# Patient Record
Sex: MALE | Race: WHITE | NOT HISPANIC OR LATINO | Employment: OTHER | URBAN - METROPOLITAN AREA
[De-identification: names, ages, dates, MRNs, and addresses within clinical notes are randomized per-mention and may not be internally consistent; named-entity substitution may affect disease eponyms.]

---

## 2017-01-04 ENCOUNTER — HOSPITAL ENCOUNTER (OUTPATIENT)
Dept: RADIOLOGY | Facility: HOSPITAL | Age: 67
Discharge: HOME | End: 2017-01-04
Attending: INTERNAL MEDICINE
Payer: MEDICARE

## 2017-01-04 ENCOUNTER — GENERIC CONVERSION - ENCOUNTER (OUTPATIENT)
Dept: OTHER | Facility: OTHER | Age: 67
End: 2017-01-04

## 2017-01-04 DIAGNOSIS — J42 CHRONIC BRONCHITIS (HCC): ICD-10-CM

## 2017-01-04 PROCEDURE — 71250 CT THORAX DX C-: CPT

## 2017-01-12 ENCOUNTER — APPOINTMENT (OUTPATIENT)
Dept: LAB | Facility: CLINIC | Age: 67
End: 2017-01-12
Payer: MEDICARE

## 2017-01-12 DIAGNOSIS — E78.5 HYPERLIPIDEMIA: ICD-10-CM

## 2017-01-12 DIAGNOSIS — I10 ESSENTIAL (PRIMARY) HYPERTENSION: ICD-10-CM

## 2017-01-12 DIAGNOSIS — I47.1 SUPRAVENTRICULAR TACHYCARDIA (HCC): ICD-10-CM

## 2017-01-12 LAB
ALBUMIN SERPL BCP-MCNC: 4 G/DL (ref 3.5–5)
ALP SERPL-CCNC: 95 U/L (ref 46–116)
ALT SERPL W P-5'-P-CCNC: 33 U/L (ref 12–78)
ANION GAP SERPL CALCULATED.3IONS-SCNC: 8 MMOL/L (ref 4–13)
AST SERPL W P-5'-P-CCNC: 13 U/L (ref 5–45)
BILIRUB SERPL-MCNC: 0.38 MG/DL (ref 0.2–1)
BUN SERPL-MCNC: 17 MG/DL (ref 5–25)
CALCIUM SERPL-MCNC: 9.2 MG/DL (ref 8.3–10.1)
CHLORIDE SERPL-SCNC: 102 MMOL/L (ref 100–108)
CO2 SERPL-SCNC: 28 MMOL/L (ref 21–32)
CREAT SERPL-MCNC: 0.98 MG/DL (ref 0.6–1.3)
GFR SERPL CREATININE-BSD FRML MDRD: >60 ML/MIN/1.73SQ M
GLUCOSE SERPL-MCNC: 114 MG/DL (ref 65–140)
POTASSIUM SERPL-SCNC: 4.4 MMOL/L (ref 3.5–5.3)
PROT SERPL-MCNC: 7.8 G/DL (ref 6.4–8.2)
SODIUM SERPL-SCNC: 138 MMOL/L (ref 136–145)

## 2017-01-12 PROCEDURE — 80053 COMPREHEN METABOLIC PANEL: CPT

## 2017-01-12 PROCEDURE — 36415 COLL VENOUS BLD VENIPUNCTURE: CPT

## 2017-02-16 ENCOUNTER — APPOINTMENT (OUTPATIENT)
Dept: LAB | Facility: CLINIC | Age: 67
End: 2017-02-16
Payer: MEDICARE

## 2017-02-16 ENCOUNTER — GENERIC CONVERSION - ENCOUNTER (OUTPATIENT)
Dept: OTHER | Facility: OTHER | Age: 67
End: 2017-02-16

## 2017-02-16 DIAGNOSIS — R73.01 IMPAIRED FASTING GLUCOSE: ICD-10-CM

## 2017-02-16 DIAGNOSIS — E78.5 HYPERLIPIDEMIA: ICD-10-CM

## 2017-02-16 DIAGNOSIS — F32.9 MAJOR DEPRESSIVE DISORDER, SINGLE EPISODE: ICD-10-CM

## 2017-02-16 DIAGNOSIS — I10 ESSENTIAL (PRIMARY) HYPERTENSION: ICD-10-CM

## 2017-02-16 LAB
ALBUMIN SERPL BCP-MCNC: 3.9 G/DL (ref 3.5–5)
ALP SERPL-CCNC: 96 U/L (ref 46–116)
ALT SERPL W P-5'-P-CCNC: 31 U/L (ref 12–78)
ANION GAP SERPL CALCULATED.3IONS-SCNC: 7 MMOL/L (ref 4–13)
AST SERPL W P-5'-P-CCNC: 19 U/L (ref 5–45)
BASOPHILS # BLD AUTO: 0.07 THOUSANDS/ΜL (ref 0–0.1)
BASOPHILS NFR BLD AUTO: 1 % (ref 0–1)
BILIRUB SERPL-MCNC: 0.63 MG/DL (ref 0.2–1)
BUN SERPL-MCNC: 20 MG/DL (ref 5–25)
CALCIUM SERPL-MCNC: 9.1 MG/DL (ref 8.3–10.1)
CHLORIDE SERPL-SCNC: 105 MMOL/L (ref 100–108)
CHOLEST SERPL-MCNC: 211 MG/DL (ref 50–200)
CO2 SERPL-SCNC: 27 MMOL/L (ref 21–32)
CREAT SERPL-MCNC: 1.05 MG/DL (ref 0.6–1.3)
EOSINOPHIL # BLD AUTO: 0.49 THOUSAND/ΜL (ref 0–0.61)
EOSINOPHIL NFR BLD AUTO: 5 % (ref 0–6)
ERYTHROCYTE [DISTWIDTH] IN BLOOD BY AUTOMATED COUNT: 12.7 % (ref 11.6–15.1)
EST. AVERAGE GLUCOSE BLD GHB EST-MCNC: 131 MG/DL
GFR SERPL CREATININE-BSD FRML MDRD: >60 ML/MIN/1.73SQ M
GLUCOSE SERPL-MCNC: 112 MG/DL (ref 65–140)
HBA1C MFR BLD: 6.2 % (ref 4.2–6.3)
HCT VFR BLD AUTO: 45.2 % (ref 36.5–49.3)
HDLC SERPL-MCNC: 46 MG/DL (ref 40–60)
HGB BLD-MCNC: 15.4 G/DL (ref 12–17)
LDLC SERPL CALC-MCNC: 141 MG/DL (ref 0–100)
LYMPHOCYTES # BLD AUTO: 3.4 THOUSANDS/ΜL (ref 0.6–4.47)
LYMPHOCYTES NFR BLD AUTO: 37 % (ref 14–44)
MCH RBC QN AUTO: 31.4 PG (ref 26.8–34.3)
MCHC RBC AUTO-ENTMCNC: 34.1 G/DL (ref 31.4–37.4)
MCV RBC AUTO: 92 FL (ref 82–98)
MONOCYTES # BLD AUTO: 0.73 THOUSAND/ΜL (ref 0.17–1.22)
MONOCYTES NFR BLD AUTO: 8 % (ref 4–12)
NEUTROPHILS # BLD AUTO: 4.6 THOUSANDS/ΜL (ref 1.85–7.62)
NEUTS SEG NFR BLD AUTO: 49 % (ref 43–75)
NRBC BLD AUTO-RTO: 0 /100 WBCS
PLATELET # BLD AUTO: 353 THOUSANDS/UL (ref 149–390)
PMV BLD AUTO: 9.6 FL (ref 8.9–12.7)
POTASSIUM SERPL-SCNC: 4.6 MMOL/L (ref 3.5–5.3)
PROT SERPL-MCNC: 8 G/DL (ref 6.4–8.2)
RBC # BLD AUTO: 4.91 MILLION/UL (ref 3.88–5.62)
SODIUM SERPL-SCNC: 139 MMOL/L (ref 136–145)
T4 FREE SERPL-MCNC: 1.23 NG/DL (ref 0.76–1.46)
TRIGL SERPL-MCNC: 119 MG/DL
TSH SERPL DL<=0.05 MIU/L-ACNC: 4.48 UIU/ML (ref 0.36–3.74)
WBC # BLD AUTO: 9.3 THOUSAND/UL (ref 4.31–10.16)

## 2017-02-16 PROCEDURE — 84443 ASSAY THYROID STIM HORMONE: CPT

## 2017-02-16 PROCEDURE — 80061 LIPID PANEL: CPT

## 2017-02-16 PROCEDURE — 80053 COMPREHEN METABOLIC PANEL: CPT

## 2017-02-16 PROCEDURE — 83036 HEMOGLOBIN GLYCOSYLATED A1C: CPT

## 2017-02-16 PROCEDURE — 84439 ASSAY OF FREE THYROXINE: CPT

## 2017-02-16 PROCEDURE — 36415 COLL VENOUS BLD VENIPUNCTURE: CPT

## 2017-02-16 PROCEDURE — 85025 COMPLETE CBC W/AUTO DIFF WBC: CPT

## 2017-02-21 ENCOUNTER — ALLSCRIPTS OFFICE VISIT (OUTPATIENT)
Dept: OTHER | Facility: OTHER | Age: 67
End: 2017-02-21

## 2017-02-27 ENCOUNTER — ALLSCRIPTS OFFICE VISIT (OUTPATIENT)
Dept: OTHER | Facility: OTHER | Age: 67
End: 2017-02-27

## 2017-02-27 ENCOUNTER — TRANSCRIBE ORDERS (OUTPATIENT)
Dept: ADMINISTRATIVE | Facility: HOSPITAL | Age: 67
End: 2017-02-27

## 2017-02-27 DIAGNOSIS — R91.8 LUNG MASS: Primary | ICD-10-CM

## 2017-03-24 ENCOUNTER — GENERIC CONVERSION - ENCOUNTER (OUTPATIENT)
Dept: OTHER | Facility: OTHER | Age: 67
End: 2017-03-24

## 2017-04-21 ENCOUNTER — ALLSCRIPTS OFFICE VISIT (OUTPATIENT)
Dept: OTHER | Facility: OTHER | Age: 67
End: 2017-04-21

## 2017-04-21 DIAGNOSIS — I10 ESSENTIAL (PRIMARY) HYPERTENSION: ICD-10-CM

## 2017-04-21 DIAGNOSIS — I47.1 SUPRAVENTRICULAR TACHYCARDIA (HCC): ICD-10-CM

## 2017-04-21 DIAGNOSIS — E78.5 HYPERLIPIDEMIA: ICD-10-CM

## 2017-07-07 ENCOUNTER — HOSPITAL ENCOUNTER (OUTPATIENT)
Dept: RADIOLOGY | Facility: HOSPITAL | Age: 67
Discharge: HOME | End: 2017-07-07
Attending: INTERNAL MEDICINE
Payer: MEDICARE

## 2017-07-07 DIAGNOSIS — R91.1 SOLITARY PULMONARY NODULE: ICD-10-CM

## 2017-07-07 PROCEDURE — 71250 CT THORAX DX C-: CPT

## 2017-07-11 ENCOUNTER — GENERIC CONVERSION - ENCOUNTER (OUTPATIENT)
Dept: OTHER | Facility: OTHER | Age: 67
End: 2017-07-11

## 2017-08-11 DIAGNOSIS — E66.9 OBESITY: ICD-10-CM

## 2017-08-11 DIAGNOSIS — E78.5 HYPERLIPIDEMIA: ICD-10-CM

## 2017-08-11 DIAGNOSIS — F32.9 MAJOR DEPRESSIVE DISORDER, SINGLE EPISODE: ICD-10-CM

## 2017-08-11 DIAGNOSIS — I10 ESSENTIAL (PRIMARY) HYPERTENSION: ICD-10-CM

## 2017-08-11 DIAGNOSIS — R73.01 IMPAIRED FASTING GLUCOSE: ICD-10-CM

## 2017-08-23 ENCOUNTER — APPOINTMENT (OUTPATIENT)
Dept: LAB | Facility: CLINIC | Age: 67
End: 2017-08-23
Payer: MEDICARE

## 2017-08-23 DIAGNOSIS — I10 ESSENTIAL (PRIMARY) HYPERTENSION: ICD-10-CM

## 2017-08-23 DIAGNOSIS — E78.5 HYPERLIPIDEMIA: ICD-10-CM

## 2017-08-23 DIAGNOSIS — I47.1 SUPRAVENTRICULAR TACHYCARDIA (HCC): ICD-10-CM

## 2017-08-23 LAB
CHOLEST SERPL-MCNC: 219 MG/DL (ref 50–200)
HDLC SERPL-MCNC: 46 MG/DL (ref 40–60)
LDLC SERPL CALC-MCNC: 149 MG/DL (ref 0–100)
TRIGL SERPL-MCNC: 120 MG/DL

## 2017-08-23 PROCEDURE — 80061 LIPID PANEL: CPT

## 2017-08-23 PROCEDURE — 36415 COLL VENOUS BLD VENIPUNCTURE: CPT

## 2017-08-30 ENCOUNTER — ALLSCRIPTS OFFICE VISIT (OUTPATIENT)
Dept: OTHER | Facility: OTHER | Age: 67
End: 2017-08-30

## 2017-09-19 ENCOUNTER — APPOINTMENT (OUTPATIENT)
Dept: LAB | Facility: CLINIC | Age: 67
End: 2017-09-19
Payer: MEDICARE

## 2017-09-19 ENCOUNTER — GENERIC CONVERSION - ENCOUNTER (OUTPATIENT)
Dept: OTHER | Facility: OTHER | Age: 67
End: 2017-09-19

## 2017-09-19 ENCOUNTER — TRANSCRIBE ORDERS (OUTPATIENT)
Dept: LAB | Facility: CLINIC | Age: 67
End: 2017-09-19

## 2017-09-19 DIAGNOSIS — I10 ESSENTIAL HYPERTENSION, MALIGNANT: ICD-10-CM

## 2017-09-19 DIAGNOSIS — E78.5 OTHER AND UNSPECIFIED HYPERLIPIDEMIA: Primary | ICD-10-CM

## 2017-09-19 DIAGNOSIS — E66.9 OBESITY: ICD-10-CM

## 2017-09-19 DIAGNOSIS — E78.5 HYPERLIPIDEMIA: ICD-10-CM

## 2017-09-19 DIAGNOSIS — E78.5 OTHER AND UNSPECIFIED HYPERLIPIDEMIA: ICD-10-CM

## 2017-09-19 DIAGNOSIS — I10 ESSENTIAL (PRIMARY) HYPERTENSION: ICD-10-CM

## 2017-09-19 DIAGNOSIS — R97.20 ELEVATED PROSTATE SPECIFIC ANTIGEN (PSA): ICD-10-CM

## 2017-09-19 DIAGNOSIS — F32.9 MAJOR DEPRESSIVE DISORDER, SINGLE EPISODE: ICD-10-CM

## 2017-09-19 DIAGNOSIS — R73.01 IMPAIRED FASTING GLUCOSE: ICD-10-CM

## 2017-09-19 LAB
ALBUMIN SERPL BCP-MCNC: 3.6 G/DL (ref 3.5–5)
ALP SERPL-CCNC: 88 U/L (ref 46–116)
ALT SERPL W P-5'-P-CCNC: 27 U/L (ref 12–78)
ANION GAP SERPL CALCULATED.3IONS-SCNC: 7 MMOL/L (ref 4–13)
AST SERPL W P-5'-P-CCNC: 20 U/L (ref 5–45)
BILIRUB SERPL-MCNC: 0.55 MG/DL (ref 0.2–1)
BUN SERPL-MCNC: 16 MG/DL (ref 5–25)
CALCIUM SERPL-MCNC: 9 MG/DL (ref 8.3–10.1)
CHLORIDE SERPL-SCNC: 106 MMOL/L (ref 100–108)
CHOLEST SERPL-MCNC: 170 MG/DL (ref 50–200)
CO2 SERPL-SCNC: 26 MMOL/L (ref 21–32)
CREAT SERPL-MCNC: 0.85 MG/DL (ref 0.6–1.3)
EST. AVERAGE GLUCOSE BLD GHB EST-MCNC: 126 MG/DL
GFR SERPL CREATININE-BSD FRML MDRD: 91 ML/MIN/1.73SQ M
GLUCOSE SERPL-MCNC: 99 MG/DL (ref 65–140)
HBA1C MFR BLD: 6 % (ref 4.2–6.3)
HDLC SERPL-MCNC: 41 MG/DL (ref 40–60)
LDLC SERPL CALC-MCNC: 110 MG/DL (ref 0–100)
POTASSIUM SERPL-SCNC: 4.3 MMOL/L (ref 3.5–5.3)
PROT SERPL-MCNC: 7.2 G/DL (ref 6.4–8.2)
PSA SERPL-MCNC: 0.9 NG/ML (ref 0–4)
SODIUM SERPL-SCNC: 139 MMOL/L (ref 136–145)
T4 FREE SERPL-MCNC: 1.02 NG/DL (ref 0.76–1.46)
TRIGL SERPL-MCNC: 93 MG/DL
TSH SERPL DL<=0.05 MIU/L-ACNC: 4.13 UIU/ML (ref 0.36–3.74)

## 2017-09-19 PROCEDURE — 84153 ASSAY OF PSA TOTAL: CPT

## 2017-09-19 PROCEDURE — 80061 LIPID PANEL: CPT

## 2017-09-19 PROCEDURE — 84439 ASSAY OF FREE THYROXINE: CPT

## 2017-09-19 PROCEDURE — 36415 COLL VENOUS BLD VENIPUNCTURE: CPT

## 2017-09-19 PROCEDURE — 80053 COMPREHEN METABOLIC PANEL: CPT

## 2017-09-19 PROCEDURE — 83036 HEMOGLOBIN GLYCOSYLATED A1C: CPT

## 2017-09-19 PROCEDURE — 84443 ASSAY THYROID STIM HORMONE: CPT

## 2017-10-02 ENCOUNTER — GENERIC CONVERSION - ENCOUNTER (OUTPATIENT)
Dept: PULMONOLOGY | Facility: HOSPITAL | Age: 67
End: 2017-10-02

## 2017-10-02 ENCOUNTER — GENERIC CONVERSION - ENCOUNTER (OUTPATIENT)
Dept: OTHER | Facility: OTHER | Age: 67
End: 2017-10-02

## 2017-10-27 ENCOUNTER — ALLSCRIPTS OFFICE VISIT (OUTPATIENT)
Dept: OTHER | Facility: OTHER | Age: 67
End: 2017-10-27

## 2017-10-28 NOTE — PROGRESS NOTES
Assessment  1  Bronchitis, asthmatic (493 90) (J45 909)    Plan  Bronchitis, asthmatic    · Azithromycin 250 MG Oral Tablet; TAKE 2 TABLETS ON DAY 1 THEN TAKE 1  TABLET A DAY FOR 4 DAYS   · PredniSONE 10 MG Oral Tablet; Take 3 tablets daily for 3 days, 2 tablets daily for 3  days, one tablet daily for 3 days then stop  Take with food  Chronic bronchitis    · ProAir  (90 Base) MCG/ACT Inhalation Aerosol Solution; INHALE 2  PUFFS EVERY 4-6 HOURS AS NEEDED   · Symbicort 160-4 5 MCG/ACT Inhalation Aerosol; TAKE 2 PUFFS TWICE  DAILY  Depression, controlled    · ClonazePAM 0 5 MG Oral Tablet; take one tab qhs  Hyperlipidemia    · Atorvastatin Calcium 80 MG Oral Tablet; TAKE 1 TABLET DAILY  Hypertension    · NIFEdipine ER Osmotic Release 90 MG Oral Tablet Extended Release 24  Hour; Take 1 tablet daily  Hypertension, Multifocal atrial tachycardia    · Metoprolol Tartrate 25 MG Oral Tablet; Take 1 tablet twice daily as needed  Multifocal atrial tachycardia    · Metoprolol Tartrate 50 MG Oral Tablet; TAKE 1 TABLET TWICE DAILY  Spinal stenosis    · Diclofenac Sodium 50 MG Oral Tablet Delayed Release; take 1 tablet 3 times  a day as needed  Unlinked    · DULoxetine HCl - 60 MG Oral Capsule Delayed Release Particles; take 1  capsule daily   · Gabapentin 300 MG Oral Capsule; take 1 capsule 4 times daily   · Loratadine 10 MG Oral Tablet; TAKE 1 TABLET DAILY   · Multi-Vitamin TABS; TAKE 1 TABLET DAILY    Discussion/Summary    A lot of fluids and rest  zpack  SE educated pt  prednisone  SE educated pt  symbicort bid  use proair as needed  May use OTC cough meds  office if symptoms no improving or worse  Possible side effects of new medications were reviewed with the patient/guardian today  The treatment plan was reviewed with the patient/guardian  The patient/guardian understands and agrees with the treatment plan      Chief Complaint  Patient presents with possible bronchitis  He has a deep, rattling cough and sore throat  History of Present Illness  HPI: Pt is here by himself  cough for 2 days  Feels wheezing  Little SOB  ear pain, sore throat  Some postnasal drip  fever, chest pain, n/v/abd pain  smoking  of asthma/chronic bronchitis  Use symbicort bid  FU pulmonary  Review of Systems    Constitutional: no fever or chills, feels well, no tiredness, no recent weight loss or weight gain  ENT: no complaints of earache, no loss of hearing, no nosebleeds or nasal discharge, no sore throat or hoarseness  Cardiovascular: no complaints of slow or fast heart rate, no chest pain, no palpitations, no leg claudication or lower extremity edema  Respiratory: as noted in HPI  Gastrointestinal: no complaints of abdominal pain, no constipation, no nausea or vomiting, no diarrhea or bloody stools  Musculoskeletal: no complaints of arthralgia, no myalgia, no joint swelling or stiffness, no limb pain or swelling  Active Problems  1  Allergic rhinitis (477 9) (J30 9)   2  Benign prostate hyperplasia (600 00) (N40 0)   3  Chronic bronchitis (491 9) (J42)   4  Depression, controlled (311) (F32 9)   5  Elevated ALT measurement (790 4) (R74 0)   6  Hyperlipidemia (272 4) (E78 5)   7  Hypertension (401 9) (I10)   8  Impaired fasting glucose (790 21) (R73 01)   9  Lung nodule (793 11) (R91 1)   10  Male erectile dysfunction (607 84) (N52 9)   11  Multifocal atrial tachycardia (427 89) (I47 1)   12  Need for prophylactic vaccination and inoculation against influenza (V04 81) (Z23)   13  Need for vaccination with 13-polyvalent pneumococcal conjugate vaccine (V03 82) (Z23)   14  Need for zoster vaccination (V04 89) (Z23)   15  Obesity (278 00) (E66 9)   16  Osteoarthritis of knee (715 36) (M17 10)   17  Osteoarthritis of left hip, unspecified osteoarthritis type (715 95) (M16 12)   18  Palpitations (785 1) (R00 2)   19  Pre-operative cardiovascular examination (V72 81) (Z01 810)   20  Screen for colon cancer (V76 51) (Z12 11)   21  Seasonal allergies (477 9) (J30 2)   22  Spinal stenosis (724 00) (M48 00)   23  Supraventricular tachycardia (427 89) (I47 1)   24  Visit for pre-operative examination (V72 84) (Z01 818)   25  Welcome to Medicare preventive visit (V70 0) (Z00 00)    Past Medical History  1  History of Acute upper respiratory infection (465 9) (J06 9)   2  History of Bronchitis, chronic obstructive, with exacerbation (491 21) (J44 1)   3  History of COPD with acute exacerbation (491 21) (J44 1)   4  History of Elevated prostate specific antigen (PSA) (790 93) (R97 20)    Family History  Mother    1  Family history of Breast Cancer (V16 3)  Father    2  Family history of Pancreatitis  Maternal Grandmother    3  Family history of Diabetes Mellitus (V18 0)  Paternal Grandmother    4  Family history of Acute Myocardial Infarction (V17 3)   5  Family history of Diabetes Mellitus (V18 0)  Maternal Grandfather    6  Family history of Acute Myocardial Infarction (V17 3)  Paternal Grandfather    7  Family history of Diabetes Mellitus (V18 0)    Social History   · Former smoker (Q35 314)   · Quit in 2000  Smoked for 23years a ppd  · Stopped Drinking Alcohol    Surgical History  1  History of Colonoscopy (Fiberoptic)   2  History of Knee Arthroscopy (Therapeutic)   3  History of Lower Back Surgery   4  History of Needle Biopsy Of Prostate   5  History of Transurethral Resection Of Prostate (TURP)    Current Meds   1  Aspirin 81 MG Oral Tablet Chewable; one tab po daily; Therapy: 47Ulb4198 to (Evaluate:64Ggi7012); Last Rx:27Fad7928 Ordered   2  Atorvastatin Calcium 80 MG Oral Tablet; TAKE 1 TABLET DAILY; Therapy: 57WGU0421 to ([phone])  Requested for: 38Iej7691; Last   Rx:13Pby5892 Ordered   3  Cialis 5 MG Oral Tablet; One po daily; Therapy: 96LNO9713 to (Evaluate:80Lub0424)  Requested for: 27Enj9870; Last   Rx:86Qda9173 Ordered   4   ClonazePAM 0 5 MG Oral Tablet; take one tab qhs;   Therapy: 90VLY7894 to (Nazanin Freeze) Requested for: 09YZO5635; Last   Rx:14Vcv9695 Ordered   5  Diclofenac Sodium 50 MG Oral Tablet Delayed Release; take 1 tablet 3 times a day as   needed; Therapy: 86Yxx8126 to (Evaluate:19Nov2017)  Requested for: 46Byr1558; Last   Rx:39Nml6221 Ordered   6  DULoxetine HCl - 60 MG Oral Capsule Delayed Release Particles; take 1 capsule daily; Therapy: (GHTSNGUE:37EQE3091) to Recorded   7  Gabapentin 300 MG Oral Capsule; take 1 capsule 4 times daily; Therapy: (MVVWDEFJ:19FEB8035) to Recorded   8  Loratadine 10 MG Oral Tablet; TAKE 1 TABLET DAILY; Therapy: 76KKD7955 to Recorded   9  Metoprolol Tartrate 25 MG Oral Tablet; Take 1 tablet twice daily as needed; Therapy: 98LDX3366 to Rebeca Arellano)  Requested for: 23Oct2017; Last   FU:77VYE3304 Ordered   10  Metoprolol Tartrate 50 MG Oral Tablet; TAKE 1 TABLET TWICE DAILY; Therapy[facility]IG1319 [facility]  Requested for: 30Aug2017; Last    Rx:21Mar2017 Ordered   11  Multi-Vitamin TABS; TAKE 1 TABLET DAILY; Therapy: (QPOSZMOT:65CEO3680) to Recorded   12  NIFEdipine ER Osmotic Release 90 MG Oral Tablet Extended Release 24 Hour; Take 1    tablet daily; Therapy: 58JHB5313 to (Evaluate:63Tox2945)  Requested for: 28Frt5442; Last    Rx:05Nrk9787 Ordered   13  Symbicort 160-4 5 MCG/ACT Inhalation Aerosol; TAKE 2 PUFFS TWICE DAILY; Therapy: 18Lnw6815 to (Evaluate:49Qpq5685)  Requested for: 60EYL6065; Last    Rx:18Oct2017 Ordered    Allergies  1   No Known Drug Allergies    Vitals   Recorded: 10UPE7941 11:18AM Recorded: 42FZG3703 11:00AM   Temperature  97 9 F, Tympanic   Heart Rate  78, L Radial   Pulse Quality  Normal, L Radial   Respiration Quality  Difficult   Respiration  16   Systolic  920, LUE, Sitting   Diastolic  72, LUE, Sitting   Height  5 ft 8 in   Weight  237 lb 8 oz   BMI Calculated  36 11   BSA Calculated  2 2   O2 Saturation 98, RA    Pain Scale  0     Physical Exam    Constitutional   General appearance: No acute distress, well appearing and well nourished  Ears, Nose, Mouth, and Throat   External inspection of ears and nose: Normal     Otoscopic examination: Tympanic membrance translucent with normal light reflex  Canals patent without erythema  Nasal mucosa, septum, and turbinates: Abnormal  -- swollen  Oropharynx: Abnormal  -- mild erythema, no exudate  Pulmonary   Respiratory effort: No increased work of breathing or signs of respiratory distress  Auscultation of lungs: Clear to auscultation, equal breath sounds bilaterally, no wheezes, no rales, no rhonci  Cardiovascular   Auscultation of heart: Normal rate and rhythm, normal S1 and S2, without murmurs  Examination of extremities for edema and/or varicosities: Normal     Carotid pulses: Normal     Abdomen   Abdomen: Non-tender, no masses  Liver and spleen: No hepatomegaly or splenomegaly  Lymphatic   Palpation of lymph nodes in neck: No lymphadenopathy  Musculoskeletal   Gait and station: Normal          Future Appointments    Date/Time Provider Specialty Site   02/28/2018 10:00 AM Charito Obando MD Family Medicine [address] [place] [address]   12/12/2017 03:00 PM Kavita De Jesus DO Cardiology [facility]     Signatures   Electronically signed by :  Nella Payne MD; Oct 27 2017 11:29AM EST                       (Author)

## 2017-11-06 ENCOUNTER — GENERIC CONVERSION - ENCOUNTER (OUTPATIENT)
Dept: OTHER | Facility: OTHER | Age: 67
End: 2017-11-06

## 2017-11-06 DIAGNOSIS — J45.909 UNCOMPLICATED ASTHMA: ICD-10-CM

## 2017-12-12 ENCOUNTER — ALLSCRIPTS OFFICE VISIT (OUTPATIENT)
Dept: OTHER | Facility: OTHER | Age: 67
End: 2017-12-12

## 2017-12-12 ENCOUNTER — GENERIC CONVERSION - ENCOUNTER (OUTPATIENT)
Dept: OTHER | Facility: OTHER | Age: 67
End: 2017-12-12

## 2017-12-12 DIAGNOSIS — E78.5 HYPERLIPIDEMIA: ICD-10-CM

## 2017-12-12 DIAGNOSIS — I47.1 SUPRAVENTRICULAR TACHYCARDIA (HCC): ICD-10-CM

## 2017-12-12 DIAGNOSIS — I10 ESSENTIAL (PRIMARY) HYPERTENSION: ICD-10-CM

## 2017-12-13 NOTE — PROGRESS NOTES
Assessment  Assessed    1  Hyperlipidemia (272 4) (E78 5)   2  Hypertension (401 9) (I10)   3  Multifocal atrial tachycardia (427 89) (I47 1)    Plan  Hyperlipidemia, Hypertension, Multifocal atrial tachycardia    · ECHO COMPLETE WITH CONTRAST IF INDICATED; Status:Hold For - Scheduling;Requested for:78Wsq3910;    Perform:[facility]; Due:04Keh2398; Ordered; Hypertension, Multifocal atrial tachycardia; Ordered By:Franky Ace;   · Follow-up visit in 6 months Evaluation and Treatment  Follow-up  Status: Hold For -Scheduling  Requested for: 87Vqu7535   Ordered; Hyperlipidemia, Hypertension, Multifocal atrial tachycardia; Ordered By: Nestor Capellan Performed:  Due: 29IET3206    Discussion/Summary  Cardiology Discussion Summary Free Text Note Form [facility]:   Overall he's doing well  Multifocal atrial tachycardia is well controlled with beta blockers  Blood pressure and cholesterol have been doing well  He's due for a echo Esdras De La Fuente made no other recommendations other medications will remain the same and told him he can take an extra 25 mg of metoprolol the a m  to help with palpitations  I also recommended he stop any energy drinks  Chief Complaint  Chief Complaint Free Text Note Form: Patient is here for 8 month follow up  Patient has no cardiac complaints  History of Present Illness  Cardiology HPI Free Text Note Form [facility]: Mr Denise Marvin Is a very pleasant 69-year-old gentleman with a history of palpitations, hypertension, hyperlipidemia who presents for followup visit  His rhythm strips had confirmed episodes of multifocal atrial tachycardia  He has minimal symptoms on these events, but the palpitations have been more frequently during the summer  I started him on metoprolol about 2 weeks ago and he says that the symptoms resolved immediately upon starting the new medication  He's not had any palpitations over last 2 weeks   He started taking a half tablet twice a day and then went up to a full tablet twice a day  With a full tablet he did have an episode of dizziness so he dropped back down to half tablet and is been doing quite well  He is a good functional capacity for age and offers no other symptoms  the higher dose of metoprolol he's been feeling much better has had no recurrence of his symptoms of palpitations  He was diagnosed multifocal atrial tachycardia  Overall he remains quite physically active with no limitations  Blood pressures been very well controlled at home  He denies any exertional chest pain, shortness of breath, palpitations, lightheadedness, dizziness, or syncope  Lipid panel was reviewed his numbers were going up in February he tells me he's lost 20 pounds and is working very hard to get his numbers down  presents today for follow-up visit regarding his palpitations and multifocal atrial tachycardia overall he's been doing quite well Without significant symptoms  He does have occasional palpitations which the metoprolol controls well  He is a good functional capacity denies any chest pain, shortness of breath, headedness, dizziness, or syncope area he is been staying well hydrated retry still limit his caffeine intake area he did mention to me that he was drinking an energy drink prior to his workouts the occasional palpitations seem to be more likely to happen during the evening  Review of Systems  Cardiology Male ROS:    Cardiac: No complaints of chest pain, no palpitations, no fainiting  Skin: No complaints of nonhealing sores or skin rash  Genitourinary: No complaints of recurrent urinary tract infections, frequent urination at night, difficult urination, blood in urine, kidney stones, loss of bladder control, no kidney or prostate problems, no erectile dysfunction  Psychological: No complaints of feeling depressed, anxiety, panic attacks, or difficulty concentrating    General: No complaints of trouble sleeping, lack of energy, fatigue, appetite changes, weight changes, fever, frequent infections, or night sweats  Respiratory: No complaints of shortness of breath, cough with sputum, or wheezing  HEENT: No complaints of serious problems, hearing problems, nose problems, throat problems, or snoring  Gastrointestinal: No complaints of liver problems, nausea, vomiting, heartburn, constipation, bloody stools, diarrhea, problems swallowing, adbominal pain, or rectal bleeding  Hematologic: No complaints of bleeding disorders, anemia, blood clots, or excessive brusing  Neurological: No complaints of numbness, tingling, dizziness, weakness, seizures, headaches, syncope or fainting, AM fatigue, daytime sleepiness, no witnessed apnea episodes  Musculoskeletal: No complaints of arthritis, back pain, or painfull swelling  Active Problems  Problems    1  Allergic rhinitis (477 9) (J30 9)   2  Benign prostate hyperplasia (600 00) (N40 0)   3  Bronchitis, asthmatic (493 90) (J45 909)   4  Chronic bronchitis (491 9) (J42)   5  Depression, controlled (311) (F32 9)   6  Elevated ALT measurement (790 4) (R74 0)   7  Hyperlipidemia (272 4) (E78 5)   8  Hypertension (401 9) (I10)   9  Impaired fasting glucose (790 21) (R73 01)   10  Lung nodule (793 11) (R91 1)   11  Male erectile dysfunction (607 84) (N52 9)   12  Multifocal atrial tachycardia (427 89) (I47 1)   13  Need for prophylactic vaccination and inoculation against influenza (V04 81) (Z23)   14  Need for vaccination with 13-polyvalent pneumococcal conjugate vaccine (V03 82) (Z23)   15  Need for zoster vaccination (V04 89) (Z23)   16  Obesity (278 00) (E66 9)   17  Osteoarthritis of knee (715 36) (M17 10)   18  Osteoarthritis of left hip, unspecified osteoarthritis type (715 95) (M16 12)   19  Palpitations (785 1) (R00 2)   20  Pre-operative cardiovascular examination (V72 81) (Z01 810)   21  Screen for colon cancer (V76 51) (Z12 11)   22  Seasonal allergies (477 9) (J30 2)   23   Spinal stenosis (724 00) (M48 00)   24  Supraventricular tachycardia (427 89) (I47 1)   25  Visit for pre-operative examination (V72 84) (Z01 818)   26  Welcome to Medicare preventive visit (V70 0) (Z00 00)    Past Medical History  Problems    1  History of Acute upper respiratory infection (465 9) (J06 9)   2  History of Bronchitis, chronic obstructive, with exacerbation (491 21) (J44 1)   3  History of COPD with acute exacerbation (491 21) (J44 1)   4  History of Elevated prostate specific antigen (PSA) (790 93) (R97 20)  Active Problems And Past Medical History Reviewed: The active problems and past medical history were reviewed and updated today  Surgical History  Problems    1  History of Colonoscopy (Fiberoptic)   2  History of Knee Arthroscopy (Therapeutic)   3  History of Lower Back Surgery   4  History of Needle Biopsy Of Prostate   5  History of Transurethral Resection Of Prostate (TURP)  Surgical History Reviewed: The surgical history was reviewed and updated today  Family History  Mother    1  Family history of Breast Cancer (V16 3)  Father    2  Family history of Pancreatitis  Maternal Grandmother    3  Family history of Diabetes Mellitus (V18 0)  Paternal Grandmother    4  Family history of Acute Myocardial Infarction (V17 3)   5  Family history of Diabetes Mellitus (V18 0)  Maternal Grandfather    6  Family history of Acute Myocardial Infarction (V17 3)  Paternal Grandfather    7  Family history of Diabetes Mellitus (V18 0)  Family History Reviewed: The family history was reviewed and updated today  Social History  Problems    · Former smoker (D19 594)   · Stopped Drinking Alcohol  Social History Reviewed: The social history was reviewed and updated today  Current Meds   1  Aspirin 81 MG Oral Tablet Chewable; one tab po daily; Therapy: 94Hvn4849 to (Evaluate:29Gfi7906); Last Rx:41Ljb5211 Ordered   2  Atorvastatin Calcium 80 MG Oral Tablet; Take 1 tablet daily;  Therapy: 76INS2986 to (Last Rx:97Bdi4283)  Requested for: 93TNE9729 Ordered   3  Cialis 5 MG Oral Tablet; One po daily; Therapy: 30SRH4830 to (Evaluate:12Apr2018)  Requested for: 25WXW7081; Last Rx:91Lnm3944 Ordered   4  ClonazePAM 0 5 MG Oral Tablet; take one tab qhs; Therapy: 84CWM9125 to (Evaluate:15Jan2018)  Requested for: 16CRG8663; Last Rx:17Nov2017 Ordered   5  Diclofenac Sodium 50 MG Oral Tablet Delayed Release; take 1 tablet 3 times a day as needed; Therapy: 32Qom5993 to (Dyana Gardner)  Requested for: 34FIP6555; Last Rx:06Nov2017 Ordered   6  DULoxetine HCl - 60 MG Oral Capsule Delayed Release Particles; take 1 capsule daily; Therapy: (75 196 772) to Recorded   7  Gabapentin 300 MG Oral Capsule; take 1 capsule 4 times daily; Therapy: (75 196 772) to Recorded   8  Loratadine 10 MG Oral Tablet; TAKE 1 TABLET DAILY; Therapy: 62IPU4401 to Recorded   9  Metoprolol Tartrate 25 MG Oral Tablet; Take 1 tablet twice daily as needed; Therapy: 21BQF7248 to [id number])  Requested for: 02WHP1354; Last XJ:62WAU7347 Ordered   10  Metoprolol Tartrate 50 MG Oral Tablet; TAKE 1 TABLET TWICE DAILY; Therapy: 24GHB4054 to (Evaluate:16Mar2018)  Requested for: 78Vgu2989; Last  Rx:21Mar2017 Ordered   11  Multi-Vitamin TABS; TAKE 1 TABLET DAILY; Therapy: (75 196 772) to Recorded   12  NIFEdipine ER Osmotic Release 90 MG Oral Tablet Extended Release 24 Hour; Take 1  tablet daily; Therapy: 20FDP3197 to (Evaluate:72Srt1507)  Requested for: 79FRP0152; Last  Rx:89Hhk7493 Ordered   13  Symbicort 160-4 5 MCG/ACT Inhalation Aerosol; TAKE 2 PUFFS TWICE DAILY; Therapy: 96FDH4638 to (Evaluate:76Qrk8763)  Requested for: 99ZPL0310; Last  Rx:36Nyu4757 Ordered    Allergies  Medication    1   No Known Drug Allergies    Vitals  Vital Signs    Recorded: 50Mxa7612 02:54PM   Heart Rate 62, R Radial   Systolic 972, LUE, Sitting   Diastolic 58, LUE, Sitting   Height 5 ft 8 in   Weight 236 lb    BMI Calculated 35 88   BSA Calculated 2 19 Physical Exam   Constitutional  General appearance: No acute distress, well appearing and well nourished  Eyes  Conjunctiva and Sclera examination: Conjunctiva pink, sclera anicteric  Ears, Nose, Mouth, and Throat - Oropharynx: Clear, nares are clear, mucous membranes are moist   Neck  Neck and thyroid: Normal, supple, trachea midline, no thyromegaly  Pulmonary  Respiratory effort: No increased work of breathing or signs of respiratory distress  Auscultation of lungs: Clear to auscultation, no rales, no rhonchi, no wheezing, good air movement  Cardiovascular  Auscultation of heart: Normal rate and rhythm, normal S1 and S2, no murmurs  Carotid pulses: Normal, 2+ bilaterally  Peripheral vascular exam: Normal pulses throughout, no tenderness, erythema or swelling  Pedal pulses: Normal, 2+ bilaterally  Examination of extremities for edema and/or varicosities: Normal    Abdomen  Abdomen: Non-tender and no distention  Liver and spleen: No hepatomegaly or splenomegaly  Musculoskeletal Gait and station: Normal gait  -- Digits and nails: Normal without clubbing or cyanosis  -- Inspection/palpation of joints, bones, and muscles: Normal, ROM normal    Skin - Skin and subcutaneous tissue: Normal without rashes or lesions  Skin is warm and well perfused, normal turgor  Neurologic - Cranial nerves: II - XII intact  -- Speech: Normal    Psychiatric - Orientation to person, place, and time: Normal -- Mood and affect: Normal       Results/Data  ECG Report: Normal sinus rhythm      Future Appointments    Date/Time Provider Specialty Site   02/28/2018 10:00 AM Dorcas Grey MD Family Medicine [address]   Electronically signed by : Elvis Schmidt DO; Dec 12 2017  4:06PM EST                       (Author)

## 2018-01-05 ENCOUNTER — HOSPITAL ENCOUNTER (OUTPATIENT)
Dept: NON INVASIVE DIAGNOSTICS | Facility: CLINIC | Age: 68
Discharge: HOME | End: 2018-01-05
Payer: MEDICARE

## 2018-01-05 ENCOUNTER — GENERIC CONVERSION - ENCOUNTER (OUTPATIENT)
Dept: CARDIOLOGY CLINIC | Facility: CLINIC | Age: 68
End: 2018-01-05

## 2018-01-05 DIAGNOSIS — I47.1 SUPRAVENTRICULAR TACHYCARDIA (HCC): ICD-10-CM

## 2018-01-05 DIAGNOSIS — I10 ESSENTIAL (PRIMARY) HYPERTENSION: ICD-10-CM

## 2018-01-05 DIAGNOSIS — E78.5 HYPERLIPIDEMIA: ICD-10-CM

## 2018-01-05 PROCEDURE — 93306 TTE W/DOPPLER COMPLETE: CPT

## 2018-01-10 NOTE — RESULT NOTES
Message   CXR is normal       Verified Results  * XR CHEST PA & LATERAL 12ZMT4493 08:25AM Talia Lincoln    Order Number: [id number]     Test Name Result Flag Reference   XR CHEST PA & LATERAL (Report)     CHEST - DUAL ENERGY     INDICATION: Dyspnea     COMPARISON: 4/7/2015     VIEWS: PA (including soft tissue/bone algorithms) and lateral projections; 4 images     FINDINGS:        Cardiomediastinal silhouette appears unremarkable  A few calcified granulomas noted  Lungs otherwise appear clear  No pleural effusion, consolidation or pneumothorax  Visualized osseous structures appear within normal limits for the patient's age  IMPRESSION:     No active pulmonary disease         Workstation performed: [id number]     Signed by:   Lenwood Crigler, MD   11/15/16

## 2018-01-10 NOTE — RESULT NOTES
Verified Results  (1) HEMOGLOBIN A1C 19Sep2017 09:53AM [facility] Order Number: [id number]     Test Name Result Flag Reference   HEMOGLOBIN A1C 6 0 %  4 2-6 3   EST  AVG  GLUCOSE 126 mg/dl       (1) TSH WITH FT4 REFLEX 19Sep2017 09:53AM [facility]    Order Number: [id number]     Test Name Result Flag Reference   TSH 4 130 uIU/mL H 0 358-3 740   Patients undergoing fluorescein dye angiography may retain small amounts of fluorescein in the body for 48-72 hours post procedure  Samples containing fluorescein can produce falsely depressed TSH values  If the patient had this procedure,a specimen should be resubmitted post fluorescein clearance  T4,FREE 1 02 ng/dL  0 76-1 46   Specimen collection should occur prior to Sulfasalazine administration due to the potential for falsely elevated results  (1) COMPREHENSIVE METABOLIC PANEL 38PWV1048 07:61RS [facility]    Order Number: [id number]     Test Name Result Flag Reference   GLUCOSE,RANDM 99 mg/dL     If the patient is fasting, the ADA then defines impaired fasting glucose as > 100 mg/dL and diabetes as > or equal to 123 mg/dL  Specimen collection should occur prior to Sulfasalazine administration due to the potential for falsely depressed results  Specimen collection should occur prior to Sulfapyridine administration due to the potential for falsely elevated results  SODIUM 139 mmol/L  136-145   POTASSIUM 4 3 mmol/L  3 5-5 3   CHLORIDE 106 mmol/L  100-108   CARBON DIOXIDE 26 mmol/L  21-32   ANION GAP (CALC) 7 mmol/L  4-13   BLOOD UREA NITROGEN 16 mg/dL  5-25   CREATININE 0 85 mg/dL  0 60-1 30   Standardized to IDMS reference method   CALCIUM 9 0 mg/dL  8 3-10 1   BILI, TOTAL 0 55 mg/dL  0 20-1 00   ALK PHOSPHATAS 88 U/L     ALT (SGPT) 27 U/L  12-78   Specimen collection should occur prior to Sulfasalazine and/or Sulfapyridine administration due to the potential for falsely depressed results     AST(SGOT) 20 U/L 5-45   Specimen collection should occur prior to Sulfasalazine administration due to the potential for falsely depressed results  ALBUMIN 3 6 g/dL  3 5-5 0   TOTAL PROTEIN 7 2 g/dL  6 4-8 2   eGFR 91 ml/min/1 73sq m     National Kidney Disease Education Program recommendations are as follows:  GFR calculation is accurate only with a steady state creatinine  Chronic Kidney disease less than 60 ml/min/1 73 sq  meters  Kidney failure less than 15 ml/min/1 73 sq  meters

## 2018-01-12 VITALS
HEART RATE: 78 BPM | DIASTOLIC BLOOD PRESSURE: 62 MMHG | HEIGHT: 68 IN | BODY MASS INDEX: 38.19 KG/M2 | TEMPERATURE: 97.3 F | SYSTOLIC BLOOD PRESSURE: 122 MMHG | WEIGHT: 252 LBS | RESPIRATION RATE: 16 BRPM

## 2018-01-12 VITALS
HEIGHT: 68 IN | WEIGHT: 247 LBS | OXYGEN SATURATION: 94 % | BODY MASS INDEX: 37.44 KG/M2 | TEMPERATURE: 97 F | HEART RATE: 83 BPM | DIASTOLIC BLOOD PRESSURE: 68 MMHG | SYSTOLIC BLOOD PRESSURE: 128 MMHG | RESPIRATION RATE: 16 BRPM

## 2018-01-12 NOTE — RESULT NOTES
Message   HgA1C 5 5 normal   WBC normal   hemoglobin normal   platelet normal   total cholesterol 203 slightly elevated   triglyceride 111 normal   HDL 43    slightly elevated   Pt should follow low fat diet     electrolytes normal   liver enzymes normal   kidney function normal   TSH normal     Verified Results  (1) CBC/PLT/DIFF [id number] 08:46AM Dorcas Grey    Order Number: [id number]    TW Order Number: [id number]     Test Name Result Flag Reference   WBC COUNT 7 14 Thousand/uL  4 31-10 16   RBC COUNT 4 78 Million/uL  3 88-5 62   HEMOGLOBIN 14 4 g/dL  12 0-17 0   HEMATOCRIT 43 2 %  36 5-49 3   MCV 90 fL  82-98   MCH 30 1 pg  26 8-34 3   MCHC 33 3 g/dL  31 4-37 4   RDW 12 6 %  11 6-15 1   MPV 9 3 fL  8 9-12 7   PLATELET COUNT 498 Thousands/uL  149-390   NEUTROPHILS RELATIVE PERCENT 51 %  43-75   LYMPHOCYTES RELATIVE PERCENT 31 %  14-44   MONOCYTES RELATIVE PERCENT 7 %  4-12   EOSINOPHILS RELATIVE PERCENT 10 % H 0-6   BASOPHILS RELATIVE PERCENT 1 %  0-1   NEUTROPHILS ABSOLUTE COUNT 3 56 Thousands/µL  1 85-7 62   LYMPHOCYTES ABSOLUTE COUNT 2 24 Thousands/µL  0 60-4 47   MONOCYTES ABSOLUTE COUNT 0 52 Thousand/µL  0 17-1 22   EOSINOPHILS ABSOLUTE COUNT 0 74 Thousand/µL H 0 00-0 61   BASOPHILS ABSOLUTE COUNT 0 08 Thousands/µL  0 00-0 10     (1) COMPREHENSIVE METABOLIC PANEL [id number] 03:40BL Dorcas Grey    Order Number: [id number]      National Kidney Disease Education Program recommendations are as follows:  GFR calculation is accurate only with a steady state creatinine  Chronic Kidney disease less than 60 ml/min/1 73 sq  meters  Kidney failure less than 15 ml/min/1 73 sq  meters  Test Name Result Flag Reference   GLUCOSE,RANDM 107 mg/dL     If the patient is fasting, the ADA then defines impaired fasting glucose as > 100 mg/dL and diabetes as > or equal to 123 mg/dL     SODIUM 138 mmol/L  136-145   POTASSIUM 4 2 mmol/L  3 5-5 3   CHLORIDE 103 mmol/L  100-108   CARBON DIOXIDE 28 mmol/L  21-32 ANION GAP (CALC) 7 mmol/L  4-13   BLOOD UREA NITROGEN 13 mg/dL  5-25   CREATININE 0 82 mg/dL  0 60-1 30   Standardized to IDMS reference method   CALCIUM 8 6 mg/dL  8 3-10 1   BILI, TOTAL 0 56 mg/dL  0 20-1 00   ALK PHOSPHATAS 85 U/L     ALT (SGPT) 27 U/L  12-78   AST(SGOT) 20 U/L  5-45   ALBUMIN 3 7 g/dL  3 5-5 0   TOTAL PROTEIN 7 4 g/dL  6 4-8 2   eGFR Non-African American      >60 0 ml/min/1 73sq m     (1) LIPID PANEL FASTING W DIRECT LDL REFLEX [id number] 08:46AM [employer]   TW Order Number: [id number]      Triglyceride:         Normal              <150 mg/dl       Borderline High    150-199 mg/dl       High               200-499 mg/dl       Very High          >499 mg/dl  Cholesterol:         Desirable        <200 mg/dl      Borderline High  200-239 mg/dl      High             >239 mg/dl  HDL Cholesterol:        High    >59 mg/dL      Low     <41 mg/dL  LDL Cholesterol:        Optimal          <100 mg/dl         Near Optimal     100-129 mg/dl        Above Optimal          Borderline High   130-159 mg/dl          High              160-189 mg/dl          Very High        >189 mg/dl  LDL CALCULATED:    This screening LDL is a calculated result  It does not have the accuracy of the Direct Measured LDL in the monitoring of patients with hyperlipidemia and/or statin therapy  Direct Measure LDL (ASD165) must be ordered separately in these patients  Test Name Result Flag Reference   CHOLESTEROL 203 mg/dL H    LDL CHOLESTEROL CALCULATED 138 mg/dL H 0-100   TRIGLYCERIDES 111 mg/dL  <=150   Specimen collection should occur prior to N-Acetylcysteine or Metamizole administration due to the potential for falsely depressed results  HDL,DIRECT 43 mg/dL  40-60   Specimen collection should occur prior to Metamizole administration due to the potential for falsely depressed results       (1) HEMOGLOBIN A1C [id number] 08:46AM Arigo Order Number: [id number]      5 7-6 4% impaired fasting glucose  >=6 5% diagnosis of diabetes    Falsely low levels are seen in conditions linked to short RBC life span-  hemolytic anemia, and splenomegaly  Falsely elevated levels are seen in situations where there is an increased production of RBC- receipt of erythropoietin or blood transfusions  Adopted from ADA-Clinical Practice Recommendations     Test Name Result Flag Reference   HEMOGLOBIN A1C 5 5 %  4 0-5 6   EST  AVG  GLUCOSE 111 mg/dl       (1) TSH WITH FT4 REFLEX [id number] 08:46AM Johnathan Norton    Order Number: [id number]    Patients undergoing fluorescein dye angiography may retain small amounts of fluorescein in the body for 48-72 hours post procedure  Samples containing fluorescein can produce falsely depressed TSH values  If the patient had this procedure,a specimen should be resubmitted post fluorescein clearance       Test Name Result Flag Reference   TSH 3 229 uIU/mL  0 358-3 740

## 2018-01-12 NOTE — RESULT NOTES
Verified Results  * CT CHEST WO CONTRAST [id number] 11:47AM Vivienne Cason Order Number: [id number]   Per[facility] Comments[facility]   - Patient Instructions: To schedule this appointment, please contact Central Scheduling at [phone]  Test Name Result Flag Reference   CT CHEST WO CONTRAST (Report)     CT CHEST WITHOUT IV CONTRAST     INDICATION: Persistent cough and wheezing with shortness of breath, history of chronic bronchitis     COMPARISON: 7/21/2014     TECHNIQUE: CT examination of the chest was performed without intravenous contrast  Axial, sagittal and coronal reformatted images were submitted for interpretation  Coronal thick section MIP (maximal intensity projection) images were also created  This examination, like all CT scans performed in the [facility], was performed utilizing techniques to minimize radiation dose exposure, including the use of iterative reconstruction and automated exposure control  FINDINGS:     LUNGS: There is a 5 mm left lower lobe nodule on series 3, image 36 which was not present previously  Again seen are calcified granulomata  There is no tracheal or endobronchial lesion  PLEURA: Unremarkable  HEART/GREAT VESSELS: Unremarkable for patient's age  MEDIASTINUM AND CARMITA: Unremarkable  CHEST WALL AND LOWER NECK: Unremarkable  VISUALIZED STRUCTURES IN THE UPPER ABDOMEN: There is a 4 mm nonobstructing left renal calculus with overlying cortical scarring  OSSEOUS STRUCTURES: No acute fracture  No destructive osseous lesion  IMPRESSION:     1  5 mm left lower lobe nodule, not present previously  Given the patient's history of smoking, 6 month follow-up CT is recommended  2  4 mm nonobstructing left renal calculus with overlying cortical scarring       ##sigslh##sigslh     ##fuslh6##fuslh6       Workstation performed: [id number]     Signed by:   Casi Saab MD   1/4/17

## 2018-01-12 NOTE — RESULT NOTES
Message   DW pt on 8/18/2016 OV  Verified Results  (1) COMPREHENSIVE METABOLIC PANEL [id number] 19:98MF [place] Order Number: [id number]     Test Name Result Flag Reference   GLUCOSE,RANDM 93 mg/dL     If the patient is fasting, the ADA then defines impaired fasting glucose as > 100 mg/dL and diabetes as > or equal to 123 mg/dL  SODIUM 139 mmol/L  136-145   POTASSIUM 3 6 mmol/L  3 5-5 3   CHLORIDE 103 mmol/L  100-108   CARBON DIOXIDE 29 mmol/L  21-32   ANION GAP (CALC) 7 mmol/L  4-13   BLOOD UREA NITROGEN 15 mg/dL  5-25   CREATININE 0 87 mg/dL  0 60-1 30   Standardized to IDMS reference method   CALCIUM 8 7 mg/dL  8 3-10 1   BILI, TOTAL 0 53 mg/dL  0 20-1 00   ALK PHOSPHATAS 102 U/L     ALT (SGPT) 36 U/L  12-78   AST(SGOT) 23 U/L  5-45   ALBUMIN 3 5 g/dL  3 5-5 0   TOTAL PROTEIN 7 4 g/dL  6 4-8 2   eGFR Non-African American      >60 0 ml/min/1 73sq [facility] [employer] Disease Education Program recommendations are as follows:  GFR calculation is accurate only with a steady state creatinine  Chronic Kidney disease less than 60 ml/min/1 73 sq  meters  Kidney failure less than 15 ml/min/1 73 sq  meters  (1) HEMOGLOBIN A1C [id number] 06:50AM [employer]   TW Order Number: [id number]     Test Name Result Flag Reference   HEMOGLOBIN A1C 5 5 %  4 2-6 3   EST  AVG   GLUCOSE 111 mg/dl       (1) LIPID PANEL FASTING W DIRECT LDL REFLEX [id number] 06:50AM [employer]   TW Order Number: [id number]     Test Name Result Flag Reference   CHOLESTEROL 211 mg/dL H    LDL CHOLESTEROL CALCULATED 140 mg/dL H 0-100   Triglyceride:         Normal              <150 mg/dl       Borderline High    150-199 mg/dl       High               200-499 mg/dl       Very High          >499 mg/dl  Cholesterol:         Desirable        <200 mg/dl      Borderline High  200-239 mg/dl      High             >239 mg/dl  HDL Cholesterol:        High    >59 mg/dL      Low     <41 mg/dL  LDL Cholesterol:        Optimal          <100 mg/dl        Near Optimal     100-129 mg/dl        Above Optimal          Borderline High   130-159 mg/dl          High              160-189 mg/dl          Very High        >189 mg/dl  LDL CALCULATED:    This screening LDL is a calculated result  It does not have the accuracy of the Direct Measured LDL in the monitoring of patients with hyperlipidemia and/or statin therapy  Direct Measure LDL (MDW986) must be ordered separately in these patients  TRIGLYCERIDES 152 mg/dL H <=150   Specimen collection should occur prior to N-Acetylcysteine or Metamizole administration due to the potential for falsely depressed results  HDL,DIRECT 41 mg/dL  40-60   Specimen collection should occur prior to Metamizole administration due to the potential for falsely depressed results

## 2018-01-13 VITALS
HEART RATE: 68 BPM | HEIGHT: 68 IN | RESPIRATION RATE: 16 BRPM | TEMPERATURE: 97.6 F | BODY MASS INDEX: 36.28 KG/M2 | DIASTOLIC BLOOD PRESSURE: 72 MMHG | SYSTOLIC BLOOD PRESSURE: 128 MMHG | WEIGHT: 239.4 LBS

## 2018-01-13 VITALS
BODY MASS INDEX: 36.53 KG/M2 | WEIGHT: 241 LBS | SYSTOLIC BLOOD PRESSURE: 112 MMHG | DIASTOLIC BLOOD PRESSURE: 68 MMHG | HEIGHT: 68 IN | HEART RATE: 60 BPM

## 2018-01-14 VITALS
TEMPERATURE: 97.9 F | HEART RATE: 78 BPM | OXYGEN SATURATION: 98 % | SYSTOLIC BLOOD PRESSURE: 130 MMHG | DIASTOLIC BLOOD PRESSURE: 72 MMHG | RESPIRATION RATE: 16 BRPM | WEIGHT: 237.5 LBS | HEIGHT: 68 IN | BODY MASS INDEX: 36 KG/M2

## 2018-01-14 NOTE — RESULT NOTES
Message   DW pt on 2/21/2017 OV  Verified Results  (1) CBC/PLT/DIFF [id number] 08:05AM Huber Smith   TW Order Number: [id number]     Test Name Result Flag Reference   WBC COUNT 9 30 Thousand/uL  4 31-10 16   RBC COUNT 4 91 Million/uL  3 88-5 62   HEMOGLOBIN 15 4 g/dL  12 0-17 0   HEMATOCRIT 45 2 %  36 5-49 3   MCV 92 fL  82-98   MCH 31 4 pg  26 8-34 3   MCHC 34 1 g/dL  31 4-37 4   RDW 12 7 %  11 6-15 1   MPV 9 6 fL  8 9-12 7   PLATELET COUNT 470 Thousands/uL  149-390   nRBC AUTOMATED 0 /100 WBCs     NEUTROPHILS RELATIVE PERCENT 49 %  43-75   LYMPHOCYTES RELATIVE PERCENT 37 %  14-44   MONOCYTES RELATIVE PERCENT 8 %  4-12   EOSINOPHILS RELATIVE PERCENT 5 %  0-6   BASOPHILS RELATIVE PERCENT 1 %  0-1   NEUTROPHILS ABSOLUTE COUNT 4 60 Thousands/?L  1 85-7 62   LYMPHOCYTES ABSOLUTE COUNT 3 40 Thousands/?L  0 60-4 47   MONOCYTES ABSOLUTE COUNT 0 73 Thousand/?L  0 17-1 22   EOSINOPHILS ABSOLUTE COUNT 0 49 Thousand/?L  0 00-0 61   BASOPHILS ABSOLUTE COUNT 0 07 Thousands/?L  0 00-0 10   - Patient Instructions: This bloodwork is non-fasting  Please drink two glasses of water morning of bloodwork  - Patient Instructions: This bloodwork is non-fasting  Please drink two glasses of water morning of bloodwork  (1) COMPREHENSIVE METABOLIC PANEL [id number] 56:26OT Huber Smith   TW Order Number: [id number]     Test Name Result Flag Reference   GLUCOSE,RANDM 112 mg/dL     If the patient is fasting, the ADA then defines impaired fasting glucose as > 100 mg/dL and diabetes as > or equal to 123 mg/dL     SODIUM 139 mmol/L  136-145   POTASSIUM 4 6 mmol/L  3 5-5 3   CHLORIDE 105 mmol/L  100-108   CARBON DIOXIDE 27 mmol/L  21-32   ANION GAP (CALC) 7 mmol/L  4-13   BLOOD UREA NITROGEN 20 mg/dL  5-25   CREATININE 1 05 mg/dL  0 60-1 30   Standardized to IDMS reference method   CALCIUM 9 1 mg/dL  8 3-10 1   BILI, TOTAL 0 63 mg/dL  0 20-1 00   ALK PHOSPHATAS 96 U/L     ALT (SGPT) 31 U/L  12-78   AST(SGOT) 19 U/L  5-45   ALBUMIN 3 9 g/dL  3 5-5 0   TOTAL PROTEIN 8 0 g/dL  6 4-8 2   eGFR Non-African American      >60 0 ml/min/1 73sq m   - Patient Instructions: This is a fasting blood test  Water, black tea or black coffee only after 9:00pm the night before test Drink 2 glasses of water the morning of test   National Kidney Disease Education Program recommendations are as follows:  GFR calculation is accurate only with a steady state creatinine  Chronic Kidney disease less than 60 ml/min/1 73 sq  meters  Kidney failure less than 15 ml/min/1 73 sq  meters  (1) HEMOGLOBIN A1C [id number] 08:05AM [facility] [place]   [place] Order Number: [id number]     Test Name Result Flag Reference   HEMOGLOBIN A1C 6 2 %  4 2-6 3   EST  AVG  GLUCOSE 131 mg/dl       (1) LIPID PANEL FASTING W DIRECT LDL REFLEX [id number] 08:05AM [facility] Order Number: [id number]     Test Name Result Flag Reference   CHOLESTEROL 211 mg/dL H    LDL CHOLESTEROL CALCULATED 141 mg/dL H 0-100   - Patient Instructions: This is a fasting blood test  Water, black tea or black coffee only after 9:00pm the night before test   Drink 2 glasses of water the morning of test     - Patient Instructions:  This is a fasting blood test  Water, black tea or black coffee only after 9:00pm the night before test Drink 2 glasses of water the morning of test   Triglyceride:         Normal              <150 mg/dl       Borderline High    150-199 mg/dl       High               200-499 mg/dl       Very High          >499 mg/dl  Cholesterol:         Desirable        <200 mg/dl      Borderline High  200-239 mg/dl      High             >239 mg/dl  HDL Cholesterol:        High    >59 mg/dL      Low     <41 mg/dL  LDL Cholesterol:        Optimal          <100 mg/dl        Near Optimal     100-129 mg/dl        Above Optimal          Borderline High   130-159 mg/dl          High              160-189 mg/dl          Very High        >189 mg/dl  LDL CALCULATED:    This screening LDL is a calculated result  It does not have the accuracy of the Direct Measured LDL in the monitoring of patients with hyperlipidemia and/or statin therapy  Direct Measure LDL (KXR903) must be ordered separately in these patients  TRIGLYCERIDES 119 mg/dL  <=150   Specimen collection should occur prior to N-Acetylcysteine or Metamizole administration due to the potential for falsely depressed results  HDL,DIRECT 46 mg/dL  40-60   Specimen collection should occur prior to Metamizole administration due to the potential for falsely depressed results  (1) TSH WITH FT4 REFLEX [id number] 08:05AM Dorcas Grey    Order Number: [id number]     Test Name Result Flag Reference   TSH 4 480 uIU/mL H 0 358-3 740   - Patient Instructions: This is a fasting blood test  Water, black tea or black coffee only after 9:00pm the night before test Drink 2 glasses of water the morning of test   Patients undergoing fluorescein dye angiography may retain small amounts of fluorescein in the body for 48-72 hours post procedure  Samples containing fluorescein can produce falsely depressed TSH values  If the patient had this procedure,a specimen should be resubmitted post fluorescein clearance  T4,FREE 1 23 ng/dL  0 76-1 46   - Patient Instructions:  This is a fasting blood test  Water, black tea or black coffee only after 9:00pm the night before test Drink 2 glasses of water the morning of test

## 2018-01-15 NOTE — PROGRESS NOTES
Chief Complaint  Administered Fluzone High dose 0 5ML into Left deltoid  Lot# [id number], Exp: 06/2017  Administered Zostavax 0 65 ML into Right deltoid  Lot# [id number], Exp: 10/2017  CATHLEEN Lugo  Active Problems    1  Acute bronchitis with bronchospasm (466 0) (J20 9)   2  Allergic rhinitis (477 9) (J30 9)   3  Benign prostate hyperplasia (600 00) (N40 0)   4  Chronic bronchitis (491 9) (J42)   5  Depression (311) (F32 9)   6  Elevated ALT measurement (790 4) (R74 0)   7  Hyperlipidemia (272 4) (E78 5)   8  Hypertension (401 9) (I10)   9  Impaired fasting glucose (790 21) (R73 01)   10  Male erectile dysfunction (607 84) (N52 9)   11  Multifocal atrial tachycardia (427 89) (I47 1)   12  Need for prophylactic vaccination and inoculation against influenza (V04 81) (Z23)   13  Need for vaccination with 13-polyvalent pneumococcal conjugate vaccine (V03 82) (Z23)   14  Need for zoster vaccination (V04 89) (Z23)   15  Obesity (278 00) (E66 9)   16  Osteoarthritis of knee (715 36) (M17 9)   17  Osteoarthritis of left hip, unspecified osteoarthritis type (715 95) (M16 12)   18  Palpitations (785 1) (R00 2)   19  Pre-operative cardiovascular examination (V72 81) (Z01 810)   20  Seasonal allergies (477 9) (J30 2)   21  Spinal stenosis (724 00) (M48 00)   22  Supraventricular tachycardia (427 89) (I47 1)   23  Visit for pre-operative examination (V72 84) (Z01 818)   24  Welcome to Medicare preventive visit (V70 0) (Z00 00)    Current Meds   1  Atorvastatin Calcium 80 MG Oral Tablet; TAKE 1 TABLET DAILY; Therapy: 12WNK2376 to (Evaluate:07Oct2017)  Requested for: 80IPT7438; Last   Rx:12Oct2016 Ordered   2  Cialis 5 MG Oral Tablet; One po daily; Therapy: 22XPF7359 to (Evaluate:14Unb7442)  Requested for: 26Mzc0505; Last   Rx:16Iiz1455 Ordered   3  ClonazePAM 0 5 MG Oral Tablet; take one tab qhs;   Therapy: 59BGE7288 to (Hayden Reese)  Requested for: 81KSF6051; Last   Rx:07Jxp6529 Ordered   4   Diclofenac Sodium 50 MG Oral Tablet Delayed Release; take 1 tablet 3 times a day as   needed; Therapy: 24Egz5682 to (Jose F Wallace)  Requested for: 42ISU5461; Last   Rx:08Nov2016 Ordered   5  DULoxetine HCl - 60 MG Oral Capsule Delayed Release Particles; take 1 capsule daily; Therapy: (Recorded:11Nov2016) to Recorded   6  Fluticasone Propionate 50 MCG/ACT Nasal Suspension; use 1 spray in each nostril twice   daily; Therapy: 31ZAM0596 to (Evaluate:53Hem1654); Last Rx:11Nov2016 Ordered   7  Gabapentin 300 MG Oral Capsule; take 1 capsule 4 times daily; Therapy: (Recorded:11Nov2016) to Recorded   8  Loratadine 10 MG Oral Tablet; TAKE 1 TABLET DAILY; Therapy: 03TAL1012 to Recorded   9  Macrobid CAPS; TAKE 1 CAPSULE EVERY 12 HOURS DAILY; Therapy: (Recorded:11Nov2016) to Recorded   10  Metoprolol Tartrate 25 MG Oral Tablet; TAKE 1 TABLET 3 times daily; Therapy: 71LCH8288 to (Troy Roman)  Requested for: 55SHI1215; Last    Rx:12Oct2016 Ordered   11  Multi-Vitamin TABS; Therapy: (Recorded:11Nov2016) to Recorded   12  NIFEdipine ER Osmotic Release 90 MG Oral Tablet Extended Release 24 Hour; TAKE 1    TABLET DAILY; Therapy: 87PVE5534 to (Evaluate:01Jun2017)  Requested for: 84YHY3170; Last    Rx:06Jun2016 Ordered   13  PredniSONE 10 MG Oral Tablet; 4 tabs daily x 4 days, 3 tabs daily x 2 days, 2 tabs daily    x 2 days, 1 tab daily x 2 day; Therapy: 77FLI4074 to (Complete:21Nov2016)  Requested for: 86BQO5951; Last    Rx:11Nov2016 Ordered   14  ProAir  (90 Base) MCG/ACT Inhalation Aerosol Solution; INHALE 2 PUFFS    EVERY 4-6 HOURS AS NEEDED; Therapy: 96ZEB6846 to (Evaluate:10Jan2017)  Requested for: 44LLA9200; Last    Rx:11Nov2016 Ordered   15  Promethazine-Codeine 6 25-10 MG/5ML Oral Syrup; TAKE 1 TEASPOONFUL EVERY 6    HOURS AS NEEDED; Therapy: 52MLL9749 to (Complete:23Nov2016)  Requested for: 44BPQ6402; Last    Rx:11Nov2016 Ordered   16   Qvar 40 MCG/ACT Inhalation Aerosol Solution; INHALE 2 PUFFS TWICE DAILY; Therapy: 85XDE2010 to (Evaluate:37Aie7216); Last Rx:01Nov2016 Ordered    Allergies    1  No Known Drug Allergies    Plan  Need for prophylactic vaccination and inoculation against influenza    · Fluzone High-Dose 0 5 ML Intramuscular Suspension Prefilled Syringe  Need for zoster vaccination    · Zoster (Zostavax)    Future Appointments    Date/Time Provider Specialty Site   02/21/2017 08:20 AM Manuel Santiago MD Family [address]     Signatures   Electronically signed by :  Malcom Jordan MD; Nov 18 2016 11:08AM EST                       (Review)

## 2018-01-16 NOTE — PSYCH
History of Present Illness  Psychotherapy Provided  Luke: Individual Psychotherapy 30 minutes minutes provided today  Goals addressed in session:   Patientt ha been experiencing some relapse in symptoms of depression and anxiety that appear to stem from accumulation of stress  However, he is again seeing Dr Sharon Smith, psychiatrist at [facility]  had been patient of his up to 15 years ago  He has prescribed Gabapentin and Cymbalta and weaning off previous psych meds  Feeing very positive and hopeful as a result  Still dealing with pain issues that can affect mood  Patien verbal and cooperative  HPI - Psych: Patient has hx of depression and anxiety dating back to 1995 following death of his wife due to breast cancer  Had Edwinna Farrow in treatment them and continued for years and had been stable  He has not had drink in 15 years and has made many positive changes  Over last several years, has dealt with multiple physical/pain issues and multiple surgeries  Still has some periods of depression but this has lessened since he has been seen by Dr Elder Us  Denies any SI  Despite struggles, remains physically active as well as with hobbies/interests and family   Note   Note:   At this point, does not appear to need counseling and could access this where he sees Dr Elder Us  Provided my contact information in the vent that this changes and reinforced he can always schedule with me  Has concerns with his wife an her own depression  Having issues accessing providers  She is active patient within system  Encouraged him to have her contact me  Would initiate referral if need be  Assessment    1   Depression (311) (F32 9)    Signatures   Electronically signed by : Oliva Denney LCSW; Aug 25 2016  1:44PM EST                       (Author)

## 2018-01-17 NOTE — RESULT NOTES
Verified Results  * CT CHEST WO CONTRAST [id number] 07:41AM Jaron Odell Order Number: [id number]    - Patient Instructions: One Jalil Brice     Test Name Result Flag Reference   CT CHEST WO CONTRAST (Report)     CT CHEST WITHOUT IV CONTRAST     INDICATION: History of 5 mm left lower lobe nodule  Follow-up exam  Smoker  COMPARISON: 1/4/2017     TECHNIQUE: CT examination of the chest was performed without intravenous contrast  Reformatted images were created in axial, sagittal, and coronal planes  Radiation dose length product (DLP) for this visit: 509 61 mGy-cm   This examination, like all CT scans performed in the [facility], was performed utilizing techniques to minimize radiation dose exposure, including the use of iterative   reconstruction and automated exposure control  FINDINGS:     LUNGS: Scarring in the left lower lobe  Previously seen left lower lobe nodule is no longer visible  PLEURA: Unremarkable  HEART/GREAT VESSELS: Unremarkable for patient's age  MEDIASTINUM AND CARMITA: Unremarkable  CHEST WALL AND LOWER NECK: Unremarkable  VISUALIZED STRUCTURES IN THE UPPER ABDOMEN: Nonobstructing left upper pole intrarenal calculus measures 4 mm  Visualized portions of the upper abdomen otherwise unremarkable  OSSEOUS STRUCTURES: No acute fracture  No destructive osseous lesion  IMPRESSION:     Scarring in the left lower lobe  Previously seen left lower lobe nodule is no longer identified  Nonobstructing left renal upper pole calculus measuring 4 mm  Workstation performed: [id number]     Signed by:    Betty Haywood MD   7/11/17

## 2018-01-22 VITALS
OXYGEN SATURATION: 92 % | BODY MASS INDEX: 36.37 KG/M2 | WEIGHT: 240 LBS | TEMPERATURE: 97.8 F | HEIGHT: 68 IN | SYSTOLIC BLOOD PRESSURE: 104 MMHG | HEART RATE: 61 BPM | DIASTOLIC BLOOD PRESSURE: 60 MMHG | RESPIRATION RATE: 16 BRPM

## 2018-01-22 VITALS
DIASTOLIC BLOOD PRESSURE: 76 MMHG | BODY MASS INDEX: 36.53 KG/M2 | HEART RATE: 80 BPM | TEMPERATURE: 98.7 F | SYSTOLIC BLOOD PRESSURE: 142 MMHG | RESPIRATION RATE: 20 BRPM | HEIGHT: 68 IN | WEIGHT: 241 LBS

## 2018-01-22 VITALS — OXYGEN SATURATION: 98 %

## 2018-01-23 VITALS
BODY MASS INDEX: 35.77 KG/M2 | WEIGHT: 236 LBS | HEART RATE: 62 BPM | HEIGHT: 68 IN | DIASTOLIC BLOOD PRESSURE: 58 MMHG | SYSTOLIC BLOOD PRESSURE: 112 MMHG

## 2018-02-08 DIAGNOSIS — J44.9 CHRONIC OBSTRUCTIVE PULMONARY DISEASE, UNSPECIFIED COPD TYPE (HCC): Primary | ICD-10-CM

## 2018-02-08 RX ORDER — BUDESONIDE AND FORMOTEROL FUMARATE DIHYDRATE 160; 4.5 UG/1; UG/1
2 AEROSOL RESPIRATORY (INHALATION) 2 TIMES DAILY
COMMUNITY
Start: 2016-12-28 | End: 2018-03-12 | Stop reason: SDUPTHER

## 2018-02-09 DIAGNOSIS — R00.2 PALPITATIONS: Primary | ICD-10-CM

## 2018-02-09 DIAGNOSIS — R00.0 TACHYCARDIA: ICD-10-CM

## 2018-02-09 RX ORDER — BUDESONIDE AND FORMOTEROL FUMARATE DIHYDRATE 160; 4.5 UG/1; UG/1
2 AEROSOL RESPIRATORY (INHALATION) 2 TIMES DAILY
Qty: 1 INHALER | Refills: 0 | Status: SHIPPED | OUTPATIENT
Start: 2018-02-09 | End: 2018-03-06

## 2018-02-27 ENCOUNTER — TELEPHONE (OUTPATIENT)
Dept: FAMILY MEDICINE CLINIC | Facility: CLINIC | Age: 68
End: 2018-02-27

## 2018-02-27 DIAGNOSIS — E66.9 OBESITY (BMI 30-39.9): ICD-10-CM

## 2018-02-27 DIAGNOSIS — E78.2 MIXED HYPERLIPIDEMIA: ICD-10-CM

## 2018-02-27 DIAGNOSIS — I10 ESSENTIAL HYPERTENSION: Primary | ICD-10-CM

## 2018-02-27 DIAGNOSIS — R73.01 IFG (IMPAIRED FASTING GLUCOSE): ICD-10-CM

## 2018-02-27 PROBLEM — F32.A DEPRESSION, CONTROLLED: Status: ACTIVE | Noted: 2017-08-30

## 2018-02-27 PROBLEM — R91.1 LUNG NODULE: Status: ACTIVE | Noted: 2017-01-04

## 2018-02-27 RX ORDER — MULTIVITAMIN
1 TABLET ORAL DAILY
COMMUNITY

## 2018-02-27 RX ORDER — TADALAFIL 5 MG/1
TABLET ORAL DAILY
COMMUNITY
Start: 2016-02-18 | End: 2018-04-29 | Stop reason: SDUPTHER

## 2018-02-27 RX ORDER — ATORVASTATIN CALCIUM 80 MG/1
1 TABLET, FILM COATED ORAL DAILY
COMMUNITY
Start: 2013-05-24 | End: 2018-06-29

## 2018-02-27 RX ORDER — ALBUTEROL SULFATE 90 UG/1
2 AEROSOL, METERED RESPIRATORY (INHALATION)
COMMUNITY
Start: 2011-05-02 | End: 2018-09-06 | Stop reason: ALTCHOICE

## 2018-02-27 RX ORDER — ASPIRIN 81 MG/1
1 TABLET, CHEWABLE ORAL DAILY
COMMUNITY
Start: 2017-08-30 | End: 2020-08-25 | Stop reason: HOSPADM

## 2018-02-27 RX ORDER — CLONAZEPAM 0.5 MG/1
TABLET ORAL
COMMUNITY
Start: 2011-07-18 | End: 2018-03-06 | Stop reason: SDUPTHER

## 2018-02-27 RX ORDER — METOPROLOL TARTRATE 50 MG/1
TABLET, FILM COATED ORAL
COMMUNITY
Start: 2017-12-02 | End: 2018-03-14 | Stop reason: SDUPTHER

## 2018-02-27 RX ORDER — LORATADINE 10 MG/1
1 TABLET ORAL DAILY
COMMUNITY
Start: 2014-03-17 | End: 2021-02-05

## 2018-02-27 RX ORDER — GABAPENTIN 300 MG/1
300 CAPSULE ORAL DAILY
COMMUNITY

## 2018-02-27 RX ORDER — DULOXETIN HYDROCHLORIDE 60 MG/1
1 CAPSULE, DELAYED RELEASE ORAL DAILY
COMMUNITY
End: 2022-04-25 | Stop reason: SDUPTHER

## 2018-02-27 RX ORDER — NIFEDIPINE 90 MG/1
1 TABLET, EXTENDED RELEASE ORAL DAILY
COMMUNITY
Start: 2011-12-29 | End: 2018-08-01 | Stop reason: SDUPTHER

## 2018-02-28 ENCOUNTER — APPOINTMENT (OUTPATIENT)
Dept: LAB | Facility: CLINIC | Age: 68
End: 2018-02-28
Payer: MEDICARE

## 2018-02-28 ENCOUNTER — TRANSCRIBE ORDERS (OUTPATIENT)
Dept: LAB | Facility: CLINIC | Age: 68
End: 2018-02-28

## 2018-02-28 DIAGNOSIS — E78.2 MIXED HYPERLIPIDEMIA: ICD-10-CM

## 2018-02-28 DIAGNOSIS — E66.9 OBESITY (BMI 30-39.9): ICD-10-CM

## 2018-02-28 DIAGNOSIS — R73.01 IFG (IMPAIRED FASTING GLUCOSE): ICD-10-CM

## 2018-02-28 DIAGNOSIS — I10 ESSENTIAL HYPERTENSION: ICD-10-CM

## 2018-02-28 LAB
ALBUMIN SERPL BCP-MCNC: 3.8 G/DL (ref 3.5–5)
ALP SERPL-CCNC: 89 U/L (ref 46–116)
ALT SERPL W P-5'-P-CCNC: 26 U/L (ref 12–78)
ANION GAP SERPL CALCULATED.3IONS-SCNC: 6 MMOL/L (ref 4–13)
AST SERPL W P-5'-P-CCNC: 16 U/L (ref 5–45)
BASOPHILS # BLD AUTO: 0.07 THOUSANDS/ΜL (ref 0–0.1)
BASOPHILS NFR BLD AUTO: 1 % (ref 0–1)
BILIRUB SERPL-MCNC: 0.57 MG/DL (ref 0.2–1)
BUN SERPL-MCNC: 16 MG/DL (ref 5–25)
CALCIUM SERPL-MCNC: 8.8 MG/DL (ref 8.3–10.1)
CHLORIDE SERPL-SCNC: 104 MMOL/L (ref 100–108)
CHOLEST SERPL-MCNC: 207 MG/DL (ref 50–200)
CO2 SERPL-SCNC: 28 MMOL/L (ref 21–32)
CREAT SERPL-MCNC: 0.9 MG/DL (ref 0.6–1.3)
EOSINOPHIL # BLD AUTO: 0.5 THOUSAND/ΜL (ref 0–0.61)
EOSINOPHIL NFR BLD AUTO: 6 % (ref 0–6)
ERYTHROCYTE [DISTWIDTH] IN BLOOD BY AUTOMATED COUNT: 12.6 % (ref 11.6–15.1)
EST. AVERAGE GLUCOSE BLD GHB EST-MCNC: 126 MG/DL
GFR SERPL CREATININE-BSD FRML MDRD: 89 ML/MIN/1.73SQ M
GLUCOSE P FAST SERPL-MCNC: 110 MG/DL (ref 65–99)
HBA1C MFR BLD: 6 % (ref 4.2–6.3)
HCT VFR BLD AUTO: 44.5 % (ref 36.5–49.3)
HDLC SERPL-MCNC: 50 MG/DL (ref 40–60)
HGB BLD-MCNC: 15.4 G/DL (ref 12–17)
LDLC SERPL CALC-MCNC: 131 MG/DL (ref 0–100)
LYMPHOCYTES # BLD AUTO: 3.18 THOUSANDS/ΜL (ref 0.6–4.47)
LYMPHOCYTES NFR BLD AUTO: 39 % (ref 14–44)
MCH RBC QN AUTO: 31.4 PG (ref 26.8–34.3)
MCHC RBC AUTO-ENTMCNC: 34.6 G/DL (ref 31.4–37.4)
MCV RBC AUTO: 91 FL (ref 82–98)
MONOCYTES # BLD AUTO: 0.68 THOUSAND/ΜL (ref 0.17–1.22)
MONOCYTES NFR BLD AUTO: 8 % (ref 4–12)
NEUTROPHILS # BLD AUTO: 3.73 THOUSANDS/ΜL (ref 1.85–7.62)
NEUTS SEG NFR BLD AUTO: 46 % (ref 43–75)
NRBC BLD AUTO-RTO: 0 /100 WBCS
PLATELET # BLD AUTO: 257 THOUSANDS/UL (ref 149–390)
PMV BLD AUTO: 9.8 FL (ref 8.9–12.7)
POTASSIUM SERPL-SCNC: 4.1 MMOL/L (ref 3.5–5.3)
PROT SERPL-MCNC: 7.4 G/DL (ref 6.4–8.2)
RBC # BLD AUTO: 4.9 MILLION/UL (ref 3.88–5.62)
SODIUM SERPL-SCNC: 138 MMOL/L (ref 136–145)
T4 FREE SERPL-MCNC: 0.87 NG/DL (ref 0.76–1.46)
TRIGL SERPL-MCNC: 128 MG/DL
TSH SERPL DL<=0.05 MIU/L-ACNC: 7.19 UIU/ML (ref 0.36–3.74)
WBC # BLD AUTO: 8.18 THOUSAND/UL (ref 4.31–10.16)

## 2018-02-28 PROCEDURE — 83036 HEMOGLOBIN GLYCOSYLATED A1C: CPT

## 2018-02-28 PROCEDURE — 80061 LIPID PANEL: CPT

## 2018-02-28 PROCEDURE — 84439 ASSAY OF FREE THYROXINE: CPT

## 2018-02-28 PROCEDURE — 84443 ASSAY THYROID STIM HORMONE: CPT

## 2018-02-28 PROCEDURE — 85025 COMPLETE CBC W/AUTO DIFF WBC: CPT

## 2018-02-28 PROCEDURE — 80053 COMPREHEN METABOLIC PANEL: CPT

## 2018-02-28 PROCEDURE — 36415 COLL VENOUS BLD VENIPUNCTURE: CPT

## 2018-03-06 ENCOUNTER — OFFICE VISIT (OUTPATIENT)
Dept: FAMILY MEDICINE CLINIC | Facility: CLINIC | Age: 68
End: 2018-03-06
Payer: MEDICARE

## 2018-03-06 VITALS
TEMPERATURE: 97.5 F | RESPIRATION RATE: 16 BRPM | HEART RATE: 68 BPM | BODY MASS INDEX: 37.31 KG/M2 | SYSTOLIC BLOOD PRESSURE: 146 MMHG | WEIGHT: 246.2 LBS | HEIGHT: 68 IN | DIASTOLIC BLOOD PRESSURE: 82 MMHG

## 2018-03-06 DIAGNOSIS — F41.9 ANXIETY AND DEPRESSION: ICD-10-CM

## 2018-03-06 DIAGNOSIS — E66.9 CLASS 2 OBESITY WITHOUT SERIOUS COMORBIDITY WITH BODY MASS INDEX (BMI) OF 37.0 TO 37.9 IN ADULT, UNSPECIFIED OBESITY TYPE: ICD-10-CM

## 2018-03-06 DIAGNOSIS — F32.A ANXIETY AND DEPRESSION: ICD-10-CM

## 2018-03-06 DIAGNOSIS — R73.01 IMPAIRED FASTING GLUCOSE: ICD-10-CM

## 2018-03-06 DIAGNOSIS — I10 ESSENTIAL HYPERTENSION: Primary | ICD-10-CM

## 2018-03-06 DIAGNOSIS — E78.2 MIXED HYPERLIPIDEMIA: ICD-10-CM

## 2018-03-06 DIAGNOSIS — M19.90 ARTHRITIS: ICD-10-CM

## 2018-03-06 DIAGNOSIS — R42 VERTIGO: ICD-10-CM

## 2018-03-06 PROBLEM — J44.9 COPD (CHRONIC OBSTRUCTIVE PULMONARY DISEASE) (HCC): Status: ACTIVE | Noted: 2018-03-06

## 2018-03-06 PROCEDURE — 99214 OFFICE O/P EST MOD 30 MIN: CPT | Performed by: FAMILY MEDICINE

## 2018-03-06 RX ORDER — NITROFURANTOIN 25; 75 MG/1; MG/1
100 CAPSULE ORAL
COMMUNITY
Start: 2017-11-08 | End: 2018-12-26 | Stop reason: ALTCHOICE

## 2018-03-06 RX ORDER — CLONAZEPAM 0.5 MG/1
0.5 TABLET ORAL
Qty: 30 TABLET | Refills: 1 | Status: SHIPPED | OUTPATIENT
Start: 2018-03-06 | End: 2018-05-02 | Stop reason: SDUPTHER

## 2018-03-06 RX ORDER — SERTRALINE HYDROCHLORIDE 100 MG/1
100 TABLET, FILM COATED ORAL
COMMUNITY
Start: 2012-08-03 | End: 2018-03-06 | Stop reason: CLARIF

## 2018-03-06 RX ORDER — MECLIZINE HCL 12.5 MG/1
12.5 TABLET ORAL EVERY 8 HOURS PRN
Qty: 30 TABLET | Refills: 0 | Status: SHIPPED | OUTPATIENT
Start: 2018-03-06 | End: 2018-03-16 | Stop reason: SDUPTHER

## 2018-03-06 NOTE — PROGRESS NOTES
Assessment/Plan:    Give meclizine for vertigo  Cont other meds  Give refill of clonazepam  SE educated pt  Lose weight! FU urology for PSA  FU specialist as scheduled  Flu shot every season  Got PCV13 and zostavax in 2016  Will give pneumovax when we have supply  Colonoscopy 3/2017  Repeat in 5 years per pt  RTO in 6 months  Diagnoses and all orders for this visit:    Essential hypertension  Comments:  controlled ok  continue metoprolol 75mg bid and nifedipine 90mg QD  Orders:  -     Comprehensive metabolic panel; Future    Impaired fasting glucose  Comments:  2/2018 hgA1C 6 0 low carb diet  Orders:  -     HEMOGLOBIN A1C W/ EAG ESTIMATION; Future    Mixed hyperlipidemia  Comments:  continue atorvastatin 80mg qhs  Orders:  -     Lipid panel; Future    Anxiety and depression  Comments:  continue cymbalta  FU psychiatry  Orders:  -     clonazePAM (KlonoPIN) 0 5 mg tablet; Take 1 tablet (0 5 mg total) by mouth daily at bedtime for 30 days  -     TSH, 3rd generation; Future    Class 2 obesity without serious comorbidity with body mass index (BMI) of 37 0 to 37 9 in adult, unspecified obesity type  Comments:  Lose weight  Orders:  -     TSH, 3rd generation; Future    Arthritis  Comments:  Continue diclofenac and gabapentin  Vertigo  Comments:  Give meclizine prn  Orders:  -     meclizine (ANTIVERT) 12 5 MG tablet; Take 1 tablet (12 5 mg total) by mouth every 8 (eight) hours as needed for dizziness for up to 10 days          Subjective:      Patient ID: Makenzie Torres is a 78 y o  male  HPI    Pt is here by himself  C/o dizzy for 3 days  Feels like room spinning  Last for 15 sec and went away  It happens when he changes his position  Denies ear pain, fever, Sob, Cp, n/v/abd pain  Depression/depression-----Saw [facility] Samano and Saw psychiatry Dr Tracie Chandler Q 2-3 month  He is on cymbalta which works for him  Use clonazepam qhs for insomnia  FU cardiology for MAT, HTN  Stable  On metoprolol 75mg bid and nifedipine 90mg Qd  Hyperlipidemia---He is on atrovastatin to 80mg qhs  Denies side effects  FU pulmonary for chronic bronchitis  He is on symbicort daily  Does not need rescue inhaler  Did CT chest in 7/2017 which showed no more lung nodule  FU ortho for lower back pain/spinal stenosis before  Not follow any more  On diclofenac and neurotin which helped with arthritis of hands also  FU urology for BPH s/p TURP in 2013  Had cystoscopy and dilation in 9/2014  He is self-cath 2/week now  On macrobid when he did self-cath because Medicare does not pay macrobid daily  ED---Cialis helped  Quit smoking for years  No alcohol or drugs  Lives with wife  Does all ADL's  Denies recent falls  The following portions of the patient's history were reviewed and updated as appropriate: allergies, current medications, past family history, past medical history, past social history, past surgical history and problem list     Review of Systems   Constitutional: Negative for appetite change, chills and fever  HENT: Negative for congestion, ear pain, sinus pain and sore throat  Eyes: Negative for discharge and itching  Respiratory: Negative for apnea, cough, chest tightness, shortness of breath and wheezing  Cardiovascular: Negative for chest pain, palpitations and leg swelling  Gastrointestinal: Negative for abdominal pain, anal bleeding, constipation, diarrhea, nausea and vomiting  Endocrine: Negative for cold intolerance, heat intolerance and polyuria  Genitourinary: Negative for difficulty urinating and dysuria  Musculoskeletal: Negative for arthralgias, back pain and myalgias  Skin: Negative for rash  Neurological: Negative for dizziness and headaches  Psychiatric/Behavioral: Negative for agitation           Objective:      /82 (BP Location: Left arm, Patient Position: Sitting, Cuff Size: Standard)   Pulse 68   Temp 97 5 °F (36 4 °C) (Tympanic) Resp 16   Ht 5' 8" (1 727 m)   Wt 112 kg (246 lb 3 2 oz)   BMI 37 43 kg/m²          Physical Exam   Constitutional: He appears well-developed  HENT:   Head: Normocephalic and atraumatic  Right Ear: External ear normal    Left Ear: External ear normal    Mouth/Throat: Oropharynx is clear and moist    Eyes: Conjunctivae are normal  Pupils are equal, round, and reactive to light  Neck: Normal range of motion  Neck supple  Cardiovascular: Normal rate, regular rhythm, normal heart sounds and intact distal pulses  Pulmonary/Chest: Effort normal and breath sounds normal    Abdominal: Soft  Bowel sounds are normal    Musculoskeletal: Normal range of motion  Neurological: He is alert

## 2018-03-12 DIAGNOSIS — J44.9 CHRONIC OBSTRUCTIVE PULMONARY DISEASE, UNSPECIFIED COPD TYPE (HCC): Primary | ICD-10-CM

## 2018-03-12 NOTE — TELEPHONE ENCOUNTER
Dr Bonnie Calix,   Pharmacy called requesting a refill on this patient's Symbicort  Send to local retail pharmacy  Thank you

## 2018-03-14 DIAGNOSIS — I49.1 PAC (PREMATURE ATRIAL CONTRACTION): ICD-10-CM

## 2018-03-14 DIAGNOSIS — I49.1 PAC (PREMATURE ATRIAL CONTRACTION): Primary | ICD-10-CM

## 2018-03-14 RX ORDER — METOPROLOL TARTRATE 50 MG/1
TABLET, FILM COATED ORAL
Qty: 180 TABLET | Refills: 3 | Status: SHIPPED | OUTPATIENT
Start: 2018-03-14 | End: 2018-03-16 | Stop reason: SDUPTHER

## 2018-03-15 ENCOUNTER — TELEPHONE (OUTPATIENT)
Dept: CARDIOLOGY CLINIC | Facility: CLINIC | Age: 68
End: 2018-03-15

## 2018-03-15 RX ORDER — BUDESONIDE AND FORMOTEROL FUMARATE DIHYDRATE 160; 4.5 UG/1; UG/1
2 AEROSOL RESPIRATORY (INHALATION) 2 TIMES DAILY
Qty: 1 INHALER | Refills: 3 | Status: SHIPPED | OUTPATIENT
Start: 2018-03-15 | End: 2018-05-03 | Stop reason: SDUPTHER

## 2018-03-16 DIAGNOSIS — I49.1 PAC (PREMATURE ATRIAL CONTRACTION): ICD-10-CM

## 2018-03-16 DIAGNOSIS — R42 VERTIGO: ICD-10-CM

## 2018-03-16 RX ORDER — MECLIZINE HCL 12.5 MG/1
12.5 TABLET ORAL EVERY 8 HOURS PRN
Qty: 30 TABLET | Refills: 2 | Status: SHIPPED | OUTPATIENT
Start: 2018-03-16 | End: 2018-04-30 | Stop reason: SDUPTHER

## 2018-03-23 RX ORDER — METOPROLOL TARTRATE 50 MG/1
50 TABLET, FILM COATED ORAL 2 TIMES DAILY
Qty: 180 TABLET | Refills: 0 | Status: SHIPPED | OUTPATIENT
Start: 2018-03-23 | End: 2018-05-02 | Stop reason: SDUPTHER

## 2018-03-28 RX ORDER — METOPROLOL TARTRATE 50 MG/1
50 TABLET, FILM COATED ORAL 2 TIMES DAILY
Qty: 180 TABLET | Refills: 0 | Status: CANCELLED | OUTPATIENT
Start: 2018-03-28

## 2018-04-13 ENCOUNTER — TELEPHONE (OUTPATIENT)
Dept: FAMILY MEDICINE CLINIC | Facility: CLINIC | Age: 68
End: 2018-04-13

## 2018-04-13 DIAGNOSIS — M17.0 OSTEOARTHRITIS OF BOTH KNEES, UNSPECIFIED OSTEOARTHRITIS TYPE: Primary | ICD-10-CM

## 2018-04-29 DIAGNOSIS — N40.0 BENIGN PROSTATIC HYPERPLASIA, UNSPECIFIED WHETHER LOWER URINARY TRACT SYMPTOMS PRESENT: Primary | ICD-10-CM

## 2018-04-30 DIAGNOSIS — N40.0 BENIGN PROSTATIC HYPERPLASIA, UNSPECIFIED WHETHER LOWER URINARY TRACT SYMPTOMS PRESENT: Primary | ICD-10-CM

## 2018-04-30 DIAGNOSIS — R42 VERTIGO: ICD-10-CM

## 2018-05-01 RX ORDER — TADALAFIL 5 MG
TABLET ORAL
Qty: 90 TABLET | Refills: 3 | Status: SHIPPED | OUTPATIENT
Start: 2018-05-01 | End: 2018-05-01 | Stop reason: SDUPTHER

## 2018-05-01 RX ORDER — TADALAFIL 5 MG/1
5 TABLET ORAL DAILY
Qty: 90 TABLET | Refills: 3 | Status: SHIPPED | OUTPATIENT
Start: 2018-05-01 | End: 2018-08-10 | Stop reason: SDUPTHER

## 2018-05-01 RX ORDER — MECLIZINE HCL 12.5 MG/1
12.5 TABLET ORAL EVERY 8 HOURS PRN
Qty: 30 TABLET | Refills: 0 | Status: SHIPPED | OUTPATIENT
Start: 2018-05-01 | End: 2018-05-14 | Stop reason: SDUPTHER

## 2018-05-02 DIAGNOSIS — F41.9 ANXIETY AND DEPRESSION: ICD-10-CM

## 2018-05-02 DIAGNOSIS — F32.A ANXIETY AND DEPRESSION: ICD-10-CM

## 2018-05-02 DIAGNOSIS — I49.1 PAC (PREMATURE ATRIAL CONTRACTION): ICD-10-CM

## 2018-05-02 RX ORDER — METOPROLOL TARTRATE 50 MG/1
50 TABLET, FILM COATED ORAL 2 TIMES DAILY
Qty: 180 TABLET | Refills: 3 | Status: SHIPPED | OUTPATIENT
Start: 2018-05-02 | End: 2019-02-27 | Stop reason: SDUPTHER

## 2018-05-02 RX ORDER — CLONAZEPAM 0.5 MG/1
0.5 TABLET ORAL
Qty: 30 TABLET | Refills: 0 | Status: SHIPPED | OUTPATIENT
Start: 2018-05-02 | End: 2018-06-01 | Stop reason: SDUPTHER

## 2018-05-03 DIAGNOSIS — J44.9 CHRONIC OBSTRUCTIVE PULMONARY DISEASE, UNSPECIFIED COPD TYPE (HCC): ICD-10-CM

## 2018-05-04 RX ORDER — BUDESONIDE AND FORMOTEROL FUMARATE DIHYDRATE 160; 4.5 UG/1; UG/1
2 AEROSOL RESPIRATORY (INHALATION) 2 TIMES DAILY
Qty: 3 INHALER | Refills: 1 | Status: SHIPPED | OUTPATIENT
Start: 2018-05-04 | End: 2018-09-06 | Stop reason: ALTCHOICE

## 2018-05-14 DIAGNOSIS — R42 VERTIGO: ICD-10-CM

## 2018-05-14 RX ORDER — MECLIZINE HCL 12.5 MG/1
12.5 TABLET ORAL EVERY 8 HOURS PRN
Qty: 30 TABLET | Refills: 0 | Status: SHIPPED | OUTPATIENT
Start: 2018-05-14 | End: 2018-05-31 | Stop reason: SDUPTHER

## 2018-05-29 ENCOUNTER — TELEPHONE (OUTPATIENT)
Dept: FAMILY MEDICINE CLINIC | Facility: CLINIC | Age: 68
End: 2018-05-29

## 2018-05-29 NOTE — TELEPHONE ENCOUNTER
Checked PDMP last filled 05/03/18 not due until 06/03/18  Will send refill request to provider on 06/01/18

## 2018-05-31 DIAGNOSIS — R42 VERTIGO: ICD-10-CM

## 2018-05-31 RX ORDER — MECLIZINE HCL 12.5 MG/1
TABLET ORAL
Qty: 30 TABLET | Refills: 5 | Status: SHIPPED | OUTPATIENT
Start: 2018-05-31 | End: 2018-09-06 | Stop reason: CLARIF

## 2018-06-01 DIAGNOSIS — F41.9 ANXIETY AND DEPRESSION: ICD-10-CM

## 2018-06-01 DIAGNOSIS — F32.A ANXIETY AND DEPRESSION: ICD-10-CM

## 2018-06-01 RX ORDER — CLONAZEPAM 0.5 MG/1
0.5 TABLET ORAL
Qty: 30 TABLET | Refills: 0 | Status: SHIPPED | OUTPATIENT
Start: 2018-06-01 | End: 2018-06-04 | Stop reason: SDUPTHER

## 2018-06-04 ENCOUNTER — TELEPHONE (OUTPATIENT)
Dept: FAMILY MEDICINE CLINIC | Facility: CLINIC | Age: 68
End: 2018-06-04

## 2018-06-04 DIAGNOSIS — F41.9 ANXIETY AND DEPRESSION: ICD-10-CM

## 2018-06-04 DIAGNOSIS — F32.A ANXIETY AND DEPRESSION: ICD-10-CM

## 2018-06-04 RX ORDER — CLONAZEPAM 1 MG/1
0.5 TABLET ORAL
Qty: 15 TABLET | Refills: 0 | Status: SHIPPED | OUTPATIENT
Start: 2018-06-04 | End: 2018-07-24 | Stop reason: SDUPTHER

## 2018-06-21 ENCOUNTER — LAB (OUTPATIENT)
Dept: LAB | Facility: CLINIC | Age: 68
End: 2018-06-21
Payer: MEDICARE

## 2018-06-21 ENCOUNTER — TRANSCRIBE ORDERS (OUTPATIENT)
Dept: LAB | Facility: CLINIC | Age: 68
End: 2018-06-21

## 2018-06-21 DIAGNOSIS — Z79.899 ENCOUNTER FOR LONG-TERM (CURRENT) USE OF MEDICATIONS: ICD-10-CM

## 2018-06-21 DIAGNOSIS — Z79.899 ENCOUNTER FOR LONG-TERM (CURRENT) USE OF MEDICATIONS: Primary | ICD-10-CM

## 2018-06-21 LAB
PROLACTIN SERPL-MCNC: 14.7 NG/ML (ref 2.5–17.4)
TESTOST SERPL-MCNC: 291 NG/DL (ref 95–948)
TSH SERPL DL<=0.05 MIU/L-ACNC: 4.89 UIU/ML (ref 0.36–3.74)

## 2018-06-21 PROCEDURE — 84403 ASSAY OF TOTAL TESTOSTERONE: CPT

## 2018-06-21 PROCEDURE — 84146 ASSAY OF PROLACTIN: CPT

## 2018-06-21 PROCEDURE — 84443 ASSAY THYROID STIM HORMONE: CPT

## 2018-06-21 PROCEDURE — 36415 COLL VENOUS BLD VENIPUNCTURE: CPT

## 2018-06-29 ENCOUNTER — OFFICE VISIT (OUTPATIENT)
Dept: CARDIOLOGY CLINIC | Facility: CLINIC | Age: 68
End: 2018-06-29
Payer: MEDICARE

## 2018-06-29 VITALS
WEIGHT: 244 LBS | SYSTOLIC BLOOD PRESSURE: 108 MMHG | BODY MASS INDEX: 36.14 KG/M2 | HEART RATE: 64 BPM | DIASTOLIC BLOOD PRESSURE: 64 MMHG | HEIGHT: 69 IN

## 2018-06-29 DIAGNOSIS — E78.2 MIXED HYPERLIPIDEMIA: ICD-10-CM

## 2018-06-29 DIAGNOSIS — I47.1 SUPRAVENTRICULAR TACHYCARDIA (HCC): ICD-10-CM

## 2018-06-29 DIAGNOSIS — E66.9 CLASS 2 OBESITY WITHOUT SERIOUS COMORBIDITY WITH BODY MASS INDEX (BMI) OF 37.0 TO 37.9 IN ADULT, UNSPECIFIED OBESITY TYPE: ICD-10-CM

## 2018-06-29 DIAGNOSIS — R00.2 PALPITATIONS: ICD-10-CM

## 2018-06-29 DIAGNOSIS — I10 ESSENTIAL HYPERTENSION: ICD-10-CM

## 2018-06-29 DIAGNOSIS — I47.1 MULTIFOCAL ATRIAL TACHYCARDIA (HCC): Primary | ICD-10-CM

## 2018-06-29 DIAGNOSIS — R00.0 TACHYCARDIA: ICD-10-CM

## 2018-06-29 PROCEDURE — 99214 OFFICE O/P EST MOD 30 MIN: CPT | Performed by: INTERNAL MEDICINE

## 2018-06-29 RX ORDER — ROSUVASTATIN CALCIUM 20 MG/1
20 TABLET, COATED ORAL DAILY
Qty: 30 TABLET | Refills: 6 | Status: SHIPPED | OUTPATIENT
Start: 2018-06-29 | End: 2018-12-21 | Stop reason: SDUPTHER

## 2018-06-29 NOTE — PROGRESS NOTES
Cardiology Follow Up    Bre Rodriguez  1950  [id number]  [address] [facility] Scotty Duckworth  [address]    1  Multifocal atrial tachycardia (HCC)  Comprehensive metabolic panel   2  Supraventricular tachycardia ([address] )     3  Essential hypertension  VAS screening   4  Mixed hyperlipidemia  rosuvastatin (CRESTOR) 20 MG tablet    Lipid panel    Comprehensive metabolic panel    VAS screening   5  Class 2 obesity without serious comorbidity with body mass index (BMI) of 37 0 to 37 9 in adult, unspecified obesity type         Discussion/Summary:Overall he has been doing well from a cardiac standpoint  Multifocal atrial tachycardia has been suppressed with beta blockers and he is doing well  Blood pressures been well controlled  Lipids have still been a little on the high side despite high intensity atorvastatin  I have changed him to rosuvastatin 20 mg will check CMP in 2 weeks and full lipid profile in 4 months  If still above goal will up titrate to 40 mg  I provided him with a vascular screening to look for carotid disease and rule out aneurysm  Recent echocardiogram was reviewed  No further cardiac testing  Interval History:   28-year-old gentleman with history of multifocal atrial tachycardia, hypertension, dyslipidemia, obesity presents for routine scheduled follow-up visit  He has been doing some new diet and exercise trying to lose weight but has had a hard time doing this  Functional capacity has been good with no exertional symptoms  Denies any chest pain, shortness of breath, palpitations, lightheadedness, dizziness, or syncope  There has been no lower extremity edema, PND, orthopnea  Recent echocardiogram was reviewed  Recent blood work was reviewed  He has been taking all medications as prescribed      Problem List     Allergic rhinitis    Enlarged prostate with lower urinary tract symptoms (LUTS)    Chronic bronchitis (HCC)    Depression, controlled    Overview Signed 2/27/2018  1:10 PM by Davide Pa MD     Transitioned From: Depression         Elevated ALT measurement    Hyperlipidemia    Hypertension    Impaired fasting glucose    Lung nodule    Male erectile dysfunction    Multifocal atrial tachycardia (HCC)    Obesity    Osteoarthritis of knee    Osteoarthritis of left hip    Seasonal allergies    Spinal stenosis of lumbar region    Overview Addendum 3/6/2018  3:00 PM by Davide Pa MD     Description: Cervical and lumbar  Sees Dr Valeriano Edwards  Had surgical decompression T12 to L1 7/2012  Supraventricular tachycardia (HCC)    Anxiety and depression    Arthritis of left hip    COPD (chronic obstructive pulmonary disease) (HCC)    Elevated prostate specific antigen (PSA)    Lumbosacral spondylosis    Incomplete emptying of bladder    Other affections of shoulder region, not elsewhere classified    Personal history of other disorder of urinary system    Retention of urine    Screening for prostate cancer    Thoracic or lumbosacral neuritis or radiculitis    Urethral stricture    Increased frequency of urination        Past Medical History:   Diagnosis Date    Bronchitis, chronic obstructive, with exacerbation ([facility] )     Last Assessed: 5/17/2013    [facility]     Last Assessed: 4/13/2015    Elevated PSA     Bx neg 2012; Last Assessed: 11/23/2012     Social History     Social History    Marital status: /Civil Union     Spouse name: N/A    Number of children: N/A    Years of education: N/A     Occupational History    Not on file       Social History Main Topics    Smoking status: Former Smoker     Packs/day: 1 00     Years: 23 00     Quit date: 2000    Smokeless tobacco: Never Used    Alcohol use No      Comment: Stopped drinking    Drug use: No    Sexual activity: Not on file     Other Topics Concern    Not on file     Social History Narrative    No narrative on file      Family History   Problem Relation Age of Onset   Suni Avina Breast cancer Mother     Pancreatitis Father     Diabetes Maternal Grandmother     Heart attack Maternal Grandfather     Heart attack Paternal Grandmother     Diabetes Paternal Grandmother     Diabetes Paternal Grandfather      Past Surgical History:   Procedure Laterality Date    BACK SURGERY  07/25/2012    decompression of spinal stenosis from lower thoracic through the lumbar spine    COLONOSCOPY  10/2010    neg   recheck in 5 years    KNEE ARTHROSCOPY  01/2013    PROSTATE BIOPSY  2012    for elevated PSA of about 7; neg    TRANSURETHRAL RESECTION OF PROSTATE  06/27/2013       Current Outpatient Prescriptions:     albuterol (PROAIR HFA) 90 mcg/act inhaler, Inhale 2 puffs, Disp: , Rfl:     aspirin 81 mg chewable tablet, Chew 1 tablet daily, Disp: , Rfl:     budesonide-formoterol (SYMBICORT) 160-4 5 mcg/act inhaler, Inhale 2 puffs 2 (two) times a day, Disp: 3 Inhaler, Rfl: 1    clonazePAM (KlonoPIN) 1 mg tablet, Take 0 5 tablets (0 5 mg total) by mouth daily at bedtime for 30 days, Disp: 15 tablet, Rfl: 0    diclofenac sodium (VOLTAREN) 50 mg EC tablet, Take 1 tablet (50 mg total) by mouth 3 (three) times a day as needed (pain) for up to 30 days, Disp: 90 tablet, Rfl: 5    DULoxetine (CYMBALTA) 60 mg delayed release capsule, Take 1 capsule by mouth daily, Disp: , Rfl:     gabapentin (NEURONTIN) 300 mg capsule, Take 1 capsule by mouth 4 (four) times a day, Disp: , Rfl:     loratadine (CLARITIN) 10 mg tablet, Take 1 tablet by mouth daily, Disp: , Rfl:     meclizine (ANTIVERT) 12 5 MG tablet, TAKE 1 TABLET BY MOUTH EVERY 8 HOURS AS NEEDED FOR DIZZINESS FOR UP TO 10 DAYS, Disp: 30 tablet, Rfl: 5    metoprolol tartrate (LOPRESSOR) 25 mg tablet, Take with 50 mg twice daily (75mg) , Disp: , Rfl:     metoprolol tartrate (LOPRESSOR) 50 mg tablet, Take 1 tablet (50 mg total) by mouth 2 (two) times a day (Patient taking differently: Take by mouth Take with 25 mg twice daily (75 mg) ), Disp: 180 tablet, Rfl: 3    Multiple Vitamin (MULTI-VITAMIN DAILY) TABS, Take 1 tablet by mouth daily, Disp: , Rfl:     NIFEdipine (PROCARDIA XL) 90 mg 24 hr tablet, Take 1 tablet by mouth daily, Disp: , Rfl:     nitrofurantoin (MACROBID) 100 mg capsule, Take 100 mg by mouth, Disp: , Rfl:     tadalafil (CIALIS) 5 MG tablet, Take 1 tablet (5 mg total) by mouth daily for 90 days, Disp: 90 tablet, Rfl: 3    rosuvastatin (CRESTOR) 20 MG tablet, Take 1 tablet (20 mg total) by mouth daily, Disp: 30 tablet, Rfl: 6  No Known Allergies    Labs:     Chemistry        Component Value Date/Time     02/28/2018 0722     09/08/2015 1027    K 4 1 02/28/2018 0722    K 4 6 09/08/2015 1027     02/28/2018 0722     09/08/2015 1027    CO2 28 02/28/2018 0722    CO2 27 09/08/2015 1027    BUN 16 02/28/2018 0722    BUN 15 09/08/2015 1027    CREATININE 0 90 02/28/2018 0722    CREATININE 0 81 09/08/2015 1027        Component Value Date/Time    CALCIUM 8 8 02/28/2018 0722    CALCIUM 9 3 09/08/2015 1027    ALKPHOS 89 02/28/2018 0722    ALKPHOS 121 07/21/2014 0910    AST 16 02/28/2018 0722    AST 18 07/21/2014 0910    ALT 26 02/28/2018 0722    ALT 34 07/21/2014 0910    BILITOT 0 57 02/28/2018 0722    BILITOT 0 45 07/21/2014 0910            Lab Results   Component Value Date    CHOL 207 (H) 02/28/2018    CHOL 170 09/19/2017    CHOL 219 (H) 08/23/2017     Lab Results   Component Value Date    HDL 50 02/28/2018    HDL 41 09/19/2017    HDL 46 08/23/2017     Lab Results   Component Value Date    LDLCALC 131 (H) 02/28/2018    LDLCALC 110 (H) 09/19/2017    LDLCALC 149 (H) 08/23/2017     Lab Results   Component Value Date    TRIG 128 02/28/2018    TRIG 93 09/19/2017    TRIG 120 08/23/2017     No components found for: CHOLHDL    Imaging: No results found      ECG:        ROS    Vitals:    06/29/18 0828   BP: 108/64   Pulse: 64     Vitals:    06/29/18 0828   Weight: 111 kg (244 lb)     Height: 5' 8 5" (174 cm)   Body mass index is 36 56 kg/m²      Physical Exam:   General appearance:  Appears stated age, alert, well appearing and in no distress  HEENT:  PERRLA, EOMI, no scleral icterus, no conjunctival pallor  NECK:  Supple, No elevated JVP, no thyromegaly, no carotid bruits  HEART:  Regular rate and rhythm, normal S1/S2, no S3/S4, no murmur or rub  LUNGS:  Clear to auscultation bilaterally, no wheezes rales or rhonchi  ABDOMEN:  Soft, non-tender, positive bowel sounds, no rebound or guarding, no organomegaly   EXTREMITIES:  No edema, normal range of motion  VASCULAR:  Normal pedal pulses, good pulse volume   SKIN: No lesions or rashes on exposed skin  NEURO:  CN II-XII intact, no focal deficits

## 2018-06-30 DIAGNOSIS — R00.0 TACHYCARDIA: ICD-10-CM

## 2018-06-30 DIAGNOSIS — R00.2 PALPITATIONS: ICD-10-CM

## 2018-07-17 ENCOUNTER — APPOINTMENT (OUTPATIENT)
Dept: LAB | Facility: CLINIC | Age: 68
End: 2018-07-17

## 2018-07-17 DIAGNOSIS — E78.2 MIXED HYPERLIPIDEMIA: ICD-10-CM

## 2018-07-17 DIAGNOSIS — I47.1 MULTIFOCAL ATRIAL TACHYCARDIA (HCC): ICD-10-CM

## 2018-07-17 LAB
ALBUMIN SERPL BCP-MCNC: 3.8 G/DL (ref 3.5–5)
ALP SERPL-CCNC: 75 U/L (ref 46–116)
ALT SERPL W P-5'-P-CCNC: 29 U/L (ref 12–78)
ANION GAP SERPL CALCULATED.3IONS-SCNC: 3 MMOL/L (ref 4–13)
AST SERPL W P-5'-P-CCNC: 19 U/L (ref 5–45)
BILIRUB SERPL-MCNC: 0.42 MG/DL (ref 0.2–1)
BUN SERPL-MCNC: 21 MG/DL (ref 5–25)
CALCIUM SERPL-MCNC: 9.3 MG/DL (ref 8.3–10.1)
CHLORIDE SERPL-SCNC: 107 MMOL/L (ref 100–108)
CO2 SERPL-SCNC: 27 MMOL/L (ref 21–32)
CREAT SERPL-MCNC: 1.07 MG/DL (ref 0.6–1.3)
GFR SERPL CREATININE-BSD FRML MDRD: 71 ML/MIN/1.73SQ M
GLUCOSE SERPL-MCNC: 112 MG/DL (ref 65–140)
POTASSIUM SERPL-SCNC: 4.3 MMOL/L (ref 3.5–5.3)
PROT SERPL-MCNC: 7.3 G/DL (ref 6.4–8.2)
SODIUM SERPL-SCNC: 137 MMOL/L (ref 136–145)

## 2018-07-17 PROCEDURE — 80053 COMPREHEN METABOLIC PANEL: CPT

## 2018-07-17 PROCEDURE — 36415 COLL VENOUS BLD VENIPUNCTURE: CPT

## 2018-07-20 ENCOUNTER — HOSPITAL ENCOUNTER (OUTPATIENT)
Dept: NON INVASIVE DIAGNOSTICS | Facility: CLINIC | Age: 68
Discharge: HOME | End: 2018-07-20
Payer: MEDICARE

## 2018-07-20 DIAGNOSIS — I10 ESSENTIAL HYPERTENSION: ICD-10-CM

## 2018-07-20 DIAGNOSIS — E78.2 MIXED HYPERLIPIDEMIA: ICD-10-CM

## 2018-07-20 PROCEDURE — 93880 EXTRACRANIAL BILAT STUDY: CPT | Performed by: SURGERY

## 2018-07-20 PROCEDURE — 93979 VASCULAR STUDY: CPT | Performed by: SURGERY

## 2018-07-20 PROCEDURE — 93922 UPR/L XTREMITY ART 2 LEVELS: CPT | Performed by: SURGERY

## 2018-07-24 DIAGNOSIS — F41.9 ANXIETY AND DEPRESSION: ICD-10-CM

## 2018-07-24 DIAGNOSIS — F32.A ANXIETY AND DEPRESSION: ICD-10-CM

## 2018-07-24 RX ORDER — CLONAZEPAM 0.5 MG/1
0.5 TABLET ORAL
Qty: 30 TABLET | Refills: 1 | Status: SHIPPED | OUTPATIENT
Start: 2018-07-24 | End: 2018-09-19 | Stop reason: SDUPTHER

## 2018-07-24 NOTE — TELEPHONE ENCOUNTER
Patient is requesting a refill of clonazePAM (KLONOPIN) 1 mg tablet, take 0 5 mg daily at bedtime, 30 days + 1 refill to the [facility] on [address], [facility]  Per patient, he usually gets 0 5 mg tablets, but the pharmacy was out of them last refill  Patient can be contacted at [phone] with any questions

## 2018-08-01 DIAGNOSIS — I10 ESSENTIAL HYPERTENSION: Primary | ICD-10-CM

## 2018-08-01 RX ORDER — NIFEDIPINE 90 MG/1
90 TABLET, EXTENDED RELEASE ORAL DAILY
Qty: 90 TABLET | Refills: 3 | Status: SHIPPED | OUTPATIENT
Start: 2018-08-01 | End: 2019-08-01 | Stop reason: SDUPTHER

## 2018-08-10 DIAGNOSIS — N40.0 BENIGN PROSTATIC HYPERPLASIA, UNSPECIFIED WHETHER LOWER URINARY TRACT SYMPTOMS PRESENT: ICD-10-CM

## 2018-08-10 RX ORDER — TADALAFIL 5 MG/1
5 TABLET ORAL DAILY
Qty: 90 TABLET | Refills: 1 | Status: SHIPPED | OUTPATIENT
Start: 2018-08-10 | End: 2018-08-22 | Stop reason: SDUPTHER

## 2018-08-13 ENCOUNTER — OFFICE VISIT (OUTPATIENT)
Dept: PULMONOLOGY | Facility: CLINIC | Age: 68
End: 2018-08-13
Payer: MEDICARE

## 2018-08-13 VITALS
TEMPERATURE: 97.9 F | WEIGHT: 239 LBS | BODY MASS INDEX: 36.22 KG/M2 | HEART RATE: 97 BPM | HEIGHT: 68 IN | OXYGEN SATURATION: 98 % | SYSTOLIC BLOOD PRESSURE: 110 MMHG | DIASTOLIC BLOOD PRESSURE: 68 MMHG

## 2018-08-13 DIAGNOSIS — J45.909 ASTHMA: Primary | ICD-10-CM

## 2018-08-13 DIAGNOSIS — J30.9 ALLERGIC RHINITIS: ICD-10-CM

## 2018-08-13 PROCEDURE — 99214 OFFICE O/P EST MOD 30 MIN: CPT | Performed by: INTERNAL MEDICINE

## 2018-08-13 NOTE — PROGRESS NOTES
Office Progress Note - Pulmonary    Obadiah Bolus Jennifer 80 y o  male MRN: [id number]    Encounter: [id number]      Assessment:  · Bronchial asthma  · Allergic rhinitis  Plan:   · Stop the Symbicort and monitor symptoms  · Albuterol rescue inhaler  · Fluticasone nasal spray 2 sprays to each nostril once a day  · Claritin 10 mg once a day  · Saline nasal/sinus rinse as needed  · Follow-up in 6 months  Discussion:   The patient has bronchial asthma rather than chronic bronchitis  His asthma is in remission  I have stopped his put Symbicort and told the patient to monitor his symptoms an use the rescue inhaler for symptom relief  If his need for the rescue inhaler is more than twice a week then he will resume the Symbicort  I have maintained him on the fluticasone nasal spray and Claritin in addition to the saline rinse as needed  The patient will need the Symbicort during the allergy season since the spring and the fall  I have reminded him to take the influenza vaccine in the fall  I will see him in 6 months in a follow-up visit  Subjective: The patient is here for a follow-up visit  He denies any shortness of breath  He has occasional cough  His postnasal drip is controlled  He is using Symbicort twice a day  He does not need to use his rescue inhaler  He is using fluticasone nasal spray 2 sprays to each nostril once a day, Claritin 10 mg once a day and saline nasal/sinus rinse as needed  He has no nocturnal symptoms  Review of systems:  A 12 point system review is done and aside from what is stated above the rest of the review of systems is negative  Family history and social history are reviewed  Medications list is reviewed  Vitals: Blood pressure 110/68, pulse 97, temperature 97 9 °F (36 6 °C), temperature source Tympanic, height 5' 8" (1 727 m), weight 108 kg (239 lb), SpO2 98 %  ,     Physical Exam  Gen: Awake, alert, oriented x 3, no acute distress  HEENT: Mucous membranes moist, no oral lesions, no thrush  NECK: No accessory muscle use, JVP not elevated  Cardiac: Regular, single S1, single S2, no murmurs, no rubs, no gallops  Lungs:  Clear breath sounds  No wheezing or rhonchi  Abdomen: normoactive bowel sounds, soft nontender, nondistended, no rebound or rigidity, no guarding  Extremities: no cyanosis, no clubbing, no edema  Neuro:  Grossly nonfocal   Skin:  No rash

## 2018-08-22 DIAGNOSIS — N40.0 BENIGN PROSTATIC HYPERPLASIA, UNSPECIFIED WHETHER LOWER URINARY TRACT SYMPTOMS PRESENT: ICD-10-CM

## 2018-08-22 RX ORDER — TADALAFIL 5 MG/1
5 TABLET ORAL DAILY
Qty: 90 TABLET | Refills: 1 | Status: SHIPPED | OUTPATIENT
Start: 2018-08-22 | End: 2018-09-06 | Stop reason: SDUPTHER

## 2018-08-22 NOTE — TELEPHONE ENCOUNTER
Patient wants a refill of Cialis 5 mg sent to [facility]  Ova Luisa he needs a new authorization script  Please advise

## 2018-09-04 ENCOUNTER — TRANSCRIBE ORDERS (OUTPATIENT)
Dept: LAB | Facility: CLINIC | Age: 68
End: 2018-09-04

## 2018-09-04 ENCOUNTER — APPOINTMENT (OUTPATIENT)
Dept: LAB | Facility: CLINIC | Age: 68
End: 2018-09-04
Payer: MEDICARE

## 2018-09-04 DIAGNOSIS — F41.9 ANXIETY AND DEPRESSION: ICD-10-CM

## 2018-09-04 DIAGNOSIS — E78.2 MIXED HYPERLIPIDEMIA: ICD-10-CM

## 2018-09-04 DIAGNOSIS — I10 ESSENTIAL HYPERTENSION: ICD-10-CM

## 2018-09-04 DIAGNOSIS — E03.9 ACQUIRED HYPOTHYROIDISM: ICD-10-CM

## 2018-09-04 DIAGNOSIS — F32.A ANXIETY AND DEPRESSION: ICD-10-CM

## 2018-09-04 DIAGNOSIS — N40.0 ENLARGED PROSTATE: ICD-10-CM

## 2018-09-04 DIAGNOSIS — E66.9 CLASS 2 OBESITY WITHOUT SERIOUS COMORBIDITY WITH BODY MASS INDEX (BMI) OF 37.0 TO 37.9 IN ADULT, UNSPECIFIED OBESITY TYPE: ICD-10-CM

## 2018-09-04 DIAGNOSIS — N40.0 ENLARGED PROSTATE: Primary | ICD-10-CM

## 2018-09-04 DIAGNOSIS — R73.01 IMPAIRED FASTING GLUCOSE: ICD-10-CM

## 2018-09-04 LAB
ALBUMIN SERPL BCP-MCNC: 3.9 G/DL (ref 3.5–5)
ALP SERPL-CCNC: 78 U/L (ref 46–116)
ALT SERPL W P-5'-P-CCNC: 25 U/L (ref 12–78)
ANION GAP SERPL CALCULATED.3IONS-SCNC: 8 MMOL/L (ref 4–13)
AST SERPL W P-5'-P-CCNC: 20 U/L (ref 5–45)
BILIRUB SERPL-MCNC: 0.49 MG/DL (ref 0.2–1)
BUN SERPL-MCNC: 26 MG/DL (ref 5–25)
CALCIUM SERPL-MCNC: 9.1 MG/DL (ref 8.3–10.1)
CHLORIDE SERPL-SCNC: 104 MMOL/L (ref 100–108)
CHOLEST SERPL-MCNC: 208 MG/DL (ref 50–200)
CO2 SERPL-SCNC: 25 MMOL/L (ref 21–32)
CREAT SERPL-MCNC: 1.49 MG/DL (ref 0.6–1.3)
EST. AVERAGE GLUCOSE BLD GHB EST-MCNC: 123 MG/DL
GFR SERPL CREATININE-BSD FRML MDRD: 48 ML/MIN/1.73SQ M
GLUCOSE P FAST SERPL-MCNC: 103 MG/DL (ref 65–99)
HBA1C MFR BLD: 5.9 % (ref 4.2–6.3)
HDLC SERPL-MCNC: 43 MG/DL (ref 40–60)
LDLC SERPL CALC-MCNC: 140 MG/DL (ref 0–100)
NONHDLC SERPL-MCNC: 165 MG/DL
POTASSIUM SERPL-SCNC: 4.3 MMOL/L (ref 3.5–5.3)
PROT SERPL-MCNC: 7.8 G/DL (ref 6.4–8.2)
PSA SERPL-MCNC: 0.9 NG/ML (ref 0–4)
SODIUM SERPL-SCNC: 137 MMOL/L (ref 136–145)
T3FREE SERPL-MCNC: 1.66 PG/ML (ref 2.3–4.2)
T4 SERPL-MCNC: 10.6 UG/DL (ref 4.7–13.3)
TRIGL SERPL-MCNC: 126 MG/DL
TSH SERPL DL<=0.05 MIU/L-ACNC: 7.22 UIU/ML (ref 0.36–3.74)

## 2018-09-04 PROCEDURE — 80061 LIPID PANEL: CPT

## 2018-09-04 PROCEDURE — 84436 ASSAY OF TOTAL THYROXINE: CPT

## 2018-09-04 PROCEDURE — 80053 COMPREHEN METABOLIC PANEL: CPT

## 2018-09-04 PROCEDURE — 84443 ASSAY THYROID STIM HORMONE: CPT

## 2018-09-04 PROCEDURE — 84481 FREE ASSAY (FT-3): CPT

## 2018-09-04 PROCEDURE — 36415 COLL VENOUS BLD VENIPUNCTURE: CPT

## 2018-09-04 PROCEDURE — 83036 HEMOGLOBIN GLYCOSYLATED A1C: CPT

## 2018-09-04 PROCEDURE — 84153 ASSAY OF PSA TOTAL: CPT

## 2018-09-06 ENCOUNTER — OFFICE VISIT (OUTPATIENT)
Dept: FAMILY MEDICINE CLINIC | Facility: CLINIC | Age: 68
End: 2018-09-06
Payer: MEDICARE

## 2018-09-06 VITALS
WEIGHT: 234.6 LBS | BODY MASS INDEX: 35.55 KG/M2 | SYSTOLIC BLOOD PRESSURE: 130 MMHG | HEART RATE: 64 BPM | OXYGEN SATURATION: 97 % | DIASTOLIC BLOOD PRESSURE: 74 MMHG | RESPIRATION RATE: 16 BRPM | HEIGHT: 68 IN | TEMPERATURE: 98.4 F

## 2018-09-06 DIAGNOSIS — R79.89 T3 LOW IN SERUM: ICD-10-CM

## 2018-09-06 DIAGNOSIS — Z23 NEED FOR INFLUENZA VACCINATION: ICD-10-CM

## 2018-09-06 DIAGNOSIS — Z00.00 MEDICARE ANNUAL WELLNESS VISIT, SUBSEQUENT: ICD-10-CM

## 2018-09-06 DIAGNOSIS — F41.9 ANXIETY AND DEPRESSION: ICD-10-CM

## 2018-09-06 DIAGNOSIS — E78.5 HYPERLIPIDEMIA, UNSPECIFIED HYPERLIPIDEMIA TYPE: ICD-10-CM

## 2018-09-06 DIAGNOSIS — Z11.59 NEED FOR HEPATITIS C SCREENING TEST: ICD-10-CM

## 2018-09-06 DIAGNOSIS — I10 ESSENTIAL HYPERTENSION: Primary | ICD-10-CM

## 2018-09-06 DIAGNOSIS — J44.9 CHRONIC OBSTRUCTIVE PULMONARY DISEASE, UNSPECIFIED COPD TYPE (HCC): ICD-10-CM

## 2018-09-06 DIAGNOSIS — N40.0 BENIGN PROSTATIC HYPERPLASIA, UNSPECIFIED WHETHER LOWER URINARY TRACT SYMPTOMS PRESENT: ICD-10-CM

## 2018-09-06 DIAGNOSIS — N17.9 AKI (ACUTE KIDNEY INJURY) (HCC): ICD-10-CM

## 2018-09-06 DIAGNOSIS — M17.10 OSTEOARTHRITIS OF KNEE, UNSPECIFIED LATERALITY, UNSPECIFIED OSTEOARTHRITIS TYPE: ICD-10-CM

## 2018-09-06 DIAGNOSIS — F32.A ANXIETY AND DEPRESSION: ICD-10-CM

## 2018-09-06 DIAGNOSIS — Z23 NEED FOR PNEUMOCOCCAL VACCINATION: ICD-10-CM

## 2018-09-06 PROBLEM — M54.17 LUMBOSACRAL NEURITIS: Status: ACTIVE | Noted: 2018-09-06

## 2018-09-06 PROBLEM — M19.91 LOCALIZED, PRIMARY OSTEOARTHRITIS: Status: ACTIVE | Noted: 2018-09-06

## 2018-09-06 PROBLEM — M48.062 PSEUDOCLAUDICATION SYNDROME: Status: ACTIVE | Noted: 2018-09-06

## 2018-09-06 PROBLEM — M79.609 PAIN IN LIMB: Status: ACTIVE | Noted: 2018-09-06

## 2018-09-06 PROBLEM — M16.10 PRIMARY LOCALIZED OSTEOARTHRITIS OF PELVIC REGION AND THIGH: Status: ACTIVE | Noted: 2018-09-06

## 2018-09-06 PROBLEM — M51.379 DEGENERATION OF LUMBOSACRAL INTERVERTEBRAL DISC: Status: ACTIVE | Noted: 2018-09-06

## 2018-09-06 PROBLEM — M54.50 LOW BACK PAIN: Status: ACTIVE | Noted: 2018-09-06

## 2018-09-06 PROBLEM — M25.569 KNEE PAIN: Status: ACTIVE | Noted: 2018-09-06

## 2018-09-06 PROBLEM — M75.120 FULL THICKNESS ROTATOR CUFF TEAR: Status: ACTIVE | Noted: 2018-09-06

## 2018-09-06 PROBLEM — M48.02 CERVICAL STENOSIS OF SPINAL CANAL: Status: ACTIVE | Noted: 2018-09-06

## 2018-09-06 PROBLEM — M25.519 SHOULDER PAIN: Status: ACTIVE | Noted: 2018-09-06

## 2018-09-06 PROBLEM — M51.37 DEGENERATION OF LUMBOSACRAL INTERVERTEBRAL DISC: Status: ACTIVE | Noted: 2018-09-06

## 2018-09-06 PROBLEM — M43.10 SPONDYLOLISTHESIS, ACQUIRED: Status: ACTIVE | Noted: 2018-09-06

## 2018-09-06 PROCEDURE — 90662 IIV NO PRSV INCREASED AG IM: CPT

## 2018-09-06 PROCEDURE — G0009 ADMIN PNEUMOCOCCAL VACCINE: HCPCS

## 2018-09-06 PROCEDURE — 99214 OFFICE O/P EST MOD 30 MIN: CPT | Performed by: FAMILY MEDICINE

## 2018-09-06 PROCEDURE — 90732 PPSV23 VACC 2 YRS+ SUBQ/IM: CPT

## 2018-09-06 PROCEDURE — G0008 ADMIN INFLUENZA VIRUS VAC: HCPCS

## 2018-09-06 PROCEDURE — G0439 PPPS, SUBSEQ VISIT: HCPCS | Performed by: FAMILY MEDICINE

## 2018-09-06 RX ORDER — TADALAFIL 5 MG/1
5 TABLET ORAL DAILY
Qty: 90 TABLET | Refills: 3 | Status: SHIPPED | OUTPATIENT
Start: 2018-09-06 | End: 2019-05-16

## 2018-09-06 NOTE — PROGRESS NOTES
Assessment/Plan:  Reviewed lab in 9/2018  TSH 7 2 elevated, free T3 1 66 low, total T4 normal  cMP showed Cr 1 49 elevated  hgA1C 5 9 ok  Lipid 208/126/43/140 slightly elevated  PSA 0 9 ok    HTN---controlled  Continue nifedipine and metoprolol  CAROLYN---Keep hydrated  Pt states he is on diclofenac for years  Will recheck BMP  If Cr still elevated, will stop diclofenac  Anxiety/depression---Mood is stable  Continue cymbalta and clonazepam as needed  FU psychiatry as scheduled  COPD---use mucinex as needed  FU pulmonary  Hyperlipidemia---low fat diet  Continue crestor  OA---knees and hands  Continue diclofenac now  SE educated pt  Continue gabapentin  Low T3---refer to endocrinology  BPH---continue cialis  FU urology as scheduled  Give Flu shot today  Got PCV13 and zostavax in 2016  Give pneumovax today  Will ask insurance for coverage of Tdap and shingrix  PSA yearly  Pros and cons educated pt  Colonoscopy 3/2017  Repeat in 5 years per pt  RTO in 6 months  Diagnoses and all orders for this visit:    Essential hypertension  -     CBC; Future  -     Comprehensive metabolic panel; Future    CAROLYN (acute kidney injury) ([facility] [address] )  -     Basic metabolic panel; Future    Anxiety and depression    Chronic obstructive pulmonary disease, unspecified COPD type ([facility] )    Hyperlipidemia, unspecified hyperlipidemia type  -     Lipid panel; Future    Osteoarthritis of knee, unspecified laterality, unspecified osteoarthritis type    T3 low in serum  -     Ambulatory referral to Endocrinology; Future  -     TSH, 3rd generation; Future    Benign prostatic hyperplasia, unspecified whether lower urinary tract symptoms present  -     tadalafil (CIALIS) 5 MG tablet;  Take 1 tablet (5 mg total) by mouth daily for 90 days    Medicare annual wellness visit, subsequent    Need for influenza vaccination  -     influenza vaccine, 9599-3655, high-dose, PF 0 5 mL, for patients 65 yr+ (FLUZONE HIGH-DOSE)    Need for pneumococcal vaccination  -     PNEUMOCOCCAL POLYSACCHARIDE VACCINE 23-VALENT =>1YO SQ IM    Need for hepatitis C screening test  -     Hepatitis C antibody; Future          Subjective:      Patient ID: Chantal Roy is a 80 y o  male  HPI      Pt is here by himself    Depression/depression-----Saw Mino Pettit and Saw psychiatry Dr Mihir Sellers Q 3 month  He is on cymbalta 60mg QD which works for him  Use clonazepam 0 5mg qhs for insomnia  HTN----FU cardiology for MAT, HTN  Stable  On metoprolol 75mg bid and nifedipine 90mg Qd  Today bP 130/74 ok  Denies headache, vision change, SOB or CP  Hyperlipidemia---He is on crestor 20mg qhs  Denies side effects  COPD---FU pulmonary for chronic bronchitis  Does not need any symbicort or proair  He uses mucinex-DM daily as needed  Quit smoking in 2000  FU ortho for lower back pain/spinal stenosis before  Not follow any more  On diclofenac  and neurotin 300mg qid which helped with arthritis of hands also       BPH---FU urology Q 6 months for BPH s/p TURP in 2013  Had cystoscopy and dilation in 9/2014  He is self-cath 2/week now  On macrobid when he did self-cath because Medicare does not pay macrobid daily  Cialis 5mg QD helped  Quit smoking for years  No alcohol or drugs  Lives with wife  Does all ADL's  Still drive  Denies recent falls  The following portions of the patient's history were reviewed and updated as appropriate: allergies, current medications, past family history, past medical history, past social history, past surgical history and problem list     Review of Systems   Constitutional: Negative for appetite change, chills and fever  HENT: Negative for congestion, ear pain, sinus pain and sore throat  Eyes: Negative for discharge and itching  Respiratory: Negative for apnea, cough, chest tightness, shortness of breath and wheezing  Cardiovascular: Negative for chest pain, palpitations and leg swelling  Gastrointestinal: Negative for abdominal pain, anal bleeding, constipation, diarrhea, nausea and vomiting  Endocrine: Negative for cold intolerance, heat intolerance and polyuria  Genitourinary: Negative for difficulty urinating and dysuria  Musculoskeletal: Negative for arthralgias, back pain and myalgias  Skin: Negative for rash  Neurological: Negative for dizziness and headaches  Psychiatric/Behavioral: Negative for agitation  Objective:      /74 (BP Location: Left arm, Patient Position: Sitting, Cuff Size: Adult)   Pulse 64   Temp 98 4 °F (36 9 °C) (Oral)   Resp 16   Ht 5' 8" (1 727 m)   Wt 106 kg (234 lb 9 6 oz)   SpO2 97%   BMI 35 67 kg/m²          Physical Exam   Constitutional: He appears well-developed  HENT:   Head: Normocephalic and atraumatic  Right Ear: External ear normal    Left Ear: External ear normal    Nose: Nose normal    Mouth/Throat: Oropharynx is clear and moist    Eyes: Conjunctivae are normal  Pupils are equal, round, and reactive to light  Neck: Normal range of motion  Neck supple  Cardiovascular: Normal rate, regular rhythm, normal heart sounds and intact distal pulses  Pulmonary/Chest: Effort normal and breath sounds normal    Abdominal: Soft  Bowel sounds are normal    Musculoskeletal: Normal range of motion  Neurological: He is alert

## 2018-09-06 NOTE — PROGRESS NOTES
Chief Complaint   Patient presents with   [facility]     AWV     Assessment and Plan:    Problem List Items Addressed This Visit        Respiratory    COPD (chronic obstructive pulmonary disease) ([facility] [address] )       Cardiovascular and Mediastinum    Hypertension - Primary    Relevant Orders    CBC    Comprehensive metabolic panel       Musculoskeletal and Integument    Osteoarthritis of knee       Other    Hyperlipidemia    Relevant Orders    Lipid panel    Anxiety and depression      Other Visit Diagnoses     CAROLYN (acute kidney injury) ([facility] [address] )        Relevant Orders    Basic metabolic panel    T3 low in serum        Relevant Orders    Ambulatory referral to Endocrinology    TSH, 3rd generation    Benign prostatic hyperplasia, unspecified whether lower urinary tract symptoms present        Relevant Medications    tadalafil (CIALIS) 5 MG tablet    Medicare annual wellness visit, subsequent        Need for influenza vaccination        Relevant Orders    influenza vaccine, 6055-1832, high-dose, PF 0 5 mL, for patients 65 yr+ (FLUZONE HIGH-DOSE) (Completed)    Need for pneumococcal vaccination        Relevant Orders    PNEUMOCOCCAL POLYSACCHARIDE VACCINE 23-VALENT =>1YO SQ IM (Completed)    Need for hepatitis C screening test        Relevant Orders    Hepatitis C antibody        MMSE 30/30 today  POA---wife Shirin Rodriguez  Living will---CRP/meds ok, no ventilator per pt  Health Maintenance Due   Topic Date Due    Medicare Annual Wellness Visit (AWV)  1950    DTaP,Tdap,and Td Vaccines (1 - Tdap) 06/14/1972    Pneumococcal PPSV23/PCV13 65+ Years / Low and Medium Risk (2 of 2 - PPSV23) 10/10/2017    INFLUENZA VACCINE  09/01/2018         HPI:  Chantal Roy is a 80 y o  male here for his Subsequent Wellness Visit      Patient Active Problem List   Diagnosis    Allergic rhinitis    Enlarged prostate with lower urinary tract symptoms (LUTS)    Chronic bronchitis (HCC)    Depression, controlled    Elevated ALT measurement    Hyperlipidemia    Hypertension    Impaired fasting glucose    Lung nodule    Male erectile dysfunction    Multifocal atrial tachycardia (HCC)    Obesity    Osteoarthritis of knee    Osteoarthritis of hip    Seasonal allergies    Spinal stenosis    Supraventricular tachycardia (HCC)    Anxiety and depression    Arthritis of left hip    COPD (chronic obstructive pulmonary disease) (HCC)    Elevated prostate specific antigen (PSA)    Lumbosacral spondylosis without myelopathy    Incomplete emptying of bladder    Other affections of shoulder region, not elsewhere classified    Personal history of other disorder of urinary system    Retention of urine    Screening for prostate cancer    Thoracic or lumbosacral neuritis or radiculitis    Urethral stricture    Increased frequency of urination    Spondylolisthesis, acquired    Degeneration of lumbosacral intervertebral disc    Full thickness rotator cuff tear    Knee pain    Localized, primary osteoarthritis    Primary localized osteoarthritis of pelvic region and thigh    Low back pain    Lumbosacral neuritis    Pain in limb    Pseudoclaudication syndrome    Shoulder pain    Cervical stenosis of spinal canal     Past Medical History:   Diagnosis Date    Bronchitis, chronic obstructive, with exacerbation ([facility] [address] )     Last Assessed: 5/17/2013    COPD with acute exacerbation ([facility] [address] )     Last Assessed: 4/13/2015    Elevated PSA     Bx neg 2012; Last Assessed: 11/23/2012     Past Surgical History:   Procedure Laterality Date    BACK SURGERY  07/25/2012    decompression of spinal stenosis from lower thoracic through the lumbar spine    COLONOSCOPY  10/2010    neg   recheck in 5 years    KNEE ARTHROSCOPY  01/2013    PROSTATE BIOPSY  2012    for elevated PSA of about 7; neg    TRANSURETHRAL RESECTION OF PROSTATE  06/27/2013     Family History   Problem Relation Age of Onset    Breast cancer Mother  Pancreatitis Father     Diabetes Maternal Grandmother     Heart attack Maternal Grandfather     Heart attack Paternal Grandmother     Diabetes Paternal Grandmother     Diabetes Paternal Grandfather      History   Smoking Status    Former Smoker    Packs/day: 1 00    Years: 23 00    Types: Cigarettes    Start date: 1977    Quit date: 2000   Smokeless Tobacco    Never Used     History   Alcohol Use No     Comment: Stopped drinking      History   Drug Use No       Current Outpatient Prescriptions   Medication Sig Dispense Refill    aspirin 81 mg chewable tablet Chew 1 tablet daily      clonazePAM (KlonoPIN) 0 5 mg tablet Take 1 tablet (0 5 mg total) by mouth daily at bedtime for 30 days 30 tablet 1    dextromethorphan-guaifenesin (MUCINEX DM)  MG per 12 hr tablet Take 1 tablet by mouth every 12 (twelve) hours      diclofenac sodium (VOLTAREN) 50 mg EC tablet Take 1 tablet (50 mg total) by mouth 3 (three) times a day as needed (pain) for up to 30 days 90 tablet 5    DULoxetine (CYMBALTA) 60 mg delayed release capsule Take 1 capsule by mouth daily      gabapentin (NEURONTIN) 300 mg capsule Take 1 capsule by mouth 4 (four) times a day      loratadine (CLARITIN) 10 mg tablet Take 1 tablet by mouth daily      metoprolol tartrate (LOPRESSOR) 25 mg tablet TAKE 1 TABLET (25 MG TOTAL) BY MOUTH EVERY 12 (TWELVE) HOURS 60 tablet 3    metoprolol tartrate (LOPRESSOR) 50 mg tablet Take 1 tablet (50 mg total) by mouth 2 (two) times a day (Patient taking differently: Take by mouth Take with 25 mg twice daily (75 mg) ) 180 tablet 3    Multiple Vitamin (MULTI-VITAMIN DAILY) TABS Take 1 tablet by mouth daily      NIFEdipine (PROCARDIA XL) 90 mg 24 hr tablet Take 1 tablet (90 mg total) by mouth daily for 90 days 90 tablet 3    nitrofurantoin (MACROBID) 100 mg capsule Take 100 mg by mouth      rosuvastatin (CRESTOR) 20 MG tablet Take 1 tablet (20 mg total) by mouth daily 30 tablet 6    tadalafil (CIALIS) 5 MG tablet Take 1 tablet (5 mg total) by mouth daily for 90 days 90 tablet 3     No current facility-administered medications for this visit  No Known Allergies  Immunization History   Administered Date(s) Administered    Influenza 01/18/2013, 01/31/2014, 01/23/2015, 10/02/2017    Influenza Quadrivalent, 6-35 Months IM 10/02/2015    Influenza Split High Dose Preservative Free IM 11/18/2016, 10/02/2017    Influenza TIV (IM) 11/11/2014    Influenza, high dose seasonal 0 5 mL 09/06/2018    Pneumococcal Conjugate 13-Valent 08/18/2016    Pneumococcal Polysaccharide PPV23 10/10/2012, 09/06/2018    Zoster 11/18/2016       Patient Care Team:  Abilio Guillen MD as PCP - General  MD Florencio Zhang MD Farrel Ling, MD    Medicare Screening Tests and Risk Assessments:  Last Medicare Wellness visit information reviewed, patient interviewed and updates made to the record today  Health Risk Assessment:  Patient rates overall health as very good  Patient feels that their physical health rating is Slightly better  Eyesight was rated as Same  Hearing was rated as Same  Patient feels that their emotional and mental health rating is Same  Pain experienced by patient in the last 7 days has been Some  Patient's pain rating has been 3/10  Emotional/Mental Health:  Patient has been feeling nervous/anxious  PHQ-9 Depression Screening:    Frequency of the following problems over the past two weeks:      1  Little interest or pleasure in doing things: 0 - not at all      2  Feeling down, depressed, or hopeless: 0 - not at all  PHQ-2 Score: 0          Broken Bones/Falls: Fall Risk Assessment:    In the past year, patient has experienced: No history of falling in past year          Bladder/Bowel:  Patient has not leaked urine accidently in the last six months  Patient reports no loss of bowel control  Immunizations:  Patient has had a flu vaccination within the last year  Patient has received a pneumonia shot  Patient has received a shingles shot  Patient has not received tetanus/diphtheria shot  Home Safety:  Patient does not have trouble with stairs inside or outside of their home  Patient currently reports that there are no safety hazards present in home, working smoke alarms, working carbon monoxide detectors  Preventative Screenings:   prostate cancer screen performed, colon cancer screen completed, cholesterol screen completed, glaucoma eye exam completed,     Nutrition:  Current diet: Regular with servings of the following:    Medications:  Patient is currently taking over-the-counter supplements  List of OTC medications includes: Multivitamins  Patient is able to manage medications  Lifestyle Choices:  Patient reports no tobacco use  Patient has smoked or used tobacco in the past   Patient has stopped his tobacco use  Patient reports no alcohol use  Patient drives a vehicle  Patient wears seat belt  Current level of exercise of physical activity described by patient as: Can't walk more than 4 blocks without a cane  Activities of Daily Living:  Can get out of bed by his or her self, able to dress self, able to make own meals, able to do own shopping, able to bathe self, can do own laundry/housekeeping, can manage own money, pay bills and track expenses    Previous Hospitalizations:  No hospitalization or ED visit in past 12 months        Advanced Directives:  Patient has decided on a power of   Patient has spoken to designated power of   Patient has completed advanced directive          Preventative Screening/Counseling:      Cardiovascular:      General: Risks and Benefits Discussed and Screening Current          Diabetes:      General: Risks and Benefits Discussed and Screening Current          Colorectal Cancer:      General: Risks and Benefits Discussed and Screening Current          Prostate Cancer:      General: Risks and Benefits Discussed and Screening Current          Osteoporosis:      General: Risks and Benefits Discussed and Patient Declines          AAA:  Patient has history of tobacco use  General: Risks and Benefits Discussed and Screening Current          Hepatitis C:      General: Risks and Benefits Discussed      Counseling: has received general HCV counseling        Advanced Directives:   Patient has living will for healthcare, patient has an advanced directive  End of life assessment reviewed with patient  Provider agrees with end of life decisions   Additional Comments: POA---wife Shirin Rodriguez  Living will---CRP/meds ok, no ventilator per pt  Immunizations:      Influenza: Risks & Benefits Discussed and Influenza Due Today      Pneumococcal: Risks & Benefits Discussed and Pneumococcal Due Today      Shingrix: Risks & Benefits Discussed and Vaccine Status Unknown      TDAP: Risks & Benefits Discussed and Vaccine Status Unknown  Additional Comments:  Will check insurance coverage of shingrix and Tdap

## 2018-09-12 ENCOUNTER — LAB (OUTPATIENT)
Dept: LAB | Age: 68
End: 2018-09-12
Payer: MEDICARE

## 2018-09-12 ENCOUNTER — TRANSCRIBE ORDERS (OUTPATIENT)
Dept: ADMINISTRATIVE | Age: 68
End: 2018-09-12

## 2018-09-12 DIAGNOSIS — N17.9 AKI (ACUTE KIDNEY INJURY) (HCC): ICD-10-CM

## 2018-09-12 DIAGNOSIS — Z11.59 NEED FOR HEPATITIS C SCREENING TEST: ICD-10-CM

## 2018-09-12 LAB
ANION GAP SERPL CALCULATED.3IONS-SCNC: 9 MMOL/L (ref 4–13)
BUN SERPL-MCNC: 17 MG/DL (ref 5–25)
CALCIUM SERPL-MCNC: 9.3 MG/DL (ref 8.3–10.1)
CHLORIDE SERPL-SCNC: 103 MMOL/L (ref 100–108)
CO2 SERPL-SCNC: 23 MMOL/L (ref 21–32)
CREAT SERPL-MCNC: 1.12 MG/DL (ref 0.6–1.3)
GFR SERPL CREATININE-BSD FRML MDRD: 68 ML/MIN/1.73SQ M
GLUCOSE SERPL-MCNC: 114 MG/DL (ref 65–140)
POTASSIUM SERPL-SCNC: 4.4 MMOL/L (ref 3.5–5.3)
SODIUM SERPL-SCNC: 135 MMOL/L (ref 136–145)

## 2018-09-12 PROCEDURE — 80048 BASIC METABOLIC PNL TOTAL CA: CPT

## 2018-09-12 PROCEDURE — 36415 COLL VENOUS BLD VENIPUNCTURE: CPT

## 2018-09-12 PROCEDURE — 86803 HEPATITIS C AB TEST: CPT

## 2018-09-13 LAB — HCV AB SER QL: NORMAL

## 2018-09-19 DIAGNOSIS — F41.9 ANXIETY AND DEPRESSION: ICD-10-CM

## 2018-09-19 DIAGNOSIS — F32.A ANXIETY AND DEPRESSION: ICD-10-CM

## 2018-09-19 RX ORDER — CLONAZEPAM 0.5 MG/1
0.5 TABLET ORAL
Qty: 30 TABLET | Refills: 0 | Status: SHIPPED | OUTPATIENT
Start: 2018-09-19 | End: 2018-10-17 | Stop reason: SDUPTHER

## 2018-09-19 NOTE — TELEPHONE ENCOUNTER
Patient wants a refill on Generic Klonopin 0 5 mg take 1 by mouth daily at bedtime to [address] [place] [address]

## 2018-09-24 DIAGNOSIS — R00.2 PALPITATIONS: ICD-10-CM

## 2018-09-24 DIAGNOSIS — R00.0 TACHYCARDIA: ICD-10-CM

## 2018-10-17 DIAGNOSIS — F41.9 ANXIETY AND DEPRESSION: ICD-10-CM

## 2018-10-17 DIAGNOSIS — F32.A ANXIETY AND DEPRESSION: ICD-10-CM

## 2018-10-17 RX ORDER — CLONAZEPAM 0.5 MG/1
0.5 TABLET ORAL
Qty: 30 TABLET | Refills: 0 | Status: SHIPPED | OUTPATIENT
Start: 2018-10-17 | End: 2018-11-14 | Stop reason: SDUPTHER

## 2018-11-14 DIAGNOSIS — F41.9 ANXIETY AND DEPRESSION: ICD-10-CM

## 2018-11-14 DIAGNOSIS — F32.A ANXIETY AND DEPRESSION: ICD-10-CM

## 2018-11-14 NOTE — TELEPHONE ENCOUNTER
Requesting   Clonazepam 0 5mg  Qty 30  Take 1 tablet by mouth daily at bedtime       Send to [email or web address]

## 2018-11-15 RX ORDER — CLONAZEPAM 0.5 MG/1
0.5 TABLET ORAL
Qty: 30 TABLET | Refills: 0 | Status: SHIPPED | OUTPATIENT
Start: 2018-11-15 | End: 2018-12-12 | Stop reason: SDUPTHER

## 2018-12-12 DIAGNOSIS — F41.9 ANXIETY AND DEPRESSION: ICD-10-CM

## 2018-12-12 DIAGNOSIS — F32.A ANXIETY AND DEPRESSION: ICD-10-CM

## 2018-12-12 RX ORDER — CLONAZEPAM 0.5 MG/1
0.5 TABLET ORAL
Qty: 30 TABLET | Refills: 0 | Status: SHIPPED | OUTPATIENT
Start: 2018-12-12 | End: 2019-01-09 | Stop reason: SDUPTHER

## 2018-12-12 NOTE — TELEPHONE ENCOUNTER
Patient wants a refill on Clonazepam 0 5 mg take 1 at bedtime total 30 tablets to [facility]

## 2018-12-21 DIAGNOSIS — E78.2 MIXED HYPERLIPIDEMIA: ICD-10-CM

## 2018-12-21 RX ORDER — ROSUVASTATIN CALCIUM 20 MG/1
20 TABLET, COATED ORAL DAILY
Qty: 90 TABLET | Refills: 3 | Status: SHIPPED | OUTPATIENT
Start: 2018-12-21 | End: 2019-03-18 | Stop reason: SDUPTHER

## 2018-12-26 ENCOUNTER — OFFICE VISIT (OUTPATIENT)
Dept: FAMILY MEDICINE CLINIC | Facility: CLINIC | Age: 68
End: 2018-12-26
Payer: MEDICARE

## 2018-12-26 VITALS
HEART RATE: 68 BPM | BODY MASS INDEX: 37.35 KG/M2 | HEIGHT: 68 IN | DIASTOLIC BLOOD PRESSURE: 66 MMHG | RESPIRATION RATE: 16 BRPM | TEMPERATURE: 97.8 F | SYSTOLIC BLOOD PRESSURE: 130 MMHG | WEIGHT: 246.4 LBS

## 2018-12-26 DIAGNOSIS — J40 BRONCHITIS: Primary | ICD-10-CM

## 2018-12-26 PROCEDURE — 99213 OFFICE O/P EST LOW 20 MIN: CPT | Performed by: FAMILY MEDICINE

## 2018-12-26 RX ORDER — OXYCODONE HYDROCHLORIDE AND ACETAMINOPHEN 5; 325 MG/1; MG/1
TABLET ORAL
COMMUNITY
Start: 2018-11-06 | End: 2018-12-26 | Stop reason: ALTCHOICE

## 2018-12-26 RX ORDER — AZITHROMYCIN 250 MG/1
TABLET, FILM COATED ORAL
Qty: 6 TABLET | Refills: 0 | Status: SHIPPED | OUTPATIENT
Start: 2018-12-26 | End: 2018-12-30

## 2018-12-26 RX ORDER — DEXAMETHASONE SODIUM PHOSPHATE 4 MG/ML
INJECTION, SOLUTION INTRA-ARTICULAR; INTRALESIONAL; INTRAMUSCULAR; INTRAVENOUS; SOFT TISSUE
COMMUNITY
End: 2018-12-26 | Stop reason: ALTCHOICE

## 2018-12-26 RX ORDER — PREDNISONE 10 MG/1
TABLET ORAL
Qty: 28 TABLET | Refills: 0 | Status: SHIPPED | OUTPATIENT
Start: 2018-12-26 | End: 2019-01-07 | Stop reason: SDUPTHER

## 2018-12-26 NOTE — PROGRESS NOTES
Chief Complaint   Patient presents with    Cough     Symptoms productive cough since Sunday  Assessment/Plan:    A lot of fluids and rest  Tylenol or motrin for fever or pain  Give zpack  Side effects educated patient  Give prednisone  Side effects educated patient  Call office if symptoms no improving or worse  Diagnoses and all orders for this visit:    Bronchitis  -     azithromycin (ZITHROMAX) 250 mg tablet; Take 2 tabs on day 1, then take 1 tab daily from day 2-day 5   -     predniSONE 10 mg tablet; Take 4 tabs for 4 days, then 3 tabs for 2 days, then 2 tabs for 2 days and then 1 tab for 2 day          Subjective:      Patient ID: Hetal Dooley is a 80 y o  male  HPI    Pt is here by himself  C/o cough for 4 days  Some wheezing, SOB  Some running nose and ear blocked  No sore throat  Denies fever  Denies chest pain, n/v/abd pain  No smoking  Hx of COPD  Got flu shot this season per pt  The following portions of the patient's history were reviewed and updated as appropriate: allergies, current medications, past family history, past medical history, past social history, past surgical history and problem list     Review of Systems   Constitutional: Negative for appetite change, chills and fever  HENT: Positive for congestion  Negative for ear pain, sinus pain and sore throat  Eyes: Negative for discharge and itching  Respiratory: Positive for cough and wheezing  Negative for apnea, chest tightness and shortness of breath  Cardiovascular: Negative for chest pain, palpitations and leg swelling  Gastrointestinal: Negative for abdominal pain, anal bleeding, constipation, diarrhea, nausea and vomiting  Endocrine: Negative for cold intolerance, heat intolerance and polyuria  Genitourinary: Negative for difficulty urinating and dysuria  Musculoskeletal: Negative for arthralgias, back pain and myalgias  Skin: Negative for rash     Neurological: Negative for dizziness and headaches  Psychiatric/Behavioral: Negative for agitation  Objective:      /66 (BP Location: Left arm, Patient Position: Sitting, Cuff Size: Standard)   Pulse 68   Temp 97 8 °F (36 6 °C) (Oral)   Resp 16   Ht 5' 8" (1 727 m)   Wt 112 kg (246 lb 6 4 oz)   BMI 37 46 kg/m²          Physical Exam   Constitutional: He appears well-developed  HENT:   Head: Normocephalic and atraumatic  Right Ear: External ear normal    Left Ear: External ear normal    Mouth/Throat: Oropharynx is clear and moist    B/l nasal swollen   Eyes: Pupils are equal, round, and reactive to light  Conjunctivae are normal    Neck: Normal range of motion  Neck supple  Cardiovascular: Normal rate, regular rhythm, normal heart sounds and intact distal pulses  Pulmonary/Chest: Effort normal and breath sounds normal    Abdominal: Soft  Bowel sounds are normal    Musculoskeletal: Normal range of motion  Neurological: He is alert

## 2019-01-07 ENCOUNTER — OFFICE VISIT (OUTPATIENT)
Dept: FAMILY MEDICINE CLINIC | Facility: CLINIC | Age: 69
End: 2019-01-07
Payer: MEDICARE

## 2019-01-07 VITALS
BODY MASS INDEX: 37.44 KG/M2 | RESPIRATION RATE: 16 BRPM | WEIGHT: 247 LBS | SYSTOLIC BLOOD PRESSURE: 166 MMHG | DIASTOLIC BLOOD PRESSURE: 94 MMHG | TEMPERATURE: 98 F | OXYGEN SATURATION: 95 % | HEIGHT: 68 IN | HEART RATE: 76 BPM

## 2019-01-07 DIAGNOSIS — J44.1 CHRONIC OBSTRUCTIVE PULMONARY DISEASE WITH ACUTE EXACERBATION (HCC): Primary | ICD-10-CM

## 2019-01-07 DIAGNOSIS — J40 BRONCHITIS: ICD-10-CM

## 2019-01-07 PROCEDURE — 99214 OFFICE O/P EST MOD 30 MIN: CPT | Performed by: FAMILY MEDICINE

## 2019-01-07 RX ORDER — PREDNISONE 10 MG/1
TABLET ORAL
Qty: 28 TABLET | Refills: 0 | Status: SHIPPED | OUTPATIENT
Start: 2019-01-07 | End: 2019-03-07 | Stop reason: ALTCHOICE

## 2019-01-07 NOTE — PROGRESS NOTES
Chief Complaint   Patient presents with    Cough     Symptom productive cough started after last visit on 12/26/18  Assessment/Plan:    A lot of fluids and rest  Tylenol or motrin for fever or pain  Give prednisone again  SE educated pt  Continue Symbicort bid  May use proair Q 4-6 hours as needed  Call office if symptoms no improving or worse  Consider CXR if symptoms no improving  Diagnoses and all orders for this visit:    Chronic obstructive pulmonary disease with acute exacerbation (HCC)  -     predniSONE 10 mg tablet; Take 4 tabs for 4 days, then 3 tabs for 2 days, then 2 tabs for 2 days and then 1 tab for 2 day    Bronchitis          Subjective:      Patient ID: Dangelo Roman is a 80 y o  male  HPI    Pt is here with his wife  C/o cough for 2 weeks  Cough with phlegm  Sometimes wheezing  Denies sOb  Denies fever, chest pain, n/v/abd pain  Hx of COPD  Use symbicort 2/day  He has proair but he feels he does not need it  He got zpack and prednisone 2 weeks ago  It helped but symptoms came back  Pt states these happens every winter and he always needs longer treatment of prednisone  No smoking  The following portions of the patient's history were reviewed and updated as appropriate: allergies, current medications, past family history, past medical history, past social history, past surgical history and problem list     Review of Systems   Constitutional: Negative for appetite change, chills and fever  HENT: Negative for congestion, ear pain, sinus pain and sore throat  Eyes: Negative for discharge and itching  Respiratory: Positive for cough and wheezing  Negative for apnea, chest tightness and shortness of breath  Cardiovascular: Negative for chest pain, palpitations and leg swelling  Gastrointestinal: Negative for abdominal pain, anal bleeding, constipation, diarrhea, nausea and vomiting     Endocrine: Negative for cold intolerance, heat intolerance and polyuria  Genitourinary: Negative for difficulty urinating and dysuria  Musculoskeletal: Negative for arthralgias, back pain and myalgias  Skin: Negative for rash  Neurological: Negative for dizziness and headaches  Psychiatric/Behavioral: Negative for agitation  Objective:      /94 (BP Location: Left arm, Patient Position: Sitting, Cuff Size: Standard)   Pulse 76   Temp 98 °F (36 7 °C) (Oral)   Resp 16   Ht 5' 8" (1 727 m)   Wt 112 kg (247 lb)   SpO2 95%   BMI 37 56 kg/m²          Physical Exam   Constitutional: He appears well-developed  HENT:   Head: Normocephalic and atraumatic  Right Ear: External ear normal    Left Ear: External ear normal    Nose: Nose normal    Mouth/Throat: Oropharynx is clear and moist    Eyes: Pupils are equal, round, and reactive to light  Conjunctivae are normal    Neck: Normal range of motion  Neck supple  Cardiovascular: Normal rate, regular rhythm, normal heart sounds and intact distal pulses  Pulmonary/Chest: Effort normal and breath sounds normal    Abdominal: Soft  Bowel sounds are normal    Musculoskeletal: Normal range of motion  Neurological: He is alert

## 2019-01-09 DIAGNOSIS — F32.A ANXIETY AND DEPRESSION: ICD-10-CM

## 2019-01-09 DIAGNOSIS — F41.9 ANXIETY AND DEPRESSION: ICD-10-CM

## 2019-01-10 RX ORDER — CLONAZEPAM 0.5 MG/1
0.5 TABLET ORAL
Qty: 30 TABLET | Refills: 0 | Status: SHIPPED | OUTPATIENT
Start: 2019-01-10 | End: 2019-02-06 | Stop reason: SDUPTHER

## 2019-02-06 DIAGNOSIS — F32.A ANXIETY AND DEPRESSION: ICD-10-CM

## 2019-02-06 DIAGNOSIS — F41.9 ANXIETY AND DEPRESSION: ICD-10-CM

## 2019-02-06 RX ORDER — CLONAZEPAM 0.5 MG/1
0.5 TABLET ORAL
Qty: 30 TABLET | Refills: 0 | Status: SHIPPED | OUTPATIENT
Start: 2019-02-06 | End: 2019-03-05 | Stop reason: SDUPTHER

## 2019-02-26 RX ORDER — NIFEDIPINE 90 MG/1
TABLET, EXTENDED RELEASE ORAL
COMMUNITY
Start: 2019-01-23 | End: 2019-03-07 | Stop reason: SDUPTHER

## 2019-02-26 RX ORDER — CELECOXIB 200 MG/1
200 CAPSULE ORAL
COMMUNITY
Start: 2016-05-24 | End: 2019-03-07 | Stop reason: ALTCHOICE

## 2019-02-26 RX ORDER — ATORVASTATIN CALCIUM 40 MG/1
TABLET, FILM COATED ORAL
COMMUNITY
Start: 2015-09-28 | End: 2019-02-27

## 2019-02-26 RX ORDER — ACETAMINOPHEN 500 MG
500 TABLET ORAL EVERY 6 HOURS PRN
COMMUNITY
Start: 2016-05-24 | End: 2020-03-04 | Stop reason: HOSPADM

## 2019-02-26 RX ORDER — SERTRALINE HYDROCHLORIDE 100 MG/1
100 TABLET, FILM COATED ORAL
COMMUNITY
Start: 2012-08-03 | End: 2019-03-07 | Stop reason: ALTCHOICE

## 2019-02-26 RX ORDER — PREGABALIN 100 MG/1
CAPSULE ORAL EVERY 8 HOURS
COMMUNITY
End: 2019-03-07 | Stop reason: ALTCHOICE

## 2019-02-26 RX ORDER — TRAMADOL HYDROCHLORIDE 50 MG/1
50-100 TABLET ORAL EVERY 6 HOURS PRN
COMMUNITY
Start: 2016-05-24 | End: 2019-03-07 | Stop reason: ALTCHOICE

## 2019-02-26 RX ORDER — TADALAFIL 5 MG
TABLET ORAL
COMMUNITY
Start: 2019-02-04 | End: 2019-08-19 | Stop reason: SDUPTHER

## 2019-02-27 ENCOUNTER — OFFICE VISIT (OUTPATIENT)
Dept: CARDIOLOGY CLINIC | Facility: CLINIC | Age: 69
End: 2019-02-27
Payer: MEDICARE

## 2019-02-27 VITALS
HEART RATE: 59 BPM | DIASTOLIC BLOOD PRESSURE: 72 MMHG | BODY MASS INDEX: 36.25 KG/M2 | HEIGHT: 68 IN | SYSTOLIC BLOOD PRESSURE: 124 MMHG | WEIGHT: 239.2 LBS

## 2019-02-27 DIAGNOSIS — I49.1 PAC (PREMATURE ATRIAL CONTRACTION): ICD-10-CM

## 2019-02-27 DIAGNOSIS — I47.1 MULTIFOCAL ATRIAL TACHYCARDIA (HCC): ICD-10-CM

## 2019-02-27 DIAGNOSIS — I47.1 SUPRAVENTRICULAR TACHYCARDIA (HCC): ICD-10-CM

## 2019-02-27 DIAGNOSIS — I10 HYPERTENSION, UNSPECIFIED TYPE: Primary | ICD-10-CM

## 2019-02-27 DIAGNOSIS — E78.2 MIXED HYPERLIPIDEMIA: ICD-10-CM

## 2019-02-27 PROCEDURE — 99214 OFFICE O/P EST MOD 30 MIN: CPT | Performed by: INTERNAL MEDICINE

## 2019-02-27 PROCEDURE — 93000 ELECTROCARDIOGRAM COMPLETE: CPT | Performed by: INTERNAL MEDICINE

## 2019-02-27 RX ORDER — METOPROLOL TARTRATE 50 MG/1
50 TABLET, FILM COATED ORAL 2 TIMES DAILY
Qty: 180 TABLET | Refills: 3 | Status: SHIPPED | OUTPATIENT
Start: 2019-02-27 | End: 2020-02-27 | Stop reason: SDUPTHER

## 2019-02-27 NOTE — PROGRESS NOTES
Cardiology Follow Up    Ever Rodriguez  1950  [id number]  [address] [facility]  [address]    1  Hypertension, unspecified type  POCT ECG   2  Multifocal atrial tachycardia (HCC)  POCT ECG   3  Supraventricular tachycardia (HCC)  POCT ECG   4  PAC (premature atrial contraction)  metoprolol tartrate (LOPRESSOR) 50 mg tablet   5  Mixed hyperlipidemia         Discussion/Summary:  Overall he has been doing well from a cardiac standpoint  Multifocal atrial tachycardia has resolved  He stable in sinus rhythm  Blood pressures been well controlled  Lipids remain high I have asked him to increase his Crestor to 40 mg 3 days a week and 20 on the remaining days he will call me in 3 months and let me know how he is doing  Will check lipid profile this summer  Come back to see me in the fall  Echocardiogram next year  Interval History:   27-year-old gentleman with history of multifocal atrial tachycardia, hypertension, dyslipidemia, obesity presents for routine scheduled follow-up visit  He has been doing some new diet and exercise trying to lose weight but has had a hard time doing this  Functional capacity has been good with no exertional symptoms  There has been no chest pain, palpitations, lightheadedness, dizziness, or syncope  He had a recent episode of bronchitis but there was no recurrence of the multifocal atrial tachycardia with beta-blocker treatment      Problem List     Allergic rhinitis    Enlarged prostate with lower urinary tract symptoms (LUTS)    Chronic bronchitis (HCC)    Depression, controlled    Overview Signed 2/27/2018  1:10 PM by Roxy Bellamy MD     Transitioned From: Depression         Elevated ALT measurement    Hyperlipidemia    Hypertension    Impaired fasting glucose    Lung nodule    Male erectile dysfunction    Multifocal atrial tachycardia ([address] )    Obesity Osteoarthritis of knee    Osteoarthritis of left hip    Seasonal allergies    Spinal stenosis of lumbar region    Overview Addendum 3/6/2018  3:00 PM by Melina García MD     Description: Cervical and lumbar  Sees Dr Gurvinder Randle  Had surgical decompression T12 to L1 2012           Supraventricular tachycardia (HCC)    Anxiety and depression    Arthritis of left hip    COPD (chronic obstructive pulmonary disease) (HCC)    Elevated prostate specific antigen (PSA)    Lumbosacral spondylosis    Incomplete emptying of bladder    Other affections of shoulder region, not elsewhere classified    Personal history of other disorder of urinary system    Retention of urine    Screening for prostate cancer    Thoracic or lumbosacral neuritis or radiculitis    Urethral stricture    Increased frequency of urination        Past Medical History:   Diagnosis Date    Bronchitis, chronic obstructive, with exacerbation ([facility] [address] )     Last Assessed: 2013    COPD with acute exacerbation (HCC)     Last Assessed: 2015    Elevated PSA     Bx neg ; Last Assessed: 2012     Social History     Socioeconomic History    Marital status: /Civil Union     Spouse name: Not on file    Number of children: Not on file    Years of education: Not on file    Highest education level: Not on file   Occupational History    Not on file   Social Needs    Financial resource strain: Not on file    Food insecurity:     Worry: Not on file     Inability: Not on file    Transportation needs:     Medical: Not on file     Non-medical: Not on file   Tobacco Use    Smoking status: Former Smoker     Packs/day:  00     Years: 23 00     Pack years: 23 00     Types: Cigarettes     Start date:      Last attempt to quit: 2000     Years since quittin     Smokeless tobacco: Never Used   Substance and Sexual Activity    Alcohol use: No     Comment: Stopped drinking    Drug use: No    Sexual activity: Not on file   Lifestyle    Physical activity:     Days per week: Not on file     Minutes per session: Not on file    Stress: Not on file   Relationships    Social connections:     Talks on phone: Not on file     Gets together: Not on file     Attends [employer] service: Not on file     Active member of club or organization: Not on file     Attends meetings of clubs or organizations: Not on file     Relationship status: Not on file    Intimate partner violence:     Fear of current or ex partner: Not on file     Emotionally abused: Not on file     Physically abused: Not on file     Forced sexual activity: Not on file   Other Topics Concern    Not on file   Social History Narrative    Not on file      Family History   Problem Relation Age of Onset    Breast cancer Mother     Pancreatitis Father     Diabetes Maternal Grandmother     Heart attack Maternal Grandfather     Heart attack Paternal Grandmother     Diabetes Paternal Grandmother     Diabetes Paternal Grandfather      Past Surgical History:   Procedure Laterality Date    BACK SURGERY  07/25/2012    decompression of spinal stenosis from lower thoracic through the lumbar spine    COLONOSCOPY  10/2010    neg   recheck in 5 years    KNEE ARTHROSCOPY  01/2013    PROSTATE BIOPSY  2012    for elevated PSA of about 7; neg    TRANSURETHRAL RESECTION OF PROSTATE  06/27/2013       Current Outpatient Medications:     acetaminophen (TYLENOL) 500 mg tablet, Take 500 mg by mouth every 6 (six) hours as needed, Disp: , Rfl:     aspirin 81 mg chewable tablet, Chew 1 tablet daily, Disp: , Rfl:     celecoxib (CeleBREX) 200 mg capsule, Take 200 mg by mouth, Disp: , Rfl:     CIALIS 5 MG tablet, , Disp: , Rfl:     clonazePAM (KlonoPIN) 0 5 mg tablet, Take 1 tablet (0 5 mg total) by mouth daily at bedtime for 30 days, Disp: 30 tablet, Rfl: 0    dextromethorphan-guaifenesin (MUCINEX DM)  MG per 12 hr tablet, Take 1 tablet by mouth every 12 (twelve) hours, Disp: , Rfl:     diclofenac sodium (VOLTAREN) 50 mg EC tablet, Take 1 tablet (50 mg total) by mouth 3 (three) times a day as needed (pain) for up to 30 days, Disp: 90 tablet, Rfl: 5    DULoxetine (CYMBALTA) 60 mg delayed release capsule, Take 1 capsule by mouth daily, Disp: , Rfl:     gabapentin (NEURONTIN) 300 mg capsule, Take 1 capsule by mouth 4 (four) times a day, Disp: , Rfl:     loratadine (CLARITIN) 10 mg tablet, Take 1 tablet by mouth daily, Disp: , Rfl:     metoprolol tartrate (LOPRESSOR) 25 mg tablet, Take 1 tablet (25 mg total) by mouth every 12 (twelve) hours, Disp: 180 tablet, Rfl: 3    metoprolol tartrate (LOPRESSOR) 50 mg tablet, Take 1 tablet (50 mg total) by mouth 2 (two) times a day, Disp: 180 tablet, Rfl: 3    Multiple Vitamin (MULTI-VITAMIN DAILY) TABS, Take 1 tablet by mouth daily, Disp: , Rfl:     NIFEdipine (PROCARDIA XL) 90 mg 24 hr tablet, , Disp: , Rfl:     rosuvastatin (CRESTOR) 20 MG tablet, Take 1 tablet (20 mg total) by mouth daily, Disp: 90 tablet, Rfl: 3    NIFEdipine (PROCARDIA XL) 90 mg 24 hr tablet, Take 1 tablet (90 mg total) by mouth daily for 90 days, Disp: 90 tablet, Rfl: 3    predniSONE 10 mg tablet, Take 4 tabs for 4 days, then 3 tabs for 2 days, then 2 tabs for 2 days and then 1 tab for 2 day (Patient not taking: Reported on 2/27/2019), Disp: 28 tablet, Rfl: 0    pregabalin (LYRICA) 100 mg capsule, every 8 (eight) hours, Disp: , Rfl:     sertraline (ZOLOFT) 100 mg tablet, Take 100 mg by mouth, Disp: , Rfl:     tadalafil (CIALIS) 5 MG tablet, Take 1 tablet (5 mg total) by mouth daily for 90 days, Disp: 90 tablet, Rfl: 3    traMADol (ULTRAM) 50 mg tablet, Take  mg by mouth every 6 (six) hours as needed, Disp: , Rfl:   No Known Allergies    Labs:     Chemistry        Component Value Date/Time     09/08/2015 1027    K 4 4 09/12/2018 1349    K 4 6 09/08/2015 1027     09/12/2018 1349     09/08/2015 1027    CO2 23 09/12/2018 1349    CO2 27 09/08/2015 1027    BUN 17 09/12/2018 1349    BUN 15 09/08/2015 1027    CREATININE 1 12 09/12/2018 1349    CREATININE 0 81 09/08/2015 1027        Component Value Date/Time    CALCIUM 9 3 09/12/2018 1349    CALCIUM 9 3 09/08/2015 1027    ALKPHOS 78 09/04/2018 0750    ALKPHOS 121 07/21/2014 0910    AST 20 09/04/2018 0750    AST 18 07/21/2014 0910    ALT 25 09/04/2018 0750    ALT 34 07/21/2014 0910    BILITOT 0 45 07/21/2014 0910            Lab Results   Component Value Date    CHOL 190 07/21/2014     Lab Results   Component Value Date    HDL 43 09/04/2018    HDL 50 02/28/2018    HDL 41 09/19/2017     Lab Results   Component Value Date    LDLCALC 140 (H) 09/04/2018    LDLCALC 131 (H) 02/28/2018    LDLCALC 110 (H) 09/19/2017     Lab Results   Component Value Date    TRIG 126 09/04/2018    TRIG 128 02/28/2018    TRIG 93 09/19/2017     No results found for: CHOLHDL    Imaging: No results found  ECG:  Sinus bradycardia 1st degree AV block right axis      Review of Systems   Constitution: Negative  HENT: Negative  Eyes: Negative  Cardiovascular: Negative  Respiratory: Negative  Endocrine: Negative  Hematologic/Lymphatic: Negative  Skin: Negative  Musculoskeletal: Negative  Gastrointestinal: Negative  Genitourinary: Negative  Neurological: Negative  Psychiatric/Behavioral: Negative  Vitals:    02/27/19 1301   BP: 124/72   Pulse: 59     Vitals:    02/27/19 1301   Weight: 109 kg (239 lb 3 2 oz)     Height: 5' 8" (172 7 cm)   Body mass index is 36 37 kg/m²      Physical Exam:  General:  Alert and cooperative, appears stated age  HEENT:  PERRLA, EOMI, no scleral icterus, no conjunctival pallor  Neck:  No lymphadenopathy, no thyromegaly, no carotid bruits, no elevated JVP  Heart[de-identified]  Regular rate and rhythm, normal S1/S2, no S3/S4, no murmur  Lungs:  Clear to auscultation bilaterally   Abdomen:  Soft, non-tender, positive bowel sounds, no rebound or guarding,   no organomegaly   Extremities:  No clubbing, cyanosis or edema   Vascular:  2+ pedal pulses  Skin:  No rashes or lesions on exposed skin  Neurologic:  Cranial nerves II-XII grossly intact without focal deficits

## 2019-03-05 DIAGNOSIS — F41.9 ANXIETY AND DEPRESSION: ICD-10-CM

## 2019-03-05 DIAGNOSIS — F32.A ANXIETY AND DEPRESSION: ICD-10-CM

## 2019-03-05 RX ORDER — CLONAZEPAM 0.5 MG/1
0.5 TABLET ORAL
Qty: 30 TABLET | Refills: 0 | Status: SHIPPED | OUTPATIENT
Start: 2019-03-05 | End: 2019-04-02 | Stop reason: SDUPTHER

## 2019-03-05 NOTE — TELEPHONE ENCOUNTER
Requesting      Clonazepam  0 5 mg tablets  Qty 30                            Take 1 tab by mouth daily                      Send to [facility]                                     [facility] rehanTariq

## 2019-03-07 ENCOUNTER — OFFICE VISIT (OUTPATIENT)
Dept: FAMILY MEDICINE CLINIC | Facility: CLINIC | Age: 69
End: 2019-03-07
Payer: MEDICARE

## 2019-03-07 VITALS
OXYGEN SATURATION: 94 % | WEIGHT: 241.2 LBS | DIASTOLIC BLOOD PRESSURE: 84 MMHG | TEMPERATURE: 97 F | BODY MASS INDEX: 36.56 KG/M2 | RESPIRATION RATE: 16 BRPM | HEIGHT: 68 IN | SYSTOLIC BLOOD PRESSURE: 130 MMHG | HEART RATE: 72 BPM

## 2019-03-07 DIAGNOSIS — I10 ESSENTIAL HYPERTENSION: ICD-10-CM

## 2019-03-07 DIAGNOSIS — E78.2 MIXED HYPERLIPIDEMIA: ICD-10-CM

## 2019-03-07 DIAGNOSIS — M17.0 OSTEOARTHRITIS OF BOTH KNEES, UNSPECIFIED OSTEOARTHRITIS TYPE: Primary | ICD-10-CM

## 2019-03-07 DIAGNOSIS — E66.9 OBESITY (BMI 30-39.9): ICD-10-CM

## 2019-03-07 DIAGNOSIS — I47.1 MULTIFOCAL ATRIAL TACHYCARDIA (HCC): ICD-10-CM

## 2019-03-07 PROCEDURE — 99214 OFFICE O/P EST MOD 30 MIN: CPT | Performed by: FAMILY MEDICINE

## 2019-03-07 RX ORDER — FLUTICASONE PROPIONATE 50 MCG
SPRAY, SUSPENSION (ML) NASAL
COMMUNITY

## 2019-03-07 NOTE — PROGRESS NOTES
Chief Complaint   Patient presents with    Follow-up     6 month follow up  Health Maintenance   Topic Date Due    BMI: Followup Plan  06/14/1969    DTaP,Tdap,and Td Vaccines (1 - Tdap) 06/14/1972    Fall Risk  09/06/2019    Medicare Annual Wellness Visit (AWV)  09/06/2019    BMI: Adult  02/27/2020    CRC Screening: Colonoscopy  10/19/2020    Hepatitis C Screening  Completed    INFLUENZA VACCINE  Completed    Pneumococcal PPSV23/PCV13 65+ Years / Low and Medium Risk  Completed    HEPATITIS B VACCINES  Aged Out     Assessment/Plan:  OA---knees and hands  Continue diclofenac now  SE educated pt  Give script  Continue gabapentin  HTN---controlled  Continue nifedipine and metoprolol  Hyperlipidemia---low fat diet  Continue crestor  Will check labs  Anxiety/depression---Mood is stable  Continue cymbalta and clonazepam as needed  FU psychiatry as scheduled  BPH---continue cialis  FU urology as scheduled  Obesity---lose weight  Advised pt to eat smaller amount of food  Advised pt to exercise regularly 150min/week       Got Flu shot this season  Got PCV13 and zostavax in 2016  Got pneumovax in 2018  Will ask insurance for coverage of Tdap and shingrix  PSA yearly  Pros and cons educated pt  Colonoscopy 3/2017  Repeat in 5 years per pt  RTO in 6 months  Diagnoses and all orders for this visit:    Osteoarthritis of both knees, unspecified osteoarthritis type  -     diclofenac sodium (VOLTAREN) 50 mg EC tablet; Take 1 tablet (50 mg total) by mouth 2 (two) times a day for 90 days    Essential hypertension  -     Comprehensive metabolic panel; Future    Multifocal atrial tachycardia (HCC)  -     TSH, 3rd generation with Free T4 reflex; Future    Mixed hyperlipidemia  -     Comprehensive metabolic panel; Future  -     Lipid Panel with Direct LDL reflex; Future    Obesity (BMI 30-39  9)    Other orders  -     fluticasone (FLONASE) 50 mcg/act nasal spray; fluticasone propionate 50 mcg/actuation nasal spray,suspension          Subjective:      Patient ID: Nikki Cevallos is a 80 y o  male  HPI    Pt is here by himself    OA---FU ortho for lower back pain/spinal stenosis before  Not follow any more  On diclofenac 50mg bid and neurotin 300mg qid which helped with arthritis of hands also  HTN----FU cardiology for MAT, HTN  Stable  On metoprolol 75mg bid and nifedipine 90mg Qd  Today bP 130/84 ok  Denies headache, vision change, SOB or CP  Hyperlipidemia---He is on crestor 20mg qhs  Denies side effects  Depression/depression-----Saw JAE Reyes and Saw psychiatry Dr Danuta Pringle Q 3 month  He is on cymbalta 60mg QD which works for him  Use clonazepam 0 5mg qhs for insomnia  He is on gabapentin 300mg am and 900mg pm per psychiatrist        COPD---FU pulmonary for chronic bronchitis, worse in fall/winter  Does not need any symbicort or proair daily  He uses mucinex-DM daily as needed  Quit smoking in 2000        BPH---FU urology Q 6 months for BPH s/p TURP in 2013  Had cystoscopy and dilation in 9/2014  He is self-cath 1-2/week now  Was on macrobid when he did self-cath before, not any more  Cialis 5mg QD helped       Quit smoking for years  No alcohol or drugs       Lives with wife  Does all ADL's  Still drive  Denies recent falls  The following portions of the patient's history were reviewed and updated as appropriate: allergies, current medications, past family history, past medical history, past social history, past surgical history and problem list     Review of Systems   Constitutional: Negative for appetite change, chills and fever  HENT: Negative for congestion, ear pain, sinus pain and sore throat  Eyes: Negative for discharge and itching  Respiratory: Negative for apnea, cough, chest tightness, shortness of breath and wheezing  Cardiovascular: Negative for chest pain, palpitations and leg swelling     Gastrointestinal: Negative for abdominal pain, anal bleeding, constipation, diarrhea, nausea and vomiting  Endocrine: Negative for cold intolerance, heat intolerance and polyuria  Genitourinary: Negative for difficulty urinating and dysuria  Musculoskeletal: Negative for arthralgias, back pain and myalgias  Skin: Negative for rash  Neurological: Negative for dizziness and headaches  Psychiatric/Behavioral: Negative for agitation  Objective:      /84 (BP Location: Left arm, Patient Position: Sitting, Cuff Size: Standard)   Pulse 72   Temp (!) 97 °F (36 1 °C) (Tympanic)   Resp 16   Ht 5' 8" (1 727 m)   Wt 109 kg (241 lb 3 2 oz)   SpO2 94%   BMI 36 67 kg/m²          Physical Exam   Constitutional: He appears well-developed  HENT:   Head: Normocephalic and atraumatic  Eyes: Pupils are equal, round, and reactive to light  Conjunctivae are normal    Neck: Normal range of motion  Neck supple  Cardiovascular: Normal rate, regular rhythm, normal heart sounds and intact distal pulses  Pulmonary/Chest: Effort normal and breath sounds normal    Abdominal: Soft  Bowel sounds are normal    Musculoskeletal: Normal range of motion  Neurological: He is alert  BMI Counseling: Body mass index is 36 67 kg/m²  Discussed the patient's BMI with him  The BMI is above average  BMI counseling and education was provided to the patient  Nutrition recommendations include reducing portion sizes, decreasing overall calorie intake and 3-5 servings of fruits/vegetables daily  Exercise recommendations include moderate aerobic physical activity for 150 minutes/week

## 2019-03-18 DIAGNOSIS — E78.2 MIXED HYPERLIPIDEMIA: ICD-10-CM

## 2019-03-18 RX ORDER — ROSUVASTATIN CALCIUM 40 MG/1
40 TABLET, COATED ORAL DAILY
Qty: 60 TABLET | Refills: 3 | Status: SHIPPED | OUTPATIENT
Start: 2019-03-18 | End: 2019-03-19 | Stop reason: SDUPTHER

## 2019-03-19 DIAGNOSIS — E78.2 MIXED HYPERLIPIDEMIA: ICD-10-CM

## 2019-03-19 RX ORDER — ROSUVASTATIN CALCIUM 20 MG/1
TABLET, COATED ORAL
Qty: 40 TABLET | Refills: 3 | OUTPATIENT
Start: 2019-03-19 | End: 2019-05-20 | Stop reason: SDUPTHER

## 2019-04-02 DIAGNOSIS — F41.9 ANXIETY AND DEPRESSION: ICD-10-CM

## 2019-04-02 DIAGNOSIS — F32.A ANXIETY AND DEPRESSION: ICD-10-CM

## 2019-04-02 RX ORDER — CLONAZEPAM 0.5 MG/1
0.5 TABLET ORAL
Qty: 30 TABLET | Refills: 0 | Status: SHIPPED | OUTPATIENT
Start: 2019-04-02 | End: 2019-04-30 | Stop reason: SDUPTHER

## 2019-04-30 DIAGNOSIS — F32.A ANXIETY AND DEPRESSION: ICD-10-CM

## 2019-04-30 DIAGNOSIS — F41.9 ANXIETY AND DEPRESSION: ICD-10-CM

## 2019-04-30 RX ORDER — CLONAZEPAM 0.5 MG/1
0.5 TABLET ORAL
Qty: 30 TABLET | Refills: 0 | Status: SHIPPED | OUTPATIENT
Start: 2019-04-30 | End: 2019-05-28 | Stop reason: SDUPTHER

## 2019-05-16 ENCOUNTER — OFFICE VISIT (OUTPATIENT)
Dept: ENDOCRINOLOGY | Facility: CLINIC | Age: 69
End: 2019-05-16
Payer: MEDICARE

## 2019-05-16 VITALS
WEIGHT: 235 LBS | DIASTOLIC BLOOD PRESSURE: 68 MMHG | HEART RATE: 62 BPM | SYSTOLIC BLOOD PRESSURE: 116 MMHG | HEIGHT: 68 IN | BODY MASS INDEX: 35.61 KG/M2

## 2019-05-16 DIAGNOSIS — E03.8 SUBCLINICAL HYPOTHYROIDISM: Primary | ICD-10-CM

## 2019-05-16 DIAGNOSIS — I10 ESSENTIAL HYPERTENSION: ICD-10-CM

## 2019-05-16 DIAGNOSIS — R73.03 PREDIABETES: ICD-10-CM

## 2019-05-16 DIAGNOSIS — E78.5 HYPERLIPIDEMIA, UNSPECIFIED HYPERLIPIDEMIA TYPE: ICD-10-CM

## 2019-05-16 PROCEDURE — 99204 OFFICE O/P NEW MOD 45 MIN: CPT | Performed by: INTERNAL MEDICINE

## 2019-05-17 ENCOUNTER — APPOINTMENT (OUTPATIENT)
Dept: LAB | Facility: CLINIC | Age: 69
End: 2019-05-17
Payer: MEDICARE

## 2019-05-17 DIAGNOSIS — R79.89 T3 LOW IN SERUM: ICD-10-CM

## 2019-05-17 DIAGNOSIS — R73.03 PREDIABETES: ICD-10-CM

## 2019-05-17 DIAGNOSIS — I47.1 MULTIFOCAL ATRIAL TACHYCARDIA (HCC): ICD-10-CM

## 2019-05-17 DIAGNOSIS — E78.2 MIXED HYPERLIPIDEMIA: ICD-10-CM

## 2019-05-17 DIAGNOSIS — E03.8 SUBCLINICAL HYPOTHYROIDISM: ICD-10-CM

## 2019-05-17 DIAGNOSIS — E78.5 HYPERLIPIDEMIA, UNSPECIFIED HYPERLIPIDEMIA TYPE: ICD-10-CM

## 2019-05-17 DIAGNOSIS — I10 ESSENTIAL HYPERTENSION: ICD-10-CM

## 2019-05-17 LAB
ALBUMIN SERPL BCP-MCNC: 3.8 G/DL (ref 3.5–5)
ALP SERPL-CCNC: 63 U/L (ref 46–116)
ALT SERPL W P-5'-P-CCNC: 26 U/L (ref 12–78)
ANION GAP SERPL CALCULATED.3IONS-SCNC: 5 MMOL/L (ref 4–13)
AST SERPL W P-5'-P-CCNC: 18 U/L (ref 5–45)
BILIRUB SERPL-MCNC: 0.43 MG/DL (ref 0.2–1)
BUN SERPL-MCNC: 15 MG/DL (ref 5–25)
CALCIUM SERPL-MCNC: 8.8 MG/DL (ref 8.3–10.1)
CHLORIDE SERPL-SCNC: 109 MMOL/L (ref 100–108)
CHOLEST SERPL-MCNC: 161 MG/DL (ref 50–200)
CO2 SERPL-SCNC: 26 MMOL/L (ref 21–32)
CREAT SERPL-MCNC: 1.03 MG/DL (ref 0.6–1.3)
ERYTHROCYTE [DISTWIDTH] IN BLOOD BY AUTOMATED COUNT: 12 % (ref 11.6–15.1)
EST. AVERAGE GLUCOSE BLD GHB EST-MCNC: 126 MG/DL
GFR SERPL CREATININE-BSD FRML MDRD: 75 ML/MIN/1.73SQ M
GLUCOSE P FAST SERPL-MCNC: 105 MG/DL (ref 65–99)
HBA1C MFR BLD: 6 % (ref 4.2–6.3)
HCT VFR BLD AUTO: 44.5 % (ref 36.5–49.3)
HDLC SERPL-MCNC: 38 MG/DL (ref 40–60)
HGB BLD-MCNC: 14.6 G/DL (ref 12–17)
LDLC SERPL CALC-MCNC: 92 MG/DL (ref 0–100)
MCH RBC QN AUTO: 30.9 PG (ref 26.8–34.3)
MCHC RBC AUTO-ENTMCNC: 32.8 G/DL (ref 31.4–37.4)
MCV RBC AUTO: 94 FL (ref 82–98)
PLATELET # BLD AUTO: 202 THOUSANDS/UL (ref 149–390)
PMV BLD AUTO: 11.1 FL (ref 8.9–12.7)
POTASSIUM SERPL-SCNC: 3.9 MMOL/L (ref 3.5–5.3)
PROT SERPL-MCNC: 6.9 G/DL (ref 6.4–8.2)
RBC # BLD AUTO: 4.73 MILLION/UL (ref 3.88–5.62)
SODIUM SERPL-SCNC: 140 MMOL/L (ref 136–145)
T4 FREE SERPL-MCNC: 1.07 NG/DL (ref 0.76–1.46)
T4 FREE SERPL-MCNC: 1.07 NG/DL (ref 0.76–1.46)
TRIGL SERPL-MCNC: 153 MG/DL
TSH SERPL DL<=0.05 MIU/L-ACNC: 4.45 UIU/ML (ref 0.36–3.74)
WBC # BLD AUTO: 7.37 THOUSAND/UL (ref 4.31–10.16)

## 2019-05-17 PROCEDURE — 36415 COLL VENOUS BLD VENIPUNCTURE: CPT

## 2019-05-17 PROCEDURE — 80061 LIPID PANEL: CPT

## 2019-05-17 PROCEDURE — 84443 ASSAY THYROID STIM HORMONE: CPT

## 2019-05-17 PROCEDURE — 84439 ASSAY OF FREE THYROXINE: CPT

## 2019-05-17 PROCEDURE — 83036 HEMOGLOBIN GLYCOSYLATED A1C: CPT

## 2019-05-17 PROCEDURE — 85027 COMPLETE CBC AUTOMATED: CPT

## 2019-05-17 PROCEDURE — 80053 COMPREHEN METABOLIC PANEL: CPT

## 2019-05-20 DIAGNOSIS — E78.2 MIXED HYPERLIPIDEMIA: ICD-10-CM

## 2019-05-21 RX ORDER — ROSUVASTATIN CALCIUM 20 MG/1
TABLET, COATED ORAL
Qty: 40 TABLET | Refills: 3 | Status: SHIPPED | OUTPATIENT
Start: 2019-05-21 | End: 2019-08-16 | Stop reason: SDUPTHER

## 2019-05-22 ENCOUNTER — TELEPHONE (OUTPATIENT)
Dept: ENDOCRINOLOGY | Facility: CLINIC | Age: 69
End: 2019-05-22

## 2019-05-28 DIAGNOSIS — F41.9 ANXIETY AND DEPRESSION: ICD-10-CM

## 2019-05-28 DIAGNOSIS — F32.A ANXIETY AND DEPRESSION: ICD-10-CM

## 2019-05-28 RX ORDER — CLONAZEPAM 0.5 MG/1
0.5 TABLET ORAL
Qty: 30 TABLET | Refills: 0 | Status: SHIPPED | OUTPATIENT
Start: 2019-05-28 | End: 2019-06-26 | Stop reason: SDUPTHER

## 2019-06-26 DIAGNOSIS — F41.9 ANXIETY AND DEPRESSION: ICD-10-CM

## 2019-06-26 DIAGNOSIS — F32.A ANXIETY AND DEPRESSION: ICD-10-CM

## 2019-06-26 RX ORDER — CLONAZEPAM 0.5 MG/1
0.5 TABLET ORAL
Qty: 30 TABLET | Refills: 0 | Status: SHIPPED | OUTPATIENT
Start: 2019-06-26 | End: 2019-07-23 | Stop reason: SDUPTHER

## 2019-07-23 DIAGNOSIS — F32.A ANXIETY AND DEPRESSION: ICD-10-CM

## 2019-07-23 DIAGNOSIS — F41.9 ANXIETY AND DEPRESSION: ICD-10-CM

## 2019-07-23 RX ORDER — CLONAZEPAM 0.5 MG/1
0.5 TABLET ORAL
Qty: 30 TABLET | Refills: 1 | Status: SHIPPED | OUTPATIENT
Start: 2019-07-23 | End: 2019-08-20 | Stop reason: SDUPTHER

## 2019-07-24 ENCOUNTER — OFFICE VISIT (OUTPATIENT)
Dept: ENDOCRINOLOGY | Facility: CLINIC | Age: 69
End: 2019-07-24
Payer: MEDICARE

## 2019-07-24 VITALS
DIASTOLIC BLOOD PRESSURE: 60 MMHG | BODY MASS INDEX: 34.56 KG/M2 | HEIGHT: 68 IN | SYSTOLIC BLOOD PRESSURE: 102 MMHG | WEIGHT: 228 LBS

## 2019-07-24 DIAGNOSIS — R73.03 PREDIABETES: ICD-10-CM

## 2019-07-24 DIAGNOSIS — I10 ESSENTIAL HYPERTENSION: ICD-10-CM

## 2019-07-24 DIAGNOSIS — E03.8 SUBCLINICAL HYPOTHYROIDISM: Primary | ICD-10-CM

## 2019-07-24 PROCEDURE — 99214 OFFICE O/P EST MOD 30 MIN: CPT | Performed by: PHYSICIAN ASSISTANT

## 2019-07-24 NOTE — PROGRESS NOTES
Patient Progress Note      Referring Provider  M[address] [facility]     History of Present Illness:     Patient is a 64-year-old male here for follow-up abnormal thyroid function tests which were suggestive of subclinical hypothyroidism and prediabetes  Has history of supraventricular tachycardia  Over the past 2 years, patient has had slightly elevated TSH with normal free T4 levels  Most recent thyroid function test done in May 2019 showed TSH at 4 450 and free T4 1 07  Due to only slight elevation in TSH, levothyroxine was not started  No history of external radiation to head/neck/chest   No family history of thyroid cancer  No recent Iodine loading in form of medication, erbs or kelp supplements or radiological diagnostic studies  Pre-diabetes:  Hemoglobin A1c in May 2019 was elevated at 6 0%  He is trying to eat healthy for the most part and exercise 4-5 times a week  Patient Active Problem List   Diagnosis    Allergic rhinitis    Enlarged prostate with lower urinary tract symptoms (LUTS)    Chronic bronchitis (HCC)    Depression, controlled    Elevated ALT measurement    Hyperlipidemia    Hypertension    Impaired fasting glucose    Lung nodule    Male erectile dysfunction    Multifocal atrial tachycardia (HCC)    Obesity (BMI 30-39  9)    Osteoarthritis of knee    Osteoarthritis of hip    Seasonal allergies    Spinal stenosis    Supraventricular tachycardia (HCC)    Anxiety and depression    Arthritis of left hip    COPD (chronic obstructive pulmonary disease) (HCC)    Elevated prostate specific antigen (PSA)    Lumbosacral spondylosis without myelopathy    Incomplete emptying of bladder    Other affections of shoulder region, not elsewhere classified    Personal history of other disorder of urinary system    Retention of urine    Screening for prostate cancer    Thoracic or lumbosacral neuritis or radiculitis    Urethral stricture    Increased frequency of urination    Spondylolisthesis, acquired    Degeneration of lumbosacral intervertebral disc    Full thickness rotator cuff tear    Knee pain    Localized, primary osteoarthritis    Primary localized osteoarthritis of pelvic region and thigh    Low back pain    Lumbosacral neuritis    Pain in limb    Pseudoclaudication syndrome    Shoulder pain    Cervical stenosis of spinal canal    Subclinical hypothyroidism     Past Medical History:   Diagnosis Date    Bronchitis, chronic obstructive, with exacerbation ([facility] [address] )     Last Assessed: 2013    [facility]     Last Assessed: 2015    Elevated PSA     Bx neg ; Last Assessed: 2012      Past Surgical History:   Procedure Laterality Date    BACK SURGERY  2012    decompression of spinal stenosis from lower thoracic through the lumbar spine    COLONOSCOPY  10/2010    neg   recheck in 5 years    KNEE ARTHROSCOPY  2013    PROSTATE BIOPSY      for elevated PSA of about 7; neg    TRANSURETHRAL RESECTION OF PROSTATE  2013      Family History   Problem Relation Age of Onset    Breast cancer Mother     Pancreatitis Father     Diabetes Maternal Grandmother     Heart attack Maternal Grandfather     Heart attack Paternal Grandmother     Diabetes Paternal Grandmother     Diabetes Paternal Grandfather      Social History     Tobacco Use    Smoking status: Former Smoker     Packs/day: 1 00     Years: 23 00     Pack years: 23 00     Types: Cigarettes     Start date:      Last attempt to quit: 2000     Years since quittin 5    Smokeless tobacco: Never Used   Substance Use Topics    Alcohol use: No     Comment: Stopped drinking     No Known Allergies  Current Outpatient Medications   Medication Sig Dispense Refill    acetaminophen (TYLENOL) 500 mg tablet Take 500 mg by mouth every 6 (six) hours as needed      aspirin 81 mg chewable tablet Chew 1 tablet daily      CIALIS 5 MG tablet       clonazePAM (KlonoPIN) 0 5 mg tablet Take 1 tablet (0 5 mg total) by mouth daily at bedtime for 30 days 30 tablet 1    dextromethorphan-guaifenesin (MUCINEX DM)  MG per 12 hr tablet Take 1 tablet by mouth every 12 (twelve) hours      diclofenac sodium (VOLTAREN) 50 mg EC tablet Take 1 tablet (50 mg total) by mouth 2 (two) times a day for 90 days 180 tablet 1    DULoxetine (CYMBALTA) 60 mg delayed release capsule Take 1 capsule by mouth daily      fluticasone (FLONASE) 50 mcg/act nasal spray fluticasone propionate 50 mcg/actuation nasal spray,suspension      gabapentin (NEURONTIN) 300 mg capsule Take 1 capsule by mouth 4 (four) times a day      loratadine (CLARITIN) 10 mg tablet Take 1 tablet by mouth daily      metoprolol tartrate (LOPRESSOR) 25 mg tablet Take 1 tablet (25 mg total) by mouth every 12 (twelve) hours 180 tablet 3    metoprolol tartrate (LOPRESSOR) 50 mg tablet Take 1 tablet (50 mg total) by mouth 2 (two) times a day 180 tablet 3    Multiple Vitamin (MULTI-VITAMIN DAILY) TABS Take 1 tablet by mouth daily      NIFEdipine (PROCARDIA XL) 90 mg 24 hr tablet Take 1 tablet (90 mg total) by mouth daily for 90 days 90 tablet 3    rosuvastatin (CRESTOR) 20 MG tablet 40 mg Mon, Wed, Fri 20 mg Tue, Thur, Sat, Sun (Patient taking differently: Take 40 mg by mouth daily ) 40 tablet 3     No current facility-administered medications for this visit  Review of Systems   Constitutional: Negative for activity change, appetite change, fatigue and unexpected weight change  HENT: Negative for trouble swallowing  Eyes: Negative for visual disturbance  Respiratory: Negative for shortness of breath  Cardiovascular: Negative for chest pain and palpitations  Gastrointestinal: Negative for constipation and diarrhea  Endocrine: Negative for cold intolerance, heat intolerance, polydipsia and polyuria  Musculoskeletal: Positive for arthralgias (mild)  Skin: Negative  Neurological: Negative for tremors  Psychiatric/Behavioral: Negative  Physical Exam:  Body mass index is 34 67 kg/m²  /60   Ht 5' 8" (1 727 m)   Wt 103 kg (228 lb)   BMI 34 67 kg/m²    Wt Readings from Last 3 Encounters:   07/24/19 103 kg (228 lb)   05/16/19 107 kg (235 lb)   03/07/19 109 kg (241 lb 3 2 oz)       Physical Exam   Constitutional: He appears well-developed and well-nourished  HENT:   Head: Normocephalic  Eyes: Pupils are equal, round, and reactive to light  EOM are normal  No scleral icterus  Neck: Neck supple  No thyromegaly present  Cardiovascular: Normal rate and regular rhythm  No murmur heard  Pulses:       Radial pulses are 2+ on the right side, and 2+ on the left side  Pulmonary/Chest: Effort normal and breath sounds normal  No respiratory distress  He has no wheezes  Neurological: He is alert  He has normal reflexes  Skin: Skin is warm and dry  Psychiatric: He has a normal mood and affect  Nursing note and vitals reviewed  Patient's shoes and socks were not removed  Labs:     Ref  Range 2/28/2018 07:22 6/21/2018 07:59 9/4/2018 07:50 5/17/2019 07:46 5/17/2019 07:46   Hemoglobin A1C Latest Ref Range: 4 2 - 6 3 % 6 0  5 9 6 0    EAG Latest Units: mg/dl 126  123 126    TESTOSTERONE TOTAL Latest Ref Range: 95 - 948 ng/dL  291 0      TSH 3RD GENERATON Latest Ref Range: 0 358 - 3 740 uIU/mL 7 190 (H) 4 890 (H) 7 220 (H)  4 450 (H)   Free T4 Latest Ref Range: 0 76 - 1 46 ng/dL 0 87   1 07 1 07   T3, Free Latest Ref Range: 2 30 - 4 20 pg/mL   1 66 (L)     T4 TOTAL Latest Ref Range: 4 7 - 13 3 ug/dL   10 6       Plan:    Diagnoses and all orders for this visit:    Subclinical hypothyroidism  Thyroid function tests previously abnormal but in acceptable range considering patient's age and medical history  TSH 4 450 and free T4 1 07  History of supraventricular tachycardia  Continue to monitor thyroid function tests    Check thyroid antibody   -     T4, free; Future  -     TSH, 3rd generation; Future  -     Thyroid Antibodies Panel; Future    Prediabetes  Hemoglobin A1c is 6 0%  Continue with dietary/lifestyle modifications  -     Hemoglobin A1C; Future  -     Basic metabolic panel; Future    Essential hypertension  Blood pressure well controlled, continue current treatment  -     Basic metabolic panel; Future        Discussed with the patient and all questions fully answered  He will call me if any problems arise      Counseled patient on diagnostic results, prognosis, risk and benefit of treatment options, instruction for management, importance of treatment compliance, risk  factor reduction and impressions      Rosalba Berry PA-C

## 2019-08-01 DIAGNOSIS — I10 ESSENTIAL HYPERTENSION: ICD-10-CM

## 2019-08-01 RX ORDER — NIFEDIPINE 90 MG/1
90 TABLET, EXTENDED RELEASE ORAL DAILY
Qty: 90 TABLET | Refills: 3 | Status: SHIPPED | OUTPATIENT
Start: 2019-08-01 | End: 2020-07-20

## 2019-08-01 NOTE — TELEPHONE ENCOUNTER
Patient wants a 90 day refill on Generic Procardia XL 90 mg tablets called in to [address] can be reached at [phone] any questions

## 2019-08-16 DIAGNOSIS — E78.2 MIXED HYPERLIPIDEMIA: ICD-10-CM

## 2019-08-16 RX ORDER — ROSUVASTATIN CALCIUM 20 MG/1
TABLET, COATED ORAL
Qty: 120 TABLET | Refills: 1 | Status: SHIPPED | OUTPATIENT
Start: 2019-08-16 | End: 2020-02-03

## 2019-08-19 DIAGNOSIS — N40.0 BENIGN PROSTATIC HYPERPLASIA, UNSPECIFIED WHETHER LOWER URINARY TRACT SYMPTOMS PRESENT: ICD-10-CM

## 2019-08-19 RX ORDER — TADALAFIL 5 MG
TABLET ORAL
Qty: 90 TABLET | Refills: 3 | Status: SHIPPED | OUTPATIENT
Start: 2019-08-19 | End: 2020-08-17

## 2019-08-20 DIAGNOSIS — F41.9 ANXIETY AND DEPRESSION: ICD-10-CM

## 2019-08-20 DIAGNOSIS — F32.A ANXIETY AND DEPRESSION: ICD-10-CM

## 2019-08-20 RX ORDER — CLONAZEPAM 0.5 MG/1
0.5 TABLET ORAL
Qty: 30 TABLET | Refills: 1 | Status: SHIPPED | OUTPATIENT
Start: 2019-08-20 | End: 2019-10-17 | Stop reason: SDUPTHER

## 2019-08-20 NOTE — TELEPHONE ENCOUNTER
Patient wants a refill on Clonazepam 0 5 mg tablets called in to [address] can be reached at [phone] any questions

## 2019-09-05 DIAGNOSIS — M17.0 OSTEOARTHRITIS OF BOTH KNEES, UNSPECIFIED OSTEOARTHRITIS TYPE: ICD-10-CM

## 2019-09-12 ENCOUNTER — OFFICE VISIT (OUTPATIENT)
Dept: FAMILY MEDICINE CLINIC | Facility: CLINIC | Age: 69
End: 2019-09-12
Payer: MEDICARE

## 2019-09-12 VITALS
SYSTOLIC BLOOD PRESSURE: 108 MMHG | DIASTOLIC BLOOD PRESSURE: 68 MMHG | HEIGHT: 68 IN | BODY MASS INDEX: 34.25 KG/M2 | TEMPERATURE: 98 F | WEIGHT: 226 LBS | HEART RATE: 76 BPM | RESPIRATION RATE: 16 BRPM

## 2019-09-12 DIAGNOSIS — I10 ESSENTIAL HYPERTENSION: Primary | ICD-10-CM

## 2019-09-12 DIAGNOSIS — M48.061 SPINAL STENOSIS OF LUMBAR REGION, UNSPECIFIED WHETHER NEUROGENIC CLAUDICATION PRESENT: ICD-10-CM

## 2019-09-12 DIAGNOSIS — R20.0 HAND NUMBNESS: ICD-10-CM

## 2019-09-12 DIAGNOSIS — Z00.00 MEDICARE ANNUAL WELLNESS VISIT, SUBSEQUENT: ICD-10-CM

## 2019-09-12 DIAGNOSIS — E78.5 HYPERLIPIDEMIA, UNSPECIFIED HYPERLIPIDEMIA TYPE: ICD-10-CM

## 2019-09-12 PROCEDURE — G0439 PPPS, SUBSEQ VISIT: HCPCS | Performed by: FAMILY MEDICINE

## 2019-09-12 PROCEDURE — 99214 OFFICE O/P EST MOD 30 MIN: CPT | Performed by: FAMILY MEDICINE

## 2019-09-12 NOTE — PROGRESS NOTES
Assessment and Plan:     Problem List Items Addressed This Visit        Cardiovascular and Mediastinum    Hypertension - Primary     Controlled  Continue nifedipine 90mg QD, metoprolol 75mg bid  Relevant Orders    Comprehensive metabolic panel    Lipid Panel with Direct LDL reflex       Other    Hyperlipidemia     Low fat diet  Continue crestor 40mg qhs  FU cardiology  Relevant Orders    Comprehensive metabolic panel    Lipid Panel with Direct LDL reflex    Spinal stenosis    Relevant Orders    XR spine lumbar minimum 4 views non injury    Ambulatory referral to Orthopedic Surgery      Other Visit Diagnoses     Hand numbness        Relevant Orders    EMG 1 Limb    Ambulatory referral to Orthopedic Surgery    XR spine cervical complete 4 or 5 vw non injury    Medicare annual wellness visit, subsequent                Mini-cog 4/5 today  Preventive health issues were discussed with patient, and age appropriate screening tests were ordered as noted in patient's After Visit Summary  Personalized health advice and appropriate referrals for health education or preventive services given if needed, as noted in patient's After Visit Summary  History of Present Illness:     Patient presents for Medicare Annual Wellness visit    Patient Care Team:  Gopi Chaparro MD as PCP - MD Jesenia Ware MD Worley Parkin, MD [place], PA-C as Physician Assistant (Endocrinology)  Katarina White MD (Endocrinology)     Problem List:     Patient Active Problem List   Diagnosis    Allergic rhinitis    Enlarged prostate with lower urinary tract symptoms (LUTS)    Chronic bronchitis ([address] )    Depression, controlled    Elevated ALT measurement    Hyperlipidemia    Hypertension    Impaired fasting glucose    Lung nodule    Male erectile dysfunction    Multifocal atrial tachycardia ([address] )    Obesity (BMI 30-39  9)    Osteoarthritis of knee    Osteoarthritis of hip    Seasonal allergies    Spinal stenosis    Supraventricular tachycardia (HCC)    Anxiety and depression    Arthritis of left hip    COPD (chronic obstructive pulmonary disease) (HCC)    Elevated prostate specific antigen (PSA)    Lumbosacral spondylosis without myelopathy    Incomplete emptying of bladder    Other affections of shoulder region, not elsewhere classified    Personal history of other disorder of urinary system    Retention of urine    Screening for prostate cancer    Thoracic or lumbosacral neuritis or radiculitis    Urethral stricture    Increased frequency of urination    Spondylolisthesis, acquired    Degeneration of lumbosacral intervertebral disc    Full thickness rotator cuff tear    Knee pain    Localized, primary osteoarthritis    Primary localized osteoarthritis of pelvic region and thigh    Low back pain    Lumbosacral neuritis    Pain in limb    Pseudoclaudication syndrome    Shoulder pain    Cervical stenosis of spinal canal    Subclinical hypothyroidism      Past Medical and Surgical History:     Past Medical History:   Diagnosis Date    Bronchitis, chronic obstructive, with exacerbation ([facility] [address] )     Last Assessed: 5/17/2013    COPD with acute exacerbation ([facility] [address] )     Last Assessed: 4/13/2015    Elevated PSA     Bx neg 2012; Last Assessed: 11/23/2012     Past Surgical History:   Procedure Laterality Date    BACK SURGERY  07/25/2012    decompression of spinal stenosis from lower thoracic through the lumbar spine    COLONOSCOPY  10/2010    neg   recheck in 5 years    KNEE ARTHROSCOPY  01/2013    PROSTATE BIOPSY  2012    for elevated PSA of about 7; neg    TRANSURETHRAL RESECTION OF PROSTATE  06/27/2013      Family History:     Family History   Problem Relation Age of Onset    Breast cancer Mother     Pancreatitis Father     Diabetes Maternal Grandmother     Heart attack Maternal Grandfather     Heart attack Paternal Grandmother     Diabetes Paternal Grandmother     Diabetes Paternal Grandfather       Social History:     Social History     Socioeconomic History    Marital status: /Civil Union     Spouse name: None    Number of children: None    Years of education: None    Highest education level: None   Occupational History    None   Social Needs    Financial resource strain: None    Food insecurity:     Worry: None     Inability: None    Transportation needs:     Medical: None     Non-medical: None   Tobacco Use    Smoking status: Former Smoker     Packs/day: 1 00     Years: 23 00     Pack years: 23 00     Types: Cigarettes     Start date:      Last attempt to quit:      Years since quittin 7    Smokeless tobacco: Never Used   Substance and Sexual Activity    Alcohol use: No     Comment: Stopped drinking    Drug use: No    Sexual activity: None   Lifestyle    Physical activity:     Days per week: None     Minutes per session: None    Stress: None   Relationships    Social connections:     Talks on phone: None     Gets together: None     Attends Hinduism service: None     Active member of club or organization: None     Attends meetings of clubs or organizations: None     Relationship status: None    Intimate partner violence:     Fear of current or ex partner: None     Emotionally abused: None     Physically abused: None     Forced sexual activity: None   Other Topics Concern    None   Social History Narrative    None       Medications and Allergies:     Current Outpatient Medications   Medication Sig Dispense Refill    acetaminophen (TYLENOL) 500 mg tablet Take 500 mg by mouth every 6 (six) hours as needed      aspirin 81 mg chewable tablet Chew 1 tablet daily      CIALIS 5 MG tablet TAKE 1 TABLET (5 MG TOTAL) BY MOUTH DAILY FOR 90 DAYS 90 tablet 3    clonazePAM (KlonoPIN) 0 5 mg tablet Take 1 tablet (0 5 mg total) by mouth daily at bedtime for 30 days 30 tablet 1    dextromethorphan-guaifenesin (MUCINEX DM)  MG per 12 hr tablet Take 1 tablet by mouth every 12 (twelve) hours      diclofenac sodium (VOLTAREN) 50 mg EC tablet TAKE 1 TABLET (50 MG TOTAL) BY MOUTH 2 (TWO) TIMES A  tablet 1    DULoxetine (CYMBALTA) 60 mg delayed release capsule Take 1 capsule by mouth daily      fluticasone (FLONASE) 50 mcg/act nasal spray fluticasone propionate 50 mcg/actuation nasal spray,suspension      gabapentin (NEURONTIN) 300 mg capsule Take 1 capsule by mouth 4 (four) times a day      loratadine (CLARITIN) 10 mg tablet Take 1 tablet by mouth daily      metoprolol tartrate (LOPRESSOR) 25 mg tablet Take 1 tablet (25 mg total) by mouth every 12 (twelve) hours 180 tablet 3    metoprolol tartrate (LOPRESSOR) 50 mg tablet Take 1 tablet (50 mg total) by mouth 2 (two) times a day 180 tablet 3    Multiple Vitamin (MULTI-VITAMIN DAILY) TABS Take 1 tablet by mouth daily      NIFEdipine (PROCARDIA XL) 90 mg 24 hr tablet Take 1 tablet (90 mg total) by mouth daily for 357 days 90 tablet 3    rosuvastatin (CRESTOR) 20 MG tablet TAKE 2 TABLETS ON MON WED FRI  1 TAB ON TUES THURS SAT  AND SUNDAY 120 tablet 1     No current facility-administered medications for this visit        No Known Allergies   Immunizations:     Immunization History   Administered Date(s) Administered    INFLUENZA 01/18/2013, 01/31/2014, 01/23/2015, 10/02/2017    Influenza Quadrivalent, 6-35 Months IM 10/02/2015    Influenza Split High Dose Preservative Free IM 11/18/2016, 10/02/2017    Influenza TIV (IM) 11/11/2014    Influenza, high dose seasonal 0 5 mL 09/06/2018    Pneumococcal Conjugate 13-Valent 08/18/2016    Pneumococcal Polysaccharide PPV23 10/10/2012, 09/06/2018    Zoster 11/18/2016      Health Maintenance:         Topic Date Due    CRC Screening: Colonoscopy  10/19/2020    Hepatitis C Screening  Completed         Topic Date Due    DTaP,Tdap,and Td Vaccines (1 - Tdap) 06/14/1972    INFLUENZA VACCINE  07/01/2019      Medicare Health Risk Assessment:     /68   Pulse 76   Temp 98 °F (36 7 °C) (Tympanic)   Resp 16   Ht 5' 8" (1 727 m)   Wt 103 kg (226 lb)   BMI 34 36 kg/m²      [place] is here for his Subsequent Wellness visit  Health Risk Assessment:   Patient rates overall health as very good  Patient feels that their physical health rating is same  Eyesight was rated as same  Hearing was rated as same  Patient feels that their emotional and mental health rating is same  Pain experienced in the last 7 days has been some  Patient's pain rating has been 3/10  Depression Screening:   PHQ-2 Score: 0  PHQ-9 Score: 1      Fall Risk Screening: In the past year, patient has experienced: no history of falling in past year      Home Safety:  Patient does not have trouble with stairs inside or outside of their home  Patient has working smoke alarms and has working carbon monoxide detector  Home safety hazards include: none  Nutrition:   Current diet is Regular  Medications:   Patient is currently taking over-the-counter supplements  OTC medications include: see medication list  Patient is able to manage medications  Activities of Daily Living (ADLs)/Instrumental Activities of Daily Living (IADLs):   Walk and transfer into and out of bed and chair?: Yes  Dress and groom yourself?: Yes    Bathe or shower yourself?: Yes    Feed yourself?  Yes  Do your laundry/housekeeping?: Yes  Manage your money, pay your bills and track your expenses?: Yes  Make your own meals?: Yes    Do your own shopping?: Yes    Previous Hospitalizations:   Any hospitalizations or ED visits within the last 12 months?: No      Advance Care Planning:   Living will: Yes    Advanced directive: Yes    End of Life Decisions reviewed with patient: Yes    Provider agrees with end of life decisions: Yes      Cognitive Screening:   Provider or family/friend/caregiver concerned regarding cognition?: No    PREVENTIVE SCREENINGS      Cardiovascular Screening:    General: History Lipid Disorder    Due for: Lipid Panel      Diabetes Screening:     General: Screening Current      Colorectal Cancer Screening:     General: Screening Current      Prostate Cancer Screening:      Due for: PSA      Abdominal Aortic Aneurysm (AAA) Screening:    Risk factors include: age between 73-69 yo and tobacco use        General: Screening Current      Lung Cancer Screening:     General: Screening Not Indicated      Hepatitis C Screening:    General: Screening Current      Parish Ozuna MD

## 2019-09-12 NOTE — PROGRESS NOTES
Chief Complaint   Patient presents with   [facility] Wellness Visit     routine follow up, and AWV     Health Maintenance   Topic Date Due    DTaP,Tdap,and Td Vaccines (1 - Tdap) 06/14/1972    INFLUENZA VACCINE  07/01/2019    Medicare Annual Wellness Visit (AWV)  09/06/2019    Fall Risk  03/07/2020    BMI: Followup Plan  03/07/2020    BMI: Adult  07/24/2020    CRC Screening: Colonoscopy  10/19/2020    Hepatitis C Screening  Completed    Pneumococcal Vaccine: 65+ Years  Completed    Pneumococcal Vaccine: Pediatrics (0 to 5 Years) and At-Risk Patients (6 to 59 Years)  Aged Out    HEPATITIS B VACCINES  Aged Out     Assessment/Plan:    Hypertension  Controlled  Continue nifedipine 90mg QD, metoprolol 75mg bid  Hyperlipidemia  Low fat diet  Continue crestor 40mg qhs  FU cardiology  Anxiety and depression  Stable  Continue cymbalta 60mg QD, gabapentin 300mg am and 900mg pm per psychiatrist Dr Ari Valencia  Use clonazepam 0 5mg qhs as needed for insomnia  SE educated pt  Enlarged prostate with lower urinary tract symptoms (LUTS)  Continue cialis 5mg QD  [facility] urology  S/p TURP in 2013  Flu shot yearly  Got PCV13 and zostavax in 2016  Got pneumovax in 2018  Will ask insurance for coverage of Tdap and shingrix  PSA yearly per urology  Pros and cons educated pt    Colonoscopy 3/2017  Repeat in 5 years per pt  RTO in 6 months       POA---wife Shirin Rodriguez  Living will---DNR per pt  Diagnoses and all orders for this visit:    Essential hypertension  -     Comprehensive metabolic panel; Future  -     Lipid Panel with Direct LDL reflex; Future    Hyperlipidemia, unspecified hyperlipidemia type  -     Comprehensive metabolic panel; Future  -     Lipid Panel with Direct LDL reflex; Future    Hand numbness  -     EMG 1 Limb; Future  -     Ambulatory referral to Orthopedic Surgery; Future  -     XR spine cervical complete 4 or 5 vw non injury;  Future    Spinal stenosis of lumbar region, unspecified whether neurogenic claudication present  -     XR spine lumbar minimum 4 views non injury; Future  -     Ambulatory referral to Orthopedic Surgery; Future    Medicare annual wellness visit, subsequent          Subjective:      Patient ID: Scarlet Duvall is a 77 y o  male  HPI    Pt is here by himself    C/o left hand numbness for 10 months  Come and go  Worse if he holds books  Better if he rest  Feels neck pain radiating to shoulders  Denies injury  Chronic lower back pain--for years  Worse recently  Radiating to back of thigh  Feels weakness of legs  Cannot walk far without a cane  FU ortho at [place] [address] for lower back pain/spinal stenosis before  Hx of back surgery in 2012 at [place] [address]  On diclofenac 50mg bid and neurotin which helped some  7/2018 PAD screen was normal       HTN----[facility] cardiology for MAT, HTN  Stable  On metoprolol 75mg bid and nifedipine 90mg Qd  Today bP 130/84 ok  Denies headache, vision change, SOB or CP       Hyperlipidemia---He is on crestor 40mg qhs  Denies side effects  5/2019 Lipid 161/153/38/92 ok  Subclinial hypothyroidism---FU endocrinology       Depression/depression-----Saw [address] and Saw psychiatry Dr Tabitha Decker Q 3 month  He is on cymbalta 60mg QD which works for him  Use clonazepam 0 5mg qhs for insomnia  He is on gabapentin 300mg am and 900mg pm per psychiatrist        COPD---[facility] pulmonary for chronic bronchitis, worse in fall/winter  Does not need any symbicort or proair daily  He uses mucinex-DM daily as needed  Quit smoking in 2000        BPH---[facility] urology Q 6 months for BPH s/p TURP in 2013  Had cystoscopy and dilation in 9/2014  He is self-cath 1-2/week now  Was on macrobid when he did self-cath before, not any more  Cialis 5mg QD helped       Quit smoking for years  No alcohol or drugs       Lives with wife   Does all ADL's  Still drive    Denies recent falls              The following portions of the patient's history were reviewed and updated as appropriate: allergies, current medications, past family history, past medical history, past social history, past surgical history and problem list     Review of Systems   Constitutional: Negative for appetite change, chills and fever  HENT: Negative for congestion, ear pain, sinus pain and sore throat  Eyes: Negative for discharge and itching  Respiratory: Negative for apnea, cough, chest tightness, shortness of breath and wheezing  Cardiovascular: Negative for chest pain, palpitations and leg swelling  Gastrointestinal: Negative for abdominal pain, anal bleeding, constipation, diarrhea, nausea and vomiting  Endocrine: Negative for cold intolerance, heat intolerance and polyuria  Genitourinary: Negative for difficulty urinating and dysuria  Musculoskeletal: Negative for arthralgias, back pain and myalgias  Skin: Negative for rash  Neurological: Negative for dizziness and headaches  Psychiatric/Behavioral: Negative for agitation  Objective:      /68   Pulse 76   Temp 98 °F (36 7 °C) (Tympanic)   Resp 16   Ht 5' 8" (1 727 m)   Wt 103 kg (226 lb)   BMI 34 36 kg/m²          Physical Exam   Constitutional: He appears well-developed  HENT:   Head: Normocephalic and atraumatic  Right Ear: External ear normal    Left Ear: External ear normal    Nose: Nose normal    Mouth/Throat: Oropharynx is clear and moist    Eyes: Pupils are equal, round, and reactive to light  Conjunctivae are normal    Neck: Normal range of motion  Neck supple  Cardiovascular: Normal rate, regular rhythm, normal heart sounds and intact distal pulses  Pulmonary/Chest: Effort normal and breath sounds normal    Abdominal: Soft  Bowel sounds are normal    Musculoskeletal: Normal range of motion  He exhibits edema  Negative Spurling's test, negative for Tinel's, negative for Phalen's  Lower back tenderness, no swollen or erythema   Neurological: He is alert

## 2019-09-12 NOTE — PATIENT INSTRUCTIONS

## 2019-09-12 NOTE — ASSESSMENT & PLAN NOTE
Stable  Continue cymbalta 60mg QD, gabapentin 300mg am and 900mg pm per psychiatrist Dr Martine Cao  Use clonazepam 0 5mg qhs as needed for insomnia  SE educated pt

## 2019-09-19 ENCOUNTER — APPOINTMENT (OUTPATIENT)
Dept: RADIOLOGY | Age: 69
End: 2019-09-19
Payer: MEDICARE

## 2019-09-19 DIAGNOSIS — R20.0 HAND NUMBNESS: ICD-10-CM

## 2019-09-19 DIAGNOSIS — M48.061 SPINAL STENOSIS OF LUMBAR REGION, UNSPECIFIED WHETHER NEUROGENIC CLAUDICATION PRESENT: ICD-10-CM

## 2019-09-19 PROCEDURE — 72050 X-RAY EXAM NECK SPINE 4/5VWS: CPT

## 2019-09-19 PROCEDURE — 72110 X-RAY EXAM L-2 SPINE 4/>VWS: CPT

## 2019-09-20 ENCOUNTER — LAB (OUTPATIENT)
Dept: LAB | Facility: CLINIC | Age: 69
End: 2019-09-20
Payer: MEDICARE

## 2019-09-20 DIAGNOSIS — E78.5 HYPERLIPIDEMIA, UNSPECIFIED HYPERLIPIDEMIA TYPE: ICD-10-CM

## 2019-09-20 DIAGNOSIS — I10 ESSENTIAL HYPERTENSION: ICD-10-CM

## 2019-09-20 LAB
ALBUMIN SERPL BCP-MCNC: 3.8 G/DL (ref 3.5–5)
ALP SERPL-CCNC: 66 U/L (ref 46–116)
ALT SERPL W P-5'-P-CCNC: 23 U/L (ref 12–78)
ANION GAP SERPL CALCULATED.3IONS-SCNC: 3 MMOL/L (ref 4–13)
AST SERPL W P-5'-P-CCNC: 17 U/L (ref 5–45)
BILIRUB SERPL-MCNC: 0.58 MG/DL (ref 0.2–1)
BUN SERPL-MCNC: 16 MG/DL (ref 5–25)
CALCIUM SERPL-MCNC: 9 MG/DL (ref 8.3–10.1)
CHLORIDE SERPL-SCNC: 105 MMOL/L (ref 100–108)
CHOLEST SERPL-MCNC: 163 MG/DL (ref 50–200)
CO2 SERPL-SCNC: 29 MMOL/L (ref 21–32)
CREAT SERPL-MCNC: 1.08 MG/DL (ref 0.6–1.3)
GFR SERPL CREATININE-BSD FRML MDRD: 70 ML/MIN/1.73SQ M
GLUCOSE P FAST SERPL-MCNC: 105 MG/DL (ref 65–99)
HDLC SERPL-MCNC: 40 MG/DL (ref 40–60)
LDLC SERPL CALC-MCNC: 97 MG/DL (ref 0–100)
POTASSIUM SERPL-SCNC: 4.1 MMOL/L (ref 3.5–5.3)
PROT SERPL-MCNC: 7.3 G/DL (ref 6.4–8.2)
SODIUM SERPL-SCNC: 137 MMOL/L (ref 136–145)
TRIGL SERPL-MCNC: 131 MG/DL

## 2019-09-20 PROCEDURE — 80061 LIPID PANEL: CPT

## 2019-09-20 PROCEDURE — 36415 COLL VENOUS BLD VENIPUNCTURE: CPT

## 2019-09-20 PROCEDURE — 80053 COMPREHEN METABOLIC PANEL: CPT

## 2019-09-23 ENCOUNTER — OFFICE VISIT (OUTPATIENT)
Dept: OBGYN CLINIC | Facility: CLINIC | Age: 69
End: 2019-09-23
Payer: MEDICARE

## 2019-09-23 VITALS
BODY MASS INDEX: 34.4 KG/M2 | SYSTOLIC BLOOD PRESSURE: 127 MMHG | HEART RATE: 56 BPM | WEIGHT: 227 LBS | DIASTOLIC BLOOD PRESSURE: 71 MMHG | HEIGHT: 68 IN

## 2019-09-23 DIAGNOSIS — M43.10 SPONDYLOLISTHESIS, ACQUIRED: ICD-10-CM

## 2019-09-23 DIAGNOSIS — M51.37 DEGENERATION OF LUMBOSACRAL INTERVERTEBRAL DISC: ICD-10-CM

## 2019-09-23 DIAGNOSIS — M47.817 LUMBOSACRAL SPONDYLOSIS WITHOUT MYELOPATHY: Primary | ICD-10-CM

## 2019-09-23 PROCEDURE — 99204 OFFICE O/P NEW MOD 45 MIN: CPT | Performed by: PHYSICAL MEDICINE & REHABILITATION

## 2019-09-23 NOTE — PROGRESS NOTES
1  Lumbosacral spondylosis without myelopathy  Ambulatory referral to Physical Therapy    Ambulatory referral to Pain Management   2  Spondylolisthesis, acquired  Ambulatory referral to Physical Therapy    Ambulatory referral to Pain Management   3  Degeneration of lumbosacral intervertebral disc  Ambulatory referral to Physical Therapy    Ambulatory referral to Pain Management     Orders Placed This Encounter   Procedures    Ambulatory referral to Physical Therapy    Ambulatory referral to Pain Management        Imaging Studies (I personally reviewed images in PACS and report):  Lumbar spine x-rays dated 9/19/2009  Degenerative disc disease most notable between L1-L2 and L2-L3  Mild degeneration throughout the rest of the lumbar spine  Fusion hardware noted the L5-S1 level  No acute osseous abnormalities  Impression:  Chronic low back pain secondary severe lumbar spondylosis  The patient has a history of decompression surgery about 8 years ago at an outside facility  He continues to have low back pain  We discussed different treatment options and decided to proceed with physical therapy  The patient requested a consultation with Pain Management and so I placed this  He should also consider having his Cymbalta dosage increase to help with the radicular pain  I will see him back in about 6 weeks to be reassessed  If he has no relief with these interventions, would consider having him consult with the surgeon  Return in about 6 weeks (around 11/4/2019)  HPI:  Larisa Herrera is a 77 y o  male  who presents for evaluation of   Chief Complaint   Patient presents with    Spine - Pain       Onset/Mechanism: Chronic low back pain that he has had "forever"  He had decompression and fusion surgery about 8 years ago ([facility] Dr Ramirez Said)  He did well after surgery but feels like things are getting worse over the past couple of months    He is unable to walk a few blocks without the use of a cane   Location: Bilateral low back with radiation down the posterior thigh  Quality: Burning and weakness  Radiation: Down the bilateral posterior thigh  Provocative: Extension  He loves the shopping cart position  Severity: Severe enough to limit him in his ADL's  Associated Symptoms: No associated symptoms  Treatment so far: He has not had any treatment for his current low back pain  Review of Systems   Constitutional: Positive for activity change  Negative for fever  HENT: Negative for trouble swallowing  Eyes: Negative for visual disturbance  Respiratory: Negative for shortness of breath  Cardiovascular: Negative for chest pain  Gastrointestinal: Negative for abdominal pain  Denies bowel incontinence  Endocrine: Negative for polydipsia  Genitourinary:        Denies urinary incontinence  Musculoskeletal: Positive for back pain  Skin: Negative for rash  Allergic/Immunologic: Negative for immunocompromised state  Neurological:        Denies saddle anesthesia  Hematological: Does not bruise/bleed easily  Psychiatric/Behavioral: Negative for confusion  Following history reviewed and updated:  Past Medical History:   Diagnosis Date    Bronchitis, chronic obstructive, with exacerbation ([facility] [address] )     Last Assessed: 5/17/2013    COPD with acute exacerbation ([facility] )     Last Assessed: 4/13/2015    Elevated PSA     Bx neg 2012; Last Assessed: 11/23/2012     Past Surgical History:   Procedure Laterality Date    BACK SURGERY  07/25/2012    decompression of spinal stenosis from lower thoracic through the lumbar spine    COLONOSCOPY  10/2010    neg   recheck in 5 years    KNEE ARTHROSCOPY  01/2013    PROSTATE BIOPSY  2012    for elevated PSA of about 7; neg    TRANSURETHRAL RESECTION OF PROSTATE  06/27/2013     Social History   Social History     Substance and Sexual Activity   Alcohol Use No    Comment: Stopped drinking     Social History     Substance and Sexual Activity Drug Use No     Social History     Tobacco Use   Smoking Status Former Smoker    Packs/day: 1 00    Years:     Pack years:     Types: Cigarettes    Start date: 0    Last attempt to quit: 2000    Years since quittin 7   Smokeless Tobacco Never Used     Family History   Problem Relation Age of Onset   [facility] Breast cancer Mother     Pancreatitis Father     Diabetes Maternal Grandmother     Heart attack Maternal Grandfather     Heart attack Paternal Grandmother     Diabetes Paternal Grandmother     Diabetes Paternal Grandfather      No Known Allergies     Constitutional:  /71 (BP Location: Left arm, Patient Position: Sitting, Cuff Size: Adult)   Pulse 56   Ht 5' 8" (1 727 m)   Wt 103 kg (227 lb)   BMI 34 52 kg/m²    General: NAD  Stutters intermittently  Eyes: Clear sclerae  ENT: No inflammation, lesion, or mass of lips  No tracheal deviation  Musculoskeletal: As mentioned below  Integumentary: No visible rashes or skin lesions  Pulmonary/Chest: Effort normal  No respiratory distress  Neuro: CN's grossly intact, MEDELLIN  Psych: Normal affect and judgement  Vascular: WWP  Back Exam     Comments: Inspection: No obvious deformities, lesions or rashes  ROM: Lumbar flexion to 90°, extension to 10° with pain  Rotation and side-bending are provocative  Palpation:  Nontender to palpation  There are no step offs  Neuro:  Normal neurological exam   5/5 strength with bilateral hip flexion, knee extension, ankle dorsiflexion and ankle plantarflexion  Sensation is intact in dermatomes L2-S1  Patellar and Achilles reflexes are normoactive and equal bilaterally  No clonus  Special tests: Normal bilateral straight leg raise, dural stretch  Positive Crockett's maneuver bilaterally  Procedures - none for this visit

## 2019-09-30 ENCOUNTER — EVALUATION (OUTPATIENT)
Dept: PHYSICAL THERAPY | Facility: OTHER | Age: 69
End: 2019-09-30
Payer: MEDICARE

## 2019-09-30 DIAGNOSIS — M47.817 LUMBOSACRAL SPONDYLOSIS WITHOUT MYELOPATHY: ICD-10-CM

## 2019-09-30 DIAGNOSIS — M43.10 SPONDYLOLISTHESIS, ACQUIRED: ICD-10-CM

## 2019-09-30 DIAGNOSIS — M51.37 DEGENERATION OF LUMBOSACRAL INTERVERTEBRAL DISC: ICD-10-CM

## 2019-09-30 PROCEDURE — 97161 PT EVAL LOW COMPLEX 20 MIN: CPT | Performed by: PHYSICAL THERAPIST

## 2019-09-30 PROCEDURE — 97110 THERAPEUTIC EXERCISES: CPT | Performed by: PHYSICAL THERAPIST

## 2019-09-30 NOTE — PROGRESS NOTES
PT Evaluation     Today's date: 2019  Patient name: Misty Burger  : 1950  MRN: [id number]  Referring provider: Pema Crenshaw DO  Dx:   Encounter Diagnosis     ICD-10-CM    1  Lumbosacral spondylosis without myelopathy M47 817 Ambulatory referral to Physical Therapy   2  Spondylolisthesis, acquired M43 10 Ambulatory referral to Physical Therapy   3  Degeneration of lumbosacral intervertebral disc M51 37 Ambulatory referral to Physical Therapy                  Assessment  Assessment details: Misty Burger is a pleasant 77 y o  male who presents with LBP for several years he has surgery 2 x the most recent was 8 years ago  Patient  Reported that recently  He is starting to again have LBP and referred pain  He reported very limited walking tolerance > 1 block he needs a cane  He has to take frequent breaks with IADL's  He repeorted the is B/L post thighs and into the Lumbar spine  He reported that when he sits down he has relief  Patient reported that these symptoms have been gradually getting worst  No recent injections or other treatment  patients main goal is to make sure that what exercise he is doing wont make it worst and would like additional exercises  Std tolerance is about 1-2min   HEP issued and POC reviewed        Impairments: abnormal coordination, abnormal muscle firing, abnormal or restricted ROM, abnormal movement, activity intolerance, difficulty understanding, impaired physical strength, lacks appropriate home exercise program, pain with function and poor body mechanics    Symptom irritability: moderateUnderstanding of Dx/Px/POC: good   Prognosis: good    Goals  Short Term:  Pt will report decreased levels of pain by at least 2 subjective ratings in 4 weeks  Pt will demonstrate improved ROM by at least 10 degrees in 4 weeks  Pt will demonstrate improved strength by 1/2 grade  Long Term:   Pt will be independent in their HEP in 8 weeks  Pt will be be pain free with IADL's  Pt will demonstrate improved FOTO, > 80         Plan  Plan details: Prognosis above is given PT services 2x/week tapering to 1x/week over the next 3 months and home program adherence    Patient would benefit from: skilled physical therapy  Planned modality interventions: thermotherapy: hydrocollator packs  Planned therapy interventions: activity modification, joint mobilization, manual therapy, motor coordination training, neuromuscular re-education, patient education, self care, therapeutic activities, therapeutic exercise, graded activity and home exercise program  Frequency: 2x week  Treatment plan discussed with: patient        Subjective Evaluation    Pain  At best pain ratin  At worst pain ratin  Location: LBP     Patient Goals  Patient goals for therapy: decreased pain, independence with ADLs/IADLs, return to sport/leisure activities, increased motion and increased strength          Objective     General Comments:      Lumbar Comments  Special test                Hip/SI joint:   scour=  L hip not tested TKA     devon = -      capsular mobility= ant hip tightness          SLS=          obers=  -         piriformis test=-              Gaenslen's test= -      Distraction=    -        Active SLR= -           Active SLR with stabilization = -           Leg length =    -           Sacral Thrust=-   melvin finger=  - S/L si joint compression= -       LUMBAR tests:  SLR = - slump= -PA mob= -    quadrant test=-       femoral nerve traction=  mms tightness   Repetitive testing: extension  =   increased symptoms after prolonged ext std walking  flexion    =  -   Lower extremity reflexes:  Absent B/L LE   Lower extremity myotomes: 5/5 Sensation: light touch intact              Precautions: L JORDON       Manual  9 30            Ant glides hip                                                                      Exercise Diary              LTR              hipflexor stretch             Table slide for L-S multifitus press              Pelvic tilts post              clamshell             Prone quad stretch                                                                                                                                                                                           Modalities

## 2019-10-02 ENCOUNTER — OFFICE VISIT (OUTPATIENT)
Dept: PHYSICAL THERAPY | Facility: OTHER | Age: 69
End: 2019-10-02
Payer: MEDICARE

## 2019-10-02 DIAGNOSIS — R00.0 TACHYCARDIA: ICD-10-CM

## 2019-10-02 DIAGNOSIS — M47.817 LUMBOSACRAL SPONDYLOSIS WITHOUT MYELOPATHY: Primary | ICD-10-CM

## 2019-10-02 DIAGNOSIS — R00.2 PALPITATIONS: ICD-10-CM

## 2019-10-02 PROCEDURE — 97140 MANUAL THERAPY 1/> REGIONS: CPT

## 2019-10-02 PROCEDURE — 97112 NEUROMUSCULAR REEDUCATION: CPT

## 2019-10-02 PROCEDURE — 97110 THERAPEUTIC EXERCISES: CPT

## 2019-10-02 NOTE — PROGRESS NOTES
Daily Note     Today's date: 10/2/2019  Patient name: Mark Maria  : 1950  MRN: [id number]  Referring provider: Citlalli Zhou DO  Dx:   Encounter Diagnosis     ICD-10-CM    1  Lumbosacral spondylosis without myelopathy M47 817               1 on 1 with PTA and PT    Subjective: Patient continues to have pain with ambulation which is less with an assistive device  Objective: See treatment diary below      Assessment: Tolerated treatment well  Patient exhibited good technique with therapeutic exercises and would benefit from continued PT      Plan: Continue per plan of care  Progress treatment as tolerated  Precautions: L JORDON       Manual  9 30 10/2           Ant glides hip   MJ                                                                   Exercise Diary   10/2           LTR   10"x10           hipflexor stretch  10"x10           Table slide for L-S   10"x10           multifitus press   GTB x15 ea             Pelvic tilts post   5" x20           clamshell  GTB 2x10           Prone quad stretch   10" x10                                                                                                                                                                                        Modalities

## 2019-10-07 ENCOUNTER — OFFICE VISIT (OUTPATIENT)
Dept: PHYSICAL THERAPY | Facility: OTHER | Age: 69
End: 2019-10-07
Payer: MEDICARE

## 2019-10-07 DIAGNOSIS — M47.817 LUMBOSACRAL SPONDYLOSIS WITHOUT MYELOPATHY: Primary | ICD-10-CM

## 2019-10-07 PROCEDURE — 97140 MANUAL THERAPY 1/> REGIONS: CPT

## 2019-10-07 PROCEDURE — 97112 NEUROMUSCULAR REEDUCATION: CPT

## 2019-10-07 PROCEDURE — 97110 THERAPEUTIC EXERCISES: CPT

## 2019-10-07 NOTE — PROGRESS NOTES
Daily Note     Today's date: 10/7/2019  Patient name: Neha Styles  : 1950  MRN: [id number]  Referring provider: [place], DO  Dx:   Encounter Diagnosis     ICD-10-CM    1  Lumbosacral spondylosis without myelopathy M47 817               1 on 1 with PTA and PT    Subjective: Patient reports some cramping in B/L HS when performing bridges  Objective: See treatment diary below      Assessment: Tolerated treatment well  No cramping with session today  Some low back discomfort with hip flexor stretch, which was adjusted and more comfortable  Patient exhibited good technique with therapeutic exercises and would benefit from continued PT      Plan: Continue per plan of care  Progress treatment as tolerated  Precautions: L JORDON       Manual  9 30 10/2 10/7          Ant glides hip   MJ MJ R           Side glides    MJ L-S                                                     Exercise Diary   10/2 10/7          LTR   10"x10 10" x 10          hipflexor stretch  10"x10 10"x10          Table slide for L-S   10"x10 10"x10          multifitus press   GTB x15 ea   GTB x15          Pelvic tilts post   5" x20 5" x 20          clamshell  GTB 2x10 GTB 2x10          Prone quad stretch   10" x10 10" x 10           DLS abd   10" x 2'          Mini squats   1 x 15          pball walk out    1'                                                                                                                                                Modalities

## 2019-10-10 ENCOUNTER — OFFICE VISIT (OUTPATIENT)
Dept: PHYSICAL THERAPY | Facility: OTHER | Age: 69
End: 2019-10-10
Payer: MEDICARE

## 2019-10-10 DIAGNOSIS — M47.817 LUMBOSACRAL SPONDYLOSIS WITHOUT MYELOPATHY: Primary | ICD-10-CM

## 2019-10-10 PROCEDURE — 97112 NEUROMUSCULAR REEDUCATION: CPT

## 2019-10-10 PROCEDURE — 97140 MANUAL THERAPY 1/> REGIONS: CPT

## 2019-10-10 PROCEDURE — 97110 THERAPEUTIC EXERCISES: CPT

## 2019-10-10 NOTE — PROGRESS NOTES
Daily Note     Today's date: 10/10/2019  Patient name: Larisa Loving  : 1950  MRN: [id number]  Referring provider: Rosales Nesbitt DO  Dx:   Encounter Diagnosis     ICD-10-CM    1  Lumbosacral spondylosis without myelopathy M47 817               1 on 1 with PTA     Subjective: Patient states he is having less discomfort walking around the house, but walking any distance other than that is still painful  Objective: See treatment diary below      Assessment: Tolerated treatment well  Good tolerance to progression of MF press  Patient exhibited good technique with therapeutic exercises and would benefit from continued PT      Plan: Continue per plan of care  Progress treatment as tolerated  Precautions: L JORDON       Manual  9 30 10/2 10/7 10/10         Ant glides hip   MJ MJ R  PT SH         Side chrisdes    MJ L-S                                                     Exercise Diary   10/2 10/7 10/10         LTR   10"x10 10" x 10 10"x10         hipflexor stretch  10"x10 10"x10          Table slide for L-S   10"x10 10"x10 10"x10         multifitus press   GTB x15 ea  GTB x15 BTB x15 ea  Pelvic tilts post   5" x20 5" x 20 5" x 20         clamshell  GTB 2x10 GTB 2x10 GTB 3x10         Prone quad stretch   10" x10 10" x 10  10"x10         DLS abd   10" x 2' 10" x 2'         Mini squats   1 x 15 1 x 15         pball walk out    1' 1'         DLS alt knee ext    10 ea                                                                                                                                    Modalities

## 2019-10-14 ENCOUNTER — TELEPHONE (OUTPATIENT)
Dept: PAIN MEDICINE | Facility: CLINIC | Age: 69
End: 2019-10-14

## 2019-10-14 ENCOUNTER — OFFICE VISIT (OUTPATIENT)
Dept: PHYSICAL THERAPY | Facility: OTHER | Age: 69
End: 2019-10-14
Payer: MEDICARE

## 2019-10-14 ENCOUNTER — CLINICAL SUPPORT (OUTPATIENT)
Dept: PAIN MEDICINE | Facility: CLINIC | Age: 69
End: 2019-10-14
Payer: MEDICARE

## 2019-10-14 VITALS
HEIGHT: 68 IN | DIASTOLIC BLOOD PRESSURE: 58 MMHG | TEMPERATURE: 98.8 F | HEART RATE: 58 BPM | SYSTOLIC BLOOD PRESSURE: 97 MMHG | WEIGHT: 231 LBS | BODY MASS INDEX: 35.01 KG/M2

## 2019-10-14 DIAGNOSIS — M47.817 LUMBOSACRAL SPONDYLOSIS WITHOUT MYELOPATHY: Primary | ICD-10-CM

## 2019-10-14 DIAGNOSIS — M54.16 LUMBAR RADICULOPATHY: Primary | ICD-10-CM

## 2019-10-14 DIAGNOSIS — M51.37 DEGENERATION OF LUMBOSACRAL INTERVERTEBRAL DISC: ICD-10-CM

## 2019-10-14 DIAGNOSIS — M48.062 SPINAL STENOSIS OF LUMBAR REGION WITH NEUROGENIC CLAUDICATION: ICD-10-CM

## 2019-10-14 DIAGNOSIS — M47.817 LUMBOSACRAL SPONDYLOSIS WITHOUT MYELOPATHY: ICD-10-CM

## 2019-10-14 PROCEDURE — 97110 THERAPEUTIC EXERCISES: CPT

## 2019-10-14 PROCEDURE — 97112 NEUROMUSCULAR REEDUCATION: CPT

## 2019-10-14 PROCEDURE — 97140 MANUAL THERAPY 1/> REGIONS: CPT

## 2019-10-14 PROCEDURE — 99204 OFFICE O/P NEW MOD 45 MIN: CPT | Performed by: ANESTHESIOLOGY

## 2019-10-14 NOTE — PROGRESS NOTES
Daily Note     Today's date: 10/14/2019  Patient name: Kaylyn Keita  : 1950  MRN: [id number]  Referring provider: Jacinto Blandon DO  Dx:   Encounter Diagnosis     ICD-10-CM    1  Lumbosacral spondylosis without myelopathy M47 817               1 on 1 with PTA and PT       Subjective: Patient went to pain management this AM and will be getting MRI the   Objective: See treatment diary below  FOTO unchanged  Assessment: Tolerated treatment well  Added postural strengthening  Patient exhibited good technique with therapeutic exercises and would benefit from continued PT      Plan: Continue per plan of care  Progress treatment as tolerated  Precautions: L JORDON       Manual  9 30 10/2 10/7 10/10 10/14        Ant glides hip   MJ MJ R  PT SH MJ        Side glides    MJ L-S  MJ                                                   Exercise Diary   10/2 10/7 10/10 10/14        LTR   10"x10 10" x 10 10"x10 10" x 10         hipflexor stretch  10"x10 10"x10  10"x10        Table slide for L-S   10"x10 10"x10 10"x10 10"x10        multifitus press   GTB x15 ea  GTB x15 BTB x15 ea  BTB 2x15        Pelvic tilts post   5" x20 5" x 20 5" x 20 5" x 20        clamshell  GTB 2x10 GTB 2x10 GTB 3x10 GTB 3x10        Prone quad stretch   10" x10 10" x 10  10"x10 10" x 10        DLS abd   10" x 2' 10" x 2' 10" x 2'        Mini squats   1 x 15 1 x 15 2 x 15        pball walk out    1' 1' 1'        DLS alt knee ext    10 ea  10 ea          TB LPD     Blue 2x15        TB Rows     Blue 2x15                                                                                                       Modalities

## 2019-10-14 NOTE — PROGRESS NOTES
Assessment  1  Lumbar radiculopathy    2  Lumbosacral spondylosis without myelopathy    3  Degeneration of lumbosacral intervertebral disc    4  Spinal stenosis of lumbar region with neurogenic claudication        Plan  61-year-old male with a history of 2 previous lumbar surgeries including most recently an L4-5 ALIF in 2015 presenting for initial consultation regarding a long-standing history of lumbosacral back pain with worsening radiculopathy in the S1 distribution of bilateral lower extremities after short distances of ambulation  He denies any recent trauma or inciting event  Most recent x-ray of the lumbar spine reveals degenerative disc disease and spondylosis with stable ALIF at L4-5  The last MRI of the patient's lumbar spine was in 2015 revealing multilevel degenerative disc disease and spondylosis with varying degrees of central and foraminal stenosis from L1-2 to L4-5, most severe at L4-5  This was a preoperative MRI  The patient is currently taking gabapentin 300 mg in the morning and 900 mg at bedtime and Cymbalta 60 mg daily which is prescribed by psychiatry for mood  He also takes diclofenac 50 mg b i d  P r n  He does get minimal to mild relief of his low back and lower extremity symptoms with these medications  He is currently engaged in physical therapy with mild transient relief  The patient's low back and lower extremity symptoms are suspicious for lumbar radiculopathy secondary to stenosis with neurogenic claudication  Patient has hyporeflexic left patellar and bilateral Achilles reflexes  Strength of his lower extremities are preserved  1  I will order an MRI of the lumbar spine with and without contrast  2  I advised the patient he may consider speaking with Psychiatry about potentially increasing either Gabapentin or Cymbalta for his neuropathic complaints  I will leave this to the discretion of Psychiatry    3  The patient will continue with physical therapy as I feel he may benefit from [facility] based exercises  4  The patient will continue with diclofenac as prescribed  5  I will follow up the patient in 2 months and will call him with results of MRI once received and our recommendations based upon those results       My impressions and treatment recommendations were discussed in detail with the patient who verbalized understanding and had no further questions  Discharge instructions were provided  I personally saw and examined the patient and I agree with the above discussed plan of care  No orders of the defined types were placed in this encounter  No orders of the defined types were placed in this encounter  History of Present Illness    Harjit Mast is a 77 y o  male  with a history of 2 previous lumbar surgeries including most recently an L4-5 ALIF in 2015 presenting for initial consultation regarding a long-standing history of lumbosacral back pain with worsening pain, numbness, paresthesias, and weakness of bilateral lower extremities predominantly in the posterior aspect of the legs after short distances of ambulation  He denies any recent trauma or inciting event  He denies any bladder or bowel incontinence or saddle anesthesia  The patient does have to self cath as the patient has a history of TURP and urethral strictures  Most recent x-ray of the lumbar spine reveals degenerative disc disease and spondylosis with stable ALIF at L4-5  The last MRI of the patient's lumbar spine was in 2015 revealing multilevel degenerative disc disease and spondylosis with varying degrees of central and foraminal stenosis from L1-2 to L4-5, most severe at L4-5  This was a preoperative MRI  The patient is currently taking gabapentin 300 mg in the morning and 900 mg at bedtime and Cymbalta 60 mg daily which is prescribed by psychiatry for mood  He also takes diclofenac 50 mg b i d  P r n    He does get minimal to mild relief of his low back and lower extremity symptoms with these medications  He is currently engaged in physical therapy with mild transient relief  The patient rates his pain a 6/10 on the pain is nearly constant  The pain is not follow any particular pattern throughout the day  The pain is described as burning, numbness, pins and needles, dull, and aching  The pain is increased with standing, walking, and exercise  The pain is decreased with prior, lying down, leaning forward, sitting, and relaxation  He does get some relief with heat and ice application  I have personally reviewed and/or updated the patient's past medical history, past surgical history, family history, social history, current medications, allergies, and vital signs today  Other than as stated above, the patient denies any interval changes in medications, medical condition, mental condition, symptoms, or allergies since the last office visit  Review of Systems   Constitutional: Negative for fever and unexpected weight change  HENT: Negative for trouble swallowing  Eyes: Negative for visual disturbance  Respiratory: Positive for cough and wheezing  Negative for shortness of breath  Cardiovascular: Negative for chest pain and palpitations  Gastrointestinal: Negative for constipation, diarrhea, nausea and vomiting  Endocrine: Negative for cold intolerance, heat intolerance and polydipsia  Genitourinary: Negative for difficulty urinating and frequency  Musculoskeletal: Negative for arthralgias, gait problem, joint swelling and myalgias  Skin: Negative for rash  Neurological: Positive for numbness  Negative for dizziness, seizures, syncope, weakness and headaches  Hematological: Does not bruise/bleed easily  Psychiatric/Behavioral: Negative for dysphoric mood  Anxiety   All other systems reviewed and are negative        Patient Active Problem List   Diagnosis    Allergic rhinitis    Enlarged prostate with lower urinary tract symptoms (LUTS)  Chronic bronchitis (HCC)    Depression, controlled    Hyperlipidemia    Hypertension    Impaired fasting glucose    Lung nodule    Male erectile dysfunction    Multifocal atrial tachycardia (HCC)    Obesity (BMI 30-39  9)    Osteoarthritis of knee    Osteoarthritis of hip    Seasonal allergies    Spinal stenosis    Supraventricular tachycardia (HCC)    Anxiety and depression    Arthritis of left hip    COPD (chronic obstructive pulmonary disease) (HCC)    Elevated prostate specific antigen (PSA)    Lumbosacral spondylosis without myelopathy    Incomplete emptying of bladder    Retention of urine    Screening for prostate cancer    Thoracic or lumbosacral neuritis or radiculitis    Urethral stricture    Spondylolisthesis, acquired    Degeneration of lumbosacral intervertebral disc    Full thickness rotator cuff tear    Knee pain    Localized, primary osteoarthritis    Primary localized osteoarthritis of pelvic region and thigh    Low back pain    Lumbosacral neuritis    Pain in limb    Pseudoclaudication syndrome    Shoulder pain    Cervical stenosis of spinal canal    Subclinical hypothyroidism       Past Medical History:   Diagnosis Date    Bronchitis, chronic obstructive, with exacerbation ([address] )     Last Assessed: 5/17/2013    COPD with acute exacerbation ([facility] [address] )     Last Assessed: 4/13/2015    Elevated PSA     Bx neg 2012; Last Assessed: 11/23/2012       Past Surgical History:   Procedure Laterality Date    BACK SURGERY  07/25/2012    decompression of spinal stenosis from lower thoracic through the lumbar spine    COLONOSCOPY  10/2010    neg   recheck in 5 years    KNEE ARTHROSCOPY  01/2013    PROSTATE BIOPSY  2012    for elevated PSA of about 7; neg    TRANSURETHRAL RESECTION OF PROSTATE  06/27/2013       Family History   Problem Relation Age of Onset    Breast cancer Mother     Pancreatitis Father     Diabetes Maternal Grandmother     Heart attack Maternal Grandfather     Heart attack Paternal Grandmother     Diabetes Paternal Grandmother     Diabetes Paternal Grandfather        Social History     Occupational History    Not on file   Tobacco Use    Smoking status: Former Smoker     Packs/day:  00     Years:      Pack years:      Types: Cigarettes     Start date:      Last attempt to quit:      Years since quittin 7    Smokeless tobacco: Never Used   Substance and Sexual Activity    Alcohol use: No     Comment: Stopped drinking    Drug use: No    Sexual activity: Not on file       Current Outpatient Medications on File Prior to Visit   Medication Sig    acetaminophen (TYLENOL) 500 mg tablet Take 500 mg by mouth every 6 (six) hours as needed    aspirin 81 mg chewable tablet Chew 1 tablet daily    CIALIS 5 MG tablet TAKE 1 TABLET (5 MG TOTAL) BY MOUTH DAILY FOR 90 DAYS    clonazePAM (KlonoPIN) 0 5 mg tablet Take 1 tablet (0 5 mg total) by mouth daily at bedtime for 30 days    dextromethorphan-guaifenesin (MUCINEX DM)  MG per 12 hr tablet Take 1 tablet by mouth every 12 (twelve) hours    diclofenac sodium (VOLTAREN) 50 mg EC tablet TAKE 1 TABLET (50 MG TOTAL) BY MOUTH 2 (TWO) TIMES A DAY    DULoxetine (CYMBALTA) 60 mg delayed release capsule Take 1 capsule by mouth daily    fluticasone (FLONASE) 50 mcg/act nasal spray fluticasone propionate 50 mcg/actuation nasal spray,suspension    gabapentin (NEURONTIN) 300 mg capsule Take 1 capsule by mouth 4 (four) times a day    loratadine (CLARITIN) 10 mg tablet Take 1 tablet by mouth daily    metoprolol tartrate (LOPRESSOR) 25 mg tablet TAKE 1 TABLET (25 MG TOTAL) BY MOUTH EVERY 12 (TWELVE) HOURS    metoprolol tartrate (LOPRESSOR) 50 mg tablet Take 1 tablet (50 mg total) by mouth 2 (two) times a day    Multiple Vitamin (MULTI-VITAMIN DAILY) TABS Take 1 tablet by mouth daily    NIFEdipine (PROCARDIA XL) 90 mg 24 hr tablet Take 1 tablet (90 mg total) by mouth daily for 357 days  rosuvastatin (CRESTOR) 20 MG tablet TAKE 2 TABLETS ON MON WED FRI  1 TAB ON TUES  THURS SAT  AND SUNDAY     No current facility-administered medications on file prior to visit  No Known Allergies    Physical Exam    There were no vitals taken for this visit  Constitutional: normal, well developed, well nourished, alert, in no distress and non-toxic and no overt pain behavior  Eyes: anicteric  HEENT: grossly intact  Neck: supple, symmetric, trachea midline and no masses   Pulmonary:even and unlabored  Cardiovascular:No edema or pitting edema present  Skin:Normal without rashes or lesions and well hydrated  Psychiatric:Mood and affect appropriate  Neurologic:Cranial Nerves II-XII grossly intact  Musculoskeletal:normal gait  Well-healed midline vertical lumbar incision  Bilateral lumbar paraspinal musculature tender to palpation from L3-L5 and ropy in texture  Bilateral SI joints nontender to palpation  Left patellar reflex 1/4  Right patellar reflex 2/4  Bilateral Achilles reflexes 1/4  No clonus was noted bilaterally  Bilateral lower extremity strength 5/5 in all muscle groups  Sensation intact to light touch in L3 thru S1 dermatomes bilaterally  Negative straight leg raise bilaterally  Negative William's test bilaterally  Imaging        PACS Images      Show images for XR spine lumbar minimum 4 views non injury   Study Result     LUMBAR SPINE     INDICATION:   M48 061: Spinal stenosis, lumbar region without neurogenic claudication    Lower back pain, worsening, radiating into bilateral buttocks      COMPARISON:  None     VIEWS:  XR SPINE LUMBAR MINIMUM 4 VIEWS NON INJURY        FINDINGS:     There is no evidence of acute fracture or destructive osseous lesion      Status post anterior fusion of L5-S1 with intact hardware      Alignment is unremarkable      Severe degenerative disc space narrowing at L1-L2 and L2-L3      Mild degenerative disc space narrowing throughout the rest of the lumbar spine      Anterior osteophyte formations all levels      The pedicles appear intact      Soft tissues are unremarkable      IMPRESSION:     No acute osseous abnormality        Degenerative changes as described         Workstation performed: [id number]

## 2019-10-14 NOTE — TELEPHONE ENCOUNTER
Left message letting pt know had to cancel appt and reschedule appt it is now  changed   scheduled for a  follow up with Rios 11/1/19 @ 8:45AM

## 2019-10-15 DIAGNOSIS — F32.A ANXIETY AND DEPRESSION: ICD-10-CM

## 2019-10-15 DIAGNOSIS — F41.9 ANXIETY AND DEPRESSION: ICD-10-CM

## 2019-10-15 NOTE — TELEPHONE ENCOUNTER
Patient is requesting a refill of clonazePAM (KlonoPIN) 0 5 mg tablet, take 1 daily, 30-day supply w/1 refill to [facility], [employer]  Patient can be contacted at [phone] with any questions

## 2019-10-17 ENCOUNTER — APPOINTMENT (OUTPATIENT)
Dept: PHYSICAL THERAPY | Facility: OTHER | Age: 69
End: 2019-10-17
Payer: MEDICARE

## 2019-10-17 RX ORDER — CLONAZEPAM 0.5 MG/1
0.5 TABLET ORAL
Qty: 30 TABLET | Refills: 1 | Status: SHIPPED | OUTPATIENT
Start: 2019-10-17 | End: 2019-12-12 | Stop reason: SDUPTHER

## 2019-10-17 NOTE — TELEPHONE ENCOUNTER
PDMP Checked,  Last filled: 09/17/2019  Medication: CLONAZEPAM 0 5 MG TABLET   Quantity: #30 per 30 days  Prescriber: AWILDA SAMSON

## 2019-10-21 ENCOUNTER — OFFICE VISIT (OUTPATIENT)
Dept: PHYSICAL THERAPY | Facility: OTHER | Age: 69
End: 2019-10-21
Payer: MEDICARE

## 2019-10-21 DIAGNOSIS — M47.817 LUMBOSACRAL SPONDYLOSIS WITHOUT MYELOPATHY: Primary | ICD-10-CM

## 2019-10-21 DIAGNOSIS — M51.37 DEGENERATION OF LUMBOSACRAL INTERVERTEBRAL DISC: ICD-10-CM

## 2019-10-21 DIAGNOSIS — M43.10 SPONDYLOLISTHESIS, ACQUIRED: ICD-10-CM

## 2019-10-21 PROCEDURE — 97110 THERAPEUTIC EXERCISES: CPT | Performed by: PHYSICAL THERAPIST

## 2019-10-21 PROCEDURE — 97112 NEUROMUSCULAR REEDUCATION: CPT

## 2019-10-21 PROCEDURE — 97140 MANUAL THERAPY 1/> REGIONS: CPT | Performed by: PHYSICAL THERAPIST

## 2019-10-21 NOTE — PROGRESS NOTES
Daily Note     Today's date: 10/21/2019  Patient name: Ebonie Nick  : 1950  MRN: [id number]  Referring provider: Galen Cranker, DO  Dx:   Encounter Diagnosis     ICD-10-CM    1  Lumbosacral spondylosis without myelopathy M47 817    2  Spondylolisthesis, acquired M43 10    3  Degeneration of lumbosacral intervertebral disc M51 37               1 on 1 with PTA and PT       Subjective: Patient is tolerating PT very well  No increased pain with stregtheing exercises  Discussed D/C from PT this weds  We will updated HEP  Primary goals have been met of instructing on proper exercise tech and clairfiing if home exercise program was safe          Objective: See treatment diary below  Assessment: Tolerated treatment well  Added postural strengthening  Patient exhibited good technique with therapeutic exercises and would benefit from continued PT      Plan: Continue per plan of care  Progress treatment as tolerated  Precautions: L JORDON       Manual  9 30 10/2 10/7 10/10 10/21        Ant glides hip   MJ MJ R  PT SH MJ R only         Side glides    MJ L-S  MJ                                                   Exercise Diary   10/2 10/7 10/10 10/21        LTR   10"x10 10" x 10 10"x10 10" x 10         hipflexor stretch  10"x10 10"x10  10"x10        Table slide for L-S   10"x10 10"x10 10"x10 10"x10        multifitus press   GTB x15 ea  GTB x15 BTB x15 ea  BTB  20 ea         Pelvic tilts post   5" x20 5" x 20 5" x 20 5" x 2min with pball         clamshell  GTB 2x10 GTB 2x10 GTB 3x10 bTB 3x10        Prone quad stretch   10" x10 10" x 10  10"x10 10" x 10        DLS abd   10" x 2' 10" x 2' 10" x 2'        Mini squats   1 x 15 1 x 15 2 x 15        pball walk out    1' 1' 1'        DLS alt knee ext    10 ea  10 ea          TB LPD     Blue 2x15        TB Rows     Blue 2x15        Bridges      15 x        SLR      otb std 3 way 10ea Modalities

## 2019-10-23 ENCOUNTER — OFFICE VISIT (OUTPATIENT)
Dept: PHYSICAL THERAPY | Facility: OTHER | Age: 69
End: 2019-10-23
Payer: MEDICARE

## 2019-10-23 DIAGNOSIS — M43.10 SPONDYLOLISTHESIS, ACQUIRED: ICD-10-CM

## 2019-10-23 DIAGNOSIS — M51.37 DEGENERATION OF LUMBOSACRAL INTERVERTEBRAL DISC: ICD-10-CM

## 2019-10-23 DIAGNOSIS — M47.817 LUMBOSACRAL SPONDYLOSIS WITHOUT MYELOPATHY: Primary | ICD-10-CM

## 2019-10-23 PROCEDURE — 97140 MANUAL THERAPY 1/> REGIONS: CPT | Performed by: PHYSICAL THERAPIST

## 2019-10-23 PROCEDURE — 97112 NEUROMUSCULAR REEDUCATION: CPT | Performed by: PEDIATRICS

## 2019-10-23 NOTE — PROGRESS NOTES
Daily Note     Today's date: 10/23/2019  Patient name: Libia Nichole  : 1950  MRN: [id number]  Referring provider: Sherwin Michele DO  Dx:   Encounter Diagnosis     ICD-10-CM    1  Lumbosacral spondylosis without myelopathy M47 817    2  Spondylolisthesis, acquired M43 10    3  Degeneration of lumbosacral intervertebral disc M51 37               IEP [id number]  1:1 with PTA [id number]      Subjective: Patient is tolerating PT very well  Denies pain at this time  No increased pain with stregtheing exercises  Discussed D/C from PT this weds  We will updated HEP  Primary goals have been met of instructing on proper exercise tech and clairfiing if home exercise program was safe          Objective: See treatment diary below  Assessment: Tolerated treatment well Able to increase reps without increased pain  Patient exhibited good technique with therapeutic exercises and would benefit from continued PT      Plan: Continue per plan of care  Progress treatment as tolerated  Precautions: L JORDON       Manual  9 30 10/2 10/7 10/10 10/21 10/23       Ant glides hip   MJ MJ R  PT [facility] only  [facility]       Side glides     L-S  MJ [facility]                                                  Exercise Diary   10/2 10/7 10/10 10/21 10/23       LTR   10"x10 10" x 10 10"x10 10" x 10  10" x 10       hipflexor stretch  10"x10 10"x10  10"x10 10" x 10       Table slide for L-S   10"x10 10"x10 10"x10 10"x10 10" x 10       multifitus press   GTB x15 ea  GTB x15 BTB x15 ea  BTB  20 ea  btb  2 x 15       Pelvic tilts post   5" x20 5" x 20 5" x 20 5" x 2min with pball  5" x 2 min w/ pball       clamshell  GTB 2x10 GTB 2x10 GTB 3x10 bTB 3x10 btb  3 x 10       Prone quad stretch   10" x10 10" x 10  10"x10 10" x 10 10" x 10       DLS abd   10" x 2' 10" x 2' 10" x 2' np       Mini squats   1 x 15 1 x 15 2 x 15 2 x 15       pball walk out    1' 1' 1' 1'       DLS alt knee ext    10 ea  10 ea   np       TB LPD     Blue 2x15 Blue  2 x 15       TB Rows     Blue 2x15 Blue  2 x 15       Bridges      15 x 2 x 10       SLR      otb std 3 way 10ea  otb  Std 3 way 10 ea                                                                            Modalities

## 2019-10-25 ENCOUNTER — HOSPITAL ENCOUNTER (OUTPATIENT)
Dept: RADIOLOGY | Facility: HOSPITAL | Age: 69
Discharge: HOME | End: 2019-10-25
Attending: ANESTHESIOLOGY
Payer: MEDICARE

## 2019-10-25 DIAGNOSIS — M48.062 SPINAL STENOSIS OF LUMBAR REGION WITH NEUROGENIC CLAUDICATION: ICD-10-CM

## 2019-10-25 DIAGNOSIS — M54.16 LUMBAR RADICULOPATHY: ICD-10-CM

## 2019-10-25 PROCEDURE — 72158 MRI LUMBAR SPINE W/O & W/DYE: CPT

## 2019-10-25 PROCEDURE — A9585 GADOBUTROL INJECTION: HCPCS | Performed by: ANESTHESIOLOGY

## 2019-10-25 RX ADMIN — GADOBUTROL 10 ML: 604.72 INJECTION INTRAVENOUS at 18:03

## 2019-10-28 ENCOUNTER — TELEPHONE (OUTPATIENT)
Dept: PAIN MEDICINE | Facility: MEDICAL CENTER | Age: 69
End: 2019-10-28

## 2019-10-28 NOTE — TELEPHONE ENCOUNTER
Pt called stating that he has the MRI done on Friday and would like to now when the results are in     Pt can be reached at [phone]

## 2019-10-29 NOTE — TELEPHONE ENCOUNTER
Please notify the patient the MRI of his lumbar spine showed some slight shifting of the L1 vertebrae on top of L2, the L2 vertebrae on top of L3, and L4 vertebrae on top of L5 without any evidence of instability  He has degenerative disc disease and arthritis from L1-2 to L4-5 with stable fusion anteriorly at L5-S1  He has mild central and moderate bilateral foraminal stenosis (narrowing where nerves exist) at L1-2, L2-3, L3-4, and L4-5  There is moderate bilateral foraminal stenosis at L5-S1    I can offer the patient bilateral L5 TFESI

## 2019-10-30 NOTE — TELEPHONE ENCOUNTER
JAE patient, reviewed MRI results  Patient verbalizes understanding  Patient is scheduled with DG on 11/1/19 for follow up office visit  Patient would like to proceed with blilateral L5 TFESI  Please place order    TY

## 2019-10-31 NOTE — TELEPHONE ENCOUNTER
Called pt, scheduled B/L L5 TFESI on Wed 11/13/19 arr at 12:45  Pt denies blood thinners, ASA OK  Pt has not had flu shot, he is aware to avoid having it 1 week before/ 1 week after his injection appt  Went over pre procedure instructions, NPO 1 hr prior, if sick or on abx needs to call to rs, wear loose, comf clothing- no buttons/zippers, needs   Pt verbalized understanding

## 2019-11-01 ENCOUNTER — OFFICE VISIT (OUTPATIENT)
Dept: PAIN MEDICINE | Facility: CLINIC | Age: 69
End: 2019-11-01
Payer: MEDICARE

## 2019-11-01 VITALS
HEART RATE: 87 BPM | WEIGHT: 230 LBS | DIASTOLIC BLOOD PRESSURE: 73 MMHG | BODY MASS INDEX: 34.86 KG/M2 | HEIGHT: 68 IN | SYSTOLIC BLOOD PRESSURE: 117 MMHG

## 2019-11-01 DIAGNOSIS — G89.4 CHRONIC PAIN SYNDROME: Primary | ICD-10-CM

## 2019-11-01 DIAGNOSIS — M54.16 LUMBAR RADICULOPATHY: ICD-10-CM

## 2019-11-01 DIAGNOSIS — M47.817 LUMBOSACRAL SPONDYLOSIS WITHOUT MYELOPATHY: ICD-10-CM

## 2019-11-01 DIAGNOSIS — M54.50 CHRONIC BILATERAL LOW BACK PAIN WITHOUT SCIATICA: ICD-10-CM

## 2019-11-01 DIAGNOSIS — M48.062 SPINAL STENOSIS OF LUMBAR REGION WITH NEUROGENIC CLAUDICATION: ICD-10-CM

## 2019-11-01 DIAGNOSIS — G89.29 CHRONIC BILATERAL LOW BACK PAIN WITHOUT SCIATICA: ICD-10-CM

## 2019-11-01 DIAGNOSIS — M51.37 DEGENERATION OF LUMBOSACRAL INTERVERTEBRAL DISC: ICD-10-CM

## 2019-11-01 PROCEDURE — 99214 OFFICE O/P EST MOD 30 MIN: CPT | Performed by: NURSE PRACTITIONER

## 2019-11-01 NOTE — PATIENT INSTRUCTIONS
Spinal Cord Stimulator Placement   WHAT YOU NEED TO KNOW:   A spinal cord stimulator (SCS) is a device used to control pain after other treatments have not worked  The SCS delivers a small amount of electrical current to your spinal cord to block pain  SCS placement is surgery that is done in 2 stages  In the first stage, a temporary SCS is placed and left in for about a week  In the second stage, a permanent SCS is placed if the temporary device reduced your pain  You will get a remote control to turn the pulse generator on and off and adjust the pulses  HOW TO PREPARE:   The week before your surgery:   · Write down the correct date, time, and location of your surgery  · Ask your healthcare provider if you will need to stay in the hospital after your surgery  If you will be able to go home after the surgery, arrange for someone to drive you  Do not  drive yourself home  · Ask your caregiver if you need to stop using aspirin or any other prescribed or over-the-counter medicine before your procedure or surgery  · Bring your medicine bottles or a list of your medicines when you see your caregiver  Tell your caregiver if you are allergic to any medicine  Tell your caregiver if you use any herbs, food supplements, or over-the-counter medicine  · Tell your healthcare provider if you have a blood disorder or have ever had a bleeding problem  · Tell your healthcare provider if you are taking any medicine that may cause you to bleed more, such as blood thinners  · You may need blood or urine tests to check for infection and make sure your body is okay for surgery  You may also need an MRI to check your spine before your healthcare provider places the SCS  Ask your healthcare provider for more information about these and other tests you may need  Write down the date, time, and location of each test   The night before your surgery:  Ask caregivers about directions for eating and drinking    The day of your surgery:   · Ask your caregiver before you take any medicine on the day of your surgery  Bring a list of all the medicines you take, or your pill bottles, with you to the hospital  Caregivers will check that your medicines will not interact poorly with the medicine you need for surgery  · You or a close family member will be asked to sign a legal document called a consent form  It gives caregivers permission to do the procedure or surgery  It also explains the problems that may happen, and your choices  Make sure all your questions are answered before you sign this form  · Caregivers may insert an intravenous tube (IV) into your vein  A vein in the arm is usually chosen  Through the IV tube, you may be given liquids and medicine  · An anesthesiologist will talk to you before your surgery  You may need medicine to keep you asleep or numb an area of your body during surgery  Tell caregivers if you or anyone in your family has had a problem with anesthesia in the past   WHAT WILL HAPPEN:   What will happen:   · Temporary lead placement:  You may get general anesthesia to keep you asleep during the surgery or local anesthesia to numb the surgery area  Your surgeon will place the temporary leads along your spine  An x-ray will help him put the leads in the right place  The leads will be connected to wires and attached to a pulse generator outside your body  · Permanent lead placement:  You will get general anesthesia to keep you asleep during the surgery  Your surgeon will remove the temporary lead and replace it with a new, permanent lead through an incision or needle in your back  He will tunnel the wires under your skin  The wires will be connected to the leads on your spine  After your surgery: You will be taken to a room to rest until you are fully awake  Healthcare providers will monitor you closely for any problems  Do not get out of bed until your healthcare provider says it is okay    CONTACT YOUR HEALTHCARE PROVIDER IF:   · You cannot make it to your surgery  · You have a fever  · You get a cold or the flu  · You have questions or concerns about your surgery  SEEK CARE IMMEDIATELY IF:   · Your pain gets worse  RISKS:   The spinal cord stimulator may not work correctly and you may need to have it replaced  You may develop a headache, or your pain may get worse  Surgery may cause you to bleed or to leak spinal fluid  After surgery, you may get an infection at the incision site  You may also get a serious infection, such as an abscess near where the leads are placed  This may lead to paralysis or become life-threatening  Without treatment, your pain may get worse  CARE AGREEMENT:   You have the right to help plan your care  Learn about your health condition and how it may be treated  Discuss treatment options with your caregivers to decide what care you want to receive  You always have the right to refuse treatment  © 2017 [employer] Information is for End User's use only and may not be sold, redistributed or otherwise used for commercial purposes  All illustrations and images included in CareNotes® are the copyrighted property of [employer]  or [employer]  The above information is an  only  It is not intended as medical advice for individual conditions or treatments  Talk to your doctor, nurse or pharmacist before following any medical regimen to see if it is safe and effective for you

## 2019-11-01 NOTE — PROGRESS NOTES
Assessment:  1  Chronic pain syndrome    2  Chronic bilateral low back pain without sciatica    3  Lumbar radiculopathy    4  Lumbosacral spondylosis without myelopathy    5  Degeneration of lumbosacral intervertebral disc    6  Spinal stenosis of lumbar region with neurogenic claudication        Plan:  While the patient was in the office today, I did have a thorough conversation with the patient regarding his chronic pain syndrome, symptoms, and treatment plan  I did review the results of the most recent MRI of the lumbosacral spine and explained that it does show moderate to significant degenerative changes and postoperative changes with moderate to severe stenosis  I explained to the patient at this point I feel would be in his best interest to proceed with the bilateral L5 transforaminal epidural steroid injection as scheduled later on this month with Dr Candance Bruckner and see how he does  However, I also explained the patient that depending on the results of the injection, it may need to be repeated for him to know more moderate and stable relief  The patient was agreeable and verbalized an understanding  We also had a very thorough conversation about his future treatment plan options as the patient has already had 2 spine surgeries with Dr Sergey Alvarenga in the past with several years of relief after each surgery, he reports that at his last encounter with Dr Sergey Alvarenga, he was basically told that there was nothing else that they could do for him  The patient reports that in general he is not scared to consider another surgery but understands that as time goes on because he has already had 2 surgeries surgeons may become very hesitant to even consider surgery in the future  He reports that this is why he followed up with Dr Hemphill  I explained to the patient that another option to consider in the future might be spinal cord stimulation      I had a very thorough conversation with the patient regarding the spinal cord stimulator trial and possible permanent implantation  I explained that there is pre-authorization process, including the need to proceed with an education class and psychological evaluation, both of which must be completed, before we can consider proceeding with the spinal cord stimulator trial pre-authorization process with the insurance company  I spent a significant amount of time reviewing the basic theory of spinal cord stimulation as well as the hope that it would provide at least moderate improvement and improve the patient's pain and overall functions with regards to perfomring activities of daily living and leisure activities with as little pain as possible  I feel that with regards to the stimulator process, I answered the patient's questions to the best of my abilities and until the patient was thoroughly satisfied  While the patient was in the office today, a stimulator booklet was sent home for the patient and family to review  I encouraged that before any decisions regarding the stimulator were made that they also proceed with the stimulator education class and proceed from there with regards to proceeding with the psychological evaluation  I advised the patient that if they are interested in proceeding with the spinal cord stimulator trial they need to then proceed with the psychological evaluation so we can try to expedite the insurance pre-authorization process, which can take at least 4-6 weeks to obtain approval  The patient verbalized an understanding  I also had a thorough conversation with the patient and explained that overall the expectations of the spinal cord stimulator are not to cure the pain, but rather to at least provide moderate/stable relief, allowing the patient to be more active and functional on a daily basis  I stressed that the goal of the stimulator and our treatment is NOT to proved 100% relief of their pain, but, hopefully at least 50% relief or more   I also explained that if they have a successful trial and eventually proceed with a permanent implant, that utilizing the full potential of the stimulator can require continuous re-programming and education and I explained that the stimulator is/will be an ongoing and evolving learning process for the patient  The patient was agreeable and verbalized an understanding  With regards to the spinal cord stimulator, for now, the patient is going to take the booklet home for the [employer] stimulator and just read through it and see if it is something he would even be interested in, but was very thankful for all the information and is definitely willing to look into the stimulator as a possible future treatment option, especially if the injections are not helpful and he does not have a good surgical option  I discussed with the patient at this point he should proceed with his follow-up with Dr Denisse Washington and is injection as scheduled and I would like him to schedule follow-up office visit at least 3-4 weeks after his injection with Dr Martell Moon for re-evaluation and to discuss his treatment plan  The patient was agreeable and verbalized an understanding  I attest that I have spent at least 25 minutes face to face with the patient and that at least 50% of the time was educating and/or discussing the patient's symptoms and treatment plan options until the patient was satisfied with the plan  History of Present Illness: The patient is a 77 y o  male last seen on 10/14/19 who presents for a follow up office visit in regards to chronic pain syndrome secondary to lumbar spondylosis and stenosis with radiculopathy    The patient currently reports that since his last office visit his pain symptoms have remained relatively the same and he does proceed today to discuss results of his most recent MRI of the lumbosacral spine, but is also scheduled for a bilateral L5 transforaminal epidural steroid injection on November 13, 2019 with Dr Gabriel Moreland  The patient also reports he has an upcoming office visit with Dr Nabila Otto to regroup and discuss treatment plan options  I have personally reviewed and/or updated the patient's past medical history, past surgical history, family history, social history, current medications, allergies, and vital signs today  Review of Systems:    Review of Systems   Respiratory: Negative for shortness of breath  Cardiovascular: Negative for chest pain  Gastrointestinal: Negative for constipation, diarrhea, nausea and vomiting  Musculoskeletal: Positive for gait problem  Negative for arthralgias, joint swelling and myalgias  Skin: Negative for rash  Neurological: Negative for dizziness, seizures and weakness  All other systems reviewed and are negative  Past Medical History:   Diagnosis Date    Bronchitis, chronic obstructive, with exacerbation ([facility] [address] )     Last Assessed: 5/17/2013    COPD with acute exacerbation ([facility] )     Last Assessed: 4/13/2015    Elevated PSA     Bx neg 2012; Last Assessed: 11/23/2012       Past Surgical History:   Procedure Laterality Date    BACK SURGERY  07/25/2012    decompression of spinal stenosis from lower thoracic through the lumbar spine    COLONOSCOPY  10/2010    neg   recheck in 5 years    KNEE ARTHROSCOPY  01/2013    PROSTATE BIOPSY  2012    for elevated PSA of about 7; neg    TRANSURETHRAL RESECTION OF PROSTATE  06/27/2013       Family History   Problem Relation Age of Onset    Breast cancer Mother     Pancreatitis Father     Diabetes Maternal Grandmother     Heart attack Maternal Grandfather     Heart attack Paternal Grandmother     Diabetes Paternal Grandmother     Diabetes Paternal Grandfather        Social History     Occupational History    Not on file   Tobacco Use    Smoking status: Former Smoker     Packs/day: 1 00     Years: 23 00     Pack years: 23 00     Types: Cigarettes     Start date: 1977     Last attempt to quit: 2000     Years since quittin 8    Smokeless tobacco: Never Used   Substance and Sexual Activity    Alcohol use: No     Comment: Stopped drinking    Drug use: No    Sexual activity: Not on file         Current Outpatient Medications:     acetaminophen (TYLENOL) 500 mg tablet, Take 500 mg by mouth every 6 (six) hours as needed, Disp: , Rfl:     aspirin 81 mg chewable tablet, Chew 1 tablet daily, Disp: , Rfl:     CIALIS 5 MG tablet, TAKE 1 TABLET (5 MG TOTAL) BY MOUTH DAILY FOR 90 DAYS, Disp: 90 tablet, Rfl: 3    clonazePAM (KlonoPIN) 0 5 mg tablet, Take 1 tablet (0 5 mg total) by mouth daily at bedtime, Disp: 30 tablet, Rfl: 1    dextromethorphan-guaifenesin (MUCINEX DM)  MG per 12 hr tablet, Take 1 tablet by mouth every 12 (twelve) hours, Disp: , Rfl:     diclofenac sodium (VOLTAREN) 50 mg EC tablet, TAKE 1 TABLET (50 MG TOTAL) BY MOUTH 2 (TWO) TIMES A DAY, Disp: 180 tablet, Rfl: 1    DULoxetine (CYMBALTA) 60 mg delayed release capsule, Take 1 capsule by mouth daily, Disp: , Rfl:     fluticasone (FLONASE) 50 mcg/act nasal spray, fluticasone propionate 50 mcg/actuation nasal spray,suspension, Disp: , Rfl:     gabapentin (NEURONTIN) 300 mg capsule, Take 1 capsule by mouth 4 (four) times a day, Disp: , Rfl:     loratadine (CLARITIN) 10 mg tablet, Take 1 tablet by mouth daily, Disp: , Rfl:     metoprolol tartrate (LOPRESSOR) 25 mg tablet, TAKE 1 TABLET (25 MG TOTAL) BY MOUTH EVERY 12 (TWELVE) HOURS, Disp: 180 tablet, Rfl: 2    metoprolol tartrate (LOPRESSOR) 50 mg tablet, Take 1 tablet (50 mg total) by mouth 2 (two) times a day, Disp: 180 tablet, Rfl: 3    Multiple Vitamin (MULTI-VITAMIN DAILY) TABS, Take 1 tablet by mouth daily, Disp: , Rfl:     NIFEdipine (PROCARDIA XL) 90 mg 24 hr tablet, Take 1 tablet (90 mg total) by mouth daily for 357 days, Disp: 90 tablet, Rfl: 3    rosuvastatin (CRESTOR) 20 MG tablet, TAKE 2 TABLETS ON   1 TAB ON TUES  THURS SAT   AND , Disp: 120 tablet, Rfl: 1    No Known Allergies    Physical Exam:    /73   Pulse 87   Ht 5' 8" (1 727 m)   Wt 104 kg (230 lb)   BMI 34 97 kg/m²     Constitutional:normal, well developed, well nourished, alert, in no distress and non-toxic and no overt pain behavior  Eyes:anicteric  HEENT:grossly intact  Neck:supple, symmetric, trachea midline and no masses   Pulmonary:even and unlabored  Cardiovascular:No edema or pitting edema present  Skin:Normal without rashes or lesions and well hydrated  Psychiatric:Mood and affect appropriate  Neurologic:Cranial Nerves II-XII grossly intact  Musculoskeletal:The patient's gait was slightly antalgic, but steady without the use of any assistive devices  Imaging  No orders to display         No orders of the defined types were placed in this encounter

## 2019-11-04 ENCOUNTER — OFFICE VISIT (OUTPATIENT)
Dept: OBGYN CLINIC | Facility: CLINIC | Age: 69
End: 2019-11-04
Payer: MEDICARE

## 2019-11-04 VITALS
SYSTOLIC BLOOD PRESSURE: 95 MMHG | HEIGHT: 68 IN | DIASTOLIC BLOOD PRESSURE: 54 MMHG | HEART RATE: 67 BPM | WEIGHT: 229.28 LBS | BODY MASS INDEX: 34.75 KG/M2

## 2019-11-04 DIAGNOSIS — M54.17 LUMBOSACRAL NEURITIS: Primary | ICD-10-CM

## 2019-11-04 DIAGNOSIS — M48.062 SPINAL STENOSIS OF LUMBAR REGION WITH NEUROGENIC CLAUDICATION: ICD-10-CM

## 2019-11-04 DIAGNOSIS — M47.817 LUMBOSACRAL SPONDYLOSIS WITHOUT MYELOPATHY: ICD-10-CM

## 2019-11-04 DIAGNOSIS — M54.16 LUMBAR RADICULOPATHY: ICD-10-CM

## 2019-11-04 DIAGNOSIS — M51.37 DEGENERATION OF LUMBOSACRAL INTERVERTEBRAL DISC: ICD-10-CM

## 2019-11-04 DIAGNOSIS — G89.4 CHRONIC PAIN SYNDROME: ICD-10-CM

## 2019-11-04 DIAGNOSIS — M54.50 CHRONIC BILATERAL LOW BACK PAIN, UNSPECIFIED WHETHER SCIATICA PRESENT: ICD-10-CM

## 2019-11-04 DIAGNOSIS — M43.10 SPONDYLOLISTHESIS, ACQUIRED: ICD-10-CM

## 2019-11-04 DIAGNOSIS — G89.29 CHRONIC BILATERAL LOW BACK PAIN, UNSPECIFIED WHETHER SCIATICA PRESENT: ICD-10-CM

## 2019-11-04 PROCEDURE — 99213 OFFICE O/P EST LOW 20 MIN: CPT | Performed by: PHYSICAL MEDICINE & REHABILITATION

## 2019-11-04 NOTE — PROGRESS NOTES
1  Lumbosacral neuritis     2  Lumbar radiculopathy     3  Lumbosacral spondylosis without myelopathy     4  Spondylolisthesis, acquired     5  Degeneration of lumbosacral intervertebral disc     6  Spinal stenosis of lumbar region with neurogenic claudication     7  Chronic bilateral low back pain, unspecified whether sciatica present     8  Chronic pain syndrome       No orders of the defined types were placed in this encounter  Imaging Studies (I personally reviewed images in PACS and report):  Lumbar spine x-rays dated 9/19/2009  Degenerative disc disease most notable between L1-L2 and L2-L3  Mild degeneration throughout the rest of the lumbar spine  Fusion hardware noted the L5-S1 level  No acute osseous abnormalities  MRI lumbar spine dated 10/25/2019:  Multilevel degenerative spondylosis     Transitional anatomy at the lumbosacral junction     Mild central canal stenosis, grade 1 retrolisthesis, mild to moderate bilateral foraminal stenosis L1-2     Grade 1 retrolisthesis, left laminotomy, moderate central canal and bilateral foraminal stenosis L2-3     Mild to moderate central canal and bilateral foraminal stenosis L3-4     Grade 1 anterolisthesis, mild central canal stenosis, moderate bilateral foraminal stenosis, left laminotomy L4-5     Anterior interbody fusion, moderate bilateral foraminal stenosis L5-S1 "    Impression:  Patient is here in follow up of chronic low back pain secondary to spondylosis/spinal stenosis  The patient has a history of decompression surgery about 8 years ago at an outside facility  He continues to have extension based low back pain with radicular features  Since his last visit, he has been going to physical therapy  He currently is scheduled to have interventional spine procedures without much relief  He is currently on gabapentin 300 mg 4 times daily and on duloxetine 60 mg daily    With the type of pain he is having, we can increase the Cymbalta and he will talk to his psychiatrist about this  We also discussed that spinal cord stimulator is also an option depending on how the injections go for him  I will see him back in about 6 weeks to see where things are at that point  Return in about 6 weeks (around 12/16/2019)  HPI:  Ebonie Nick is a 77 y o  male  who presents in follow up  Here for   Chief Complaint   Patient presents with    Follow-up       Date of injury:  Chronic low back pain      Onset/Mechanism: Chronic low back pain that he has had "forever"  He had decompression and fusion surgery about 8 years ago ([facility] Dr Cipriano Mclean)  He did well after surgery but feels like things are getting worse over the past couple of months  He is unable to walk a few blocks without the use of a cane  Location: Bilateral low back with radiation down the posterior thigh  Quality: Burning and weakness  Radiation: Down the bilateral posterior thigh  Provocative: Extension  He loves the shopping cart position  Severity: Severe enough to limit him in his ADL's  Associated Symptoms: No associated symptoms  Trajectory of symptoms:  No change since his last visit with me  Review of Systems   Constitutional: Positive for activity change  Negative for fever  HENT: Negative for trouble swallowing  Eyes: Negative for visual disturbance  Respiratory: Negative for shortness of breath  Cardiovascular: Negative for chest pain  Gastrointestinal: Negative for abdominal pain  Denies bowel incontinence  Endocrine: Negative for polydipsia  Genitourinary:        Denies urinary incontinence  Musculoskeletal: Positive for back pain  Skin: Negative for rash  Allergic/Immunologic: Negative for immunocompromised state  Neurological:        Denies saddle anesthesia  Hematological: Does not bruise/bleed easily  Psychiatric/Behavioral: Negative for confusion         Following history reviewed and updated:  Past Medical History:   Diagnosis Date    Bronchitis, chronic obstructive, with exacerbation ([facility] [address] )     Last Assessed: 2013    [facility]     Last Assessed: 2015    Elevated PSA     Bx neg ; Last Assessed: 2012     Past Surgical History:   Procedure Laterality Date    BACK SURGERY  2012    decompression of spinal stenosis from lower thoracic through the lumbar spine    COLONOSCOPY  10/2010    neg  recheck in 5 years    KNEE ARTHROSCOPY  2013    PROSTATE BIOPSY      for elevated PSA of about 7; neg    TRANSURETHRAL RESECTION OF PROSTATE  2013     Social History   Social History     Substance and Sexual Activity   Alcohol Use No    Comment: Stopped drinking     Social History     Substance and Sexual Activity   Drug Use No     Social History     Tobacco Use   Smoking Status Former Smoker    Packs/day:     Years:     Pack years:     Types: Cigarettes    Start date: 0    Last attempt to quit:     Years since quittin 8   Smokeless Tobacco Never Used     Family History   Problem Relation Age of Onset    Breast cancer Mother     Pancreatitis Father     Diabetes Maternal Grandmother     Heart attack Maternal Grandfather     Heart attack Paternal Grandmother     Diabetes Paternal Grandmother     Diabetes Paternal Grandfather      No Known Allergies     Constitutional:  BP 95/54 (BP Location: Right arm, Patient Position: Sitting, Cuff Size: Standard)   Pulse 67   Ht 5' 7 99" (1 727 m)   Wt 104 kg (229 lb 4 5 oz)   BMI 34 87 kg/m²    General: NAD  Eyes: Clear sclerae  ENT: No inflammation, lesion, or mass of lips  No tracheal deviation  Musculoskeletal: As mentioned below  Integumentary: No visible rashes or skin lesions  Pulmonary/Chest: Effort normal  No respiratory distress  Neuro: CN's grossly intact, MEDELLIN  Psych: Normal affect and judgement  Vascular: WWP      Back Exam     Tenderness   The patient is experiencing tenderness in the lumbar  Range of Motion   Extension: abnormal   Flexion: normal     Tests   Straight leg raise right: negative  Straight leg raise left: negative    Other   Sensation: normal  Gait: abnormal   Erythema: no back redness  Scars: absent        Positive Crockett's maneuver bilaterally  Procedures - none for this visit

## 2019-11-13 ENCOUNTER — HOSPITAL ENCOUNTER (OUTPATIENT)
Dept: RADIOLOGY | Facility: CLINIC | Age: 69
Discharge: HOME | End: 2019-11-13
Attending: ANESTHESIOLOGY | Admitting: ANESTHESIOLOGY
Payer: MEDICARE

## 2019-11-13 VITALS
RESPIRATION RATE: 20 BRPM | TEMPERATURE: 99 F | OXYGEN SATURATION: 95 % | HEART RATE: 73 BPM | DIASTOLIC BLOOD PRESSURE: 79 MMHG | SYSTOLIC BLOOD PRESSURE: 143 MMHG

## 2019-11-13 DIAGNOSIS — M54.16 LUMBAR RADICULOPATHY: ICD-10-CM

## 2019-11-13 PROCEDURE — 64483 NJX AA&/STRD TFRM EPI L/S 1: CPT | Performed by: ANESTHESIOLOGY

## 2019-11-13 RX ORDER — PAPAVERINE HCL 150 MG
20 CAPSULE, EXTENDED RELEASE ORAL ONCE
Status: COMPLETED | OUTPATIENT
Start: 2019-11-13 | End: 2019-11-13

## 2019-11-13 RX ORDER — LIDOCAINE HYDROCHLORIDE 10 MG/ML
5 INJECTION, SOLUTION EPIDURAL; INFILTRATION; INTRACAUDAL; PERINEURAL ONCE
Status: COMPLETED | OUTPATIENT
Start: 2019-11-13 | End: 2019-11-13

## 2019-11-13 RX ADMIN — IOHEXOL 4 ML: 300 INJECTION, SOLUTION INTRAVENOUS at 13:27

## 2019-11-13 RX ADMIN — LIDOCAINE HYDROCHLORIDE 5 ML: 10 INJECTION, SOLUTION EPIDURAL; INFILTRATION; INTRACAUDAL; PERINEURAL at 13:21

## 2019-11-13 RX ADMIN — DEXAMETHASONE SODIUM PHOSPHATE 20 MG: 10 INJECTION, SOLUTION INTRAMUSCULAR; INTRAVENOUS at 13:37

## 2019-11-13 RX ADMIN — LIDOCAINE HYDROCHLORIDE 2 ML: 20 INJECTION, SOLUTION EPIDURAL; INFILTRATION; INTRACAUDAL; PERINEURAL at 13:38

## 2019-11-13 NOTE — H&P
History of Present Illness: The patient is a 77 y o  male who presents with complaints of low back and leg pain  Patient Active Problem List   Diagnosis    Allergic rhinitis    Enlarged prostate with lower urinary tract symptoms (LUTS)    Chronic bronchitis (HCC)    Depression, controlled    Hyperlipidemia    Hypertension    Impaired fasting glucose    Lung nodule    Male erectile dysfunction    Multifocal atrial tachycardia (HCC)    Obesity (BMI 30-39  9)    Osteoarthritis of knee    Osteoarthritis of hip    Seasonal allergies    Spinal stenosis of lumbar region with neurogenic claudication    Supraventricular tachycardia (HCC)    Anxiety and depression    Arthritis of left hip    COPD (chronic obstructive pulmonary disease) (HCC)    Elevated prostate specific antigen (PSA)    Lumbosacral spondylosis without myelopathy    Incomplete emptying of bladder    Retention of urine    Screening for prostate cancer    Thoracic or lumbosacral neuritis or radiculitis    Urethral stricture    Spondylolisthesis, acquired    Degeneration of lumbosacral intervertebral disc    Full thickness rotator cuff tear    Knee pain    Localized, primary osteoarthritis    Primary localized osteoarthritis of pelvic region and thigh    Low back pain    Lumbosacral neuritis    Pain in limb    Pseudoclaudication syndrome    Shoulder pain    Cervical stenosis of spinal canal    Subclinical hypothyroidism    Lumbar radiculopathy    Chronic pain syndrome       Past Medical History:   Diagnosis Date    Bronchitis, chronic obstructive, with exacerbation ([facility] [address] )     Last Assessed: 5/17/2013    COPD with acute exacerbation ([facility] [address] )     Last Assessed: 4/13/2015    Elevated PSA     Bx neg 2012; Last Assessed: 11/23/2012       Past Surgical History:   Procedure Laterality Date    BACK SURGERY  07/25/2012    decompression of spinal stenosis from lower thoracic through the lumbar spine    COLONOSCOPY  10/2010 neg  recheck in 5 years    KNEE ARTHROSCOPY  01/2013   [address] BIOPSY  2012    for elevated PSA of about 7; neg    TRANSURETHRAL RESECTION OF PROSTATE  06/27/2013         Current Outpatient Medications:     acetaminophen (TYLENOL) 500 mg tablet, Take 500 mg by mouth every 6 (six) hours as needed, Disp: , Rfl:     aspirin 81 mg chewable tablet, Chew 1 tablet daily, Disp: , Rfl:     CIALIS 5 MG tablet, TAKE 1 TABLET (5 MG TOTAL) BY MOUTH DAILY FOR 90 DAYS, Disp: 90 tablet, Rfl: 3    clonazePAM (KlonoPIN) 0 5 mg tablet, Take 1 tablet (0 5 mg total) by mouth daily at bedtime, Disp: 30 tablet, Rfl: 1    dextromethorphan-guaifenesin (MUCINEX DM)  MG per 12 hr tablet, Take 1 tablet by mouth every 12 (twelve) hours, Disp: , Rfl:     diclofenac sodium (VOLTAREN) 50 mg EC tablet, TAKE 1 TABLET (50 MG TOTAL) BY MOUTH 2 (TWO) TIMES A DAY, Disp: 180 tablet, Rfl: 1    DULoxetine (CYMBALTA) 60 mg delayed release capsule, Take 1 capsule by mouth daily, Disp: , Rfl:     fluticasone (FLONASE) 50 mcg/act nasal spray, fluticasone propionate 50 mcg/actuation nasal spray,suspension, Disp: , Rfl:     gabapentin (NEURONTIN) 300 mg capsule, Take 1 capsule by mouth 4 (four) times a day, Disp: , Rfl:     loratadine (CLARITIN) 10 mg tablet, Take 1 tablet by mouth daily, Disp: , Rfl:     metoprolol tartrate (LOPRESSOR) 25 mg tablet, TAKE 1 TABLET (25 MG TOTAL) BY MOUTH EVERY 12 (TWELVE) HOURS, Disp: 180 tablet, Rfl: 2    metoprolol tartrate (LOPRESSOR) 50 mg tablet, Take 1 tablet (50 mg total) by mouth 2 (two) times a day, Disp: 180 tablet, Rfl: 3    Multiple Vitamin (MULTI-VITAMIN DAILY) TABS, Take 1 tablet by mouth daily, Disp: , Rfl:     NIFEdipine (PROCARDIA XL) 90 mg 24 hr tablet, Take 1 tablet (90 mg total) by mouth daily for 357 days, Disp: 90 tablet, Rfl: 3    rosuvastatin (CRESTOR) 20 MG tablet, TAKE 2 TABLETS ON MON WED FRI  1 TAB ON TUES  THURS SAT   AND SUNDAY, Disp: 120 tablet, Rfl: 1    No Known Allergies    Physical Exam:   Vitals:    11/13/19 1303   BP: 139/79   Pulse: 63   Resp: 20   Temp: 99 °F (37 2 °C)   SpO2: 96%     General: Awake, Alert, Oriented x 3, Mood and affect appropriate  Respiratory: Respirations even and unlabored  Cardiovascular: Peripheral pulses intact; no edema  Musculoskeletal Exam:  Bilateral lumbar paraspinals tender to palpation  ASA Score: 3    Patient/Chart Verification  Patient ID Verified: Verbal  ID Band Applied: No  Consents Confirmed: Procedural  H&P( within 30 days) Verified: To be obtained in the Pre-Procedure area  Interval H&P(within 24 hr) Complete (required for Outpatients and Surgery Admit only): To be obtained in the Pre-Procedure area  Allergies Reviewed: Yes  Anticoag/NSAID held?: No  Currently on antibiotics?: No    Assessment:   1   Lumbar radiculopathy        Plan: B/L L5 TFESI

## 2019-11-13 NOTE — DISCHARGE INSTR - LAB
Epidural Steroid Injection   WHAT YOU NEED TO KNOW:   An epidural steroid injection (DEN) is a procedure to inject steroid medicine into the epidural space  The epidural space is between your spinal cord and vertebrae  Steroids reduce inflammation and fluid buildup in your spine that may be causing pain  You may be given pain medicine along with the steroids  ACTIVITY  · Do not drive or operate machinery today  · No strenuous activity today - bending, lifting, etc   · You may resume normal activites starting tomorrow - start slowly and as tolerated  · You may shower today, but no tub baths or hot tubs  · You may have numbness for several hours from the local anesthetic  Please use caution and common sense, especially with weight-bearing activities  CARE OF THE INJECTION SITE  · If you have soreness or pain, apply ice to the area today (20 minutes on/20 minutes off)  · Starting tomorrow, you may use warm, moist heat or ice if needed  · You may have an increase or change in your discomfort for 36-48 hours after your treatment  · Apply ice and continue with any pain medication you have been prescribed  · Notify [facility] if you have any of the following: redness, drainage, swelling, headache, stiff neck or fever above 100°F     SPECIAL INSTRUCTIONS  · Our office will contact you in approximately 7 days for a progress report  MEDICATIONS  · Continue to take all routine medications  · Our office may have instructed you to hold some medications  If you have a problem specifically related to your procedure, please call our office at [phone]  Problems not related to your procedure should be directed to your primary care physician

## 2019-11-20 ENCOUNTER — TELEPHONE (OUTPATIENT)
Dept: PAIN MEDICINE | Facility: CLINIC | Age: 69
End: 2019-11-20

## 2019-11-21 ENCOUNTER — TELEPHONE (OUTPATIENT)
Dept: FAMILY MEDICINE CLINIC | Facility: CLINIC | Age: 69
End: 2019-11-21

## 2019-11-21 NOTE — TELEPHONE ENCOUNTER
Patient called in said the pharmacy told him he needs a prior auth for cialis?     He uses [id number]

## 2019-11-22 ENCOUNTER — IMMUNIZATIONS (OUTPATIENT)
Dept: FAMILY MEDICINE CLINIC | Facility: CLINIC | Age: 69
End: 2019-11-22
Payer: MEDICARE

## 2019-11-22 DIAGNOSIS — Z23 ENCOUNTER FOR IMMUNIZATION: ICD-10-CM

## 2019-11-22 PROCEDURE — G0008 ADMIN INFLUENZA VIRUS VAC: HCPCS

## 2019-11-22 PROCEDURE — 90662 IIV NO PRSV INCREASED AG IM: CPT

## 2019-12-03 NOTE — TELEPHONE ENCOUNTER
Pt called stating first 3 days if felt great now it is all back   Pt states when he is walking is when he has more pain, pt states he can not walk more then a block without a cane

## 2019-12-06 ENCOUNTER — OFFICE VISIT (OUTPATIENT)
Dept: PAIN MEDICINE | Facility: CLINIC | Age: 69
End: 2019-12-06
Payer: MEDICARE

## 2019-12-06 VITALS
BODY MASS INDEX: 34.56 KG/M2 | SYSTOLIC BLOOD PRESSURE: 124 MMHG | HEART RATE: 59 BPM | HEIGHT: 68 IN | DIASTOLIC BLOOD PRESSURE: 68 MMHG | WEIGHT: 228 LBS

## 2019-12-06 DIAGNOSIS — M48.062 SPINAL STENOSIS OF LUMBAR REGION WITH NEUROGENIC CLAUDICATION: ICD-10-CM

## 2019-12-06 DIAGNOSIS — M54.16 LUMBAR RADICULOPATHY: Primary | ICD-10-CM

## 2019-12-06 PROCEDURE — 99214 OFFICE O/P EST MOD 30 MIN: CPT | Performed by: NURSE PRACTITIONER

## 2019-12-06 RX ORDER — DULOXETIN HYDROCHLORIDE 30 MG/1
90 CAPSULE, DELAYED RELEASE ORAL DAILY
COMMUNITY
Start: 2019-12-04

## 2019-12-06 NOTE — PROGRESS NOTES
Assessment:  1  Lumbar radiculopathy    2  Spinal stenosis of lumbar region with neurogenic claudication        Plan:  Patient is status post bilateral S1 TFESI with Dr Quintin Nicole on November 13, 2019  Per the patient, she he did receive 75% relief of his complaints for approximately 3 days  He is interested in scheduling a repeat injection as I did explain that another round of steroid could potentially provide longer benefit  He is also interested in discussing surgical intervention for spinal cord stimulator trial with both Orthopedic surgery and Neurosurgery  He also states that his PCP just raised his dose of Cymbalta to 90 mg daily, and gave him the okay to increase to 120mg if he has no side effects  He also continues on gabapentin 300 mg 4 times a day  1  I will Schedule the patient for repeat bilateral S1 TFESI  Complete risks and benefits including bleeding, infection, tissue reaction, nerve injury and allergic reaction were discussed  The patient was agreeable and verbalized an understanding  2  Patient may continue duloxetine and gabapentin as prescribed by his PCP  3  I will provide the patient with a referral to both Dr Stefanie Cross and Dr Suman Root for consultation as requested to discuss surgical intervention vs SCS trial  He did look over SCS booklet and would not be opposed to this if necessary  4  The patient will continue with physical therapy and home exercise program  5  The patient continue diclofenac as prescribed  6  I will follow up with the patient after the procedure or sooner if needed    M*Modal software was used to dictate this note  It may contain errors with dictating incorrect words or incorrect spelling  Please contact the provider directly with any questions  History of Present Illness:     The patient is a 77 y o  male with a history of 2 previous back surgeries last seen on 11/1/19 who presents for a follow up office visit in regards to chronic lumbosacral back pain that radiates into the bilateral lower extremities secondary to lumbar spondylosis and stenosis  The patient denies bowel or bladder incontinence or saddle anesthesia  The patient did recently undergo bilateral S1 TFESI with Dr Dian Johnson on November 13, 2019 and reported 75% relief, unfortunately the relief only lasted approximately 3 days before returning to baseline  A bilateral L5 TFESI was attempted, however was aborted because Dr Yahaira Zhou was unable to access neuroaxial space at this level  The patient is interested in repeating an injection in hopes that a second round of steroid will provide longer lasting relief  The patient also states that he is not opposed to further surgical intervention if warranted  MRI of the lumbar spine reveals multilevel degenerative spondylosis, mild to moderate bilateral foraminal and central stenosis at L1-2, grade 1 retrolisthesis with moderate central and bilateral foraminal stenosis at L2-3, mild to moderate central and bilateral foraminal stenosis at L3-4, grade 1 anterolisthesis with mild to moderate central and bilateral foraminal stenosis at L4-5, and moderate foraminal stenosis at L5-S1 with a left laminotomy at L4-5 and interbody fusion at L5-S1  Last office visit, the patient was given an informational pamphlet regarding a spinal cord stimulator trial   He is interested in this option as well, however would like to discuss with the surgeon prior  Current pain medications includes:  Diclofenac 50 mg b i d  P r n , duloxetine 90 mg daily, and gabapentin 300 mg 4 times a day as prescribed by his PCP    I have personally reviewed and/or updated the patient's past medical history, past surgical history, family history, social history, current medications, allergies, and vital signs today  Review of Systems:    Review of Systems   Respiratory: Negative for shortness of breath  Cardiovascular: Negative for chest pain     Gastrointestinal: Negative for constipation, diarrhea, nausea and vomiting  Musculoskeletal: Negative for arthralgias, gait problem, joint swelling and myalgias  Skin: Negative for rash  Neurological: Negative for dizziness, seizures and weakness  All other systems reviewed and are negative  Past Medical History:   Diagnosis Date    Bronchitis, chronic obstructive, with exacerbation ([facility] [address] )     Last Assessed: 2013    COPD with acute exacerbation ([facility] )     Last Assessed: 2015    Elevated PSA     Bx neg ; Last Assessed: 2012       Past Surgical History:   Procedure Laterality Date    BACK SURGERY  2012    decompression of spinal stenosis from lower thoracic through the lumbar spine    COLONOSCOPY  10/2010    neg   recheck in 5 years    KNEE ARTHROSCOPY  2013    PROSTATE BIOPSY      for elevated PSA of about 7; neg    TRANSURETHRAL RESECTION OF PROSTATE  2013       Family History   Problem Relation Age of Onset    Breast cancer Mother     Pancreatitis Father     Diabetes Maternal Grandmother     Heart attack Maternal Grandfather     Heart attack Paternal Grandmother     Diabetes Paternal Grandmother     Diabetes Paternal Grandfather        Social History     Occupational History    Not on file   Tobacco Use    Smoking status: Former Smoker     Packs/day: 1 00     Years: 23 00     Pack years: 23 00     Types: Cigarettes     Start date:      Last attempt to quit: 2000     Years since quittin 9    Smokeless tobacco: Never Used   Substance and Sexual Activity    Alcohol use: No     Comment: Stopped drinking    Drug use: No    Sexual activity: Not on file         Current Outpatient Medications:     acetaminophen (TYLENOL) 500 mg tablet, Take 500 mg by mouth every 6 (six) hours as needed, Disp: , Rfl:     aspirin 81 mg chewable tablet, Chew 1 tablet daily, Disp: , Rfl:     CIALIS 5 MG tablet, TAKE 1 TABLET (5 MG TOTAL) BY MOUTH DAILY FOR 90 DAYS, Disp: 90 tablet, Rfl: 3   dextromethorphan-guaifenesin (MUCINEX DM)  MG per 12 hr tablet, Take 1 tablet by mouth every 12 (twelve) hours, Disp: , Rfl:     diclofenac sodium (VOLTAREN) 50 mg EC tablet, TAKE 1 TABLET (50 MG TOTAL) BY MOUTH 2 (TWO) TIMES A DAY, Disp: 180 tablet, Rfl: 1    DULoxetine (CYMBALTA) 30 mg delayed release capsule, , Disp: , Rfl:     DULoxetine (CYMBALTA) 60 mg delayed release capsule, Take 1 capsule by mouth daily, Disp: , Rfl:     fluticasone (FLONASE) 50 mcg/act nasal spray, fluticasone propionate 50 mcg/actuation nasal spray,suspension, Disp: , Rfl:     gabapentin (NEURONTIN) 300 mg capsule, Take 1 capsule by mouth 4 (four) times a day, Disp: , Rfl:     loratadine (CLARITIN) 10 mg tablet, Take 1 tablet by mouth daily, Disp: , Rfl:     metoprolol tartrate (LOPRESSOR) 25 mg tablet, TAKE 1 TABLET (25 MG TOTAL) BY MOUTH EVERY 12 (TWELVE) HOURS, Disp: 180 tablet, Rfl: 2    metoprolol tartrate (LOPRESSOR) 50 mg tablet, Take 1 tablet (50 mg total) by mouth 2 (two) times a day, Disp: 180 tablet, Rfl: 3    Multiple Vitamin (MULTI-VITAMIN DAILY) TABS, Take 1 tablet by mouth daily, Disp: , Rfl:     NIFEdipine (PROCARDIA XL) 90 mg 24 hr tablet, Take 1 tablet (90 mg total) by mouth daily for 357 days, Disp: 90 tablet, Rfl: 3    rosuvastatin (CRESTOR) 20 MG tablet, TAKE 2 TABLETS ON MON WED FRI  1 TAB ON TUES  THURS SAT  AND SUNDAY, Disp: 120 tablet, Rfl: 1    clonazePAM (KlonoPIN) 0 5 mg tablet, Take 1 tablet (0 5 mg total) by mouth daily at bedtime, Disp: 30 tablet, Rfl: 1    No Known Allergies    Physical Exam:    /68   Pulse 59   Ht 5' 7 99" (1 727 m)   Wt 103 kg (228 lb)   BMI 34 68 kg/m²     Constitutional:normal, well developed, well nourished, alert, in no distress and non-toxic and no overt pain behavior    Eyes:anicteric  HEENT:grossly intact  Neck:supple, symmetric, trachea midline and no masses   Pulmonary:even and unlabored  Cardiovascular:No edema or pitting edema present  Skin:Normal without rashes or lesions and well hydrated  Psychiatric:Mood and affect appropriate  Neurologic:Cranial Nerves II-XII grossly intact  Musculoskeletal:normal gait  Positive seated straight leg raise bilaterally      Imaging  FL spine and pain procedure    (Results Pending)     MRI LUMBAR SPINE WITH AND WITHOUT CONTRAST     INDICATION: M54 [id number]: Radiculopathy, lumbar region  [id number]: Spinal stenosis, lumbar region with neurogenic claudication      COMPARISON:  9/19/2019 x-rays     TECHNIQUE:  Sagittal T1, sagittal T2, sagittal inversion recovery, axial T1 and axial T2, coronal T2  Sagittal and axial T1 postcontrast      IV Contrast:  10 mL of gadobutrol injection (MULTI-DOSE)      IMAGE QUALITY:  Diagnostic     FINDINGS:     VERTEBRAL BODIES:  Grade 1 degenerative retrolisthesis L1-2, L2-3  Grade 1 degenerative anterolisthesis L4-5  No scoliosis  No compression fracture  Normal marrow signal is identified within the visualized bony structures  No discrete marrow   lesion      SACRUM:  Normal signal within the sacrum  No evidence of insufficiency or stress fracture      DISTAL CORD AND CONUS:  Normal size and signal within the distal cord and conus        PARASPINAL SOFT TISSUES:  Paraspinal soft tissues are unremarkable      LOWER THORACIC DISC SPACES:  Normal disc height and signal   No disc herniation, canal stenosis or foraminal narrowing      Transitional configuration of vertebral bodies at the lumbosacral junction considered to represent lumbarization of the 1st sacral segment  Utilizing this numbering convention, the anterior fusion hardware is at the L5-S1 level    This is consistent with   recent x-rays      LUMBAR DISC SPACES:     L1-L2:  Moderate to severe degenerative spondylosis, grade 1 degenerative retrolisthesis, mild to moderate bilateral foraminal stenosis, mild central canal stenosis     L2-L3: Moderate to severe degenerative spondylosis, grade 1 retrolisthesis, left laminotomy, moderate central canal and bilateral foraminal stenosis     L3-L4:  Moderate degenerative spondylosis, mild to moderate central canal and bilateral foraminal stenosis     L4-L5:  Moderate degenerative spondylosis, bulging annulus, grade 1 anterolisthesis, mild central canal stenosis, moderate bilateral foraminal stenosis, left laminotomy     L5-S1:  Anterior interbody fusion    Moderate bilateral foraminal stenosis      POSTCONTRAST IMAGING:  No abnormal enhancement      IMPRESSION:  Multilevel degenerative spondylosis     Transitional anatomy at the lumbosacral junction     Mild central canal stenosis, grade 1 retrolisthesis, mild to moderate bilateral foraminal stenosis L1-2     Grade 1 retrolisthesis, left laminotomy, moderate central canal and bilateral foraminal stenosis L2-3     Mild to moderate central canal and bilateral foraminal stenosis L3-4     Grade 1 anterolisthesis, mild central canal stenosis, moderate bilateral foraminal stenosis, left laminotomy L4-5     Anterior interbody fusion, moderate bilateral foraminal stenosis L5-S1        Orders Placed This Encounter   Procedures    [place] spine and pain procedure    Ambulatory referral to Orthopedic Surgery    Ambulatory referral to Neurosurgery

## 2019-12-06 NOTE — PATIENT INSTRUCTIONS
Epidural Steroid Injection   AMBULATORY CARE:   What you need to know about an epidural steroid injection (DEN):  An DEN is a procedure to inject steroid medicine into the epidural space  The epidural space is between your spinal cord and vertebrae  Steroids reduce inflammation and fluid buildup in your spine that may be causing pain  You may be given pain medicine along with the steroids  How to prepare for an DEN:  Your healthcare provider will talk to you about how to prepare for your procedure  He will tell you what medicines to take or not take on the day of your procedure  You may need to stop taking blood thinners or other medicines several days before your procedure  You may need to adjust any diabetes medicine you take on the day of your procedure  Steroid medicine can increase your blood sugar level  What will happen during an DEN:   · You will be given medicine to numb the procedure area  You will be awake for the procedure, but you will not feel pain  You may also be given medicine to help you relax during the procedure  Contrast liquid will be used to help your healthcare provider see the area better  Tell the healthcare provider if you have ever had an allergic reaction to contrast liquid  · Your healthcare provider may place the needle into your neck area, middle of your back, or tailbone area  He may inject the medicine next to the nerves that are causing your pain  He may instead inject the medicine into a larger area of the epidural space  This helps the medicine spread to more nerves  Your healthcare provider will use a fluoroscope to help guide the needle to the right place  A fluoroscope is a type of x-ray  After the procedure, a bandage will be placed over the injection site to prevent infection  Risks of an EDN:  You may have temporary or permanent nerve damage or paralysis  You may have bleeding or develop a serious infection, such as meningitis (swelling of the brain coverings)  An abscess may also develop  You may need surgery to fix the abscess  You may have a seizure, anxiety, or trouble sleeping  If you are a man, you may have temporary erectile dysfunction (not able to have an erection)  Care for your wound as directed: You may remove the bandage before you go to bed the day of your procedure  You may take a shower, but do not take a bath for at least 24 hours  Self-care:   · Do not drive,  use machines, or do strenuous activity for 24 hours after your procedure or as directed  · Continue other treatments  as directed  Steroid injections alone will not control your pain  The injections are meant to be used with other treatments, such as physical therapy  Seek care immediately if:   · Blood soaks through your bandage  · Your wound is red, swollen, or draining pus  · You have a fever or chills, severe back pain, and the procedure area is sensitive to the touch  · You have a seizure  · You have trouble moving your legs  · You have weakness or numbness in your legs  · You cannot control when you urinate or have a bowel movement  Contact your healthcare provider if:   · You have nausea or are vomiting  · Your face or neck is red for a few days and you feel warm  · You have more pain than you had before the procedure  · You have swelling in your hands or feet  · You have questions or concerns about your condition or care  Follow up with your healthcare provider as directed:  Write down your questions so you remember to ask them during your visits  © 2017 [address] [employer] Information is for End User's use only and may not be sold, redistributed or otherwise used for commercial purposes  All illustrations and images included in CareNotes® are the copyrighted property of [employer]  or [employer]  The above information is an  only  It is not intended as medical advice for individual conditions or treatments  Talk to your doctor, nurse or pharmacist before following any medical regimen to see if it is safe and effective for you

## 2019-12-09 ENCOUNTER — TELEPHONE (OUTPATIENT)
Dept: OBGYN CLINIC | Facility: HOSPITAL | Age: 69
End: 2019-12-09

## 2019-12-09 NOTE — TELEPHONE ENCOUNTER
Pt was in to see Lea on 12/6/19, she ordered REPEAT B/L S1 TFESI, called pt again and [facility] for him to cb to schedule

## 2019-12-09 NOTE — TELEPHONE ENCOUNTER
Pt returned call, scheduled B/L S1 TFESI on Mon 12/30/19 arr at 09:45  (offered sooner, pt could not make)  Pt denies blood thinners  Went over pre procedure instructions, no vaccinations 2 weeks prior/post proc, NPO 1 hr prior, if sick or on abx needs to call to rs, wear loose, comf clothing- no buttons/zippers, needs   Pt verbalized understanding

## 2019-12-12 DIAGNOSIS — F41.9 ANXIETY AND DEPRESSION: ICD-10-CM

## 2019-12-12 DIAGNOSIS — F32.A ANXIETY AND DEPRESSION: ICD-10-CM

## 2019-12-12 RX ORDER — CLONAZEPAM 0.5 MG/1
0.5 TABLET ORAL
Qty: 30 TABLET | Refills: 1 | Status: SHIPPED | OUTPATIENT
Start: 2019-12-12 | End: 2020-02-05 | Stop reason: SDUPTHER

## 2019-12-19 ENCOUNTER — HOSPITAL ENCOUNTER (OUTPATIENT)
Dept: NEUROLOGY | Facility: CLINIC | Age: 69
Discharge: HOME | End: 2019-12-19
Payer: MEDICARE

## 2019-12-19 DIAGNOSIS — R20.0 HAND NUMBNESS: ICD-10-CM

## 2019-12-19 PROCEDURE — 95886 MUSC TEST DONE W/N TEST COMP: CPT | Performed by: PSYCHIATRY & NEUROLOGY

## 2019-12-19 PROCEDURE — 95911 NRV CNDJ TEST 9-10 STUDIES: CPT | Performed by: PSYCHIATRY & NEUROLOGY

## 2019-12-20 ENCOUNTER — HOSPITAL ENCOUNTER (OUTPATIENT)
Dept: RADIOLOGY | Facility: HOSPITAL | Age: 69
Discharge: HOME | End: 2019-12-20
Payer: MEDICARE

## 2019-12-20 ENCOUNTER — CONSULT (OUTPATIENT)
Dept: NEUROSURGERY | Facility: CLINIC | Age: 69
End: 2019-12-20
Payer: MEDICARE

## 2019-12-20 ENCOUNTER — TRANSCRIBE ORDERS (OUTPATIENT)
Dept: RADIOLOGY | Facility: HOSPITAL | Age: 69
End: 2019-12-20

## 2019-12-20 VITALS
DIASTOLIC BLOOD PRESSURE: 90 MMHG | SYSTOLIC BLOOD PRESSURE: 160 MMHG | TEMPERATURE: 97.6 F | BODY MASS INDEX: 35.46 KG/M2 | WEIGHT: 234 LBS | RESPIRATION RATE: 16 BRPM | HEIGHT: 68 IN | HEART RATE: 62 BPM

## 2019-12-20 DIAGNOSIS — M48.062 SPINAL STENOSIS OF LUMBAR REGION WITH NEUROGENIC CLAUDICATION: ICD-10-CM

## 2019-12-20 DIAGNOSIS — M54.16 LUMBAR RADICULOPATHY: ICD-10-CM

## 2019-12-20 DIAGNOSIS — M43.10 DEGENERATIVE SPONDYLOLISTHESIS: ICD-10-CM

## 2019-12-20 DIAGNOSIS — G89.4 CHRONIC PAIN SYNDROME: Primary | ICD-10-CM

## 2019-12-20 DIAGNOSIS — Z98.1 STATUS POST LUMBAR AND LUMBOSACRAL FUSION BY ANTERIOR TECHNIQUE: ICD-10-CM

## 2019-12-20 PROCEDURE — 99204 OFFICE O/P NEW MOD 45 MIN: CPT | Performed by: NURSE PRACTITIONER

## 2019-12-20 PROCEDURE — 72120 X-RAY BEND ONLY L-S SPINE: CPT

## 2019-12-20 RX ORDER — IPRATROPIUM BROMIDE AND ALBUTEROL SULFATE 2.5; .5 MG/3ML; MG/3ML
SOLUTION RESPIRATORY (INHALATION)
COMMUNITY
End: 2019-12-30 | Stop reason: SDUPTHER

## 2019-12-20 NOTE — PROGRESS NOTES
Assessment/Plan:  Diagnoses and all orders for this visit:    Chronic pain syndrome    Lumbar radiculopathy  -     Ambulatory referral to Neurosurgery    Spinal stenosis of lumbar region with neurogenic claudication  -     Ambulatory referral to Neurosurgery  -     X-ray lumbar spine flexion and extension only 4+ views; Future    Degenerative spondylolisthesis  -     X-ray lumbar spine flexion and extension only 4+ views; Future    Status post lumbar and lumbosacral fusion by anterior technique    Subjective:   Pain management referral chronic pain syndrome , lumbar     Patient ID: Vladislav Pizarro is a 77 y o  male  HPI  He presents accompanied by wife for initial consultative referral from Dr Lei Delacruz, spine and pain  He has a longstanding history of chronic pain affecting his lower back and bilateral lower extremities  Onset approximately 7 years ago  Location  Lumbar sacral, bilateral buttocks, posterior thighs and just above popliteal fossa area  The duration of symptoms is intermittent over past 7 years  Symptoms resolved after each back surgery for a period of a little more than a year or so the returned  Status post 2 back surgeries , first decompression laminectomy  around [id number]  Second surgery Anterior interbody fusion L 5 - S1  Both surgical procedures performed by [facility], Dr Ubaldo Vasques)   Had resurgence of pain about 5 years ago, over past 2-3 years endurance for distance ambulation ability has declined  Characterization of pain as aching tingling,weakness in thighs up and into buttocks and across low back, associated with intermittent electric shock sensations    Intermittent electric shock sensation is not associated with precipitating factor (s) has aura shocks about to occur while sitting or standing  During prolonged  walking or standing  has electric shock sensations are worse, if sitting and remain sitting will dissipate   Electric shock sensations rated 9/10 on a numeric scale  The severity of symptoms are rated 6-7/10 on a numeric scale  Pain occurs when standing or walking, starts in posterior thighs behind knees  is referred up into both thighs, into buttocks, then across lumbar sacral area  The longer standing and walking the pain intensifies in thighs, buttocks and lower back  Cannot walk more than about a block before the pain starts  Aggravating factors,prolonged standing or  walking    Releiving factors include leaning over while standing or sitting  During shopping leaning over shopping cart relieves pain or lying down  Must lean while on elliptical exercise machine to complete a session  Has undergone multimodal treatments,  steroid injections  delivered relief for about 3-4 days  Physical therapy did not relieve pain sessions with therapist or independent home exercise  Medications Diclofenac,offers no relief  Gabapentin effective for some relief of back and leg pain  Duloxetine delivers no noticeable improvement in back or leg pain    He ambulates using a single point cane, alternating sides,for support and off-loading weight on leg  Pain interferes with quality of life, cannot stand or walk for long periods  Has a decline in endurance for distance ambulation  Is retired for 5 years  Early career worked at [employer]  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 [employer]   Both jobs involved heavy lifting , bending and twisting  Was told many years ago by a Dr  at [employer] that he had arthritis throughout his spine  Assessment/Plan:    As outlined above in HPI section     Imagining;  MRI Lumbar spine reviewed in collaboration with Dr Edgardo Livingtson- no surgical pathology appreciated , is status post L5 S1 fusion       Flexion and extension-ordered , completed     Recommendation:  Thoracic Spinal Cord Stimulator Trial, return to pain management    I have spent 60 minutes with patient today in which greater than 50% of this time was spent in explaining assessment findings, impressions, plan and reviewing imagining patient verbalized an understanding and is in agreement with plan  The following portions of the patient's history were reviewed and updated as appropriate:   He  has a past medical history of Bronchitis, chronic obstructive, with exacerbation ([address] ), COPD with acute exacerbation ([address] ), and Elevated PSA    He   Patient Active Problem List    Diagnosis Date Noted    Status post lumbar and lumbosacral fusion by anterior technique 12/22/2019    Carpal tunnel syndrome, bilateral     Chronic pain syndrome 11/01/2019    Lumbar radiculopathy 10/14/2019    Subclinical hypothyroidism 07/24/2019    Spondylolisthesis, acquired 09/06/2018    Degeneration of lumbosacral intervertebral disc 09/06/2018    Full thickness rotator cuff tear 09/06/2018    Knee pain 09/06/2018    Localized, primary osteoarthritis 09/06/2018    Primary localized osteoarthritis of pelvic region and thigh 09/06/2018    Low back pain 09/06/2018    Lumbosacral neuritis 09/06/2018    Pain in limb 09/06/2018    Pseudoclaudication syndrome 09/06/2018    Shoulder pain 09/06/2018    Cervical stenosis of spinal canal 09/06/2018    COPD (chronic obstructive pulmonary disease) ([facility] [address] ) 03/06/2018    Depression, controlled 08/30/2017    Lung nodule 01/04/2017    Allergic rhinitis 11/11/2016    Anxiety and depression 05/23/2016    Arthritis of left hip 05/22/2016    Osteoarthritis of hip 05/06/2016    Chronic bronchitis ([facility] [address] ) 04/23/2015    Supraventricular tachycardia ([facility] [address] ) 04/15/2015    Seasonal allergies 04/08/2015    Multifocal atrial tachycardia ([facility] [address] ) 03/25/2015    Urethral stricture 10/11/2013    Incomplete emptying of bladder 07/01/2013    Screening for prostate cancer 01/18/2013    Elevated prostate specific antigen (PSA) 10/17/2012    Enlarged prostate with lower urinary tract symptoms (LUTS) 09/26/2012    Hypertension 09/26/2012    Impaired fasting glucose 09/26/2012    Male erectile dysfunction 09/26/2012    Obesity (BMI 30-39 9) 09/26/2012    Osteoarthritis of knee 09/26/2012    Spinal stenosis of lumbar region with neurogenic claudication 09/26/2012    Hyperlipidemia 09/25/2012    Retention of urine 08/03/2012    Lumbosacral spondylosis without myelopathy 04/18/2012    Thoracic or lumbosacral neuritis or radiculitis 04/18/2012     He  has a past surgical history that includes Colonoscopy (10/2010); Knee arthroscopy (01/2013); Back surgery (07/25/2012); Prostate biopsy (2012); and Transurethral resection of prostate (06/27/2013)  His family history includes Breast cancer in his mother; Diabetes in his maternal grandmother, paternal grandfather, and paternal grandmother; Heart attack in his maternal grandfather and paternal grandmother; Pancreatitis in his father  He  reports that he quit smoking about 19 years ago  His smoking use included cigarettes  He started smoking about 43 years ago  He has a 23 00 pack-year smoking history  He has never used smokeless tobacco  He reports that he does not drink alcohol or use drugs    Current Outpatient Medications   Medication Sig Dispense Refill    acetaminophen (TYLENOL) 500 mg tablet Take 500 mg by mouth every 6 (six) hours as needed      aspirin 81 mg chewable tablet Chew 1 tablet daily      CIALIS 5 MG tablet TAKE 1 TABLET (5 MG TOTAL) BY MOUTH DAILY FOR 90 DAYS 90 tablet 3    clonazePAM (KlonoPIN) 0 5 mg tablet Take 1 tablet (0 5 mg total) by mouth daily at bedtime 30 tablet 1    dextromethorphan-guaifenesin (MUCINEX DM)  MG per 12 hr tablet Take 1 tablet by mouth every 12 (twelve) hours      diclofenac sodium (VOLTAREN) 50 mg EC tablet TAKE 1 TABLET (50 MG TOTAL) BY MOUTH 2 (TWO) TIMES A  tablet 1    DULoxetine (CYMBALTA) 30 mg delayed release capsule       DULoxetine (CYMBALTA) 60 mg delayed release capsule Take 1 capsule by mouth daily      gabapentin (NEURONTIN) 300 mg capsule Take 1 capsule by mouth 4 (four) times a day      loratadine (CLARITIN) 10 mg tablet Take 1 tablet by mouth daily      metoprolol tartrate (LOPRESSOR) 25 mg tablet TAKE 1 TABLET (25 MG TOTAL) BY MOUTH EVERY 12 (TWELVE) HOURS 180 tablet 2    metoprolol tartrate (LOPRESSOR) 50 mg tablet Take 1 tablet (50 mg total) by mouth 2 (two) times a day 180 tablet 3    Multiple Vitamin (MULTI-VITAMIN DAILY) TABS Take 1 tablet by mouth daily      NIFEdipine (PROCARDIA XL) 90 mg 24 hr tablet Take 1 tablet (90 mg total) by mouth daily for 357 days 90 tablet 3    rosuvastatin (CRESTOR) 20 MG tablet TAKE 2 TABLETS ON MON WED FRI  1 TAB ON TUES THURS SAT  AND SUNDAY 120 tablet 1    fluticasone (FLONASE) 50 mcg/act nasal spray fluticasone propionate 50 mcg/actuation nasal spray,suspension      ipratropium-albuterol (DUO-NEB) 0 5-2 5 mg/3 mL nebulizer solution ipratropium 0 5 mg-albuterol 3 mg (2 5 mg base)/3 mL nebulization soln       No current facility-administered medications for this visit  He has No Known Allergies       Review of Systems   Constitutional: Negative  HENT: Negative  Eyes: Negative  Respiratory:        Chronic bronchitis  COPD   Cardiovascular:        A-fib     Gastrointestinal: Negative  Endocrine: Negative  Genitourinary: Negative  Self-cath   Musculoskeletal: Positive for back pain (patient reports pain coming from back of knee radiating up back of legs into buttocks and lower back)  Allergic/Immunologic: Positive for environmental allergies  Neurological: Positive for weakness (bilateral legs)  Hematological:        Asa [address]         Objective:      /90 (BP Location: Right arm)   Pulse 62   Temp 97 6 °F (36 4 °C) (Tympanic)   Resp 16   Ht 5' 7 9" (1 725 m)   Wt 106 kg (234 lb)   BMI 35 68 kg/m²          Physical Exam   Constitutional: He is oriented to person, place, and time   He appears well-developed and well-nourished  Cardiovascular: Normal rate  Pulmonary/Chest: Effort normal  No respiratory distress  Neurological: He is oriented to person, place, and time  Reflex Scores:       Patellar reflexes are 2+ on the right side and 2+ on the left side  Achilles reflexes are 2+ on the right side and 2+ on the left side  Psychiatric: His speech is normal    Nursing note and vitals reviewed  Neurologic Exam     Mental Status   Oriented to person, place, and time     Speech: speech is normal   Level of consciousness: alert    Motor Exam     Strength   Right iliopsoas: 5/5  Left iliopsoas: 5/5  Right hamstrin/5  Left hamstrin/5  Right glutei: 5/5  Left glutei: 5/5  Right anterior tibial: 5/5  Left anterior tibial: 5/5  Right gastroc: 5/5    Sensory Exam   Right leg light touch: normal  Left leg light touch: normal  Right leg proprioception: normal  Left leg proprioception: normal    Gait, Coordination, and Reflexes     Gait  Gait: (antalgic, use single point cane)    Reflexes   Right patellar: 2+  Left patellar: 2+  Right achilles: 2+  Left achilles: 2+  Right plantar: normal  Left plantar: normal  Right ankle clonus: absent  Left ankle clonus: absent  Right pendular knee jerk: present  Left pendular knee jerk: present

## 2019-12-22 PROBLEM — Z98.1 STATUS POST LUMBAR AND LUMBOSACRAL FUSION BY ANTERIOR TECHNIQUE: Status: ACTIVE | Noted: 2019-12-22

## 2019-12-23 ENCOUNTER — OFFICE VISIT (OUTPATIENT)
Dept: OBGYN CLINIC | Facility: HOSPITAL | Age: 69
End: 2019-12-23
Payer: MEDICARE

## 2019-12-23 ENCOUNTER — TELEPHONE (OUTPATIENT)
Dept: NEUROSURGERY | Facility: CLINIC | Age: 69
End: 2019-12-23

## 2019-12-23 ENCOUNTER — OFFICE VISIT (OUTPATIENT)
Dept: FAMILY MEDICINE CLINIC | Facility: CLINIC | Age: 69
End: 2019-12-23
Payer: MEDICARE

## 2019-12-23 VITALS
WEIGHT: 248 LBS | SYSTOLIC BLOOD PRESSURE: 145 MMHG | BODY MASS INDEX: 37.59 KG/M2 | HEART RATE: 93 BPM | HEIGHT: 68 IN | DIASTOLIC BLOOD PRESSURE: 88 MMHG

## 2019-12-23 VITALS
SYSTOLIC BLOOD PRESSURE: 122 MMHG | BODY MASS INDEX: 37.44 KG/M2 | RESPIRATION RATE: 24 BRPM | DIASTOLIC BLOOD PRESSURE: 60 MMHG | OXYGEN SATURATION: 97 % | HEIGHT: 68 IN | TEMPERATURE: 97.3 F | HEART RATE: 80 BPM | WEIGHT: 247 LBS

## 2019-12-23 DIAGNOSIS — M54.16 LUMBAR RADICULOPATHY: ICD-10-CM

## 2019-12-23 DIAGNOSIS — J20.9 ACUTE BRONCHITIS, UNSPECIFIED ORGANISM: Primary | ICD-10-CM

## 2019-12-23 DIAGNOSIS — J44.1 CHRONIC OBSTRUCTIVE PULMONARY DISEASE WITH ACUTE EXACERBATION (HCC): ICD-10-CM

## 2019-12-23 DIAGNOSIS — M48.062 SPINAL STENOSIS OF LUMBAR REGION WITH NEUROGENIC CLAUDICATION: Primary | ICD-10-CM

## 2019-12-23 PROCEDURE — 99213 OFFICE O/P EST LOW 20 MIN: CPT | Performed by: ORTHOPAEDIC SURGERY

## 2019-12-23 PROCEDURE — 99213 OFFICE O/P EST LOW 20 MIN: CPT | Performed by: NURSE PRACTITIONER

## 2019-12-23 RX ORDER — AZITHROMYCIN 250 MG/1
TABLET, FILM COATED ORAL
Qty: 6 TABLET | Refills: 0 | Status: SHIPPED | OUTPATIENT
Start: 2019-12-23 | End: 2019-12-27

## 2019-12-23 RX ORDER — PREDNISONE 10 MG/1
TABLET ORAL
Qty: 28 TABLET | Refills: 0 | Status: SHIPPED | OUTPATIENT
Start: 2019-12-23 | End: 2019-12-30 | Stop reason: SDUPTHER

## 2019-12-23 NOTE — PROGRESS NOTES
Chief Complaint   Patient presents with    Bronchitis     for the past few days, SOB     Health Maintenance   Topic Date Due    DTaP,Tdap,and Td Vaccines (1 - Tdap) 06/14/1961    PT PLAN OF CARE  10/30/2019    BMI: Followup Plan  03/07/2020    Fall Risk  09/30/2020    CRC Screening: Colonoscopy  10/19/2020    BMI: Adult  12/20/2020    Hepatitis C Screening  Completed    Influenza Vaccine  Completed    Pneumococcal Vaccine: 65+ Years  Completed    Pneumococcal Vaccine: Pediatrics (0 to 5 Years) and At-Risk Patients (6 to 59 Years)  Aged Out    HIB Vaccine  Aged Out    Hepatitis B Vaccine  Aged Out    IPV Vaccine  Aged Out    Hepatitis A Vaccine  Aged Out    Meningococcal ACWY Vaccine  Aged Out    HPV Vaccine  Aged Out     Assessment/Plan:    Acute bronchitis- to start Azithromycin and PO Prednisone taper  SE reviewed, take with food  No NSAIDs while Prednisone  Increase PO fluids and rest   Warm, salt water gargles  Albuterol neb treatments prn  Call office if symptoms worsen or persist     COPD (chronic obstructive pulmonary disease) ([facility] [address] )  Has seen Pulmonary in the past  No current exacerbation  Has Albuterol neb treatments for prn use       Diagnoses and all orders for this visit:    Acute bronchitis, unspecified organism  -     predniSONE 10 mg tablet; Take 4 tabs for 4 days, 3 tabs for 2 days, 2 tabs for 2 days and 1 tab for 2 days  -     azithromycin (ZITHROMAX) 250 mg tablet; Take 2 tablets today then 1 tablet daily x 4 days    Chronic obstructive pulmonary disease with acute exacerbation (HCC)          Subjective:      Patient ID: Severo Creeks is a 77 y o  male      HPI     Pt presents with his wife today for an acute visit   C/O a dry cough which started about 2-3 days ago, is worsening  Some PND and rhinorrhea  No sinus pressure or ear pain  No fevers, chills  Denies SOB or wheezing    Former smoker, quit in 2000  He does report COPD, no maintenance inhalers   He has seen Pulmonary in the past   He does have Albuterol neb treatments at home for prn use, hasn't needed to use these recently     Pt notes a H/o chronic bronchitis, tends to get sick around this time every year   The following portions of the patient's history were reviewed and updated as appropriate: allergies, current medications, past medical history, past social history and problem list     Review of Systems   Constitutional: Positive for fatigue  Negative for chills and fever  HENT: Positive for postnasal drip and rhinorrhea  Negative for congestion, ear pain, sinus pressure, sinus pain, sore throat, tinnitus, trouble swallowing and voice change  Eyes: Negative for visual disturbance  Respiratory: Positive for cough  Negative for chest tightness, shortness of breath and wheezing  Cardiovascular: Negative for chest pain, palpitations and leg swelling  Gastrointestinal: Negative for abdominal pain, blood in stool, constipation, diarrhea and nausea  Genitourinary: Negative for dysuria  Musculoskeletal: Negative for arthralgias and myalgias  Skin: Negative for rash and wound  Neurological: Negative for dizziness, weakness, numbness and headaches  Psychiatric/Behavioral: Negative for sleep disturbance and suicidal ideas  Objective:      /60   Pulse 80   Temp (!) 97 3 °F (36 3 °C) (Tympanic)   Resp (!) 24   Ht 5' 8" (1 727 m)   Wt 112 kg (247 lb)   SpO2 97%   BMI 37 56 kg/m²          Physical Exam   Constitutional: He is oriented to person, place, and time  He appears well-developed and well-nourished  No distress  HENT:   Head: Normocephalic and atraumatic  Right Ear: Hearing, tympanic membrane and ear canal normal    Left Ear: Hearing, tympanic membrane and ear canal normal    Nose: Rhinorrhea present  Right sinus exhibits no maxillary sinus tenderness and no frontal sinus tenderness  Left sinus exhibits no maxillary sinus tenderness and no frontal sinus tenderness  Mouth/Throat: Uvula is midline, oropharynx is clear and moist and mucous membranes are normal    Eyes: Pupils are equal, round, and reactive to light  Conjunctivae are normal    Neck: Normal range of motion  Neck supple  No thyromegaly present  Cardiovascular: Normal rate, regular rhythm and normal heart sounds  No murmur heard  No LE edema   Pulmonary/Chest: Effort normal and breath sounds normal  No accessory muscle usage or stridor  No respiratory distress  He has no decreased breath sounds  He has no wheezes  He has no rhonchi  He has no rales  Abdominal: Soft  Bowel sounds are normal  He exhibits no distension  There is no tenderness  Musculoskeletal: Normal range of motion  Lymphadenopathy:     He has no cervical adenopathy  Neurological: He is alert and oriented to person, place, and time  Skin: Skin is warm and dry  He is not diaphoretic  Psychiatric: He has a normal mood and affect

## 2019-12-23 NOTE — PROGRESS NOTES
Assessment/Plan:    Post-laminectomy syndrome  Chronic pain syndrome  Most recent MRI demonstrates scattered areas of spinal stenosis but no significant central stenosis  Multilevel lumbar stenosis with history of anterior L5-S1 fusion as well as previous multilevel lumbar decompression  · Continued conservative treatment options were encouraged  · Proceed with injections as previously scheduled  · Sonia Pinedo was encouraged to discuss spinal cord stimulator options with Dr Dread Roberts or Dr Kellie Russo  · Follow up as needed       Problem List Items Addressed This Visit        Nervous and Auditory    Lumbar radiculopathy       Other    Spinal stenosis of lumbar region with neurogenic claudication - Primary            Subjective:      Patient ID: Rm Bradley is a 77 y o  male  HPI  Sonia Pinedo is a 77year old male who presents to the office for evaluation of low back pain ongoing for multiple years  He has history of 2 lumbar surgeries including 4 level lumbar laminectomy in 2013 and anterior lumbar fusion L5-S1 roughly 2-3 years later both performed by Dr Mahnaz Wilcox at [place] [address]  He is experiencing low back pain that goes into her bilateral buttocks and down his posterior thighs  He has associated tingling  He has difficulty with prolonged standing and ambulation activities  His symptoms are relieved with bending forward and sitting down  He describes a positive shopping cart sign  He has tried vilateral S1 transforaminal epidural steroid injections with mild relief  He is accompanied by his wife today in the office  The following portions of the patient's history were reviewed and updated as appropriate: current medications, past family history, past medical history, past social history, past surgical history and problem list     Review of Systems   Constitutional: Negative for fever and unexpected weight change  HENT: Negative for hearing loss, nosebleeds and sore throat      Eyes: Negative for pain, redness and visual disturbance  Respiratory: Negative for cough, shortness of breath and wheezing  Cardiovascular: Negative for chest pain, palpitations and leg swelling  Gastrointestinal: Negative for abdominal pain, nausea and vomiting  Endocrine: Negative for polydipsia and polyuria  Genitourinary: Negative for dysuria and hematuria  Musculoskeletal: Positive for back pain  Skin: Negative for rash and wound  Neurological: Negative for dizziness and headaches  Psychiatric/Behavioral: Negative for agitation and suicidal ideas  Objective:      /88   Pulse 93   Ht 5' 8" (1 727 m)   Wt 112 kg (248 lb)   BMI 37 71 kg/m²          Physical Exam   Constitutional: He is oriented to person, place, and time  He appears well-developed and well-nourished  HENT:   Head: Normocephalic and atraumatic  Eyes: Pupils are equal, round, and reactive to light  Neck: Neck supple  Cardiovascular: Intact distal pulses  Pulmonary/Chest: Breath sounds normal    Neurological: He is alert and oriented to person, place, and time  Skin: Skin is warm and dry  Psychiatric: He has a normal mood and affect   His behavior is normal        Patient ambulates with the assistance of a cane  Non-tender to palpation   Modified straight leg raise negative bilaterally  Strength L2-S1 5/5 bilaterally  Sensation L2-S1 equal bilaterally    Imaging  X-rays of lumbar spine 12/20/2019 were reviewed and shows degenerative changes present with stable hardware  MRI of lumbar spine 10/25/2019 was reviewed and shows transitional anatomy with multilevel foraminal stenosis bilaterally    Scribe Attestation    I,:   Lizette Eye am acting as a scribe while in the presence of the attending physician :        I,:   Renetta Webb MD personally performed the services described in this documentation    as scribed in my presence :

## 2019-12-23 NOTE — TELEPHONE ENCOUNTER
Telephoned patient with recommendation after discussion with DR Catherine Emmanuel proceed with SCS trail , referred back to pain management  Explained in detail he is status post L5 S1 fusion no surgical pathology for neurosurgery standpoint     Patient verbalized and understanding  He has consult with DR Tyler as well to discuss chronic pain  lumbar

## 2019-12-24 ENCOUNTER — TELEPHONE (OUTPATIENT)
Dept: PAIN MEDICINE | Facility: MEDICAL CENTER | Age: 69
End: 2019-12-24

## 2019-12-24 NOTE — TELEPHONE ENCOUNTER
Attempted to reach the patient  Left a detailed message that our office is cancelling his appointment  Instructed patient to call our office to reschedule  Provided office hours and CB#  Please cancel 12/30/19 procedure   TY

## 2019-12-24 NOTE — TELEPHONE ENCOUNTER
Please reach back out to patient   Patient said that he started azithromycin (ZITHROMAX) 250 mg Monday 12/23/1/9 @ 5 pm

## 2019-12-24 NOTE — TELEPHONE ENCOUNTER
Pt called stating that she was given five day of Zithromax and 1 days worth of prednisone for chronic bronchitis   Pt would like to know if he is okay for the injection on 12/30    Pt can be reached at [phone]

## 2019-12-24 NOTE — TELEPHONE ENCOUNTER
It sounds like will need to cancel because the patient will not be off of antibiotics for 3 days prior to procedure which is what is required

## 2019-12-24 NOTE — TELEPHONE ENCOUNTER
Attempted to reach patient  [facility] with CB #, [facility] [place] hours for today  Berry Loredo, waiting for patient to call back  Should we cancel his procedure for 12/30/19?

## 2019-12-24 NOTE — TELEPHONE ENCOUNTER
SW patient, states he has COPD with chronic bronchitis and it normally takes 2 -3 rounds of antibiotics to get him cleared  Patient states he would rather wait until he is over his bronchitis and call to schedule his appointment

## 2019-12-30 ENCOUNTER — OFFICE VISIT (OUTPATIENT)
Dept: FAMILY MEDICINE CLINIC | Facility: CLINIC | Age: 69
End: 2019-12-30
Payer: MEDICARE

## 2019-12-30 VITALS
WEIGHT: 240 LBS | RESPIRATION RATE: 16 BRPM | TEMPERATURE: 99.1 F | DIASTOLIC BLOOD PRESSURE: 82 MMHG | HEART RATE: 74 BPM | HEIGHT: 68 IN | OXYGEN SATURATION: 97 % | SYSTOLIC BLOOD PRESSURE: 138 MMHG | BODY MASS INDEX: 36.37 KG/M2

## 2019-12-30 DIAGNOSIS — J20.9 BRONCHITIS WITH BRONCHOSPASM: Primary | ICD-10-CM

## 2019-12-30 PROCEDURE — 99213 OFFICE O/P EST LOW 20 MIN: CPT | Performed by: FAMILY MEDICINE

## 2019-12-30 RX ORDER — IPRATROPIUM BROMIDE AND ALBUTEROL SULFATE 2.5; .5 MG/3ML; MG/3ML
3 SOLUTION RESPIRATORY (INHALATION) EVERY 6 HOURS PRN
Qty: 60 VIAL | Refills: 1 | Status: SHIPPED | OUTPATIENT
Start: 2019-12-30 | End: 2020-01-06 | Stop reason: SDUPTHER

## 2019-12-30 RX ORDER — PREDNISONE 10 MG/1
TABLET ORAL
Qty: 28 TABLET | Refills: 0 | Status: SHIPPED | OUTPATIENT
Start: 2019-12-30 | End: 2020-01-06 | Stop reason: SDUPTHER

## 2019-12-30 NOTE — PROGRESS NOTES
Chief Complaint   Patient presents with    Cough     Health Maintenance   Topic Date Due    DTaP,Tdap,and Td Vaccines (1 - Tdap) 1962    PT PLAN OF CARE  10/30/2019    BMI: Followup Plan  2020    Fall Risk  2020    CRC Screening: Colonoscopy  10/19/2020    BMI: Adult  2020    Hepatitis C Screening  Completed    Influenza Vaccine  Completed    Pneumococcal Vaccine: 65+ Years  Completed    Pneumococcal Vaccine: Pediatrics (0 to 5 Years) and At-Risk Patients (6 to 59 Years)  Aged Out    HIB Vaccine  Aged Out    Hepatitis B Vaccine  Aged Out    IPV Vaccine  Aged Out    Hepatitis A Vaccine  Aged Out    Meningococcal ACWY Vaccine  Aged Out    HPV Vaccine  Aged Out     Assessment/Plan:    A lot of fluids and rest  Tylenol or motrin for fever or pain  Give prednisone  SE educated pt  Give duo-neb solution  Use Q 4-6 hours as needed  Continue flonase and claritin  Give CXR script  No antibiotics now  If CXR showed any pneumonia, will give antibiotics again  Call office if symptoms no improving or worse  Diagnoses and all orders for this visit:    Bronchitis with bronchospasm  -     ipratropium-albuterol (DUO-NEB) 0 5-2 5 mg/3 mL nebulizer solution; Take 1 vial (3 mL total) by nebulization every 6 (six) hours as needed for wheezing or shortness of breath for up to 15 days  -     predniSONE 10 mg tablet; Take 4 tabs for 4 days, 3 tabs for 2 days, 2 tabs for 2 days and 1 tab for 2 days  -     XR chest pa & lateral; Future          Subjective:      Patient ID: Wally Meigs is a 77 y o  male  HPI    Pt is here by himself  C/o cough for 2 days  He was here last week, got zpack and prednisone  Got better and now it is worse again  A lot of Postnasal drip  Dry cough  Feels wheezing and SOB sometimes  Denies fever  Denies chest pain, n/v/abd pain  Would like to Use nebulizer but his solution was              The following portions of the patient's history were reviewed and updated as appropriate: allergies, current medications, past family history, past medical history, past social history, past surgical history and problem list     Review of Systems   Constitutional: Negative for appetite change, chills and fever  HENT: Positive for postnasal drip  Negative for congestion, ear pain, sinus pain and sore throat  Eyes: Negative for discharge and itching  Respiratory: Positive for cough, shortness of breath and wheezing  Negative for apnea and chest tightness  Cardiovascular: Negative for chest pain, palpitations and leg swelling  Gastrointestinal: Negative for abdominal pain, anal bleeding, constipation, diarrhea, nausea and vomiting  Endocrine: Negative for cold intolerance, heat intolerance and polyuria  Genitourinary: Negative for difficulty urinating and dysuria  Musculoskeletal: Negative for arthralgias, back pain and myalgias  Skin: Negative for rash  Neurological: Negative for dizziness and headaches  Psychiatric/Behavioral: Negative for agitation  Objective:      /82 (BP Location: Left arm, Patient Position: Sitting, Cuff Size: Adult)   Pulse 74   Temp 99 1 °F (37 3 °C) (Tympanic)   Resp 16   Ht 5' 8" (1 727 m)   Wt 109 kg (240 lb)   SpO2 97%   BMI 36 49 kg/m²          Physical Exam   Constitutional: He appears well-developed  HENT:   Head: Normocephalic and atraumatic  Right Ear: External ear normal    Left Ear: External ear normal    B/l nasal swollen  Throat erythema, no exudate   Eyes: Pupils are equal, round, and reactive to light  Conjunctivae are normal    Neck: Normal range of motion  Neck supple  Cardiovascular: Normal rate, regular rhythm, normal heart sounds and intact distal pulses  Exam reveals no gallop and no friction rub  No murmur heard  Pulmonary/Chest: Effort normal and breath sounds normal  No stridor  No respiratory distress  He has no wheezes  He has no rales   He exhibits no tenderness  Abdominal: Soft  Bowel sounds are normal    Musculoskeletal: Normal range of motion  Neurological: He is alert

## 2020-01-02 ENCOUNTER — HOSPITAL ENCOUNTER (OUTPATIENT)
Dept: RADIOLOGY | Facility: HOSPITAL | Age: 70
Discharge: HOME | End: 2020-01-02
Payer: MEDICARE

## 2020-01-02 DIAGNOSIS — J20.9 BRONCHITIS WITH BRONCHOSPASM: ICD-10-CM

## 2020-01-02 PROCEDURE — 71046 X-RAY EXAM CHEST 2 VIEWS: CPT

## 2020-01-03 NOTE — RESULT ENCOUNTER NOTE
I reviewed results with patient  He said he thinks the Prednisone is kicking in and is helping a bit, but he knows he sometimes needs an extra dose to get rid of this kind of thing  He will certainly keep in touch and by Monday he will let us know how he feels

## 2020-01-06 ENCOUNTER — OFFICE VISIT (OUTPATIENT)
Dept: FAMILY MEDICINE CLINIC | Facility: CLINIC | Age: 70
End: 2020-01-06
Payer: MEDICARE

## 2020-01-06 VITALS
HEIGHT: 68 IN | OXYGEN SATURATION: 94 % | TEMPERATURE: 97.8 F | DIASTOLIC BLOOD PRESSURE: 80 MMHG | WEIGHT: 242 LBS | SYSTOLIC BLOOD PRESSURE: 148 MMHG | HEART RATE: 88 BPM | BODY MASS INDEX: 36.68 KG/M2 | RESPIRATION RATE: 16 BRPM

## 2020-01-06 DIAGNOSIS — J44.1 CHRONIC OBSTRUCTIVE PULMONARY DISEASE WITH ACUTE EXACERBATION (HCC): Primary | ICD-10-CM

## 2020-01-06 DIAGNOSIS — J20.9 BRONCHITIS WITH BRONCHOSPASM: ICD-10-CM

## 2020-01-06 PROCEDURE — 99214 OFFICE O/P EST MOD 30 MIN: CPT | Performed by: NURSE PRACTITIONER

## 2020-01-06 RX ORDER — PREDNISONE 10 MG/1
TABLET ORAL
Qty: 28 TABLET | Refills: 0 | Status: SHIPPED | OUTPATIENT
Start: 2020-01-06 | End: 2020-01-20 | Stop reason: ALTCHOICE

## 2020-01-06 RX ORDER — IPRATROPIUM BROMIDE AND ALBUTEROL SULFATE 2.5; .5 MG/3ML; MG/3ML
3 SOLUTION RESPIRATORY (INHALATION) EVERY 6 HOURS PRN
Qty: 60 VIAL | Refills: 1
Start: 2020-01-06 | End: 2020-01-21

## 2020-01-06 NOTE — PROGRESS NOTES
Chief Complaint   Patient presents with   [address] Maintenance   Topic Date Due    DTaP,Tdap,and Td Vaccines (1 - Tdap) 06/14/1961    PT PLAN OF CARE  10/30/2019    BMI: Followup Plan  03/07/2020    Fall Risk  09/30/2020    CRC Screening: Colonoscopy  10/19/2020    BMI: Adult  12/30/2020    Hepatitis C Screening  Completed    Influenza Vaccine  Completed    Pneumococcal Vaccine: 65+ Years  Completed    Pneumococcal Vaccine: Pediatrics (0 to 5 Years) and At-Risk Patients (6 to 59 Years)  Aged Out    HIB Vaccine  Aged Out    Hepatitis B Vaccine  Aged Out    IPV Vaccine  Aged Out    Hepatitis A Vaccine  Aged Out    Meningococcal ACWY Vaccine  Aged Out    HPV Vaccine  Aged Out       Assessment/Plan:    Acute bronchitis- Recent CXR reviewed and is normal   He does still have some scattered wheezing on exam   He has duo-neb treatments for prn use and will continue these  I do think he needs a 3rd round of PO Prednisone at this time  In addition to this, I send Advair 250/50 mcg/ dose to his pharmacy, one puff BID  SE reviewed  If symptoms persist, will refer to Pulmonary  No need for abx today    COPD- to start Advair as noted above  Has duo-neb treatments for prn use  If symptoms persist, refer back to Pulmonary      Diagnoses and all orders for this visit:    Chronic obstructive pulmonary disease with acute exacerbation ([facility] [address] )  -     ipratropium-albuterol (DUO-NEB) 0 5-2 5 mg/3 mL nebulizer solution; Take 1 vial (3 mL total) by nebulization every 6 (six) hours as needed for wheezing or shortness of breath for up to 15 days  -     fluticasone-salmeterol (ADVAIR, WIXELA) 250-50 mcg/dose inhaler; Inhale 1 puff 2 (two) times a day Rinse mouth after use  Bronchitis with bronchospasm  -     predniSONE 10 mg tablet; Take 4 tabs for 4 days, 3 tabs for 2 days, 2 tabs for 2 days and 1 tab for 2 days          Subjective:      Patient ID: Autumn Yao is a 77 y o  male      HPI     Pt presents by himself today for an acute visit     He was recently seen by myself on 12/23/19 with acute bronchitis  Started on Azithromycin and PO Prednisone taper, felt better but still had some chest tightness once he had about 2 days of the Prednisone left  He ended up seeing Dr Terence Encarnacion on 12/30/19   PO Prednisone was repeated, CXR normal at that time  Also given duo-neb treatments   He reports again, feeling better initially but symptoms return once he has about 2-3 days of the Prednisone left     Today, he c/o chest tightness, wheezing and a cough  Still having some slight PND and rhinorrhea  No fevers, chills, sore throat  No N/V/D    Pt does have COPD, has albuterol neb treatments for prn use   Was evaluated by Pulmonary in the past (a few years ago), no maintenance inhalers   Quit smoking in 2000      The following portions of the patient's history were reviewed and updated as appropriate: allergies, current medications, past medical history, past social history and problem list     Review of Systems   Constitutional: Negative for chills, fatigue, fever and unexpected weight change  HENT: Positive for postnasal drip and rhinorrhea  Negative for congestion, ear pain, hearing loss, sinus pressure and sore throat  Eyes: Negative for visual disturbance  Respiratory: Positive for cough, chest tightness and wheezing  Negative for shortness of breath  Cardiovascular: Negative for chest pain, palpitations and leg swelling  Gastrointestinal: Negative for abdominal pain, blood in stool, constipation, diarrhea, nausea and vomiting  Musculoskeletal: Negative for arthralgias and myalgias  Skin: Negative for rash and wound  Neurological: Negative for dizziness, weakness, numbness and headaches  Psychiatric/Behavioral: Negative for sleep disturbance and suicidal ideas           Objective:      /80   Pulse 88   Temp 97 8 °F (36 6 °C) (Tympanic)   Resp 16   Ht 5' 8" (1 727 m)   Wt 110 kg (242 lb) SpO2 94%   BMI 36 80 kg/m²          Physical Exam   Constitutional: He is oriented to person, place, and time  He appears well-developed and well-nourished  No distress  HENT:   Head: Normocephalic and atraumatic  Right Ear: Hearing, tympanic membrane, external ear and ear canal normal    Left Ear: Hearing, tympanic membrane, external ear and ear canal normal    Nose: Rhinorrhea present  Right sinus exhibits no maxillary sinus tenderness and no frontal sinus tenderness  Left sinus exhibits no maxillary sinus tenderness and no frontal sinus tenderness  Mouth/Throat: Uvula is midline, oropharynx is clear and moist and mucous membranes are normal    Eyes: Pupils are equal, round, and reactive to light  Conjunctivae are normal    Neck: Normal range of motion  Neck supple  No thyromegaly present  Cardiovascular: Normal rate, regular rhythm and normal heart sounds  No murmur heard  No LE edema   Pulmonary/Chest: Effort normal  No accessory muscle usage or stridor  No respiratory distress  He has no decreased breath sounds  He has wheezes (scattered)  He has no rhonchi  He has no rales  Abdominal: Soft  Bowel sounds are normal  He exhibits no distension  There is no tenderness  Musculoskeletal: Normal range of motion  Lymphadenopathy:     He has no cervical adenopathy  Neurological: He is alert and oriented to person, place, and time  Skin: Skin is warm and dry  He is not diaphoretic  Psychiatric: He has a normal mood and affect

## 2020-01-16 NOTE — TELEPHONE ENCOUNTER
Spoke to pt, scheduled REPEAT B/L S1 TFESI on Mon 01/20/20 arr at 09:30  Went over pre procedure instructions, no vaccinations 2 weeks prior/post proc, NPO 1 hr prior, if sick or on abx needs to call to rs, wear loose, comf clothing- no buttons/zippers, needs   Pt verbalized understanding

## 2020-01-17 ENCOUNTER — OFFICE VISIT (OUTPATIENT)
Dept: OBGYN CLINIC | Facility: HOSPITAL | Age: 70
End: 2020-01-17
Payer: MEDICARE

## 2020-01-17 VITALS
SYSTOLIC BLOOD PRESSURE: 123 MMHG | DIASTOLIC BLOOD PRESSURE: 70 MMHG | HEIGHT: 68 IN | WEIGHT: 251 LBS | BODY MASS INDEX: 38.04 KG/M2 | HEART RATE: 61 BPM

## 2020-01-17 DIAGNOSIS — G56.03 CARPAL TUNNEL SYNDROME, BILATERAL: Primary | ICD-10-CM

## 2020-01-17 DIAGNOSIS — M48.061 SPINAL STENOSIS OF LUMBAR REGION, UNSPECIFIED WHETHER NEUROGENIC CLAUDICATION PRESENT: ICD-10-CM

## 2020-01-17 PROCEDURE — 1124F ACP DISCUSS-NO DSCNMKR DOCD: CPT | Performed by: ORTHOPAEDIC SURGERY

## 2020-01-17 PROCEDURE — 99214 OFFICE O/P EST MOD 30 MIN: CPT | Performed by: ORTHOPAEDIC SURGERY

## 2020-01-17 NOTE — PROGRESS NOTES
ASSESSMENT/PLAN:    Assessment:   B/L CTS, L>R    Plan:   Pt elects L ECTR under sedation  Typical postoperative protocol/recovery d/w pt in detail today  Since the R side is not as bad on EMG or by pt's history - we will continue to monitor this - explained 30-40% chance this could improve after the L side is fixed    Follow Up: After Surgery    To Do Next Visit:  Sutures out    Operative Discussions:  Endoscopic Carpal Tunnel Release: The anatomy and physiology of carpal tunnel syndrome was discussed with the patient today  Increase pressure localized under the transverse carpal ligament can cause pain, numbness, tingling, or dysesthesias within the median nerve distribution as well as feelings of fatigue, clumsiness, or awkwardness  These symptoms typically occur at night and worse in the morning upon waking  Eventually, untreated carpal tunnel syndrome can result in weakness and permanent loss of muscle within the thenar compartment of the hand  Treatment options were discussed with the patient  Conservative treatment includes nocturnal resting splints to keep the nerve in a neutral position, ergonomic changes within the work or home environment, activity modification, and tendon gliding exercises  Steroid injections within the carpal canal can help a majority of patients, however this is often self-limited in a majority of patients  Surgical intervention to divide the transverse carpal ligament typically results in a long-lasting relief of the patient's complaints, with the recurrence rate of less than 1%  The patient has elected to undergo an endoscopic carpal tunnel release  The 2 incision technique was discussed with the patient, which results in approximately a two-week less recovery time, and less wound complications    In the postoperative period, light activities are allowed immediately, driving is allowed when narcotic medication has stopped, and the bandages may be removed and incision may get wet after 2 days  Heavy activities (lifting more than approximately 10 pounds) will be allowed after follow up appointment in 1-2 weeks  While the pain and discomfort in the hands generally improves rapidly, the numbness and tingling as well as the strength will slowly improve over weeks to months depending on the severity of the carpal tunnel syndrome  Pillar pain was discussed with the patient, which is typically a common but self-limiting condition  The risks of bleeding and infection from the surgery are less than 1%  Risk of recurrence is approximately 0 5%  The risks of nerve injury or nerve damage or damage to the blood vessels is approximately 1 in 1200  The patient has an understanding of the above mentioned discussion  The risks and benefits of the procedure were explained to the patient, which include, but are not limited to: Bleeding, infection, recurrence, pain, scar, damage to tendons, damage to nerves, and damage to blood vessels, failure to give desired results and complications related to anesthesia   These risks, along with alternative conservative treatment options, and postoperative protocols were voiced back and understood by the patient   All questions were answered to the patient's satisfaction   The patient agrees to comply with a standard postoperative protocol, and is willing to proceed   Education was provided via written and auditory forms   There were no barriers to learning   Written handouts regarding wound care, incision and scar care, and general preoperative information was provided to the patient   Prior to surgery, the patient may be requested to stop all anti-inflammatory medications   Prophylactic aspirin, Plavix, and Coumadin may be allowed to be continued   Medications including vitamin E , ginkgo, and fish oil are requested to be stopped approximately one week prior to surgery   Hypertensive medications and beta blockers, if taken, should be continued  _____________________________________________________  CHIEF COMPLAINT:  Chief Complaint   Patient presents with    Left Hand - Numbness         SUBJECTIVE:  Wally Meigs is a 77 y o  male who presents with complaints of L hand n/t in the thumb, index, long and ring fingers  Patient states he has this on the R side as well, but not nearly as significant  The L side can bother him 4-5x/day, primarily when gripping the bar on his elliptical or trying to hold onto things such as his phone or gayle  No major complaints of weakness at this time  He has not tried any treatment so far  PAST MEDICAL HISTORY:  Past Medical History:   Diagnosis Date    Bronchitis, chronic obstructive, with exacerbation ([facility] [address] )     Last Assessed: 2013    COPD with acute exacerbation ([facility] [address] )     Last Assessed: 2015    Elevated PSA     Bx neg ; Last Assessed: 2012       PAST SURGICAL HISTORY:  Past Surgical History:   Procedure Laterality Date    BACK SURGERY  2012    decompression of spinal stenosis from lower thoracic through the lumbar spine    COLONOSCOPY  10/2010    neg   recheck in 5 years    KNEE ARTHROSCOPY  2013    PROSTATE BIOPSY      for elevated PSA of about 7; neg    TRANSURETHRAL RESECTION OF PROSTATE  2013       FAMILY HISTORY:  Family History   Problem Relation Age of Onset   [place]  Breast cancer Mother     Pancreatitis Father     Diabetes Maternal Grandmother     Heart attack Maternal Grandfather     Heart attack Paternal Grandmother     Diabetes Paternal Grandmother     Diabetes Paternal Grandfather        SOCIAL HISTORY:  Social History     Tobacco Use    Smoking status: Former Smoker     Packs/day: 1 00     Years: 23 00     Pack years: 23 00     Types: Cigarettes     Start date:      Last attempt to quit: 2000     Years since quittin 0    Smokeless tobacco: Never Used   Substance Use Topics    Alcohol use: No     Comment: Stopped drinking    Drug use: No       MEDICATIONS:    Current Outpatient Medications:     acetaminophen (TYLENOL) 500 mg tablet, Take 500 mg by mouth every 6 (six) hours as needed, Disp: , Rfl:     aspirin 81 mg chewable tablet, Chew 1 tablet daily, Disp: , Rfl:     CIALIS 5 MG tablet, TAKE 1 TABLET (5 MG TOTAL) BY MOUTH DAILY FOR 90 DAYS, Disp: 90 tablet, Rfl: 3    dextromethorphan-guaifenesin (MUCINEX DM)  MG per 12 hr tablet, Take 1 tablet by mouth every 12 (twelve) hours, Disp: , Rfl:     diclofenac sodium (VOLTAREN) 50 mg EC tablet, TAKE 1 TABLET (50 MG TOTAL) BY MOUTH 2 (TWO) TIMES A DAY, Disp: 180 tablet, Rfl: 1    DULoxetine (CYMBALTA) 30 mg delayed release capsule, , Disp: , Rfl:     DULoxetine (CYMBALTA) 60 mg delayed release capsule, Take 1 capsule by mouth daily, Disp: , Rfl:     fluticasone (FLONASE) 50 mcg/act nasal spray, fluticasone propionate 50 mcg/actuation nasal spray,suspension, Disp: , Rfl:     fluticasone-salmeterol (ADVAIR, WIXELA) 250-50 mcg/dose inhaler, Inhale 1 puff 2 (two) times a day Rinse mouth after use , Disp: 1 Inhaler, Rfl: 3    gabapentin (NEURONTIN) 300 mg capsule, Take 1 capsule by mouth 4 (four) times a day, Disp: , Rfl:     ipratropium-albuterol (DUO-NEB) 0 5-2 5 mg/3 mL nebulizer solution, Take 1 vial (3 mL total) by nebulization every 6 (six) hours as needed for wheezing or shortness of breath for up to 15 days, Disp: 60 vial, Rfl: 1    loratadine (CLARITIN) 10 mg tablet, Take 1 tablet by mouth daily, Disp: , Rfl:     metoprolol tartrate (LOPRESSOR) 25 mg tablet, TAKE 1 TABLET (25 MG TOTAL) BY MOUTH EVERY 12 (TWELVE) HOURS, Disp: 180 tablet, Rfl: 2    metoprolol tartrate (LOPRESSOR) 50 mg tablet, Take 1 tablet (50 mg total) by mouth 2 (two) times a day, Disp: 180 tablet, Rfl: 3    Multiple Vitamin (MULTI-VITAMIN DAILY) TABS, Take 1 tablet by mouth daily, Disp: , Rfl:     NIFEdipine (PROCARDIA XL) 90 mg 24 hr tablet, Take 1 tablet (90 mg total) by mouth daily for 357 days, Disp: 90 tablet, Rfl: 3    rosuvastatin (CRESTOR) 20 MG tablet, TAKE 2 TABLETS ON MON WED FRI  1 TAB ON TUES  THURS SAT  AND SUNDAY, Disp: 120 tablet, Rfl: 1    clonazePAM (KlonoPIN) 0 5 mg tablet, Take 1 tablet (0 5 mg total) by mouth daily at bedtime, Disp: 30 tablet, Rfl: 1    predniSONE 10 mg tablet, Take 4 tabs for 4 days, 3 tabs for 2 days, 2 tabs for 2 days and 1 tab for 2 days (Patient not taking: Reported on 1/17/2020), Disp: 28 tablet, Rfl: 0    ALLERGIES:  No Known Allergies    REVIEW OF SYSTEMS:  Pertinent items are noted in HPI  A comprehensive review of systems was negative      LABS:  HgA1c:   Lab Results   Component Value Date    HGBA1C 6 0 05/17/2019     BMP:   Lab Results   Component Value Date    GLUCOSE 99 09/08/2015    CALCIUM 9 0 09/20/2019     09/08/2015    K 4 1 09/20/2019    CO2 29 09/20/2019     09/20/2019    BUN 16 09/20/2019    CREATININE 1 08 09/20/2019         _____________________________________________________  PHYSICAL EXAMINATION:  Vital signs: /70   Pulse 61   Ht 5' 8" (1 727 m)   Wt 114 kg (251 lb)   BMI 38 16 kg/m²   General: well developed and well nourished, alert, oriented times 3 and appears comfortable  Psychiatric: Normal  HEENT: Trachea Midline, No torticollis  Cardiovascular: No discernable arrhythmia  Pulmonary: No wheezing or stridor  Skin: No masses, erythema, lacerations, fluctation, ulcerations  Neurovascular: Sensation Intact to the Median, Ulnar, Radial Nerve, Motor Intact to the Median, Ulnar, Radial Nerve and Pulses Intact    MUSCULOSKELETAL EXAMINATION:  LEFT SIDE:  Carpal tunnel:  Weakness APB (4/5), Postive Tinel's, Positive Derkin's Compression Test and Positive Phalan's Test  RIGHT SIDE:  Carpal tunnel:  Negative Derkin's Compression, Negative Phalan's test, Weakness APB (4+/5) and Postive Tinel's (mild)    Testing by hand therapy:   2 R/L = 85/81  3Jaw R/L = 15/14  TIP R/L = 14/12  Key = 20/19    2 point R = 5mm throughout  2 point L = Thumb 7mm, rest of median nerve 6mm, ulnar nerve 7mm    _____________________________________________________  STUDIES REVIEWED:  EMG: EMG shows signs of CTS b/l, moderate on the L and mild on the R         PROCEDURES PERFORMED:  Procedures  No Procedures performed today   Scribe Attestation    I,:   Macy Bence, PA-C am acting as a scribe while in the presence of the attending physician :        I,:   Jose Ceballos MD personally performed the services described in this documentation    as scribed in my presence :

## 2020-01-17 NOTE — H&P
ASSESSMENT/PLAN:    Assessment:   B/L CTS, L>R    Plan:   Pt elects L ECTR under sedation  Typical postoperative protocol/recovery d/w pt in detail today  Since the R side is not as bad on EMG or by pt's history - we will continue to monitor this - explained 30-40% chance this could improve after the L side is fixed    Follow Up: After Surgery    To Do Next Visit:  Sutures out    Operative Discussions:  Endoscopic Carpal Tunnel Release: The anatomy and physiology of carpal tunnel syndrome was discussed with the patient today  Increase pressure localized under the transverse carpal ligament can cause pain, numbness, tingling, or dysesthesias within the median nerve distribution as well as feelings of fatigue, clumsiness, or awkwardness  These symptoms typically occur at night and worse in the morning upon waking  Eventually, untreated carpal tunnel syndrome can result in weakness and permanent loss of muscle within the thenar compartment of the hand  Treatment options were discussed with the patient  Conservative treatment includes nocturnal resting splints to keep the nerve in a neutral position, ergonomic changes within the work or home environment, activity modification, and tendon gliding exercises  Steroid injections within the carpal canal can help a majority of patients, however this is often self-limited in a majority of patients  Surgical intervention to divide the transverse carpal ligament typically results in a long-lasting relief of the patient's complaints, with the recurrence rate of less than 1%  The patient has elected to undergo an endoscopic carpal tunnel release  The 2 incision technique was discussed with the patient, which results in approximately a two-week less recovery time, and less wound complications    In the postoperative period, light activities are allowed immediately, driving is allowed when narcotic medication has stopped, and the bandages may be removed and incision may get wet after 2 days  Heavy activities (lifting more than approximately 10 pounds) will be allowed after follow up appointment in 1-2 weeks  While the pain and discomfort in the hands generally improves rapidly, the numbness and tingling as well as the strength will slowly improve over weeks to months depending on the severity of the carpal tunnel syndrome  Pillar pain was discussed with the patient, which is typically a common but self-limiting condition  The risks of bleeding and infection from the surgery are less than 1%  Risk of recurrence is approximately 0 5%  The risks of nerve injury or nerve damage or damage to the blood vessels is approximately 1 in 1200  The patient has an understanding of the above mentioned discussion  The risks and benefits of the procedure were explained to the patient, which include, but are not limited to: Bleeding, infection, recurrence, pain, scar, damage to tendons, damage to nerves, and damage to blood vessels, failure to give desired results and complications related to anesthesia   These risks, along with alternative conservative treatment options, and postoperative protocols were voiced back and understood by the patient   All questions were answered to the patient's satisfaction   The patient agrees to comply with a standard postoperative protocol, and is willing to proceed   Education was provided via written and auditory forms   There were no barriers to learning   Written handouts regarding wound care, incision and scar care, and general preoperative information was provided to the patient   Prior to surgery, the patient may be requested to stop all anti-inflammatory medications   Prophylactic aspirin, Plavix, and Coumadin may be allowed to be continued   Medications including vitamin E , ginkgo, and fish oil are requested to be stopped approximately one week prior to surgery   Hypertensive medications and beta blockers, if taken, should be continued  _____________________________________________________  CHIEF COMPLAINT:  Chief Complaint   Patient presents with    Left Hand - Numbness         SUBJECTIVE:  Valentina Earl is a 77 y o  male who presents with complaints of L hand n/t in the thumb, index, long and ring fingers  Patient states he has this on the R side as well, but not nearly as significant  The L side can bother him 4-5x/day, primarily when gripping the bar on his elliptical or trying to hold onto things such as his phone or gayle  No major complaints of weakness at this time  He has not tried any treatment so far  PAST MEDICAL HISTORY:  Past Medical History:   Diagnosis Date    Bronchitis, chronic obstructive, with exacerbation ([facility] [address] )     Last Assessed: 2013    COPD with acute exacerbation ([facility] [address] )     Last Assessed: 2015    Elevated PSA     Bx neg ; Last Assessed: 2012       PAST SURGICAL HISTORY:  Past Surgical History:   Procedure Laterality Date    BACK SURGERY  2012    decompression of spinal stenosis from lower thoracic through the lumbar spine    COLONOSCOPY  10/2010    neg   recheck in 5 years    KNEE ARTHROSCOPY  2013    PROSTATE BIOPSY      for elevated PSA of about 7; neg    TRANSURETHRAL RESECTION OF PROSTATE  2013       FAMILY HISTORY:  Family History   Problem Relation Age of Onset   [facility] Breast cancer Mother     Pancreatitis Father     Diabetes Maternal Grandmother     Heart attack Maternal Grandfather     Heart attack Paternal Grandmother     Diabetes Paternal Grandmother     Diabetes Paternal Grandfather        SOCIAL HISTORY:  Social History     Tobacco Use    Smoking status: Former Smoker     Packs/day: 1 00     Years: 23 00     Pack years: 23 00     Types: Cigarettes     Start date:      Last attempt to quit: 2000     Years since quittin 0    Smokeless tobacco: Never Used   Substance Use Topics    Alcohol use: No     Comment: Stopped drinking    Drug use: No       MEDICATIONS:    Current Outpatient Medications:     acetaminophen (TYLENOL) 500 mg tablet, Take 500 mg by mouth every 6 (six) hours as needed, Disp: , Rfl:     aspirin 81 mg chewable tablet, Chew 1 tablet daily, Disp: , Rfl:     CIALIS 5 MG tablet, TAKE 1 TABLET (5 MG TOTAL) BY MOUTH DAILY FOR 90 DAYS, Disp: 90 tablet, Rfl: 3    dextromethorphan-guaifenesin (MUCINEX DM)  MG per 12 hr tablet, Take 1 tablet by mouth every 12 (twelve) hours, Disp: , Rfl:     diclofenac sodium (VOLTAREN) 50 mg EC tablet, TAKE 1 TABLET (50 MG TOTAL) BY MOUTH 2 (TWO) TIMES A DAY, Disp: 180 tablet, Rfl: 1    DULoxetine (CYMBALTA) 30 mg delayed release capsule, , Disp: , Rfl:     DULoxetine (CYMBALTA) 60 mg delayed release capsule, Take 1 capsule by mouth daily, Disp: , Rfl:     fluticasone (FLONASE) 50 mcg/act nasal spray, fluticasone propionate 50 mcg/actuation nasal spray,suspension, Disp: , Rfl:     fluticasone-salmeterol (ADVAIR, WIXELA) 250-50 mcg/dose inhaler, Inhale 1 puff 2 (two) times a day Rinse mouth after use , Disp: 1 Inhaler, Rfl: 3    gabapentin (NEURONTIN) 300 mg capsule, Take 1 capsule by mouth 4 (four) times a day, Disp: , Rfl:     ipratropium-albuterol (DUO-NEB) 0 5-2 5 mg/3 mL nebulizer solution, Take 1 vial (3 mL total) by nebulization every 6 (six) hours as needed for wheezing or shortness of breath for up to 15 days, Disp: 60 vial, Rfl: 1    loratadine (CLARITIN) 10 mg tablet, Take 1 tablet by mouth daily, Disp: , Rfl:     metoprolol tartrate (LOPRESSOR) 25 mg tablet, TAKE 1 TABLET (25 MG TOTAL) BY MOUTH EVERY 12 (TWELVE) HOURS, Disp: 180 tablet, Rfl: 2    metoprolol tartrate (LOPRESSOR) 50 mg tablet, Take 1 tablet (50 mg total) by mouth 2 (two) times a day, Disp: 180 tablet, Rfl: 3    Multiple Vitamin (MULTI-VITAMIN DAILY) TABS, Take 1 tablet by mouth daily, Disp: , Rfl:     NIFEdipine (PROCARDIA XL) 90 mg 24 hr tablet, Take 1 tablet (90 mg total) by mouth daily for 357 days, Disp: 90 tablet, Rfl: 3    rosuvastatin (CRESTOR) 20 MG tablet, TAKE 2 TABLETS ON MON WED FRI  1 TAB ON TUES  THURS SAT  AND SUNDAY, Disp: 120 tablet, Rfl: 1    clonazePAM (KlonoPIN) 0 5 mg tablet, Take 1 tablet (0 5 mg total) by mouth daily at bedtime, Disp: 30 tablet, Rfl: 1    predniSONE 10 mg tablet, Take 4 tabs for 4 days, 3 tabs for 2 days, 2 tabs for 2 days and 1 tab for 2 days (Patient not taking: Reported on 1/17/2020), Disp: 28 tablet, Rfl: 0    ALLERGIES:  No Known Allergies    REVIEW OF SYSTEMS:  Pertinent items are noted in HPI  A comprehensive review of systems was negative      LABS:  HgA1c:   Lab Results   Component Value Date    HGBA1C 6 0 05/17/2019     BMP:   Lab Results   Component Value Date    GLUCOSE 99 09/08/2015    CALCIUM 9 0 09/20/2019     09/08/2015    K 4 1 09/20/2019    CO2 29 09/20/2019     09/20/2019    BUN 16 09/20/2019    CREATININE 1 08 09/20/2019         _____________________________________________________  PHYSICAL EXAMINATION:  Vital signs: /70   Pulse 61   Ht 5' 8" (1 727 m)   Wt 114 kg (251 lb)   BMI 38 16 kg/m²    General: well developed and well nourished, alert, oriented times 3 and appears comfortable  Psychiatric: Normal  HEENT: Trachea Midline, No torticollis  Cardiovascular: No discernable arrhythmia  Pulmonary: No wheezing or stridor  Skin: No masses, erythema, lacerations, fluctation, ulcerations  Neurovascular: Sensation Intact to the Median, Ulnar, Radial Nerve, Motor Intact to the Median, Ulnar, Radial Nerve and Pulses Intact    MUSCULOSKELETAL EXAMINATION:  LEFT SIDE:  Carpal tunnel:  Weakness APB (4/5), Postive Tinel's, Positive Derkin's Compression Test and Positive Phalan's Test  RIGHT SIDE:  Carpal tunnel:  Negative Derkin's Compression, Negative Phalan's test, Weakness APB (4+/5) and Postive Tinel's (mild)    Testing by hand therapy:   2 R/L = 85/81  3Jaw R/L = 15/14  TIP R/L = 14/12  Key = 20/19    2 point R = 5mm throughout  2 point L = Thumb 7mm, rest of median nerve 6mm, ulnar nerve 7mm    _____________________________________________________  STUDIES REVIEWED:  EMG: EMG shows signs of CTS b/l, moderate on the L and mild on the R         PROCEDURES PERFORMED:  Procedures  No Procedures performed today

## 2020-01-20 ENCOUNTER — HOSPITAL ENCOUNTER (OUTPATIENT)
Dept: RADIOLOGY | Facility: CLINIC | Age: 70
Discharge: HOME | End: 2020-01-20
Attending: ANESTHESIOLOGY | Admitting: ANESTHESIOLOGY
Payer: MEDICARE

## 2020-01-20 VITALS
SYSTOLIC BLOOD PRESSURE: 130 MMHG | OXYGEN SATURATION: 96 % | HEART RATE: 59 BPM | RESPIRATION RATE: 20 BRPM | TEMPERATURE: 98.1 F | DIASTOLIC BLOOD PRESSURE: 79 MMHG

## 2020-01-20 DIAGNOSIS — M54.16 LUMBAR RADICULOPATHY: ICD-10-CM

## 2020-01-20 PROCEDURE — 64483 NJX AA&/STRD TFRM EPI L/S 1: CPT | Performed by: ANESTHESIOLOGY

## 2020-01-20 RX ORDER — LIDOCAINE HYDROCHLORIDE 10 MG/ML
5 INJECTION, SOLUTION EPIDURAL; INFILTRATION; INTRACAUDAL; PERINEURAL ONCE
Status: COMPLETED | OUTPATIENT
Start: 2020-01-20 | End: 2020-01-20

## 2020-01-20 RX ORDER — PAPAVERINE HCL 150 MG
20 CAPSULE, EXTENDED RELEASE ORAL ONCE
Status: COMPLETED | OUTPATIENT
Start: 2020-01-20 | End: 2020-01-20

## 2020-01-20 RX ADMIN — IOHEXOL 2 ML: 300 INJECTION, SOLUTION INTRAVENOUS at 10:01

## 2020-01-20 RX ADMIN — LIDOCAINE HYDROCHLORIDE 4 ML: 10 INJECTION, SOLUTION EPIDURAL; INFILTRATION; INTRACAUDAL; PERINEURAL at 09:58

## 2020-01-20 RX ADMIN — LIDOCAINE HYDROCHLORIDE 2 ML: 20 INJECTION, SOLUTION EPIDURAL; INFILTRATION; INTRACAUDAL; PERINEURAL at 10:01

## 2020-01-20 RX ADMIN — DEXAMETHASONE SODIUM PHOSPHATE 15 MG: 10 INJECTION, SOLUTION INTRAMUSCULAR; INTRAVENOUS at 10:02

## 2020-01-20 NOTE — DISCHARGE INSTR - LAB
Epidural Steroid Injection   WHAT YOU NEED TO KNOW:   An epidural steroid injection (DEN) is a procedure to inject steroid medicine into the epidural space  The epidural space is between your spinal cord and vertebrae  Steroids reduce inflammation and fluid buildup in your spine that may be causing pain  You may be given pain medicine along with the steroids  ACTIVITY  · Do not drive or operate machinery today  · No strenuous activity today - bending, lifting, etc   · You may resume normal activites starting tomorrow - start slowly and as tolerated  · You may shower today, but no tub baths or hot tubs  · You may have numbness for several hours from the local anesthetic  Please use caution and common sense, especially with weight-bearing activities  CARE OF THE INJECTION SITE  · If you have soreness or pain, apply ice to the area today (20 minutes on/20 minutes off)  · Starting tomorrow, you may use warm, moist heat or ice if needed  · You may have an increase or change in your discomfort for 36-48 hours after your treatment  · Apply ice and continue with any pain medication you have been prescribed  · Notify the [facility] if you have any of the following: redness, drainage, swelling, headache, stiff neck or fever above 100°F     SPECIAL INSTRUCTIONS  · Our office will contact you in approximately 7 days for a progress report  MEDICATIONS  · Continue to take all routine medications  · Our office may have instructed you to hold some medications  If you have a problem specifically related to your procedure, please call our office at [phone]  Problems not related to your procedure should be directed to your primary care physician

## 2020-01-20 NOTE — H&P
History of Present Illness: The patient is a 77 y o  male who presents with complaints of low back and leg pain  Patient Active Problem List   Diagnosis    Allergic rhinitis    Enlarged prostate with lower urinary tract symptoms (LUTS)    Chronic bronchitis (HCC)    Depression, controlled    Hyperlipidemia    Hypertension    Impaired fasting glucose    Lung nodule    Male erectile dysfunction    Multifocal atrial tachycardia (HCC)    Obesity (BMI 30-39  9)    Osteoarthritis of knee    Osteoarthritis of hip    Seasonal allergies    Spinal stenosis of lumbar region with neurogenic claudication    Supraventricular tachycardia (HCC)    Anxiety and depression    Arthritis of left hip    COPD (chronic obstructive pulmonary disease) (HCC)    Elevated prostate specific antigen (PSA)    Lumbosacral spondylosis without myelopathy    Incomplete emptying of bladder    Retention of urine    Screening for prostate cancer    Thoracic or lumbosacral neuritis or radiculitis    Urethral stricture    Spondylolisthesis, acquired    Degeneration of lumbosacral intervertebral disc    Full thickness rotator cuff tear    Knee pain    Localized, primary osteoarthritis    Primary localized osteoarthritis of pelvic region and thigh    Low back pain    Lumbosacral neuritis    Pain in limb    Pseudoclaudication syndrome    Shoulder pain    Cervical stenosis of spinal canal    Subclinical hypothyroidism    Lumbar radiculopathy    Chronic pain syndrome    Carpal tunnel syndrome, bilateral    Status post lumbar and lumbosacral fusion by anterior technique       Past Medical History:   Diagnosis Date    Bronchitis, chronic obstructive, with exacerbation ([address] )     Last Assessed: 5/17/2013    COPD with acute exacerbation ([address] )     Last Assessed: 4/13/2015    Elevated PSA     Bx neg 2012; Last Assessed: 11/23/2012       Past Surgical History:   Procedure Laterality Date    BACK SURGERY  07/25/2012 decompression of spinal stenosis from lower thoracic through the lumbar spine    COLONOSCOPY  10/2010    neg   recheck in 5 years    KNEE ARTHROSCOPY  01/2013    PROSTATE BIOPSY  2012    for elevated PSA of about 7; neg    TRANSURETHRAL RESECTION OF PROSTATE  06/27/2013         Current Outpatient Medications:     acetaminophen (TYLENOL) 500 mg tablet, Take 500 mg by mouth every 6 (six) hours as needed, Disp: , Rfl:     aspirin 81 mg chewable tablet, Chew 1 tablet daily, Disp: , Rfl:     CIALIS 5 MG tablet, TAKE 1 TABLET (5 MG TOTAL) BY MOUTH DAILY FOR 90 DAYS, Disp: 90 tablet, Rfl: 3    clonazePAM (KlonoPIN) 0 5 mg tablet, Take 1 tablet (0 5 mg total) by mouth daily at bedtime, Disp: 30 tablet, Rfl: 1    dextromethorphan-guaifenesin (MUCINEX DM)  MG per 12 hr tablet, Take 1 tablet by mouth every 12 (twelve) hours, Disp: , Rfl:     diclofenac sodium (VOLTAREN) 50 mg EC tablet, TAKE 1 TABLET (50 MG TOTAL) BY MOUTH 2 (TWO) TIMES A DAY, Disp: 180 tablet, Rfl: 1    DULoxetine (CYMBALTA) 30 mg delayed release capsule, , Disp: , Rfl:     DULoxetine (CYMBALTA) 60 mg delayed release capsule, Take 1 capsule by mouth daily, Disp: , Rfl:     fluticasone (FLONASE) 50 mcg/act nasal spray, fluticasone propionate 50 mcg/actuation nasal spray,suspension, Disp: , Rfl:     fluticasone-salmeterol (ADVAIR, WIXELA) 250-50 mcg/dose inhaler, Inhale 1 puff 2 (two) times a day Rinse mouth after use , Disp: 1 Inhaler, Rfl: 3    gabapentin (NEURONTIN) 300 mg capsule, Take 1 capsule by mouth 4 (four) times a day, Disp: , Rfl:     ipratropium-albuterol (DUO-NEB) 0 5-2 5 mg/3 mL nebulizer solution, Take 1 vial (3 mL total) by nebulization every 6 (six) hours as needed for wheezing or shortness of breath for up to 15 days, Disp: 60 vial, Rfl: 1    loratadine (CLARITIN) 10 mg tablet, Take 1 tablet by mouth daily, Disp: , Rfl:     metoprolol tartrate (LOPRESSOR) 25 mg tablet, TAKE 1 TABLET (25 MG TOTAL) BY MOUTH EVERY 12 (TWELVE) HOURS, Disp: 180 tablet, Rfl: 2    metoprolol tartrate (LOPRESSOR) 50 mg tablet, Take 1 tablet (50 mg total) by mouth 2 (two) times a day, Disp: 180 tablet, Rfl: 3    Multiple Vitamin (MULTI-VITAMIN DAILY) TABS, Take 1 tablet by mouth daily, Disp: , Rfl:     NIFEdipine (PROCARDIA XL) 90 mg 24 hr tablet, Take 1 tablet (90 mg total) by mouth daily for 357 days, Disp: 90 tablet, Rfl: 3    rosuvastatin (CRESTOR) 20 MG tablet, TAKE 2 TABLETS ON MON WED FRI  1 TAB ON TUES  THURS SAT  AND SUNDAY, Disp: 120 tablet, Rfl: 1    No Known Allergies    Physical Exam:   Vitals:    01/20/20 0922   BP: 116/72   Pulse: 57   Resp: 20   Temp: 98 1 °F (36 7 °C)   SpO2: 96%     General: Awake, Alert, Oriented x 3, Mood and affect appropriate  Respiratory: Respirations even and unlabored  Cardiovascular: Peripheral pulses intact; no edema  Musculoskeletal Exam:  Bilateral lumbar paraspinals tender to palpation  ASA Score: 3    Patient/Chart Verification  Patient ID Verified: Verbal  ID Band Applied: No  Consents Confirmed: Procedural  H&P( within 30 days) Verified: To be obtained in the Pre-Procedure area  Interval H&P(within 24 hr) Complete (required for Outpatients and Surgery Admit only): To be obtained in the Pre-Procedure area  Allergies Reviewed: Yes  Anticoag/NSAID held?: No  Currently on antibiotics?: No    Assessment:   1   Lumbar radiculopathy        Plan: Repeat B/L S1 TFESI

## 2020-01-22 ENCOUNTER — OFFICE VISIT (OUTPATIENT)
Dept: PAIN MEDICINE | Facility: CLINIC | Age: 70
End: 2020-01-22
Payer: MEDICARE

## 2020-01-22 VITALS
HEIGHT: 68 IN | HEART RATE: 80 BPM | DIASTOLIC BLOOD PRESSURE: 67 MMHG | BODY MASS INDEX: 38.16 KG/M2 | SYSTOLIC BLOOD PRESSURE: 118 MMHG

## 2020-01-22 DIAGNOSIS — M54.16 LUMBAR RADICULOPATHY: ICD-10-CM

## 2020-01-22 DIAGNOSIS — G89.4 CHRONIC PAIN SYNDROME: Primary | ICD-10-CM

## 2020-01-22 DIAGNOSIS — Z01.818 PREPROCEDURAL EXAMINATION: ICD-10-CM

## 2020-01-22 PROCEDURE — 99214 OFFICE O/P EST MOD 30 MIN: CPT | Performed by: NURSE PRACTITIONER

## 2020-01-22 NOTE — PROGRESS NOTES
Assessment:  1  Chronic pain syndrome    2  Preprocedural examination    3  Lumbar radiculopathy        Plan:  Patient continues to complain of low back pain that radiates into the bilateral lower extremities  He has had bilateral S1 TFESI which provided significant relief for only 3-4 days  He does state that oral steroids significantly improve his pain as well, however again short lived  He is currently on gabapentin and duloxetine by psychiatry, however states neither these medications are providing any significant pain relief  He was evaluated by both Dr Callum Chawla and Dr Bethany Asher who did not appreciate surgical pathology and suggested that he consider a spinal cord stimulator trial which the patient presents to the office today to further discuss and consider  1   I had a thorough conversation with the patient regarding a spinal cord stimulator trial and possible permanent implantation  I explained that there is a pre-authorization process, including an education class and psychological evaluation that must be completed before we can consider proceeding with the spinal cord stimulator trial  While the patient was in the office today, the [facility] medical release form was completed and signed by the patient  The patient will be scheduled for the education class and was given prescriptions for a psychological evaluation/clearance for the procedure as well as an MRI of the thoracic spine to rule out any significant stenosis that would prevent lead passage  I encouraged the patient to go to the class and complete the psychological evaluation as soon as possible, as these are the key elements in proceeding with the pre-authorization of the spinal cord stimulation trial procedure      I also had a thorough conversation with the patient and explained that overall the expectations of the spinal cord stimulator are not to cure the pain, but rather to at least provide moderate/stable relief, allowing the patient to be more active and functional on a daily basis  I stressed that the goal of the stimulator and our treatment is NOT to provide 100% relief of their pain, but, hopefully at least 50% relief or more  I also explained that if they have a successful trial and eventually proceed with a permanent implant, that utilizing the full potential of the stimulator can require continuous re-programming and education and I explained that the stimulator is/will be an ongoing and evolving learning process for the patient  The patient was agreeable and verbalized an understanding  2  The patient may continue gabapentin and Cymbalta as prescribed by psychiatry  3  The patient will continue with his home exercise program  4  The patient will follow-up for his spinal cord stimulator trial once all prerequisites have been met    M*Modal software was used to dictate this note  It may contain errors with dictating incorrect words or incorrect spelling  Please contact the provider directly with any questions  I attest that I have spent at least 25 minutes face to face with the patient and that at least 50% of the time was spent educating and/or discussing the patient's symptoms and treatment plan options  History of Present Illness: The patient is a 77 y o  male with a history of 2 previous lumbar surgeries including L4-5 ALIF in 2015 last seen on 12/6/19 who presents for a follow up office visit in regards to chronic lumbosacral back pain that radiates into the posterior aspect of the bilateral lower extremities with associated numbness and paresthesias and subjective weakness  The patient has had bilateral S1 TFESI x2 without any significant sustainable relief  He was evaluated by both Dr Grisel Cornell and Dr Pieter Reddy who did not appreciate surgical pathology and recommended that he discuss having a spinal cord stimulator trial with our office    He is currently taking gabapentin 1500 mg daily and duloxetine 90 mg daily as prescribed by psychiatry for mood, however he states it is not providing any relief to his pain  Of note, the patient is having a carpal tunnel release with Orthopedics on March 5, 2020 for carpal tunnel syndrome    The patient currently rates his pain as 0/10 on the numeric pain rating scale, however states when he ambulates, his pain will be 6/10 on the numeric pain rating scale  States the pain is occasional in nature and follows no particular pattern throughout the day  He characterizes the pain as dull aching, pressure like, numbness and pins and needles  I have personally reviewed and/or updated the patient's past medical history, past surgical history, family history, social history, current medications, allergies, and vital signs today  Review of Systems:    Review of Systems      Past Medical History:   Diagnosis Date    Bronchitis, chronic obstructive, with exacerbation ([facility] [address] )     Last Assessed: 5/17/2013    COPD with acute exacerbation ([facility] [address] )     Last Assessed: 4/13/2015    Elevated PSA     Bx neg 2012; Last Assessed: 11/23/2012       Past Surgical History:   Procedure Laterality Date    BACK SURGERY  07/25/2012    decompression of spinal stenosis from lower thoracic through the lumbar spine    COLONOSCOPY  10/2010    neg   recheck in 5 years    KNEE ARTHROSCOPY  01/2013    PROSTATE BIOPSY  2012    for elevated PSA of about 7; neg    TRANSURETHRAL RESECTION OF PROSTATE  06/27/2013       Family History   Problem Relation Age of Onset    Breast cancer Mother     Pancreatitis Father     Diabetes Maternal Grandmother     Heart attack Maternal Grandfather     Heart attack Paternal Grandmother     Diabetes Paternal Grandmother     Diabetes Paternal Grandfather        Social History     Occupational History    Not on file   Tobacco Use    Smoking status: Former Smoker     Packs/day: 1 00     Years: 23 00     Pack years: 23 00     Types: Cigarettes     Start date: 1977     Last attempt to quit: 2000     Years since quittin 0    Smokeless tobacco: Never Used   Substance and Sexual Activity    Alcohol use: No     Comment: Stopped drinking    Drug use: No    Sexual activity: Not on file         Current Outpatient Medications:     acetaminophen (TYLENOL) 500 mg tablet, Take 500 mg by mouth every 6 (six) hours as needed, Disp: , Rfl:     aspirin 81 mg chewable tablet, Chew 1 tablet daily, Disp: , Rfl:     CIALIS 5 MG tablet, TAKE 1 TABLET (5 MG TOTAL) BY MOUTH DAILY FOR 90 DAYS, Disp: 90 tablet, Rfl: 3    dextromethorphan-guaifenesin (MUCINEX DM)  MG per 12 hr tablet, Take 1 tablet by mouth every 12 (twelve) hours, Disp: , Rfl:     diclofenac sodium (VOLTAREN) 50 mg EC tablet, TAKE 1 TABLET (50 MG TOTAL) BY MOUTH 2 (TWO) TIMES A DAY, Disp: 180 tablet, Rfl: 1    DULoxetine (CYMBALTA) 30 mg delayed release capsule, , Disp: , Rfl:     DULoxetine (CYMBALTA) 60 mg delayed release capsule, Take 1 capsule by mouth daily, Disp: , Rfl:     fluticasone (FLONASE) 50 mcg/act nasal spray, fluticasone propionate 50 mcg/actuation nasal spray,suspension, Disp: , Rfl:     fluticasone-salmeterol (ADVAIR, WIXELA) 250-50 mcg/dose inhaler, Inhale 1 puff 2 (two) times a day Rinse mouth after use , Disp: 1 Inhaler, Rfl: 3    gabapentin (NEURONTIN) 300 mg capsule, Take 1 capsule by mouth 4 (four) times a day, Disp: , Rfl:     loratadine (CLARITIN) 10 mg tablet, Take 1 tablet by mouth daily, Disp: , Rfl:     metoprolol tartrate (LOPRESSOR) 25 mg tablet, TAKE 1 TABLET (25 MG TOTAL) BY MOUTH EVERY 12 (TWELVE) HOURS, Disp: 180 tablet, Rfl: 2    metoprolol tartrate (LOPRESSOR) 50 mg tablet, Take 1 tablet (50 mg total) by mouth 2 (two) times a day, Disp: 180 tablet, Rfl: 3    Multiple Vitamin (MULTI-VITAMIN DAILY) TABS, Take 1 tablet by mouth daily, Disp: , Rfl:     NIFEdipine (PROCARDIA XL) 90 mg 24 hr tablet, Take 1 tablet (90 mg total) by mouth daily for 357 days, Disp: 90 tablet, Rfl: 3    rosuvastatin (CRESTOR) 20 MG tablet, TAKE 2 TABLETS ON MON WED FRI  1 TAB ON TUES THURS SAT  AND SUNDAY, Disp: 120 tablet, Rfl: 1    clonazePAM (KlonoPIN) 0 5 mg tablet, Take 1 tablet (0 5 mg total) by mouth daily at bedtime, Disp: 30 tablet, Rfl: 1    No Known Allergies    Physical Exam:    /67   Pulse 80   Ht 5' 8" (1 727 m)   BMI 38 16 kg/m²     Constitutional:normal, well developed, well nourished, alert, in no distress and non-toxic and no overt pain behavior  Eyes:anicteric  HEENT:grossly intact  Neck:supple, symmetric, trachea midline and no masses   Pulmonary:even and unlabored  Cardiovascular:No edema or pitting edema present  Skin:Normal without rashes or lesions and well hydrated  Psychiatric:Mood and affect appropriate  Neurologic:Cranial Nerves II-XII grossly intact  Musculoskeletal:Slightly antalgic gait but steady without the use of assistive devices      Imaging  MRI thoracic spine without contrast    (Results Pending)     LUMBAR SPINE     INDICATION:   M48 062: Spinal stenosis, lumbar region with neurogenic claudication  M43 10: Spondylolisthesis, site unspecified      COMPARISON:  9/19/2019, MR lumbar spine 10/25/2019     VIEWS:  XR LUMBAR SP/BENDING ONLY MIN 2-3 V  Images: 3 (lateral projections in neutral, flexion and extension)     FINDINGS:      Transitional lumbar anatomy is seen  In keeping with previous reporting, there is lumbarization of S1 seen  Anterior fusion of L5-S1 is again identified  Hardware appears intact      Grade 1 anterolisthesis L4 on L5, not significant changed during flexion or extension      There is no evidence of acute fracture or destructive osseous lesion       Advanced multilevel degenerative disc disease with bulky marginal end plate osteophytes throughout the lumbar spine and a lesser extent lower thoracic region      The pedicles appear intact      There are atherosclerotic calcifications   Soft tissues are otherwise unremarkable      IMPRESSION:     Stable appearance of anterior fusion L5-S1    Transitional lumbosacral anatomy as above      Grade 1 anterolisthesis L4 on L5, not significant changed during flexion or extension      Advanced multilevel lumbar degenerative changes      Workstation performed: [id number]    Orders Placed This Encounter   Procedures    MRI thoracic spine without contrast    Ambulatory referral to Psychology

## 2020-01-24 ENCOUNTER — TELEPHONE (OUTPATIENT)
Dept: ENDOCRINOLOGY | Facility: CLINIC | Age: 70
End: 2020-01-24

## 2020-01-27 ENCOUNTER — TELEPHONE (OUTPATIENT)
Dept: PAIN MEDICINE | Facility: CLINIC | Age: 70
End: 2020-01-27

## 2020-01-27 NOTE — TELEPHONE ENCOUNTER
Pt is returning call stating that his pain was better two days after but now it went back to how it was before  Pain level depends if he is sitting he has no pain but he cannot walk more than a block without a cane   The longer he stands or walks the higher his pain goes     Pt can be reached at [phone]

## 2020-01-27 NOTE — TELEPHONE ENCOUNTER
Pt scheduled for education on 2/27/2020 @ 1400 in [place] office  Email sent to [employer] to inform them

## 2020-01-27 NOTE — TELEPHONE ENCOUNTER
Patient to be an [employer] SCS Trial with Dr Oneyda Sotomayor  Patient signed release of info  Pt received scripts for thoracic MRI and psych eval  Spoke with patient to schedule education, patient will call me back to schedule  Once I have psych eval I will send all clinical information for authorization

## 2020-01-28 NOTE — TELEPHONE ENCOUNTER
Will see how the patient does over the next week as it can take up to 2 weeks for him to notice full effect of injection

## 2020-01-30 ENCOUNTER — OFFICE VISIT (OUTPATIENT)
Dept: ENDOCRINOLOGY | Facility: CLINIC | Age: 70
End: 2020-01-30
Payer: MEDICARE

## 2020-01-30 VITALS
HEIGHT: 68 IN | WEIGHT: 247 LBS | BODY MASS INDEX: 37.44 KG/M2 | HEART RATE: 66 BPM | DIASTOLIC BLOOD PRESSURE: 66 MMHG | SYSTOLIC BLOOD PRESSURE: 114 MMHG

## 2020-01-30 DIAGNOSIS — I10 ESSENTIAL HYPERTENSION: ICD-10-CM

## 2020-01-30 DIAGNOSIS — R73.03 PREDIABETES: ICD-10-CM

## 2020-01-30 DIAGNOSIS — E78.5 HYPERLIPIDEMIA, UNSPECIFIED HYPERLIPIDEMIA TYPE: ICD-10-CM

## 2020-01-30 DIAGNOSIS — E03.8 SUBCLINICAL HYPOTHYROIDISM: Primary | ICD-10-CM

## 2020-01-30 LAB — SL AMB POCT HEMOGLOBIN AIC: 6.3 (ref ?–6.5)

## 2020-01-30 PROCEDURE — 4040F PNEUMOC VAC/ADMIN/RCVD: CPT | Performed by: INTERNAL MEDICINE

## 2020-01-30 PROCEDURE — 1036F TOBACCO NON-USER: CPT | Performed by: INTERNAL MEDICINE

## 2020-01-30 PROCEDURE — 99214 OFFICE O/P EST MOD 30 MIN: CPT | Performed by: INTERNAL MEDICINE

## 2020-01-30 PROCEDURE — 3078F DIAST BP <80 MM HG: CPT | Performed by: INTERNAL MEDICINE

## 2020-01-30 PROCEDURE — 83036 HEMOGLOBIN GLYCOSYLATED A1C: CPT | Performed by: INTERNAL MEDICINE

## 2020-01-30 PROCEDURE — 1160F RVW MEDS BY RX/DR IN RCRD: CPT | Performed by: INTERNAL MEDICINE

## 2020-01-30 PROCEDURE — 3074F SYST BP LT 130 MM HG: CPT | Performed by: INTERNAL MEDICINE

## 2020-01-30 RX ORDER — METFORMIN HYDROCHLORIDE 500 MG/1
500 TABLET, EXTENDED RELEASE ORAL
Qty: 90 TABLET | Refills: 0 | Status: SHIPPED | OUTPATIENT
Start: 2020-01-30 | End: 2020-04-20

## 2020-01-30 NOTE — PROGRESS NOTES
Eitan Muhammad 77 y o  male MRN: [id number]    Encounter: [id number]      Assessment/Plan     Assessment: This is a 77y o -year-old male with pre-diabetes and subclinical hypothyroidism   Plan:    Diagnoses and all orders for this visit:    Subclinical hypothyroidism  Patient is clinically asymptomatic  Discussed to do TSH and free T4 now, and call to review  Will order TSH and free T4 before next appointment    -     T4, free; Future  -     TSH, 3rd generation; Future  -     T4, free; Future  -     TSH, 3rd generation; Future    Prediabetes  In the office his A1c 6 3% which is suggestive of prediabetes  Educated about lifestyle modifications, including diet and exercise  Also discussed the option of metformin, which is used as off-label in treatment of prediabetes and patient agreed to take it  Will start metformin extended release 500 mg with dinner  Will check A1c in 3 months along with basic metabolic profile  -     Basic metabolic panel; Future  -     Hemoglobin A1C; Future  -     POCT hemoglobin A1c  -     metFORMIN (GLUCOPHAGE-XR) 500 mg 24 hr tablet; Take 1 tablet (500 mg total) by mouth daily with dinner  -     Hemoglobin A1C; Future  -     Basic metabolic panel; Future    Essential hypertension  Continue current management  Blood pressure well controlled  Hyperlipidemia, unspecified hyperlipidemia type  Continue statins Crestor      CC:   Pre-diabetes, Subclinical hypothyroidism     History of Present Illness     HPI:  Patient is 77year old gentleman for follow-up abnormal thyroid function test which was suggestive of subclinical hypothyroidism  He did not complete thyroid blood work before this appointment  Over the past 2-3 years patient has slightly elevated TSH with normal free T4 level    He has negative thyroid antibodies  No history of external radiation to the head/neck or chest area  No family history of thyroid cancer  He does not take any herbal medications or supplementation    He also has history of prediabetes, HIS currently not taking any medications for pre diabetes  His A1c is 6% in May 2019  Currently not following diet   He was given steroids recently for Bronchitis   A1c is 6 3% in office today         Results for Laura Leon (MRN [id number]) as of 1/30/2020 10:26   Ref  Range 9/19/2017 09:53 2/28/2018 07:22 6/21/2018 07:59 9/4/2018 07:50 5/17/2019 07:46   TSH 3RD GENERATON Latest Ref Range: 0 358 - 3 740 uIU/mL 4 130 (H) 7 190 (H) 4 890 (H) 7 220 (H) 4 450 (H)     Lab Results   Component Value Date    HGBA1C 6 7 (H) 01/31/2020         Component      Latest Ref Rng & Units 5/17/2019 5/17/2019 9/20/2019           7:46 AM  7:46 AM    Sodium      136 - 145 mmol/L   137   Potassium      3 5 - 5 3 mmol/L   4 1   Chloride      100 - 108 mmol/L   105   CO2      21 - 32 mmol/L   29   Anion Gap      4 - 13 mmol/L   3 (L)   BUN      5 - 25 mg/dL   16   Creatinine      0 60 - 1 30 mg/dL   1 08   GLUCOSE FASTING      65 - 99 mg/dL   105 (H)   Calcium      8 3 - 10 1 mg/dL   9 0   AST      5 - 45 U/L   17   ALT      12 - 78 U/L   23   Alkaline Phosphatase      46 - 116 U/L   66   Total Protein      6 4 - 8 2 g/dL   7 3   Albumin      3 5 - 5 0 g/dL   3 8   TOTAL BILIRUBIN      0 20 - 1 00 mg/dL   0 58   eGFR      ml/min/1 73sq m   70   Cholesterol      50 - 200 mg/dL   163   Triglycerides      <=150 mg/dL   131   HDL      40 - 60 mg/dL   40   LDL Direct      0 - 100 mg/dL   97   Hemoglobin A1C      4 2 - 6 3 % 6 0     EAG      mg/dl 126     TSH 3RD GENERATON      0 358 - 3 740 uIU/mL 4 450 (H)     Free T4      0 76 - 1 46 ng/dL 1 07 1 07      Review of Systems   Constitutional: Negative for activity change, diaphoresis, fatigue, fever and unexpected weight change  HENT: Negative  Eyes: Negative for visual disturbance  Respiratory: Negative for cough, chest tightness and shortness of breath  Cardiovascular: Negative for chest pain, palpitations and leg swelling  Gastrointestinal: Negative for abdominal pain, diarrhea, nausea and vomiting  Endocrine: Negative for cold intolerance, heat intolerance, polydipsia, polyphagia and polyuria  Genitourinary: Negative for dysuria, enuresis, frequency and urgency  Musculoskeletal: Positive for back pain and myalgias  Negative for arthralgias  Skin: Negative for pallor, rash and wound  Allergic/Immunologic: Negative  Neurological: Negative for dizziness, tremors, weakness and numbness  Hematological: Negative  Psychiatric/Behavioral: Negative  Historical Information   Past Medical History:   Diagnosis Date    Bronchitis, chronic obstructive, with exacerbation ([facility] )     Last Assessed: 2013    COPD with acute exacerbation ([facility] )     Last Assessed: 2015    Elevated PSA     Bx neg ; Last Assessed: 2012     Past Surgical History:   Procedure Laterality Date    BACK SURGERY  2012    decompression of spinal stenosis from lower thoracic through the lumbar spine    COLONOSCOPY  10/2010    neg   recheck in 5 years    KNEE ARTHROSCOPY  2013    PROSTATE BIOPSY      for elevated PSA of about 7; neg    TRANSURETHRAL RESECTION OF PROSTATE  2013     Social History   Social History     Substance and Sexual Activity   Alcohol Use No    Comment: Stopped drinking     Social History     Substance and Sexual Activity   Drug Use No     Social History     Tobacco Use   Smoking Status Former Smoker    Packs/day: 1 00    Years: 23 00    Pack years: 23 00    Types: Cigarettes    Start date: 0    Last attempt to quit: 2000    Years since quittin 1   Smokeless Tobacco Never Used     Family History:   Family History   Problem Relation Age of Onset    Breast cancer Mother     Pancreatitis Father     Diabetes Maternal Grandmother     Heart attack Maternal Grandfather     Heart attack Paternal Grandmother     Diabetes Paternal Grandmother     Diabetes Paternal Grandfather Meds/Allergies   Current Outpatient Medications   Medication Sig Dispense Refill    acetaminophen (TYLENOL) 500 mg tablet Take 500 mg by mouth every 6 (six) hours as needed      aspirin 81 mg chewable tablet Chew 1 tablet daily      CIALIS 5 MG tablet TAKE 1 TABLET (5 MG TOTAL) BY MOUTH DAILY FOR 90 DAYS 90 tablet 3    clonazePAM (KlonoPIN) 0 5 mg tablet Take 1 tablet (0 5 mg total) by mouth daily at bedtime 30 tablet 1    dextromethorphan-guaifenesin (MUCINEX DM)  MG per 12 hr tablet Take 1 tablet by mouth every 12 (twelve) hours      diclofenac sodium (VOLTAREN) 50 mg EC tablet TAKE 1 TABLET (50 MG TOTAL) BY MOUTH 2 (TWO) TIMES A  tablet 1    DULoxetine (CYMBALTA) 30 mg delayed release capsule       DULoxetine (CYMBALTA) 60 mg delayed release capsule Take 1 capsule by mouth daily      fluticasone (FLONASE) 50 mcg/act nasal spray fluticasone propionate 50 mcg/actuation nasal spray,suspension      fluticasone-salmeterol (ADVAIR, WIXELA) 250-50 mcg/dose inhaler Inhale 1 puff 2 (two) times a day Rinse mouth after use  1 Inhaler 3    gabapentin (NEURONTIN) 300 mg capsule Take 1 capsule by mouth 4 (four) times a day       loratadine (CLARITIN) 10 mg tablet Take 1 tablet by mouth daily      metoprolol tartrate (LOPRESSOR) 25 mg tablet TAKE 1 TABLET (25 MG TOTAL) BY MOUTH EVERY 12 (TWELVE) HOURS 180 tablet 2    metoprolol tartrate (LOPRESSOR) 50 mg tablet Take 1 tablet (50 mg total) by mouth 2 (two) times a day 180 tablet 3    Multiple Vitamin (MULTI-VITAMIN DAILY) TABS Take 1 tablet by mouth daily      NIFEdipine (PROCARDIA XL) 90 mg 24 hr tablet Take 1 tablet (90 mg total) by mouth daily for 357 days 90 tablet 3    rosuvastatin (CRESTOR) 20 MG tablet TAKE 2 TABLETS ON MON WED FRI  1 TAB ON TUES  THURS SAT  AND SUNDAY (Patient taking differently: 40 mg TAKE 2 TABLETS ON MON WED FRI  1 TAB ON TUES  THURS SAT   AND SUNDAY) 120 tablet 1    metFORMIN (GLUCOPHAGE-XR) 500 mg 24 hr tablet Take 1 tablet (500 mg total) by mouth daily with dinner 90 tablet 0     No current facility-administered medications for this visit  No Known Allergies    Objective   Vitals: Blood pressure 114/66, pulse 66, height 5' 8" (1 727 m), weight 112 kg (247 lb)  Physical Exam   Constitutional: He is oriented to person, place, and time  He appears well-developed and well-nourished  No distress  HENT:   Head: Normocephalic and atraumatic  Eyes: Pupils are equal, round, and reactive to light  EOM are normal    Neck: Normal range of motion  Neck supple  No thyromegaly present  Cardiovascular: Normal rate, regular rhythm and normal heart sounds  Pulmonary/Chest: Effort normal and breath sounds normal  No respiratory distress  Musculoskeletal: Normal range of motion  He exhibits no edema or deformity  Neurological: He is alert and oriented to person, place, and time  Skin: Skin is warm and dry  No erythema  Psychiatric: He has a normal mood and affect  Vitals reviewed  The history was obtained from the review of the chart, patient  Lab Results:   Lab Results   Component Value Date/Time    [facility] 7 420 (H) 01/31/2020 07:33 AM    [facility] 4 450 (H) 05/17/2019 07:46 AM    Free T4 1 01 01/31/2020 07:33 AM    Free T4 1 07 05/17/2019 07:46 AM    Free T4 1 07 05/17/2019 07:46 AM       Imaging Studies:        I have personally reviewed pertinent reports  Portions of the record may have been created with voice recognition software  Occasional wrong word or "sound a like" substitutions may have occurred due to the inherent limitations of voice recognition software  Read the chart carefully and recognize, using context, where substitutions have occurred

## 2020-01-31 ENCOUNTER — APPOINTMENT (OUTPATIENT)
Dept: LAB | Facility: CLINIC | Age: 70
End: 2020-01-31
Payer: MEDICARE

## 2020-01-31 DIAGNOSIS — R73.03 PREDIABETES: ICD-10-CM

## 2020-01-31 DIAGNOSIS — I10 ESSENTIAL HYPERTENSION: ICD-10-CM

## 2020-01-31 DIAGNOSIS — E03.8 SUBCLINICAL HYPOTHYROIDISM: ICD-10-CM

## 2020-01-31 LAB
ANION GAP SERPL CALCULATED.3IONS-SCNC: 2 MMOL/L (ref 4–13)
BUN SERPL-MCNC: 20 MG/DL (ref 5–25)
CALCIUM SERPL-MCNC: 9.4 MG/DL (ref 8.3–10.1)
CHLORIDE SERPL-SCNC: 111 MMOL/L (ref 100–108)
CO2 SERPL-SCNC: 29 MMOL/L (ref 21–32)
CREAT SERPL-MCNC: 0.98 MG/DL (ref 0.6–1.3)
EST. AVERAGE GLUCOSE BLD GHB EST-MCNC: 146 MG/DL
GFR SERPL CREATININE-BSD FRML MDRD: 79 ML/MIN/1.73SQ M
GLUCOSE P FAST SERPL-MCNC: 100 MG/DL (ref 65–99)
HBA1C MFR BLD: 6.7 % (ref 4.2–6.3)
POTASSIUM SERPL-SCNC: 4.9 MMOL/L (ref 3.5–5.3)
SODIUM SERPL-SCNC: 142 MMOL/L (ref 136–145)
T4 FREE SERPL-MCNC: 1.01 NG/DL (ref 0.76–1.46)
TSH SERPL DL<=0.05 MIU/L-ACNC: 7.42 UIU/ML (ref 0.36–3.74)

## 2020-01-31 PROCEDURE — 80048 BASIC METABOLIC PNL TOTAL CA: CPT

## 2020-01-31 PROCEDURE — 36415 COLL VENOUS BLD VENIPUNCTURE: CPT

## 2020-01-31 PROCEDURE — 84443 ASSAY THYROID STIM HORMONE: CPT

## 2020-01-31 PROCEDURE — 84439 ASSAY OF FREE THYROXINE: CPT

## 2020-01-31 PROCEDURE — 83036 HEMOGLOBIN GLYCOSYLATED A1C: CPT

## 2020-02-01 ENCOUNTER — HOSPITAL ENCOUNTER (OUTPATIENT)
Dept: RADIOLOGY | Facility: HOSPITAL | Age: 70
Discharge: HOME | End: 2020-02-01
Payer: MEDICARE

## 2020-02-01 DIAGNOSIS — Z01.818 PREPROCEDURAL EXAMINATION: ICD-10-CM

## 2020-02-01 DIAGNOSIS — M54.16 LUMBAR RADICULOPATHY: ICD-10-CM

## 2020-02-01 PROCEDURE — 72146 MRI CHEST SPINE W/O DYE: CPT

## 2020-02-03 ENCOUNTER — TELEPHONE (OUTPATIENT)
Dept: ENDOCRINOLOGY | Facility: CLINIC | Age: 70
End: 2020-02-03

## 2020-02-03 DIAGNOSIS — E78.2 MIXED HYPERLIPIDEMIA: ICD-10-CM

## 2020-02-03 RX ORDER — ROSUVASTATIN CALCIUM 20 MG/1
TABLET, COATED ORAL
Qty: 120 TABLET | Refills: 0 | Status: SHIPPED | OUTPATIENT
Start: 2020-02-03 | End: 2020-07-31

## 2020-02-03 NOTE — TELEPHONE ENCOUNTER
Patient states she or he has 0% of improvement & 7 or 8/10 pain level if patient is mobile with out a cane

## 2020-02-05 ENCOUNTER — OFFICE VISIT (OUTPATIENT)
Dept: CARDIOLOGY CLINIC | Facility: CLINIC | Age: 70
End: 2020-02-05
Payer: MEDICARE

## 2020-02-05 VITALS
BODY MASS INDEX: 36.88 KG/M2 | SYSTOLIC BLOOD PRESSURE: 108 MMHG | HEIGHT: 69 IN | WEIGHT: 249 LBS | DIASTOLIC BLOOD PRESSURE: 60 MMHG | HEART RATE: 66 BPM

## 2020-02-05 DIAGNOSIS — E78.5 HYPERLIPIDEMIA, UNSPECIFIED HYPERLIPIDEMIA TYPE: ICD-10-CM

## 2020-02-05 DIAGNOSIS — I47.1 MULTIFOCAL ATRIAL TACHYCARDIA (HCC): Primary | ICD-10-CM

## 2020-02-05 DIAGNOSIS — I47.1 SUPRAVENTRICULAR TACHYCARDIA (HCC): ICD-10-CM

## 2020-02-05 DIAGNOSIS — J44.9 CHRONIC OBSTRUCTIVE PULMONARY DISEASE, UNSPECIFIED COPD TYPE (HCC): ICD-10-CM

## 2020-02-05 DIAGNOSIS — F41.9 ANXIETY AND DEPRESSION: ICD-10-CM

## 2020-02-05 DIAGNOSIS — I10 HYPERTENSION, UNSPECIFIED TYPE: ICD-10-CM

## 2020-02-05 DIAGNOSIS — F32.A ANXIETY AND DEPRESSION: ICD-10-CM

## 2020-02-05 PROCEDURE — 1160F RVW MEDS BY RX/DR IN RCRD: CPT | Performed by: INTERNAL MEDICINE

## 2020-02-05 PROCEDURE — 3008F BODY MASS INDEX DOCD: CPT | Performed by: INTERNAL MEDICINE

## 2020-02-05 PROCEDURE — 3074F SYST BP LT 130 MM HG: CPT | Performed by: INTERNAL MEDICINE

## 2020-02-05 PROCEDURE — 4040F PNEUMOC VAC/ADMIN/RCVD: CPT | Performed by: INTERNAL MEDICINE

## 2020-02-05 PROCEDURE — 99214 OFFICE O/P EST MOD 30 MIN: CPT | Performed by: INTERNAL MEDICINE

## 2020-02-05 PROCEDURE — 1036F TOBACCO NON-USER: CPT | Performed by: INTERNAL MEDICINE

## 2020-02-05 PROCEDURE — 3078F DIAST BP <80 MM HG: CPT | Performed by: INTERNAL MEDICINE

## 2020-02-05 PROCEDURE — 93000 ELECTROCARDIOGRAM COMPLETE: CPT | Performed by: INTERNAL MEDICINE

## 2020-02-05 RX ORDER — CLONAZEPAM 0.5 MG/1
0.5 TABLET ORAL
Qty: 30 TABLET | Refills: 1 | Status: SHIPPED | OUTPATIENT
Start: 2020-02-05 | End: 2020-04-02 | Stop reason: SDUPTHER

## 2020-02-05 NOTE — PROGRESS NOTES
Cardiology Follow Up    Andie Rodriguez  1950  [id number]  [address] [facility] Mart[facility]nez  [address]    1  Multifocal atrial tachycardia (HCC)     2  Hypertension, unspecified type  POCT ECG   3  Supraventricular tachycardia ([address] )     4  Chronic obstructive pulmonary disease, unspecified COPD type ([address] )     5  Hyperlipidemia, unspecified hyperlipidemia type         Discussion/Summary:   He has been doing quite well since his last office appointment  He remains very physically active there has been no recurrence of multifocal atrial tachycardia  Iron dose beta-blockers been helping  Lipids have been doing well and have been improving on up titrated Crestor  He is now at 40 mg a day  Will follow-up with his next lipid profile  Encourage diet and exercise no further testing follow-up in 8 months  Interval History:   60-year-old gentleman with history of multifocal atrial tachycardia, hypertension, dyslipidemia, obesity presents for routine scheduled follow-up visit  He has been doing some new diet and exercise trying to lose weight but has had a hard time doing this  Since her last visit he has been doing well  He remains quite physically active  He is exercising on a regular basis  There has been no exertional chest pain, shortness of breath, palpitations, lightheadedness, dizziness, or syncope  He has been suffering from chronic back pain and is undergoing evaluation for implantable spinal stimulator      Problem List     Allergic rhinitis    Enlarged prostate with lower urinary tract symptoms (LUTS)    Chronic bronchitis (HCC)    Depression, controlled    Overview Signed 2/27/2018  1:10 PM by Zuleyka Thompson MD     Transitioned From: Depression         Elevated ALT measurement    Hyperlipidemia    Hypertension    Impaired fasting glucose    Lung nodule    Male erectile dysfunction Multifocal atrial tachycardia (HCC)    Obesity    Osteoarthritis of knee    Osteoarthritis of left hip    Seasonal allergies    Spinal stenosis of lumbar region    Overview Addendum 3/6/2018  3:00 PM by Sola Obrien MD     Description: Cervical and lumbar  Sees Dr Neema Mac  Had surgical decompression T12 to L1 2012           Supraventricular tachycardia (HCC)    Anxiety and depression    Arthritis of left hip    COPD (chronic obstructive pulmonary disease) (HCC)    Elevated prostate specific antigen (PSA)    Lumbosacral spondylosis    Incomplete emptying of bladder    Other affections of shoulder region, not elsewhere classified    Personal history of other disorder of urinary system    Retention of urine    Screening for prostate cancer    Thoracic or lumbosacral neuritis or radiculitis    Urethral stricture    Increased frequency of urination        Past Medical History:   Diagnosis Date    Bron[facility] chronic obstructive, with [facility]ation [facility] )     Last Assessed: 2013    COPD with acute exacerbation (HCC)     Last Assessed: 2015    Elevated PSA     Bx neg ; Last Assessed: 2012     Social History     Socioeconomic History    Marital status: /Civil Union     Spouse name: Not on file    Number of children: Not on file    Years of education: Not on file    Highest education level: Not on file   Occupational History    Not on file   Social Needs    Financial resource strain: Not on file    Food insecurity:     Worry: Not on file     Inability: Not on file    Transportation needs:     Medical: Not on file     Non-medical: Not on file   Tobacco Use    Smoking status: Former Smoker     Packs/day: 1 00     Years: 23 00     Pack years: 23 00     Types: Cigarettes     Start date:      Last attempt to quit: 2000     Years since quittin 1    Smokeless tobacco: Never Used   Substance and Sexual Activity    Alcohol use: No     Comment: Stopped drinking    Drug use: No    Sexual activity: Not on file   Lifestyle    Physical activity:     Days per week: Not on file     Minutes per session: Not on file    Stress: Not on file   Relationships    Social connections:     Talks on phone: Not on file     Gets together: Not on file     Attends Faith service: Not on file     Active member of club or organization: Not on file     Attends meetings of clubs or organizations: Not on file     Relationship status: Not on file    Intimate partner violence:     Fear of current or ex partner: Not on file     Emotionally abused: Not on file     Physically abused: Not on file     Forced sexual activity: Not on file   Other Topics Concern    Not on file   Social History Narrative    Not on file      Family History   Problem Relation Age of Onset    Breast cancer Mother     Pancreatitis Father     Diabetes Maternal Grandmother     Heart attack Maternal Grandfather     Heart attack Paternal Grandmother     Diabetes Paternal Grandmother     Diabetes Paternal Grandfather      Past Surgical History:   Procedure Laterality Date    BACK SURGERY  07/25/2012    decompression of spinal stenosis from lower thoracic through the lumbar spine    COLONOSCOPY  10/2010    neg   recheck in 5 years    KNEE ARTHROSCOPY  01/2013    PROSTATE BIOPSY  2012    for elevated PSA of about 7; neg    TRANSURETHRAL RESECTION OF PROSTATE  06/27/2013       Current Outpatient Medications:     acetaminophen (TYLENOL) 500 mg tablet, Take 500 mg by mouth every 6 (six) hours as needed, Disp: , Rfl:     aspirin 81 mg chewable tablet, Chew 1 tablet daily, Disp: , Rfl:     CIALIS 5 MG tablet, TAKE 1 TABLET (5 MG TOTAL) BY MOUTH DAILY FOR 90 DAYS, Disp: 90 tablet, Rfl: 3    clonazePAM (KlonoPIN) 0 5 mg tablet, Take 1 tablet (0 5 mg total) by mouth daily at bedtime, Disp: 30 tablet, Rfl: 1    dextromethorphan-guaifenesin (MUCINEX DM)  MG per 12 hr tablet, Take 1 tablet by mouth every 12 (twelve) hours, Disp: , Rfl:    diclofenac sodium (VOLTAREN) 50 mg EC tablet, TAKE 1 TABLET (50 MG TOTAL) BY MOUTH 2 (TWO) TIMES A DAY, Disp: 180 tablet, Rfl: 1    DULoxetine (CYMBALTA) 30 mg delayed release capsule, Take 90 mg by mouth daily , Disp: , Rfl:     fluticasone (FLONASE) 50 mcg/act nasal spray, fluticasone propionate 50 mcg/actuation nasal spray,suspension, Disp: , Rfl:     fluticasone-salmeterol (ADVAIR, WIXELA) 250-50 mcg/dose inhaler, Inhale 1 puff 2 (two) times a day Rinse mouth after use , Disp: 1 Inhaler, Rfl: 3    gabapentin (NEURONTIN) 300 mg capsule, Take 1 capsule by mouth 4 (four) times a day , Disp: , Rfl:     loratadine (CLARITIN) 10 mg tablet, Take 1 tablet by mouth daily, Disp: , Rfl:     metFORMIN (GLUCOPHAGE-XR) 500 mg 24 hr tablet, Take 1 tablet (500 mg total) by mouth daily with dinner, Disp: 90 tablet, Rfl: 0    metoprolol tartrate (LOPRESSOR) 25 mg tablet, TAKE 1 TABLET (25 MG TOTAL) BY MOUTH EVERY 12 (TWELVE) HOURS, Disp: 180 tablet, Rfl: 2    metoprolol tartrate (LOPRESSOR) 50 mg tablet, Take 1 tablet (50 mg total) by mouth 2 (two) times a day, Disp: 180 tablet, Rfl: 3    Multiple Vitamin (MULTI-VITAMIN DAILY) TABS, Take 1 tablet by mouth daily, Disp: , Rfl:     NIFEdipine (PROCARDIA XL) 90 mg 24 hr tablet, Take 1 tablet (90 mg total) by mouth daily for 357 days, Disp: 90 tablet, Rfl: 3    rosuvastatin (CRESTOR) 20 MG tablet, TAKE 2 TABLETS ON MON WED FRI  1 TAB ON TUES  THURS SAT   AND SUNDAY (Patient taking differently: Take 40 mg by mouth daily ), Disp: 120 tablet, Rfl: 0    DULoxetine (CYMBALTA) 60 mg delayed release capsule, Take 1 capsule by mouth daily, Disp: , Rfl:   No Known Allergies    Labs:     Chemistry        Component Value Date/Time     09/08/2015 1027    K 4 9 01/31/2020 0733    K 4 6 09/08/2015 1027     (H) 01/31/2020 0733     09/08/2015 1027    CO2 29 01/31/2020 0733    CO2 27 09/08/2015 1027    BUN 20 01/31/2020 0733    BUN 15 09/08/2015 1027    CREATININE 0 98 01/31/2020 0733    CREATININE 0 81 09/08/2015 1027        Component Value Date/Time    CALCIUM 9 4 01/31/2020 0733    CALCIUM 9 3 09/08/2015 1027    ALKPHOS 66 09/20/2019 0730    ALKPHOS 121 07/21/2014 0910    AST 17 09/20/2019 0730    AST 18 07/21/2014 0910    ALT 23 09/20/2019 0730    ALT 34 07/21/2014 0910    BILITOT 0 45 07/21/2014 0910            Lab Results   Component Value Date    CHOL 190 07/21/2014     Lab Results   Component Value Date    HDL 40 09/20/2019    HDL 38 (L) 05/17/2019    HDL 43 09/04/2018     Lab Results   Component Value Date    LDLCALC 97 09/20/2019    LDLCALC 92 05/17/2019    LDLCALC 140 (H) 09/04/2018     Lab Results   Component Value Date    TRIG 131 09/20/2019    TRIG 153 (H) 05/17/2019    TRIG 126 09/04/2018     No results found for: CHOLHDL    Imaging: No results found  ECG:    Normal sinus rhythm      Review of Systems   Constitution: Negative  HENT: Negative  Eyes: Negative  Cardiovascular: Negative  Respiratory: Negative  Endocrine: Negative  Hematologic/Lymphatic: Negative  Skin: Negative  Musculoskeletal: Negative  Gastrointestinal: Negative  Genitourinary: Negative  Neurological: Negative  Psychiatric/Behavioral: Negative  Vitals:    02/05/20 1246   BP: 108/60   Pulse: 66     Vitals:    02/05/20 1246   Weight: 113 kg (249 lb)     Height: 5' 8 5" (174 cm)   Body mass index is 37 31 kg/m²  Physical Exam:  Vital signs reviewed  General:  Alert and cooperative, appears stated age, no acute distress  HEENT:  PERRLA, EOMI, no scleral icterus, no conjunctival pallor  Neck:  No lymphadenopathy, no thyromegaly, no carotid bruits, no elevated JVP  Heart:  Regular rate and rhythm, normal S1/S2, no S3/S4, no murmur, rubs or gallops    PMI nondisplaced  Lungs:  Clear to auscultation bilaterally, no wheezes rales or rhonchi  Abdomen:  Soft, non-tender, positive bowel sounds, no rebound or guarding,   no organomegaly   Extremities:  Normal range of motion    No clubbing, cyanosis or edema   Vascular:  2+ pedal pulses  Skin:  No rashes or lesions on exposed skin  Neurologic:  Cranial nerves II-XII grossly intact without focal deficits

## 2020-02-05 NOTE — TELEPHONE ENCOUNTER
Patient called requesting a refill on the Clonazepam 0 5mg tablet 1 at bedtime      PDMP checked 2/5/20- CATHLEEN MONROY  Last filled 1/9/20 #30 per 30 days

## 2020-02-07 ENCOUNTER — TELEPHONE (OUTPATIENT)
Dept: PAIN MEDICINE | Facility: CLINIC | Age: 70
End: 2020-02-07

## 2020-02-07 DIAGNOSIS — M54.16 LUMBAR RADICULOPATHY: Primary | ICD-10-CM

## 2020-02-07 NOTE — TELEPHONE ENCOUNTER
Please notify the patient I would like to order an MRI of the thoracic spine with and without contrast considering there is a hyperintense signal at T7-8 which may represent a syrinx  The contrast the MRI is for further evaluation of this possible lesion

## 2020-02-10 NOTE — TELEPHONE ENCOUNTER
SW patient and advised JW recommendations  Patient given Central Scheduling phone number  Patient appreciative of call back

## 2020-02-16 DIAGNOSIS — E78.2 MIXED HYPERLIPIDEMIA: Primary | ICD-10-CM

## 2020-02-17 ENCOUNTER — HOSPITAL ENCOUNTER (OUTPATIENT)
Dept: RADIOLOGY | Facility: HOSPITAL | Age: 70
Discharge: HOME | End: 2020-02-17
Attending: ANESTHESIOLOGY
Payer: MEDICARE

## 2020-02-17 DIAGNOSIS — M54.16 LUMBAR RADICULOPATHY: ICD-10-CM

## 2020-02-17 PROCEDURE — 72157 MRI CHEST SPINE W/O & W/DYE: CPT

## 2020-02-17 PROCEDURE — A9585 GADOBUTROL INJECTION: HCPCS | Performed by: ANESTHESIOLOGY

## 2020-02-17 RX ORDER — ROSUVASTATIN CALCIUM 40 MG/1
TABLET, COATED ORAL
Qty: 60 TABLET | Refills: 3 | Status: SHIPPED | OUTPATIENT
Start: 2020-02-17 | End: 2020-05-12 | Stop reason: SDUPTHER

## 2020-02-17 RX ADMIN — GADOBUTROL 11 ML: 604.72 INJECTION INTRAVENOUS at 22:54

## 2020-02-19 ENCOUNTER — TELEPHONE (OUTPATIENT)
Dept: PAIN MEDICINE | Facility: CLINIC | Age: 70
End: 2020-02-19

## 2020-02-20 DIAGNOSIS — M17.0 OSTEOARTHRITIS OF BOTH KNEES, UNSPECIFIED OSTEOARTHRITIS TYPE: ICD-10-CM

## 2020-02-20 NOTE — TELEPHONE ENCOUNTER
Per Dr Morena Rodarte, based on thoracic MRI findings, patient will need to be a [employer] trial  Patient is aware of this  Email sent to Sophie Benton from [employer] to educate patient  All clinical info faxed to [employer]

## 2020-02-20 NOTE — TELEPHONE ENCOUNTER
S/W pt  Advised pt of the same  Pt verbalized understanding  Pt stated he would like Bowen Rolle to call him

## 2020-02-20 NOTE — TELEPHONE ENCOUNTER
MRI of the thoracic spine did not show abnormal enhancement of the hyperintense signal within the thoracic cord from T7-T10  Still may represent a 1 mm syrinx cavity  No evidence of malignancy  Considering these findings will need to switch from [employer] trial to [facility] trial after discussing case with Dr Shasta Roldan as the patient may need MRI for monitoring of the thoracic cord    Okay to still move forward with spinal cord stimulator trial as long as we switch

## 2020-02-20 NOTE — TELEPHONE ENCOUNTER
Signed off psych eval and all other clinical information faxed to [employer] for authorization  Patient aware of status

## 2020-02-24 DIAGNOSIS — R00.2 PALPITATIONS: ICD-10-CM

## 2020-02-24 DIAGNOSIS — R00.0 TACHYCARDIA: ICD-10-CM

## 2020-02-24 NOTE — TELEPHONE ENCOUNTER
Received benefit confirmation, no prior authorization required  Dr Lise Cline, Patient is scheduled for carpal tunnel surgery 3/4/2020   How long would you like me to wait to schedule trial?  Thank you, Nadeen Gale

## 2020-02-27 DIAGNOSIS — I49.1 PAC (PREMATURE ATRIAL CONTRACTION): ICD-10-CM

## 2020-02-27 RX ORDER — METOPROLOL TARTRATE 50 MG/1
50 TABLET, FILM COATED ORAL 2 TIMES DAILY
Qty: 180 TABLET | Refills: 3 | OUTPATIENT
Start: 2020-02-27 | End: 2021-02-16 | Stop reason: SDUPTHER

## 2020-02-28 ENCOUNTER — TELEPHONE (OUTPATIENT)
Dept: CARDIOLOGY CLINIC | Facility: CLINIC | Age: 70
End: 2020-02-28

## 2020-02-28 DIAGNOSIS — Z91.89 RISK FACTORS FOR OBSTRUCTIVE SLEEP APNEA: Primary | ICD-10-CM

## 2020-02-28 NOTE — TELEPHONE ENCOUNTER
I would like to wait at least 1 week after the procedure to ensure there are no postoperative complications/infection before proceeding with stimulator trial unless the surgeon would prefer us to wait longer

## 2020-02-28 NOTE — TELEPHONE ENCOUNTER
5 days
Pt advised to hold for 5 days
Pt asking how long he can hold ASA prior to carpal tunnel release scheduled for 3/4/2020 by Dr Xavier Contreras?
None

## 2020-02-28 NOTE — PRE-PROCEDURE INSTRUCTIONS
Pre-Surgery Instructions:   Medication Instructions    aspirin 81 mg chewable tablet Patient was instructed to contact Physician for medication instruction  holding 5 days as per MD     clonazePAM (KlonoPIN) 0 5 mg tablet Instructed patient per Anesthesia Guidelines   dextromethorphan-guaifenesin (MUCINEX DM)  MG per 12 hr tablet Instructed patient per Anesthesia Guidelines   diclofenac sodium (VOLTAREN) 50 mg EC tablet Instructed patient per Anesthesia Guidelines   DULoxetine (CYMBALTA) 30 mg delayed release capsule Instructed patient per Anesthesia Guidelines   DULoxetine (CYMBALTA) 60 mg delayed release capsule Instructed patient per Anesthesia Guidelines   fluticasone (FLONASE) 50 mcg/act nasal spray Instructed patient per Anesthesia Guidelines   gabapentin (NEURONTIN) 300 mg capsule Instructed patient per Anesthesia Guidelines   loratadine (CLARITIN) 10 mg tablet Instructed patient per Anesthesia Guidelines   metFORMIN (GLUCOPHAGE-XR) 500 mg 24 hr tablet Instructed patient per Anesthesia Guidelines   metoprolol tartrate (LOPRESSOR) 25 mg tablet Instructed patient per Anesthesia Guidelines   metoprolol tartrate (LOPRESSOR) 50 mg tablet Instructed patient per Anesthesia Guidelines   Multiple Vitamin (MULTI-VITAMIN DAILY) TABS Instructed patient per Anesthesia Guidelines  stopping 2/28    NIFEdipine (PROCARDIA XL) 90 mg 24 hr tablet Instructed patient per Anesthesia Guidelines   rosuvastatin (CRESTOR) 20 MG tablet Instructed patient per Anesthesia Guidelines  Acetaminophen Med Class     Continue to take this medication on your normal schedule  If this is an oral medication and you take it in the morning, then you may take this medicine with a sip of water  Antidepressant Med Class     Continue to take this medication on your normal schedule    If this is an oral medication and you take it in the morning, then you may take this medicine with a sip of water   Antiepileptic Med Class     Continue to take this medication on your normal schedule  If this is an oral medication and you take it in the morning, then you may take this medicine with a sip of water  Benzodiazepine antagonist Med Class     If this medication is needed please continue to take on your normal schedule  If you take it in the morning, then you may take this medicine with a sip of water  ASA Med Class: Aspirin     Should be discontinued at least one week prior to planned operation, unless specifically stated otherwise by surgical service  Your Surgeon may have patient stop taking aspirin up to a week before surgery if having intracranial, middle ear, posterior eye, spine surgery or prostate surgery  [Patients taking aspirin for coronary stents should be reviewed by an anesthesiologist in the optimization clinic  Please do not discontinue aspirin in patients with coronary stents unless given specific permission to do so by the cardiologist who prescribed medication ]   If your surgeon approves please continue to take this medication on your normal schedule  You may take this medication on the morning of your surgery with a sip of water  Beta blocker Med Class     Continue to take this heart medication on your normal schedule  If this is an oral medication and you take it in the morning, then you may take this medicine with a sip of water  Calcium Channel Blocker Med Class     Continue to take this heart medication on your normal schedule  If this is an oral medication and you take it in the morning, then you may take this medicine with a sip of water  Inhalational Med Class     Continue to take these inhaler medications on your normal schedule up to and including the day of surgery     Insulin Med Class     Pre-Surgery/Procedure Instructions for Adult Patients who Take Medicine for Diabetes or to Control their Blood Sugar     Day Before Surgery/Procedure  Use the directions based on the type of medicine you take for your diabetes  1  If you are having a procedure that does not require a bowel prep:  ? Pre-Mixed Insulin (Intermediate Acting: Humalog 75/25, Humulin 70/30  Novolog 70/30, Regular Insulin)  § Take ½ your regular dose the evening before your procedure  ? Rapid/Fast Acting Insulin/Long Acting Insulin (Humalog U200, NovoLog, Apidra, Lantus, Levemir, Samella Gris, Garfield)  § Take your FULL regular dose the day before procedure  ? Oral Diabetic Medicines including Glipizide/Glimepiride/Glucotrol (sulfonylurea)  § Take your regular dose with dinner the evening before your procedure  2  If you are having a procedure (e g  Colonoscopy) that requires a bowel prep and you are allowed to have at least a clear liquid diet:  ? Pre-Mixed Insulin (Intermediate Acting: Humalog 75/25, Humulin 70/30, Novolog 70/30, Regular Insulin)  § Take ½ your regular dose the evening before your procedure  ? Rapid/Fast Acting Insulin (Humalog U200, NovoLog, Apidra, Fiasp)  § Take ½ your regular dose the evening before your procedure  ? Long Acting Insulin (Lantus, Levemir, Samella Gris)  § Take your FULL regular dose the day before procedure  ? Oral Glipizide/Glimepiride/Glucotrol (sulfonylurea)  § Take ½ your regular dose the evening before your procedure  ? Oral Diabetic Medicines that are NOT Glipizide/Glimepiride/Glucotrol  § Take your regular dose with dinner in the evening before your procedure      Day of Surgery/Procedure  · Long Acting Insulin (Lantus, Levemir, Samella Gris)  ? If you usually take your Long-Acting Insulin in the morning, take the full dose as scheduled  · With the exception of the morning Long-Acting Insulin noted above, DO NOT take ANY diabetic medicine on the day of your procedure unless you were instructed by the doctor who manages your diabetic medicines  · Continue to check your blood sugars    · If you have an insulin pump then consult with your Endocrinologist for instructions  · If you cannot see your Endocrinologist, on the day of the procedure set your insulin pump to your basal rate only  Please bring your insulin pump supplies to the hospital      This Educational material has been approved by the Patient Education Advisory Committee  Date prepared: 1/17/2018          Expiration date: 1/17/2019        Approval Number:                     NSAID Med Class     Stop taking this medication at least 3 days prior to surgery/procedure  Statin Med Class     Continue to take this medication on your normal schedule  If this is an oral medication and you take it in the morning, then you may take this medicine with a sip of water  Vitamin Med Class     You may continue to take any vitamin that your surgeon has prescribed to you up to the day before surgery  If your surgeon has not specifically prescribed this vitamin or instructed you to continue then stop taking 7 days prior to surgery    Pre op instructions reviewed with pt on 2/28  Meds, bathing and hospital instructions reviewed with understanding at this time

## 2020-03-03 ENCOUNTER — ANESTHESIA EVENT (OUTPATIENT)
Dept: PERIOP | Facility: HOSPITAL | Age: 70
End: 2020-03-03
Payer: MEDICARE

## 2020-03-03 NOTE — ANESTHESIA PREPROCEDURE EVALUATION
Review of Systems/Medical History  Patient summary reviewed  Chart reviewed  No history of anesthetic complications     Cardiovascular  Hyperlipidemia, Hypertension , Dysrhythmias , history of PSVT,    Pulmonary  COPD ,        GI/Hepatic  Negative GI/hepatic ROS          Negative  ROS        Endo/Other  History of thyroid disease , hypothyroidism,   Obesity    GYN       Hematology  Negative hematology ROS      Musculoskeletal    Arthritis     Neurology  Negative neurology ROS      Psychology   Anxiety, Depression ,              Physical Exam    Airway    Mallampati score: II  TM Distance: >3 FB  Neck ROM: full     Dental       Cardiovascular      Pulmonary      Other Findings        Anesthesia Plan  ASA Score- 3     Anesthesia Type- IV sedation with anesthesia with ASA Monitors  Additional Monitors:   Airway Plan:         Plan Factors-    Induction- intravenous  Postoperative Plan-     Informed Consent- Anesthetic plan and risks discussed with patient  I personally reviewed this patient with the CRNA  Discussed and agreed on the Anesthesia Plan with the CRNA  Pramod Levy

## 2020-03-04 ENCOUNTER — ANESTHESIA (OUTPATIENT)
Dept: PERIOP | Facility: HOSPITAL | Age: 70
End: 2020-03-04
Payer: MEDICARE

## 2020-03-04 ENCOUNTER — HOSPITAL ENCOUNTER (OUTPATIENT)
Facility: HOSPITAL | Age: 70
Setting detail: OUTPATIENT SURGERY
Discharge: HOME | End: 2020-03-04
Attending: ORTHOPAEDIC SURGERY | Admitting: ORTHOPAEDIC SURGERY
Payer: MEDICARE

## 2020-03-04 VITALS
WEIGHT: 235 LBS | DIASTOLIC BLOOD PRESSURE: 61 MMHG | HEIGHT: 68 IN | OXYGEN SATURATION: 96 % | RESPIRATION RATE: 18 BRPM | HEART RATE: 57 BPM | BODY MASS INDEX: 35.61 KG/M2 | SYSTOLIC BLOOD PRESSURE: 114 MMHG | TEMPERATURE: 96.8 F

## 2020-03-04 DIAGNOSIS — M54.16 LUMBAR RADICULOPATHY: ICD-10-CM

## 2020-03-04 DIAGNOSIS — G56.03 CARPAL TUNNEL SYNDROME, BILATERAL: Primary | ICD-10-CM

## 2020-03-04 DIAGNOSIS — G89.4 CHRONIC PAIN SYNDROME: ICD-10-CM

## 2020-03-04 LAB — GLUCOSE SERPL-MCNC: 104 MG/DL (ref 65–140)

## 2020-03-04 PROCEDURE — 82948 REAGENT STRIP/BLOOD GLUCOSE: CPT

## 2020-03-04 PROCEDURE — 99024 POSTOP FOLLOW-UP VISIT: CPT | Performed by: ORTHOPAEDIC SURGERY

## 2020-03-04 PROCEDURE — 29848 WRIST ENDOSCOPY/SURGERY: CPT | Performed by: ORTHOPAEDIC SURGERY

## 2020-03-04 RX ORDER — FENTANYL CITRATE 50 UG/ML
INJECTION, SOLUTION INTRAMUSCULAR; INTRAVENOUS AS NEEDED
Status: DISCONTINUED | OUTPATIENT
Start: 2020-03-04 | End: 2020-03-04 | Stop reason: SURG

## 2020-03-04 RX ORDER — SODIUM CHLORIDE, SODIUM LACTATE, POTASSIUM CHLORIDE, CALCIUM CHLORIDE 600; 310; 30; 20 MG/100ML; MG/100ML; MG/100ML; MG/100ML
50 INJECTION, SOLUTION INTRAVENOUS CONTINUOUS
Status: DISCONTINUED | OUTPATIENT
Start: 2020-03-04 | End: 2020-03-04 | Stop reason: HOSPADM

## 2020-03-04 RX ORDER — SODIUM CHLORIDE, SODIUM LACTATE, POTASSIUM CHLORIDE, CALCIUM CHLORIDE 600; 310; 30; 20 MG/100ML; MG/100ML; MG/100ML; MG/100ML
INJECTION, SOLUTION INTRAVENOUS CONTINUOUS PRN
Status: DISCONTINUED | OUTPATIENT
Start: 2020-03-04 | End: 2020-03-04 | Stop reason: SURG

## 2020-03-04 RX ORDER — NAPROXEN SODIUM 220 MG
220 TABLET ORAL 2 TIMES DAILY WITH MEALS
Qty: 10 TABLET | Refills: 0 | Status: SHIPPED | OUTPATIENT
Start: 2020-03-04 | End: 2020-03-12 | Stop reason: ALTCHOICE

## 2020-03-04 RX ORDER — MIDAZOLAM HYDROCHLORIDE 2 MG/2ML
INJECTION, SOLUTION INTRAMUSCULAR; INTRAVENOUS AS NEEDED
Status: DISCONTINUED | OUTPATIENT
Start: 2020-03-04 | End: 2020-03-04 | Stop reason: SURG

## 2020-03-04 RX ORDER — PROPOFOL 10 MG/ML
INJECTION, EMULSION INTRAVENOUS AS NEEDED
Status: DISCONTINUED | OUTPATIENT
Start: 2020-03-04 | End: 2020-03-04 | Stop reason: SURG

## 2020-03-04 RX ORDER — FENTANYL CITRATE/PF 50 MCG/ML
50 SYRINGE (ML) INJECTION
Status: DISCONTINUED | OUTPATIENT
Start: 2020-03-04 | End: 2020-03-04 | Stop reason: HOSPADM

## 2020-03-04 RX ORDER — ONDANSETRON 2 MG/ML
4 INJECTION INTRAMUSCULAR; INTRAVENOUS ONCE AS NEEDED
Status: DISCONTINUED | OUTPATIENT
Start: 2020-03-04 | End: 2020-03-04 | Stop reason: HOSPADM

## 2020-03-04 RX ORDER — METOCLOPRAMIDE HYDROCHLORIDE 5 MG/ML
10 INJECTION INTRAMUSCULAR; INTRAVENOUS ONCE AS NEEDED
Status: DISCONTINUED | OUTPATIENT
Start: 2020-03-04 | End: 2020-03-04 | Stop reason: HOSPADM

## 2020-03-04 RX ORDER — PROPOFOL 10 MG/ML
INJECTION, EMULSION INTRAVENOUS CONTINUOUS PRN
Status: DISCONTINUED | OUTPATIENT
Start: 2020-03-04 | End: 2020-03-04 | Stop reason: SURG

## 2020-03-04 RX ORDER — HYDROCODONE BITARTRATE AND ACETAMINOPHEN 5; 325 MG/1; MG/1
1 TABLET ORAL EVERY 6 HOURS PRN
Qty: 5 TABLET | Refills: 0 | Status: SHIPPED | OUTPATIENT
Start: 2020-03-04 | End: 2020-03-12 | Stop reason: ALTCHOICE

## 2020-03-04 RX ORDER — ONDANSETRON 2 MG/ML
INJECTION INTRAMUSCULAR; INTRAVENOUS AS NEEDED
Status: DISCONTINUED | OUTPATIENT
Start: 2020-03-04 | End: 2020-03-04 | Stop reason: SURG

## 2020-03-04 RX ORDER — MAGNESIUM HYDROXIDE 1200 MG/15ML
LIQUID ORAL AS NEEDED
Status: DISCONTINUED | OUTPATIENT
Start: 2020-03-04 | End: 2020-03-04 | Stop reason: HOSPADM

## 2020-03-04 RX ORDER — SENNOSIDES 8.6 MG
650 CAPSULE ORAL EVERY 8 HOURS
Qty: 15 TABLET | Refills: 0 | Status: SHIPPED | OUTPATIENT
Start: 2020-03-04 | End: 2020-03-12 | Stop reason: ALTCHOICE

## 2020-03-04 RX ORDER — ACETAMINOPHEN 325 MG/1
650 TABLET ORAL ONCE
Status: DISCONTINUED | OUTPATIENT
Start: 2020-03-04 | End: 2020-03-04 | Stop reason: HOSPADM

## 2020-03-04 RX ORDER — HYDROCODONE BITARTRATE AND ACETAMINOPHEN 5; 325 MG/1; MG/1
1 TABLET ORAL EVERY 6 HOURS PRN
Status: DISCONTINUED | OUTPATIENT
Start: 2020-03-04 | End: 2020-03-04 | Stop reason: HOSPADM

## 2020-03-04 RX ORDER — LIDOCAINE HYDROCHLORIDE 10 MG/ML
INJECTION, SOLUTION EPIDURAL; INFILTRATION; INTRACAUDAL; PERINEURAL AS NEEDED
Status: DISCONTINUED | OUTPATIENT
Start: 2020-03-04 | End: 2020-03-04 | Stop reason: SURG

## 2020-03-04 RX ADMIN — ONDANSETRON 4 MG: 2 INJECTION INTRAMUSCULAR; INTRAVENOUS at 08:57

## 2020-03-04 RX ADMIN — MIDAZOLAM 2 MG: 1 INJECTION INTRAMUSCULAR; INTRAVENOUS at 08:26

## 2020-03-04 RX ADMIN — SODIUM CHLORIDE, SODIUM LACTATE, POTASSIUM CHLORIDE, AND CALCIUM CHLORIDE: .6; .31; .03; .02 INJECTION, SOLUTION INTRAVENOUS at 08:10

## 2020-03-04 RX ADMIN — LIDOCAINE HYDROCHLORIDE 50 MG: 10 INJECTION, SOLUTION EPIDURAL; INFILTRATION; INTRACAUDAL; PERINEURAL at 08:34

## 2020-03-04 RX ADMIN — FENTANYL CITRATE 12.5 MCG: 50 INJECTION, SOLUTION INTRAMUSCULAR; INTRAVENOUS at 08:36

## 2020-03-04 RX ADMIN — PROPOFOL 50 MG: 10 INJECTION, EMULSION INTRAVENOUS at 08:34

## 2020-03-04 RX ADMIN — PROPOFOL 120 MCG/KG/MIN: 10 INJECTION, EMULSION INTRAVENOUS at 08:35

## 2020-03-04 NOTE — ANESTHESIA POSTPROCEDURE EVALUATION
Post-Op Assessment Note    CV Status:  Stable  Pain Score: 0    Pain management: adequate     Mental Status:  Awake and alert   Hydration Status:  Stable   PONV Controlled:  None   Airway Patency:  Patent   Post Op Vitals Reviewed: Yes      Staff: CRNA   Comments: Patient resting in PACU, no c/o pain or nuasea  Airway patent, VSS             BP   93/51 (72)   Temp   97 5   Pulse   65   Resp   14   SpO2   94% on RA

## 2020-03-04 NOTE — OP NOTE
OPERATIVE REPORT  PATIENT NAME: Neha Styles  :  1950  MRN: [id number]  Pt Location: [facility]    SURGERY DATE: 20    Surgeon(s) and Role:     * Jean Claude Galan MD - Primary     * Mark Grover MD - Assisting    Pre-Op Diagnosis:  Left carpal tunnel    Post-Op Diagnosis:  Left carpal tunnel    Specimen(s):  * No orders in the log *    Estimated Blood Loss:   Minimal      Anesthesia Type:   IV Sedation with Anesthesia    Operative Indications: The patient has a history of Carpal Tunnel Syndrome  left that was recalcitrant to conservative management  The decision was made to bring the patient to the operating room for Endoscopic Carpal Tunnel Release  left  Risks of the procedure were explained which include, but are not limited to bleeding; infection; damage to nerves, arteries,veins, tendons; scar; pain; need for reoperation; failure to give desired result; and risks of anaesthesia  All questions were answered to satisfaction and they were willing to proceed  Operative Findings:  Left carpal tunnel    Complications:   None    Procedure and Technique:  After the patient, site, and procedure were identified, the patient was brought into the operating room in a supine position  Local anaesthesia was adminstered in the preoperative holding area  A tourniquet was not used  The  left upper extremity was then prepped and drapped in a normal, sterile, orthopedic fashion  After reconfirmation of the patient, site, and surgical procedure, which was agreed upon by the entire surgical team, attention was turned to the left wrist   The sites of the proximal and distal incisions were marked  The bennett of the proximal incision was placed horizontally at the midline of the wrist   The distal incision bennett was longitudinal extending distally from the point of intersection of the line between the long finger and ring finger and the line along the distal border of the fully abducted thumb  The proximal incision was performed  Subcutaneous tissues were dissected  Then the transverse volar antebrachial fascia was perforated with a scalpel  The edges of the skin incision where retracted and the forearm fascia was incised for approximately 1 5 cm proximally with care taken to identify and protect the median nerve  Retractors were used to inspect the transverse carpal ligament distally  A curved Valerio dissector was used to glide under the transverse carpal ligament and superficial to the median nerve with confirmation via the washboard feeling  Then the curved Valerio was pushed into the palm toward the distal incision site  When the location of the distal skin bennett was adequate, the distal incision was made  Then with retraction of the skin, further dissection and perforation of the palmar fascia was performed with the use of tenotomy scissors  The curved Valerio was guided from proximal to distal out the distal incisions without any twisting to allow for dilation of the tract  The curved Valerio was removed, and the cannula for the camera was inserted along the same tract, making sure to keep the alignment post on the cannula perpendicular to the plane of the hand without twisting  Then while keeping the wrist in extension, and holding the cannula of the camera in place, the wrist was placed on the hyperextension board  The scope was inserted distally, and a cotton-tip applicator was used proximally to clean the tract as well as the scope  A curved cutting knife was introduced from proximal to distal while keeping visualization with the use of the camera  Without twisting of the canula, the knife was used to cut the transverse carpal ligament completely, making sure there were no remnant fibers  Then after this was accomplished, the hand was removed from the extension block  Three maneuvers were used to confirm the full release of the transverse carpal ligament    First, the ease of twisting the trocar of the camera confirmed the release of the ligament  Second, the curved Valerio was introduced to make sure there were no remnant fibers that could be felt palmarly  Third, the scope was introduced again to visualize that the whole ligament was released proximally to distally  Additional confirmation of full release included retraction and inspection in the distal and proximal incisions to make sure there were no remnant fibers distally or proximally respectively  At the completion of the procedure, hemostasis was obtained with cautery and direct pressure  The wounds were copiously irrigated with sterile solution  The wounds were closed with Prolene  Sterile dressings were applied, including Xeroform, gauze, tweeners, webril, ACE  Please note, all sponge, needle, and instrument counts were correct prior to closure  Loupe magnification was utilized  The patient tolerated the procedure well       I was present for the entire procedure    Patient Disposition:  APU and hemodynamically stable    SIGNATURE: Howard Kan MD  DATE: 03/04/20  TIME: 8:56 AM

## 2020-03-04 NOTE — H&P
H&P Exam - Orthopedics   Emily Medrano 77 y o  male MRN: [id number]  Unit/Bed#: [facility] 05    Assessment/Plan   Assessment:  Bilateral carpal tunnel syndrome left greater than right  Plan:  Left endoscopic carpal tunnel release under sedation today, observation right carpal tunnel    History of Present Illness   HPI:  Emily Medrano is a 77 y o  male who presents with bilateral hand numbness and tingling left greater than right with a history of carpal tunnel that failed conservative management  Patient for left endoscopic carpal tunnel release today  Historical Information  Review Of Systems:   · Skin: Normal  · Neuro: See HPI  · Musculoskeletal: See HPI  · 14 point review of systems negative except as stated above     Past Medical History:   Past Medical History:   Diagnosis Date    Bronchitis, chronic obstructive, with exacerbation ([facility] [address] )     Last Assessed: 5/17/2013    COPD with acute exacerbation ([facility] [address] )     Last Assessed: 4/13/2015    Elevated PSA     Bx neg 2012; Last Assessed: 11/23/2012    Hypertension        Past Surgical History:   Past Surgical History:   Procedure Laterality Date    BACK SURGERY  07/25/2012    decompression of spinal stenosis from lower thoracic through the lumbar spine    COLONOSCOPY  10/2010    neg   recheck in 5 years   [facility] 42 KNEE ARTHROSCOPY  01/2013    PROSTATE BIOPSY  2012    for elevated PSA of about 7; neg    TRANSURETHRAL RESECTION OF PROSTATE  06/27/2013       Family History:  Family history reviewed and non-contributory  Family History   Problem Relation Age of Onset    Breast cancer Mother     Pancreatitis Father     Diabetes Maternal Grandmother     Heart attack Maternal Grandfather     Heart attack Paternal Grandmother     Diabetes Paternal Grandmother     Diabetes Paternal Grandfather        Social History:  Social History     Socioeconomic History    Marital status: /Civil Union     Spouse name: None    Number of children: None    Years of education: None    Highest education level: None   Occupational History    None   Social Needs    Financial resource strain: None    Food insecurity:     Worry: None     Inability: None    Transportation needs:     Medical: None     Non-medical: None   Tobacco Use    Smoking status: Former Smoker     Packs/day: 1 00     Years: 23 00     Pack years: 23 00     Types: Cigarettes     Start date:      Last attempt to quit: 2000     Years since quittin 1    Smokeless tobacco: Never Used   Substance and Sexual Activity    Alcohol use: No     Comment: Stopped drinking    Drug use: No    Sexual activity: Yes   Lifestyle    Physical activity:     Days per week: None     Minutes per session: None    Stress: None   Relationships    Social connections:     Talks on phone: None     Gets together: None     Attends [employer] service: None     Active member of club or organization: None     Attends meetings of clubs or organizations: None     Relationship status: None    Intimate partner violence:     Fear of current or ex partner: None     Emotionally abused: None     Physically abused: None     Forced sexual activity: None   Other Topics Concern    None   Social History Narrative    None       Allergies:   No Known Allergies        Labs:  0   Lab Value Date/Time    HCT 44 5 2019 0746    HCT 44 5 2018 0722    HCT 45 2 2017 0805    HGB 14 6 2019 0746    HGB 15 4 2018 0722    HGB 15 4 2017 0805    WBC 7 37 2019 0746    WBC 8 18 2018 0722    WBC 9 30 2017 0805       Meds:    Current Facility-Administered Medications:     lactated ringers infusion, 50 mL/hr, Intravenous, Continuous, Lucas Mccarthy MD    Blood Culture:   No results found for: BLOODCX    Wound Culture:   No results found for: WOUNDCULT    Ins and Outs:  No intake/output data recorded              Physical Exam  /73   Pulse 55   Temp (!) 97 3 °F (36 3 °C) (Tympanic)   Resp 20   Ht 5' 8" (1 727 m)   Wt 107 kg (235 lb)   SpO2 96%   BMI 35 73 kg/m²   /73   Pulse 55   Temp (!) 97 3 °F (36 3 °C) (Tympanic)   Resp 20   Ht 5' 8" (1 727 m)   Wt 107 kg (235 lb)   SpO2 96%   BMI 35 73 kg/m²   Gen: Alert and oriented to person, place, time  HEENT: EOMI, eyes clear, moist mucus membranes, hearing intact  Respiratory: Bilateral chest rise  No audible wheezing found  Cardiovascular: Regular Rate and Rhythm  Abdomen: soft nontender/nondistended  Ortho Exam:  Tenderness to palpation over the left wrist, positive Tinel's, carpal tunnel compression test, weakness abductor pollicis brevis  Neuro Exam:  Ulnar and radial nerve intact  Good pulses and vascular supply      Lab Results: Reviewed  Imaging: Reviewed

## 2020-03-05 ENCOUNTER — TELEPHONE (OUTPATIENT)
Dept: PAIN MEDICINE | Facility: CLINIC | Age: 70
End: 2020-03-05

## 2020-03-05 NOTE — TELEPHONE ENCOUNTER
Spoke with patient and he is scheduled as follows for his trial:  3/12/2020 @ 0930 w/ Lea for H&P  3/23/2020 1400 arrival for 1500 trial  3/31/2020 @ 1130 w/ Anna Ventura for lead removal  NKDA  Pt takes 81mg ASA by Cardiology and PRN Aleve/Ibuprofen  Email send to [employer] to inform them of trial dates

## 2020-03-05 NOTE — TELEPHONE ENCOUNTER
Dr Vteo Capone is scheduled for a spinal cord stimulator trial with Dr Marissa Price and I am requesting to hold ASA as follows:    ASA 81m days prior to trial, 8 days during trial for total of 14 days  Please respond giving permission to hold as requested      Thank you, Juliana Hanna

## 2020-03-06 DIAGNOSIS — M54.16 LUMBAR RADICULOPATHY: ICD-10-CM

## 2020-03-06 DIAGNOSIS — Z79.899 ENCOUNTER FOR LONG-TERM CURRENT USE OF MEDICATION: ICD-10-CM

## 2020-03-06 DIAGNOSIS — G89.4 CHRONIC PAIN SYNDROME: Primary | ICD-10-CM

## 2020-03-06 DIAGNOSIS — Z79.01 CHRONIC ANTICOAGULATION: ICD-10-CM

## 2020-03-12 ENCOUNTER — OFFICE VISIT (OUTPATIENT)
Dept: FAMILY MEDICINE CLINIC | Facility: CLINIC | Age: 70
End: 2020-03-12
Payer: MEDICARE

## 2020-03-12 ENCOUNTER — LAB (OUTPATIENT)
Dept: LAB | Facility: CLINIC | Age: 70
End: 2020-03-12
Payer: MEDICARE

## 2020-03-12 ENCOUNTER — OFFICE VISIT (OUTPATIENT)
Dept: PAIN MEDICINE | Facility: CLINIC | Age: 70
End: 2020-03-12
Payer: MEDICARE

## 2020-03-12 ENCOUNTER — TELEPHONE (OUTPATIENT)
Dept: ENDOCRINOLOGY | Facility: CLINIC | Age: 70
End: 2020-03-12

## 2020-03-12 VITALS
TEMPERATURE: 97.8 F | HEIGHT: 68 IN | BODY MASS INDEX: 37.31 KG/M2 | DIASTOLIC BLOOD PRESSURE: 76 MMHG | RESPIRATION RATE: 20 BRPM | HEART RATE: 68 BPM | WEIGHT: 246.2 LBS | SYSTOLIC BLOOD PRESSURE: 122 MMHG | OXYGEN SATURATION: 96 %

## 2020-03-12 VITALS
HEART RATE: 62 BPM | BODY MASS INDEX: 35.73 KG/M2 | SYSTOLIC BLOOD PRESSURE: 110 MMHG | HEIGHT: 68 IN | DIASTOLIC BLOOD PRESSURE: 65 MMHG

## 2020-03-12 DIAGNOSIS — I10 ESSENTIAL HYPERTENSION: ICD-10-CM

## 2020-03-12 DIAGNOSIS — R73.01 IMPAIRED FASTING GLUCOSE: Primary | ICD-10-CM

## 2020-03-12 DIAGNOSIS — E03.8 SUBCLINICAL HYPOTHYROIDISM: ICD-10-CM

## 2020-03-12 DIAGNOSIS — G89.4 CHRONIC PAIN SYNDROME: ICD-10-CM

## 2020-03-12 DIAGNOSIS — Z79.899 ENCOUNTER FOR LONG-TERM CURRENT USE OF MEDICATION: ICD-10-CM

## 2020-03-12 DIAGNOSIS — G89.4 CHRONIC PAIN SYNDROME: Primary | ICD-10-CM

## 2020-03-12 DIAGNOSIS — M43.10 SPONDYLOLISTHESIS, ACQUIRED: ICD-10-CM

## 2020-03-12 DIAGNOSIS — M54.16 LUMBAR RADICULOPATHY: ICD-10-CM

## 2020-03-12 DIAGNOSIS — F41.9 ANXIETY AND DEPRESSION: ICD-10-CM

## 2020-03-12 DIAGNOSIS — Z98.1 STATUS POST LUMBAR AND LUMBOSACRAL FUSION BY ANTERIOR TECHNIQUE: ICD-10-CM

## 2020-03-12 DIAGNOSIS — J44.9 CHRONIC OBSTRUCTIVE PULMONARY DISEASE, UNSPECIFIED COPD TYPE (HCC): ICD-10-CM

## 2020-03-12 DIAGNOSIS — E78.2 MIXED HYPERLIPIDEMIA: ICD-10-CM

## 2020-03-12 DIAGNOSIS — Z79.01 CHRONIC ANTICOAGULATION: ICD-10-CM

## 2020-03-12 DIAGNOSIS — F32.A ANXIETY AND DEPRESSION: ICD-10-CM

## 2020-03-12 LAB
ALBUMIN SERPL BCP-MCNC: 4.1 G/DL (ref 3.5–5)
ALP SERPL-CCNC: 71 U/L (ref 46–116)
ALT SERPL W P-5'-P-CCNC: 27 U/L (ref 12–78)
ANION GAP SERPL CALCULATED.3IONS-SCNC: 7 MMOL/L (ref 4–13)
APTT PPP: 37 SECONDS (ref 23–37)
AST SERPL W P-5'-P-CCNC: 18 U/L (ref 5–45)
BILIRUB SERPL-MCNC: 0.59 MG/DL (ref 0.2–1)
BUN SERPL-MCNC: 24 MG/DL (ref 5–25)
CALCIUM SERPL-MCNC: 9.7 MG/DL (ref 8.3–10.1)
CHLORIDE SERPL-SCNC: 107 MMOL/L (ref 100–108)
CO2 SERPL-SCNC: 27 MMOL/L (ref 21–32)
CREAT SERPL-MCNC: 1.18 MG/DL (ref 0.6–1.3)
ERYTHROCYTE [DISTWIDTH] IN BLOOD BY AUTOMATED COUNT: 12.5 % (ref 11.6–15.1)
EST. AVERAGE GLUCOSE BLD GHB EST-MCNC: 120 MG/DL
GFR SERPL CREATININE-BSD FRML MDRD: 63 ML/MIN/1.73SQ M
GLUCOSE P FAST SERPL-MCNC: 103 MG/DL (ref 65–99)
HBA1C MFR BLD: 5.8 %
HCT VFR BLD AUTO: 46.5 % (ref 36.5–49.3)
HGB BLD-MCNC: 15.4 G/DL (ref 12–17)
INR PPP: 1.03 (ref 0.84–1.19)
MCH RBC QN AUTO: 31.2 PG (ref 26.8–34.3)
MCHC RBC AUTO-ENTMCNC: 33.1 G/DL (ref 31.4–37.4)
MCV RBC AUTO: 94 FL (ref 82–98)
PLATELET # BLD AUTO: 242 THOUSANDS/UL (ref 149–390)
PMV BLD AUTO: 11 FL (ref 8.9–12.7)
POTASSIUM SERPL-SCNC: 4.5 MMOL/L (ref 3.5–5.3)
PROT SERPL-MCNC: 7.9 G/DL (ref 6.4–8.2)
PROTHROMBIN TIME: 13.1 SECONDS (ref 11.6–14.5)
RBC # BLD AUTO: 4.94 MILLION/UL (ref 3.88–5.62)
SODIUM SERPL-SCNC: 141 MMOL/L (ref 136–145)
T4 FREE SERPL-MCNC: 1.12 NG/DL (ref 0.76–1.46)
TSH SERPL DL<=0.05 MIU/L-ACNC: 6.78 UIU/ML (ref 0.36–3.74)
WBC # BLD AUTO: 8.35 THOUSAND/UL (ref 4.31–10.16)

## 2020-03-12 PROCEDURE — 3008F BODY MASS INDEX DOCD: CPT | Performed by: FAMILY MEDICINE

## 2020-03-12 PROCEDURE — 84439 ASSAY OF FREE THYROXINE: CPT

## 2020-03-12 PROCEDURE — 99214 OFFICE O/P EST MOD 30 MIN: CPT | Performed by: FAMILY MEDICINE

## 2020-03-12 PROCEDURE — 4040F PNEUMOC VAC/ADMIN/RCVD: CPT | Performed by: FAMILY MEDICINE

## 2020-03-12 PROCEDURE — 84443 ASSAY THYROID STIM HORMONE: CPT

## 2020-03-12 PROCEDURE — 80053 COMPREHEN METABOLIC PANEL: CPT

## 2020-03-12 PROCEDURE — 3074F SYST BP LT 130 MM HG: CPT | Performed by: FAMILY MEDICINE

## 2020-03-12 PROCEDURE — 1160F RVW MEDS BY RX/DR IN RCRD: CPT | Performed by: FAMILY MEDICINE

## 2020-03-12 PROCEDURE — 85730 THROMBOPLASTIN TIME PARTIAL: CPT

## 2020-03-12 PROCEDURE — 3074F SYST BP LT 130 MM HG: CPT | Performed by: NURSE PRACTITIONER

## 2020-03-12 PROCEDURE — 1036F TOBACCO NON-USER: CPT | Performed by: FAMILY MEDICINE

## 2020-03-12 PROCEDURE — 3078F DIAST BP <80 MM HG: CPT | Performed by: FAMILY MEDICINE

## 2020-03-12 PROCEDURE — 85027 COMPLETE CBC AUTOMATED: CPT

## 2020-03-12 PROCEDURE — 1160F RVW MEDS BY RX/DR IN RCRD: CPT | Performed by: NURSE PRACTITIONER

## 2020-03-12 PROCEDURE — 86376 MICROSOMAL ANTIBODY EACH: CPT

## 2020-03-12 PROCEDURE — 3078F DIAST BP <80 MM HG: CPT | Performed by: NURSE PRACTITIONER

## 2020-03-12 PROCEDURE — 85610 PROTHROMBIN TIME: CPT

## 2020-03-12 PROCEDURE — 99214 OFFICE O/P EST MOD 30 MIN: CPT | Performed by: NURSE PRACTITIONER

## 2020-03-12 PROCEDURE — 86800 THYROGLOBULIN ANTIBODY: CPT

## 2020-03-12 PROCEDURE — 36415 COLL VENOUS BLD VENIPUNCTURE: CPT

## 2020-03-12 PROCEDURE — 4040F PNEUMOC VAC/ADMIN/RCVD: CPT | Performed by: NURSE PRACTITIONER

## 2020-03-12 PROCEDURE — 83036 HEMOGLOBIN GLYCOSYLATED A1C: CPT

## 2020-03-12 PROCEDURE — 1036F TOBACCO NON-USER: CPT | Performed by: NURSE PRACTITIONER

## 2020-03-12 NOTE — RESULT ENCOUNTER NOTE
Please inform patient that thyroid blood work is improved l, continue current dose of levothyroxine

## 2020-03-12 NOTE — ASSESSMENT & PLAN NOTE
Stable  Continue cymbalta, gabapentin per psychiatrist  Use clonazepam 0 5mg qhs prn  SE educated pt

## 2020-03-12 NOTE — TELEPHONE ENCOUNTER
----- Message from Seth Wolf MD sent at 3/12/2020  5:07 PM EDT -----  Please inform patient that thyroid blood work is improved l, continue current dose of levothyroxine

## 2020-03-12 NOTE — PROGRESS NOTES
Assessment:  1  Chronic pain syndrome    2  Lumbar radiculopathy    3  Spondylolisthesis, acquired    4  Status post lumbar and lumbosacral fusion by anterior technique        Plan:  Patient is to be an [employer] SCS trial  Pre-procedural instructions were discussed at today's office visit, which the patient signed and was given a copy  All questions and concerns were answered to the patient's satisfaction  We will proceed with a [employer] SCS trial on 3/23/20 with lead removal on 3/31/20  Patient was also made aware to complete lab work prior to March 16, 2020 and to call our office should they be initiated on antibiotics prior to trial date  The patient was also made aware to hold aspirin starting March 17, 2020, Aleve starting on March 19, 2020 , and ibuprofen starting on March 21, 2020 throughout the trial  The patient will call our office with any questions or concerns  History of Present Illness: The patient is a 77 y o  male with a history of 2 previous lumbar surgeries including L4-5 ALIF in 2015 last seen on 1/22/20 who presents for a follow up office visit in regards to chronic lumbosacral back pain that radiates into the bilateral lower extremities with associated weakness secondary to chronic pain syndrome  The patient unfortunately failed epidural steroid injections in the past   He presents to the office today to discuss preprocedural paperwork for his spinal cord stimulator trial which will be on March 23, 2020  Patient currently denies pain, however states he intermittently has pain which he describes as dull aching, throbbing and numbness  I have personally reviewed and/or updated the patient's past medical history, past surgical history, family history, social history, current medications, allergies, and vital signs today  Review of Systems:    Review of Systems   Respiratory: Negative for shortness of breath  Cardiovascular: Negative for chest pain     Gastrointestinal: Negative for constipation, diarrhea, nausea and vomiting  Musculoskeletal: Positive for gait problem  Negative for arthralgias, joint swelling and myalgias  Skin: Negative for rash  Neurological: Negative for dizziness, seizures and weakness  All other systems reviewed and are negative  Past Medical History:   Diagnosis Date    Bronchitis, chronic obstructive, with exacerbation ([facility] )     Last Assessed: 2013    COPD with acute exacerbation ([facility] )     Last Assessed: 2015    Elevated PSA     Bx neg ; Last Assessed: 2012    Hypertension        Past Surgical History:   Procedure Laterality Date    BACK SURGERY  2012    decompression of spinal stenosis from lower thoracic through the lumbar spine    COLONOSCOPY  10/2010    neg   recheck in 5 years   [address] KNEE ARTHROSCOPY  2013    AL WRIST Angelica Poncho LIG Left 3/4/2020    Procedure: RELEASE CARPAL TUNNEL ENDOSCOPIC;  Surgeon: Jose Ceballos MD;  Location: [facility];  Service: Orthopedics    PROSTATE BIOPSY      for elevated PSA of about 7; neg    TRANSURETHRAL RESECTION OF PROSTATE  2013       Family History   Problem Relation Age of Onset    Breast cancer Mother     Pancreatitis Father     Diabetes Maternal Grandmother     Heart attack Maternal Grandfather     Heart attack Paternal Grandmother     Diabetes Paternal Grandmother     Diabetes Paternal Grandfather        Social History     Occupational History    Not on file   Tobacco Use    Smoking status: Former Smoker     Packs/day: 1 00     Years: 23 00     Pack years: 23 00     Types: Cigarettes     Start date:      Last attempt to quit: 2000     Years since quittin 2    Smokeless tobacco: Never Used   Substance and Sexual Activity    Alcohol use: No     Comment: Stopped drinking    Drug use: No    Sexual activity: Yes         Current Outpatient Medications:     acetaminophen (Tylenol 8 Hour) 650 mg CR tablet, Take 1 tablet (650 mg total) by mouth every 8 (eight) hours, Disp: 15 tablet, Rfl: 0    aspirin 81 mg chewable tablet, Chew 1 tablet daily, Disp: , Rfl:     CIALIS 5 MG tablet, TAKE 1 TABLET (5 MG TOTAL) BY MOUTH DAILY FOR 90 DAYS, Disp: 90 tablet, Rfl: 3    dextromethorphan-guaifenesin (MUCINEX DM)  MG per 12 hr tablet, Take 1 tablet by mouth every 12 (twelve) hours, Disp: , Rfl:     DULoxetine (CYMBALTA) 30 mg delayed release capsule, Take 90 mg by mouth daily , Disp: , Rfl:     DULoxetine (CYMBALTA) 60 mg delayed release capsule, Take 1 capsule by mouth daily, Disp: , Rfl:     fluticasone (FLONASE) 50 mcg/act nasal spray, fluticasone propionate 50 mcg/actuation nasal spray,suspension, Disp: , Rfl:     fluticasone-salmeterol (ADVAIR, WIXELA) 250-50 mcg/dose inhaler, Inhale 1 puff 2 (two) times a day Rinse mouth after use , Disp: 1 Inhaler, Rfl: 3    gabapentin (NEURONTIN) 300 mg capsule, Take 1 capsule by mouth 4 (four) times a day , Disp: , Rfl:     loratadine (CLARITIN) 10 mg tablet, Take 1 tablet by mouth daily, Disp: , Rfl:     metFORMIN (GLUCOPHAGE-XR) 500 mg 24 hr tablet, Take 1 tablet (500 mg total) by mouth daily with dinner, Disp: 90 tablet, Rfl: 0    metoprolol tartrate (LOPRESSOR) 25 mg tablet, Take 1 tablet (25 mg total) by mouth every 12 (twelve) hours, Disp: 180 tablet, Rfl: 2    metoprolol tartrate (LOPRESSOR) 50 mg tablet, Take 1 tablet (50 mg total) by mouth 2 (two) times a day Take with 25 mg tab bid, Disp: 180 tablet, Rfl: 3    Multiple Vitamin (MULTI-VITAMIN DAILY) TABS, Take 1 tablet by mouth daily, Disp: , Rfl:     NIFEdipine (PROCARDIA XL) 90 mg 24 hr tablet, Take 1 tablet (90 mg total) by mouth daily for 357 days, Disp: 90 tablet, Rfl: 3    rosuvastatin (CRESTOR) 20 MG tablet, TAKE 2 TABLETS ON MON WED FRI  1 TAB ON TUES  THURS SAT   AND SUNDAY (Patient taking differently: Take 40 mg by mouth daily ), Disp: 120 tablet, Rfl: 0    rosuvastatin (CRESTOR) 40 MG tablet, TAKE 1 TABLET MON-WED-FRI, AND TAKE 1/2 TABLET TUES-THUR-SAT-SUN, Disp: 60 tablet, Rfl: 3    clonazePAM (KlonoPIN) 0 5 mg tablet, Take 1 tablet (0 5 mg total) by mouth daily at bedtime, Disp: 30 tablet, Rfl: 1    naproxen sodium (ALEVE) 220 MG tablet, Take 1 tablet (220 mg total) by mouth 2 (two) times a day with meals for 5 days, Disp: 10 tablet, Rfl: 0    No Known Allergies    Physical Exam:    /65   Pulse 62   Ht 5' 8" (1 727 m)   BMI 35 73 kg/m²     Constitutional:normal, well developed, well nourished, alert, in no distress and non-toxic and no overt pain behavior  Eyes:anicteric  HEENT:grossly intact  Neck:supple, symmetric, trachea midline and no masses   Pulmonary:even and unlabored  Cardiovascular:No edema or pitting edema present  Skin:Normal without rashes or lesions and well hydrated  Psychiatric:Mood and affect appropriate  Neurologic:Cranial Nerves II-XII grossly intact  Musculoskeletal:Slightly antalgic gait but steady without the use of assistive devices      Imaging  No orders to display   MRI THORACIC SPINE WITHOUT CONTRAST     INDICATION: M54 [id number]: Radiculopathy, lumbar region      COMPARISON:  MRI lumbar spine study from October 25, 2019      TECHNIQUE:  Sagittal T1, sagittal T2, sagittal inversion recovery, axial T2,  axial 2D MERGE      IMAGE QUALITY: Diagnostic      FINDINGS:     ALIGNMENT: Mild dextroscoliotic curvature to the mid thoracic spine  No lateral subluxation  Mild gradual kyphosis of the thoracic spine also centered in the midthoracic region without spondylolisthesis  The vertebral bodies are maintained in stature      MARROW SIGNAL:  Mild diffuse heterogeneous marrow signal without focal suspicious marrow lesion or edema      THORACIC CORD: Single level of cord indentation due to degenerative disease at the T5-T6 where a central and right central protrusion is noted  There is no associated cord signal abnormality    There is a small central syrinx cavity versus prominence of   the central canal noted beginning at the inferior T7 vertebral body level and extending to the T10 level  There is no associated cord expansion or volume loss        PARAVERTEBRAL SOFT TISSUES:  Normal      THORACIC DEGENERATIVE CHANGE: Mild degenerative changes are again noted as described on the recent prior MRI  No cord compression or cord signal abnormality      IMPRESSION:     No abnormal enhancement identified within the centrally located hyperintense signal within the lower thoracic cord from T7 to T10 may represent a prominent central canal versus 1 mm syrinx cavity  No abnormal enhancement within the remainder of the   thoracic spinal cord      Mild degenerative changes as described unchanged from prior study             No orders of the defined types were placed in this encounter

## 2020-03-12 NOTE — PROGRESS NOTES
Chief Complaint   Patient presents with    Follow-up     6 months  Health Maintenance   Topic Date Due    DTaP,Tdap,and Td Vaccines (1 - Tdap) 06/14/1961    PT PLAN OF CARE  10/30/2019    BMI: Followup Plan  03/07/2020    Medicare Annual Wellness Visit (AWV)  09/12/2020    Fall Risk  09/30/2020    CRC Screening: Colonoscopy  10/19/2020    BMI: Adult  03/04/2021    Hepatitis C Screening  Completed    Influenza Vaccine  Completed    Pneumococcal Vaccine: 65+ Years  Completed    Pneumococcal Vaccine: Pediatrics (0 to 5 Years) and At-Risk Patients (6 to 59 Years)  Aged Out    HIB Vaccine  Aged Out    Hepatitis B Vaccine  Aged Out    IPV Vaccine  Aged Out    Hepatitis A Vaccine  Aged Out    Meningococcal ACWY Vaccine  Aged Out    HPV Vaccine  Aged Out     BMI Counseling: Body mass index is 37 43 kg/m²  The BMI is above normal  Nutrition recommendations include reducing portion sizes, decreasing overall calorie intake and 3-5 servings of fruits/vegetables daily  Exercise recommendations include moderate aerobic physical activity for 150 minutes/week  Assessment/Plan:    Impaired fasting glucose  Low carb diet  Continue metformin and FU endocrinology  Subclinical hypothyroidism  FU endocrinology  COPD (chronic obstructive pulmonary disease) ([address] )  Flare up during Christmas  Continue advair prn and mucinex prn  Hypertension  Controlled  Continue nifedipine 90mg Qd and metoprolol 75mg bid  FU cardiology  Anxiety and depression  Stable  Continue cymbalta, gabapentin per psychiatrist  Use clonazepam 0 5mg qhs prn  SE educated pt  Chronic pain syndrome  FU pain specialist  Plan to get stimulator trial next week  Hyperlipidemia  Low fat diet  Continue crestor per cardiology  Flu shot yearly  Got PCV13 and zostavax in 2016  Got pneumovax in 2018    Will ask insurance for coverage of Tdap and shingrix  PSA yearly per urology  Pros and cons educated pt    Colonoscopy 3/2017  Repeat in 5 years per pt  RTO in 6 months          Diagnoses and all orders for this visit:    Impaired fasting glucose    Subclinical hypothyroidism    Chronic obstructive pulmonary disease, unspecified COPD type ([address] )    Essential hypertension    Anxiety and depression    Chronic pain syndrome    Mixed hyperlipidemia          Subjective:      Patient ID: Mary Tovar is a 77 y o  male  HPI    Pt is here by himself    CTS---had surgery last week  Feels much better  Does not need pain meds  Chronic lower back pain--for years  Hx of back surgery in 2012 at [facility]  FU pain specialist now  Plan to get stimulator trial next week       HTN----FU cardiology for MAT, HTN  Stable  On metoprolol 75mg bid and nifedipine 90mg Qd  Today BP is good  Denies headache, vision change, SOB or CP       Hyperlipidemia---He is on crestor 40mg qhs per cardiology  Denies side effects      Subclinial hypothyroidism---FU endocrinology  IFG---he is on metformin 500mg QD per endocrinology  Denies SE  Tried to eat healthy       Depression/depression-----Saw JAE Smith and Saw psychiatry Dr Maddie Gonzalez Q 3 month  He is on cymbalta 60mg QD which works for him  Use clonazepam 0 5mg qhs for insomnia  He is on gabapentin 300mg am and 900mg pm per psychiatrist        COPD---FU pulmonary for chronic bronchitis, worse around [place]  Does not need any symbicort or proair daily  He uses mucinex-DM daily as needed  Quit smoking in 2000        BPH---FU urology Q 6 months for BPH s/p TURP in 2013  Had cystoscopy and dilation in 9/2014  He is self-cath 1-2/week now  Was on macrobid when he did self-cath before, not any more  Cialis 5mg QD helped       Quit smoking for years  No alcohol or drugs       Lives with wife   Does all ADL's  Still drive    Denies recent falls              The following portions of the patient's history were reviewed and updated as appropriate: allergies, current medications, past family history, past medical history, past social history, past surgical history and problem list     Review of Systems   Constitutional: Negative for appetite change, chills and fever  HENT: Negative for congestion, ear pain, sinus pain and sore throat  Eyes: Negative for discharge and itching  Respiratory: Negative for apnea, cough, chest tightness, shortness of breath and wheezing  Cardiovascular: Negative for chest pain, palpitations and leg swelling  Gastrointestinal: Negative for abdominal pain, anal bleeding, constipation, diarrhea, nausea and vomiting  Endocrine: Negative for cold intolerance, heat intolerance and polyuria  Genitourinary: Negative for difficulty urinating and dysuria  Musculoskeletal: Negative for arthralgias, back pain and myalgias  Skin: Negative for rash  Neurological: Negative for dizziness and headaches  Psychiatric/Behavioral: Negative for agitation  Objective:      /76 (BP Location: Left arm, Patient Position: Sitting, Cuff Size: Large)   Pulse 68   Temp 97 8 °F (36 6 °C) (Tympanic)   Resp 20   Ht 5' 8" (1 727 m)   Wt 112 kg (246 lb 3 2 oz)   SpO2 96%   BMI 37 43 kg/m²          Physical Exam   Constitutional: He appears well-developed  HENT:   Head: Normocephalic and atraumatic  Eyes: Pupils are equal, round, and reactive to light  Conjunctivae are normal    Neck: Normal range of motion  Neck supple  Cardiovascular: Normal rate, regular rhythm, normal heart sounds and intact distal pulses  Exam reveals no gallop and no friction rub  No murmur heard  Pulmonary/Chest: Effort normal and breath sounds normal  No stridor  No respiratory distress  He has no wheezes  He has no rales  Abdominal: Soft  Bowel sounds are normal  He exhibits no distension and no mass  There is no tenderness  There is no rebound and no guarding  Musculoskeletal: Normal range of motion  He exhibits no edema, tenderness or deformity  Neurological: He is alert

## 2020-03-13 ENCOUNTER — OFFICE VISIT (OUTPATIENT)
Dept: OBGYN CLINIC | Facility: HOSPITAL | Age: 70
End: 2020-03-13

## 2020-03-13 VITALS
SYSTOLIC BLOOD PRESSURE: 148 MMHG | HEIGHT: 68 IN | HEART RATE: 60 BPM | WEIGHT: 244 LBS | DIASTOLIC BLOOD PRESSURE: 82 MMHG | BODY MASS INDEX: 36.98 KG/M2

## 2020-03-13 DIAGNOSIS — Z98.890 S/P CARPAL TUNNEL RELEASE: Primary | ICD-10-CM

## 2020-03-13 LAB
THYROGLOB AB SERPL-ACNC: <1 IU/ML (ref 0–0.9)
THYROPEROXIDASE AB SERPL-ACNC: 8 IU/ML (ref 0–34)

## 2020-03-13 PROCEDURE — 1160F RVW MEDS BY RX/DR IN RCRD: CPT | Performed by: ORTHOPAEDIC SURGERY

## 2020-03-13 PROCEDURE — 3079F DIAST BP 80-89 MM HG: CPT | Performed by: ORTHOPAEDIC SURGERY

## 2020-03-13 PROCEDURE — 3008F BODY MASS INDEX DOCD: CPT | Performed by: ORTHOPAEDIC SURGERY

## 2020-03-13 PROCEDURE — 4040F PNEUMOC VAC/ADMIN/RCVD: CPT | Performed by: ORTHOPAEDIC SURGERY

## 2020-03-13 PROCEDURE — 99024 POSTOP FOLLOW-UP VISIT: CPT | Performed by: ORTHOPAEDIC SURGERY

## 2020-03-13 PROCEDURE — 3077F SYST BP >= 140 MM HG: CPT | Performed by: ORTHOPAEDIC SURGERY

## 2020-03-13 NOTE — PROGRESS NOTES
Assessment:   S/P L ECTR 03/04/2020    Plan:   Resume activities as tolerated and scar tissue massage  No soaking x 48 hours    Follow Up:  PRN      CHIEF COMPLAINT:  Chief Complaint   Patient presents with    Left Wrist - Post-op         SUBJECTIVE:  Ebonie Ncik is a 77 y o  male who presents for follow up S/P L ECTR 03/04/2020  Patient states he is feeling very well  No n/t  No complaints of pain        PHYSICAL EXAMINATION:  Vital signs: /82   Pulse 60   Ht 5' 8" (1 727 m)   Wt 111 kg (244 lb)   BMI 37 10 kg/m²   General: well developed and well nourished, alert, oriented times 3 and appears comfortable  Psychiatric: Normal    MUSCULOSKELETAL EXAMINATION:  Incision: Clean, dry, intact  Range of Motion: As expected  Neurovascular status: Neuro intact, good cap refill  Activity Restrictions: No restrictions  Done today: Sutures out and Steri strips applied      STUDIES REVIEWED:  No Studies to review      PROCEDURES PERFORMED:  Procedures  No Procedures performed today

## 2020-03-13 NOTE — TELEPHONE ENCOUNTER
Patient informed of thyroid blood work results and to continue current dose of levothyroxine  Patient verbally understands

## 2020-03-19 NOTE — TELEPHONE ENCOUNTER
Trial and lead removal have been cancelled due to Covid 19 precautions  Attempt to reach pt, left a detailed message informing patient  Email sent to [employer]  Cancelled in [address]  Called patient again to confirm he received message and spoke with him and also asked patient to resume his ASA  Patient verbalized understanding

## 2020-03-30 DIAGNOSIS — J44.1 CHRONIC OBSTRUCTIVE PULMONARY DISEASE WITH ACUTE EXACERBATION (HCC): ICD-10-CM

## 2020-04-02 DIAGNOSIS — F32.A ANXIETY AND DEPRESSION: ICD-10-CM

## 2020-04-02 DIAGNOSIS — F41.9 ANXIETY AND DEPRESSION: ICD-10-CM

## 2020-04-02 RX ORDER — CLONAZEPAM 0.5 MG/1
0.5 TABLET ORAL
Qty: 30 TABLET | Refills: 1 | Status: SHIPPED | OUTPATIENT
Start: 2020-04-02 | End: 2020-05-27 | Stop reason: SDUPTHER

## 2020-04-18 DIAGNOSIS — R73.03 PREDIABETES: ICD-10-CM

## 2020-04-20 RX ORDER — METFORMIN HYDROCHLORIDE 500 MG/1
TABLET, EXTENDED RELEASE ORAL
Qty: 90 TABLET | Refills: 0 | Status: SHIPPED | OUTPATIENT
Start: 2020-04-20 | End: 2020-07-14 | Stop reason: SDUPTHER

## 2020-05-12 DIAGNOSIS — E78.2 MIXED HYPERLIPIDEMIA: ICD-10-CM

## 2020-05-12 RX ORDER — ROSUVASTATIN CALCIUM 40 MG/1
TABLET, COATED ORAL
Qty: 180 TABLET | Refills: 1 | Status: SHIPPED | OUTPATIENT
Start: 2020-05-12 | End: 2021-05-03

## 2020-05-27 ENCOUNTER — TELEPHONE (OUTPATIENT)
Dept: RADIOLOGY | Facility: CLINIC | Age: 70
End: 2020-05-27

## 2020-05-27 DIAGNOSIS — F32.A ANXIETY AND DEPRESSION: ICD-10-CM

## 2020-05-27 DIAGNOSIS — F41.9 ANXIETY AND DEPRESSION: ICD-10-CM

## 2020-05-27 RX ORDER — CLONAZEPAM 0.5 MG/1
0.5 TABLET ORAL
Qty: 30 TABLET | Refills: 1 | Status: SHIPPED | OUTPATIENT
Start: 2020-05-27 | End: 2020-07-21 | Stop reason: SDUPTHER

## 2020-05-28 DIAGNOSIS — Z79.01 CHRONIC ANTICOAGULATION: ICD-10-CM

## 2020-05-28 DIAGNOSIS — M54.16 LUMBAR RADICULOPATHY: ICD-10-CM

## 2020-05-28 DIAGNOSIS — Z79.899 ENCOUNTER FOR LONG-TERM (CURRENT) USE OF OTHER MEDICATIONS: ICD-10-CM

## 2020-05-28 DIAGNOSIS — G89.4 CHRONIC PAIN SYNDROME: Primary | ICD-10-CM

## 2020-05-28 RX ORDER — CEFAZOLIN SODIUM 2 G/50ML
2000 SOLUTION INTRAVENOUS ONCE
Status: CANCELLED | OUTPATIENT
Start: 2020-05-28 | End: 2020-05-28

## 2020-05-28 NOTE — TELEPHONE ENCOUNTER
Spoke to pt, rescheduled SCS trial appts  Mon 06/15/20 arr at 02:00pm- TRIAL  Mon 06/22/20 arr at 09:15am- lead removal (w Dr Denisa Tee, NP schedule not yet open for OVs)  (pt was in for consent appt in March, does not need another appt for this- will sign consents at Trial)     Pt cardiologist, Dr Norma Shields, gave new anti coag clearance (via epic task on 5/27)- OK for pt to hold aspirin for 6 days prior to, and 8 days during trial- 14 total, starting Tues 06/09/20  Pt will also hold ibuprofen for 1 days starting Sun 06/14/20  COVID disclaimer/ screening completed  Pt states they have NOT had COVID  Because of possible immunosuppression due to steroid, pt is aware that he should practice more strict social distancing, masking, handwashing for 2-3 weeks following procedure  Reviewed check in process with pt- will enter building no earlier than arrival time given, agreed to wear mask for duration of appt,  will wait in car  Went over pre procedure instructions, no vaccinations 2 weeks prior/post proc, NPO 1 hr prior, if sick or on abx needs to call to rs, wear loose, comf clothing- no buttons/zippers, needs   Pt verbalized understanding  Mailed pre op instructions, blood work, and PROMIS/ OSWESTRY to pt

## 2020-06-04 DIAGNOSIS — M17.0 OSTEOARTHRITIS OF BOTH KNEES, UNSPECIFIED OSTEOARTHRITIS TYPE: ICD-10-CM

## 2020-06-08 ENCOUNTER — APPOINTMENT (OUTPATIENT)
Dept: LAB | Age: 70
End: 2020-06-08
Payer: MEDICARE

## 2020-06-08 DIAGNOSIS — G89.4 CHRONIC PAIN SYNDROME: ICD-10-CM

## 2020-06-08 DIAGNOSIS — Z79.899 ENCOUNTER FOR LONG-TERM (CURRENT) USE OF OTHER MEDICATIONS: ICD-10-CM

## 2020-06-08 DIAGNOSIS — M54.16 LUMBAR RADICULOPATHY: ICD-10-CM

## 2020-06-08 DIAGNOSIS — Z79.01 CHRONIC ANTICOAGULATION: ICD-10-CM

## 2020-06-08 LAB
ALBUMIN SERPL BCP-MCNC: 4.1 G/DL (ref 3.5–5)
ALP SERPL-CCNC: 67 U/L (ref 46–116)
ALT SERPL W P-5'-P-CCNC: 29 U/L (ref 12–78)
ANION GAP SERPL CALCULATED.3IONS-SCNC: 6 MMOL/L (ref 4–13)
APTT PPP: 34 SECONDS (ref 23–37)
AST SERPL W P-5'-P-CCNC: 20 U/L (ref 5–45)
BILIRUB SERPL-MCNC: 0.57 MG/DL (ref 0.2–1)
BUN SERPL-MCNC: 27 MG/DL (ref 5–25)
CALCIUM SERPL-MCNC: 9.5 MG/DL (ref 8.3–10.1)
CHLORIDE SERPL-SCNC: 102 MMOL/L (ref 100–108)
CO2 SERPL-SCNC: 32 MMOL/L (ref 21–32)
CREAT SERPL-MCNC: 1.65 MG/DL (ref 0.6–1.3)
ERYTHROCYTE [DISTWIDTH] IN BLOOD BY AUTOMATED COUNT: 12.3 % (ref 11.6–15.1)
EST. AVERAGE GLUCOSE BLD GHB EST-MCNC: 128 MG/DL
GFR SERPL CREATININE-BSD FRML MDRD: 42 ML/MIN/1.73SQ M
GLUCOSE P FAST SERPL-MCNC: 102 MG/DL (ref 65–99)
HBA1C MFR BLD: 6.1 %
HCT VFR BLD AUTO: 44.8 % (ref 36.5–49.3)
HGB BLD-MCNC: 15 G/DL (ref 12–17)
INR PPP: 0.96 (ref 0.84–1.19)
MCH RBC QN AUTO: 30.7 PG (ref 26.8–34.3)
MCHC RBC AUTO-ENTMCNC: 33.5 G/DL (ref 31.4–37.4)
MCV RBC AUTO: 92 FL (ref 82–98)
PLATELET # BLD AUTO: 212 THOUSANDS/UL (ref 149–390)
PMV BLD AUTO: 11.2 FL (ref 8.9–12.7)
POTASSIUM SERPL-SCNC: 4.2 MMOL/L (ref 3.5–5.3)
PROT SERPL-MCNC: 7.6 G/DL (ref 6.4–8.2)
PROTHROMBIN TIME: 12.4 SECONDS (ref 11.6–14.5)
RBC # BLD AUTO: 4.88 MILLION/UL (ref 3.88–5.62)
SODIUM SERPL-SCNC: 140 MMOL/L (ref 136–145)
WBC # BLD AUTO: 7.51 THOUSAND/UL (ref 4.31–10.16)

## 2020-06-08 PROCEDURE — 85730 THROMBOPLASTIN TIME PARTIAL: CPT

## 2020-06-08 PROCEDURE — 36415 COLL VENOUS BLD VENIPUNCTURE: CPT

## 2020-06-08 PROCEDURE — 85610 PROTHROMBIN TIME: CPT

## 2020-06-08 PROCEDURE — 85027 COMPLETE CBC AUTOMATED: CPT

## 2020-06-08 PROCEDURE — 83036 HEMOGLOBIN GLYCOSYLATED A1C: CPT

## 2020-06-08 PROCEDURE — 80053 COMPREHEN METABOLIC PANEL: CPT

## 2020-06-15 ENCOUNTER — HOSPITAL ENCOUNTER (OUTPATIENT)
Dept: RADIOLOGY | Facility: CLINIC | Age: 70
Discharge: HOME | End: 2020-06-15
Payer: MEDICARE

## 2020-06-15 ENCOUNTER — TRANSCRIBE ORDERS (OUTPATIENT)
Dept: RADIOLOGY | Facility: HOSPITAL | Age: 70
End: 2020-06-15

## 2020-06-15 ENCOUNTER — TELEPHONE (OUTPATIENT)
Dept: RADIOLOGY | Facility: CLINIC | Age: 70
End: 2020-06-15

## 2020-06-15 ENCOUNTER — HOSPITAL ENCOUNTER (OUTPATIENT)
Dept: RADIOLOGY | Facility: HOSPITAL | Age: 70
Discharge: HOME | End: 2020-06-15
Attending: ANESTHESIOLOGY
Payer: MEDICARE

## 2020-06-15 VITALS
HEART RATE: 55 BPM | DIASTOLIC BLOOD PRESSURE: 73 MMHG | OXYGEN SATURATION: 98 % | SYSTOLIC BLOOD PRESSURE: 134 MMHG | TEMPERATURE: 98 F | RESPIRATION RATE: 18 BRPM

## 2020-06-15 DIAGNOSIS — Z98.890 STATUS POST SURGERY: ICD-10-CM

## 2020-06-15 DIAGNOSIS — Z98.890 STATUS POST SURGERY: Primary | ICD-10-CM

## 2020-06-15 DIAGNOSIS — M54.16 LUMBAR RADICULOPATHY: ICD-10-CM

## 2020-06-15 DIAGNOSIS — G89.4 CHRONIC PAIN SYNDROME: ICD-10-CM

## 2020-06-15 PROCEDURE — 72070 X-RAY EXAM THORAC SPINE 2VWS: CPT

## 2020-06-15 PROCEDURE — 63650 IMPLANT NEUROELECTRODES: CPT | Performed by: ANESTHESIOLOGY

## 2020-06-15 PROCEDURE — C1787 PATIENT PROGR, NEUROSTIM: HCPCS

## 2020-06-15 PROCEDURE — C1897 LEAD, NEUROSTIM TEST KIT: HCPCS

## 2020-06-15 RX ORDER — CEFAZOLIN SODIUM 2 G/50ML
2000 SOLUTION INTRAVENOUS ONCE
Status: COMPLETED | OUTPATIENT
Start: 2020-06-15 | End: 2020-06-15

## 2020-06-15 RX ORDER — CEPHALEXIN 500 MG/1
500 CAPSULE ORAL EVERY 6 HOURS SCHEDULED
Qty: 28 CAPSULE | Refills: 0 | Status: SHIPPED | OUTPATIENT
Start: 2020-06-15 | End: 2020-06-22

## 2020-06-15 RX ADMIN — CEFAZOLIN SODIUM 2000 MG: 2 SOLUTION INTRAVENOUS at 14:30

## 2020-06-15 RX ADMIN — LIDOCAINE HYDROCHLORIDE 10 ML: 10; .005 INJECTION, SOLUTION EPIDURAL; INFILTRATION; INTRACAUDAL; PERINEURAL at 15:28

## 2020-06-17 ENCOUNTER — TELEMEDICINE (OUTPATIENT)
Dept: ENDOCRINOLOGY | Facility: CLINIC | Age: 70
End: 2020-06-17
Payer: MEDICARE

## 2020-06-17 DIAGNOSIS — R73.01 IMPAIRED FASTING GLUCOSE: ICD-10-CM

## 2020-06-17 DIAGNOSIS — E03.8 SUBCLINICAL HYPOTHYROIDISM: Primary | ICD-10-CM

## 2020-06-17 PROCEDURE — 99214 OFFICE O/P EST MOD 30 MIN: CPT | Performed by: PHYSICIAN ASSISTANT

## 2020-06-18 ENCOUNTER — TELEPHONE (OUTPATIENT)
Dept: PAIN MEDICINE | Facility: CLINIC | Age: 70
End: 2020-06-18

## 2020-06-22 ENCOUNTER — OFFICE VISIT (OUTPATIENT)
Dept: PAIN MEDICINE | Facility: CLINIC | Age: 70
End: 2020-06-22

## 2020-06-22 VITALS
TEMPERATURE: 97.6 F | BODY MASS INDEX: 35.61 KG/M2 | WEIGHT: 235 LBS | HEART RATE: 55 BPM | HEIGHT: 68 IN | SYSTOLIC BLOOD PRESSURE: 114 MMHG | DIASTOLIC BLOOD PRESSURE: 72 MMHG

## 2020-06-22 DIAGNOSIS — M54.16 LUMBAR RADICULOPATHY: ICD-10-CM

## 2020-06-22 DIAGNOSIS — G89.4 CHRONIC PAIN SYNDROME: Primary | ICD-10-CM

## 2020-06-22 PROCEDURE — 3008F BODY MASS INDEX DOCD: CPT | Performed by: ANESTHESIOLOGY

## 2020-06-22 PROCEDURE — 1160F RVW MEDS BY RX/DR IN RCRD: CPT | Performed by: ANESTHESIOLOGY

## 2020-06-22 PROCEDURE — 3078F DIAST BP <80 MM HG: CPT | Performed by: ANESTHESIOLOGY

## 2020-06-22 PROCEDURE — 99024 POSTOP FOLLOW-UP VISIT: CPT | Performed by: ANESTHESIOLOGY

## 2020-06-22 PROCEDURE — 3074F SYST BP LT 130 MM HG: CPT | Performed by: ANESTHESIOLOGY

## 2020-06-22 PROCEDURE — 4040F PNEUMOC VAC/ADMIN/RCVD: CPT | Performed by: ANESTHESIOLOGY

## 2020-07-14 DIAGNOSIS — R73.03 PREDIABETES: ICD-10-CM

## 2020-07-14 RX ORDER — METFORMIN HYDROCHLORIDE 500 MG/1
500 TABLET, EXTENDED RELEASE ORAL
Qty: 90 TABLET | Refills: 0 | Status: SHIPPED | OUTPATIENT
Start: 2020-07-14 | End: 2020-10-06

## 2020-07-15 ENCOUNTER — TELEPHONE (OUTPATIENT)
Dept: FAMILY MEDICINE CLINIC | Facility: CLINIC | Age: 70
End: 2020-07-15

## 2020-07-15 ENCOUNTER — TELEMEDICINE (OUTPATIENT)
Dept: FAMILY MEDICINE CLINIC | Facility: CLINIC | Age: 70
End: 2020-07-15
Payer: MEDICARE

## 2020-07-15 VITALS — BODY MASS INDEX: 35.61 KG/M2 | TEMPERATURE: 99 F | HEIGHT: 68 IN | WEIGHT: 235 LBS

## 2020-07-15 DIAGNOSIS — Z20.828 EXPOSURE TO SARS-ASSOCIATED CORONAVIRUS: ICD-10-CM

## 2020-07-15 DIAGNOSIS — B34.9 ACUTE VIRAL SYNDROME: Primary | ICD-10-CM

## 2020-07-15 PROCEDURE — 99213 OFFICE O/P EST LOW 20 MIN: CPT | Performed by: FAMILY MEDICINE

## 2020-07-15 PROCEDURE — 3074F SYST BP LT 130 MM HG: CPT | Performed by: FAMILY MEDICINE

## 2020-07-15 PROCEDURE — 4040F PNEUMOC VAC/ADMIN/RCVD: CPT | Performed by: FAMILY MEDICINE

## 2020-07-15 PROCEDURE — 1160F RVW MEDS BY RX/DR IN RCRD: CPT | Performed by: FAMILY MEDICINE

## 2020-07-15 PROCEDURE — U0003 INFECTIOUS AGENT DETECTION BY NUCLEIC ACID (DNA OR RNA); SEVERE ACUTE RESPIRATORY SYNDROME CORONAVIRUS 2 (SARS-COV-2) (CORONAVIRUS DISEASE [COVID-19]), AMPLIFIED PROBE TECHNIQUE, MAKING USE OF HIGH THROUGHPUT TECHNOLOGIES AS DESCRIBED BY CMS-2020-01-R: HCPCS | Performed by: OBSTETRICS & GYNECOLOGY

## 2020-07-15 PROCEDURE — 3078F DIAST BP <80 MM HG: CPT | Performed by: FAMILY MEDICINE

## 2020-07-15 PROCEDURE — 3008F BODY MASS INDEX DOCD: CPT | Performed by: FAMILY MEDICINE

## 2020-07-15 PROCEDURE — 1036F TOBACCO NON-USER: CPT | Performed by: FAMILY MEDICINE

## 2020-07-15 NOTE — PROGRESS NOTES
COVID-19 Virtual Visit     Assessment/Plan:    Problem List Items Addressed This Visit        Other    Acute viral syndrome     Recommended to increase fluid intake  Take Tylenol PRN for body aches, fever  Will refer to [facility] at [place] [address] to get tested for COVID-19  Patient was recommended self isolation at home until results of Covid-19 test is available  Recommended to call office if continues to spike fever, develops cough, shortness breath, continues with persistent headache  Other Visit Diagnoses     Exposure to SARS-associated coronavirus    -  Primary    Relevant Orders    MISC COVID-19 TEST- Collected at [facility] or [facility]        This virtual check-in was done via [employer] and patient was informed that this is not a secure, HIPAA-complaint platform  He agrees to proceed     Disposition:      I recommended self-quarantine for 14 days and to call back for worsening symptoms or development of shortness of breath    I spent 15 minutes directly with the patient during this visit    Encounter provider Jay Jay Camarena MD    Provider located at [address] [place] [address]  [address] [place] [id number]    Recent Visits  No visits were found meeting these conditions     Showing recent visits within past 7 days and meeting all other requirements     Today's Visits  Date Type Provider Dept   07/15/20 [address] [facility] [address], [address] [place] [address]   07/15/20 Telephone Melina García MD [address] today's visits and meeting all other requirements     Future Appointments  Date Type Provider Dept   07/15/20 Telemedicine Jay Jay Camarena  [facility]   Showing future appointments within next 150 days and meeting all other requirements        Patient agrees to participate in a virtual check in via telephone or video visit instead of presenting to the office to address urgent/immediate medical needs  Patient is aware this is a billable service  After connecting through [employer], the patient was identified by name and date of birth  Tamara Hdez was informed that this was a telemedicine visit and that the exam was being conducted confidentially over secure lines  My office door was closed  No one else was in the room  Tamara Hdez acknowledged consent and understanding of privacy and security of the telemedicine visit  I informed the patient that I have reviewed his record in Epic and presented the opportunity for him to ask any questions regarding the visit today  The patient agreed to participate  Subjective  Tamara Hdez is a 72 y o  male who is concerned about COVID-19  He reports fever, chills, headache and myalgias  He has not experienced shortness of breath, sore throat, anosmia, abdominal pain, diarrhea, nausea and vomiting He has not had contact with a person who is under investigation for or who is positive for COVID-19 within the last 14 days  He has not been hospitalized recently for fever and/or lower respiratory symptoms  Patient developed fever, chills, body aches, headache yesterday  He took Tylenol last night and this morning  Denies abdominal pain, nausea, vomiting diarrhea  Patient has COPD  C/o occasional mild cough  Denies shortness of breath  Patient denies recent travel  Nenies known exposure to COVID-19      Past Medical History:   Diagnosis Date    Bronchitis, chronic obstructive, with exacerbation ([address] )     Last Assessed: 5/17/2013    COPD with acute exacerbation ([facility] [address] [facility] )     Last Assessed: 4/13/2015    Depression, controlled 8/30/2017    Transitioned From: Depression    Elevated PSA     Bx neg 2012; Last Assessed: 11/23/2012    Hypertension        Past Surgical History:   Procedure Laterality Date    BACK SURGERY  07/25/2012    decompression of spinal stenosis from lower thoracic through the lumbar spine    COLONOSCOPY  10/2010    neg   recheck in 5 years   [address] ARTHROSCOPY  01/2013    OR WRIST Enmanuel Nipper LIG Left 3/4/2020    Procedure: RELEASE CARPAL TUNNEL ENDOSCOPIC;  Surgeon: Sarah Arnold MD;  Location: [facility];  Service: [address] BIOPSY  2012    for elevated PSA of about 7; neg    TRANSURETHRAL RESECTION OF PROSTATE  06/27/2013       Current Outpatient Medications   Medication Sig Dispense Refill    aspirin 81 mg chewable tablet Chew 1 tablet daily      CIALIS 5 MG tablet TAKE 1 TABLET (5 MG TOTAL) BY MOUTH DAILY FOR 90 DAYS 90 tablet 3    clonazePAM (KlonoPIN) 0 5 mg tablet Take 1 tablet (0 5 mg total) by mouth daily at bedtime 30 tablet 1    diclofenac sodium (VOLTAREN) 50 mg EC tablet Take 1 tablet (50 mg total) by mouth 2 (two) times a day 180 tablet 1    DULoxetine (CYMBALTA) 30 mg delayed release capsule Take 90 mg by mouth daily       DULoxetine (CYMBALTA) 60 mg delayed release capsule Take 1 capsule by mouth daily      fluticasone (FLONASE) 50 mcg/act nasal spray fluticasone propionate 50 mcg/actuation nasal spray,suspension      gabapentin (NEURONTIN) 300 mg capsule Take 150 mg by mouth 4 (four) times a day       loratadine (CLARITIN) 10 mg tablet Take 1 tablet by mouth daily      metFORMIN (GLUCOPHAGE-XR) 500 mg 24 hr tablet Take 1 tablet (500 mg total) by mouth daily with dinner 90 tablet 0    metoprolol tartrate (LOPRESSOR) 25 mg tablet Take 1 tablet (25 mg total) by mouth every 12 (twelve) hours 180 tablet 2    metoprolol tartrate (LOPRESSOR) 50 mg tablet Take 1 tablet (50 mg total) by mouth 2 (two) times a day Take with 25 mg tab bid 180 tablet 3    Multiple Vitamin (MULTI-VITAMIN DAILY) TABS Take 1 tablet by mouth daily      NIFEdipine (PROCARDIA XL) 90 mg 24 hr tablet Take 1 tablet (90 mg total) by mouth daily for 357 days 90 tablet 3    rosuvastatin (CRESTOR) 20 MG tablet TAKE 2 TABLETS ON MON WED FRI  1 TAB ON TUES  THURS SAT  AND SUNDAY 120 tablet 0    rosuvastatin (CRESTOR) 40 MG tablet TAKE 1 TABLET MON-WED-FRI, AND TAKE 1/2 TABLET TUES-THUR-SAT- tablet 1    WIXELA INHUB 250-50 MCG/DOSE inhaler INHALE 1 PUFF 2 (TWO) TIMES A DAY RINSE MOUTH AFTER USE  1 Inhaler 5    dextromethorphan-guaifenesin (MUCINEX DM)  MG per 12 hr tablet Take 1 tablet by mouth every 12 (twelve) hours       No current facility-administered medications for this visit  No Known Allergies    Review of Systems   Constitutional: Positive for chills, fatigue and fever  Negative for activity change and appetite change  HENT: Negative for congestion, ear pain, mouth sores, nosebleeds, postnasal drip, sore throat and trouble swallowing  Eyes: Negative for pain, discharge, redness, itching and visual disturbance  Respiratory: Positive for cough (9occasional mild cough)  Negative for chest tightness, shortness of breath and wheezing  Cardiovascular: Negative for chest pain and leg swelling  Gastrointestinal: Negative for abdominal pain, blood in stool, constipation, diarrhea, nausea and vomiting  Genitourinary: Negative for difficulty urinating, dysuria, flank pain, frequency and hematuria  Musculoskeletal: Positive for arthralgias and myalgias  Negative for joint swelling and neck pain  Skin: Negative for rash  Neurological: Positive for headaches  Negative for dizziness and syncope  Hematological: Negative  Psychiatric/Behavioral: Negative  Video Exam    Vitals:    07/15/20 1356   Temp: 99 °F (37 2 °C)   TempSrc: Oral   Weight: 107 kg (235 lb)   Height: 5' 8" (1 727 m)         Prachi Saldaña appears alert, no distress  Physical Exam   Constitutional: He appears well-developed and well-nourished  HENT:   Head: Normocephalic and atraumatic  Eyes: Pupils are equal, round, and reactive to light  Conjunctivae are normal    Cardiovascular:   No chest pain   Pulmonary/Chest: Effort normal  He has no wheezes  Abdominal: Soft  He exhibits no distension  There is no tenderness  Musculoskeletal: Normal range of motion  He exhibits no edema, tenderness or deformity  Skin: Skin is warm and dry  No rash noted  Psychiatric: He has a normal mood and affect  Nursing note and vitals reviewed  VIRTUAL VISIT DISCLAIMER    Lori Koch acknowledges that he has consented to an online visit or consultation  He understands that the online visit is based solely on information provided by him, and that, in the absence of a face-to-face physical evaluation by the physician, the diagnosis he receives is both limited and provisional in terms of accuracy and completeness  This is not intended to replace a full medical face-to-face evaluation by the physician  Lori Koch understands and accepts these terms

## 2020-07-15 NOTE — ASSESSMENT & PLAN NOTE
Recommended to increase fluid intake  Take Tylenol PRN for body aches, fever  Will refer to [facility] at [place] [address] to get tested for COVID-19  Patient was recommended self isolation at home until results of Covid-19 test is available  Recommended to call office if continues to spike fever, develops cough, shortness breath, continues with persistent headache

## 2020-07-15 NOTE — TELEPHONE ENCOUNTER
Started last night with chills, fever and temp 100 4 last night  The took tylenol and temp went down this morning but he h as HA, body aches       Was going to offer him a virtual visit but I'm not sure were to put him in please advise

## 2020-07-18 DIAGNOSIS — I10 ESSENTIAL HYPERTENSION: ICD-10-CM

## 2020-07-20 LAB — SARS-COV-2 RNA SPEC QL NAA+PROBE: NOT DETECTED

## 2020-07-20 RX ORDER — NIFEDIPINE 90 MG/1
TABLET, EXTENDED RELEASE ORAL
Qty: 90 TABLET | Refills: 3 | Status: SHIPPED | OUTPATIENT
Start: 2020-07-20 | End: 2021-07-10

## 2020-07-21 DIAGNOSIS — F32.A ANXIETY AND DEPRESSION: ICD-10-CM

## 2020-07-21 DIAGNOSIS — F41.9 ANXIETY AND DEPRESSION: ICD-10-CM

## 2020-07-21 RX ORDER — CLONAZEPAM 0.5 MG/1
0.5 TABLET ORAL
Qty: 30 TABLET | Refills: 1 | Status: SHIPPED | OUTPATIENT
Start: 2020-07-21 | End: 2020-09-16

## 2020-07-21 NOTE — RESULT ENCOUNTER NOTE
I called and reviewed the results with patient  He actually explained that he developed burning on urination after the video visit, and he contacted his urologist with [facility] and they ran a urine culture, he did test positive for a UTI and is being treated with Cipro, and he does feel better

## 2020-07-31 ENCOUNTER — OFFICE VISIT (OUTPATIENT)
Dept: NEUROSURGERY | Facility: CLINIC | Age: 70
End: 2020-07-31
Payer: MEDICARE

## 2020-07-31 VITALS
TEMPERATURE: 96.9 F | HEIGHT: 68 IN | BODY MASS INDEX: 35.61 KG/M2 | HEART RATE: 59 BPM | SYSTOLIC BLOOD PRESSURE: 118 MMHG | DIASTOLIC BLOOD PRESSURE: 74 MMHG | RESPIRATION RATE: 16 BRPM | WEIGHT: 235 LBS

## 2020-07-31 DIAGNOSIS — Z79.899 ENCOUNTER FOR LONG-TERM (CURRENT) USE OF MEDICATIONS: ICD-10-CM

## 2020-07-31 DIAGNOSIS — G89.4 CHRONIC PAIN SYNDROME: Primary | ICD-10-CM

## 2020-07-31 DIAGNOSIS — M96.1 POSTLAMINECTOMY SYNDROME: ICD-10-CM

## 2020-07-31 DIAGNOSIS — Z79.01 ADMISSION FOR LONG-TERM (CURRENT) USE OF ANTICOAGULANTS: ICD-10-CM

## 2020-07-31 DIAGNOSIS — M54.16 LUMBAR RADICULOPATHY: ICD-10-CM

## 2020-07-31 DIAGNOSIS — Z51.81 ADMISSION FOR LONG-TERM (CURRENT) USE OF ANTICOAGULANTS: ICD-10-CM

## 2020-07-31 PROCEDURE — 99214 OFFICE O/P EST MOD 30 MIN: CPT | Performed by: NEUROLOGICAL SURGERY

## 2020-07-31 RX ORDER — CHLORHEXIDINE GLUCONATE 0.12 MG/ML
15 RINSE ORAL ONCE
Status: CANCELLED | OUTPATIENT
Start: 2020-08-25 | End: 2020-07-31

## 2020-07-31 RX ORDER — CEFAZOLIN SODIUM 2 G/50ML
2000 SOLUTION INTRAVENOUS ONCE
Status: CANCELLED | OUTPATIENT
Start: 2020-08-25 | End: 2020-07-31

## 2020-07-31 NOTE — LETTER
July 31, 2020     [id number] [facility]    Patient: Gareld Rubinstein   YOB: 1950   Date of Visit: 7/31/2020       Dear Dr Amada Moreland:    Thank you for referring Brittney Phoenix to me for evaluation  Below are my notes for this consultation  If you have questions, please do not hesitate to call me  I look forward to following your patient along with you  Sincerely,        Davin Christie MD        CC: No Recipients  Davin Christie MD  7/31/2020  9:54 AM  Sign at close encounter  Assessment/Plan:    No problem-specific Assessment & Plan notes found for this encounter  Patient is stable  Symptoms, as detailed in HPI, continue to significantly impact of patient's quality of life in daily activities  After carefully considering presentation, investigations, functional status and co-morbidities, the risk/benefit profile of surgical intervention is favorable  History, physical examination and diagnostic tests were reviewed and questions answered  Diagnosis, care plan and treatment options were discussed  The patient understand instructions and will follow up as directed  Patient with successful trial stimulation with 70% relief of low back and leg pain  Recommend percutaneous placement of spinal cord stimulator and generator  Expected postoperative course, including activity restrictions, expected pain and postoperative medication were reviewed  Patient provided verbal consent to surgical procedure and signed consent form: Yes    We also discussed the risks and benefits of the procedure the risks being including: infection (~2%), neurologic injury (<1%), new pain, revisions surgery, failure to relieve pain, hardware issues  The benefits including relief of pain  The patient stated understanding of the risks and benefits and agreed to proceed       IMAGING REVIEWED: MRI thoracic shows no obvious stenosis limiting implant       Diagnoses and all orders for this visit:    Chronic pain syndrome  -     Ambulatory referral to Neurosurgery  -     Case request operating room: [address], RIGHT; Standing  -     Case request operating room: INSERTION THORACIC DORSAL COLUMN SPINAL CORD STIMULATOR PERCUTANEOUS W IMPLANTABLE PULSE GENERATOR, RIGHT    Postlaminectomy syndrome  -     Case request operating room: INSERTION THORACIC DORSAL COLUMN SPINAL CORD STIMULATOR PERCUTANEOUS W IMPLANTABLE PULSE GENERATOR, RIGHT; Standing  -     Case request operating room: INSERTION THORACIC DORSAL COLUMN SPINAL CORD STIMULATOR PERCUTANEOUS W IMPLANTABLE PULSE GENERATOR, RIGHT    Lumbar radiculopathy  -     Ambulatory referral to Neurosurgery  -     Case request operating room: [address], RIGHT; Standing  -     Case request operating room: [address], RIGHT    Other orders  -     Diet NPO; Sips with meds; Standing  -     Nursing Communication [facility] Interventions Implemented; Standing  -     Nursing Communication Use 2 CHG cloths, have staff wash the entire body (neck down) ; Standing  -     Nursing Communication Swab both nares with Povidone-Iodine solution, EXCLUDE if patient has shellfish/Iodine allergy; Standing  -     chlorhexidine (PERIDEX) 0 12 % oral rinse 15 mL  -     Void on call to OR; Standing  -     Insert peripheral IV;  Standing  -     ceFAZolin (ANCEF) IVPB (premix) 2,000 mg 50 mL        I have spent 60 minutes with Patient  today in which greater than 50% of this time was spent in counseling/coordination of care regarding Diagnostic results, Prognosis, Risks and benefits of tx options, Intructions for management, Patient and family education, Importance of tx compliance, Risk factor reductions and Impressions  Subjective:      Patient ID: Natalee Patricio is a 72 y o  male  Patient is a 72year old male with symptoms of low back and left greater than right leg pain  Pain is severe requiring persistent management with pain therapies  Reduction and activity and quality of life  The patient underwent a successful medtronic thoracic SCS trial with 70 % relief of pain  They reported improvement in activity  They are ready to proceed with surgery  He does have a history of prior spine surgery  The following portions of the patient's history were reviewed and updated as appropriate:   He  has a past medical history of Bronchitis, chronic obstructive, with exacerbation ([address] ), COPD with acute exacerbation ([address] ), Depression, controlled (8/30/2017), Elevated PSA, and Hypertension    He   Patient Active Problem List    Diagnosis Date Noted    Acute viral syndrome 07/15/2020    Status post lumbar and lumbosacral fusion by anterior technique 12/22/2019    Chronic pain syndrome 11/01/2019    Lumbar radiculopathy 10/14/2019    Subclinical hypothyroidism 07/24/2019    Spondylolisthesis, acquired 09/06/2018    Degeneration of lumbosacral intervertebral disc 09/06/2018    Full thickness rotator cuff tear 09/06/2018    Knee pain 09/06/2018    Localized, primary osteoarthritis 09/06/2018    Primary localized osteoarthritis of pelvic region and thigh 09/06/2018    Low back pain 09/06/2018    Lumbosacral neuritis 09/06/2018    Pain in limb 09/06/2018    Pseudoclaudication syndrome 09/06/2018    Shoulder pain 09/06/2018    Cervical stenosis of spinal canal 09/06/2018    COPD (chronic obstructive pulmonary disease) ([facility] [address] ) 03/06/2018    Lung nodule 01/04/2017    Allergic rhinitis 11/11/2016    Anxiety and depression 05/23/2016    Arthritis of left hip 05/22/2016    Osteoarthritis of hip 05/06/2016    Chronic bronchitis ([address] ) 04/23/2015    Supraventricular tachycardia ([facility] [address] ) 04/15/2015    Seasonal allergies 04/08/2015    Multifocal atrial tachycardia ([facility] [address] ) 03/25/2015    Urethral stricture 10/11/2013    Incomplete emptying of bladder 07/01/2013    Screening for prostate cancer 01/18/2013    Elevated prostate specific antigen (PSA) 10/17/2012    Enlarged prostate with lower urinary tract symptoms (LUTS) 09/26/2012    Hypertension 09/26/2012    Impaired fasting glucose 09/26/2012    Male erectile dysfunction 09/26/2012    Obesity (BMI 30-39 9) 09/26/2012    Osteoarthritis of knee 09/26/2012    Spinal stenosis of lumbar region with neurogenic claudication 09/26/2012    Hyperlipidemia 09/25/2012    Retention of urine 08/03/2012    Lumbosacral spondylosis without myelopathy 04/18/2012    Thoracic or lumbosacral neuritis or radiculitis 04/18/2012     He  has a past surgical history that includes Colonoscopy (10/2010); Knee arthroscopy (01/2013); Back surgery (07/25/2012); Prostate biopsy (2012); Transurethral resection of prostate (06/27/2013); Joint replacement; and pr wrist arthroscop,release xvers lig (Left, 3/4/2020)  His family history includes Breast cancer in his mother; Diabetes in his maternal grandmother, paternal grandfather, and paternal grandmother; Heart attack in his maternal grandfather and paternal grandmother; Pancreatitis in his father  He  reports that he quit smoking about 20 years ago  His smoking use included cigarettes  He started smoking about 43 years ago  He has a 23 00 pack-year smoking history  He has never used smokeless tobacco  He reports that he does not drink alcohol or use drugs    Current Outpatient Medications   Medication Sig Dispense Refill    aspirin 81 mg chewable tablet Chew 1 tablet daily      CIALIS 5 MG tablet TAKE 1 TABLET (5 MG TOTAL) BY MOUTH DAILY FOR 90 DAYS 90 tablet 3    clonazePAM (KlonoPIN) 0 5 mg tablet Take 1 tablet (0 5 mg total) by mouth daily at bedtime 30 tablet 1    dextromethorphan-guaifenesin (MUCINEX DM)  MG per 12 hr tablet Take 1 tablet by mouth every 12 (twelve) hours      diclofenac sodium (VOLTAREN) 50 mg EC tablet Take 1 tablet (50 mg total) by mouth 2 (two) times a day 180 tablet 1    DULoxetine (CYMBALTA) 30 mg delayed release capsule Take 90 mg by mouth daily       DULoxetine (CYMBALTA) 60 mg delayed release capsule Take 1 capsule by mouth daily      fluticasone (FLONASE) 50 mcg/act nasal spray fluticasone propionate 50 mcg/actuation nasal spray,suspension      gabapentin (NEURONTIN) 300 mg capsule Take 150 mg by mouth 5 (five) times a day       loratadine (CLARITIN) 10 mg tablet Take 1 tablet by mouth daily      metFORMIN (GLUCOPHAGE-XR) 500 mg 24 hr tablet Take 1 tablet (500 mg total) by mouth daily with dinner 90 tablet 0    metoprolol tartrate (LOPRESSOR) 25 mg tablet Take 1 tablet (25 mg total) by mouth every 12 (twelve) hours 180 tablet 2    metoprolol tartrate (LOPRESSOR) 50 mg tablet Take 1 tablet (50 mg total) by mouth 2 (two) times a day Take with 25 mg tab bid 180 tablet 3    Multiple Vitamin (MULTI-VITAMIN DAILY) TABS Take 1 tablet by mouth daily      NIFEdipine (PROCARDIA XL) 90 mg 24 hr tablet TAKE 1 TABLET BY MOUTH DAILY 90 tablet 3    rosuvastatin (CRESTOR) 40 MG tablet TAKE 1 TABLET MON-WED-FRI, AND TAKE 1/2 TABLET TUES-THUR-SAT-SUN (Patient taking differently: 40 mg daily ) 180 tablet 1    WIXELA INHUB 250-50 MCG/DOSE inhaler INHALE 1 PUFF 2 (TWO) TIMES A DAY RINSE MOUTH AFTER USE  (Patient not taking: Reported on 7/31/2020) 1 Inhaler 5     No current facility-administered medications for this visit        Current Outpatient Medications on File Prior to Visit   Medication Sig    aspirin 81 mg chewable tablet Chew 1 tablet daily    CIALIS 5 MG tablet TAKE 1 TABLET (5 MG TOTAL) BY MOUTH DAILY FOR 90 DAYS    clonazePAM (KlonoPIN) 0 5 mg tablet Take 1 tablet (0 5 mg total) by mouth daily at bedtime    dextromethorphan-guaifenesin (MUCINEX DM)  MG per 12 hr tablet Take 1 tablet by mouth every 12 (twelve) hours    diclofenac sodium (VOLTAREN) 50 mg EC tablet Take 1 tablet (50 mg total) by mouth 2 (two) times a day    DULoxetine (CYMBALTA) 30 mg delayed release capsule Take 90 mg by mouth daily     DULoxetine (CYMBALTA) 60 mg delayed release capsule Take 1 capsule by mouth daily    fluticasone (FLONASE) 50 mcg/act nasal spray fluticasone propionate 50 mcg/actuation nasal spray,suspension    gabapentin (NEURONTIN) 300 mg capsule Take 150 mg by mouth 5 (five) times a day     loratadine (CLARITIN) 10 mg tablet Take 1 tablet by mouth daily    metFORMIN (GLUCOPHAGE-XR) 500 mg 24 hr tablet Take 1 tablet (500 mg total) by mouth daily with dinner    metoprolol tartrate (LOPRESSOR) 25 mg tablet Take 1 tablet (25 mg total) by mouth every 12 (twelve) hours    metoprolol tartrate (LOPRESSOR) 50 mg tablet Take 1 tablet (50 mg total) by mouth 2 (two) times a day Take with 25 mg tab bid    Multiple Vitamin (MULTI-VITAMIN DAILY) TABS Take 1 tablet by mouth daily    NIFEdipine (PROCARDIA XL) 90 mg 24 hr tablet TAKE 1 TABLET BY MOUTH DAILY    rosuvastatin (CRESTOR) 40 MG tablet TAKE 1 TABLET MON-WED-FRI, AND TAKE 1/2 TABLET TUES-THUR-SAT-SUN (Patient taking differently: 40 mg daily )    WIXELA INHUB 250-50 MCG/DOSE inhaler INHALE 1 PUFF 2 (TWO) TIMES A DAY RINSE MOUTH AFTER USE  (Patient not taking: Reported on 7/31/2020)    [DISCONTINUED] rosuvastatin (CRESTOR) 20 MG tablet TAKE 2 TABLETS ON MON WED FRI  1 TAB ON TUES  THURS SAT  AND SUNDAY     No current facility-administered medications on file prior to visit  He has No Known Allergies       Review of Systems   Constitutional: Negative  HENT: Negative  Eyes: Negative  Respiratory:        Chronic bronchitis  COPD   Cardiovascular:        A-fib     Gastrointestinal: Negative  Endocrine: Negative  Genitourinary: Negative           Self-cath   Musculoskeletal: Positive for back pain (LBP radiating into bi/ buttock hips down the back of legs to knees  )  Allergic/Immunologic: Positive for environmental allergies  Neurological: Positive for weakness (bilateral legs)  Hematological:        [address]       I have personally reviewed all aspects of the review of systems as documented    Objective:      /74 (BP Location: Right arm)   Pulse 59   Temp (!) 96 9 °F (36 1 °C) (Tympanic)   Resp 16   Ht 5' 8" (1 727 m)   Wt 107 kg (235 lb)   BMI 35 73 kg/m²           Physical Exam   Constitutional: He is oriented to person, place, and time  He appears well-developed and well-nourished  No distress  HENT:   Head: Normocephalic and atraumatic  Eyes: Pupils are equal, round, and reactive to light  Conjunctivae and EOM are normal  Right eye exhibits no discharge  Left eye exhibits no discharge  Neck: Normal range of motion  No tracheal deviation present  No thyromegaly present  Cardiovascular: Normal rate  Pulmonary/Chest: Effort normal and breath sounds normal    Musculoskeletal: Normal range of motion  He exhibits no edema or deformity  Neurological: He is alert and oriented to person, place, and time  He has normal strength  No cranial nerve deficit or sensory deficit  Skin: Skin is warm and dry  He is not diaphoretic  Psychiatric: He has a normal mood and affect   His behavior is normal

## 2020-07-31 NOTE — H&P (VIEW-ONLY)
Assessment/Plan:    No problem-specific Assessment & Plan notes found for this encounter  Patient is stable  Symptoms, as detailed in HPI, continue to significantly impact of patient's quality of life in daily activities  After carefully considering presentation, investigations, functional status and co-morbidities, the risk/benefit profile of surgical intervention is favorable  History, physical examination and diagnostic tests were reviewed and questions answered  Diagnosis, care plan and treatment options were discussed  The patient understand instructions and will follow up as directed  Patient with successful trial stimulation with 70% relief of low back and leg pain  Recommend percutaneous placement of spinal cord stimulator and generator  Expected postoperative course, including activity restrictions, expected pain and postoperative medication were reviewed  Patient provided verbal consent to surgical procedure and signed consent form: Yes    We also discussed the risks and benefits of the procedure the risks being including: infection (~2%), neurologic injury (<1%), new pain, revisions surgery, failure to relieve pain, hardware issues  The benefits including relief of pain  The patient stated understanding of the risks and benefits and agreed to proceed       IMAGING REVIEWED: MRI thoracic shows no obvious stenosis limiting implant       Diagnoses and all orders for this visit:    Chronic pain syndrome  -     Ambulatory referral to Neurosurgery  -     Case request operating room: [address] [place] [address], [facility]; Standing  -     Case request operating room: INSERTION THORACIC DORSAL COLUMN SPINAL CORD STIMULATOR PERCUTANEOUS W IMPLANTABLE PULSE GENERATOR, RIGHT    Postlaminectomy syndrome  -     Case request operating room: [place] [id number] GENERATOR, RIGHT; Standing  -     Case request operating room: INSERTION THORACIC DORSAL COLUMN SPINAL CORD STIMULATOR PERCUTANEOUS W IMPLANTABLE PULSE GENERATOR, RIGHT    Lumbar radiculopathy  -     Ambulatory referral to Neurosurgery  -     Case request operating room: INSERTION THORACIC DORSAL COLUMN SPINAL CORD STIMULATOR PERCUTANEOUS W IMPLANTABLE PULSE GENERATOR, RIGHT; Standing  -     Case request operating room: [address], RIGHT    Other orders  -     Diet NPO; Sips with meds; Standing  -     Nursing Communication [facility] Interventions Implemented; Standing  -     Nursing Communication Use 2 CHG cloths, have staff wash the entire body (neck down) ; Standing  -     Nursing Communication Swab both nares with Povidone-Iodine solution, EXCLUDE if patient has shellfish/Iodine allergy; Standing  -     chlorhexidine (PERIDEX) 0 12 % oral rinse 15 mL  -     Void on call to OR; Standing  -     Insert peripheral IV; Standing  -     ceFAZolin (ANCEF) IVPB (premix) 2,000 mg 50 mL        I have spent 60 minutes with Patient  today in which greater than 50% of this time was spent in counseling/coordination of care regarding Diagnostic results, Prognosis, Risks and benefits of tx options, Intructions for management, Patient and family education, Importance of tx compliance, Risk factor reductions and Impressions  Subjective:      Patient ID: Dangelo Roman is a 72 y o  male  Patient is a 72year old male with symptoms of low back and left greater than right leg pain  Pain is severe requiring persistent management with pain therapies  Reduction and activity and quality of life  The patient underwent a successful medtronic thoracic SCS trial with 70 % relief of pain  They reported improvement in activity  They are ready to proceed with surgery  He does have a history of prior spine surgery        The following portions of the patient's history were reviewed and updated as appropriate:   He  has a past medical history of Bronchitis, chronic obstructive, with exacerbation ([address] ), COPD with acute exacerbation ([address] ), Depression, controlled (8/30/2017), Elevated PSA, and Hypertension    He   Patient Active Problem List    Diagnosis Date Noted    Acute viral syndrome 07/15/2020    Status post lumbar and lumbosacral fusion by anterior technique 12/22/2019    Chronic pain syndrome 11/01/2019    Lumbar radiculopathy 10/14/2019    Subclinical hypothyroidism 07/24/2019    Spondylolisthesis, acquired 09/06/2018    Degeneration of lumbosacral intervertebral disc 09/06/2018    Full thickness rotator cuff tear 09/06/2018    Knee pain 09/06/2018    Localized, primary osteoarthritis 09/06/2018    Primary localized osteoarthritis of pelvic region and thigh 09/06/2018    Low back pain 09/06/2018    Lumbosacral neuritis 09/06/2018    Pain in limb 09/06/2018    Pseudoclaudication syndrome 09/06/2018    Shoulder pain 09/06/2018    Cervical stenosis of spinal canal 09/06/2018    COPD (chronic obstructive pulmonary disease) ([facility] ) 03/06/2018    Lung nodule 01/04/2017    Allergic rhinitis 11/11/2016    Anxiety and depression 05/23/2016    Arthritis of left hip 05/22/2016    Osteoarthritis of hip 05/06/2016    Chronic bronchitis ([facility] ) 04/23/2015    [facility]ra[facility]ricular tachycardia ([facility] ) 04/15/2015    Seasonal allergies 04/08/2015    Multifocal atrial tachycardia ([facility] ) 03/25/2015    Urethral stricture 10/11/2013    Incomplete emptying of bladder 07/01/2013    Screening for prostate cancer 01/18/2013    Elevated prostate specific antigen (PSA) 10/17/2012    Enlarged prostate with lower urinary tract symptoms (LUTS) 09/26/2012    Hypertension 09/26/2012    Impaired fasting glucose 09/26/2012    Male erectile dysfunction 09/26/2012    Obesity (BMI 30-39 9) 09/26/2012    Osteoarthritis of knee 09/26/2012    Spinal stenosis of lumbar region with neurogenic claudication 09/26/2012    Hyperlipidemia 09/25/2012    Retention of urine 08/03/2012    Lumbosacral spondylosis without myelopathy 04/18/2012    Thoracic or lumbosacral neuritis or radiculitis 04/18/2012     He  has a past surgical history that includes Colonoscopy (10/2010); Knee arthroscopy (01/2013); Back surgery (07/25/2012); Prostate biopsy (2012); Transurethral resection of prostate (06/27/2013); Joint replacement; and pr wrist arthroscop,release xvers lig (Left, 3/4/2020)  His family history includes Breast cancer in his mother; Diabetes in his maternal grandmother, paternal grandfather, and paternal grandmother; Heart attack in his maternal grandfather and paternal grandmother; Pancreatitis in his father  He  reports that he quit smoking about 20 years ago  His smoking use included cigarettes  He started smoking about 43 years ago  He has a 23 00 pack-year smoking history  He has never used smokeless tobacco  He reports that he does not drink alcohol or use drugs    Current Outpatient Medications   Medication Sig Dispense Refill    aspirin 81 mg chewable tablet Chew 1 tablet daily      CIALIS 5 MG tablet TAKE 1 TABLET (5 MG TOTAL) BY MOUTH DAILY FOR 90 DAYS 90 tablet 3    clonazePAM (KlonoPIN) 0 5 mg tablet Take 1 tablet (0 5 mg total) by mouth daily at bedtime 30 tablet 1    dextromethorphan-guaifenesin (MUCINEX DM)  MG per 12 hr tablet Take 1 tablet by mouth every 12 (twelve) hours      diclofenac sodium (VOLTAREN) 50 mg EC tablet Take 1 tablet (50 mg total) by mouth 2 (two) times a day 180 tablet 1    DULoxetine (CYMBALTA) 30 mg delayed release capsule Take 90 mg by mouth daily       DULoxetine (CYMBALTA) 60 mg delayed release capsule Take 1 capsule by mouth daily      fluticasone (FLONASE) 50 mcg/act nasal spray fluticasone propionate 50 mcg/actuation nasal spray,suspension      gabapentin (NEURONTIN) 300 mg capsule Take 150 mg by mouth 5 (five) times a day  loratadine (CLARITIN) 10 mg tablet Take 1 tablet by mouth daily      metFORMIN (GLUCOPHAGE-XR) 500 mg 24 hr tablet Take 1 tablet (500 mg total) by mouth daily with dinner 90 tablet 0    metoprolol tartrate (LOPRESSOR) 25 mg tablet Take 1 tablet (25 mg total) by mouth every 12 (twelve) hours 180 tablet 2    metoprolol tartrate (LOPRESSOR) 50 mg tablet Take 1 tablet (50 mg total) by mouth 2 (two) times a day Take with 25 mg tab bid 180 tablet 3    Multiple Vitamin (MULTI-VITAMIN DAILY) TABS Take 1 tablet by mouth daily      NIFEdipine (PROCARDIA XL) 90 mg 24 hr tablet TAKE 1 TABLET BY MOUTH DAILY 90 tablet 3    rosuvastatin (CRESTOR) 40 MG tablet TAKE 1 TABLET MON-WED-FRI, AND TAKE 1/2 TABLET TUES-THUR-SAT-SUN (Patient taking differently: 40 mg daily ) 180 tablet 1    WIXELA INHUB 250-50 MCG/DOSE inhaler INHALE 1 PUFF 2 (TWO) TIMES A DAY RINSE MOUTH AFTER USE  (Patient not taking: Reported on 7/31/2020) 1 Inhaler 5     No current facility-administered medications for this visit        Current Outpatient Medications on File Prior to Visit   Medication Sig    aspirin 81 mg chewable tablet Chew 1 tablet daily    CIALIS 5 MG tablet TAKE 1 TABLET (5 MG TOTAL) BY MOUTH DAILY FOR 90 DAYS    clonazePAM (KlonoPIN) 0 5 mg tablet Take 1 tablet (0 5 mg total) by mouth daily at bedtime    dextromethorphan-guaifenesin (MUCINEX DM)  MG per 12 hr tablet Take 1 tablet by mouth every 12 (twelve) hours    diclofenac sodium (VOLTAREN) 50 mg EC tablet Take 1 tablet (50 mg total) by mouth 2 (two) times a day    DULoxetine (CYMBALTA) 30 mg delayed release capsule Take 90 mg by mouth daily     DULoxetine (CYMBALTA) 60 mg delayed release capsule Take 1 capsule by mouth daily    fluticasone (FLONASE) 50 mcg/act nasal spray fluticasone propionate 50 mcg/actuation nasal spray,suspension    gabapentin (NEURONTIN) 300 mg capsule Take 150 mg by mouth 5 (five) times a day     loratadine (CLARITIN) 10 mg tablet Take 1 tablet by mouth daily    metFORMIN (GLUCOPHAGE-XR) 500 mg 24 hr tablet Take 1 tablet (500 mg total) by mouth daily with dinner    metoprolol tartrate (LOPRESSOR) 25 mg tablet Take 1 tablet (25 mg total) by mouth every 12 (twelve) hours    metoprolol tartrate (LOPRESSOR) 50 mg tablet Take 1 tablet (50 mg total) by mouth 2 (two) times a day Take with 25 mg tab bid    Multiple Vitamin (MULTI-VITAMIN DAILY) TABS Take 1 tablet by mouth daily    NIFEdipine (PROCARDIA XL) 90 mg 24 hr tablet TAKE 1 TABLET BY MOUTH DAILY    rosuvastatin (CRESTOR) 40 MG tablet TAKE 1 TABLET MON-WED-FRI, AND TAKE 1/2 TABLET TUES-THUR-SAT-SUN (Patient taking differently: 40 mg daily )    WIXELA INHUB 250-50 MCG/DOSE inhaler INHALE 1 PUFF 2 (TWO) TIMES A DAY RINSE MOUTH AFTER USE  (Patient not taking: Reported on 7/31/2020)    [DISCONTINUED] rosuvastatin (CRESTOR) 20 MG tablet TAKE 2 TABLETS ON MON WED FRI  1 TAB ON TUES  THURS SAT  AND SUNDAY     No current facility-administered medications on file prior to visit  He has No Known Allergies       Review of Systems   Constitutional: Negative  HENT: Negative  Eyes: Negative  Respiratory:        Chronic bronchitis  COPD   Cardiovascular:        A-fib     Gastrointestinal: Negative  Endocrine: Negative  Genitourinary: Negative  Self-cath   Musculoskeletal: Positive for back pain (LBP radiating into bi/ buttock hips down the back of legs to knees  )  Allergic/Immunologic: Positive for environmental allergies  Neurological: Positive for weakness (bilateral legs)  Hematological:        [address] 82       I have personally reviewed all aspects of the review of systems as documented    Objective:      /74 (BP Location: Right arm)   Pulse 59   Temp (!) 96 9 °F (36 1 °C) (Tympanic)   Resp 16   Ht 5' 8" (1 727 m)   Wt 107 kg (235 lb)   BMI 35 73 kg/m²          Physical Exam   Constitutional: He is oriented to person, place, and time   He appears well-developed and well-nourished  No distress  HENT:   Head: Normocephalic and atraumatic  Eyes: Pupils are equal, round, and reactive to light  Conjunctivae and EOM are normal  Right eye exhibits no discharge  Left eye exhibits no discharge  Neck: Normal range of motion  No tracheal deviation present  No thyromegaly present  Cardiovascular: Normal rate  Pulmonary/Chest: Effort normal and breath sounds normal    Musculoskeletal: Normal range of motion  He exhibits no edema or deformity  Neurological: He is alert and oriented to person, place, and time  He has normal strength  No cranial nerve deficit or sensory deficit  Skin: Skin is warm and dry  He is not diaphoretic  Psychiatric: He has a normal mood and affect   His behavior is normal

## 2020-07-31 NOTE — PROGRESS NOTES
Assessment/Plan:    No problem-specific Assessment & Plan notes found for this encounter  Patient is stable  Symptoms, as detailed in HPI, continue to significantly impact of patient's quality of life in daily activities  After carefully considering presentation, investigations, functional status and co-morbidities, the risk/benefit profile of surgical intervention is favorable  History, physical examination and diagnostic tests were reviewed and questions answered  Diagnosis, care plan and treatment options were discussed  The patient understand instructions and will follow up as directed  Patient with successful trial stimulation with 70% relief of low back and leg pain  Recommend percutaneous placement of spinal cord stimulator and generator  Expected postoperative course, including activity restrictions, expected pain and postoperative medication were reviewed  Patient provided verbal consent to surgical procedure and signed consent form: Yes    We also discussed the risks and benefits of the procedure the risks being including: infection (~2%), neurologic injury (<1%), new pain, revisions surgery, failure to relieve pain, hardware issues  The benefits including relief of pain  The patient stated understanding of the risks and benefits and agreed to proceed       IMAGING REVIEWED: MRI thoracic shows no obvious stenosis limiting implant       Diagnoses and all orders for this visit:    Chronic pain syndrome  -     Ambulatory referral to Neurosurgery  -     Case request operating room: [address] [place] [address], [facility]; Standing  -     Case request operating room: INSERTION THORACIC DORSAL COLUMN SPINAL CORD STIMULATOR PERCUTANEOUS W IMPLANTABLE PULSE GENERATOR, RIGHT    Postlaminectomy syndrome  -     Case request operating room: [place] [address] GENERATOR, RIGHT; Standing  -     Case request operating room: INSERTION THORACIC DORSAL COLUMN SPINAL CORD STIMULATOR PERCUTANEOUS W IMPLANTABLE PULSE GENERATOR, RIGHT    Lumbar radiculopathy  -     Ambulatory referral to Neurosurgery  -     Case request operating room: INSERTION THORACIC DORSAL COLUMN SPINAL CORD STIMULATOR PERCUTANEOUS W IMPLANTABLE PULSE GENERATOR, RIGHT; Standing  -     Case request operating room: [address], RIGHT    Other orders  -     Diet NPO; Sips with meds; Standing  -     Nursing Communication [facility] Interventions Implemented; Standing  -     Nursing Communication Use 2 CHG cloths, have staff wash the entire body (neck down) ; Standing  -     Nursing Communication Swab both nares with Povidone-Iodine solution, EXCLUDE if patient has shellfish/Iodine allergy; Standing  -     chlorhexidine (PERIDEX) 0 12 % oral rinse 15 mL  -     Void on call to OR; Standing  -     Insert peripheral IV; Standing  -     ceFAZolin (ANCEF) IVPB (premix) 2,000 mg 50 mL        I have spent 60 minutes with Patient  today in which greater than 50% of this time was spent in counseling/coordination of care regarding Diagnostic results, Prognosis, Risks and benefits of tx options, Intructions for management, Patient and family education, Importance of tx compliance, Risk factor reductions and Impressions  Subjective:      Patient ID: Aureliano Diaz is a 72 y o  male  Patient is a 72year old male with symptoms of low back and left greater than right leg pain  Pain is severe requiring persistent management with pain therapies  Reduction and activity and quality of life  The patient underwent a successful medtronic thoracic SCS trial with 70 % relief of pain  They reported improvement in activity  They are ready to proceed with surgery  He does have a history of prior spine surgery        The following portions of the patient's history were reviewed and updated as appropriate:   He  has a past medical history of Bronchitis, chronic obstructive, with exacerbation ([address] ), COPD with acute exacerbation ([address] ), Depression, controlled (8/30/2017), Elevated PSA, and Hypertension    He   Patient Active Problem List    Diagnosis Date Noted    Acute viral syndrome 07/15/2020    Status post lumbar and lumbosacral fusion by anterior technique 12/22/2019    Chronic pain syndrome 11/01/2019    Lumbar radiculopathy 10/14/2019    Subclinical hypothyroidism 07/24/2019    Spondylolisthesis, acquired 09/06/2018    Degeneration of lumbosacral intervertebral disc 09/06/2018    Full thickness rotator cuff tear 09/06/2018    Knee pain 09/06/2018    Localized, primary osteoarthritis 09/06/2018    Primary localized osteoarthritis of pelvic region and thigh 09/06/2018    Low back pain 09/06/2018    Lumbosacral neuritis 09/06/2018    Pain in limb 09/06/2018    Pseudoclaudication syndrome 09/06/2018    Shoulder pain 09/06/2018    Cervical stenosis of spinal canal 09/06/2018    COPD (chronic obstructive pulmonary disease) ([facility] ) 03/06/2018    Lung nodule 01/04/2017    Allergic rhinitis 11/11/2016    Anxiety and depression 05/23/2016    Arthritis of left hip 05/22/2016    Osteoarthritis of hip 05/06/2016    Chronic bronchitis ([facility] ) 04/23/2015    [facility] ) 04/15/2015    Seasonal allergies 04/08/2015    Multifocal atrial tachycardia ([facility] ) 03/25/2015    Urethral stricture 10/11/2013    Incomplete emptying of bladder 07/01/2013    Screening for prostate cancer 01/18/2013    Elevated prostate specific antigen (PSA) 10/17/2012    Enlarged prostate with lower urinary tract symptoms (LUTS) 09/26/2012    Hypertension 09/26/2012    Impaired fasting glucose 09/26/2012    Male erectile dysfunction 09/26/2012    Obesity (BMI 30-39 9) 09/26/2012    Osteoarthritis of knee 09/26/2012    Spinal stenosis of lumbar region with neurogenic claudication 09/26/2012    Hyperlipidemia 09/25/2012    Retention of urine 08/03/2012    Lumbosacral spondylosis without myelopathy 04/18/2012    Thoracic or lumbosacral neuritis or radiculitis 04/18/2012     He  has a past surgical history that includes Colonoscopy (10/2010); Knee arthroscopy (01/2013); Back surgery (07/25/2012); Prostate biopsy (2012); Transurethral resection of prostate (06/27/2013); Joint replacement; and pr wrist arthroscop,release xvers lig (Left, 3/4/2020)  His family history includes Breast cancer in his mother; Diabetes in his maternal grandmother, paternal grandfather, and paternal grandmother; Heart attack in his maternal grandfather and paternal grandmother; Pancreatitis in his father  He  reports that he quit smoking about 20 years ago  His smoking use included cigarettes  He started smoking about 43 years ago  He has a 23 00 pack-year smoking history  He has never used smokeless tobacco  He reports that he does not drink alcohol or use drugs    Current Outpatient Medications   Medication Sig Dispense Refill    aspirin 81 mg chewable tablet Chew 1 tablet daily      CIALIS 5 MG tablet TAKE 1 TABLET (5 MG TOTAL) BY MOUTH DAILY FOR 90 DAYS 90 tablet 3    clonazePAM (KlonoPIN) 0 5 mg tablet Take 1 tablet (0 5 mg total) by mouth daily at bedtime 30 tablet 1    dextromethorphan-guaifenesin (MUCINEX DM)  MG per 12 hr tablet Take 1 tablet by mouth every 12 (twelve) hours      diclofenac sodium (VOLTAREN) 50 mg EC tablet Take 1 tablet (50 mg total) by mouth 2 (two) times a day 180 tablet 1    DULoxetine (CYMBALTA) 30 mg delayed release capsule Take 90 mg by mouth daily       DULoxetine (CYMBALTA) 60 mg delayed release capsule Take 1 capsule by mouth daily      fluticasone (FLONASE) 50 mcg/act nasal spray fluticasone propionate 50 mcg/actuation nasal spray,suspension      gabapentin (NEURONTIN) 300 mg capsule Take 150 mg by mouth 5 (five) times a day  loratadine (CLARITIN) 10 mg tablet Take 1 tablet by mouth daily      metFORMIN (GLUCOPHAGE-XR) 500 mg 24 hr tablet Take 1 tablet (500 mg total) by mouth daily with dinner 90 tablet 0    metoprolol tartrate (LOPRESSOR) 25 mg tablet Take 1 tablet (25 mg total) by mouth every 12 (twelve) hours 180 tablet 2    metoprolol tartrate (LOPRESSOR) 50 mg tablet Take 1 tablet (50 mg total) by mouth 2 (two) times a day Take with 25 mg tab bid 180 tablet 3    Multiple Vitamin (MULTI-VITAMIN DAILY) TABS Take 1 tablet by mouth daily      NIFEdipine (PROCARDIA XL) 90 mg 24 hr tablet TAKE 1 TABLET BY MOUTH DAILY 90 tablet 3    rosuvastatin (CRESTOR) 40 MG tablet TAKE 1 TABLET MON-WED-FRI, AND TAKE 1/2 TABLET TUES-THUR-SAT-SUN (Patient taking differently: 40 mg daily ) 180 tablet 1    WIXELA INHUB 250-50 MCG/DOSE inhaler INHALE 1 PUFF 2 (TWO) TIMES A DAY RINSE MOUTH AFTER USE  (Patient not taking: Reported on 7/31/2020) 1 Inhaler 5     No current facility-administered medications for this visit        Current Outpatient Medications on File Prior to Visit   Medication Sig    aspirin 81 mg chewable tablet Chew 1 tablet daily    CIALIS 5 MG tablet TAKE 1 TABLET (5 MG TOTAL) BY MOUTH DAILY FOR 90 DAYS    clonazePAM (KlonoPIN) 0 5 mg tablet Take 1 tablet (0 5 mg total) by mouth daily at bedtime    dextromethorphan-guaifenesin (MUCINEX DM)  MG per 12 hr tablet Take 1 tablet by mouth every 12 (twelve) hours    diclofenac sodium (VOLTAREN) 50 mg EC tablet Take 1 tablet (50 mg total) by mouth 2 (two) times a day    DULoxetine (CYMBALTA) 30 mg delayed release capsule Take 90 mg by mouth daily     DULoxetine (CYMBALTA) 60 mg delayed release capsule Take 1 capsule by mouth daily    fluticasone (FLONASE) 50 mcg/act nasal spray fluticasone propionate 50 mcg/actuation nasal spray,suspension    gabapentin (NEURONTIN) 300 mg capsule Take 150 mg by mouth 5 (five) times a day     loratadine (CLARITIN) 10 mg tablet Take 1 tablet by mouth daily    metFORMIN (GLUCOPHAGE-XR) 500 mg 24 hr tablet Take 1 tablet (500 mg total) by mouth daily with dinner    metoprolol tartrate (LOPRESSOR) 25 mg tablet Take 1 tablet (25 mg total) by mouth every 12 (twelve) hours    metoprolol tartrate (LOPRESSOR) 50 mg tablet Take 1 tablet (50 mg total) by mouth 2 (two) times a day Take with 25 mg tab bid    Multiple Vitamin (MULTI-VITAMIN DAILY) TABS Take 1 tablet by mouth daily    NIFEdipine (PROCARDIA XL) 90 mg 24 hr tablet TAKE 1 TABLET BY MOUTH DAILY    rosuvastatin (CRESTOR) 40 MG tablet TAKE 1 TABLET MON-WED-FRI, AND TAKE 1/2 TABLET TUES-THUR-SAT-SUN (Patient taking differently: 40 mg daily )    WIXELA INHUB 250-50 MCG/DOSE inhaler INHALE 1 PUFF 2 (TWO) TIMES A DAY RINSE MOUTH AFTER USE  (Patient not taking: Reported on 7/31/2020)    [DISCONTINUED] rosuvastatin (CRESTOR) 20 MG tablet TAKE 2 TABLETS ON MON WED FRI  1 TAB ON TUES  THURS SAT  AND SUNDAY     No current facility-administered medications on file prior to visit  He has No Known Allergies       Review of Systems   Constitutional: Negative  HENT: Negative  Eyes: Negative  Respiratory:        Chronic bronchitis  COPD   Cardiovascular:        A-fib     Gastrointestinal: Negative  Endocrine: Negative  Genitourinary: Negative  Self-cath   Musculoskeletal: Positive for back pain (LBP radiating into bi/ buttock hips down the back of legs to knees  )  Allergic/Immunologic: Positive for environmental allergies  Neurological: Positive for weakness (bilateral legs)  Hematological:        Asa 81       I have personally reviewed all aspects of the review of systems as documented    Objective:      /74 (BP Location: Right arm)   Pulse 59   Temp (!) 96 9 °F (36 1 °C) (Tympanic)   Resp 16   Ht 5' 8" (1 727 m)   Wt 107 kg (235 lb)   BMI 35 73 kg/m²          Physical Exam   Constitutional: He is oriented to person, place, and time   He appears well-developed and well-nourished  No distress  HENT:   Head: Normocephalic and atraumatic  Eyes: Pupils are equal, round, and reactive to light  Conjunctivae and EOM are normal  Right eye exhibits no discharge  Left eye exhibits no discharge  Neck: Normal range of motion  No tracheal deviation present  No thyromegaly present  Cardiovascular: Normal rate  Pulmonary/Chest: Effort normal and breath sounds normal    Musculoskeletal: Normal range of motion  He exhibits no edema or deformity  Neurological: He is alert and oriented to person, place, and time  He has normal strength  No cranial nerve deficit or sensory deficit  Skin: Skin is warm and dry  He is not diaphoretic  Psychiatric: He has a normal mood and affect   His behavior is normal

## 2020-08-06 ENCOUNTER — APPOINTMENT (OUTPATIENT)
Dept: LAB | Age: 70
End: 2020-08-06
Payer: MEDICARE

## 2020-08-06 ENCOUNTER — TELEPHONE (OUTPATIENT)
Dept: ENDOCRINOLOGY | Facility: CLINIC | Age: 70
End: 2020-08-06

## 2020-08-06 ENCOUNTER — LAB (OUTPATIENT)
Dept: LAB | Age: 70
End: 2020-08-06
Payer: MEDICARE

## 2020-08-06 ENCOUNTER — TELEPHONE (OUTPATIENT)
Dept: NEUROSURGERY | Facility: CLINIC | Age: 70
End: 2020-08-06

## 2020-08-06 DIAGNOSIS — E03.8 SUBCLINICAL HYPOTHYROIDISM: ICD-10-CM

## 2020-08-06 DIAGNOSIS — M54.16 LUMBAR RADICULOPATHY: ICD-10-CM

## 2020-08-06 DIAGNOSIS — G89.4 CHRONIC PAIN SYNDROME: ICD-10-CM

## 2020-08-06 DIAGNOSIS — Z79.899 ENCOUNTER FOR LONG-TERM (CURRENT) USE OF MEDICATIONS: ICD-10-CM

## 2020-08-06 DIAGNOSIS — Z51.81 ADMISSION FOR LONG-TERM (CURRENT) USE OF ANTICOAGULANTS: ICD-10-CM

## 2020-08-06 DIAGNOSIS — M96.1 POSTLAMINECTOMY SYNDROME: ICD-10-CM

## 2020-08-06 DIAGNOSIS — R73.03 PREDIABETES: ICD-10-CM

## 2020-08-06 DIAGNOSIS — Z79.01 ADMISSION FOR LONG-TERM (CURRENT) USE OF ANTICOAGULANTS: ICD-10-CM

## 2020-08-06 LAB
ALBUMIN SERPL BCP-MCNC: 3.8 G/DL (ref 3.5–5)
ALP SERPL-CCNC: 62 U/L (ref 46–116)
ALT SERPL W P-5'-P-CCNC: 25 U/L (ref 12–78)
ANION GAP SERPL CALCULATED.3IONS-SCNC: 4 MMOL/L (ref 4–13)
APTT PPP: 32 SECONDS (ref 23–37)
AST SERPL W P-5'-P-CCNC: 17 U/L (ref 5–45)
BACTERIA UR QL AUTO: ABNORMAL /HPF
BASOPHILS # BLD AUTO: 0.1 THOUSANDS/ΜL (ref 0–0.1)
BASOPHILS NFR BLD AUTO: 2 % (ref 0–1)
BILIRUB SERPL-MCNC: 0.61 MG/DL (ref 0.2–1)
BILIRUB UR QL STRIP: NEGATIVE
BUN SERPL-MCNC: 16 MG/DL (ref 5–25)
CALCIUM SERPL-MCNC: 9.7 MG/DL (ref 8.3–10.1)
CHLORIDE SERPL-SCNC: 106 MMOL/L (ref 100–108)
CLARITY UR: CLEAR
CO2 SERPL-SCNC: 28 MMOL/L (ref 21–32)
COLOR UR: ABNORMAL
CREAT SERPL-MCNC: 1.16 MG/DL (ref 0.6–1.3)
EOSINOPHIL # BLD AUTO: 0.36 THOUSAND/ΜL (ref 0–0.61)
EOSINOPHIL NFR BLD AUTO: 6 % (ref 0–6)
ERYTHROCYTE [DISTWIDTH] IN BLOOD BY AUTOMATED COUNT: 12.8 % (ref 11.6–15.1)
EST. AVERAGE GLUCOSE BLD GHB EST-MCNC: 123 MG/DL
GFR SERPL CREATININE-BSD FRML MDRD: 64 ML/MIN/1.73SQ M
GLUCOSE P FAST SERPL-MCNC: 107 MG/DL (ref 65–99)
GLUCOSE UR STRIP-MCNC: NEGATIVE MG/DL
HBA1C MFR BLD: 5.9 %
HCT VFR BLD AUTO: 43.8 % (ref 36.5–49.3)
HGB BLD-MCNC: 14.5 G/DL (ref 12–17)
HGB UR QL STRIP.AUTO: NEGATIVE
HYALINE CASTS #/AREA URNS LPF: ABNORMAL /LPF
IMM GRANULOCYTES # BLD AUTO: 0.01 THOUSAND/UL (ref 0–0.2)
IMM GRANULOCYTES NFR BLD AUTO: 0 % (ref 0–2)
INR PPP: 0.97 (ref 0.84–1.19)
KETONES UR STRIP-MCNC: NEGATIVE MG/DL
LEUKOCYTE ESTERASE UR QL STRIP: ABNORMAL
LYMPHOCYTES # BLD AUTO: 2.12 THOUSANDS/ΜL (ref 0.6–4.47)
LYMPHOCYTES NFR BLD AUTO: 34 % (ref 14–44)
MCH RBC QN AUTO: 30.5 PG (ref 26.8–34.3)
MCHC RBC AUTO-ENTMCNC: 33.1 G/DL (ref 31.4–37.4)
MCV RBC AUTO: 92 FL (ref 82–98)
MONOCYTES # BLD AUTO: 0.53 THOUSAND/ΜL (ref 0.17–1.22)
MONOCYTES NFR BLD AUTO: 9 % (ref 4–12)
NEUTROPHILS # BLD AUTO: 3.05 THOUSANDS/ΜL (ref 1.85–7.62)
NEUTS SEG NFR BLD AUTO: 49 % (ref 43–75)
NITRITE UR QL STRIP: NEGATIVE
NON-SQ EPI CELLS URNS QL MICRO: ABNORMAL /HPF
NRBC BLD AUTO-RTO: 0 /100 WBCS
PH UR STRIP.AUTO: 6 [PH]
PLATELET # BLD AUTO: 256 THOUSANDS/UL (ref 149–390)
PMV BLD AUTO: 10.5 FL (ref 8.9–12.7)
POTASSIUM SERPL-SCNC: 4.2 MMOL/L (ref 3.5–5.3)
PROT SERPL-MCNC: 7.2 G/DL (ref 6.4–8.2)
PROT UR STRIP-MCNC: NEGATIVE MG/DL
PROTHROMBIN TIME: 12.9 SECONDS (ref 11.6–14.5)
RBC # BLD AUTO: 4.75 MILLION/UL (ref 3.88–5.62)
RBC #/AREA URNS AUTO: ABNORMAL /HPF
SODIUM SERPL-SCNC: 138 MMOL/L (ref 136–145)
SP GR UR STRIP.AUTO: 1.02 (ref 1–1.03)
T4 FREE SERPL-MCNC: 1.04 NG/DL (ref 0.76–1.46)
TSH SERPL DL<=0.05 MIU/L-ACNC: 4.35 UIU/ML (ref 0.36–3.74)
UROBILINOGEN UR QL STRIP.AUTO: 1 E.U./DL
WBC # BLD AUTO: 6.17 THOUSAND/UL (ref 4.31–10.16)
WBC #/AREA URNS AUTO: ABNORMAL /HPF

## 2020-08-06 PROCEDURE — 93005 ELECTROCARDIOGRAM TRACING: CPT

## 2020-08-06 PROCEDURE — 93010 ELECTROCARDIOGRAM REPORT: CPT | Performed by: INTERNAL MEDICINE

## 2020-08-06 PROCEDURE — 84439 ASSAY OF FREE THYROXINE: CPT

## 2020-08-06 PROCEDURE — 36415 COLL VENOUS BLD VENIPUNCTURE: CPT

## 2020-08-06 PROCEDURE — 84443 ASSAY THYROID STIM HORMONE: CPT

## 2020-08-06 PROCEDURE — 85730 THROMBOPLASTIN TIME PARTIAL: CPT

## 2020-08-06 PROCEDURE — 85025 COMPLETE CBC W/AUTO DIFF WBC: CPT

## 2020-08-06 PROCEDURE — 83036 HEMOGLOBIN GLYCOSYLATED A1C: CPT

## 2020-08-06 PROCEDURE — 80053 COMPREHEN METABOLIC PANEL: CPT

## 2020-08-06 PROCEDURE — 81001 URINALYSIS AUTO W/SCOPE: CPT | Performed by: NEUROLOGICAL SURGERY

## 2020-08-06 PROCEDURE — 85610 PROTHROMBIN TIME: CPT

## 2020-08-06 NOTE — TELEPHONE ENCOUNTER
----- Message from Dania Matamoros MD sent at 8/6/2020 12:34 PM EDT -----  Please call the patient regarding his results, A1c is 5 9% improved from 6 1%, thyroid blood work is within normal range,

## 2020-08-10 LAB
ATRIAL RATE: 53 BPM
P AXIS: 6 DEGREES
PR INTERVAL: 216 MS
QRS AXIS: 59 DEGREES
QRSD INTERVAL: 88 MS
QT INTERVAL: 440 MS
QTC INTERVAL: 412 MS
T WAVE AXIS: 60 DEGREES
VENTRICULAR RATE: 53 BPM

## 2020-08-16 DIAGNOSIS — N40.0 BENIGN PROSTATIC HYPERPLASIA, UNSPECIFIED WHETHER LOWER URINARY TRACT SYMPTOMS PRESENT: ICD-10-CM

## 2020-08-17 ENCOUNTER — OFFICE VISIT (OUTPATIENT)
Dept: FAMILY MEDICINE CLINIC | Facility: CLINIC | Age: 70
End: 2020-08-17
Payer: MEDICARE

## 2020-08-17 VITALS
TEMPERATURE: 97.5 F | SYSTOLIC BLOOD PRESSURE: 142 MMHG | HEART RATE: 64 BPM | DIASTOLIC BLOOD PRESSURE: 82 MMHG | HEIGHT: 68 IN | BODY MASS INDEX: 35.8 KG/M2 | OXYGEN SATURATION: 96 % | RESPIRATION RATE: 16 BRPM | WEIGHT: 236.2 LBS

## 2020-08-17 DIAGNOSIS — N40.1 BENIGN PROSTATIC HYPERPLASIA WITH URINARY RETENTION: ICD-10-CM

## 2020-08-17 DIAGNOSIS — J44.9 CHRONIC OBSTRUCTIVE PULMONARY DISEASE, UNSPECIFIED COPD TYPE (HCC): ICD-10-CM

## 2020-08-17 DIAGNOSIS — R33.8 BENIGN PROSTATIC HYPERPLASIA WITH URINARY RETENTION: ICD-10-CM

## 2020-08-17 DIAGNOSIS — I10 ESSENTIAL HYPERTENSION: ICD-10-CM

## 2020-08-17 DIAGNOSIS — F32.A ANXIETY AND DEPRESSION: ICD-10-CM

## 2020-08-17 DIAGNOSIS — Z01.818 PREOP EXAMINATION: Primary | ICD-10-CM

## 2020-08-17 DIAGNOSIS — F41.9 ANXIETY AND DEPRESSION: ICD-10-CM

## 2020-08-17 DIAGNOSIS — R73.01 IMPAIRED FASTING GLUCOSE: ICD-10-CM

## 2020-08-17 DIAGNOSIS — I47.1 MULTIFOCAL ATRIAL TACHYCARDIA (HCC): ICD-10-CM

## 2020-08-17 DIAGNOSIS — M54.16 LUMBAR RADICULOPATHY: ICD-10-CM

## 2020-08-17 PROCEDURE — 3008F BODY MASS INDEX DOCD: CPT | Performed by: FAMILY MEDICINE

## 2020-08-17 PROCEDURE — 1160F RVW MEDS BY RX/DR IN RCRD: CPT | Performed by: FAMILY MEDICINE

## 2020-08-17 PROCEDURE — 4040F PNEUMOC VAC/ADMIN/RCVD: CPT | Performed by: FAMILY MEDICINE

## 2020-08-17 PROCEDURE — 1036F TOBACCO NON-USER: CPT | Performed by: FAMILY MEDICINE

## 2020-08-17 PROCEDURE — 99214 OFFICE O/P EST MOD 30 MIN: CPT | Performed by: FAMILY MEDICINE

## 2020-08-17 PROCEDURE — 3079F DIAST BP 80-89 MM HG: CPT | Performed by: FAMILY MEDICINE

## 2020-08-17 PROCEDURE — 3077F SYST BP >= 140 MM HG: CPT | Performed by: FAMILY MEDICINE

## 2020-08-17 RX ORDER — TADALAFIL 5 MG
TABLET ORAL
Qty: 90 TABLET | Refills: 3 | Status: SHIPPED | OUTPATIENT
Start: 2020-08-17 | End: 2021-08-20

## 2020-08-17 NOTE — PROGRESS NOTES
Chief Complaint   Patient presents with    Pre-op Clearance     Stimulator implant in spine surgery scheduled 08/25/20 with Dr Demetria Singh  Subjective:     Edna Haas is a 72 y o  male who presents to the office today for a preoperative consultation at the request of surgeon Dr Peace Juares who plans on performing spinal cord stimulator on 8/25/2020  This consultation is requested for the specific conditions prompting preoperative evaluation (i e  because of potential affect on operative risk): preop  Planned anesthesia: general  The patient has the following known anesthesia issues: no  Patients bleeding risk: no recent abnormal bleeding  Patient does not have objections to receiving blood products if needed  Chronic lower back pain--for years  Hx of back surgery in 2012 at [facility]  [facility] pain specialist now  Stimulator trial helped       MAT/HTN----[facility] cardiology for MAT, HTN  Stable  On metoprolol 75mg bid and nifedipine 90mg Qd  Denies headache, vision change, dizzy, SOB, palpitation or chest pain       Hyperlipidemia---He is on crestor 40mg qhs per cardiology  Denies side effects      IFG---he is on metformin 500mg QD per endocrinology  Denies SE  Tried to eat healthy  Subclinial hypothyroidism---[facility] endocrinology         Depression/depression----[facility] psychiatry Dr Anaid Kline Q 3 month  He is on cymbalta 90mg QD which works for him  Use clonazepam 0 5mg qhs for insomnia  He is on gabapentin 300mg am and 1200mg pm per psychiatrist        COPD---[facility] pulmonary for chronic bronchitis, worse around [place]  He is on wixela daily and mucinex-DM daily  Quit smoking in 2000        BPH---[facility] urology Q 6 months for BPH s/p TURP in 2013  Had cystoscopy and dilation in 9/2014  He is self-cath 3/week now  Cialis 5mg QD helped       Quit smoking since 20 years ago  No alcohol or drugs             The following portions of the patient's history were reviewed and updated as appropriate: allergies, current medications, past family history, past medical history, past social history, past surgical history and problem list     Review of Systems  Pertinent items are noted in HPI       Objective:     /82 (BP Location: Left arm, Patient Position: Sitting, Cuff Size: Adult)   Pulse 64   Temp 97 5 °F (36 4 °C) (Oral)   Resp 16   Ht 5' 7 75" (1 721 m)   Wt 107 kg (236 lb 3 2 oz)   SpO2 96%   BMI 36 18 kg/m²     General Appearance:    Alert, cooperative, no distress, appears stated age   Head:    Normocephalic, without obvious abnormality, atraumatic   Eyes:    PERRL, conjunctiva/corneas clear, EOM's intact, fundi     benign, both eyes                    Neck:   Supple, symmetrical, trachea midline, no adenopathy;        thyroid:  No enlargement/tenderness/nodules; no carotid    bruit or JVD       Lungs:     Clear to auscultation bilaterally, respirations unlabored       Heart:    Regular rate and rhythm, S1 and S2 normal, no murmur, rub   or gallop   Abdomen:     Soft, non-tender, bowel sounds active all four quadrants,     no masses, no organomegaly           Extremities:   Extremities normal, atraumatic, no cyanosis or edema                       Predictors of intubation difficulty:   Morbid obesity? no    Short, thick neck? no   Neck range of motion: normal     Cardiographics  ECG: sinus bradycardia with 1st AV block    Lab Review   Lab on 08/06/2020   Component Date Value    Sodium 08/06/2020 138     Potassium 08/06/2020 4 2     Chloride 08/06/2020 106     CO2 08/06/2020 28     ANION GAP 08/06/2020 4     BUN 08/06/2020 16     Creatinine 08/06/2020 1 16     Glucose, Fasting 08/06/2020 107*    Calcium 08/06/2020 9 7     AST 08/06/2020 17     ALT 08/06/2020 25     Alkaline Phosphatase 08/06/2020 62     Total Protein 08/06/2020 7 2     Albumin 08/06/2020 3 8     Total Bilirubin 08/06/2020 0 61     eGFR 08/06/2020 64     WBC 08/06/2020 6 17     RBC 08/06/2020 4 75     Hemoglobin 08/06/2020 14 5     Hematocrit 08/06/2020 43 8     MCV 08/06/2020 92     MCH 08/06/2020 30 5     MCHC 08/06/2020 33 1     RDW 08/06/2020 12 8     MPV 08/06/2020 10 5     Platelets 03/91/3041 256     nRBC 08/06/2020 0     Neutrophils Relative 08/06/2020 49     Immat GRANS % 08/06/2020 0     Lymphocytes Relative 08/06/2020 34     Monocytes Relative 08/06/2020 9     Eosinophils Relative 08/06/2020 6     Basophils Relative 08/06/2020 2*    Neutrophils Absolute 08/06/2020 3 05     Immature Grans Absolute 08/06/2020 0 01     Lymphocytes Absolute 08/06/2020 2 12     Monocytes Absolute 08/06/2020 0 53     Eosinophils Absolute 08/06/2020 0 36     Basophils Absolute 08/06/2020 0 10     PTT 08/06/2020 32     Protime 08/06/2020 12 9     INR 08/06/2020 0 97     Ventricular Rate 08/06/2020 53     Atrial Rate 08/06/2020 53     HI Interval 08/06/2020 216     QRSD Interval 08/06/2020 88     QT Interval 08/06/2020 440     QTC Interval 08/06/2020 412     P Axis 08/06/2020 6     QRS Axis 08/06/2020 59     T Wave Casa 08/06/2020 60    Appointment on 08/06/2020   Component Date Value    Hemoglobin A1C 08/06/2020 5 9*    EAG 08/06/2020 123     Free T4 08/06/2020 1 04     TSH 3RD GENERATON 08/06/2020 4 350*   Office Visit on 07/31/2020   Component Date Value    Color, UA 08/06/2020 Dk Yellow     Clarity, UA 08/06/2020 Clear     Specific Gravity, UA 08/06/2020 1 025     pH, UA 08/06/2020 6 0     Leukocytes, UA 08/06/2020 Trace*    Nitrite, UA 08/06/2020 Negative     Protein, UA 08/06/2020 Negative     Glucose, UA 08/06/2020 Negative     Ketones, UA 08/06/2020 Negative     Urobilinogen, UA 08/06/2020 1 0     Bilirubin, UA 08/06/2020 Negative     Blood, UA 08/06/2020 Negative     RBC, UA 08/06/2020 4-10*    WBC, UA 08/06/2020 2-4*    Epithelial Cells 08/06/2020 None Seen     Bacteria, UA 08/06/2020 None Seen     Hyaline Casts, UA 08/06/2020 None Seen    Orders Only on 07/15/2020   Component Date Value    SARS-CoV-2 07/15/2020 Not Detected         Assessment:     72 y o  male with planned surgery as above  Known risk factors for perioperative complications: Chronic pulmonary disease  MAT    Difficulty with intubation is not anticipated  Cardiac Risk Estimation: low    Current medications which may produce withdrawal symptoms if withheld perioperatively: no    Plan:     1  Preoperative workup as follows ECG, electrolytes, creatinine, glucose, liver function studies  2  Change in medication regimen before surgery: Hold ASA 7 days prior to surgery  3  Prophylaxis for cardiac events with perioperative beta-blockers: not indicated  4  Invasive hemodynamic monitoring perioperatively: not indicated    5  Deep vein thrombosis prophylaxis postoperatively:regimen to be chosen by surgical team

## 2020-08-17 NOTE — LETTER
August 17, 2020     [place]ita [place], [place] [address]  [address]  [address] [address]    Patient: Prem Garnett   YOB: 1950   Date of Visit: 8/17/2020       Dear Dr Abe Ingram: Thank you for referring Humaira Vidal to me for evaluation  Below are my notes for this consultation  If you have questions, please do not hesitate to call me  I look forward to following your patient along with you  Sincerely,        Jesse Spring MD        CC: No Recipients  Jesse Spring MD  8/17/2020 10:07 AM  Sign when Signing Visit  Chief Complaint   Patient presents with    Pre-op Clearance     Stimulator implant in spine surgery scheduled 08/25/20 with Dr Michelle Galdamez  Subjective:     Prem Garnett is a 72 y o  male who presents to the office today for a preoperative consultation at the request of surgeon Dr Abe Ingram who plans on performing spinal cord stimulator on 8/25/2020  This consultation is requested for the specific conditions prompting preoperative evaluation (i e  because of potential affect on operative risk): preop  Planned anesthesia: general  The patient has the following known anesthesia issues: no  Patients bleeding risk: no recent abnormal bleeding  Patient does not have objections to receiving blood products if needed  Chronic lower back pain--for years  Hx of back surgery in 2012 at [facility]  [facility] pain specialist now  Stimulator trial helped       MAT/HTN----[facility] cardiology for MAT, HTN  Stable  On metoprolol 75mg bid and nifedipine 90mg Qd  Denies headache, vision change, dizzy, SOB, palpitation or chest pain       Hyperlipidemia---He is on crestor 40mg qhs per cardiology  Denies side effects      IFG---he is on metformin 500mg QD per endocrinology  Denies SE  Tried to eat healthy  Subclinial hypothyroidism---[facility] endocrinology         Depression/depression----[facility] psychiatry Dr Kt Renteria Q 3 month  He is on cymbalta 90mg QD which works for him   Use clonazepam 0 5mg qhs for insomnia  He is on gabapentin 300mg am and 1200mg pm per psychiatrist        COPD---[facility] pulmonary for chronic bronchitis, worse around [place]  He is on wixela daily and mucinex-DM daily  Quit smoking in 2000        BPH---[facility] urology Q 6 months for BPH s/p TURP in 2013  Had cystoscopy and dilation in 9/2014  He is self-cath 3/week now  Cialis 5mg QD helped       Quit smoking since 20 years ago  No alcohol or drugs  The following portions of the patient's history were reviewed and updated as appropriate: allergies, current medications, past family history, past medical history, past social history, past surgical history and problem list     Review of Systems  Pertinent items are noted in HPI       Objective:     /82 (BP Location: Left arm, Patient Position: Sitting, Cuff Size: Adult)   Pulse 64   Temp 97 5 °F (36 4 °C) (Oral)   Resp 16   Ht 5' 7 75" (1 721 m)   Wt 107 kg (236 lb 3 2 oz)   SpO2 96%   BMI 36 18 kg/m²     General Appearance:    Alert, cooperative, no distress, appears stated age   Head:    Normocephalic, without obvious abnormality, atraumatic   Eyes:    PERRL, conjunctiva/corneas clear, EOM's intact, fundi     benign, both eyes                    Neck:   Supple, symmetrical, trachea midline, no adenopathy;        thyroid:  No enlargement/tenderness/nodules; no carotid    bruit or JVD       Lungs:     Clear to auscultation bilaterally, respirations unlabored       Heart:    Regular rate and rhythm, S1 and S2 normal, no murmur, rub   or gallop   Abdomen:     Soft, non-tender, bowel sounds active all four quadrants,     no masses, no organomegaly           Extremities:   Extremities normal, atraumatic, no cyanosis or edema                       Predictors of intubation difficulty:   Morbid obesity? no    Short, thick neck? no   Neck range of motion: normal     Cardiographics  ECG: sinus bradycardia with 1st AV block    Lab Review   Lab on 08/06/2020   Component Date Value    Sodium 08/06/2020 138     Potassium 08/06/2020 4 2     Chloride 08/06/2020 106     CO2 08/06/2020 28     ANION GAP 08/06/2020 4     BUN 08/06/2020 16     Creatinine 08/06/2020 1 16     Glucose, Fasting 08/06/2020 107*    Calcium 08/06/2020 9 7     AST 08/06/2020 17     ALT 08/06/2020 25     Alkaline Phosphatase 08/06/2020 62     Total Protein 08/06/2020 7 2     Albumin 08/06/2020 3 8     Total Bilirubin 08/06/2020 0 61     eGFR 08/06/2020 64     WBC 08/06/2020 6 17     RBC 08/06/2020 4 75     Hemoglobin 08/06/2020 14 5     Hematocrit 08/06/2020 43 8     MCV 08/06/2020 92     MCH 08/06/2020 30 5     MCHC 08/06/2020 33 1     RDW 08/06/2020 12 8     MPV 08/06/2020 10 5     Platelets 62/09/9751 256     nRBC 08/06/2020 0     Neutrophils Relative 08/06/2020 49     Immat GRANS % 08/06/2020 0     Lymphocytes Relative 08/06/2020 34     Monocytes Relative 08/06/2020 9     Eosinophils Relative 08/06/2020 6     Basophils Relative 08/06/2020 2*    Neutrophils Absolute 08/06/2020 3 05     Immature Grans Absolute 08/06/2020 0 01     Lymphocytes Absolute 08/06/2020 2 12     Monocytes Absolute 08/06/2020 0 53     Eosinophils Absolute 08/06/2020 0 36     Basophils Absolute 08/06/2020 0 10     PTT 08/06/2020 32     Protime 08/06/2020 12 9     INR 08/06/2020 0 97     Ventricular Rate 08/06/2020 53     Atrial Rate 08/06/2020 53     CO Interval 08/06/2020 216     QRSD Interval 08/06/2020 88     QT Interval 08/06/2020 440     QTC Interval 08/06/2020 412     P Axis 08/06/2020 6     QRS Axis 08/06/2020 59     T Wave [place] 08/06/2020 60    Appointment on 08/06/2020   Component Date Value    Hemoglobin A1C 08/06/2020 5 9*    EAG 08/06/2020 123     Free T4 08/06/2020 1 04     TSH 3RD GENERATON 08/06/2020 4 350*   Office Visit on 07/31/2020   Component Date Value    Color, UA 08/06/2020 Dk Yellow     Clarity, UA 08/06/2020 Clear     Specific Gravity, UA 08/06/2020 1 025     pH, UA 08/06/2020 6 0     Leukocytes, UA 08/06/2020 Trace*    Nitrite, UA 08/06/2020 Negative     Protein, UA 08/06/2020 Negative     Glucose, UA 08/06/2020 Negative     Ketones, UA 08/06/2020 Negative     Urobilinogen, UA 08/06/2020 1 0     Bilirubin, UA 08/06/2020 Negative     Blood, UA 08/06/2020 Negative     RBC, UA 08/06/2020 4-10*    WBC, UA 08/06/2020 2-4*    Epithelial Cells 08/06/2020 None Seen     Bacteria, UA 08/06/2020 None Seen     Hyaline Casts, UA 08/06/2020 None Seen    Orders Only on 07/15/2020   Component Date Value    SARS-CoV-2  07/15/2020 Not Detected         Assessment:     72 y o  male with planned surgery as above  Known risk factors for perioperative complications: Chronic pulmonary disease  MAT    Difficulty with intubation is not anticipated  Cardiac Risk Estimation: low    Current medications which may produce withdrawal symptoms if withheld perioperatively: no    Plan:     1  Preoperative workup as follows ECG, electrolytes, creatinine, glucose, liver function studies  2  Change in medication regimen before surgery: Hold ASA 7 days prior to surgery  3  Prophylaxis for cardiac events with perioperative beta-blockers: not indicated  4  Invasive hemodynamic monitoring perioperatively: not indicated    5  Deep vein thrombosis prophylaxis postoperatively:regimen to be chosen by surgical team

## 2020-08-18 NOTE — TELEPHONE ENCOUNTER
Operative Procedure INSERTION THORACIC DORSAL COLUMN SPINAL CORD STIMULATOR PERCUTANEOUS W IMPLANTABLE PULSE GENERATOR, RIGHT (Right Spine Thoracic)     Assessment for comorbid medical conditions:   HO adverse response to general anesthesia:denies     Surgical Procedures past 6 months:SCS trial Dr Adi Case , [employer]     Infection (MRSA ESBL  CDIFF ):denies  Autoimmune Disease:Subclinical hypothyroidism as per clearance note for PCP and labs w/, abnormal TSH, followed by endocrinology, Dr Manjeet Ojeda but has cardiologist management for HTN, multifocal atrial tachycardia (MAT) SVT PAC , mixed hyperlipidemia ,   EKG for sx protocol > 60 required  Pulmonary: Smoke (stopped  ) O2 use ( NO) RODRIGUE/CPAP (NO ) COPD/Asthma ( Yes ) recurrent chronic bronchitis in winter months  , worse around [place] requiring treatment with prednisone , occasionally ABX , does not normally need to use prn albuterol  He is on Wixela daily and mucinex-DM daily  Quit smoking in   Rarely ever use albuterol -might be  has not refilled in some time  Endocrine:  DM HgA1C 5 9 managed by endocrinologist Abe Rogers prediabetics was 6 3     MISC/Oncology/hematology: denies       Skin intact//GI- (urostomy, colostomy, ileostomy, intermittent catheterization):He is self-cath 3/week now   Secondary to scar tissue formation in bladder neck as per patient , to break through scar tissue that forms thus decrease risk for recurrent scar tissue formation and need for cystoscopy, Dr Roney Short -Urologist   ANTWON BPH s/p TURP  Skin intact     Personal history of venous thromboembolic disease: denies     Imagining: MRI T/  images viewed in PACS     Pain management:  Opiate naive , post op med oxycodone 5 mg arturo add acetaminophen 5000 mg     Hold Anticoagulant agents pre and postoperative (AC, Antiplatelet, ASA, NSAID, vitamins , dietary supplements , OTC, immunosuppressant ) ASA, diclofenac , vitamin and dietary supplement     Provided overview of surgical process from office appointment thru 6 weeks post op:--donr written document     Patient verbalized understanding, all questions were answered and contact information provided if additional questions arise

## 2020-08-19 ENCOUNTER — APPOINTMENT (OUTPATIENT)
Dept: PREADMISSION TESTING | Facility: HOSPITAL | Age: 70
End: 2020-08-19
Payer: MEDICARE

## 2020-08-19 RX ORDER — GABAPENTIN 300 MG/1
1200 CAPSULE ORAL
COMMUNITY
End: 2020-10-09 | Stop reason: SDUPTHER

## 2020-08-19 NOTE — PRE-PROCEDURE INSTRUCTIONS
Pre-Surgery Instructions:   Medication Instructions    aspirin 81 mg chewable tablet Patient was instructed by Physician and understands   Cialis 5 MG tablet Instructed patient per Anesthesia Guidelines   clonazePAM (KlonoPIN) 0 5 mg tablet Instructed patient per Anesthesia Guidelines   dextromethorphan-guaifenesin (MUCINEX DM)  MG per 12 hr tablet Instructed patient per Anesthesia Guidelines   diclofenac sodium (VOLTAREN) 50 mg EC tablet Patient was instructed by Physician and understands   DULoxetine (CYMBALTA) 30 mg delayed release capsule Instructed patient per Anesthesia Guidelines   DULoxetine (CYMBALTA) 60 mg delayed release capsule Instructed patient per Anesthesia Guidelines   fluticasone (FLONASE) 50 mcg/act nasal spray Instructed patient per Anesthesia Guidelines   gabapentin (NEURONTIN) 300 mg capsule Instructed patient per Anesthesia Guidelines   gabapentin (NEURONTIN) 300 mg capsule Instructed patient per Anesthesia Guidelines   loratadine (CLARITIN) 10 mg tablet Instructed patient per Anesthesia Guidelines   metFORMIN (GLUCOPHAGE-XR) 500 mg 24 hr tablet Instructed patient per Anesthesia Guidelines   metoprolol tartrate (LOPRESSOR) 25 mg tablet Instructed patient per Anesthesia Guidelines   metoprolol tartrate (LOPRESSOR) 50 mg tablet Instructed patient per Anesthesia Guidelines   Multiple Vitamin (MULTI-VITAMIN DAILY) TABS Patient was instructed by Physician and understands   NIFEdipine (PROCARDIA XL) 90 mg 24 hr tablet Instructed patient per Anesthesia Guidelines   rosuvastatin (CRESTOR) 40 MG tablet Instructed patient per Anesthesia Guidelines   WIXELA INHUB 250-50 MCG/DOSE inhaler Instructed patient per Anesthesia Guidelines  Before your operation, you play an important role in decreasing your risk for infection by washing with special antiseptic soap    This is an effective way to reduce bacteria on the skin which may help to prevent infections at the surgical site  Please read the following directions in advance  1  In the week before your operation purchase a 4 ounce bottle of antiseptic soap containing chlorhexidine gluconate 4%  Some brand names include: Aplicare, Endure, and Hibiclens  The cost is usually less than $5 00  · For your convenience, the [address] [place] [address] carries the soap  · It may also be available at your doctor's office or pre-admission testing center, and at most retail pharmacies  · If you are allergic or sensitive to soaps containing chlorhexidine gluconate (CHG), please let your doctor know so another antiseptic soap can be suggested  · CHG antiseptic soap is for external use only  2      The day before your operation follow these directions carefully to get ready  · Place clean lines (sheets) on your bed; you should sleep on clean sheets after your evening shower  · Get clean towels and washcloths ready - you need enough for 2 showers  · Set aside clean underwear, pajamas, and clothing to wear after the shower  Reminders:  · DO NOT use any other soap or body rinse on your skin during or after the antiseptic showers  · DO NOT use lotion , powder, deodorant, or perfume/aftershave of any kind on your skin after your antiseptic shower  · DO NOT shave any body parts in the 24 hours/the day before your operation  · DO NOT get the antiseptic soap in your eyes, ears, nose, mouth, or vaginal area  3      You will need to shower the night before AND the morning of your Surgery  Shower 1:  · The evening before your operation, take the fist shower  · First, shampoo your hair with regular shampoo and rinse it completely before you use the anitseptic soap  After washing and rinsing your hair, rinse your body  · Next, use a clean wash cloth to apply the antiseptic soap and wash your body from the neck down to your toes using 1/2 bottle of the antiseptic soap    You will use the other 1/2 bottle for the second shower  · Clean the area where your incision will be; later this area well for about 2 minutes  · If you ar having head or neck surgery, wash areas with the antiseptic soap  · Rinse yourself completely with running water  · Use a clean towel to dry off  · Wear clean underwear and clothing/pajamas  Shower 2:  · The Morning of your operation, take the second shower following the same steps as Shower 1 using the second 1/2 of the bottle of antiseptic soap  · Use clean cloths and towels to was and dry yourself off  · Wear clean underwear and clothing

## 2020-08-24 ENCOUNTER — DOCUMENTATION (OUTPATIENT)
Dept: NEUROSURGERY | Facility: CLINIC | Age: 70
End: 2020-08-24

## 2020-08-24 ENCOUNTER — TELEPHONE (OUTPATIENT)
Dept: NEUROSURGERY | Facility: CLINIC | Age: 70
End: 2020-08-24

## 2020-08-24 DIAGNOSIS — Z98.890 POSTOPERATIVE STATE: Primary | ICD-10-CM

## 2020-08-24 DIAGNOSIS — G89.18 POSTOPERATIVE PAIN: ICD-10-CM

## 2020-08-24 DIAGNOSIS — Z79.2 PROPHYLACTIC ANTIBIOTIC: ICD-10-CM

## 2020-08-24 RX ORDER — OXYCODONE HYDROCHLORIDE 5 MG/1
TABLET ORAL
Qty: 40 TABLET | Refills: 0 | Status: SHIPPED | OUTPATIENT
Start: 2020-08-24 | End: 2020-10-09 | Stop reason: ALTCHOICE

## 2020-08-24 RX ORDER — CEPHALEXIN 500 MG/1
500 CAPSULE ORAL EVERY 6 HOURS SCHEDULED
Qty: 12 CAPSULE | Refills: 0 | Status: SHIPPED | OUTPATIENT
Start: 2020-08-24 | End: 2020-08-27

## 2020-08-24 NOTE — TELEPHONE ENCOUNTER
Pre operative call day prior surgery scheduled in the AM w/ Dr Ely Rocha, RIGHT (Right Spine Thoracic) --8/25/20    Discussion/Review    Allergies ---Reviewed   Hold medications --- Reviewed diclofenac and ASA  NPO after MN, night prior surgery ---Reviewed as instructed by ASU nurse  Medication (s) instructed by healthcare provider to take the morning of surgery w/ sip of water 4 OZ discussed: as instructed by ASU nurse  Post operative antibiotic electronic transmission to pharmacy: cephalexin     PDMP site reviewed accessed and reviewed scheduled drug list printed and scanned into record--done   Pain management script:oxycodone 5 mg add acetaminophen 500 mg w/ each dose MDD 3000 mg    Pre- operative shower protocol reviewed; Clarify instructions as per protocol, third chlorhexidine shower tonight before surgery, then use NESTOR wipes as per packaging instructions, Use a clean towel and wash cloth starting tonight and continue nightly until seen 2 weeks post operative visit for incision check removal  Change bed linens tonight and continue at least 1-2 times weekly  --reinforced     Informed will receive a telephone call tonight from a hospital representative with time to report on surgery day: pending  Informed will receive a f/u call within in 24 -48 hours post-op to assess recovery reinforce instructions, and to answer any questions  Reinforced     Follow-up appointments reviewed: documented in discharge paper work 2 week 9/9/20   6 week  10/15/2020--    Patient verbalized understanding information provided /discussed

## 2020-08-25 ENCOUNTER — ANESTHESIA (OUTPATIENT)
Dept: PERIOP | Facility: HOSPITAL | Age: 70
End: 2020-08-25
Payer: MEDICARE

## 2020-08-25 ENCOUNTER — HOSPITAL ENCOUNTER (OUTPATIENT)
Facility: HOSPITAL | Age: 70
Setting detail: OUTPATIENT SURGERY
Discharge: HOME | End: 2020-08-25
Attending: NEUROLOGICAL SURGERY | Admitting: NEUROLOGICAL SURGERY
Payer: MEDICARE

## 2020-08-25 ENCOUNTER — APPOINTMENT (OUTPATIENT)
Dept: RADIOLOGY | Facility: HOSPITAL | Age: 70
End: 2020-08-25
Payer: MEDICARE

## 2020-08-25 ENCOUNTER — ANESTHESIA EVENT (OUTPATIENT)
Dept: PERIOP | Facility: HOSPITAL | Age: 70
End: 2020-08-25
Payer: MEDICARE

## 2020-08-25 VITALS
RESPIRATION RATE: 16 BRPM | HEIGHT: 68 IN | DIASTOLIC BLOOD PRESSURE: 64 MMHG | TEMPERATURE: 98 F | BODY MASS INDEX: 34.37 KG/M2 | HEART RATE: 72 BPM | WEIGHT: 226.8 LBS | OXYGEN SATURATION: 97 % | SYSTOLIC BLOOD PRESSURE: 141 MMHG

## 2020-08-25 LAB — GLUCOSE SERPL-MCNC: 117 MG/DL (ref 65–140)

## 2020-08-25 PROCEDURE — C1820 GENERATOR NEURO RECHG BAT SY: HCPCS | Performed by: NEUROLOGICAL SURGERY

## 2020-08-25 PROCEDURE — 63650 IMPLANT NEUROELECTRODES: CPT | Performed by: PHYSICIAN ASSISTANT

## 2020-08-25 PROCEDURE — C1787 PATIENT PROGR, NEUROSTIM: HCPCS | Performed by: NEUROLOGICAL SURGERY

## 2020-08-25 PROCEDURE — C1778 LEAD, NEUROSTIMULATOR: HCPCS | Performed by: NEUROLOGICAL SURGERY

## 2020-08-25 PROCEDURE — 82948 REAGENT STRIP/BLOOD GLUCOSE: CPT

## 2020-08-25 PROCEDURE — 63650 IMPLANT NEUROELECTRODES: CPT | Performed by: NEUROLOGICAL SURGERY

## 2020-08-25 PROCEDURE — 72070 X-RAY EXAM THORAC SPINE 2VWS: CPT

## 2020-08-25 PROCEDURE — 63685 INS/RPLC SPI NPG/RCVR POCKET: CPT | Performed by: NEUROLOGICAL SURGERY

## 2020-08-25 PROCEDURE — 63685 INS/RPLC SPI NPG/RCVR POCKET: CPT | Performed by: PHYSICIAN ASSISTANT

## 2020-08-25 PROCEDURE — 95972 ALYS CPLX SP/PN NPGT W/PRGRM: CPT | Performed by: NEUROLOGICAL SURGERY

## 2020-08-25 DEVICE — LEAD KIT 8 CONTACT SCS MRI
Type: IMPLANTABLE DEVICE | Status: NON-FUNCTIONAL
Removed: 2022-05-03

## 2020-08-25 DEVICE — INS 97715 INTELLIS SENSOR MRI US EMANUAL
Type: IMPLANTABLE DEVICE | Status: NON-FUNCTIONAL
Brand: INTELLIS™ ADAPTIVESTIM®
Removed: 2022-05-03

## 2020-08-25 RX ORDER — EPHEDRINE SULFATE 50 MG/ML
INJECTION INTRAVENOUS AS NEEDED
Status: DISCONTINUED | OUTPATIENT
Start: 2020-08-25 | End: 2020-08-25

## 2020-08-25 RX ORDER — PROPOFOL 10 MG/ML
INJECTION, EMULSION INTRAVENOUS CONTINUOUS PRN
Status: DISCONTINUED | OUTPATIENT
Start: 2020-08-25 | End: 2020-08-25

## 2020-08-25 RX ORDER — PROPOFOL 10 MG/ML
INJECTION, EMULSION INTRAVENOUS AS NEEDED
Status: DISCONTINUED | OUTPATIENT
Start: 2020-08-25 | End: 2020-08-25

## 2020-08-25 RX ORDER — HYDROCODONE BITARTRATE AND ACETAMINOPHEN 5; 325 MG/1; MG/1
1 TABLET ORAL EVERY 6 HOURS PRN
Status: DISCONTINUED | OUTPATIENT
Start: 2020-08-25 | End: 2020-08-25 | Stop reason: HOSPADM

## 2020-08-25 RX ORDER — SUCCINYLCHOLINE/SOD CL,ISO/PF 100 MG/5ML
SYRINGE (ML) INTRAVENOUS AS NEEDED
Status: DISCONTINUED | OUTPATIENT
Start: 2020-08-25 | End: 2020-08-25

## 2020-08-25 RX ORDER — ONDANSETRON 2 MG/ML
4 INJECTION INTRAMUSCULAR; INTRAVENOUS ONCE
Status: DISCONTINUED | OUTPATIENT
Start: 2020-08-25 | End: 2020-08-25 | Stop reason: HOSPADM

## 2020-08-25 RX ORDER — FENTANYL CITRATE/PF 50 MCG/ML
25 SYRINGE (ML) INJECTION
Status: DISCONTINUED | OUTPATIENT
Start: 2020-08-25 | End: 2020-08-25 | Stop reason: HOSPADM

## 2020-08-25 RX ORDER — ROCURONIUM BROMIDE 10 MG/ML
INJECTION, SOLUTION INTRAVENOUS AS NEEDED
Status: DISCONTINUED | OUTPATIENT
Start: 2020-08-25 | End: 2020-08-25

## 2020-08-25 RX ORDER — HYDRALAZINE HYDROCHLORIDE 20 MG/ML
5 INJECTION INTRAMUSCULAR; INTRAVENOUS
Status: DISCONTINUED | OUTPATIENT
Start: 2020-08-25 | End: 2020-08-25 | Stop reason: HOSPADM

## 2020-08-25 RX ORDER — HYDROMORPHONE HCL/PF 1 MG/ML
0.2 SYRINGE (ML) INJECTION
Status: DISCONTINUED | OUTPATIENT
Start: 2020-08-25 | End: 2020-08-25 | Stop reason: HOSPADM

## 2020-08-25 RX ORDER — BUPIVACAINE HYDROCHLORIDE 2.5 MG/ML
INJECTION, SOLUTION EPIDURAL; INFILTRATION; INTRACAUDAL AS NEEDED
Status: DISCONTINUED | OUTPATIENT
Start: 2020-08-25 | End: 2020-08-25 | Stop reason: HOSPADM

## 2020-08-25 RX ORDER — CHLORHEXIDINE GLUCONATE 0.12 MG/ML
15 RINSE ORAL ONCE
Status: COMPLETED | OUTPATIENT
Start: 2020-08-25 | End: 2020-08-25

## 2020-08-25 RX ORDER — ONDANSETRON 2 MG/ML
INJECTION INTRAMUSCULAR; INTRAVENOUS AS NEEDED
Status: DISCONTINUED | OUTPATIENT
Start: 2020-08-25 | End: 2020-08-25

## 2020-08-25 RX ORDER — ONDANSETRON 2 MG/ML
4 INJECTION INTRAMUSCULAR; INTRAVENOUS ONCE AS NEEDED
Status: DISCONTINUED | OUTPATIENT
Start: 2020-08-25 | End: 2020-08-25 | Stop reason: HOSPADM

## 2020-08-25 RX ORDER — METOCLOPRAMIDE HYDROCHLORIDE 5 MG/ML
10 INJECTION INTRAMUSCULAR; INTRAVENOUS ONCE AS NEEDED
Status: DISCONTINUED | OUTPATIENT
Start: 2020-08-25 | End: 2020-08-25 | Stop reason: HOSPADM

## 2020-08-25 RX ORDER — SODIUM CHLORIDE, SODIUM LACTATE, POTASSIUM CHLORIDE, CALCIUM CHLORIDE 600; 310; 30; 20 MG/100ML; MG/100ML; MG/100ML; MG/100ML
75 INJECTION, SOLUTION INTRAVENOUS CONTINUOUS
Status: DISCONTINUED | OUTPATIENT
Start: 2020-08-25 | End: 2020-08-25 | Stop reason: HOSPADM

## 2020-08-25 RX ORDER — KETOROLAC TROMETHAMINE 30 MG/ML
INJECTION, SOLUTION INTRAMUSCULAR; INTRAVENOUS AS NEEDED
Status: DISCONTINUED | OUTPATIENT
Start: 2020-08-25 | End: 2020-08-25

## 2020-08-25 RX ORDER — MIDAZOLAM HYDROCHLORIDE 2 MG/2ML
INJECTION, SOLUTION INTRAMUSCULAR; INTRAVENOUS AS NEEDED
Status: DISCONTINUED | OUTPATIENT
Start: 2020-08-25 | End: 2020-08-25

## 2020-08-25 RX ORDER — MEPERIDINE HYDROCHLORIDE 25 MG/ML
12.5 INJECTION INTRAMUSCULAR; INTRAVENOUS; SUBCUTANEOUS
Status: DISCONTINUED | OUTPATIENT
Start: 2020-08-25 | End: 2020-08-25 | Stop reason: HOSPADM

## 2020-08-25 RX ORDER — DEXAMETHASONE SODIUM PHOSPHATE 10 MG/ML
INJECTION, SOLUTION INTRAMUSCULAR; INTRAVENOUS AS NEEDED
Status: DISCONTINUED | OUTPATIENT
Start: 2020-08-25 | End: 2020-08-25

## 2020-08-25 RX ORDER — LABETALOL 20 MG/4 ML (5 MG/ML) INTRAVENOUS SYRINGE
5
Status: DISCONTINUED | OUTPATIENT
Start: 2020-08-25 | End: 2020-08-25 | Stop reason: HOSPADM

## 2020-08-25 RX ORDER — LIDOCAINE HYDROCHLORIDE AND EPINEPHRINE 10; 10 MG/ML; UG/ML
INJECTION, SOLUTION INFILTRATION; PERINEURAL AS NEEDED
Status: DISCONTINUED | OUTPATIENT
Start: 2020-08-25 | End: 2020-08-25 | Stop reason: HOSPADM

## 2020-08-25 RX ORDER — HYDROMORPHONE HCL/PF 1 MG/ML
0.5 SYRINGE (ML) INJECTION
Status: DISCONTINUED | OUTPATIENT
Start: 2020-08-25 | End: 2020-08-25 | Stop reason: HOSPADM

## 2020-08-25 RX ORDER — PROMETHAZINE HYDROCHLORIDE 25 MG/ML
6.25 INJECTION, SOLUTION INTRAMUSCULAR; INTRAVENOUS ONCE AS NEEDED
Status: DISCONTINUED | OUTPATIENT
Start: 2020-08-25 | End: 2020-08-25 | Stop reason: HOSPADM

## 2020-08-25 RX ORDER — CEFAZOLIN SODIUM 2 G/50ML
2000 SOLUTION INTRAVENOUS ONCE
Status: COMPLETED | OUTPATIENT
Start: 2020-08-25 | End: 2020-08-25

## 2020-08-25 RX ORDER — FENTANYL CITRATE 50 UG/ML
INJECTION, SOLUTION INTRAMUSCULAR; INTRAVENOUS AS NEEDED
Status: DISCONTINUED | OUTPATIENT
Start: 2020-08-25 | End: 2020-08-25

## 2020-08-25 RX ADMIN — FENTANYL CITRATE 50 MCG: 50 INJECTION, SOLUTION INTRAMUSCULAR; INTRAVENOUS at 12:00

## 2020-08-25 RX ADMIN — KETOROLAC TROMETHAMINE 15 MG: 30 INJECTION, SOLUTION INTRAMUSCULAR; INTRAVENOUS at 13:14

## 2020-08-25 RX ADMIN — Medication 100 MG: at 12:08

## 2020-08-25 RX ADMIN — DEXAMETHASONE SODIUM PHOSPHATE 4 MG: 10 INJECTION, SOLUTION INTRAMUSCULAR; INTRAVENOUS at 12:49

## 2020-08-25 RX ADMIN — PROPOFOL 30 MG: 10 INJECTION, EMULSION INTRAVENOUS at 12:13

## 2020-08-25 RX ADMIN — PROPOFOL 20 MG: 10 INJECTION, EMULSION INTRAVENOUS at 12:24

## 2020-08-25 RX ADMIN — EPHEDRINE SULFATE 10 MG: 50 INJECTION, SOLUTION INTRAVENOUS at 12:38

## 2020-08-25 RX ADMIN — PROPOFOL 150 MG: 10 INJECTION, EMULSION INTRAVENOUS at 12:07

## 2020-08-25 RX ADMIN — EPHEDRINE SULFATE 10 MG: 50 INJECTION, SOLUTION INTRAVENOUS at 12:49

## 2020-08-25 RX ADMIN — CHLORHEXIDINE GLUCONATE 0.12% ORAL RINSE 15 ML: 1.2 LIQUID ORAL at 11:13

## 2020-08-25 RX ADMIN — HYDROCODONE BITARTRATE AND ACETAMINOPHEN 1 TABLET: 5; 325 TABLET ORAL at 14:41

## 2020-08-25 RX ADMIN — SODIUM CHLORIDE, SODIUM LACTATE, POTASSIUM CHLORIDE, AND CALCIUM CHLORIDE 75 ML/HR: .6; .31; .03; .02 INJECTION, SOLUTION INTRAVENOUS at 11:09

## 2020-08-25 RX ADMIN — CEFAZOLIN SODIUM 2000 MG: 2 SOLUTION INTRAVENOUS at 12:05

## 2020-08-25 RX ADMIN — PROPOFOL 120 MCG/KG/MIN: 10 INJECTION, EMULSION INTRAVENOUS at 12:11

## 2020-08-25 RX ADMIN — FENTANYL CITRATE 25 MCG: 50 INJECTION, SOLUTION INTRAMUSCULAR; INTRAVENOUS at 12:22

## 2020-08-25 RX ADMIN — MIDAZOLAM 2 MG: 1 INJECTION INTRAMUSCULAR; INTRAVENOUS at 12:00

## 2020-08-25 RX ADMIN — FENTANYL CITRATE 25 MCG: 50 INJECTION, SOLUTION INTRAMUSCULAR; INTRAVENOUS at 13:00

## 2020-08-25 RX ADMIN — ONDANSETRON 4 MG: 2 INJECTION INTRAMUSCULAR; INTRAVENOUS at 13:10

## 2020-08-25 RX ADMIN — ROCURONIUM BROMIDE 5 MG: 10 INJECTION, SOLUTION INTRAVENOUS at 12:02

## 2020-08-25 NOTE — OP NOTE
OPERATIVE REPORT  PATIENT NAME: Caprice Barber    :  1950  MRN: [id number]  Pt Location: [facility] OR ROOM 03    SURGERY DATE: 2020    Surgeon(s) and Role:     * Edmund Reyes MD - Primary     * Andres Mccormack PA-C - Assisting    Preop Diagnosis:  Chronic pain syndrome [G89 4]  Lumbar radiculopathy [M54 16]  Postlaminectomy syndrome [M96 1]    Post-Op Diagnosis Codes:     * Chronic pain syndrome [G89 4]     * Lumbar radiculopathy [M54 16]     * Postlaminectomy syndrome [M96 1]    Procedure(s) (LRB):  INSERTION THORACIC DORSAL COLUMN SPINAL CORD STIMULATOR PERCUTANEOUS W IMPLANTABLE PULSE GENERATOR, RIGHT (Right)    Specimen(s):  * No specimens in log *    Estimated Blood Loss:   Minimal    Drains:  * No LDAs found *    Anesthesia Type:   General    Operative Indications:  Chronic pain syndrome [G89 4]  Lumbar radiculopathy [M54 16]  Postlaminectomy syndrome [M96 1]      Operative Findings:  See dictated note  Navera  [id number]  [id number] x2    Complications:   None    Procedure and Technique:  The patient was taken to operative theater induced under general anesthesia and intubated he was positioned prone a Yogi frame  The sweet spot during the trial was planned at T8-9  An right flank incision was planned for the generator he was prepped and draped in sterile fashion  We used an epidmed needle to gain epidural access via loss of resistance technique  We traveled two electrodes through two different epidural access needles up to towards approximately T8 and T9  We confirmed placement of the the lead based on fluoroscopy  We tested the EMG response by altering the following parameters using a neurostimulation device   We programmed the following:    Frequency: 10-60 hertz   Pulse width of 350 us  Amplitudes: 0-10 mA   Contacts: midline contacts alternating contacts on the 16 contact electrode  Configuration: Bipolar with (+) and (-)  Cycling: None  Waveform: Tonic     We obtained the appropriate EMG response on the right and left legs      The pocket on the right flank was created with an knife and electrocautery  We tunneled that electrodes connected to the impulse generator and this was connected  We then tested impedances which were normal     The leads been anchored down using anchors under serial of fluoroscopy  After all hardware was in place we irrigated each wound  And then closed in layers using 2 0 Vicryl for the subcutaneous tissues and monocryl for the skin sterile dressing was placed  Patient was repositioned supine the hospital bed extubated to room air  he was taken to the postop recovery area stable condition  All needle and sponge counts were correct at the procedure  All neuromonitoring remained stable during the procedure       I was present for the entire procedure, A qualified resident physician was not available and A physician assistant was required during the procedure for retraction tissue handling,dissection and suturing    Patient Disposition:  PACU     SIGNATURE: Almaz Johns MD  DATE: August 25, 2020  TIME: 1:02 PM

## 2020-08-25 NOTE — INTERVAL H&P NOTE
H&P reviewed  After examining the patient I find no changes in the patients condition since the H&P had been written      Vitals:    08/25/20 1058   BP: 133/68   Pulse: 66   Resp: 18   Temp: 98 °F (36 7 °C)   SpO2: 96%

## 2020-08-25 NOTE — DISCHARGE INSTRUCTIONS
Discharge Instructions  Spinal Cord Stimulator (SCS)  Activity:  1  Do not lift more than 10 pounds for 6 weeks  2  Avoid bending, lifting and twisting for 6 weeks  3  No strenuous activities  No driving for 2 weeks  4  When able to shower, continue to use clean towel and washcloth for 2 weeks post-op  5  Continue to change bed linens and pajamas more frequently  Wear clean clothes daily  Surgical incision care:  1  For Permanent SCS placement:  a  Keep incisions dry for 3 days  b  May shower with mild antimicrobial soap after 3 days  2  Do not immerse the incisions in water for 6 weeks  3  Do not apply any creams or ointments to the incision for 6 weeks, unless otherwise instructed by [facility]  4  Contact office if increasing redness, drainage, pain or swelling around the incisions  Postoperative medication:  1  Complete course of antibiotic as directed  a  For permanent spinal cord stimulators: 3 days  b  For spinal cord stimulator trials: Continue for duration of trial and 3 days after leads are pulled unless directed otherwise  2  [facility] will provide pain medication as coordinated with your pain specialist  All prescriptions must come from a single provider  a  Take all medications as prescribed  Call office with any questions/concerns  3  Please contact office for questions regarding dosage and modifications  4  No antiplatelet, anticoagulation or Nonsteroidal anti-inflammatory (NSAIDs) medication until cleared by [email or web address], unless otherwise instructed  5  Do not operate heavy machinery or vehicles while taking sedating medications  6  Use a bowel regimen while on opioids as they induce constipation  Ie  Senokot-S, Miralax, Colace, etc  Increase fiber and water intake  ***Note that it may take some time for the stimulator to improve chronic pain symptoms   Adjustment of the stimulator program can begin 2 weeks after placement  For TRIALS, there will be ongoing communication with the rep and adjustments as indicated  Please contact the stimulator representative if you have any questions regarding programing  ***

## 2020-08-25 NOTE — ANESTHESIA PREPROCEDURE EVALUATION
Procedure:  INSERTION THORACIC DORSAL COLUMN SPINAL CORD STIMULATOR PERCUTANEOUS W IMPLANTABLE PULSE GENERATOR, RIGHT (Right Spine Thoracic)    Relevant Problems   ANESTHESIA (within normal limits)      CARDIO   (+) Hyperlipidemia   (+) Hypertension   (+) Multifocal atrial tachycardia (HCC)   (+) Supraventricular tachycardia (HCC)      ENDO  Obesity   (+) Subclinical hypothyroidism      GI/HEPATIC (within normal limits)      /RENAL   (+) Enlarged prostate with lower urinary tract symptoms (LUTS)      GYN (within normal limits)      HEMATOLOGY (within normal limits)      MUSCULOSKELETAL   (+) Arthritis of left hip   (+) Degeneration of lumbosacral intervertebral disc   (+) Localized, primary osteoarthritis   (+) Low back pain   (+) Lumbosacral spondylosis without myelopathy   (+) Osteoarthritis of hip   (+) Osteoarthritis of knee   (+) Primary localized osteoarthritis of pelvic region and thigh   (+) Pseudoclaudication syndrome      NEURO/PSYCH   (+) Anxiety and depression   (+) Chronic pain syndrome   (+) Pseudoclaudication syndrome      PULMONARY  Former smoker   (+) COPD (chronic obstructive pulmonary disease) (HCC)        Physical Exam    Airway    Mallampati score: II  TM Distance: >3 FB  Neck ROM: full     Dental   No notable dental hx     Cardiovascular  Rhythm: regular, Rate: normal, Cardiovascular exam normal    Pulmonary  Pulmonary exam normal Breath sounds clear to auscultation,     Other Findings        Anesthesia Plan  ASA Score- 3     Anesthesia Type- general with ASA Monitors  Additional Monitors:   Airway Plan: ETT  Plan Factors-Exercise tolerance (METS): >4 METS  Chart reviewed  EKG reviewed  Existing labs reviewed  Patient summary reviewed  Patient is not a current smoker  Obstructive sleep apnea risk education given perioperatively  Induction- intravenous  Postoperative Plan- Plan for postoperative opioid use       Informed Consent- Anesthetic plan and risks discussed with patient  I personally reviewed this patient with the CRNA  Discussed and agreed on the Anesthesia Plan with the CRNA  Jarred Guajardo

## 2020-08-26 ENCOUNTER — TELEPHONE (OUTPATIENT)
Dept: NEUROSURGERY | Facility: CLINIC | Age: 70
End: 2020-08-26

## 2020-08-26 DIAGNOSIS — M62.830 MUSCLE SPASM OF BACK: Primary | ICD-10-CM

## 2020-08-26 DIAGNOSIS — Z98.890 POSTOPERATIVE STATE: ICD-10-CM

## 2020-08-26 RX ORDER — METHOCARBAMOL 750 MG/1
TABLET, FILM COATED ORAL
Qty: 40 TABLET | Refills: 0 | Status: SHIPPED | OUTPATIENT
Start: 2020-08-26 | End: 2020-10-09 | Stop reason: ALTCHOICE

## 2020-08-26 NOTE — TELEPHONE ENCOUNTER
Pt called reporting SCS placement yesterday  While speaking with one day surgery today, he mentioned he was experiencing muscle spasms and was referred to the office to request muscle relaxer  Please advise    Thank You

## 2020-08-26 NOTE — TELEPHONE ENCOUNTER
Post operative call s/p surgery on   w/ DR Jasmin Banks  s/p ;INSERTION THORACIC DORSAL COLUMN SPINAL CORD STIMULATOR PERCUTANEOUS W IMPLANTABLE PULSE GENERATOR, RIGHT (Right Spine Thoracic)     Post operative pain:controlled w/Oxycodone + acetaminophen 500 mg 1 tab every 4 hrs 2 tabs every 8 hrs MDD 3000 mg   Complains of intermittent muscle spasm low back/lumbar --characterized as cramping aching tightness , then relaxes after a period of time, methocarbamol prescribed   Taken in the past with efficacy  Antibiotic:cephalexin started   Gastrointestinal (BM, NVD, Appetite /Fluid intake), -- denies   Bowel hygiene for opioid induced constipation - Started per protocol               Call if you develop any signs or symptoms of incision (s) redness, drainage, dehiscence, or  fever of 101 or higher , uncontrolled nausea and /or vomiting  DENIES ,REVIEWED   Wound/dressing; as per postoperative discharge instructions -no dressing to remove , surgical glue dressing applied  ---reinforced   Post operative Hygiene: Sponge bath until 4 th day post op then start showers  Allow shower water to briefly run over incision do not rub incision  Use a clean towel and wash cloth daily, use antibacterial soap, change bed linens 1-2 times per week and pajamas several times per week  --reinforced     Surgical incision closed using dissolvable suture cover with surgical adhesive over your incision, it will peel off on it's own  Do not remove  There is no special care for your incision  --reinforced     Post operative Activity:  Ambulate as tolerate, Lift no greater than 10 LBS until 6 weeks, and refrain for bending or twisting until about 4 weeks -light duty until then  --reinforced   Avoid repetitive pushing and pulling movements using upper extremities--reinforced   Avoid submersion in water x 6 weeks after surgery  ---reinforced   No NSAID (aspirin, Motrin, Aleve, Excedrin,  OTC, supplements etc ) for 2 weeks, may resume after 2 week postoperative visit    Continue medication holds as reviewed preoperatively--reinforced      Post Operative Visits:9/9/20 2 wk po   10/15/20 6 week po  Patent verbalized an understanding of information communicated and agreement

## 2020-09-08 ENCOUNTER — TELEPHONE (OUTPATIENT)
Dept: NEUROSURGERY | Facility: CLINIC | Age: 70
End: 2020-09-08

## 2020-09-09 ENCOUNTER — OFFICE VISIT (OUTPATIENT)
Dept: NEUROSURGERY | Facility: CLINIC | Age: 70
End: 2020-09-09

## 2020-09-09 VITALS
BODY MASS INDEX: 34.71 KG/M2 | RESPIRATION RATE: 16 BRPM | DIASTOLIC BLOOD PRESSURE: 74 MMHG | WEIGHT: 229 LBS | HEIGHT: 68 IN | TEMPERATURE: 97.6 F | HEART RATE: 61 BPM | SYSTOLIC BLOOD PRESSURE: 128 MMHG

## 2020-09-09 DIAGNOSIS — M54.16 LUMBAR RADICULOPATHY: ICD-10-CM

## 2020-09-09 DIAGNOSIS — Z96.89 STATUS POST INSERTION OF SPINAL CORD STIMULATOR: ICD-10-CM

## 2020-09-09 DIAGNOSIS — G89.4 CHRONIC PAIN SYNDROME: Primary | ICD-10-CM

## 2020-09-09 PROCEDURE — 99024 POSTOP FOLLOW-UP VISIT: CPT | Performed by: NURSE PRACTITIONER

## 2020-09-09 NOTE — PROGRESS NOTES
Assessment/Plan:    Status post insertion of spinal cord stimulator  · As detailed in HPI  · 2 week postop visit for aftercare following surgery , thoracic spinal cord stimulator implant  · Continues with chronic pain spinal cord stimulator not activated yet  Right buttock incision healing as expected for SCS activation and generator system charging, product rep present  · Had surgical pain  For about 2-3 days postop then subsided  · Had paraspinal lumbar muscle spasms for several days after surgery now subsided  · Surgical incisions right buttock and lumbar area with 4 0 running monocryl suture, incision is clean, dry and intact, without erythema, dehiscence, drainage or s/s of infection, healing well approximated wound edges  · Cut knots on lateral ends of monocryl sutures, he tolerated procedure well  ·  Surgical glue is adherent to incision line covering scabbing and surrounding skin  · Continues ambulating using a SPC  The following instructions are reviewed in detail and continue thru next 4 weeks (6 week post op)    · At 2 weeks postop can resume restricted medications such as ASA, products containing ASA, NSAID, fish oils, and OTC products or as previously directed  · Resume driving 2 week postoperatively  · Continue to observe incisions for redness, drainage, swelling dehiscence, increased pain, fever >/= 101, warmth to touch incision or skin surrounding, if occur call or report to office immediately for reassessment  · Explained monocryl reasonable  sutures can take up to 90 days to reabsorb  · Do not remove glue adherent to incision lines covering scabs will eventually disappear  · Continue showers using clean towel and wash cloth with OTC antibacterial body wash, pat dry after showering continue protocol for an additional 2 weeks      · Do not apply lotions, creams, powder, or ointments to surgical incisions  · Activity as tolerated, no bending, lifting  greater than 10 lbs, turning, stretching, no pulling, pushing  ambulation as tolerated  · Refrain from strenuous activity, bending, and  twisting back  · No Immersion in water such as swimming, hot tub, or tub bath  · If there is any significant change condition  call and/or return to the office immediately for reassessment  · Explained in detail barrier protection during spinal cord stimulator charging, never place  directly on generator site  Always provide clean storage for generator and mendoza  Discuss with Rep protocol for cleaning generator mendoza  · Met with product rep for device programing and teaching, refer to attached session report  · Explained chronic pain relief may not be immediate after reprogramming can take  Up to 72 hours to feel effect   · Contact Rep immediately if there is any change in efficacy or concern over SCS system  · Contact office if unable to reach Rep or if the reps   intervention does  not resolve issue  Chronic pain syndrome  · As detailed in HPI  · As detailed in insertion Spinal Cord  Stimulator  Subjective: 2 week postop visit , stimulator not turned on , still have chronic pain  Patient ID: Gareld Rubinstein is a 72 y o  male     HPI   Has  A longstanding history of chronic pain affecting low back and bilateral buttocks with pain distribution in to bilateral posterior thighs stop just above knees   He failed conservative treatment of injections and therapy  He underwent a [employer] spinal cord stimulator trial , achieved 75 % efficacy for  reduction in chronic pain  Had improved ambulation for distance without sitting, and improved functional mobility  He consulted with DR Eugene Morillo 7/31/20 , was deemed and appropriate candidate to proceed with permanent spinal cord stimulator implant    He scheduled or surgery 8/25/20 with Dr Paola Krause, RIGHT (Right Spine Thoracic)  Impressions and treatment recommendations were discussed in detail with the patient who verbalized understanding, all questions were answered and contact information was given in the event future questions arise  REVIEW OF SYSTEMS  Review of Systems   Constitutional: Negative  HENT: Negative  Eyes: Negative  Respiratory:        Chronic bronchitis  COPD   Cardiovascular:        A-fib     Gastrointestinal: Negative  Endocrine: Negative  Genitourinary: Negative  Self-cath 2-3 x weekly   Musculoskeletal: Positive for back pain (only with standing/walking) and gait problem (uses standard cane)  Skin: Positive for wound (surgical incisions)  Allergic/Immunologic: Positive for environmental allergies  Neurological: Positive for weakness (bilateral legs with walking)  Negative for numbness           Meds/Allergies     Current Outpatient Medications   Medication Sig Dispense Refill    Cialis 5 MG tablet TAKE 1 TABLET BY MOUTH EVERY DAY 90 tablet 3    clonazePAM (KlonoPIN) 0 5 mg tablet Take 1 tablet (0 5 mg total) by mouth daily at bedtime 30 tablet 1    DULoxetine (CYMBALTA) 30 mg delayed release capsule Take 90 mg by mouth daily       fluticasone (FLONASE) 50 mcg/act nasal spray fluticasone propionate 50 mcg/actuation nasal spray,suspension      gabapentin (NEURONTIN) 300 mg capsule Take 300 mg by mouth daily       loratadine (CLARITIN) 10 mg tablet Take 1 tablet by mouth daily      metFORMIN (GLUCOPHAGE-XR) 500 mg 24 hr tablet Take 1 tablet (500 mg total) by mouth daily with dinner 90 tablet 0    methocarbamol (ROBAXIN) 750 mg tablet Take one tablet (750 mg) by mouth every 6 hours as needed  for muscle spasm in back 40 tablet 0    metoprolol tartrate (LOPRESSOR) 25 mg tablet Take 1 tablet (25 mg total) by mouth every 12 (twelve) hours 180 tablet 2    metoprolol tartrate (LOPRESSOR) 50 mg tablet Take 1 tablet (50 mg total) by mouth 2 (two) times a day Take with 25 mg tab bid 180 tablet 3    NIFEdipine (PROCARDIA XL) 90 mg 24 hr tablet TAKE 1 TABLET BY MOUTH DAILY 90 tablet 3    rosuvastatin (CRESTOR) 40 MG tablet TAKE 1 TABLET MON-WED-FRI, AND TAKE 1/2 TABLET TUES-THUR-SAT-SUN (Patient taking differently: 40 mg daily ) 180 tablet 1    WIXELA INHUB 250-50 MCG/DOSE inhaler INHALE 1 PUFF 2 (TWO) TIMES A DAY RINSE MOUTH AFTER USE  1 Inhaler 5    dextromethorphan-guaifenesin (MUCINEX DM)  MG per 12 hr tablet Take 1 tablet by mouth every 12 (twelve) hours      diclofenac sodium (VOLTAREN) 50 mg EC tablet Take 1 tablet (50 mg total) by mouth 2 (two) times a day (Patient not taking: Reported on 9/9/2020) 180 tablet 1    DULoxetine (CYMBALTA) 60 mg delayed release capsule Take 1 capsule by mouth daily      gabapentin (NEURONTIN) 300 mg capsule Take 1,200 mg by mouth daily at bedtime      Multiple Vitamin (MULTI-VITAMIN DAILY) TABS Take 1 tablet by mouth daily      oxyCODONE (ROXICODONE) 5 mg immediate release tablet Take by mouth as needed for pain 1 tab every 4 hours moderate or 2 tabs every 6 hours severe start 8/25/20 after surgery (Patient not taking: Reported on 9/9/2020) 40 tablet 0     No current facility-administered medications for this visit          No Known Allergies    PAST HISTORY    Past Medical History:   Diagnosis Date    Bronchitis     [facility]     Last Assessed: 5/17/2013    Chronic pain disorder     [facility]     Last Assessed: 4/13/2015    Depression, controlled 8/30/2017    Transitioned From: Depression    [facility]     Elevated PSA     Bx neg 2012; Last Assessed: 11/23/2012    Hypertension     Irregular heart beat     MAT, afib    Postoperative urinary retention        Past Surgical History:   Procedure Laterality Date    BACK SURGERY  07/25/2012    decompression of spinal stenosis from lower thoracic through the lumbar spine    COLONOSCOPY  10/2010 neg  recheck in 5 years   [address] ARTHROSCOPY  2013    [place] PERCUT IMPLNT Meghan Piety Right 2020    Procedure: INSERTION THORACIC DORSAL COLUMN SPINAL CORD STIMULATOR PERCUTANEOUS W IMPLANTABLE PULSE GENERATOR, RIGHT;  Surgeon: Karely Sorensen MD;  Location: [facility];  Service: Neurosurgery    [place] WRIST Volney Saltness LIG Left 3/4/2020    Procedure: RELEASE CARPAL TUNNEL ENDOSCOPIC;  Surgeon: Joceline Degroot MD;  Location: [facility];  Service: Orthopedics    PROSTATE BIOPSY      for elevated PSA of about 7; neg    TRANSURETHRAL RESECTION OF PROSTATE  2013       Social History     Tobacco Use    Smoking status: Former Smoker     Packs/day: 1 00     Years: 23 00     Pack years: 23 00     Types: Cigarettes     Start date:      Last attempt to quit:      Years since quittin 7    Smokeless tobacco: Never Used   Substance Use Topics    Alcohol use: No     Comment: Stopped drinking    Drug use: No       Family History   Problem Relation Age of Onset    Breast cancer Mother     Pancreatitis Father     Diabetes Maternal Grandmother     Heart attack Maternal Grandfather     Heart attack Paternal Grandmother     Diabetes Paternal Grandmother     Diabetes Paternal Grandfather        The following portions of the patient's history were reviewed and updated as appropriate: allergies, current medications, past family history, past medical history, past social history, past surgical history and problem list       EXAM    Vitals:Blood pressure 128/74, pulse 61, temperature 97 6 °F (36 4 °C), temperature source Tympanic, resp  rate 16, height 5' 8" (1 727 m), weight 104 kg (229 lb)  ,Body mass index is 34 82 kg/m²  Physical Exam    Neurologic Exam    Imaging Studies  Xr Spine Thoracic 2 Vw    Result Date: 2020  Narrative: C-ARM - THORACIC SPINE INDICATION: thoracic spinal cord stimulator insertion  Procedure guidance   COMPARISON: 6/15/2020 TECHNIQUE: FLUOROSCOPY TIME:   141 3 seconds 2 FLUOROSCOPIC IMAGES FINDINGS: Fluoroscopic guidance provided for surgical procedure  Spinal cord stimulator leads placed from T8 through T10  Osseous and soft tissue detail limited by technique  Impression: Fluoroscopic guidance provided for surgical procedure  Please refer to the separate procedure notes for additional details  Workstation performed: [id number]       I have personally reviewed pertinent reports     and I have personally reviewed pertinent films in PACS

## 2020-09-09 NOTE — ASSESSMENT & PLAN NOTE
· As detailed in HPI  · 2 week postop visit for aftercare following surgery , thoracic spinal cord stimulator implant  · Continues with chronic pain spinal cord stimulator not activated yet  Right buttock incision healing as expected for SCS activation and generator system charging, product rep present  · Had surgical pain  For about 2-3 days postop then subsided  · Had paraspinal lumbar muscle spasms for several days after surgery now subsided  · Surgical incisions right buttock and lumbar area with 4 0 running monocryl suture, incision is clean, dry and intact, without erythema, dehiscence, drainage or s/s of infection, healing well approximated wound edges  · Cut knots on lateral ends of monocryl sutures, he tolerated procedure well  ·  Surgical glue is adherent to incision line covering scabbing and surrounding skin  · Continues ambulating using a SPC  The following instructions are reviewed in detail and continue thru next 4 weeks (6 week post op)    · At 2 weeks postop can resume restricted medications such as ASA, products containing ASA, NSAID, fish oils, and OTC products or as previously directed  · Resume driving 2 week postoperatively  · Continue to observe incisions for redness, drainage, swelling dehiscence, increased pain, fever >/= 101, warmth to touch incision or skin surrounding, if occur call or report to office immediately for reassessment  · Explained monocryl reasonable  sutures can take up to 90 days to reabsorb  · Do not remove glue adherent to incision lines covering scabs will eventually disappear  · Continue showers using clean towel and wash cloth with OTC antibacterial body wash, pat dry after showering continue protocol for an additional 2 weeks      · Do not apply lotions, creams, powder, or ointments to surgical incisions  · Activity as tolerated, no bending, lifting  greater than 10 lbs, turning, stretching, no pulling, pushing  ambulation as tolerated  · Refrain from strenuous activity, bending, and  twisting back  · No Immersion in water such as swimming, hot tub, or tub bath  · If there is any significant change condition  call and/or return to the office immediately for reassessment  · Explained in detail barrier protection during spinal cord stimulator charging, never place  directly on generator site  Always provide clean storage for generator and mendoza  Discuss with Rep protocol for cleaning generator mendoza  · Met with product rep for device programing and teaching, refer to attached session report  · Explained chronic pain relief may not be immediate after reprogramming can take  Up to 72 hours to feel effect   · Contact Rep immediately if there is any change in efficacy or concern over SCS system  · Contact office if unable to reach Rep or if the reps   intervention does  not resolve issue

## 2020-09-14 DIAGNOSIS — F32.A ANXIETY AND DEPRESSION: ICD-10-CM

## 2020-09-14 DIAGNOSIS — F41.9 ANXIETY AND DEPRESSION: ICD-10-CM

## 2020-09-14 RX ORDER — CLONAZEPAM 0.5 MG/1
0.5 TABLET ORAL
Qty: 30 TABLET | Refills: 1 | OUTPATIENT
Start: 2020-09-14 | End: 2020-10-14

## 2020-09-15 DIAGNOSIS — F41.9 ANXIETY AND DEPRESSION: ICD-10-CM

## 2020-09-15 DIAGNOSIS — F32.A ANXIETY AND DEPRESSION: ICD-10-CM

## 2020-09-16 RX ORDER — CLONAZEPAM 0.5 MG/1
TABLET ORAL
Qty: 30 TABLET | Refills: 1 | Status: SHIPPED | OUTPATIENT
Start: 2020-09-16 | End: 2020-11-09 | Stop reason: SDUPTHER

## 2020-09-18 ENCOUNTER — TELEPHONE (OUTPATIENT)
Dept: NEUROSURGERY | Facility: CLINIC | Age: 70
End: 2020-09-18

## 2020-09-18 NOTE — TELEPHONE ENCOUNTER
Returned call to patient, he telephoned regarding Dx with UTI ,  (dpoocumented as UTI w/out hematuria) had fever 101 , started cipro  Urologist advised him to [facility] neurosurgery and advise  Left VM --should not e problem is not septic bacteria contained on urinary tract   Monitor incisions for dehiscence, drainage, redness  Call if symptoms occur      SX 8/25 INSERTION THORACIC DORSAL COLUMN SPINAL CORD STIMULATOR PERCUTANEOUS W IMPLANTABLE PULSE GENERATOR, RIGHT (Right Spine Thoracic)

## 2020-10-06 DIAGNOSIS — R73.03 PREDIABETES: ICD-10-CM

## 2020-10-06 RX ORDER — METFORMIN HYDROCHLORIDE 500 MG/1
TABLET, EXTENDED RELEASE ORAL
Qty: 90 TABLET | Refills: 0 | Status: SHIPPED | OUTPATIENT
Start: 2020-10-06 | End: 2021-01-04

## 2020-10-09 ENCOUNTER — TELEPHONE (OUTPATIENT)
Dept: ADMINISTRATIVE | Facility: OTHER | Age: 70
End: 2020-10-09

## 2020-10-09 ENCOUNTER — OFFICE VISIT (OUTPATIENT)
Dept: FAMILY MEDICINE CLINIC | Facility: CLINIC | Age: 70
End: 2020-10-09
Payer: MEDICARE

## 2020-10-09 VITALS
SYSTOLIC BLOOD PRESSURE: 138 MMHG | RESPIRATION RATE: 16 BRPM | OXYGEN SATURATION: 95 % | BODY MASS INDEX: 36.19 KG/M2 | WEIGHT: 238.8 LBS | DIASTOLIC BLOOD PRESSURE: 64 MMHG | HEIGHT: 68 IN | TEMPERATURE: 97.2 F | HEART RATE: 64 BPM

## 2020-10-09 DIAGNOSIS — R73.01 IMPAIRED FASTING GLUCOSE: Primary | ICD-10-CM

## 2020-10-09 DIAGNOSIS — M54.16 LUMBAR RADICULOPATHY: ICD-10-CM

## 2020-10-09 DIAGNOSIS — J42 CHRONIC BRONCHITIS, UNSPECIFIED CHRONIC BRONCHITIS TYPE (HCC): ICD-10-CM

## 2020-10-09 DIAGNOSIS — E03.8 SUBCLINICAL HYPOTHYROIDISM: ICD-10-CM

## 2020-10-09 DIAGNOSIS — F41.9 ANXIETY AND DEPRESSION: ICD-10-CM

## 2020-10-09 DIAGNOSIS — Z23 NEED FOR INFLUENZA VACCINATION: ICD-10-CM

## 2020-10-09 DIAGNOSIS — E78.2 MIXED HYPERLIPIDEMIA: ICD-10-CM

## 2020-10-09 DIAGNOSIS — I10 ESSENTIAL HYPERTENSION: ICD-10-CM

## 2020-10-09 DIAGNOSIS — F32.A ANXIETY AND DEPRESSION: ICD-10-CM

## 2020-10-09 PROCEDURE — 90662 IIV NO PRSV INCREASED AG IM: CPT

## 2020-10-09 PROCEDURE — G0008 ADMIN INFLUENZA VIRUS VAC: HCPCS

## 2020-10-09 PROCEDURE — 99214 OFFICE O/P EST MOD 30 MIN: CPT | Performed by: FAMILY MEDICINE

## 2020-10-14 ENCOUNTER — OFFICE VISIT (OUTPATIENT)
Dept: CARDIOLOGY CLINIC | Facility: CLINIC | Age: 70
End: 2020-10-14
Payer: MEDICARE

## 2020-10-14 VITALS
DIASTOLIC BLOOD PRESSURE: 74 MMHG | BODY MASS INDEX: 36.69 KG/M2 | OXYGEN SATURATION: 97 % | HEART RATE: 62 BPM | HEIGHT: 68 IN | SYSTOLIC BLOOD PRESSURE: 120 MMHG | WEIGHT: 242.1 LBS | TEMPERATURE: 97.1 F

## 2020-10-14 DIAGNOSIS — J44.9 CHRONIC OBSTRUCTIVE PULMONARY DISEASE, UNSPECIFIED COPD TYPE (HCC): ICD-10-CM

## 2020-10-14 DIAGNOSIS — I47.1 MULTIFOCAL ATRIAL TACHYCARDIA (HCC): Primary | ICD-10-CM

## 2020-10-14 DIAGNOSIS — E78.2 MIXED HYPERLIPIDEMIA: ICD-10-CM

## 2020-10-14 PROCEDURE — 99214 OFFICE O/P EST MOD 30 MIN: CPT | Performed by: INTERNAL MEDICINE

## 2020-10-15 ENCOUNTER — OFFICE VISIT (OUTPATIENT)
Dept: NEUROSURGERY | Facility: CLINIC | Age: 70
End: 2020-10-15

## 2020-10-15 VITALS
SYSTOLIC BLOOD PRESSURE: 123 MMHG | BODY MASS INDEX: 36.68 KG/M2 | TEMPERATURE: 98 F | DIASTOLIC BLOOD PRESSURE: 70 MMHG | HEIGHT: 68 IN | WEIGHT: 242 LBS

## 2020-10-15 DIAGNOSIS — Z48.89 AFTERCARE FOLLOWING SURGERY: Primary | ICD-10-CM

## 2020-10-15 PROCEDURE — 99024 POSTOP FOLLOW-UP VISIT: CPT | Performed by: NEUROLOGICAL SURGERY

## 2020-11-09 DIAGNOSIS — F32.A ANXIETY AND DEPRESSION: ICD-10-CM

## 2020-11-09 DIAGNOSIS — F41.9 ANXIETY AND DEPRESSION: ICD-10-CM

## 2020-11-09 RX ORDER — CLONAZEPAM 0.5 MG/1
0.5 TABLET ORAL
Qty: 30 TABLET | Refills: 1 | Status: SHIPPED | OUTPATIENT
Start: 2020-11-09 | End: 2021-01-04 | Stop reason: SDUPTHER

## 2020-12-01 DIAGNOSIS — R00.0 TACHYCARDIA: ICD-10-CM

## 2020-12-01 DIAGNOSIS — R00.2 PALPITATIONS: ICD-10-CM

## 2020-12-03 ENCOUNTER — TELEMEDICINE (OUTPATIENT)
Dept: FAMILY MEDICINE CLINIC | Facility: CLINIC | Age: 70
End: 2020-12-03
Payer: MEDICARE

## 2020-12-03 DIAGNOSIS — J44.1 COPD WITH ACUTE EXACERBATION (HCC): Primary | ICD-10-CM

## 2020-12-03 PROCEDURE — 99213 OFFICE O/P EST LOW 20 MIN: CPT | Performed by: FAMILY MEDICINE

## 2020-12-03 RX ORDER — AZITHROMYCIN 250 MG/1
TABLET, FILM COATED ORAL
Qty: 6 TABLET | Refills: 0 | Status: SHIPPED | OUTPATIENT
Start: 2020-12-03 | End: 2020-12-07

## 2020-12-03 RX ORDER — PREDNISONE 10 MG/1
TABLET ORAL
Qty: 28 TABLET | Refills: 0 | Status: SHIPPED | OUTPATIENT
Start: 2020-12-03 | End: 2020-12-08 | Stop reason: SDUPTHER

## 2020-12-03 RX ORDER — ALBUTEROL SULFATE 90 UG/1
2 AEROSOL, METERED RESPIRATORY (INHALATION) EVERY 6 HOURS PRN
Qty: 1 INHALER | Refills: 5 | Status: SHIPPED | OUTPATIENT
Start: 2020-12-03 | End: 2021-12-27

## 2020-12-06 DIAGNOSIS — M17.0 OSTEOARTHRITIS OF BOTH KNEES, UNSPECIFIED OSTEOARTHRITIS TYPE: ICD-10-CM

## 2020-12-08 ENCOUNTER — TELEPHONE (OUTPATIENT)
Dept: FAMILY MEDICINE CLINIC | Facility: CLINIC | Age: 70
End: 2020-12-08

## 2020-12-08 DIAGNOSIS — J44.1 COPD WITH ACUTE EXACERBATION (HCC): ICD-10-CM

## 2020-12-08 RX ORDER — PREDNISONE 10 MG/1
TABLET ORAL
Qty: 28 TABLET | Refills: 0 | Status: SHIPPED | OUTPATIENT
Start: 2020-12-08 | End: 2020-12-24 | Stop reason: SDUPTHER

## 2020-12-24 ENCOUNTER — TELEPHONE (OUTPATIENT)
Dept: ENDOCRINOLOGY | Facility: CLINIC | Age: 70
End: 2020-12-24

## 2020-12-24 ENCOUNTER — TELEMEDICINE (OUTPATIENT)
Dept: FAMILY MEDICINE CLINIC | Facility: CLINIC | Age: 70
End: 2020-12-24
Payer: MEDICARE

## 2020-12-24 DIAGNOSIS — J44.1 COPD WITH ACUTE EXACERBATION (HCC): ICD-10-CM

## 2020-12-24 DIAGNOSIS — J44.1 CHRONIC OBSTRUCTIVE PULMONARY DISEASE WITH ACUTE EXACERBATION (HCC): Primary | ICD-10-CM

## 2020-12-24 PROCEDURE — 99213 OFFICE O/P EST LOW 20 MIN: CPT | Performed by: FAMILY MEDICINE

## 2020-12-24 RX ORDER — IPRATROPIUM BROMIDE AND ALBUTEROL SULFATE 2.5; .5 MG/3ML; MG/3ML
3 SOLUTION RESPIRATORY (INHALATION) EVERY 6 HOURS PRN
Qty: 60 VIAL | Refills: 1 | Status: SHIPPED | OUTPATIENT
Start: 2020-12-24 | End: 2022-04-25 | Stop reason: ALTCHOICE

## 2020-12-24 RX ORDER — DEXTROMETHORPHAN HYDROBROMIDE AND PROMETHAZINE HYDROCHLORIDE 15; 6.25 MG/5ML; MG/5ML
5 SOLUTION ORAL 4 TIMES DAILY PRN
Qty: 180 ML | Refills: 0 | Status: SHIPPED | OUTPATIENT
Start: 2020-12-24 | End: 2021-04-14 | Stop reason: ALTCHOICE

## 2020-12-24 RX ORDER — PREDNISONE 10 MG/1
TABLET ORAL
Qty: 28 TABLET | Refills: 0 | Status: SHIPPED | OUTPATIENT
Start: 2020-12-24 | End: 2020-12-29 | Stop reason: SDUPTHER

## 2020-12-28 ENCOUNTER — TELEPHONE (OUTPATIENT)
Dept: FAMILY MEDICINE CLINIC | Facility: CLINIC | Age: 70
End: 2020-12-28

## 2020-12-28 ENCOUNTER — APPOINTMENT (OUTPATIENT)
Dept: RADIOLOGY | Age: 70
End: 2020-12-28
Payer: MEDICARE

## 2020-12-28 ENCOUNTER — TRANSCRIBE ORDERS (OUTPATIENT)
Dept: ADMINISTRATIVE | Age: 70
End: 2020-12-28

## 2020-12-28 DIAGNOSIS — J44.1 COPD WITH ACUTE EXACERBATION (HCC): ICD-10-CM

## 2020-12-28 PROCEDURE — 71046 X-RAY EXAM CHEST 2 VIEWS: CPT

## 2020-12-28 NOTE — TELEPHONE ENCOUNTER
Pt states that his bronchitis is still bothering him  Would like to know if another medication can be prescribed  No appts available for today          Please advise

## 2020-12-29 ENCOUNTER — TELEMEDICINE (OUTPATIENT)
Dept: FAMILY MEDICINE CLINIC | Facility: CLINIC | Age: 70
End: 2020-12-29
Payer: MEDICARE

## 2020-12-29 DIAGNOSIS — J44.1 CHRONIC OBSTRUCTIVE PULMONARY DISEASE WITH ACUTE EXACERBATION (HCC): Primary | ICD-10-CM

## 2020-12-29 DIAGNOSIS — J44.1 COPD WITH ACUTE EXACERBATION (HCC): ICD-10-CM

## 2020-12-29 PROCEDURE — 99213 OFFICE O/P EST LOW 20 MIN: CPT | Performed by: FAMILY MEDICINE

## 2020-12-29 RX ORDER — PREDNISONE 10 MG/1
TABLET ORAL
Qty: 28 TABLET | Refills: 0 | Status: SHIPPED | OUTPATIENT
Start: 2020-12-29 | End: 2021-01-22 | Stop reason: ALTCHOICE

## 2021-01-03 DIAGNOSIS — R73.03 PREDIABETES: ICD-10-CM

## 2021-01-04 DIAGNOSIS — F32.A ANXIETY AND DEPRESSION: ICD-10-CM

## 2021-01-04 DIAGNOSIS — F41.9 ANXIETY AND DEPRESSION: ICD-10-CM

## 2021-01-04 RX ORDER — METFORMIN HYDROCHLORIDE 500 MG/1
TABLET, EXTENDED RELEASE ORAL
Qty: 90 TABLET | Refills: 0 | Status: SHIPPED | OUTPATIENT
Start: 2021-01-04 | End: 2021-03-28

## 2021-01-04 RX ORDER — CLONAZEPAM 0.5 MG/1
0.5 TABLET ORAL
Qty: 30 TABLET | Refills: 1 | Status: SHIPPED | OUTPATIENT
Start: 2021-01-04 | End: 2021-02-26 | Stop reason: SDUPTHER

## 2021-01-22 ENCOUNTER — OFFICE VISIT (OUTPATIENT)
Dept: PULMONOLOGY | Facility: CLINIC | Age: 71
End: 2021-01-22
Payer: MEDICARE

## 2021-01-22 VITALS
HEART RATE: 80 BPM | DIASTOLIC BLOOD PRESSURE: 60 MMHG | BODY MASS INDEX: 38.95 KG/M2 | OXYGEN SATURATION: 96 % | HEIGHT: 68 IN | WEIGHT: 257 LBS | SYSTOLIC BLOOD PRESSURE: 130 MMHG | TEMPERATURE: 97.3 F

## 2021-01-22 DIAGNOSIS — J44.1 CHRONIC OBSTRUCTIVE PULMONARY DISEASE WITH ACUTE EXACERBATION (HCC): ICD-10-CM

## 2021-01-22 DIAGNOSIS — J41.1 MUCOPURULENT CHRONIC BRONCHITIS (HCC): Primary | ICD-10-CM

## 2021-01-22 PROBLEM — R91.1 LUNG NODULE: Status: RESOLVED | Noted: 2017-01-04 | Resolved: 2021-01-22

## 2021-01-22 PROCEDURE — 99214 OFFICE O/P EST MOD 30 MIN: CPT | Performed by: INTERNAL MEDICINE

## 2021-01-22 RX ORDER — ALBUTEROL SULFATE 2.5 MG/3ML
2.5 SOLUTION RESPIRATORY (INHALATION) EVERY 6 HOURS PRN
COMMUNITY
End: 2021-01-22 | Stop reason: CLARIF

## 2021-01-22 NOTE — ASSESSMENT & PLAN NOTE
· Patient symptoms are suggestive of situational chronic bronchitis with likely component of bronchospasm/reactive airway  · Previous pulmonary function testing did not demonstrate substantial COPD  Only mild air trapping on the lung volumes    Otherwise was a normal study in 2015  · I did recommend we repeat his pulmonary function study to determine whether there has been any change since his last testing

## 2021-01-22 NOTE — ASSESSMENT & PLAN NOTE
· Continue use of Wixela 500/50 during peak season which occurs late fall and into the early winter  · Utilize nebulizer and rescue inhaler when needed  · Continue cough suppression with over-the-counter Robitussin  · Can also try flutter valve and Tessalon if needed  · Try to minimize prednisone as able  · Recommended continued diet and exercise as he can tolerate    He is limited by his back problem to certain activities

## 2021-01-22 NOTE — PROGRESS NOTES
Progress note - Pulmonary Medicine   Omid Eunice Rodriguez 72 y o  male MRN: [id number]       Impression & Plan:     COPD (chronic obstructive pulmonary disease) ([facility] )  · Patient symptoms are suggestive of situational chronic bronchitis with likely component of bronchospasm/reactive airway  · Previous pulmonary function testing did not demonstrate substantial COPD  Only mild air trapping on the lung volumes  Otherwise was a normal study in 2015  · I did recommend we repeat his pulmonary function study to determine whether there has been any change since his last testing    Chronic bronchitis (HCC)  · Continue use of Wixela 500/50 during peak season which occurs late fall and into the early winter  · Utilize nebulizer and rescue inhaler when needed  · Continue cough suppression with over-the-counter Robitussin  · Can also try flutter valve and Tessalon if needed  · Try to minimize prednisone as able  · Recommended continued diet and exercise as he can tolerate  He is limited by his back problem to certain activities    I will be in contact with him with the results of his pulmonary function study and he should follow up with us in early November to re-evaluate his symptoms during his peak season  ______________________________________________________________________    HPI:    Markus Loyola presents today for follow-up of chronic bronchitis  He was previously seen by Dr Corey Eaton remotely and subsequently Dr Ruel Nunez in 2018  He has not followed up with us since that time  He has chronic bronchitis but also has a component of asthma overlap/bronchospasm/reactive airway associated with respiratory infection  He tends to have late fall allergies and also has bronchitis almost every year around the time of the holidays  It does take him quite some time to eliminate the symptoms and has required multiple rounds of prednisone      He does experience side effects related to the prednisone including insomnia and substantial weight gain of approximately 20 lb  He is significantly overweight with a BMI close to 40 currently  Fortunately, he typically is able to lose the weight again with diet and exercise  His exercise however has been limited due to significant back problems  He does have a spinal stimulator  He walks with a cane  He has trouble maintaining upright posture  He is able to use an elliptical machine because he can lean forward on the machine  He also does some weight strengthening exercises with a both flex machine to try to maintain muscle strength  He has not been able to do this however since the late fall  His activity level has also declined during the COVID-19 pandemic  He does cough intermittently and produce some phlegm  During his bronchitis episodes he produces a lot of phlegm and finds it very difficult to expectorate  He will take over-the-counter Robitussin DM to try to help loosen the mucus  He does not report uzma wheezing  No chest pain or cardiac complaints  No pleurisy  No abdominal pain or GERD symptoms      Current Medications:    Current Outpatient Medications:     albuterol (PROVENTIL HFA,VENTOLIN HFA) 90 mcg/act inhaler, Inhale 2 puffs every 6 (six) hours as needed for wheezing or shortness of breath, Disp: 1 Inhaler, Rfl: 5    Cialis 5 MG tablet, TAKE 1 TABLET BY MOUTH EVERY DAY, Disp: 90 tablet, Rfl: 3    clonazePAM (KlonoPIN) 0 5 mg tablet, Take 1 tablet (0 5 mg total) by mouth daily at bedtime, Disp: 30 tablet, Rfl: 1    dextromethorphan-guaifenesin (MUCINEX DM)  MG per 12 hr tablet, Take 1 tablet by mouth every 12 (twelve) hours, Disp: , Rfl:     diclofenac sodium (VOLTAREN) 50 mg EC tablet, TAKE 1 TABLET BY MOUTH TWICE A DAY, Disp: 180 tablet, Rfl: 1    DULoxetine (CYMBALTA) 30 mg delayed release capsule, Take 90 mg by mouth daily , Disp: , Rfl:     DULoxetine (CYMBALTA) 60 mg delayed release capsule, Take 1 capsule by mouth daily, Disp: , Rfl:    fluticasone (FLONASE) 50 mcg/act nasal spray, fluticasone propionate 50 mcg/actuation nasal spray,suspension, Disp: , Rfl:     fluticasone-salmeterol (Wixela Inhub) 500-50 mcg/dose inhaler, Inhale 1 puff 2 (two) times a day Rinse mouth after use , Disp: 1 Inhaler, Rfl: 1    gabapentin (NEURONTIN) 300 mg capsule, Take 300 mg by mouth daily , Disp: , Rfl:     ipratropium-albuterol (DUO-NEB) 0 5-2 5 mg/3 mL nebulizer solution, Take 1 vial (3 mL total) by nebulization every 6 (six) hours as needed for wheezing or shortness of breath, Disp: 60 vial, Rfl: 1    loratadine (CLARITIN) 10 mg tablet, Take 1 tablet by mouth daily, Disp: , Rfl:     metFORMIN (GLUCOPHAGE-XR) 500 mg 24 hr tablet, TAKE 1 TABLET BY MOUTH EVERY DAY WITH DINNER, Disp: 90 tablet, Rfl: 0    metoprolol tartrate (LOPRESSOR) 25 mg tablet, Take 1 tablet (25 mg total) by mouth every 12 (twelve) hours, Disp: 180 tablet, Rfl: 2    metoprolol tartrate (LOPRESSOR) 50 mg tablet, Take 1 tablet (50 mg total) by mouth 2 (two) times a day Take with 25 mg tab bid, Disp: 180 tablet, Rfl: 3    Multiple Vitamin (MULTI-VITAMIN DAILY) TABS, Take 1 tablet by mouth daily, Disp: , Rfl:     NIFEdipine (PROCARDIA XL) 90 mg 24 hr tablet, TAKE 1 TABLET BY MOUTH DAILY, Disp: 90 tablet, Rfl: 3    Promethazine-DM (PHENERGAN-DM) 6 25-15 mg/5 mL oral syrup, Take 5 mL by mouth 4 (four) times a day as needed for cough, Disp: 180 mL, Rfl: 0    rosuvastatin (CRESTOR) 40 MG tablet, TAKE 1 TABLET MON-WED-FRI, AND TAKE 1/2 TABLET TUES-THUR-SAT-SUN (Patient taking differently: 40 mg daily ), Disp: 180 tablet, Rfl: 1    Review of Systems:  Review of Systems   Constitutional: Negative for appetite change and fever  HENT: Positive for postnasal drip and rhinorrhea  Negative for ear pain, sneezing, sore throat and trouble swallowing  Respiratory: Positive for shortness of breath  Cardiovascular: Negative for chest pain  Gastrointestinal: Negative      Musculoskeletal: Positive for back pain  Negative for myalgias  Allergic/Immunologic: Positive for environmental allergies  Neurological: Negative for headaches  Psychiatric/Behavioral: Negative for sleep disturbance  All other systems reviewed and are negative  Answers for HPI/ROS submitted by the patient on 2021   Primary symptoms  Chronicity: chronic  When did you first notice your symptoms?: more than 1 month ago  How often do your symptoms occur?: 2 to 4 times per day  Since you first noticed this problem, how has it changed?: unchanged  Do you have shortness of breath that occurs with effort or exertion?: Yes  Do you have ear congestion?: No  Do you have heartburn?: Yes  Do you have fatigue?: Yes  Do you have nasal congestion?: Yes  Do you have shortness of breath when lying flat?: Yes  Do you have shortness of breath when you wake up?: No  Do you have sweats?: No  Have you experienced weight loss?: No  Which of the following makes your symptoms worse?: climbing stairs, eating, strenuous activity, URI  Which of the following makes your symptoms better?: oral steroids, OTC cough suppressant, rest  Risk factors for lung disease: occupational exposure, smoking/tobacco exposure    Aside from what is mentioned in the HPI, the review of systems is otherwise negative    Past medical history, surgical history, and family history were reviewed and updated as appropriate    Social history updates:  Social History     Tobacco Use   Smoking Status Former Smoker    Packs/day: 1 50    Years: 23 00    Pack years: 34 50    Types: Cigarettes    Start date: 0    Quit date:     Years since quittin 0   Smokeless Tobacco Never Used     Occupational history is notable for worked in [employer]  He worked in the [employer] [address] and [facility] [employer]  He had significant dust exposure  He also worked in his best [employer] [place] [employer] a shin but used appropriate personal protective equipment for these activities    He also worked for [employer] as  for [address] [address]    PhysicalExamination:  Vitals:   /60 (BP Location: Left arm, Patient Position: Sitting)   Pulse 80   Temp (!) 97 3 °F (36 3 °C)   Ht 5' 8" (1 727 m)   Wt 117 kg (257 lb)   SpO2 96%   BMI 39 08 kg/m²   Gen:  Obese, comfortable on room air  HEENT: PERRL  Oropharynx exam deferred due to COVID-19 and patient wearing face mask  Neck: Stout  I do not appreciate any JVD or lymphadenopathy  Trachea is midline  No thyromegaly  Chest: Breath sounds are distant but clear to auscultation  There are no wheezes, rales or rhonchi  Cardiac: Regular rate and rhythm  There are no murmurs, rubs or gallops  Abdomen: Obese  Soft and nontender  Extremities: No clubbing, cyanosis or edema  Neurologic: No focal deficits  Skin: No appreciable rashes  Diagnostic Data:  Labs: I personally reviewed the most recent laboratory data pertinent to today's visit    Lab Results   Component Value Date    WBC 6 17 08/06/2020    HGB 14 5 08/06/2020    HCT 43 8 08/06/2020    MCV 92 08/06/2020     08/06/2020     Lab Results   Component Value Date    SODIUM 138 08/06/2020    K 4 2 08/06/2020    CO2 28 08/06/2020     08/06/2020    BUN 16 08/06/2020    CREATININE 1 16 08/06/2020    CALCIUM 9 7 08/06/2020       PFT results: The most recent pulmonary function tests were reviewed  Two thousand fifteen complete pulmonary function test showed normal spirometry, normal total lung capacity and thoracic gas volume  There was evidence of hyperinflation and air trapping  Normal diffusing capacity    Imaging:  I personally reviewed the images on the [facility] system pertinent to today's visit  Two thousand seventeen CT scan of the chest showed mild scarring in the left lower lobe  He did previously have a left lower lobe nodule which resolved on the 2017 study    No substantial emphysema bronchial thickening or other findings of COPD    Chest x-ray December 2020 showed clear lung singh    Cheri Guy MD

## 2021-02-05 ENCOUNTER — TELEMEDICINE (OUTPATIENT)
Dept: FAMILY MEDICINE CLINIC | Facility: CLINIC | Age: 71
End: 2021-02-05
Payer: MEDICARE

## 2021-02-05 VITALS — WEIGHT: 248 LBS | HEART RATE: 73 BPM | OXYGEN SATURATION: 97 % | BODY MASS INDEX: 37.59 KG/M2 | HEIGHT: 68 IN

## 2021-02-05 DIAGNOSIS — J41.1 MUCOPURULENT CHRONIC BRONCHITIS (HCC): Primary | ICD-10-CM

## 2021-02-05 PROCEDURE — 99213 OFFICE O/P EST LOW 20 MIN: CPT | Performed by: FAMILY MEDICINE

## 2021-02-05 RX ORDER — AZITHROMYCIN 250 MG/1
TABLET, FILM COATED ORAL
Qty: 6 TABLET | Refills: 0 | Status: SHIPPED | OUTPATIENT
Start: 2021-02-05 | End: 2021-02-09

## 2021-02-05 RX ORDER — PREDNISONE 10 MG/1
TABLET ORAL
Qty: 28 TABLET | Refills: 0 | Status: SHIPPED | OUTPATIENT
Start: 2021-02-05 | End: 2021-04-14 | Stop reason: ALTCHOICE

## 2021-02-05 RX ORDER — MONTELUKAST SODIUM 10 MG/1
10 TABLET ORAL
Qty: 30 TABLET | Refills: 5 | Status: SHIPPED | OUTPATIENT
Start: 2021-02-05 | End: 2021-07-28

## 2021-02-05 NOTE — PROGRESS NOTES
Virtual Regular Visit      Assessment/Plan:    Problem List Items Addressed This Visit        Respiratory    Chronic bronchitis ([address] ) - Primary    Relevant Medications    montelukast (SINGULAIR) 10 mg tablet    predniSONE 10 mg tablet    azithromycin (ZITHROMAX) 250 mg tablet          BMI Counseling: Body mass index is 37 71 kg/m²  The BMI is above normal  Nutrition recommendations include decreasing portion sizes, encouraging healthy choices of fruits and vegetables, decreasing fast food intake, consuming healthier snacks and limiting drinks that contain sugar  Exercise recommendations include moderate physical activity 150 minutes/week  No pharmacotherapy was ordered  Reason for visit is   Chief Complaint   Patient presents with    Bronchitis     Possible bronchitis flare up   Virtual Regular Visit        Encounter provider Maria Del Carmen Kerr MD    Provider located at [address] [place] [id number]      Recent Visits  No visits were found meeting these conditions  Showing recent visits within past 7 days and meeting all other requirements     Today's Visits  Date Type Provider Dept   02/05/21 [facility] [address], [address] today's visits and meeting all other requirements     Future Appointments  No visits were found meeting these conditions  Showing future appointments within next 150 days and meeting all other requirements      It was my intent to perform this visit via video technology but the patient was not able to do a video connection so the visit was completed via audio telephone only  Facetime did not show video, only audio  The patient was identified by name and date of birth  Laurilucian Davis was informed that this is a telemedicine visit and that the visit is being conducted through telephone  My office door was closed  No one else was in the room    He acknowledged consent and understanding of privacy and security of the video platform  The patient has agreed to participate and understands they can discontinue the visit at any time  Patient is aware this is a billable service  Pk Abbasi is a 72 y o  male for cough   HPI     C/o cough for 2 days  Felt wheezing  Denies fever, Sob, CP, n/v/abd pain  Hx of bronchitis  Got prednisone and antibiotics 4 times since 12/2020  Saw pulmonary recently, plans to do CPF next week  He is on wixela 500-50 bid, albuterol as needed  Past Medical History:   Diagnosis Date    Bronchitis     Bronchitis, chronic obstructive, with exacerbation ([facility] )     Last Assessed: 5/17/2013    Chronic pain disorder     COPD with acute exacerbation ([facility] )     Last Assessed: 4/13/2015    Depression, controlled 8/30/2017    Transitioned From: Depression    Diabetes mellitus ([facility] )     Elevated PSA     Bx neg 2012; Last Assessed: 11/23/2012    Hypertension     Irregular heart beat     MAT, afib    Lung nodule - resolved 2017 CT 1/4/2017    Postoperative urinary retention        Past Surgical History:   Procedure Laterality Date    BACK SURGERY  07/25/2012    decompression of spinal stenosis from lower thoracic through the lumbar spine    COLONOSCOPY  10/2010    neg   recheck in 5 years   [address] ARTHROSCOPY  01/2013    NM PERCUT IMPLNT NEUROELECT,EPIDURAL Right 8/25/2020    Procedure: INSERTION THORACIC DORSAL COLUMN SPINAL CORD STIMULATOR PERCUTANEOUS W IMPLANTABLE PULSE GENERATOR, RIGHT;  Surgeon: Emilio Florez MD;  Location: [facility];  Service: Neurosurgery    NM WRIST Nelda Jose LIG Left 3/4/2020    Procedure: RELEASE CARPAL TUNNEL ENDOSCOPIC;  Surgeon: Michael Gibson MD;  Location: [facility];  Service: Orthopedics    PROSTATE BIOPSY  2012    for elevated PSA of about 7; neg    TRANSURETHRAL RESECTION OF PROSTATE  06/27/2013       Current Outpatient Medications Medication Sig Dispense Refill    albuterol (PROVENTIL HFA,VENTOLIN HFA) 90 mcg/act inhaler Inhale 2 puffs every 6 (six) hours as needed for wheezing or shortness of breath 1 Inhaler 5    azithromycin (ZITHROMAX) 250 mg tablet Take 2 tabs on day 1, then take 1 tab daily from day 2-day 5  6 tablet 0    Cialis 5 MG tablet TAKE 1 TABLET BY MOUTH EVERY DAY 90 tablet 3    clonazePAM (KlonoPIN) 0 5 mg tablet Take 1 tablet (0 5 mg total) by mouth daily at bedtime 30 tablet 1    dextromethorphan-guaifenesin (MUCINEX DM)  MG per 12 hr tablet Take 1 tablet by mouth every 12 (twelve) hours      diclofenac sodium (VOLTAREN) 50 mg EC tablet TAKE 1 TABLET BY MOUTH TWICE A  tablet 1    DULoxetine (CYMBALTA) 30 mg delayed release capsule Take 90 mg by mouth daily       DULoxetine (CYMBALTA) 60 mg delayed release capsule Take 1 capsule by mouth daily      fluticasone (FLONASE) 50 mcg/act nasal spray fluticasone propionate 50 mcg/actuation nasal spray,suspension      fluticasone-salmeterol (Wixela Inhub) 500-50 mcg/dose inhaler Inhale 1 puff 2 (two) times a day Rinse mouth after use   1 Inhaler 1    gabapentin (NEURONTIN) 300 mg capsule Take 300 mg by mouth daily       ipratropium-albuterol (DUO-NEB) 0 5-2 5 mg/3 mL nebulizer solution Take 1 vial (3 mL total) by nebulization every 6 (six) hours as needed for wheezing or shortness of breath 60 vial 1    metFORMIN (GLUCOPHAGE-XR) 500 mg 24 hr tablet TAKE 1 TABLET BY MOUTH EVERY DAY WITH DINNER 90 tablet 0    metoprolol tartrate (LOPRESSOR) 25 mg tablet Take 1 tablet (25 mg total) by mouth every 12 (twelve) hours 180 tablet 2    metoprolol tartrate (LOPRESSOR) 50 mg tablet Take 1 tablet (50 mg total) by mouth 2 (two) times a day Take with 25 mg tab bid 180 tablet 3    montelukast (SINGULAIR) 10 mg tablet Take 1 tablet (10 mg total) by mouth daily at bedtime 30 tablet 5    Multiple Vitamin (MULTI-VITAMIN DAILY) TABS Take 1 tablet by mouth daily      NIFEdipine (PROCARDIA XL) 90 mg 24 hr tablet TAKE 1 TABLET BY MOUTH DAILY 90 tablet 3    predniSONE 10 mg tablet Take 4 tabs for 4 days, then 3 tabs for 2 days, then 2 tabs for 2 days and then 1 tab for 2 day 28 tablet 0    Promethazine-DM (PHENERGAN-DM) 6 25-15 mg/5 mL oral syrup Take 5 mL by mouth 4 (four) times a day as needed for cough 180 mL 0    rosuvastatin (CRESTOR) 40 MG tablet TAKE 1 TABLET MON-WED-FRI, AND TAKE 1/2 TABLET TUES-THUR-SAT-SUN (Patient taking differently: 40 mg daily ) 180 tablet 1     No current facility-administered medications for this visit  No Known Allergies    Review of Systems   Constitutional: Negative for appetite change, chills and fever  HENT: Negative for congestion, ear pain, sinus pain and sore throat  Eyes: Negative for discharge and itching  Respiratory: Positive for cough and wheezing  Negative for apnea, chest tightness and shortness of breath  Cardiovascular: Negative for chest pain, palpitations and leg swelling  Gastrointestinal: Negative for abdominal pain, anal bleeding, constipation, diarrhea, nausea and vomiting  Endocrine: Negative for cold intolerance, heat intolerance and polyuria  Genitourinary: Negative for difficulty urinating and dysuria  Musculoskeletal: Negative for arthralgias, back pain and myalgias  Skin: Negative for rash  Neurological: Negative for dizziness and headaches  Psychiatric/Behavioral: Negative for agitation  Video Exam    Vitals:    02/05/21 1511   Pulse: 73   SpO2: 97%   Weight: 112 kg (248 lb)   Height: 5' 8" (1 727 m)          I spent 15 minutes directly with the patient during this visit      VIRTUAL VISIT [id number] [facility] [facility] acknowledges that he has consented to an online visit or consultation   He understands that the online visit is based solely on information provided by him, and that, in the absence of a face-to-face physical evaluation by the physician, the diagnosis he receives is both limited and provisional in terms of accuracy and completeness  This is not intended to replace a full medical face-to-face evaluation by the physician  Eric Melo understands and accepts these terms

## 2021-02-09 ENCOUNTER — HOSPITAL ENCOUNTER (OUTPATIENT)
Dept: PULMONOLOGY | Facility: HOSPITAL | Age: 71
Discharge: HOME | End: 2021-02-09
Attending: INTERNAL MEDICINE
Payer: MEDICARE

## 2021-02-09 DIAGNOSIS — J41.1 MUCOPURULENT CHRONIC BRONCHITIS (HCC): ICD-10-CM

## 2021-02-09 PROCEDURE — 94010 BREATHING CAPACITY TEST: CPT

## 2021-02-09 PROCEDURE — 94729 DIFFUSING CAPACITY: CPT

## 2021-02-09 PROCEDURE — 94010 BREATHING CAPACITY TEST: CPT | Performed by: INTERNAL MEDICINE

## 2021-02-09 PROCEDURE — 94726 PLETHYSMOGRAPHY LUNG VOLUMES: CPT

## 2021-02-09 PROCEDURE — 94726 PLETHYSMOGRAPHY LUNG VOLUMES: CPT | Performed by: INTERNAL MEDICINE

## 2021-02-09 PROCEDURE — 94760 N-INVAS EAR/PLS OXIMETRY 1: CPT

## 2021-02-09 PROCEDURE — 94729 DIFFUSING CAPACITY: CPT | Performed by: INTERNAL MEDICINE

## 2021-02-13 DIAGNOSIS — I49.1 PAC (PREMATURE ATRIAL CONTRACTION): ICD-10-CM

## 2021-02-16 DIAGNOSIS — I49.1 PAC (PREMATURE ATRIAL CONTRACTION): ICD-10-CM

## 2021-02-16 DIAGNOSIS — R00.2 PALPITATIONS: ICD-10-CM

## 2021-02-16 DIAGNOSIS — R00.0 TACHYCARDIA: ICD-10-CM

## 2021-02-16 RX ORDER — METOPROLOL TARTRATE 50 MG/1
50 TABLET, FILM COATED ORAL 2 TIMES DAILY
Qty: 180 TABLET | Refills: 3 | Status: SHIPPED | OUTPATIENT
Start: 2021-02-16 | End: 2022-02-09

## 2021-02-17 RX ORDER — METOPROLOL TARTRATE 50 MG/1
TABLET, FILM COATED ORAL
Qty: 180 TABLET | Refills: 3 | Status: SHIPPED | OUTPATIENT
Start: 2021-02-17 | End: 2022-04-07 | Stop reason: SDUPTHER

## 2021-02-26 DIAGNOSIS — F41.9 ANXIETY AND DEPRESSION: ICD-10-CM

## 2021-02-26 DIAGNOSIS — F32.A ANXIETY AND DEPRESSION: ICD-10-CM

## 2021-02-26 RX ORDER — CLONAZEPAM 0.5 MG/1
0.5 TABLET ORAL
Qty: 30 TABLET | Refills: 1 | Status: SHIPPED | OUTPATIENT
Start: 2021-02-26 | End: 2021-04-22 | Stop reason: SDUPTHER

## 2021-03-05 ENCOUNTER — TELEPHONE (OUTPATIENT)
Dept: PULMONOLOGY | Facility: HOSPITAL | Age: 71
End: 2021-03-05

## 2021-03-05 NOTE — TELEPHONE ENCOUNTER
Called left voicemail:  PFTs are consistent with some air trapping and mild restrictive airflow-advised patient once he gets voicemail he can feel free if he has any further questions to discuss PFTs in further detail

## 2021-03-10 DIAGNOSIS — Z23 ENCOUNTER FOR IMMUNIZATION: ICD-10-CM

## 2021-03-11 DIAGNOSIS — J44.1 COPD WITH ACUTE EXACERBATION (HCC): ICD-10-CM

## 2021-03-24 ENCOUNTER — LAB (OUTPATIENT)
Dept: LAB | Facility: CLINIC | Age: 71
End: 2021-03-24
Payer: MEDICARE

## 2021-03-24 DIAGNOSIS — E78.2 MIXED HYPERLIPIDEMIA: ICD-10-CM

## 2021-03-24 DIAGNOSIS — R73.01 IMPAIRED FASTING GLUCOSE: ICD-10-CM

## 2021-03-24 DIAGNOSIS — E03.8 SUBCLINICAL HYPOTHYROIDISM: ICD-10-CM

## 2021-03-24 DIAGNOSIS — I10 ESSENTIAL HYPERTENSION: ICD-10-CM

## 2021-03-24 LAB
ALBUMIN SERPL BCP-MCNC: 3.9 G/DL (ref 3.5–5)
ALP SERPL-CCNC: 70 U/L (ref 46–116)
ALT SERPL W P-5'-P-CCNC: 29 U/L (ref 12–78)
ANION GAP SERPL CALCULATED.3IONS-SCNC: 1 MMOL/L (ref 4–13)
AST SERPL W P-5'-P-CCNC: 13 U/L (ref 5–45)
BILIRUB SERPL-MCNC: 0.55 MG/DL (ref 0.2–1)
BUN SERPL-MCNC: 17 MG/DL (ref 5–25)
CALCIUM SERPL-MCNC: 9 MG/DL (ref 8.3–10.1)
CHLORIDE SERPL-SCNC: 109 MMOL/L (ref 100–108)
CHOLEST SERPL-MCNC: 191 MG/DL (ref 50–200)
CO2 SERPL-SCNC: 29 MMOL/L (ref 21–32)
CREAT SERPL-MCNC: 1.01 MG/DL (ref 0.6–1.3)
EST. AVERAGE GLUCOSE BLD GHB EST-MCNC: 123 MG/DL
GFR SERPL CREATININE-BSD FRML MDRD: 76 ML/MIN/1.73SQ M
GLUCOSE P FAST SERPL-MCNC: 108 MG/DL (ref 65–99)
HBA1C MFR BLD: 5.9 %
HDLC SERPL-MCNC: 46 MG/DL
LDLC SERPL CALC-MCNC: 105 MG/DL (ref 0–100)
NONHDLC SERPL-MCNC: 145 MG/DL
POTASSIUM SERPL-SCNC: 4.4 MMOL/L (ref 3.5–5.3)
PROT SERPL-MCNC: 7.5 G/DL (ref 6.4–8.2)
SODIUM SERPL-SCNC: 139 MMOL/L (ref 136–145)
T4 FREE SERPL-MCNC: 1.01 NG/DL (ref 0.76–1.46)
T4 FREE SERPL-MCNC: 1.04 NG/DL (ref 0.76–1.46)
TRIGL SERPL-MCNC: 200 MG/DL
TSH SERPL DL<=0.05 MIU/L-ACNC: 3.86 UIU/ML (ref 0.36–3.74)

## 2021-03-24 PROCEDURE — 84439 ASSAY OF FREE THYROXINE: CPT

## 2021-03-24 PROCEDURE — 83036 HEMOGLOBIN GLYCOSYLATED A1C: CPT

## 2021-03-24 PROCEDURE — 80061 LIPID PANEL: CPT

## 2021-03-24 PROCEDURE — 36415 COLL VENOUS BLD VENIPUNCTURE: CPT

## 2021-03-24 PROCEDURE — 80053 COMPREHEN METABOLIC PANEL: CPT

## 2021-03-24 PROCEDURE — 84443 ASSAY THYROID STIM HORMONE: CPT

## 2021-03-28 DIAGNOSIS — R73.03 PREDIABETES: ICD-10-CM

## 2021-03-28 RX ORDER — METFORMIN HYDROCHLORIDE 500 MG/1
TABLET, EXTENDED RELEASE ORAL
Qty: 90 TABLET | Refills: 0 | Status: SHIPPED | OUTPATIENT
Start: 2021-03-28 | End: 2021-06-24

## 2021-04-01 ENCOUNTER — TELEMEDICINE (OUTPATIENT)
Dept: ENDOCRINOLOGY | Facility: CLINIC | Age: 71
End: 2021-04-01
Payer: MEDICARE

## 2021-04-01 DIAGNOSIS — I10 ESSENTIAL HYPERTENSION: ICD-10-CM

## 2021-04-01 DIAGNOSIS — E03.8 SUBCLINICAL HYPOTHYROIDISM: ICD-10-CM

## 2021-04-01 DIAGNOSIS — E78.5 HYPERLIPIDEMIA, UNSPECIFIED HYPERLIPIDEMIA TYPE: ICD-10-CM

## 2021-04-01 DIAGNOSIS — R73.03 PREDIABETES: Primary | ICD-10-CM

## 2021-04-01 PROCEDURE — 99214 OFFICE O/P EST MOD 30 MIN: CPT | Performed by: INTERNAL MEDICINE

## 2021-04-01 NOTE — PROGRESS NOTES
Virtual Regular Visit      Assessment/Plan:->    Jolly Virk was seen today for virtual regular visit  Diagnoses and all orders for this visit:    Prediabetes  Lab Results   Component Value Date    HGBA1C 5 9 (H) 03/24/2021     A1c is good   Continue metformin and life style modifications   Follow up with PCP onwards for pre-diabetes,   Will need Hba1c every 6 months     Subclinical hypothyroidism  TSH is normal   TG antibodies , microsomal antibodies - normal  Continue to monitor TFT yearly   Essential hypertension  BP Readings from Last 3 Encounters:   01/22/21 130/60   10/15/20 123/70   10/14/20 120/74   continue current management   Hyperlipidemia, unspecified hyperlipidemia type  Continue statins       Problem List Items Addressed This Visit     None               Reason for visit is   Chief Complaint   Patient presents with    Virtual Regular Visit        Encounter provider Alize Acosta MD    Provider located at [address] [id number]      Recent Visits  No visits were found meeting these conditions  Showing recent visits within past 7 days and meeting all other requirements     Future Appointments  No visits were found meeting these conditions  Showing future appointments within next 150 days and meeting all other requirements        The patient was identified by name and date of birth  Dulce Blake was informed that this is a telemedicine visit and that the visit is being conducted through [address] [place] [address] and patient was informed that this is not a secure, HIPAA-compliant platform  He agrees to proceed     My office door was closed  No one else was in the room  He acknowledged consent and understanding of privacy and security of the video platform  The patient has agreed to participate and understands they can discontinue the visit at any time  Patient is aware this is a billable service  Pk Mullen is a 72 y o  male is here for follow up of  abnormal thyroid function test , prediabetes       Has history of supraventricular tachycardia  Over the past couple years, patient has had slightly elevated TSH with normal free T4 levels  Most recent TSH improved to normal     Lab Results   Component Value Date    WMK8OSBJEWID 3 860 (H) 03/24/2021    T5HJUXP 10 6 09/04/2018     Most recent thyroid function test done in March 2020 showed TSH at 6 780 and free T4 1  12  He is not currently on thyroid medication  No history of external radiation to head/neck/chest  No family history of thyroid cancer  No recent Iodine loading in form of medication, erbs or kelp supplements or radiological diagnostic studies  Pre-diabetes: he takes metformin 500 mg ER daily  A1c is 5 9 %  He is following diet  He takes Statins for hyperlipidemia     Lab Results   Component Value Date    HGBA1C 5 9 (H) 03/24/2021         Reviewed labs from 3/24/2021   Sodium 139, potassium 4 4, and gap is 1 chloride is 109, fasting glucose 108, calcium 9 0 AST 13, ALT 29, alkaline phosphatase 70 albumin 3 9 GFR 76, cholesterol 191, triglycerides 200 HDL 46       Past Medical History:   Diagnosis Date    Bronchitis     [facility] obstructive[facility] )     Last Assessed: 5/17/2013    Chronic pain disorder     COPD with acute exacerbation ([facility] )     Last Assessed: 4/13/2015    Depression, controlled 8/30/2017    Transitioned From: Depression    Diabetes mellitus ([facility] )     Elevated PSA     Bx neg 2012; Last Assessed: 11/23/2012    Hypertension     Irregular heart beat     MAT, afib    Lung nodule - resolved 2017 CT 1/4/2017    Postoperative urinary retention        Past Surgical History:   Procedure Laterality Date    BACK SURGERY  07/25/2012    decompression of spinal stenosis from lower thoracic through the lumbar spine    COLONOSCOPY  10/2010    neg   recheck in 5 years    JOINT REPLACEMENT      KNEE ARTHROSCOPY  01/2013    CA PERCUT IMPLNT NEUROELECT,EPIDURAL Right 8/25/2020    Procedure: INSERTION THORACIC DORSAL COLUMN SPINAL CORD STIMULATOR PERCUTANEOUS W IMPLANTABLE PULSE GENERATOR, RIGHT;  Surgeon: Dale Jacobs MD;  Location: [facility];  Service: Neurosurgery    [place] WRIST [place] LIG Left 3/4/2020    Procedure: RELEASE CARPAL TUNNEL ENDOSCOPIC;  Surgeon: Behzad Patel MD;  Location: [facility];  Service: [address] BIOPSY  2012    for elevated PSA of about 7; neg    TRANSURETHRAL RESECTION OF PROSTATE  06/27/2013       Current Outpatient Medications   Medication Sig Dispense Refill    albuterol (PROVENTIL HFA,VENTOLIN HFA) 90 mcg/act inhaler Inhale 2 puffs every 6 (six) hours as needed for wheezing or shortness of breath 1 Inhaler 5    Cialis 5 MG tablet TAKE 1 TABLET BY MOUTH EVERY DAY 90 tablet 3    clonazePAM (KlonoPIN) 0 5 mg tablet Take 1 tablet (0 5 mg total) by mouth daily at bedtime 30 tablet 1    dextromethorphan-guaifenesin (MUCINEX DM)  MG per 12 hr tablet Take 1 tablet by mouth every 12 (twelve) hours      diclofenac sodium (VOLTAREN) 50 mg EC tablet TAKE 1 TABLET BY MOUTH TWICE A  tablet 1    DULoxetine (CYMBALTA) 30 mg delayed release capsule Take 90 mg by mouth daily       DULoxetine (CYMBALTA) 60 mg delayed release capsule Take 1 capsule by mouth daily      fluticasone (FLONASE) 50 mcg/act nasal spray fluticasone propionate 50 mcg/actuation nasal spray,suspension      gabapentin (NEURONTIN) 300 mg capsule Take 300 mg by mouth daily       ipratropium-albuterol (DUO-NEB) 0 5-2 5 mg/3 mL nebulizer solution Take 1 vial (3 mL total) by nebulization every 6 (six) hours as needed for wheezing or shortness of breath 60 vial 1    metFORMIN (GLUCOPHAGE-XR) 500 mg 24 hr tablet TAKE 1 TABLET BY MOUTH EVERY DAY WITH DINNER 90 tablet 0    metoprolol tartrate (LOPRESSOR) 25 mg tablet Take 1 tablet (25 mg total) by mouth every 12 (twelve) hours 180 tablet 2    metoprolol tartrate (LOPRESSOR) 50 mg tablet TAKE 1 TABLET BY MOUTH TWICE A DAY IN ADDITION TO 25 MG TABLET 180 tablet 3    metoprolol tartrate (LOPRESSOR) 50 mg tablet Take 1 tablet (50 mg total) by mouth 2 (two) times a day Take with 25 mg tab bid 180 tablet 3    montelukast (SINGULAIR) 10 mg tablet Take 1 tablet (10 mg total) by mouth daily at bedtime 30 tablet 5    Multiple Vitamin (MULTI-VITAMIN DAILY) TABS Take 1 tablet by mouth daily      NIFEdipine (PROCARDIA XL) 90 mg 24 hr tablet TAKE 1 TABLET BY MOUTH DAILY 90 tablet 3    predniSONE 10 mg tablet Take 4 tabs for 4 days, then 3 tabs for 2 days, then 2 tabs for 2 days and then 1 tab for 2 day 28 tablet 0    Promethazine-DM (PHENERGAN-DM) 6 25-15 mg/5 mL oral syrup Take 5 mL by mouth 4 (four) times a day as needed for cough 180 mL 0    rosuvastatin (CRESTOR) 40 MG tablet TAKE 1 TABLET MON-WED-FRI, AND TAKE 1/2 TABLET TUES-THUR-SAT-SUN (Patient taking differently: 40 mg daily ) 180 tablet 1    Wixela Inhub 500-50 MCG/DOSE inhaler INHALE 1 PUFF 2 (TWO) TIMES A DAY RINSE MOUTH AFTER USE  60 each 1     No current facility-administered medications for this visit  No Known Allergies    Review of Systems   Constitutional: Negative for activity change, diaphoresis, fatigue, fever and unexpected weight change  HENT: Negative  Eyes: Negative for visual disturbance  Respiratory: Negative for cough, chest tightness and shortness of breath  Cardiovascular: Negative for chest pain, palpitations and leg swelling  Gastrointestinal: Negative for abdominal pain, blood in stool, constipation, diarrhea, nausea and vomiting  Endocrine: Negative for cold intolerance, heat intolerance, polydipsia, polyphagia and polyuria  Genitourinary: Negative for dysuria, enuresis, frequency and urgency  Musculoskeletal: Negative for arthralgias and myalgias  Skin: Negative for pallor, rash and wound  Allergic/Immunologic: Negative  Neurological: Negative for dizziness, tremors, weakness and numbness  Hematological: Negative  Psychiatric/Behavioral: Negative  Video Exam    There were no vitals filed for this visit  Physical Exam  Constitutional:       General: He is not in acute distress  Appearance: Normal appearance  He is not ill-appearing  HENT:      Head: Normocephalic and atraumatic  Nose: Nose normal    Eyes:      Extraocular Movements: Extraocular movements intact  Conjunctiva/sclera: Conjunctivae normal    Neck:      Musculoskeletal: Normal range of motion  Pulmonary:      Effort: No respiratory distress  Neurological:      General: No focal deficit present  Mental Status: He is alert and oriented to person, place, and time  Psychiatric:         Mood and Affect: Mood normal          Behavior: Behavior normal           I spent 23 minutes directly with the patient during this visit      VIRTUAL VISIT [address] acknowledges that he has consented to an online visit or consultation  He understands that the online visit is based solely on information provided by him, and that, in the absence of a face-to-face physical evaluation by the physician, the diagnosis he receives is both limited and provisional in terms of accuracy and completeness  This is not intended to replace a full medical face-to-face evaluation by the physician  Nadine Argueta understands and accepts these terms

## 2021-04-05 ENCOUNTER — TELEPHONE (OUTPATIENT)
Dept: PAIN MEDICINE | Facility: CLINIC | Age: 71
End: 2021-04-05

## 2021-04-05 NOTE — TELEPHONE ENCOUNTER
--KARLY--    S/W pt  Pt stated the SCS trial is not helping and he called the SCS rep 4 times  He stated the this never worked like the trial did  Pt asking for a RFA for b/l lower back  Scheduled pt for SOVS on 4/12 at 7:360 w/ [facility]  Pt appreciative

## 2021-04-05 NOTE — TELEPHONE ENCOUNTER
Patient   [phone]  DEISY Ann    Patient is calling in stating that the Stim is not working for him  He would like to know if he can get the nerves burned   Please follow up with pt thank you     Pain level: 7-8/10 when walking or standing

## 2021-04-12 ENCOUNTER — OFFICE VISIT (OUTPATIENT)
Dept: PAIN MEDICINE | Facility: CLINIC | Age: 71
End: 2021-04-12
Payer: MEDICARE

## 2021-04-12 VITALS
WEIGHT: 247 LBS | HEIGHT: 68 IN | TEMPERATURE: 98.5 F | SYSTOLIC BLOOD PRESSURE: 134 MMHG | HEART RATE: 71 BPM | BODY MASS INDEX: 37.44 KG/M2 | DIASTOLIC BLOOD PRESSURE: 69 MMHG

## 2021-04-12 DIAGNOSIS — G89.4 CHRONIC PAIN SYNDROME: Primary | ICD-10-CM

## 2021-04-12 DIAGNOSIS — Z98.1 STATUS POST LUMBAR AND LUMBOSACRAL FUSION BY ANTERIOR TECHNIQUE: ICD-10-CM

## 2021-04-12 DIAGNOSIS — Z96.89 STATUS POST INSERTION OF SPINAL CORD STIMULATOR: ICD-10-CM

## 2021-04-12 DIAGNOSIS — M54.16 LUMBAR RADICULOPATHY: ICD-10-CM

## 2021-04-12 DIAGNOSIS — M47.816 LUMBAR SPONDYLOSIS: ICD-10-CM

## 2021-04-12 DIAGNOSIS — M47.817 LUMBOSACRAL SPONDYLOSIS WITHOUT MYELOPATHY: ICD-10-CM

## 2021-04-12 PROCEDURE — 99214 OFFICE O/P EST MOD 30 MIN: CPT | Performed by: NURSE PRACTITIONER

## 2021-04-12 NOTE — PROGRESS NOTES
Assessment:  1  Chronic pain syndrome    2  Lumbar spondylosis    3  Lumbar radiculopathy    4  Lumbosacral spondylosis without myelopathy    5  Status post lumbar and lumbosacral fusion by anterior technique    6  Status post insertion of spinal cord stimulator        Plan:  1  We will schedule the patient for bilateral L1-4 medial branch blocks with intention of moving forward towards radiofrequency ablation if there is an appropriate diagnostic response  The initial blocks will be performed with 2% lidocaine and if an appropriate response is obtained upon review of the patient's pain diary, a confirmatory block will be scheduled  Complete risks and benefits including bleeding, infection, tissue reaction, nerve injury and allergic reaction were discussed  The patient was agreeable and verbalized an understanding  2  The patient may continue duloxetine 60 mg daily, gabapentin 300 mg in the morning and 1200 mg at bedtime, and diclofenac 50 mg twice a day p r n  as prescribed   3  Patient will continue to follow with [employer] for spinal cord stimulator reprogramming sessions as needed   4  Depending on response to MBB, may consider re referral back to Dr Cassandra Hammonds  5  The patient will continue with his home exercise program  6  Patient will follow-up pending results of the procedure or sooner if needed     M*Modal software was used to dictate this note  It may contain errors with dictating incorrect words or incorrect spelling  Please contact the provider directly with any questions  History of Present Illness: The patient is a 72 y o  male  With a history of an L5-S1 ALIF and [employer] SCS last seen on 6/22/20 who presents for a follow up office visit in regards to chronic lumbosacral back pain that radiates into the posterior aspect of the bilateral lower extremities secondary to  Lumbar degenerative disc disease, lumbar spondylosis, lumbar stenosis, lumbar radiculopathy and chronic pain syndrome    The patient denies bowel or bladder incontinence or saddle anesthesia  The patient has undergone bilateral S1 TFESI x2 which did provide significant relief of his pain, however unfortunately only lasted a few days before the pain returned to baseline  He had been evaluated by both neurosurgery and ortho surgery who recommended a spinal cord stimulator  He feels that the permanent spinal cord stimulator never provided the same relief as the trial   He does feel that it somewhat helps his leg symptoms, however does not improve his low back pain which is most bothersome to him at this time  He states that he is unable to walk any distance or complete any type of activity where he does not have to sit down or lean forward to remove the pressure off of his back  MRI of the lumbar spine reveals multilevel degenerative spondylosis, mild to moderate bilateral foraminal and central stenosis at L1-2, grade 1 retrolisthesis with moderate central and bilateral foraminal stenosis at L2-3, mild to moderate central and bilateral foraminal stenosis at L3-4, grade 1 anterolisthesis with mild to moderate central and bilateral foraminal stenosis at L4-5, and moderate foraminal stenosis at L5-S1 with a left laminotomy at L4-5 and interbody fusion at L5-S1      the patient rates his pain an 8/10 on the numeric pain rating scale  States pain is intermittent in nature and follows no particular pattern throughout the day  He characterizes pain as burning, dull aching, throbbing and numbness  Current pain medications includes:  Diclofenac 50 mg twice a day p r n  as prescribed by his PCP, duloxetine 90 mg daily and gabapentin 300 mg daily as prescribed by psychiatry   The patient reports that this regimen is providing minimal pain relief  The patient is reporting no side effects from this pain medication regimen      I have personally reviewed and/or updated the patient's past medical history, past surgical history, family history, social history, current medications, allergies, and vital signs today  Review of Systems:    Review of Systems   Respiratory: Negative for shortness of breath  Cardiovascular: Negative for chest pain  Gastrointestinal: Negative for constipation, diarrhea, nausea and vomiting  Musculoskeletal: Positive for gait problem  Negative for arthralgias, joint swelling and myalgias  Skin: Negative for rash  Neurological: Negative for dizziness, seizures and weakness  All other systems reviewed and are negative  Past Medical History:   Diagnosis Date    Bronchitis     Bronchitis, chronic obstructive, with exacerbation ([facility] )     Last Assessed: 5/17/2013    Chronic pain disorder     COPD with acute exacerbation ([facility] )     Last Assessed: 4/13/2015    Depression, controlled 8/30/2017    Transitioned From: Depression    Diabetes mellitus ([facility] )     Elevated PSA     Bx neg 2012; Last Assessed: 11/23/2012    Hypertension     Irregular heart beat     MAT, afib    Lung nodule - resolved 2017 CT 1/4/2017    Postoperative urinary retention        Past Surgical History:   Procedure Laterality Date    BACK SURGERY  07/25/2012    decompression of spinal stenosis from lower thoracic through the lumbar spine    COLONOSCOPY  10/2010    neg   recheck in 5 years   [address] ARTHROSCOPY  01/2013    [place] PERCUT IMPLNT NEUROELECT,EPIDURAL Right 8/25/2020    Procedure: INSERTION THORACIC DORSAL COLUMN SPINAL CORD STIMULATOR PERCUTANEOUS W IMPLANTABLE PULSE GENERATOR, RIGHT;  Surgeon: Donavan Landaverde MD;  Location:  MAIN OR;  Service: Neurosurgery    AK WRIST [place]na [place] LIG Left 3/4/2020    Procedure: RELEASE CARPAL TUNNEL ENDOSCOPIC;  Surgeon: Charlotte Ybarra MD;  Location:  MAIN OR;  Service: Orthopedics    PROSTATE BIOPSY  2012    for elevated PSA of about 7; neg    TRANSURETHRAL RESECTION OF PROSTATE  06/27/2013       Family History   Problem Relation Age of Onset    Breast cancer Mother     Pancreatitis Father     Diabetes Maternal Grandmother     Heart attack Maternal Grandfather     Heart attack Paternal Grandmother     Diabetes Paternal Grandmother     Diabetes Paternal Grandfather     No Known Problems Brother     Lung disease Paternal Uncle        Social History     Occupational History    Not on file   Tobacco Use    Smoking status: Former Smoker     Packs/day: 1 50     Years: 23 00     Pack years: 34 50     Types: Cigarettes     Start date:      Quit date:      Years since quittin 2    Smokeless tobacco: Never Used   Substance and Sexual Activity    Alcohol use: No     Comment: Stopped drinking    Drug use: No    Sexual activity: Yes         Current Outpatient Medications:     albuterol (PROVENTIL HFA,VENTOLIN HFA) 90 mcg/act inhaler, Inhale 2 puffs every 6 (six) hours as needed for wheezing or shortness of breath, Disp: 1 Inhaler, Rfl: 5    Cialis 5 MG tablet, TAKE 1 TABLET BY MOUTH EVERY DAY, Disp: 90 tablet, Rfl: 3    clonazePAM (KlonoPIN) 0 5 mg tablet, Take 1 tablet (0 5 mg total) by mouth daily at bedtime, Disp: 30 tablet, Rfl: 1    dextromethorphan-guaifenesin (MUCINEX DM)  MG per 12 hr tablet, Take 1 tablet by mouth every 12 (twelve) hours, Disp: , Rfl:     diclofenac sodium (VOLTAREN) 50 mg EC tablet, TAKE 1 TABLET BY MOUTH TWICE A DAY, Disp: 180 tablet, Rfl: 1    DULoxetine (CYMBALTA) 30 mg delayed release capsule, Take 90 mg by mouth daily , Disp: , Rfl:     DULoxetine (CYMBALTA) 60 mg delayed release capsule, Take 1 capsule by mouth daily, Disp: , Rfl:     fluticasone (FLONASE) 50 mcg/act nasal spray, fluticasone propionate 50 mcg/actuation nasal spray,suspension, Disp: , Rfl:     gabapentin (NEURONTIN) 300 mg capsule, Take 300 mg by mouth daily , Disp: , Rfl:     ipratropium-albuterol (DUO-NEB) 0 5-2 5 mg/3 mL nebulizer solution, Take 1 vial (3 mL total) by nebulization every 6 (six) hours as needed for wheezing or shortness of breath, Disp: 60 vial, Rfl: 1    metFORMIN (GLUCOPHAGE-XR) 500 mg 24 hr tablet, TAKE 1 TABLET BY MOUTH EVERY DAY WITH DINNER, Disp: 90 tablet, Rfl: 0    metoprolol tartrate (LOPRESSOR) 25 mg tablet, Take 1 tablet (25 mg total) by mouth every 12 (twelve) hours, Disp: 180 tablet, Rfl: 2    metoprolol tartrate (LOPRESSOR) 50 mg tablet, Take 1 tablet (50 mg total) by mouth 2 (two) times a day Take with 25 mg tab bid, Disp: 180 tablet, Rfl: 3    montelukast (SINGULAIR) 10 mg tablet, Take 1 tablet (10 mg total) by mouth daily at bedtime, Disp: 30 tablet, Rfl: 5    Multiple Vitamin (MULTI-VITAMIN DAILY) TABS, Take 1 tablet by mouth daily, Disp: , Rfl:     NIFEdipine (PROCARDIA XL) 90 mg 24 hr tablet, TAKE 1 TABLET BY MOUTH DAILY, Disp: 90 tablet, Rfl: 3    Promethazine-DM (PHENERGAN-DM) 6 25-15 mg/5 mL oral syrup, Take 5 mL by mouth 4 (four) times a day as needed for cough, Disp: 180 mL, Rfl: 0    rosuvastatin (CRESTOR) 40 MG tablet, TAKE 1 TABLET MON-WED-FRI, AND TAKE 1/2 TABLET TUES-THUR-SAT-SUN (Patient taking differently: 40 mg daily ), Disp: 180 tablet, Rfl: 1    Wixela Inhub 500-50 MCG/DOSE inhaler, INHALE 1 PUFF 2 (TWO) TIMES A DAY RINSE MOUTH AFTER USE , Disp: 60 each, Rfl: 1    metoprolol tartrate (LOPRESSOR) 50 mg tablet, TAKE 1 TABLET BY MOUTH TWICE A DAY IN ADDITION TO 25 MG TABLET, Disp: 180 tablet, Rfl: 3    predniSONE 10 mg tablet, Take 4 tabs for 4 days, then 3 tabs for 2 days, then 2 tabs for 2 days and then 1 tab for 2 day, Disp: 28 tablet, Rfl: 0    No Known Allergies    Physical Exam:    /69   Pulse 71   Temp 98 5 °F (36 9 °C)   Ht 5' 8" (1 727 m)   Wt 112 kg (247 lb)   BMI 37 56 kg/m²     Constitutional:normal, well developed, well nourished, alert, in no distress and non-toxic and no overt pain behavior    Eyes:anicteric  HEENT:grossly intact  Neck:supple, symmetric, trachea midline and no masses   Pulmonary:even and unlabored  Cardiovascular:No edema or pitting edema present  Skin:Normal without rashes or lesions and well hydrated  Psychiatric:Mood and affect appropriate  Neurologic:Cranial Nerves II-XII grossly intact  Musculoskeletal:antalgic gait, ambulates with a cane  Lumbar facet loading maneuvers reproduce patient's low back pain complaints  Equivocal straight leg raise bilaterally      Imaging  FL spine and pain procedure    (Results Pending)       MRI LUMBAR SPINE WITH AND WITHOUT CONTRAST   INDICATION: M54 [id number]: Radiculopathy, lumbar region   M48 [id number]: Spinal stenosis, lumbar region with neurogenic claudication  COMPARISON: 9/19/2019 x-rays   TECHNIQUE: Sagittal T1, sagittal T2, sagittal inversion recovery, axial T1 and axial T2, coronal T2  Sagittal and axial T1 postcontrast    IV Contrast: 10 mL of gadobutrol injection (MULTI-DOSE)   IMAGE QUALITY: Diagnostic   FINDINGS:   VERTEBRAL BODIES: Grade 1 degenerative retrolisthesis L1-2, L2-3  Grade 1 degenerative anterolisthesis L4-5  No scoliosis  No compression fracture  Normal marrow signal is identified within the visualized bony structures  No discrete marrow   lesion  SACRUM: Normal signal within the sacrum  No evidence of insufficiency or stress fracture  DISTAL CORD AND CONUS: Normal size and signal within the distal cord and conus  PARASPINAL SOFT TISSUES: Paraspinal soft tissues are unremarkable  LOWER THORACIC DISC SPACES: Normal disc height and signal  No disc herniation, canal stenosis or foraminal narrowing  Transitional configuration of vertebral bodies at the lumbosacral junction considered to represent lumbarization of the 1st sacral segment  Utilizing this numbering convention, the anterior fusion hardware is at the L5-S1 level  This is consistent with   recent x-rays     LUMBAR DISC SPACES:   L1-L2: Moderate to severe degenerative spondylosis, grade 1 degenerative retrolisthesis, mild to moderate bilateral foraminal stenosis, mild central canal stenosis   L2-L3: Moderate to severe degenerative spondylosis, grade 1 retrolisthesis, left laminotomy, moderate central canal and bilateral foraminal stenosis   L3-L4: Moderate degenerative spondylosis, mild to moderate central canal and bilateral foraminal stenosis   L4-L5: Moderate degenerative spondylosis, bulging annulus, grade 1 anterolisthesis, mild central canal stenosis, moderate bilateral foraminal stenosis, left laminotomy   L5-S1: Anterior interbody fusion  Moderate bilateral foraminal stenosis  POSTCONTRAST IMAGING: No abnormal enhancement  IMPRESSION:   Multilevel degenerative spondylosis   Transitional anatomy at the lumbosacral junction   Mild central canal stenosis, grade 1 retrolisthesis, mild to moderate bilateral foraminal stenosis L1-2   Grade 1 retrolisthesis, left laminotomy, moderate central canal and bilateral foraminal stenosis L2-3   Mild to moderate central canal and bilateral foraminal stenosis L3-4   Grade 1 anterolisthesis, mild central canal stenosis, moderate bilateral foraminal stenosis, left laminotomy L4-5   Anterior interbody fusion, moderate bilateral foraminal stenosis L5-S1      Orders Placed This Encounter   Procedures    [place] spine and pain procedure

## 2021-04-14 ENCOUNTER — HOSPITAL ENCOUNTER (OUTPATIENT)
Dept: RADIOLOGY | Facility: CLINIC | Age: 71
Discharge: HOME | End: 2021-04-14
Attending: ANESTHESIOLOGY
Payer: MEDICARE

## 2021-04-14 ENCOUNTER — OFFICE VISIT (OUTPATIENT)
Dept: FAMILY MEDICINE CLINIC | Facility: CLINIC | Age: 71
End: 2021-04-14
Payer: MEDICARE

## 2021-04-14 VITALS
WEIGHT: 258 LBS | SYSTOLIC BLOOD PRESSURE: 134 MMHG | HEIGHT: 68 IN | BODY MASS INDEX: 39.1 KG/M2 | HEART RATE: 64 BPM | RESPIRATION RATE: 16 BRPM | OXYGEN SATURATION: 97 % | TEMPERATURE: 98 F | DIASTOLIC BLOOD PRESSURE: 66 MMHG

## 2021-04-14 VITALS
OXYGEN SATURATION: 98 % | HEART RATE: 86 BPM | TEMPERATURE: 97.8 F | RESPIRATION RATE: 20 BRPM | DIASTOLIC BLOOD PRESSURE: 80 MMHG | SYSTOLIC BLOOD PRESSURE: 150 MMHG

## 2021-04-14 DIAGNOSIS — G89.4 CHRONIC PAIN SYNDROME: ICD-10-CM

## 2021-04-14 DIAGNOSIS — J41.1 MUCOPURULENT CHRONIC BRONCHITIS (HCC): ICD-10-CM

## 2021-04-14 DIAGNOSIS — I10 ESSENTIAL HYPERTENSION: ICD-10-CM

## 2021-04-14 DIAGNOSIS — E03.8 SUBCLINICAL HYPOTHYROIDISM: ICD-10-CM

## 2021-04-14 DIAGNOSIS — Z79.52 LONG TERM (CURRENT) USE OF SYSTEMIC STEROIDS: ICD-10-CM

## 2021-04-14 DIAGNOSIS — E78.2 MIXED HYPERLIPIDEMIA: ICD-10-CM

## 2021-04-14 DIAGNOSIS — M47.816 LUMBAR SPONDYLOSIS: ICD-10-CM

## 2021-04-14 DIAGNOSIS — R73.01 IMPAIRED FASTING GLUCOSE: Primary | ICD-10-CM

## 2021-04-14 DIAGNOSIS — Z00.00 MEDICARE ANNUAL WELLNESS VISIT, SUBSEQUENT: ICD-10-CM

## 2021-04-14 DIAGNOSIS — F41.9 ANXIETY AND DEPRESSION: ICD-10-CM

## 2021-04-14 DIAGNOSIS — E66.9 OBESITY (BMI 30-39.9): ICD-10-CM

## 2021-04-14 DIAGNOSIS — F32.A ANXIETY AND DEPRESSION: ICD-10-CM

## 2021-04-14 PROCEDURE — 1123F ACP DISCUSS/DSCN MKR DOCD: CPT | Performed by: FAMILY MEDICINE

## 2021-04-14 PROCEDURE — 64494 INJ PARAVERT F JNT L/S 2 LEV: CPT | Performed by: ANESTHESIOLOGY

## 2021-04-14 PROCEDURE — G0439 PPPS, SUBSEQ VISIT: HCPCS | Performed by: FAMILY MEDICINE

## 2021-04-14 PROCEDURE — 64493 INJ PARAVERT F JNT L/S 1 LEV: CPT | Performed by: ANESTHESIOLOGY

## 2021-04-14 PROCEDURE — 99214 OFFICE O/P EST MOD 30 MIN: CPT | Performed by: FAMILY MEDICINE

## 2021-04-14 PROCEDURE — 64495 INJ PARAVERT F JNT L/S 3 LEV: CPT | Performed by: ANESTHESIOLOGY

## 2021-04-14 RX ORDER — LIDOCAINE HYDROCHLORIDE 10 MG/ML
10 INJECTION, SOLUTION EPIDURAL; INFILTRATION; INTRACAUDAL; PERINEURAL ONCE
Status: COMPLETED | OUTPATIENT
Start: 2021-04-14 | End: 2021-04-14

## 2021-04-14 RX ADMIN — LIDOCAINE HYDROCHLORIDE 6 ML: 10 INJECTION, SOLUTION EPIDURAL; INFILTRATION; INTRACAUDAL; PERINEURAL at 13:28

## 2021-04-14 RX ADMIN — LIDOCAINE HYDROCHLORIDE 4 ML: 20 INJECTION, SOLUTION EPIDURAL; INFILTRATION; INTRACAUDAL; PERINEURAL at 13:28

## 2021-04-14 NOTE — ASSESSMENT & PLAN NOTE
[facility] psychiatry as scheduled  Continue gabapentin and dulextine  Use clonazepam 0 5mg qhs  SE educated pt

## 2021-04-14 NOTE — DISCHARGE INSTRUCTIONS
ACTIVITY  · Please do activities that will bring on the normal pain that we are rating  For example, if vacuuming or walking increases the pain, do that  This will give the most accurate response to the diary  · You may shower, but no tub baths today, or applied heat  CARE OF THE INJECTION SITE  · This area may be numb for several hours after the injection  · [facility]ify [facility] if you have any of the following:  redness, drainage, swelling, or fever above 100°F     SPECIAL INSTRUCTIONS  · Please return the MBB diary to our office by mail, fax, or drop it off  MEDICATIONS  · Please do not take any break through or short acting pain medications for 8 hours after the block  · Continue to take all routine medications  · Our office may have instructed you to hold some medications  As no general anesthesia was used in today's procedure, you should not experience any side effects related to anesthesia  If you have a problem specifically related to your procedure, please call our office at [phone]  Problems not related to your procedure should be directed to your primary care physician

## 2021-04-14 NOTE — H&P
History of Present Illness: The patient is a 72 y o  male who presents with complaints of  Low back pain  Patient Active Problem List   Diagnosis    Allergic rhinitis    Enlarged prostate with lower urinary tract symptoms (LUTS)    Chronic bronchitis (HCC)    Hyperlipidemia    Hypertension    Impaired fasting glucose    Male erectile dysfunction    Multifocal atrial tachycardia (HCC)    Obesity (BMI 30-39  9)    Osteoarthritis of knee    Osteoarthritis of hip    Seasonal allergies    Spinal stenosis of lumbar region with neurogenic claudication    Supraventricular tachycardia (HCC)    Anxiety and depression    Arthritis of left hip    COPD (chronic obstructive pulmonary disease) (HCC)    Elevated prostate specific antigen (PSA)    Lumbosacral spondylosis without myelopathy    Incomplete emptying of bladder    Retention of urine    Screening for prostate cancer    Thoracic or lumbosacral neuritis or radiculitis    Urethral stricture    Spondylolisthesis, acquired    Degeneration of lumbosacral intervertebral disc    Full thickness rotator cuff tear    Knee pain    Localized, primary osteoarthritis    Primary localized osteoarthritis of pelvic region and thigh    Low back pain    Lumbosacral neuritis    Pain in limb    Pseudoclaudication syndrome    Shoulder pain    Cervical stenosis of spinal canal    Subclinical hypothyroidism    Lumbar radiculopathy    Chronic pain syndrome    Status post lumbar and lumbosacral fusion by anterior technique    Status post insertion of spinal cord stimulator       Past Medical History:   Diagnosis Date    Bronchitis     Bronchitis, chronic obstructive, with exacerbation (HCC)     Last Assessed: 5/17/2013    Chronic pain disorder     COPD with acute exacerbation ([facility] )     Last Assessed: 4/13/2015    Depression, controlled 8/30/2017    Transitioned From: Depression    Diabetes mellitus ([facility] [address] )     Elevated PSA     Bx neg 2012; Last Assessed: 11/23/2012    Hypertension     Irregular heart beat     MAT, afib    Lung nodule - resolved 2017 CT 1/4/2017    Postoperative urinary retention        Past Surgical History:   Procedure Laterality Date    BACK SURGERY  07/25/2012    decompression of spinal stenosis from lower thoracic through the lumbar spine    COLONOSCOPY  10/2010    neg   recheck in 5 years   [address] ARTHROSCOPY  01/2013    CO PERCUT IMPLNT NEUROELECT,EPIDURAL Right 8/25/2020    Procedure: INSERTION THORACIC DORSAL COLUMN SPINAL CORD STIMULATOR PERCUTANEOUS W IMPLANTABLE PULSE GENERATOR, RIGHT;  Surgeon: Kacey Sanders MD;  [facility];  Service: Neurosurgery    CO WRIST [place] LIG Left 3/4/2020    Procedure: RELEASE CARPAL TUNNEL ENDOSCOPIC;  Surgeon: Michael Mayorga MD;  Location: [facility];  Service: [address] BIOPSY  2012    for elevated PSA of about 7; neg    TRANSURETHRAL RESECTION OF PROSTATE  06/27/2013         Current Outpatient Medications:     albuterol (PROVENTIL HFA,VENTOLIN HFA) 90 mcg/act inhaler, Inhale 2 puffs every 6 (six) hours as needed for wheezing or shortness of breath, Disp: 1 Inhaler, Rfl: 5    Cialis 5 MG tablet, TAKE 1 TABLET BY MOUTH EVERY DAY, Disp: 90 tablet, Rfl: 3    clonazePAM (KlonoPIN) 0 5 mg tablet, Take 1 tablet (0 5 mg total) by mouth daily at bedtime, Disp: 30 tablet, Rfl: 1    dextromethorphan-guaifenesin (MUCINEX DM)  MG per 12 hr tablet, Take 1 tablet by mouth every 12 (twelve) hours, Disp: , Rfl:     diclofenac sodium (VOLTAREN) 50 mg EC tablet, TAKE 1 TABLET BY MOUTH TWICE A DAY, Disp: 180 tablet, Rfl: 1    DULoxetine (CYMBALTA) 30 mg delayed release capsule, Take 90 mg by mouth daily , Disp: , Rfl:     DULoxetine (CYMBALTA) 60 mg delayed release capsule, Take 1 capsule by mouth daily, Disp: , Rfl:     fluticasone (FLONASE) 50 mcg/act nasal spray, fluticasone propionate 50 mcg/actuation nasal spray,suspension, Disp: , Rfl:     gabapentin (NEURONTIN) 300 mg capsule, Take 300 mg by mouth daily , Disp: , Rfl:     ipratropium-albuterol (DUO-NEB) 0 5-2 5 mg/3 mL nebulizer solution, Take 1 vial (3 mL total) by nebulization every 6 (six) hours as needed for wheezing or shortness of breath, Disp: 60 vial, Rfl: 1    metFORMIN (GLUCOPHAGE-XR) 500 mg 24 hr tablet, TAKE 1 TABLET BY MOUTH EVERY DAY WITH DINNER, Disp: 90 tablet, Rfl: 0    metoprolol tartrate (LOPRESSOR) 25 mg tablet, Take 1 tablet (25 mg total) by mouth every 12 (twelve) hours, Disp: 180 tablet, Rfl: 2    metoprolol tartrate (LOPRESSOR) 50 mg tablet, TAKE 1 TABLET BY MOUTH TWICE A DAY IN ADDITION TO 25 MG TABLET, Disp: 180 tablet, Rfl: 3    metoprolol tartrate (LOPRESSOR) 50 mg tablet, Take 1 tablet (50 mg total) by mouth 2 (two) times a day Take with 25 mg tab bid, Disp: 180 tablet, Rfl: 3    montelukast (SINGULAIR) 10 mg tablet, Take 1 tablet (10 mg total) by mouth daily at bedtime, Disp: 30 tablet, Rfl: 5    Multiple Vitamin (MULTI-VITAMIN DAILY) TABS, Take 1 tablet by mouth daily, Disp: , Rfl:     NIFEdipine (PROCARDIA XL) 90 mg 24 hr tablet, TAKE 1 TABLET BY MOUTH DAILY, Disp: 90 tablet, Rfl: 3    rosuvastatin (CRESTOR) 40 MG tablet, TAKE 1 TABLET MON-WED-FRI, AND TAKE 1/2 TABLET TUES-THUR-SAT-SUN (Patient taking differently: 40 mg daily ), Disp: 180 tablet, Rfl: 1    Wixela Inhub 500-50 MCG/DOSE inhaler, INHALE 1 PUFF 2 (TWO) TIMES A DAY RINSE MOUTH AFTER USE , Disp: 60 each, Rfl: 1    Current Facility-Administered Medications:     lidocaine (PF) (XYLOCAINE-MPF) 1 % injection 10 mL, 10 mL, Other, Once, Emery Ann DO    lidocaine (PF) (XYLOCAINE-MPF) 2 % injection 4 mL, 4 mL, Other, Once, Selwyn Real, DO    No Known Allergies    Physical Exam: There were no vitals filed for this visit    General: Awake, Alert, Oriented x 3, Mood and affect appropriate  Respiratory: Respirations even and unlabored  Cardiovascular: Peripheral pulses intact; no edema  Musculoskeletal Exam:   Bilateral lumbar paraspinals tender to palpation  ASA Score: 3         Assessment:   1   Lumbar spondylosis        Plan:  Bilateral L2 through L5 medial branch block

## 2021-04-14 NOTE — PATIENT INSTRUCTIONS

## 2021-04-14 NOTE — PROGRESS NOTES
Assessment and Plan:     Problem List Items Addressed This Visit        Endocrine    Impaired fasting glucose - Primary     3/2021 hgA1C 5 9 stable  Low carb diet  Relevant Orders    CBC    Comprehensive metabolic panel    Hemoglobin A1C    Lipid panel    TSH, 3rd generation with Free T4 reflex    Subclinical hypothyroidism     3/2021 TSH 3 86, free T4 1 01 normal  Monitor  Relevant Orders    CBC    Comprehensive metabolic panel    Hemoglobin A1C    Lipid panel    TSH, 3rd generation with Free T4 reflex       Respiratory    Chronic bronchitis (HCC)     FU pulmonary  Continue Singulair, wixela daily  Cardiovascular and Mediastinum    Hypertension     Controlled  DASH diet  Continue metoprolol 75mg bid and nifedipine 90mg QD  FU cardiology  Relevant Orders    CBC    Comprehensive metabolic panel    Hemoglobin A1C    Lipid panel    TSH, 3rd generation with Free T4 reflex       Other    Hyperlipidemia     3/2021 Lipid 191/200/46/105 elevated TG  Low fat diet  Continue crestor 40mg qhs per cardiology  Relevant Orders    CBC    Comprehensive metabolic panel    Hemoglobin A1C    Lipid panel    TSH, 3rd generation with Free T4 reflex    Obesity (BMI 30-39  9)    Anxiety and depression     FU psychiatry as scheduled  Continue gabapentin and dulextine  Use clonazepam 0 5mg qhs  SE educated pt  Chronic pain syndrome     FU pain specialist             Other Visit Diagnoses     Long term (current) use of systemic steroids        Relevant Orders    DXA bone density spine hip and pelvis    Medicare annual wellness visit, subsequent               Preventive health issues were discussed with patient, and age appropriate screening tests were ordered as noted in patient's After Visit Summary  Personalized health advice and appropriate referrals for health education or preventive services given if needed, as noted in patient's After Visit Summary       History of Present Illness:     Patient presents for Medicare Annual Wellness visit    Patient Care Team:  Jerry Ryan MD as PCP - MD Sara Bailey MD Joaquim Greener, MD Lavaughn Kling, PA-C as Physician Assistant (Endocrinology)  Abhinav Saba MD (Endocrinology)     Problem List:     Patient Active Problem List   Diagnosis    Allergic rhinitis    Enlarged prostate with lower urinary tract symptoms (LUTS)    Chronic bronchitis ([address] )    Hyperlipidemia    Hypertension    Impaired fasting glucose    Male erectile dysfunction    Multifocal atrial tachycardia ([address] )    Obesity (BMI 30-39  9)    Osteoarthritis of knee    Osteoarthritis of hip    Seasonal allergies    Spinal stenosis of lumbar region with neurogenic claudication    Supraventricular tachycardia (HCC)    Anxiety and depression    Arthritis of left hip    COPD (chronic obstructive pulmonary disease) (HCC)    Elevated prostate specific antigen (PSA)    Lumbosacral spondylosis without myelopathy    Incomplete emptying of bladder    Retention of urine    Screening for prostate cancer    Thoracic or lumbosacral neuritis or radiculitis    Urethral stricture    Spondylolisthesis, acquired    Degeneration of lumbosacral intervertebral disc    Full thickness rotator cuff tear    Knee pain    Localized, primary osteoarthritis    Primary localized osteoarthritis of pelvic region and thigh    Low back pain    Lumbosacral neuritis    Pain in limb    Pseudoclaudication syndrome    Shoulder pain    Cervical stenosis of spinal canal    Subclinical hypothyroidism    Lumbar radiculopathy    Chronic pain syndrome    Status post lumbar and lumbosacral fusion by anterior technique    Status post insertion of spinal cord stimulator      Past Medical and Surgical History:     Past Medical History:   Diagnosis Date    Bronchitis     Bronchitis, chronic obstructive, with exacerbation (HCC)     Last Assessed: 5/17/2013    Chronic pain disorder     COPD with acute exacerbation ([facility] [address] )     Last Assessed: 4/13/2015    Depression, controlled 8/30/2017    Transitioned From: Depression    Diabetes mellitus ([facility] [address] )     Elevated PSA     Bx neg 2012; Last Assessed: 11/23/2012    Hypertension     Irregular heart beat     MAT, afib    Lung nodule - resolved 2017 CT 1/4/2017    Postoperative urinary retention      Past Surgical History:   Procedure Laterality Date    BACK SURGERY  07/25/2012    decompression of spinal stenosis from lower thoracic through the lumbar spine    COLONOSCOPY  10/2010    neg   recheck in 5 years   [address] ARTHROSCOPY  01/2013    [address] PERCUT IMPLNT [address] Right 8/25/2020    Procedure: INSERTION THORACIC DORSAL COLUMN SPINAL CORD STIMULATOR PERCUTANEOUS W IMPLANTABLE PULSE GENERATOR, RIGHT;  Surgeon: Nurys Royal MD;  Location: [facility];  Service: Neurosurgery    MO WRIST Maria [place] LIG Left 3/4/2020    Procedure: RELEASE CARPAL TUNNEL ENDOSCOPIC;  Surgeon: Kunal Mccartney MD;  [facility];  Service: Orthopedics    PROSTATE BIOPSY  2012    for elevated PSA of about 7; neg    TRANSURETHRAL RESECTION OF PROSTATE  06/27/2013      Family History:     Family History   Problem Relation Age of Onset    Breast cancer Mother     Pancreatitis Father     Diabetes Maternal Grandmother     Heart attack Maternal Grandfather     Heart attack Paternal Grandmother     Diabetes Paternal Grandmother     Diabetes Paternal Grandfather     No Known Problems Brother     Lung disease Paternal Uncle       Social History:     E-Cigarette/Vaping    E-Cigarette Use Never User      E-Cigarette/Vaping Substances    Nicotine No     THC No     CBD No     Flavoring No     Other No     Unknown No      Social History     Socioeconomic History    Marital status: /Civil Union     Spouse name: None    Number of children: None    Years of education: None    Highest education level: None   Occupational History    None   Social Needs    Financial resource strain: None    Food insecurity     Worry: None     Inability: None    Transportation needs     Medical: None     Non-medical: None   Tobacco Use    Smoking status: Former Smoker     Packs/day: 1 50     Years: 23 00     Pack years: 34 50     Types: Cigarettes     Start date:      Quit date:      Years since quittin 2    Smokeless tobacco: Never Used   Substance and Sexual Activity    Alcohol use: No     Comment: Stopped drinking    Drug use: No    Sexual activity: Yes   Lifestyle    Physical activity     Days per week: None     Minutes per session: None    Stress: None   Relationships    Social connections     Talks on phone: None     Gets together: None     Attends L[employer] service: None     Active member of club or organization: None     Attends meetings of clubs or organizations: None     Relationship status: None    Intimate partner violence     Fear of current or ex partner: None     Emotionally abused: None     Physically abused: None     Forced sexual activity: None   Other Topics Concern    None   Social History Narrative    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, torch    Asbestos removal - use [employer]    [place] - [place]      Medications and Allergies:     Current Outpatient Medications   Medication Sig Dispense Refill    albuterol (PROVENTIL HFA,VENTOLIN HFA) 90 mcg/act inhaler Inhale 2 puffs every 6 (six) hours as needed for wheezing or shortness of breath 1 Inhaler 5    Cialis 5 MG tablet TAKE 1 TABLET BY MOUTH EVERY DAY 90 tablet 3    clonazePAM (KlonoPIN) 0 5 mg tablet Take 1 tablet (0 5 mg total) by mouth daily at bedtime 30 tablet 1    dextromethorphan-guaifenesin (MUCINEX DM)  MG per 12 hr tablet Take 1 tablet by mouth every 12 (twelve) hours      diclofenac sodium (VOLTAREN) 50 mg EC tablet TAKE 1 TABLET BY MOUTH TWICE A  tablet 1    DULoxetine (CYMBALTA) 30 mg delayed release capsule Take 90 mg by mouth daily       DULoxetine (CYMBALTA) 60 mg delayed release capsule Take 1 capsule by mouth daily      fluticasone (FLONASE) 50 mcg/act nasal spray fluticasone propionate 50 mcg/actuation nasal spray,suspension      gabapentin (NEURONTIN) 300 mg capsule Take 300 mg by mouth daily       ipratropium-albuterol (DUO-NEB) 0 5-2 5 mg/3 mL nebulizer solution Take 1 vial (3 mL total) by nebulization every 6 (six) hours as needed for wheezing or shortness of breath 60 vial 1    metFORMIN (GLUCOPHAGE-XR) 500 mg 24 hr tablet TAKE 1 TABLET BY MOUTH EVERY DAY WITH DINNER 90 tablet 0    metoprolol tartrate (LOPRESSOR) 25 mg tablet Take 1 tablet (25 mg total) by mouth every 12 (twelve) hours 180 tablet 2    metoprolol tartrate (LOPRESSOR) 50 mg tablet TAKE 1 TABLET BY MOUTH TWICE A DAY IN ADDITION TO 25 MG TABLET 180 tablet 3    metoprolol tartrate (LOPRESSOR) 50 mg tablet Take 1 tablet (50 mg total) by mouth 2 (two) times a day Take with 25 mg tab bid 180 tablet 3    montelukast (SINGULAIR) 10 mg tablet Take 1 tablet (10 mg total) by mouth daily at bedtime 30 tablet 5    Multiple Vitamin (MULTI-VITAMIN DAILY) TABS Take 1 tablet by mouth daily      NIFEdipine (PROCARDIA XL) 90 mg 24 hr tablet TAKE 1 TABLET BY MOUTH DAILY 90 tablet 3    rosuvastatin (CRESTOR) 40 MG tablet TAKE 1 TABLET MON-WED-FRI, AND TAKE 1/2 TABLET TUES-THUR-SAT-SUN (Patient taking differently: 40 mg daily ) 180 tablet 1    Wixela Inhub 500-50 MCG/DOSE inhaler INHALE 1 PUFF 2 (TWO) TIMES A DAY RINSE MOUTH AFTER USE  60 each 1     No current facility-administered medications for this visit        No Known Allergies   Immunizations:     Immunization History   Administered Date(s) Administered    INFLUENZA 01/18/2013, 01/31/2014, 01/23/2015, 10/02/2017    Influenza Quadrivalent, 6-35 Months IM 10/02/2015    Influenza Split High Dose Preservative Free IM 11/18/2016, 10/02/2017    Influenza, high dose seasonal 0 7 mL 09/06/2018, 11/22/2019, 10/09/2020    Influenza, seasonal, injectable 11/11/2014    Pneumococcal Conjugate 13-Valent 08/18/2016    Pneumococcal Polysaccharide PPV23 10/10/2012, 09/06/2018    SARS-CoV-2 / COVID-19 mRNA IM (Moderna) 03/06/2021, 04/06/2021    Zoster 11/18/2016      Health Maintenance:         Topic Date Due    Colonoscopy Surveillance  03/24/2022    Colorectal Cancer Screening  03/24/2027    Hepatitis C Screening  Completed         Topic Date Due    DTaP,Tdap,and Td Vaccines (1 - Tdap) Never done      Medicare Health Risk Assessment:     /66 (BP Location: Left arm, Patient Position: Sitting, Cuff Size: Large)   Pulse 64   Temp 98 °F (36 7 °C) (Tympanic)   Resp 16   Ht 5' 8" (1 727 m)   Wt 117 kg (258 lb)   SpO2 97%   BMI 39 23 kg/m²      Nadya Cabrera is here for his Subsequent Wellness visit  Health Risk Assessment:   Patient rates overall health as good  Patient feels that their physical health rating is slightly worse  Patient is satisfied with their life  Eyesight was rated as same  Hearing was rated as same  Patient feels that their emotional and mental health rating is same  Patients states they are never, rarely angry  Patient states they are sometimes unusually tired/fatigued  Pain experienced in the last 7 days has been a lot  Patient's pain rating has been 8/10  Patient states that he has experienced no weight loss or gain in last 6 months  Depression Screening:   PHQ-2 Score: 0  PHQ-9 Score: 1      Fall Risk Screening: In the past year, patient has experienced: history of falling in past year    Number of falls: 1  Injured during fall?: No    Feels unsteady when standing or walking?: No    Worried about falling?: No      Home Safety:  Patient does not have trouble with stairs inside or outside of their home  Patient has working smoke alarms and has working carbon monoxide detector  Home safety hazards include: none       Nutrition: Current diet is Regular  Medications:   Patient is currently taking over-the-counter supplements  OTC medications include: see medication list  Patient is able to manage medications  Activities of Daily Living (ADLs)/Instrumental Activities of Daily Living (IADLs):   Walk and transfer into and out of bed and chair?: Yes  Dress and groom yourself?: Yes    Bathe or shower yourself?: Yes    Feed yourself? Yes  Do your laundry/housekeeping?: Yes  Manage your money, pay your bills and track your expenses?: Yes  Make your own meals?: Yes    Do your own shopping?: Yes    Previous Hospitalizations:   Any hospitalizations or ED visits within the last 12 months?: Yes    How many hospitalizations have you had in the last year?: 1-2    Advance Care Planning:   Living will: Yes    Durable POA for healthcare: Yes    Advanced directive: Yes    End of Life Decisions reviewed with patient: Yes    Provider agrees with end of life decisions: Yes      Cognitive Screening:   Provider or family/friend/caregiver concerned regarding cognition?: No    PREVENTIVE SCREENINGS      Cardiovascular Screening:    General: History Lipid Disorder and Screening Current      Diabetes Screening:     General: Screening Current      Colorectal Cancer Screening:     General: Screening Current      Prostate Cancer Screening:    General: Screening Current      Osteoporosis Screening:    General: Risks and Benefits Discussed    Due for: DXA Axial      Abdominal Aortic Aneurysm (AAA) Screening:    Risk factors include: age between 73-69 yo and tobacco use        Lung Cancer Screening:     General: Screening Not Indicated      Hepatitis C Screening:    General: Screening Current    Screening, Brief Intervention, and Referral to Treatment (SBIRT)    Screening  Typical number of drinks in a day: 0  Typical number of drinks in a week: 0  Interpretation: Low risk drinking behavior      Single Item Drug Screening:  How often have you used an illegal drug (including marijuana) or a prescription medication for non-medical reasons in the past year? never    Single Item Drug Screen Score: 0  Interpretation: Negative screen for possible drug use disorder      Keren Benjamin MD

## 2021-04-14 NOTE — PROGRESS NOTES
Assessment/Plan:    Impaired fasting glucose  3/2021 hgA1C 5 9 stable  Low carb diet  Subclinical hypothyroidism  3/2021 TSH 3 86, free T4 1 01 normal  Monitor  Chronic bronchitis (HCC)  FU pulmonary  Continue Singulair, wixela daily  Hypertension  Controlled  DASH diet  Continue metoprolol 75mg bid and nifedipine 90mg QD  FU cardiology  Hyperlipidemia  3/2021 Lipid 191/200/46/105 elevated TG  Low fat diet  Continue crestor 40mg qhs per cardiology  Anxiety and depression  FU psychiatry as scheduled  Continue gabapentin and dulextine  Use clonazepam 0 5mg qhs  SE educated pt  Chronic pain syndrome  FU pain specialist        Reviewed lab in 3/2021  TSH 3 86 elevated, free T4 1 01 normal  HgA1C 5 9 stable  CMP ok  Lipid 191/200/46/105 elevated TG    Flu shot yearly  Got covid19 vaccine  Got PCV13 and zostavax in 2016  Got pneumovax in 2018    Will ask insurance for coverage of Tdap and shingrix  PSA yearly per urology  Pros and cons educated pt  2/2021 PSA 1 27 normal    Colonoscopy 3/2017  Repeat in 5 years per pt  RTO in 6 months      POA---Wife  Living will---Full code          Diagnoses and all orders for this visit:    Impaired fasting glucose  -     CBC; Future  -     Comprehensive metabolic panel; Future  -     Hemoglobin A1C; Future  -     Lipid panel; Future  -     TSH, 3rd generation with Free T4 reflex; Future    Subclinical hypothyroidism  -     CBC; Future  -     Comprehensive metabolic panel; Future  -     Hemoglobin A1C; Future  -     Lipid panel; Future  -     TSH, 3rd generation with Free T4 reflex; Future    Essential hypertension  -     CBC; Future  -     Comprehensive metabolic panel; Future  -     Hemoglobin A1C; Future  -     Lipid panel; Future  -     TSH, 3rd generation with Free T4 reflex; Future    Mixed hyperlipidemia  -     CBC; Future  -     Comprehensive metabolic panel; Future  -     Hemoglobin A1C; Future  -     Lipid panel;  Future  -     TSH, 3rd generation with Free T4 reflex; Future    Mucopurulent chronic bronchitis (HCC)    Anxiety and depression    Chronic pain syndrome    Long term (current) use of systemic steroids  -     DXA bone density spine hip and pelvis; Future    Medicare annual wellness visit, subsequent    Obesity (BMI 30-39  9)          Subjective:      Patient ID: Cholo Love is a 72 y o  male  HPI    Pt is here by himself  Chronic lower back pain--for years  H[facility] of back surgery in [facility]  FU pain specialist now  Got Stimulator in 8/2020 which helped little  Will do ablation this afternoon       MAT/HTN----FU cardiology for MAT, HTN  Stable  On metoprolol 75mg bid and nifedipine 90mg Qd  Denies headache, vision change, dizzy, SOB, palpitation or chest pain       Hyperlipidemia---He is on crestor 40mg qhs per cardiology  Denies side effects      IFG---3/2021 hgA1C 5 9 stable  He is on metformin 500mg QD per endocrinology  Denies SE  Tried to eat healthy  Subclinial hypothyroidism---FU endocrinology prn in the future        Depression/depression/Insomnia----FU psychiatry Dr Jefferson Walker Q 3 month  He is on cymbalta 90mg QD which works for him  Use clonazepam 0 5mg qhs for insomnia  He is on gabapentin 300mg am and 1200mg pm per psychiatrist        COPD/chronic bronchitis---FU pulmonary  Always worse around [place]  Used prednisone 5 rounds this season  He is on wixela bid and mucinex-DM daily    He is on singulair 10mg QD    1/2021 PFT showed mild restrictive  Quit smoking in 2000        BPH---FU urology Q 6 months for BPH s/p TURP in 2013  Had cystoscopy and dilation in 9/2014  He is self-cath 3-4/week  Cialis 5mg QD helped       Quit smoking since 20 years ago    No alcohol       Lives with wife  Does all ADL's  Denies recent falls  Walk with a cane         The following portions of the patient's history were reviewed and updated as appropriate: allergies, current medications, past family history, past medical history, past social history, past surgical history and problem list     Review of Systems   Constitutional: Negative for appetite change, chills and fever  HENT: Negative for congestion, ear pain, sinus pain and sore throat  Eyes: Negative for discharge and itching  Respiratory: Negative for apnea, cough, chest tightness, shortness of breath and wheezing  Cardiovascular: Negative for chest pain, palpitations and leg swelling  Gastrointestinal: Negative for abdominal pain, anal bleeding, constipation, diarrhea, nausea and vomiting  Endocrine: Negative for cold intolerance, heat intolerance and polyuria  Genitourinary: Negative for difficulty urinating and dysuria  Musculoskeletal: Positive for back pain  Negative for arthralgias and myalgias  Skin: Negative for rash  Neurological: Negative for dizziness and headaches  Psychiatric/Behavioral: Negative for agitation  Objective:      /66 (BP Location: Left arm, Patient Position: Sitting, Cuff Size: Large)   Pulse 64   Temp 98 °F (36 7 °C) (Tympanic)   Resp 16   Ht 5' 8" (1 727 m)   Wt 117 kg (258 lb)   SpO2 97%   BMI 39 23 kg/m²          Physical Exam  Constitutional:       Appearance: He is well-developed  HENT:      Head: Normocephalic and atraumatic  Eyes:      General:         Right eye: No discharge  Left eye: No discharge  Conjunctiva/sclera: Conjunctivae normal    Neck:      Musculoskeletal: Normal range of motion and neck supple  No muscular tenderness  Cardiovascular:      Rate and Rhythm: Normal rate and regular rhythm  Heart sounds: Normal heart sounds  No murmur  No friction rub  No gallop  Pulmonary:      Effort: Pulmonary effort is normal  No respiratory distress  Breath sounds: Normal breath sounds  No wheezing or rales  Abdominal:      General: Bowel sounds are normal  There is no distension  Palpations: Abdomen is soft  Tenderness: There is no abdominal tenderness   There is no guarding  Musculoskeletal:         General: Tenderness present  No swelling or deformity  Comments: Use cane   Lymphadenopathy:      Cervical: No cervical adenopathy  Neurological:      Mental Status: He is alert

## 2021-04-21 ENCOUNTER — TELEPHONE (OUTPATIENT)
Dept: RADIOLOGY | Facility: CLINIC | Age: 71
End: 2021-04-21

## 2021-04-21 DIAGNOSIS — M47.816 LUMBAR SPONDYLOSIS: Primary | ICD-10-CM

## 2021-04-21 NOTE — TELEPHONE ENCOUNTER
Patient contacted and scheduled for Bilateral L2 through L5 medial branch block # 2  All pre procedure instructions were given to patient   Nothing to eat or drink for 1 hour prior  Loose fitting clothing   NSAIDS, ANTICOAG'S OKAY   Denies Antibx   NO PRN PAIN MEDS 6 HOURS PRIOR   Needs    Patient directed to call the office if becomes sick, as we will most likely reschedule       Insurance auth received but is not a guarantee of payment per your insurance company's authorization disclaimer and it is your responsibility to verify your benefits     COVID -19 screening complete

## 2021-04-22 DIAGNOSIS — F41.9 ANXIETY AND DEPRESSION: ICD-10-CM

## 2021-04-22 DIAGNOSIS — F32.A ANXIETY AND DEPRESSION: ICD-10-CM

## 2021-04-22 RX ORDER — CLONAZEPAM 0.5 MG/1
0.5 TABLET ORAL
Qty: 30 TABLET | Refills: 1 | Status: SHIPPED | OUTPATIENT
Start: 2021-04-22 | End: 2021-06-16 | Stop reason: SDUPTHER

## 2021-04-26 ENCOUNTER — TELEMEDICINE (OUTPATIENT)
Dept: FAMILY MEDICINE CLINIC | Facility: CLINIC | Age: 71
End: 2021-04-26
Payer: MEDICARE

## 2021-04-26 DIAGNOSIS — J44.1 COPD EXACERBATION (HCC): Primary | ICD-10-CM

## 2021-04-26 DIAGNOSIS — B34.9 VIRAL INFECTION, UNSPECIFIED: ICD-10-CM

## 2021-04-26 PROCEDURE — 99213 OFFICE O/P EST LOW 20 MIN: CPT | Performed by: FAMILY MEDICINE

## 2021-04-26 RX ORDER — AZITHROMYCIN 250 MG/1
TABLET, FILM COATED ORAL
Qty: 6 TABLET | Refills: 0 | Status: SHIPPED | OUTPATIENT
Start: 2021-04-26 | End: 2021-04-30

## 2021-04-26 RX ORDER — PREDNISONE 10 MG/1
TABLET ORAL
Qty: 28 TABLET | Refills: 0 | Status: SHIPPED | OUTPATIENT
Start: 2021-04-26 | End: 2021-04-30 | Stop reason: SDUPTHER

## 2021-04-26 NOTE — PROGRESS NOTES
Virtual Regular Visit      Assessment/Plan:    Problem List Items Addressed This Visit     None      Visit Diagnoses     COPD exacerbation ([facility] )    -  Primary    Relevant Medications    azithromycin (ZITHROMAX) 250 mg tablet    predniSONE 10 mg tablet    Viral infection, unspecified        Relevant Orders    Novel Coronavirus (Covid-19),PCR [facility] - [facility]               Reason for visit is   Chief Complaint   Patient presents with    Virtual Regular Visit     Chronic Bronchitis, Shortness of Breath        Encounter provider Willie Moon MD    Provider located at [address] [place] [id number]      Recent Visits  No visits were found meeting these conditions  Showing recent visits within past 7 days and meeting all other requirements     Today's Visits  Date Type Provider Dept   04/26/21 [facility] [address], [address] today's visits and meeting all other requirements     Future Appointments  No visits were found meeting these conditions  Showing future appointments within next 150 days and meeting all other requirements        The patient was identified by name and date of birth  Gilmar Mcgill was informed that this is a telemedicine visit and that the visit is being conducted through [address] [place] [address] and patient was informed that this is not a secure, HIPAA-compliant platform  He agrees to proceed     My office door was closed  No one else was in the room  He acknowledged consent and understanding of privacy and security of the video platform  The patient has agreed to participate and understands they can discontinue the visit at any time  Patient is aware this is a billable service  Subjective  Gilmar Mcgill is a 72 y o  male for cough   HPI     C/o cough for 3 days  Wheezing, SOB  Denies fever  Denies CP, n/v/abd pain  He is on wixla 500-50 bid   Use albuterol 2-3/day  Pt states the pollen bothers him for a while  Got Covid19 vaccine in 3/2021 and 4/6/2021  Denies contact with Covid19 pt  Past Medical History:   Diagnosis Date    Bronchitis     Bronchitis, chronic obstructive, with exacerbation ([facility] [address] )     Last Assessed: 5/17/2013    Chronic pain disorder     COPD with acute exacerbation ([facility] [address] )     Last Assessed: 4/13/2015    Depression, controlled 8/30/2017    Transitioned From: Depression    Diabetes mellitus ([facility] [address] )     Elevated PSA     Bx neg 2012; Last Assessed: 11/23/2012    Hypertension     Irregular heart beat     MAT, afib    Lung nodule - resolved 2017 CT 1/4/2017    Postoperative urinary retention        Past Surgical History:   Procedure Laterality Date    BACK SURGERY  07/25/2012    decompression of spinal stenosis from lower thoracic through the lumbar spine    COLONOSCOPY  10/2010    neg   recheck in 5 years   [address] ARTHROSCOPY  01/2013    ND PERCUT IMPLNT NEUROELECT,EPIDURAL Right 8/25/2020    Procedure: INSERTION THORACIC DORSAL COLUMN SPINAL CORD STIMULATOR PERCUTANEOUS W IMPLANTABLE PULSE GENERATOR, RIGHT;  Surgeon: Lyle Nava MD;  Location:  MAIN OR;  Service: Neurosurgery    ND WRIST Yasir Tanesha LIG Left 3/4/2020    Procedure: RELEASE CARPAL TUNNEL ENDOSCOPIC;  Surgeon: Blair Villagomez MD;  Location: [facility] MAIN OR;  Service: [address] BIOPSY  2012    for elevated PSA of about 7; neg    TRANSURETHRAL RESECTION OF PROSTATE  06/27/2013       Current Outpatient Medications   Medication Sig Dispense Refill    albuterol (PROVENTIL HFA,VENTOLIN HFA) 90 mcg/act inhaler Inhale 2 puffs every 6 (six) hours as needed for wheezing or shortness of breath 1 Inhaler 5    Cialis 5 MG tablet TAKE 1 TABLET BY MOUTH EVERY DAY 90 tablet 3    clonazePAM (KlonoPIN) 0 5 mg tablet Take 1 tablet (0 5 mg total) by mouth daily at bedtime 30 tablet 1    dextromethorphan-guaifenesin [facility] DM)  MG per 12 hr tablet Take 1 tablet by mouth every 12 (twelve) hours      diclofenac sodium (VOLTAREN) 50 mg EC tablet TAKE 1 TABLET BY MOUTH TWICE A  tablet 1    DULoxetine (CYMBALTA) 30 mg delayed release capsule Take 90 mg by mouth daily       DULoxetine (CYMBALTA) 60 mg delayed release capsule Take 1 capsule by mouth daily      fluticasone (FLONASE) 50 mcg/act nasal spray fluticasone propionate 50 mcg/actuation nasal spray,suspension      gabapentin (NEURONTIN) 300 mg capsule Take 300 mg by mouth daily       ipratropium-albuterol (DUO-NEB) 0 5-2 5 mg/3 mL nebulizer solution Take 1 vial (3 mL total) by nebulization every 6 (six) hours as needed for wheezing or shortness of breath 60 vial 1    metFORMIN (GLUCOPHAGE-XR) 500 mg 24 hr tablet TAKE 1 TABLET BY MOUTH EVERY DAY WITH DINNER 90 tablet 0    metoprolol tartrate (LOPRESSOR) 25 mg tablet Take 1 tablet (25 mg total) by mouth every 12 (twelve) hours 180 tablet 2    metoprolol tartrate (LOPRESSOR) 50 mg tablet TAKE 1 TABLET BY MOUTH TWICE A DAY IN ADDITION TO 25 MG TABLET 180 tablet 3    metoprolol tartrate (LOPRESSOR) 50 mg tablet Take 1 tablet (50 mg total) by mouth 2 (two) times a day Take with 25 mg tab bid 180 tablet 3    montelukast (SINGULAIR) 10 mg tablet Take 1 tablet (10 mg total) by mouth daily at bedtime 30 tablet 5    Multiple Vitamin (MULTI-VITAMIN DAILY) TABS Take 1 tablet by mouth daily      NIFEdipine (PROCARDIA XL) 90 mg 24 hr tablet TAKE 1 TABLET BY MOUTH DAILY 90 tablet 3    rosuvastatin (CRESTOR) 40 MG tablet TAKE 1 TABLET MON-WED-FRI, AND TAKE 1/2 TABLET TUES-THUR-SAT-SUN (Patient taking differently: 40 mg daily ) 180 tablet 1    Wixela Inhub 500-50 MCG/DOSE inhaler INHALE 1 PUFF 2 (TWO) TIMES A DAY RINSE MOUTH AFTER USE   60 each 1    azithromycin (ZITHROMAX) 250 mg tablet Take 2 tabs on day 1, then take 1 tab daily from day 2-day 5  6 tablet 0    predniSONE 10 mg tablet Take 4 tabs for 4 days, then 3 tabs for 2 days, then 2 tabs for 2 days and then 1 tab for 2 day 28 tablet 0     No current facility-administered medications for this visit  No Known Allergies    Review of Systems   Constitutional: Negative for appetite change, chills and fever  HENT: Negative for congestion, ear pain, sinus pain and sore throat  Eyes: Negative for discharge and itching  Respiratory: Positive for cough, shortness of breath and wheezing  Negative for apnea and chest tightness  Cardiovascular: Negative for chest pain, palpitations and leg swelling  Gastrointestinal: Negative for abdominal pain, anal bleeding, constipation, diarrhea, nausea and vomiting  Endocrine: Negative for cold intolerance, heat intolerance and polyuria  Genitourinary: Negative for difficulty urinating and dysuria  Musculoskeletal: Negative for arthralgias, back pain and myalgias  Skin: Negative for rash  Neurological: Negative for dizziness and headaches  Psychiatric/Behavioral: Negative for agitation  Video Exam    There were no vitals filed for this visit  Physical Exam  Eyes:      General:         Right eye: No discharge  Left eye: No discharge  Conjunctiva/sclera: Conjunctivae normal    Neck:      Musculoskeletal: Normal range of motion  Pulmonary:      Effort: Pulmonary effort is normal    Musculoskeletal: Normal range of motion  Neurological:      Mental Status: He is alert  I spent 15 minutes directly with the patient during this visit      VIRTUAL VISIT [address] acknowledges that he has consented to an online visit or consultation  He understands that the online visit is based solely on information provided by him, and that, in the absence of a face-to-face physical evaluation by the physician, the diagnosis he receives is both limited and provisional in terms of accuracy and completeness   This is not intended to replace a full medical face-to-face evaluation by the physician  Gareld Rubinstein understands and accepts these terms

## 2021-04-27 DIAGNOSIS — B34.9 VIRAL INFECTION, UNSPECIFIED: ICD-10-CM

## 2021-04-27 PROCEDURE — U0003 INFECTIOUS AGENT DETECTION BY NUCLEIC ACID (DNA OR RNA); SEVERE ACUTE RESPIRATORY SYNDROME CORONAVIRUS 2 (SARS-COV-2) (CORONAVIRUS DISEASE [COVID-19]), AMPLIFIED PROBE TECHNIQUE, MAKING USE OF HIGH THROUGHPUT TECHNOLOGIES AS DESCRIBED BY CMS-2020-01-R: HCPCS | Performed by: FAMILY MEDICINE

## 2021-04-27 PROCEDURE — U0005 INFEC AGEN DETEC AMPLI PROBE: HCPCS | Performed by: FAMILY MEDICINE

## 2021-04-28 LAB — SARS-COV-2 RNA RESP QL NAA+PROBE: NEGATIVE

## 2021-04-30 ENCOUNTER — TELEPHONE (OUTPATIENT)
Dept: FAMILY MEDICINE CLINIC | Facility: CLINIC | Age: 71
End: 2021-04-30

## 2021-04-30 DIAGNOSIS — J44.1 COPD EXACERBATION (HCC): ICD-10-CM

## 2021-04-30 RX ORDER — PREDNISONE 10 MG/1
TABLET ORAL
Qty: 28 TABLET | Refills: 0 | Status: SHIPPED | OUTPATIENT
Start: 2021-04-30 | End: 2021-10-14 | Stop reason: ALTCHOICE

## 2021-04-30 NOTE — TELEPHONE ENCOUNTER
I called pt back  Felt cough/SOB  He felt the allergies made him worse  Pulse ox 97% today  No fever  Maureen CP, n/v/abd pain  Will send prednisone to his pharmacy

## 2021-04-30 NOTE — TELEPHONE ENCOUNTER
Pt states  That he has not had much improvement with prednisone he feels worse he feels more congestion  Please advise if other medication or dosage can be changed   Thank you

## 2021-05-01 DIAGNOSIS — J44.1 COPD WITH ACUTE EXACERBATION (HCC): ICD-10-CM

## 2021-05-02 DIAGNOSIS — E78.2 MIXED HYPERLIPIDEMIA: ICD-10-CM

## 2021-05-03 RX ORDER — ROSUVASTATIN CALCIUM 40 MG/1
40 TABLET, COATED ORAL DAILY
Qty: 90 TABLET | Refills: 3 | Status: SHIPPED | OUTPATIENT
Start: 2021-05-03 | End: 2022-05-04

## 2021-05-19 ENCOUNTER — HOSPITAL ENCOUNTER (OUTPATIENT)
Dept: RADIOLOGY | Facility: CLINIC | Age: 71
Discharge: HOME | End: 2021-05-19
Attending: ANESTHESIOLOGY
Payer: MEDICARE

## 2021-05-19 VITALS
RESPIRATION RATE: 20 BRPM | DIASTOLIC BLOOD PRESSURE: 81 MMHG | TEMPERATURE: 98.6 F | HEART RATE: 72 BPM | SYSTOLIC BLOOD PRESSURE: 150 MMHG | OXYGEN SATURATION: 96 %

## 2021-05-19 DIAGNOSIS — M47.816 LUMBAR SPONDYLOSIS: ICD-10-CM

## 2021-05-19 PROCEDURE — 64494 INJ PARAVERT F JNT L/S 2 LEV: CPT | Performed by: ANESTHESIOLOGY

## 2021-05-19 PROCEDURE — 64495 INJ PARAVERT F JNT L/S 3 LEV: CPT | Performed by: ANESTHESIOLOGY

## 2021-05-19 PROCEDURE — 64493 INJ PARAVERT F JNT L/S 1 LEV: CPT | Performed by: ANESTHESIOLOGY

## 2021-05-19 RX ORDER — LIDOCAINE HYDROCHLORIDE 10 MG/ML
10 INJECTION, SOLUTION EPIDURAL; INFILTRATION; INTRACAUDAL; PERINEURAL ONCE
Status: COMPLETED | OUTPATIENT
Start: 2021-05-19 | End: 2021-05-19

## 2021-05-19 RX ORDER — BUPIVACAINE HYDROCHLORIDE 5 MG/ML
30 INJECTION, SOLUTION EPIDURAL; INTRACAUDAL ONCE
Status: COMPLETED | OUTPATIENT
Start: 2021-05-19 | End: 2021-05-19

## 2021-05-19 RX ADMIN — LIDOCAINE HYDROCHLORIDE 6 ML: 10 INJECTION, SOLUTION EPIDURAL; INFILTRATION; INTRACAUDAL; PERINEURAL at 14:58

## 2021-05-19 RX ADMIN — BUPIVACAINE HYDROCHLORIDE 4 ML: 5 INJECTION, SOLUTION EPIDURAL; INTRACAUDAL at 15:03

## 2021-05-19 NOTE — DISCHARGE INSTRUCTIONS
ACTIVITY  · Please do activities that will bring on the normal pain that we are rating  For example, if vacuuming or walking increases the pain, do that  This will give the most accurate response to the diary  · You may shower, but no tub baths today, or applied heat  CARE OF THE INJECTION SITE  · This area may be numb for several hours after the injection  · Notify [facility] if you have any of the following:  redness, drainage, swelling, or fever above 100°F     SPECIAL INSTRUCTIONS  · Please return the MBB diary to our office by mail, fax, or drop it off  MEDICATIONS  · Please do not take any break through or short acting pain medications for 8 hours after the block  · Continue to take all routine medications  · Our office may have instructed you to hold some medications  As no general anesthesia was used in today's procedure, you should not experience any side effects related to anesthesia  If you have a problem specifically related to your procedure, please call our office at [phone]  Problems not related to your procedure should be directed to your primary care physician

## 2021-05-19 NOTE — H&P
History of Present Illness: The patient is a 72 y o  male who presents with complaints of   Low back pain  Patient Active Problem List   Diagnosis    Allergic rhinitis    Enlarged prostate with lower urinary tract symptoms (LUTS)    Chronic bronchitis (HCC)    Hyperlipidemia    Hypertension    Impaired fasting glucose    Male erectile dysfunction    Multifocal atrial tachycardia (HCC)    Obesity (BMI 30-39  9)    Osteoarthritis of knee    Osteoarthritis of hip    Seasonal allergies    Spinal stenosis of lumbar region with neurogenic claudication    Supraventricular tachycardia (HCC)    Anxiety and depression    Arthritis of left hip    COPD (chronic obstructive pulmonary disease) (HCC)    Elevated prostate specific antigen (PSA)    Lumbosacral spondylosis without myelopathy    Incomplete emptying of bladder    Retention of urine    Screening for prostate cancer    Thoracic or lumbosacral neuritis or radiculitis    Urethral stricture    Spondylolisthesis, acquired    Degeneration of lumbosacral intervertebral disc    Full thickness rotator cuff tear    Knee pain    Localized, primary osteoarthritis    Primary localized osteoarthritis of pelvic region and thigh    Low back pain    Lumbosacral neuritis    Pain in limb    Pseudoclaudication syndrome    Shoulder pain    Cervical stenosis of spinal canal    Subclinical hypothyroidism    Lumbar radiculopathy    Chronic pain syndrome    Status post lumbar and lumbosacral fusion by anterior technique    Status post insertion of spinal cord stimulator    Lumbar spondylosis       Past Medical History:   Diagnosis Date    Bronchitis     Bronchitis, chronic obstructive, with exacerbation (HCC)     Last Assessed: 5/17/2013    Chronic pain disorder     [facility]     Last Assessed: 4/13/2015    Depression, controlled 8/30/2017    Transitioned From: Depression    Diabetes mellitus (HCC)     Elevated PSA Bx neg 2012; Last Assessed: 11/23/2012    Hypertension     Irregular heart beat     MAT, afib    Lung nodule - resolved 2017 CT 1/4/2017    Postoperative urinary retention        Past Surgical History:   Procedure Laterality Date    BACK SURGERY  07/25/2012    decompression of spinal stenosis from lower thoracic through the lumbar spine    COLONOSCOPY  10/2010    neg   recheck in 5 years   [address] ARTHROSCOPY  01/2013    AL PERCUT IMPLNT NEUROELECT,EPIDURAL Right 8/25/2020    Procedure: INSERTION THORACIC DORSAL COLUMN SPINAL CORD STIMULATOR PERCUTANEOUS W IMPLANTABLE PULSE GENERATOR, RIGHT;  Surgeon: Markus Medina MD;  [facility];  Service: Neurosurgery    AL WRIST Hurtis Mary Ellen LIG Left 3/4/2020    Procedure: RELEASE CARPAL TUNNEL ENDOSCOPIC;  Surgeon: Ariel Davidson MD;  Location: [facility];  Service: [address] BIOPSY  2012    for elevated PSA of about 7; neg    TRANSURETHRAL RESECTION OF PROSTATE  06/27/2013         Current Outpatient Medications:     albuterol (PROVENTIL HFA,VENTOLIN HFA) 90 mcg/act inhaler, Inhale 2 puffs every 6 (six) hours as needed for wheezing or shortness of breath, Disp: 1 Inhaler, Rfl: 5    Cialis 5 MG tablet, TAKE 1 TABLET BY MOUTH EVERY DAY, Disp: 90 tablet, Rfl: 3    clonazePAM (KlonoPIN) 0 5 mg tablet, Take 1 tablet (0 5 mg total) by mouth daily at bedtime, Disp: 30 tablet, Rfl: 1    dextromethorphan-guaifenesin (MUCINEX DM)  MG per 12 hr tablet, Take 1 tablet by mouth every 12 (twelve) hours, Disp: , Rfl:     diclofenac sodium (VOLTAREN) 50 mg EC tablet, TAKE 1 TABLET BY MOUTH TWICE A DAY, Disp: 180 tablet, Rfl: 1    DULoxetine (CYMBALTA) 30 mg delayed release capsule, Take 90 mg by mouth daily , Disp: , Rfl:     DULoxetine (CYMBALTA) 60 mg delayed release capsule, Take 1 capsule by mouth daily, Disp: , Rfl:     fluticasone (FLONASE) 50 mcg/act nasal spray, fluticasone propionate 50 mcg/actuation nasal spray,suspension, Disp: , Rfl:     fluticasone-salmeterol (Wixela Inhub) 500-50 mcg/dose inhaler, Inhale 1 puff 2 (two) times a day Rinse mouth after use, Disp: 3 Inhaler, Rfl: 1    gabapentin (NEURONTIN) 300 mg capsule, Take 300 mg by mouth daily , Disp: , Rfl:     ipratropium-albuterol (DUO-NEB) 0 5-2 5 mg/3 mL nebulizer solution, Take 1 vial (3 mL total) by nebulization every 6 (six) hours as needed for wheezing or shortness of breath, Disp: 60 vial, Rfl: 1    metFORMIN (GLUCOPHAGE-XR) 500 mg 24 hr tablet, TAKE 1 TABLET BY MOUTH EVERY DAY WITH DINNER, Disp: 90 tablet, Rfl: 0    metoprolol tartrate (LOPRESSOR) 25 mg tablet, Take 1 tablet (25 mg total) by mouth every 12 (twelve) hours, Disp: 180 tablet, Rfl: 2    metoprolol tartrate (LOPRESSOR) 50 mg tablet, TAKE 1 TABLET BY MOUTH TWICE A DAY IN ADDITION TO 25 MG TABLET, Disp: 180 tablet, Rfl: 3    metoprolol tartrate (LOPRESSOR) 50 mg tablet, Take 1 tablet (50 mg total) by mouth 2 (two) times a day Take with 25 mg tab bid, Disp: 180 tablet, Rfl: 3    montelukast (SINGULAIR) 10 mg tablet, Take 1 tablet (10 mg total) by mouth daily at bedtime, Disp: 30 tablet, Rfl: 5    Multiple Vitamin (MULTI-VITAMIN DAILY) TABS, Take 1 tablet by mouth daily, Disp: , Rfl:     NIFEdipine (PROCARDIA XL) 90 mg 24 hr tablet, TAKE 1 TABLET BY MOUTH DAILY, Disp: 90 tablet, Rfl: 3    predniSONE 10 mg tablet, Take 4 tabs for 4 days, then 3 tabs for 2 days, then 2 tabs for 2 days and then 1 tab for 2 day, Disp: 28 tablet, Rfl: 0    rosuvastatin (CRESTOR) 40 MG tablet, Take 1 tablet (40 mg total) by mouth daily, Disp: 90 tablet, Rfl: 3    Current Facility-Administered Medications:     bupivacaine (PF) (MARCAINE) 0 5 % injection 30 mL, 30 mL, Injection, Once, Emery Ann,     lidocaine (PF) (XYLOCAINE-MPF) 1 % injection 10 mL, 10 mL, Other, Once, Emery Ann, DO    No Known Allergies    Physical Exam:   Vitals:    05/19/21 1437   BP: 126/75   Pulse: 67   Resp: 20 Temp: 98 6 °F (37 °C)   SpO2: 96%     General: Awake, Alert, Oriented x 3, Mood and affect appropriate  Respiratory: Respirations even and unlabored  Cardiovascular: Peripheral pulses intact; no edema  Musculoskeletal Exam:   Bilateral lumbar paraspinals tender to pal    ASA Score: 3    Patient/Chart Verification  Patient ID Verified: Verbal  ID Band Applied: No  Consents Confirmed: Procedural  H&P( within 30 days) Verified: To be obtained in the Pre-Procedure area  Interval H&P(within 24 hr) Complete (required for Outpatients and Surgery Admit only): To be obtained in the Pre-Procedure area  Allergies Reviewed: Yes  Anticoag/NSAID held?: NA  Currently on antibiotics?: No  Pre-op Lab/Test Results Available: N/A    Assessment:   1   Lumbar spondylosis        Plan: Bilateral L2 through L5 medial branch block # 2

## 2021-05-25 ENCOUNTER — TELEPHONE (OUTPATIENT)
Dept: RADIOLOGY | Facility: CLINIC | Age: 71
End: 2021-05-25

## 2021-05-25 DIAGNOSIS — R07.89 OTHER CHEST PAIN: ICD-10-CM

## 2021-05-25 DIAGNOSIS — R06.00 DOE (DYSPNEA ON EXERTION): Primary | ICD-10-CM

## 2021-05-25 PROBLEM — R06.09 DOE (DYSPNEA ON EXERTION): Status: ACTIVE | Noted: 2021-05-25

## 2021-05-26 DIAGNOSIS — M47.816 LUMBAR SPONDYLOSIS: Primary | ICD-10-CM

## 2021-05-26 NOTE — TELEPHONE ENCOUNTER
Patient contacted and scheduled for LEFT AND RIGHT RFA   All pre procedure instructions were given to patient   Nothing to eat or drink for 1 hour prior  Loose fitting clothing   NSAIDS, ANTICOAG'S OKAY   Denies Antibx     Needs    Patient directed to call the office if becomes sick, as we will most likely reschedule       Insurance auth received but is not a guarantee of payment per your insurance company's authorization disclaimer and it is your responsibility to verify your benefits     COVID -19 screening complete

## 2021-06-01 ENCOUNTER — HOSPITAL ENCOUNTER (OUTPATIENT)
Dept: NON INVASIVE DIAGNOSTICS | Facility: HOSPITAL | Age: 71
Discharge: HOME | End: 2021-06-01
Payer: MEDICARE

## 2021-06-01 ENCOUNTER — HOSPITAL ENCOUNTER (OUTPATIENT)
Dept: RADIOLOGY | Facility: HOSPITAL | Age: 71
Discharge: HOME | End: 2021-06-01
Payer: MEDICARE

## 2021-06-01 DIAGNOSIS — R06.00 DOE (DYSPNEA ON EXERTION): ICD-10-CM

## 2021-06-01 DIAGNOSIS — R07.89 OTHER CHEST PAIN: ICD-10-CM

## 2021-06-01 LAB
CHEST PAIN STATEMENT: NORMAL
MAX DIASTOLIC BP: 70 MMHG
MAX HEART RATE: 101 BPM
MAX PREDICTED HEART RATE: 151 BPM
MAX. SYSTOLIC BP: 144 MMHG
PROTOCOL NAME: NORMAL
REASON FOR TERMINATION: NORMAL
TARGET HR FORMULA: NORMAL
TEST INDICATION: NORMAL
TIME IN EXERCISE PHASE: NORMAL

## 2021-06-01 PROCEDURE — 78452 HT MUSCLE IMAGE SPECT MULT: CPT

## 2021-06-01 PROCEDURE — 93017 CV STRESS TEST TRACING ONLY: CPT

## 2021-06-01 PROCEDURE — G1004 CDSM NDSC: HCPCS

## 2021-06-01 PROCEDURE — A9502 TC99M TETROFOSMIN: HCPCS

## 2021-06-01 PROCEDURE — 93016 CV STRESS TEST SUPVJ ONLY: CPT | Performed by: INTERNAL MEDICINE

## 2021-06-01 PROCEDURE — 93306 TTE W/DOPPLER COMPLETE: CPT | Performed by: INTERNAL MEDICINE

## 2021-06-01 PROCEDURE — 78452 HT MUSCLE IMAGE SPECT MULT: CPT | Performed by: INTERNAL MEDICINE

## 2021-06-01 PROCEDURE — 93018 CV STRESS TEST I&R ONLY: CPT | Performed by: INTERNAL MEDICINE

## 2021-06-01 PROCEDURE — 93306 TTE W/DOPPLER COMPLETE: CPT

## 2021-06-01 RX ADMIN — REGADENOSON 0.4 MG: 0.08 INJECTION, SOLUTION INTRAVENOUS at 12:21

## 2021-06-03 DIAGNOSIS — M17.0 OSTEOARTHRITIS OF BOTH KNEES, UNSPECIFIED OSTEOARTHRITIS TYPE: ICD-10-CM

## 2021-06-10 ENCOUNTER — OFFICE VISIT (OUTPATIENT)
Dept: CARDIOLOGY CLINIC | Facility: CLINIC | Age: 71
End: 2021-06-10
Payer: MEDICARE

## 2021-06-10 VITALS — DIASTOLIC BLOOD PRESSURE: 70 MMHG | SYSTOLIC BLOOD PRESSURE: 100 MMHG | HEART RATE: 80 BPM

## 2021-06-10 DIAGNOSIS — I65.23 CAROTID ARTERY STENOSIS, ASYMPTOMATIC, BILATERAL: Primary | ICD-10-CM

## 2021-06-10 DIAGNOSIS — E78.2 MIXED HYPERLIPIDEMIA: ICD-10-CM

## 2021-06-10 DIAGNOSIS — I10 ESSENTIAL HYPERTENSION: ICD-10-CM

## 2021-06-10 DIAGNOSIS — I47.1 MULTIFOCAL ATRIAL TACHYCARDIA (HCC): ICD-10-CM

## 2021-06-10 PROCEDURE — 99214 OFFICE O/P EST MOD 30 MIN: CPT | Performed by: INTERNAL MEDICINE

## 2021-06-10 NOTE — PROGRESS NOTES
Cardiology Follow Up    Suzie Rodriguez  1950  [id number]  [address] [facility] [place]ry [place]  [address] [place] [address] [address]    1  Carotid artery stenosis, asymptomatic, bilateral  VAS carotid complete study   2  Essential hypertension  VAS carotid complete study   3  Multifocal atrial tachycardia (HCC)  VAS carotid complete study   4  Mixed hyperlipidemia  VAS carotid complete study       Discussion/Summary:   I saw him recently when he was in the office for an appointment with his wife he told me he had some atypical chest pain and I knew he had an upcoming procedure  I ordered a stress test and echocardiogram both of which were unremarkable  I think his slight dyspnea is due to deconditioning hopefully he will get some pain relief and will be able to ambulate better  He is due for carotid Doppler I will see him back in 6 months  Interval History:   60-year-old gentleman with history of multifocal atrial tachycardia, hypertension, dyslipidemia, obesity presents for routine scheduled follow-up visit  He has been doing some new diet and exercise trying to lose weight but has had a hard time doing this  Since his last visit he has been doing well  I saw him in the office recently with his wife and he told me that he was having some shortness of breath and chest discomfort  He described as an aching sensation not related to physical exertion  The dyspnea has been generalized slowly progressive  Functional capacity has been low due to significant back pain  He has an upcoming nerve ablation procedure  He denies any lower extremity edema, PND, orthopnea  He had a recent stress test and echocardiogram that I personally reviewed with him      Problem List     Allergic rhinitis    Enlarged prostate with lower urinary tract symptoms (LUTS)    Chronic bronchitis (HCC)    Depression, controlled    Overview Signed 2/27/2018  1:10 PM by Jerry Ryan MD     Transitioned From: Depression         Elevated ALT measurement    Hyperlipidemia    Hypertension    Impaired fasting glucose    Lung nodule    Male erectile dysfunction    Multifocal atrial tachycardia (HCC)    Obesity    Osteoarthritis of knee    Osteoarthritis of left hip    Seasonal allergies    Spinal stenosis of lumbar region    Overview Addendum 3/6/2018  3:00 PM by Jerry Ryan MD     Description: Cervical and lumbar  Sees Dr Azar Hale  Had surgical decompression T12 to L1 7/2012           Supraventricular tachycardia (HCC)    Anxiety and depression    Arthritis of left hip    COPD (chronic obstructive pulmonary disease) (HCC)    Elevated prostate specific antigen (PSA)    Lumbosacral spondylosis    Incomplete emptying of bladder    Other affections of shoulder region, not elsewhere classified    Personal history of other disorder of urinary system    Retention of urine    Screening for prostate cancer    Thoracic or lumbosacral neuritis or radiculitis    Urethral stricture    Increased frequency of urination        Past Medical History:   Diagnosis Date    Bronchitis     Bronchitis, chronic obstructive, with exacerbation ([facility] )     Last Assessed: 5/17/2013    Chronic pain disorder     COPD with acute exacerbation ([facility] )     Last Assessed: 4/13/2015    Depression, controlled 8/30/2017    Transitioned From: Depression    Diabetes mellitus ([facility] )     Elevated PSA     Bx neg 2012; Last Assessed: 11/23/2012    Hypertension     Irregular heart beat     MAT, afib    Lung nodule - resolved 2017 CT 1/4/2017    Postoperative urinary retention      Social History     Socioeconomic History    Marital status: /Civil Union     Spouse name: Not on file    Number of children: Not on file    Years of education: Not on file    Highest education level: Not on file   Occupational History    Not on file   Social Needs    Financial resource strain: Not on file   [place] insecurity     Worry: Not on file     Inability: Not on file    Transportation needs     Medical: Not on file     Non-medical: Not on file   Tobacco Use    Smoking status: Former Smoker     Packs/day: 1 50     Years: 23 00     Pack years: 34 50     Types: Cigarettes     Start date: 0     Quit date: 2000     Years since quittin 4    Smokeless tobacco: Never Used   Substance and Sexual Activity    Alcohol use: No     Comment: Stopped drinking    Drug use: No    Sexual activity: Yes   Lifestyle    Physical activity     Days per week: Not on file     Minutes per session: Not on file    Stress: Not on file   Relationships    Social connections     Talks on phone: Not on file     Gets together: Not on file     Attends Scientologist service: Not on file     Active member of club or organization: Not on file     Attends meetings of clubs or organizations: Not on file     Relationship status: Not on file    Intimate partner violence     Fear of current or ex partner: Not on file     Emotionally abused: Not on file     Physically abused: Not on file     Forced sexual activity: Not on file   Other Topics Concern    Not on file   Social History Narrative    [employer]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, torch    Asbestos removal - use [address] [place] [address]      Family History   Problem Relation Age of Onset    Breast cancer Mother     Pancreatitis Father     Diabetes Maternal Grandmother     Heart attack Maternal Grandfather     Heart attack Paternal Grandmother     Diabetes Paternal Grandmother     Diabetes Paternal Grandfather     No Known Problems Brother     Lung disease Paternal Uncle      Past Surgical History:   Procedure Laterality Date    BACK SURGERY  2012    decompression of spinal stenosis from lower thoracic through the lumbar spine    COLONOSCOPY  10/2010    neg   recheck in 5 years    JOINT REPLACEMENT      KNEE ARTHROSCOPY  2013    CT PERCUT IMPLNT NEUROELECT,EPIDURAL Right 8/25/2020    Procedure: INSERTION THORACIC DORSAL COLUMN SPINAL CORD STIMULATOR PERCUTANEOUS W IMPLANTABLE PULSE GENERATOR, RIGHT;  Surgeon: Meg Moreno MD;  Location: [facility];  Service: Neurosurgery    MA WRIST Enmanuel Nipper LIG Left 3/4/2020    Procedure: RELEASE CARPAL TUNNEL ENDOSCOPIC;  Surgeon: Sarah Arnold MD;  Location: [facility];  Service: [address] BIOPSY  2012    for elevated PSA of about 7; neg    TRANSURETHRAL RESECTION OF PROSTATE  06/27/2013       Current Outpatient Medications:     albuterol (PROVENTIL HFA,VENTOLIN HFA) 90 mcg/act inhaler, Inhale 2 puffs every 6 (six) hours as needed for wheezing or shortness of breath, Disp: 1 Inhaler, Rfl: 5    Cialis 5 MG tablet, TAKE 1 TABLET BY MOUTH EVERY DAY, Disp: 90 tablet, Rfl: 3    clonazePAM (KlonoPIN) 0 5 mg tablet, Take 1 tablet (0 5 mg total) by mouth daily at bedtime, Disp: 30 tablet, Rfl: 1    dextromethorphan-guaifenesin (MUCINEX DM)  MG per 12 hr tablet, Take 1 tablet by mouth every 12 (twelve) hours, Disp: , Rfl:     diclofenac sodium (VOLTAREN) 50 mg EC tablet, TAKE 1 TABLET BY MOUTH TWICE A DAY, Disp: 180 tablet, Rfl: 1    DULoxetine (CYMBALTA) 30 mg delayed release capsule, Take 90 mg by mouth daily , Disp: , Rfl:     DULoxetine (CYMBALTA) 60 mg delayed release capsule, Take 1 capsule by mouth daily, Disp: , Rfl:     fluticasone (FLONASE) 50 mcg/act nasal spray, fluticasone propionate 50 mcg/actuation nasal spray,suspension, Disp: , Rfl:     fluticasone-salmeterol (Wixela Inhub) 500-50 mcg/dose inhaler, Inhale 1 puff 2 (two) times a day Rinse mouth after use, Disp: 3 Inhaler, Rfl: 1    gabapentin (NEURONTIN) 300 mg capsule, Take 300 mg by mouth daily , Disp: , Rfl:     ipratropium-albuterol (DUO-NEB) 0 5-2 5 mg/3 mL nebulizer solution, Take 1 vial (3 mL total) by nebulization every 6 (six) hours as needed for wheezing or shortness of breath, Disp: 60 vial, Rfl: 1    metFORMIN (GLUCOPHAGE-XR) 500 mg 24 hr tablet, TAKE 1 TABLET BY MOUTH EVERY DAY WITH DINNER, Disp: 90 tablet, Rfl: 0    metoprolol tartrate (LOPRESSOR) 25 mg tablet, Take 1 tablet (25 mg total) by mouth every 12 (twelve) hours, Disp: 180 tablet, Rfl: 2    metoprolol tartrate (LOPRESSOR) 50 mg tablet, TAKE 1 TABLET BY MOUTH TWICE A DAY IN ADDITION TO 25 MG TABLET, Disp: 180 tablet, Rfl: 3    metoprolol tartrate (LOPRESSOR) 50 mg tablet, Take 1 tablet (50 mg total) by mouth 2 (two) times a day Take with 25 mg tab bid, Disp: 180 tablet, Rfl: 3    montelukast (SINGULAIR) 10 mg tablet, Take 1 tablet (10 mg total) by mouth daily at bedtime, Disp: 30 tablet, Rfl: 5    Multiple Vitamin (MULTI-VITAMIN DAILY) TABS, Take 1 tablet by mouth daily, Disp: , Rfl:     NIFEdipine (PROCARDIA XL) 90 mg 24 hr tablet, TAKE 1 TABLET BY MOUTH DAILY, Disp: 90 tablet, Rfl: 3    predniSONE 10 mg tablet, Take 4 tabs for 4 days, then 3 tabs for 2 days, then 2 tabs for 2 days and then 1 tab for 2 day, Disp: 28 tablet, Rfl: 0    rosuvastatin (CRESTOR) 40 MG tablet, Take 1 tablet (40 mg total) by mouth daily, Disp: 90 tablet, Rfl: 3  No Known Allergies    Labs:     Chemistry        Component Value Date/Time     09/08/2015 1027    K 4 4 03/24/2021 0827    K 4 6 09/08/2015 1027     (H) 03/24/2021 0827     09/08/2015 1027    CO2 29 03/24/2021 0827    CO2 27 09/08/2015 1027    BUN 17 03/24/2021 0827    BUN 15 09/08/2015 1027    CREATININE 1 01 03/24/2021 0827    CREATININE 0 81 09/08/2015 1027        Component Value Date/Time    CALCIUM 9 0 03/24/2021 0827    CALCIUM 9 3 09/08/2015 1027    ALKPHOS 70 03/24/2021 0827    ALKPHOS 121 07/21/2014 0910    AST 13 03/24/2021 0827    AST 18 07/21/2014 0910    ALT 29 03/24/2021 0827    ALT 34 07/21/2014 0910    BILITOT 0 45 07/21/2014 0910            Lab Results   Component Value Date    CHOL 190 07/21/2014     Lab Results   Component Value Date    HDL 46 03/24/2021    HDL 40 09/20/2019    HDL 38 (L) 05/17/2019     Lab Results   Component Value Date    LDLCALC 105 (H) 03/24/2021    LDLCALC 97 09/20/2019    LDLCALC 92 05/17/2019     Lab Results   Component Value Date    TRIG 200 (H) 03/24/2021    TRIG 131 09/20/2019    TRIG 153 (H) 05/17/2019     No results found for: CHOLHDL    Imaging: No results found  ECG:    Normal sinus rhythm      Review of Systems   Constitution: Negative  HENT: Negative  Eyes: Negative  Cardiovascular: Negative  Respiratory: Negative  Endocrine: Negative  Hematologic/Lymphatic: Negative  Skin: Negative  Musculoskeletal: Negative  Gastrointestinal: Negative  Genitourinary: Negative  Neurological: Negative  Psychiatric/Behavioral: Negative  Vitals:    06/10/21 1248   BP: 100/70   Pulse: 80     There were no vitals filed for this visit  There is no height or weight on file to calculate BMI  Physical Exam:  Vital signs reviewed  General:  Alert and cooperative, appears stated age, no acute distress  HEENT:  PERRLA, EOMI, no scleral icterus, no conjunctival pallor  Neck:  No lymphadenopathy, no thyromegaly, no carotid bruits, no elevated JVP  Heart:  Regular rate and rhythm, normal S1/S2, no S3/S4, no murmur, rubs or gallops  PMI nondisplaced  Lungs:  Clear to auscultation bilaterally, no wheezes rales or rhonchi  Abdomen:  Soft, non-tender, positive bowel sounds, no rebound or guarding,   no organomegaly   Extremities:  Normal range of motion    No clubbing, cyanosis or edema   Vascular:  2+ pedal pulses  Skin:  No rashes or lesions on exposed skin  Neurologic:  Cranial nerves II-XII grossly intact without focal deficits  Psych:  Normal mood and affect

## 2021-06-16 ENCOUNTER — HOSPITAL ENCOUNTER (OUTPATIENT)
Dept: RADIOLOGY | Facility: CLINIC | Age: 71
Discharge: HOME | End: 2021-06-16
Attending: ANESTHESIOLOGY | Admitting: ANESTHESIOLOGY
Payer: MEDICARE

## 2021-06-16 ENCOUNTER — TELEPHONE (OUTPATIENT)
Dept: PAIN MEDICINE | Facility: CLINIC | Age: 71
End: 2021-06-16

## 2021-06-16 VITALS
OXYGEN SATURATION: 93 % | TEMPERATURE: 98.5 F | HEART RATE: 73 BPM | DIASTOLIC BLOOD PRESSURE: 81 MMHG | RESPIRATION RATE: 20 BRPM | SYSTOLIC BLOOD PRESSURE: 148 MMHG

## 2021-06-16 DIAGNOSIS — M47.816 LUMBAR SPONDYLOSIS: ICD-10-CM

## 2021-06-16 DIAGNOSIS — F32.A ANXIETY AND DEPRESSION: ICD-10-CM

## 2021-06-16 DIAGNOSIS — F41.9 ANXIETY AND DEPRESSION: ICD-10-CM

## 2021-06-16 PROCEDURE — 64636 DESTROY L/S FACET JNT ADDL: CPT | Performed by: ANESTHESIOLOGY

## 2021-06-16 PROCEDURE — 64635 DESTROY LUMB/SAC FACET JNT: CPT | Performed by: ANESTHESIOLOGY

## 2021-06-16 RX ORDER — CLONAZEPAM 0.5 MG/1
0.5 TABLET ORAL
Qty: 30 TABLET | Refills: 1 | Status: SHIPPED | OUTPATIENT
Start: 2021-06-16 | End: 2021-08-11 | Stop reason: SDUPTHER

## 2021-06-16 RX ORDER — BUPIVACAINE HYDROCHLORIDE 5 MG/ML
30 INJECTION, SOLUTION EPIDURAL; INTRACAUDAL ONCE
Status: COMPLETED | OUTPATIENT
Start: 2021-06-16 | End: 2021-06-16

## 2021-06-16 RX ORDER — LIDOCAINE HYDROCHLORIDE 10 MG/ML
10 INJECTION, SOLUTION EPIDURAL; INFILTRATION; INTRACAUDAL; PERINEURAL ONCE
Status: COMPLETED | OUTPATIENT
Start: 2021-06-16 | End: 2021-06-16

## 2021-06-16 RX ADMIN — LIDOCAINE HYDROCHLORIDE 10 ML: 10 INJECTION, SOLUTION EPIDURAL; INFILTRATION; INTRACAUDAL; PERINEURAL at 14:35

## 2021-06-16 RX ADMIN — BUPIVACAINE HYDROCHLORIDE 4 ML: 5 INJECTION, SOLUTION EPIDURAL; INTRACAUDAL at 14:34

## 2021-06-16 RX ADMIN — LIDOCAINE HYDROCHLORIDE 4 ML: 20 INJECTION, SOLUTION EPIDURAL; INFILTRATION; INTRACAUDAL; PERINEURAL at 14:35

## 2021-06-16 NOTE — H&P
History of Present Illness: The patient is a 80 y o  male who presents with complaints of  Low back pain  Patient Active Problem List   Diagnosis    Allergic rhinitis    Enlarged prostate with lower urinary tract symptoms (LUTS)    Chronic bronchitis (HCC)    Hyperlipidemia    Hypertension    Impaired fasting glucose    Male erectile dysfunction    Multifocal atrial tachycardia (HCC)    Obesity (BMI 30-39  9)    Osteoarthritis of knee    Osteoarthritis of hip    Seasonal allergies    Spinal stenosis of lumbar region with neurogenic claudication    Supraventricular tachycardia (HCC)    Anxiety and depression    Arthritis of left hip    COPD (chronic obstructive pulmonary disease) (HCC)    Elevated prostate specific antigen (PSA)    Lumbosacral spondylosis without myelopathy    Incomplete emptying of bladder    Retention of urine    Screening for prostate cancer    Thoracic or lumbosacral neuritis or radiculitis    Urethral stricture    Spondylolisthesis, acquired    Degeneration of lumbosacral intervertebral disc    Full thickness rotator cuff tear    Knee pain    Localized, primary osteoarthritis    Primary localized osteoarthritis of pelvic region and thigh    Low back pain    Lumbosacral neuritis    Pain in limb    Pseudoclaudication syndrome    Shoulder pain    Cervical stenosis of spinal canal    Subclinical hypothyroidism    Lumbar radiculopathy    Chronic pain syndrome    Status post lumbar and lumbosacral fusion by anterior technique    Status post insertion of spinal cord stimulator    Lumbar spondylosis    PITTMAN (dyspnea on exertion)       Past Medical History:   Diagnosis Date    Bronchitis     Bronchitis, chronic obstructive, with exacerbation ([facility])     Last Assessed: 5/17/2013    Chronic pain disorder     [facility]     Last Assessed: 4/13/2015    Depression, controlled 8/30/2017    Transitioned From: Depression    Diabetes mellitus ([facility] [address] [address] )     Elevated PSA     Bx neg 2012; Last Assessed: 11/23/2012    Hypertension     Irregular heart beat     MAT, afib    Lung nodule - resolved 2017 CT 1/4/2017    Postoperative urinary retention        Past Surgical History:   Procedure Laterality Date    BACK SURGERY  07/25/2012    decompression of spinal stenosis from lower thoracic through the lumbar spine    COLONOSCOPY  10/2010    neg   recheck in 5 years   [address] ARTHROSCOPY  01/2013    CA PERCUT IMPLNT NEUROELECT,EPIDURAL Right 8/25/2020    Procedure: INSERTION THORACIC DORSAL COLUMN SPINAL CORD STIMULATOR PERCUTANEOUS W IMPLANTABLE PULSE GENERATOR, RIGHT;  Surgeon: Manju Kessler MD;  Location:  MAIN OR;  Service: Neurosurgery    CA WRIST Jolynn Small LIG Left 3/4/2020    Procedure: RELEASE CARPAL TUNNEL ENDOSCOPIC;  Surgeon: Shreya Traore MD;  Location:  MAIN OR;  Service: [address] BIOPSY  2012    for elevated PSA of about 7; neg    TRANSURETHRAL RESECTION OF PROSTATE  06/27/2013         Current Outpatient Medications:     albuterol (PROVENTIL HFA,VENTOLIN HFA) 90 mcg/act inhaler, Inhale 2 puffs every 6 (six) hours as needed for wheezing or shortness of breath, Disp: 1 Inhaler, Rfl: 5    Cialis 5 MG tablet, TAKE 1 TABLET BY MOUTH EVERY DAY, Disp: 90 tablet, Rfl: 3    clonazePAM (KlonoPIN) 0 5 mg tablet, Take 1 tablet (0 5 mg total) by mouth daily at bedtime, Disp: 30 tablet, Rfl: 1    dextromethorphan-guaifenesin (MUCINEX DM)  MG per 12 hr tablet, Take 1 tablet by mouth every 12 (twelve) hours, Disp: , Rfl:     diclofenac sodium (VOLTAREN) 50 mg EC tablet, TAKE 1 TABLET BY MOUTH TWICE A DAY, Disp: 180 tablet, Rfl: 1    DULoxetine (CYMBALTA) 30 mg delayed release capsule, Take 90 mg by mouth daily , Disp: , Rfl:     DULoxetine (CYMBALTA) 60 mg delayed release capsule, Take 1 capsule by mouth daily, Disp: , Rfl:     fluticasone (FLONASE) 50 mcg/act nasal spray, fluticasone propionate 50 mcg/actuation nasal spray,suspension, Disp: , Rfl:     fluticasone-salmeterol (Wixela Inhub) 500-50 mcg/dose inhaler, Inhale 1 puff 2 (two) times a day Rinse mouth after use, Disp: 3 Inhaler, Rfl: 1    gabapentin (NEURONTIN) 300 mg capsule, Take 300 mg by mouth daily , Disp: , Rfl:     ipratropium-albuterol (DUO-NEB) 0 5-2 5 mg/3 mL nebulizer solution, Take 1 vial (3 mL total) by nebulization every 6 (six) hours as needed for wheezing or shortness of breath, Disp: 60 vial, Rfl: 1    metFORMIN (GLUCOPHAGE-XR) 500 mg 24 hr tablet, TAKE 1 TABLET BY MOUTH EVERY DAY WITH DINNER, Disp: 90 tablet, Rfl: 0    metoprolol tartrate (LOPRESSOR) 25 mg tablet, Take 1 tablet (25 mg total) by mouth every 12 (twelve) hours, Disp: 180 tablet, Rfl: 2    metoprolol tartrate (LOPRESSOR) 50 mg tablet, TAKE 1 TABLET BY MOUTH TWICE A DAY IN ADDITION TO 25 MG TABLET, Disp: 180 tablet, Rfl: 3    metoprolol tartrate (LOPRESSOR) 50 mg tablet, Take 1 tablet (50 mg total) by mouth 2 (two) times a day Take with 25 mg tab bid, Disp: 180 tablet, Rfl: 3    montelukast (SINGULAIR) 10 mg tablet, Take 1 tablet (10 mg total) by mouth daily at bedtime, Disp: 30 tablet, Rfl: 5    Multiple Vitamin (MULTI-VITAMIN DAILY) TABS, Take 1 tablet by mouth daily, Disp: , Rfl:     NIFEdipine (PROCARDIA XL) 90 mg 24 hr tablet, TAKE 1 TABLET BY MOUTH DAILY, Disp: 90 tablet, Rfl: 3    predniSONE 10 mg tablet, Take 4 tabs for 4 days, then 3 tabs for 2 days, then 2 tabs for 2 days and then 1 tab for 2 day, Disp: 28 tablet, Rfl: 0    rosuvastatin (CRESTOR) 40 MG tablet, Take 1 tablet (40 mg total) by mouth daily, Disp: 90 tablet, Rfl: 3    Current Facility-Administered Medications:     bupivacaine (PF) (MARCAINE) 0 5 % injection 30 mL, 30 mL, Injection, Once, Emery Ann,     lidocaine (PF) (XYLOCAINE-MPF) 1 % injection 10 mL, 10 mL, Other, Once, Emery Ann DO    lidocaine (PF) (XYLOCAINE-MPF) 2 % injection 4 mL, 4 mL, Other, Once, Lilliam Katiana, DO    No Known Allergies    Physical Exam:   Vitals:    06/16/21 1359   Pulse: 70   Resp: 20   Temp: 98 5 °F (36 9 °C)   SpO2: 92%     General: Awake, Alert, Oriented x 3, Mood and affect appropriate  Respiratory: Respirations even and unlabored  Cardiovascular: Peripheral pulses intact; no edema  Musculoskeletal Exam:   Bilateral lumbar paraspinals tender to palpation  ASA Score: 3         Assessment:   1  Lumbar spondylosis        Plan: LEFT L2,L3,L4  L5 RFA

## 2021-06-16 NOTE — DISCHARGE INSTRUCTIONS

## 2021-06-16 NOTE — TELEPHONE ENCOUNTER
Patient is S/P a Left L2-L5 RFA c/ JW on 6/16/21  Next Torin Stern is scheduled for 6/30/21  Please call on 6/17/21 post RFA   Thanks

## 2021-06-17 NOTE — TELEPHONE ENCOUNTER
Patient called returning the nurses call  Please be advise thank you        Please call patient back @ [phone]

## 2021-06-21 NOTE — TELEPHONE ENCOUNTER
RN s/w pt  Pt states that he is steadily getting better and moving in the right direction  Pt occasionally uses Tylenol for julienne  Pt denies s/sx of infection, no fevers or sunburn like sensation  Pt aware will take 2-4 weeks to notice a difference with 4-6 weeks for full effect  Confirmed OPRO 6/30  Pt appreciated call

## 2021-06-21 NOTE — TELEPHONE ENCOUNTER
Patient called in stating that hes feeling great pain  Henery Jurist improvement 20%   Henery Jurist Henery Jurist pain level is 6/10 post RFA

## 2021-06-23 ENCOUNTER — TELEPHONE (OUTPATIENT)
Dept: PAIN MEDICINE | Facility: CLINIC | Age: 71
End: 2021-06-23

## 2021-06-24 DIAGNOSIS — R73.03 PREDIABETES: ICD-10-CM

## 2021-06-24 RX ORDER — METFORMIN HYDROCHLORIDE 500 MG/1
TABLET, EXTENDED RELEASE ORAL
Qty: 90 TABLET | Refills: 0 | Status: SHIPPED | OUTPATIENT
Start: 2021-06-24 | End: 2021-09-21

## 2021-06-30 ENCOUNTER — HOSPITAL ENCOUNTER (OUTPATIENT)
Dept: RADIOLOGY | Facility: CLINIC | Age: 71
Discharge: HOME | End: 2021-06-30
Attending: ANESTHESIOLOGY | Admitting: ANESTHESIOLOGY
Payer: MEDICARE

## 2021-06-30 ENCOUNTER — TELEPHONE (OUTPATIENT)
Dept: PAIN MEDICINE | Facility: CLINIC | Age: 71
End: 2021-06-30

## 2021-06-30 VITALS
HEART RATE: 70 BPM | SYSTOLIC BLOOD PRESSURE: 124 MMHG | RESPIRATION RATE: 20 BRPM | DIASTOLIC BLOOD PRESSURE: 60 MMHG | TEMPERATURE: 99.1 F | OXYGEN SATURATION: 98 %

## 2021-06-30 DIAGNOSIS — M47.816 LUMBAR SPONDYLOSIS: ICD-10-CM

## 2021-06-30 PROCEDURE — 64635 DESTROY LUMB/SAC FACET JNT: CPT | Performed by: ANESTHESIOLOGY

## 2021-06-30 PROCEDURE — 64636 DESTROY L/S FACET JNT ADDL: CPT | Performed by: ANESTHESIOLOGY

## 2021-06-30 RX ORDER — LIDOCAINE HYDROCHLORIDE 10 MG/ML
10 INJECTION, SOLUTION EPIDURAL; INFILTRATION; INTRACAUDAL; PERINEURAL ONCE
Status: COMPLETED | OUTPATIENT
Start: 2021-06-30 | End: 2021-06-30

## 2021-06-30 RX ORDER — BUPIVACAINE HYDROCHLORIDE 5 MG/ML
30 INJECTION, SOLUTION EPIDURAL; INTRACAUDAL ONCE
Status: COMPLETED | OUTPATIENT
Start: 2021-06-30 | End: 2021-06-30

## 2021-06-30 RX ADMIN — LIDOCAINE HYDROCHLORIDE 5 ML: 10 INJECTION, SOLUTION EPIDURAL; INFILTRATION; INTRACAUDAL; PERINEURAL at 13:11

## 2021-06-30 RX ADMIN — BUPIVACAINE HYDROCHLORIDE 3 ML: 5 INJECTION, SOLUTION EPIDURAL; INTRACAUDAL at 13:12

## 2021-06-30 RX ADMIN — LIDOCAINE HYDROCHLORIDE 4 ML: 20 INJECTION, SOLUTION EPIDURAL; INFILTRATION; INTRACAUDAL; PERINEURAL at 13:28

## 2021-06-30 NOTE — TELEPHONE ENCOUNTER
Pt s/p Right L2,L3,L4,L5 RFA 6/30 with JANINE    Please call 7/1 for f/u    Pt needs 6 week f/u appt

## 2021-06-30 NOTE — H&P
History of Present Illness: The patient is a 80 y o  male who presents with complaints of Low back pain  Patient Active Problem List   Diagnosis    Allergic rhinitis    Enlarged prostate with lower urinary tract symptoms (LUTS)    Chronic bronchitis (HCC)    Hyperlipidemia    Hypertension    Impaired fasting glucose    Male erectile dysfunction    Multifocal atrial tachycardia (HCC)    Obesity (BMI 30-39  9)    Osteoarthritis of knee    Osteoarthritis of hip    Seasonal allergies    Spinal stenosis of lumbar region with neurogenic claudication    Supraventricular tachycardia (HCC)    Anxiety and depression    Arthritis of left hip    COPD (chronic obstructive pulmonary disease) (HCC)    Elevated prostate specific antigen (PSA)    Lumbosacral spondylosis without myelopathy    Incomplete emptying of bladder    Retention of urine    Screening for prostate cancer    Thoracic or lumbosacral neuritis or radiculitis    Urethral stricture    Spondylolisthesis, acquired    Degeneration of lumbosacral intervertebral disc    Full thickness rotator cuff tear    Knee pain    Localized, primary osteoarthritis    Primary localized osteoarthritis of pelvic region and thigh    Low back pain    Lumbosacral neuritis    Pain in limb    Pseudoclaudication syndrome    Shoulder pain    Cervical stenosis of spinal canal    Subclinical hypothyroidism    Lumbar radiculopathy    Chronic pain syndrome    Status post lumbar and lumbosacral fusion by anterior technique    Status post insertion of spinal cord stimulator    Lumbar spondylosis    PITTMAN (dyspnea on exertion)       Past Medical History:   Diagnosis Date    Bronchitis     Bronchitis, chronic obstructive, with exacerbation ([facility])     Last Assessed: 5/17/2013    Chronic pain disorder     [facility]     Last Assessed: 4/13/2015    Depression, controlled 8/30/2017    Transitioned From: Depression    Diabetes mellitus ([facility] )     Elevated PSA     Bx neg 2012; Last Assessed: 11/23/2012    Hypertension     Irregular heart beat     MAT, afib    Lung nodule - resolved 2017 CT 1/4/2017    Postoperative urinary retention        Past Surgical History:   Procedure Laterality Date    BACK SURGERY  07/25/2012    decompression of spinal stenosis from lower thoracic through the lumbar spine    COLONOSCOPY  10/2010    neg   recheck in 5 years   [address] ARTHROSCOPY  01/2013    [place] PERCUT IMPLNT NEUROELECT,EPIDURAL Right 8/25/2020    Procedure: INSERTION THORACIC DORSAL COLUMN SPINAL CORD STIMULATOR PERCUTANEOUS W IMPLANTABLE PULSE GENERATOR, RIGHT;  Surgeon: Radha Eugene MD;  [facility];  Service: Neurosurgery    [place] WRIST Cheron Herd LIG Left 3/4/2020    Procedure: RELEASE CARPAL TUNNEL ENDOSCOPIC;  Surgeon: Bella Samson MD;  Location: [facility];  Service: [address] BIOPSY  2012    for elevated PSA of about 7; neg    TRANSURETHRAL RESECTION OF PROSTATE  06/27/2013         Current Outpatient Medications:     albuterol (PROVENTIL HFA,VENTOLIN HFA) 90 mcg/act inhaler, Inhale 2 puffs every 6 (six) hours as needed for wheezing or shortness of breath, Disp: 1 Inhaler, Rfl: 5    Cialis 5 MG tablet, TAKE 1 TABLET BY MOUTH EVERY DAY, Disp: 90 tablet, Rfl: 3    clonazePAM (KlonoPIN) 0 5 mg tablet, Take 1 tablet (0 5 mg total) by mouth daily at bedtime, Disp: 30 tablet, Rfl: 1    dextromethorphan-guaifenesin (MUCINEX DM)  MG per 12 hr tablet, Take 1 tablet by mouth every 12 (twelve) hours, Disp: , Rfl:     diclofenac sodium (VOLTAREN) 50 mg EC tablet, TAKE 1 TABLET BY MOUTH TWICE A DAY, Disp: 180 tablet, Rfl: 1    DULoxetine (CYMBALTA) 30 mg delayed release capsule, Take 90 mg by mouth daily , Disp: , Rfl:     DULoxetine (CYMBALTA) 60 mg delayed release capsule, Take 1 capsule by mouth daily, Disp: , Rfl:     fluticasone (FLONASE) 50 mcg/act nasal spray, fluticasone propionate 50 mcg/actuation nasal spray,suspension, Disp: , Rfl:     fluticasone-salmeterol (Wixela Inhub) 500-50 mcg/dose inhaler, Inhale 1 puff 2 (two) times a day Rinse mouth after use, Disp: 3 Inhaler, Rfl: 1    gabapentin (NEURONTIN) 300 mg capsule, Take 300 mg by mouth daily , Disp: , Rfl:     ipratropium-albuterol (DUO-NEB) 0 5-2 5 mg/3 mL nebulizer solution, Take 1 vial (3 mL total) by nebulization every 6 (six) hours as needed for wheezing or shortness of breath, Disp: 60 vial, Rfl: 1    metFORMIN (GLUCOPHAGE-XR) 500 mg 24 hr tablet, TAKE 1 TABLET BY MOUTH EVERY DAY WITH DINNER, Disp: 90 tablet, Rfl: 0    metoprolol tartrate (LOPRESSOR) 25 mg tablet, Take 1 tablet (25 mg total) by mouth every 12 (twelve) hours, Disp: 180 tablet, Rfl: 2    metoprolol tartrate (LOPRESSOR) 50 mg tablet, TAKE 1 TABLET BY MOUTH TWICE A DAY IN ADDITION TO 25 MG TABLET, Disp: 180 tablet, Rfl: 3    metoprolol tartrate (LOPRESSOR) 50 mg tablet, Take 1 tablet (50 mg total) by mouth 2 (two) times a day Take with 25 mg tab bid, Disp: 180 tablet, Rfl: 3    montelukast (SINGULAIR) 10 mg tablet, Take 1 tablet (10 mg total) by mouth daily at bedtime, Disp: 30 tablet, Rfl: 5    Multiple Vitamin (MULTI-VITAMIN DAILY) TABS, Take 1 tablet by mouth daily, Disp: , Rfl:     NIFEdipine (PROCARDIA XL) 90 mg 24 hr tablet, TAKE 1 TABLET BY MOUTH DAILY, Disp: 90 tablet, Rfl: 3    predniSONE 10 mg tablet, Take 4 tabs for 4 days, then 3 tabs for 2 days, then 2 tabs for 2 days and then 1 tab for 2 day, Disp: 28 tablet, Rfl: 0    rosuvastatin (CRESTOR) 40 MG tablet, Take 1 tablet (40 mg total) by mouth daily, Disp: 90 tablet, Rfl: 3    Current Facility-Administered Medications:     bupivacaine (PF) (MARCAINE) 0 5 % injection 30 mL, 30 mL, Injection, Once, Emery Ann,     lidocaine (PF) (XYLOCAINE-MPF) 1 % injection 10 mL, 10 mL, Other, Once, Emery Ann DO    lidocaine (PF) (XYLOCAINE-MPF) 2 % injection 4 mL, 4 mL, Other, Once, Ely Harley, DO    No Known Allergies    Physical Exam:   Vitals:    06/30/21 1255   BP: 116/71   Pulse: 68   Resp: 20   Temp: 99 1 °F (37 3 °C)   SpO2: 92%     General: Awake, Alert, Oriented x 3, Mood and affect appropriate  Respiratory: Respirations even and unlabored  Cardiovascular: Peripheral pulses intact; no edema  Musculoskeletal Exam:   Right lumbar paraspinals tender to palpation  ASA Score: 3    Patient/Chart Verification  Patient ID Verified: Verbal  ID Band Applied: No  Consents Confirmed: Procedural, To be obtained in the Pre-Procedure area  H&P( within 30 days) Verified: To be obtained in the Pre-Procedure area  Allergies Reviewed: Yes  Anticoag/NSAID held?: NA  Currently on antibiotics?: No    Assessment:   1  Lumbar spondylosis        Plan: RIGHT L2,L3,L4  L5 RFA

## 2021-06-30 NOTE — DISCHARGE INSTRUCTIONS

## 2021-07-01 ENCOUNTER — HOSPITAL ENCOUNTER (OUTPATIENT)
Dept: NON INVASIVE DIAGNOSTICS | Facility: CLINIC | Age: 71
Discharge: HOME | End: 2021-07-01
Payer: MEDICARE

## 2021-07-01 DIAGNOSIS — I65.23 CAROTID ARTERY STENOSIS, ASYMPTOMATIC, BILATERAL: ICD-10-CM

## 2021-07-01 DIAGNOSIS — E78.2 MIXED HYPERLIPIDEMIA: ICD-10-CM

## 2021-07-01 DIAGNOSIS — I10 ESSENTIAL HYPERTENSION: ICD-10-CM

## 2021-07-01 DIAGNOSIS — I47.1 MULTIFOCAL ATRIAL TACHYCARDIA (HCC): ICD-10-CM

## 2021-07-01 PROCEDURE — 93880 EXTRACRANIAL BILAT STUDY: CPT | Performed by: SURGERY

## 2021-07-01 PROCEDURE — 93880 EXTRACRANIAL BILAT STUDY: CPT

## 2021-07-01 NOTE — TELEPHONE ENCOUNTER
RN s/w pt  Pt denies any pain and has not needed pain meds or ice  Pt denies s/sx infection, sunburn like sensation  Pt feels that [address] hit a home run with this procedure  He is just getting better and better  Pt aware that he may still be feeling the effects of the local anesthetic and that he may still notice a difference in 2 weeks up to 4-6 weeks for full effect  Confirmed 6 week f/u [facility] 8/9

## 2021-07-10 DIAGNOSIS — I10 ESSENTIAL HYPERTENSION: ICD-10-CM

## 2021-07-10 RX ORDER — NIFEDIPINE 90 MG/1
TABLET, EXTENDED RELEASE ORAL
Qty: 90 TABLET | Refills: 3 | Status: SHIPPED | OUTPATIENT
Start: 2021-07-10 | End: 2022-04-25

## 2021-07-28 DIAGNOSIS — J41.1 MUCOPURULENT CHRONIC BRONCHITIS (HCC): ICD-10-CM

## 2021-07-28 RX ORDER — MONTELUKAST SODIUM 10 MG/1
TABLET ORAL
Qty: 90 TABLET | Refills: 1 | Status: SHIPPED | OUTPATIENT
Start: 2021-07-28 | End: 2022-01-17

## 2021-08-09 ENCOUNTER — OFFICE VISIT (OUTPATIENT)
Dept: PAIN MEDICINE | Facility: CLINIC | Age: 71
End: 2021-08-09
Payer: MEDICARE

## 2021-08-09 VITALS
DIASTOLIC BLOOD PRESSURE: 58 MMHG | WEIGHT: 240 LBS | SYSTOLIC BLOOD PRESSURE: 100 MMHG | HEIGHT: 68 IN | BODY MASS INDEX: 36.37 KG/M2 | HEART RATE: 61 BPM

## 2021-08-09 DIAGNOSIS — Z98.1 STATUS POST LUMBAR AND LUMBOSACRAL FUSION BY ANTERIOR TECHNIQUE: ICD-10-CM

## 2021-08-09 DIAGNOSIS — Z96.89 STATUS POST INSERTION OF SPINAL CORD STIMULATOR: ICD-10-CM

## 2021-08-09 DIAGNOSIS — M48.062 SPINAL STENOSIS OF LUMBAR REGION WITH NEUROGENIC CLAUDICATION: ICD-10-CM

## 2021-08-09 DIAGNOSIS — M47.817 LUMBOSACRAL SPONDYLOSIS WITHOUT MYELOPATHY: ICD-10-CM

## 2021-08-09 DIAGNOSIS — G89.4 CHRONIC PAIN SYNDROME: Primary | ICD-10-CM

## 2021-08-09 DIAGNOSIS — M47.816 LUMBAR SPONDYLOSIS: ICD-10-CM

## 2021-08-09 DIAGNOSIS — M54.16 LUMBAR RADICULOPATHY: ICD-10-CM

## 2021-08-09 PROCEDURE — 99213 OFFICE O/P EST LOW 20 MIN: CPT | Performed by: NURSE PRACTITIONER

## 2021-08-09 NOTE — PROGRESS NOTES
Assessment:  1  Chronic pain syndrome    2  Lumbar radiculopathy    3  Lumbosacral spondylosis without myelopathy    4  Lumbar spondylosis    5  Spinal stenosis of lumbar region with neurogenic claudication    6  Status post lumbar and lumbosacral fusion by anterior technique    7  Status post insertion of spinal cord stimulator        Plan:  1  We can repeat lumbar RFA every 6-9 months p r n     I discussed with the patient that since there has been moderate to significant improvement in the pain symptoms, we will hold off on any repeat injections at this point in time  However, I reviewed with the patient that if their symptoms should return or worsen,  they should call our office to schedule to discuss repeating the injection  2  Patient may continue duloxetine, gabapentin, and diclofenac as prescribed  3  Patient will continue to follow with [employer] for reprogramming sessions of a spinal cord stimulator device as needed  4  Patient will continue with his home exercise program  5  I will follow up with the patient on a p r n  basis at this time     M*Modal software was used to dictate this note  It may contain errors with dictating incorrect words or incorrect spelling  Please contact the provider directly with any questions  History of Present Illness: The patient is a 80 y o  male  With a history of an L5-S1 ALIF and [employer] spinal cord stimulator last seen on 4/12/21 who presents for a follow up office visit in regards to chronic low back pain will radiate into the buttocks secondary to  Lumbar degenerative disc disease, lumbar spondylosis, lumbar stenosis, lumbar radiculopathy and chronic pain syndrome  The patient denies bowel or bladder incontinence or saddle anesthesia  Patient is status post lumbar ablation on June 22, 2021 and report significant improvement of his back pain from this procedure  He still has some back pain with long distances of ambulation, however overall much improved  He has undergone bilateral S1 TFESI in the past which provided relief of his pain for few days  He does feel that his spinal cord stimulator provides optimal relief to his lower extremity symptoms at this time  He currently rates his pain as 0/10 on the numeric pain rating scale  He states he will occasionally have pain which he describes as dull aching, pressure like, and numbness    MRI of the lumbar spine reveals multilevel degenerative spondylosis, mild to moderate bilateral foraminal and central stenosis at L1-2, grade 1 retrolisthesis with moderate central and bilateral foraminal stenosis at L2-3, mild to moderate central and bilateral foraminal stenosis at L3-4, grade 1 anterolisthesis with mild to moderate central and bilateral foraminal stenosis at L4-5, and moderate foraminal stenosis at L5-S1 with a left laminotomy at L4-5 and interbody fusion at L5-S1  Current pain medications includes: diclofenac 50 mg b i d  p r n , duloxetine 90 mg daily and gabapentin 1200 mg as prescribed by psychiatry    I have personally reviewed and/or updated the patient's past medical history, past surgical history, family history, social history, current medications, allergies, and vital signs today  Review of Systems:    Review of Systems   Respiratory: Negative for shortness of breath  Cardiovascular: Negative for chest pain  Gastrointestinal: Negative for constipation, diarrhea, nausea and vomiting  Musculoskeletal: Negative for arthralgias, gait problem, joint swelling and myalgias  Skin: Negative for rash  Neurological: Negative for dizziness, seizures and weakness  All other systems reviewed and are negative          Past Medical History:   Diagnosis Date    Bronchitis     Bronchitis, chronic obstructive, with exacerbation (HCC)     Last Assessed: 5/17/2013    Chronic pain disorder     [facility]     Last Assessed: 4/13/2015    Depression, controlled 8/30/2017 Transitioned From: Depression    Diabetes mellitus ([facility]     Elevated PSA     Bx neg ; Last Assessed: 2012    Hypertension     Irregular heart beat     MAT, afib    Lung nodule - resolved  CT 2017    Postoperative urinary retention        Past Surgical History:   Procedure Laterality Date    BACK SURGERY  2012    decompression of spinal stenosis from lower thoracic through the lumbar spine    COLONOSCOPY  10/2010    neg   recheck in 5 years   [address] ARTHROSCOPY  2013    [place] PERCUT IMPLNT [place] [facility] Right 2020    Procedure: INSERTION THORACIC DORSAL COLUMN SPINAL CORD STIMULATOR PERCUTANEOUS W IMPLANTABLE PULSE GENERATOR, RIGHT;  Surgeon: Ese Blackburn MD;  Location[facility];  Service: Neurosurgery    [place] WRIST Frances Ping LIG Left 3/4/2020    Procedure: RELEASE CARPAL TUNNEL ENDOSCOPIC;  Surgeon: David Traore MD;  [facility];  Service: Orthopedics    PROSTATE BIOPSY      for elevated PSA of about 7; neg    TRANSURETHRAL RESECTION OF PROSTATE  2013       Family History   Problem Relation Age of Onset    Breast cancer Mother     Pancreatitis Father     Diabetes Maternal Grandmother     Heart attack Maternal Grandfather     Heart attack Paternal Grandmother     Diabetes Paternal Grandmother     Diabetes Paternal Grandfather     No Known Problems Brother     Lung disease Paternal Uncle        Social History     Occupational History    Not on file   Tobacco Use    Smoking status: Former Smoker     Packs/day: 1 50     Years: 23 00     Pack years: 34 50     Types: Cigarettes     Start date:      Quit date:      Years since quittin 6    Smokeless tobacco: Never Used   Vaping Use    Vaping Use: Never used   Substance and Sexual Activity    Alcohol use: No     Comment: Stopped drinking    Drug use: No    Sexual activity: Yes         Current Outpatient Medications:     albuterol (PROVENTIL HFA,VENTOLIN HFA) 90 mcg/act inhaler, Inhale 2 puffs every 6 (six) hours as needed for wheezing or shortness of breath, Disp: 1 Inhaler, Rfl: 5    Cialis 5 MG tablet, TAKE 1 TABLET BY MOUTH EVERY DAY, Disp: 90 tablet, Rfl: 3    clonazePAM (KlonoPIN) 0 5 mg tablet, Take 1 tablet (0 5 mg total) by mouth daily at bedtime, Disp: 30 tablet, Rfl: 1    dextromethorphan-guaifenesin (MUCINEX DM)  MG per 12 hr tablet, Take 1 tablet by mouth every 12 (twelve) hours, Disp: , Rfl:     diclofenac sodium (VOLTAREN) 50 mg EC tablet, TAKE 1 TABLET BY MOUTH TWICE A DAY, Disp: 180 tablet, Rfl: 1    DULoxetine (CYMBALTA) 30 mg delayed release capsule, Take 90 mg by mouth daily , Disp: , Rfl:     DULoxetine (CYMBALTA) 60 mg delayed release capsule, Take 1 capsule by mouth daily, Disp: , Rfl:     fluticasone (FLONASE) 50 mcg/act nasal spray, fluticasone propionate 50 mcg/actuation nasal spray,suspension, Disp: , Rfl:     fluticasone-salmeterol (Wixela Inhub) 500-50 mcg/dose inhaler, Inhale 1 puff 2 (two) times a day Rinse mouth after use, Disp: 3 Inhaler, Rfl: 1    gabapentin (NEURONTIN) 300 mg capsule, Take 300 mg by mouth daily , Disp: , Rfl:     ipratropium-albuterol (DUO-NEB) 0 5-2 5 mg/3 mL nebulizer solution, Take 1 vial (3 mL total) by nebulization every 6 (six) hours as needed for wheezing or shortness of breath, Disp: 60 vial, Rfl: 1    metFORMIN (GLUCOPHAGE-XR) 500 mg 24 hr tablet, TAKE 1 TABLET BY MOUTH EVERY DAY WITH DINNER, Disp: 90 tablet, Rfl: 0    metoprolol tartrate (LOPRESSOR) 25 mg tablet, Take 1 tablet (25 mg total) by mouth every 12 (twelve) hours, Disp: 180 tablet, Rfl: 2    metoprolol tartrate (LOPRESSOR) 50 mg tablet, TAKE 1 TABLET BY MOUTH TWICE A DAY IN ADDITION TO 25 MG TABLET, Disp: 180 tablet, Rfl: 3    metoprolol tartrate (LOPRESSOR) 50 mg tablet, Take 1 tablet (50 mg total) by mouth 2 (two) times a day Take with 25 mg tab bid, Disp: 180 tablet, Rfl: 3    montelukast (SINGULAIR) 10 mg tablet, TAKE 1 TABLET BY MOUTH DAILY AT BEDTIME, Disp: 90 tablet, Rfl: 1    Multiple Vitamin (MULTI-VITAMIN DAILY) TABS, Take 1 tablet by mouth daily, Disp: , Rfl:     NIFEdipine (PROCARDIA XL) 90 mg 24 hr tablet, TAKE 1 TABLET BY MOUTH EVERY DAY, Disp: 90 tablet, Rfl: 3    rosuvastatin (CRESTOR) 40 MG tablet, Take 1 tablet (40 mg total) by mouth daily, Disp: 90 tablet, Rfl: 3    predniSONE 10 mg tablet, Take 4 tabs for 4 days, then 3 tabs for 2 days, then 2 tabs for 2 days and then 1 tab for 2 day (Patient not taking: Reported on 8/9/2021), Disp: 28 tablet, Rfl: 0    No Known Allergies    Physical Exam:    /58   Pulse 61   Ht 5' 8" (1 727 m)   Wt 109 kg (240 lb)   BMI 36 49 kg/m²     Constitutional:normal, well developed, well nourished, alert, in no distress and non-toxic and no overt pain behavior  Eyes:anicteric  HEENT:grossly intact  Neck:supple, symmetric, trachea midline and no masses   Pulmonary:even and unlabored  Cardiovascular:No edema or pitting edema present  Skin:Normal without rashes or lesions and well hydrated  Psychiatric:Mood and affect appropriate  Neurologic:Cranial Nerves II-XII grossly intact  Musculoskeletal:Slightly antalgic gait but steady without the use of assistive devices      Imaging  No orders to display   MRI LUMBAR SPINE WITH AND WITHOUT CONTRAST   INDICATION: M54 [id number]: Radiculopathy, lumbar region   M48 [id number]: Spinal stenosis, lumbar region with neurogenic claudication  COMPARISON: 9/19/2019 x-rays   TECHNIQUE: Sagittal T1, sagittal T2, sagittal inversion recovery, axial T1 and axial T2, coronal T2  Sagittal and axial T1 postcontrast    IV Contrast: 10 mL of gadobutrol injection (MULTI-DOSE)   IMAGE QUALITY: Diagnostic   FINDINGS:   VERTEBRAL BODIES: Grade 1 degenerative retrolisthesis L1-2, L2-3  Grade 1 degenerative anterolisthesis L4-5  No scoliosis  No compression fracture  Normal marrow signal is identified within the visualized bony structures  No discrete marrow   lesion  SACRUM: Normal signal within the sacrum  No evidence of insufficiency or stress fracture  DISTAL CORD AND CONUS: Normal size and signal within the distal cord and conus  PARASPINAL SOFT TISSUES: Paraspinal soft tissues are unremarkable  LOWER THORACIC DISC SPACES: Normal disc height and signal  No disc herniation, canal stenosis or foraminal narrowing  Transitional configuration of vertebral bodies at the lumbosacral junction considered to represent lumbarization of the 1st sacral segment  Utilizing this numbering convention, the anterior fusion hardware is at the L5-S1 level  This is consistent with   recent x-rays  LUMBAR DISC SPACES:   L1-L2: Moderate to severe degenerative spondylosis, grade 1 degenerative retrolisthesis, mild to moderate bilateral foraminal stenosis, mild central canal stenosis   L2-L3: Moderate to severe degenerative spondylosis, grade 1 retrolisthesis, left laminotomy, moderate central canal and bilateral foraminal stenosis   L3-L4: Moderate degenerative spondylosis, mild to moderate central canal and bilateral foraminal stenosis   L4-L5: Moderate degenerative spondylosis, bulging annulus, grade 1 anterolisthesis, mild central canal stenosis, moderate bilateral foraminal stenosis, left laminotomy   L5-S1: Anterior interbody fusion  Moderate bilateral foraminal stenosis  POSTCONTRAST IMAGING: No abnormal enhancement     IMPRESSION:   Multilevel degenerative spondylosis   Transitional anatomy at the lumbosacral junction   Mild central canal stenosis, grade 1 retrolisthesis, mild to moderate bilateral foraminal stenosis L1-2   Grade 1 retrolisthesis, left laminotomy, moderate central canal and bilateral foraminal stenosis L2-3   Mild to moderate central canal and bilateral foraminal stenosis L3-4   Grade 1 anterolisthesis, mild central canal stenosis, moderate bilateral foraminal stenosis, left laminotomy L4-5   Anterior interbody fusion, moderate bilateral foraminal stenosis L5-S1  No orders of the defined types were placed in this encounter

## 2021-08-11 DIAGNOSIS — F32.A ANXIETY AND DEPRESSION: ICD-10-CM

## 2021-08-11 DIAGNOSIS — F41.9 ANXIETY AND DEPRESSION: ICD-10-CM

## 2021-08-11 RX ORDER — CLONAZEPAM 0.5 MG/1
0.5 TABLET ORAL
Qty: 30 TABLET | Refills: 1 | Status: SHIPPED | OUTPATIENT
Start: 2021-08-11 | End: 2021-10-07 | Stop reason: SDUPTHER

## 2021-08-19 DIAGNOSIS — R00.2 PALPITATIONS: ICD-10-CM

## 2021-08-19 DIAGNOSIS — R00.0 TACHYCARDIA: ICD-10-CM

## 2021-09-21 DIAGNOSIS — R73.03 PREDIABETES: ICD-10-CM

## 2021-09-21 RX ORDER — METFORMIN HYDROCHLORIDE 500 MG/1
TABLET, EXTENDED RELEASE ORAL
Qty: 90 TABLET | Refills: 0 | Status: SHIPPED | OUTPATIENT
Start: 2021-09-21 | End: 2021-12-15

## 2021-10-07 DIAGNOSIS — F41.9 ANXIETY AND DEPRESSION: ICD-10-CM

## 2021-10-07 DIAGNOSIS — F32.A ANXIETY AND DEPRESSION: ICD-10-CM

## 2021-10-07 RX ORDER — CLONAZEPAM 0.5 MG/1
0.5 TABLET ORAL
Qty: 30 TABLET | Refills: 1 | Status: SHIPPED | OUTPATIENT
Start: 2021-10-07 | End: 2021-12-01 | Stop reason: SDUPTHER

## 2021-10-08 ENCOUNTER — APPOINTMENT (OUTPATIENT)
Dept: LAB | Facility: CLINIC | Age: 71
End: 2021-10-08
Payer: MEDICARE

## 2021-10-08 DIAGNOSIS — E78.2 MIXED HYPERLIPIDEMIA: ICD-10-CM

## 2021-10-08 DIAGNOSIS — R73.01 IMPAIRED FASTING GLUCOSE: ICD-10-CM

## 2021-10-08 DIAGNOSIS — E03.8 SUBCLINICAL HYPOTHYROIDISM: ICD-10-CM

## 2021-10-08 DIAGNOSIS — I10 ESSENTIAL HYPERTENSION: ICD-10-CM

## 2021-10-08 LAB
ALBUMIN SERPL BCP-MCNC: 3.5 G/DL (ref 3.5–5)
ALP SERPL-CCNC: 63 U/L (ref 46–116)
ALT SERPL W P-5'-P-CCNC: 19 U/L (ref 12–78)
ANION GAP SERPL CALCULATED.3IONS-SCNC: 4 MMOL/L (ref 4–13)
AST SERPL W P-5'-P-CCNC: 11 U/L (ref 5–45)
BILIRUB SERPL-MCNC: 0.53 MG/DL (ref 0.2–1)
BUN SERPL-MCNC: 14 MG/DL (ref 5–25)
CALCIUM SERPL-MCNC: 9.4 MG/DL (ref 8.3–10.1)
CHLORIDE SERPL-SCNC: 108 MMOL/L (ref 100–108)
CHOLEST SERPL-MCNC: 122 MG/DL (ref 50–200)
CO2 SERPL-SCNC: 28 MMOL/L (ref 21–32)
CREAT SERPL-MCNC: 1.14 MG/DL (ref 0.6–1.3)
ERYTHROCYTE [DISTWIDTH] IN BLOOD BY AUTOMATED COUNT: 13 % (ref 11.6–15.1)
EST. AVERAGE GLUCOSE BLD GHB EST-MCNC: 120 MG/DL
GFR SERPL CREATININE-BSD FRML MDRD: 65 ML/MIN/1.73SQ M
GLUCOSE P FAST SERPL-MCNC: 90 MG/DL (ref 65–99)
HBA1C MFR BLD: 5.8 %
HCT VFR BLD AUTO: 48.1 % (ref 36.5–49.3)
HDLC SERPL-MCNC: 37 MG/DL
HGB BLD-MCNC: 15.6 G/DL (ref 12–17)
LDLC SERPL CALC-MCNC: 63 MG/DL (ref 0–100)
MCH RBC QN AUTO: 30.5 PG (ref 26.8–34.3)
MCHC RBC AUTO-ENTMCNC: 32.4 G/DL (ref 31.4–37.4)
MCV RBC AUTO: 94 FL (ref 82–98)
NONHDLC SERPL-MCNC: 85 MG/DL
PLATELET # BLD AUTO: 236 THOUSANDS/UL (ref 149–390)
PMV BLD AUTO: 10.5 FL (ref 8.9–12.7)
POTASSIUM SERPL-SCNC: 4.1 MMOL/L (ref 3.5–5.3)
PROT SERPL-MCNC: 6.9 G/DL (ref 6.4–8.2)
RBC # BLD AUTO: 5.11 MILLION/UL (ref 3.88–5.62)
SODIUM SERPL-SCNC: 140 MMOL/L (ref 136–145)
TRIGL SERPL-MCNC: 109 MG/DL
TSH SERPL DL<=0.05 MIU/L-ACNC: 2.94 UIU/ML (ref 0.36–3.74)
WBC # BLD AUTO: 7.82 THOUSAND/UL (ref 4.31–10.16)

## 2021-10-08 PROCEDURE — 83036 HEMOGLOBIN GLYCOSYLATED A1C: CPT

## 2021-10-08 PROCEDURE — 36415 COLL VENOUS BLD VENIPUNCTURE: CPT

## 2021-10-08 PROCEDURE — 85027 COMPLETE CBC AUTOMATED: CPT

## 2021-10-08 PROCEDURE — 80053 COMPREHEN METABOLIC PANEL: CPT

## 2021-10-08 PROCEDURE — 80061 LIPID PANEL: CPT

## 2021-10-08 PROCEDURE — 84443 ASSAY THYROID STIM HORMONE: CPT

## 2021-10-14 ENCOUNTER — OFFICE VISIT (OUTPATIENT)
Dept: FAMILY MEDICINE CLINIC | Facility: CLINIC | Age: 71
End: 2021-10-14
Payer: MEDICARE

## 2021-10-14 ENCOUNTER — TELEPHONE (OUTPATIENT)
Dept: CARDIOLOGY CLINIC | Facility: CLINIC | Age: 71
End: 2021-10-14

## 2021-10-14 VITALS
RESPIRATION RATE: 20 BRPM | WEIGHT: 218.4 LBS | SYSTOLIC BLOOD PRESSURE: 85 MMHG | HEIGHT: 68 IN | HEART RATE: 60 BPM | DIASTOLIC BLOOD PRESSURE: 55 MMHG | TEMPERATURE: 97.5 F | OXYGEN SATURATION: 97 % | BODY MASS INDEX: 33.1 KG/M2

## 2021-10-14 DIAGNOSIS — E66.9 OBESITY (BMI 30-39.9): ICD-10-CM

## 2021-10-14 DIAGNOSIS — N40.1 BENIGN PROSTATIC HYPERPLASIA WITH URINARY RETENTION: ICD-10-CM

## 2021-10-14 DIAGNOSIS — J41.1 MUCOPURULENT CHRONIC BRONCHITIS (HCC): ICD-10-CM

## 2021-10-14 DIAGNOSIS — F32.A ANXIETY AND DEPRESSION: ICD-10-CM

## 2021-10-14 DIAGNOSIS — I10 PRIMARY HYPERTENSION: Primary | ICD-10-CM

## 2021-10-14 DIAGNOSIS — R73.01 IMPAIRED FASTING GLUCOSE: ICD-10-CM

## 2021-10-14 DIAGNOSIS — E78.2 MIXED HYPERLIPIDEMIA: ICD-10-CM

## 2021-10-14 DIAGNOSIS — M47.816 LUMBAR SPONDYLOSIS: ICD-10-CM

## 2021-10-14 DIAGNOSIS — F41.9 ANXIETY AND DEPRESSION: ICD-10-CM

## 2021-10-14 DIAGNOSIS — Z23 ENCOUNTER FOR IMMUNIZATION: ICD-10-CM

## 2021-10-14 DIAGNOSIS — R33.8 BENIGN PROSTATIC HYPERPLASIA WITH URINARY RETENTION: ICD-10-CM

## 2021-10-14 PROCEDURE — 90662 IIV NO PRSV INCREASED AG IM: CPT

## 2021-10-14 PROCEDURE — 99214 OFFICE O/P EST MOD 30 MIN: CPT | Performed by: FAMILY MEDICINE

## 2021-10-14 PROCEDURE — G0008 ADMIN INFLUENZA VIRUS VAC: HCPCS

## 2021-10-14 RX ORDER — OXYBUTYNIN CHLORIDE 5 MG/1
5 TABLET ORAL 2 TIMES DAILY PRN
COMMUNITY
Start: 2021-08-31

## 2021-11-14 DIAGNOSIS — N40.0 BENIGN PROSTATIC HYPERPLASIA, UNSPECIFIED WHETHER LOWER URINARY TRACT SYMPTOMS PRESENT: ICD-10-CM

## 2021-11-15 RX ORDER — TADALAFIL 5 MG
TABLET ORAL
Qty: 90 TABLET | Refills: 0 | Status: SHIPPED | OUTPATIENT
Start: 2021-11-15 | End: 2022-05-18 | Stop reason: SDUPTHER

## 2021-11-26 DIAGNOSIS — M17.0 OSTEOARTHRITIS OF BOTH KNEES, UNSPECIFIED OSTEOARTHRITIS TYPE: ICD-10-CM

## 2021-11-30 ENCOUNTER — TELEPHONE (OUTPATIENT)
Dept: FAMILY MEDICINE CLINIC | Facility: CLINIC | Age: 71
End: 2021-11-30

## 2021-11-30 DIAGNOSIS — Z20.822 EXPOSURE TO COVID-19 VIRUS: Primary | ICD-10-CM

## 2021-12-01 DIAGNOSIS — F41.9 ANXIETY AND DEPRESSION: ICD-10-CM

## 2021-12-01 DIAGNOSIS — F32.A ANXIETY AND DEPRESSION: ICD-10-CM

## 2021-12-01 PROCEDURE — U0005 INFEC AGEN DETEC AMPLI PROBE: HCPCS | Performed by: FAMILY MEDICINE

## 2021-12-01 PROCEDURE — U0003 INFECTIOUS AGENT DETECTION BY NUCLEIC ACID (DNA OR RNA); SEVERE ACUTE RESPIRATORY SYNDROME CORONAVIRUS 2 (SARS-COV-2) (CORONAVIRUS DISEASE [COVID-19]), AMPLIFIED PROBE TECHNIQUE, MAKING USE OF HIGH THROUGHPUT TECHNOLOGIES AS DESCRIBED BY CMS-2020-01-R: HCPCS | Performed by: FAMILY MEDICINE

## 2021-12-02 ENCOUNTER — TELEPHONE (OUTPATIENT)
Dept: PAIN MEDICINE | Facility: CLINIC | Age: 71
End: 2021-12-02

## 2021-12-02 DIAGNOSIS — M47.816 LUMBAR SPONDYLOSIS: Primary | ICD-10-CM

## 2021-12-02 RX ORDER — CLONAZEPAM 0.5 MG/1
0.5 TABLET ORAL
Qty: 30 TABLET | Refills: 1 | Status: SHIPPED | OUTPATIENT
Start: 2021-12-02 | End: 2022-01-26 | Stop reason: SDUPTHER

## 2021-12-08 ENCOUNTER — HOSPITAL ENCOUNTER (OUTPATIENT)
Dept: RADIOLOGY | Facility: CLINIC | Age: 71
Discharge: HOME | End: 2021-12-08
Attending: ANESTHESIOLOGY | Admitting: ANESTHESIOLOGY
Payer: MEDICARE

## 2021-12-08 VITALS
SYSTOLIC BLOOD PRESSURE: 125 MMHG | HEART RATE: 58 BPM | OXYGEN SATURATION: 98 % | TEMPERATURE: 97.8 F | RESPIRATION RATE: 20 BRPM | DIASTOLIC BLOOD PRESSURE: 88 MMHG

## 2021-12-08 DIAGNOSIS — M47.816 LUMBAR SPONDYLOSIS: ICD-10-CM

## 2021-12-08 PROCEDURE — 64494 INJ PARAVERT F JNT L/S 2 LEV: CPT | Performed by: ANESTHESIOLOGY

## 2021-12-08 PROCEDURE — 64493 INJ PARAVERT F JNT L/S 1 LEV: CPT | Performed by: ANESTHESIOLOGY

## 2021-12-08 RX ORDER — LIDOCAINE HYDROCHLORIDE 10 MG/ML
10 INJECTION, SOLUTION EPIDURAL; INFILTRATION; INTRACAUDAL; PERINEURAL ONCE
Status: COMPLETED | OUTPATIENT
Start: 2021-12-08 | End: 2021-12-08

## 2021-12-08 RX ADMIN — LIDOCAINE HYDROCHLORIDE 3 ML: 20 INJECTION, SOLUTION EPIDURAL; INFILTRATION; INTRACAUDAL; PERINEURAL at 11:57

## 2021-12-08 RX ADMIN — LIDOCAINE HYDROCHLORIDE 4 ML: 10 INJECTION, SOLUTION EPIDURAL; INFILTRATION; INTRACAUDAL; PERINEURAL at 11:56

## 2021-12-15 DIAGNOSIS — J44.1 COPD WITH ACUTE EXACERBATION (HCC): ICD-10-CM

## 2021-12-15 DIAGNOSIS — R73.03 PREDIABETES: ICD-10-CM

## 2021-12-15 RX ORDER — METFORMIN HYDROCHLORIDE 500 MG/1
TABLET, EXTENDED RELEASE ORAL
Qty: 90 TABLET | Refills: 0 | Status: SHIPPED | OUTPATIENT
Start: 2021-12-15 | End: 2022-03-14

## 2021-12-17 ENCOUNTER — TELEPHONE (OUTPATIENT)
Dept: PAIN MEDICINE | Facility: CLINIC | Age: 71
End: 2021-12-17

## 2021-12-17 DIAGNOSIS — M47.816 LUMBAR SPONDYLOSIS: Primary | ICD-10-CM

## 2021-12-27 DIAGNOSIS — J44.1 COPD WITH ACUTE EXACERBATION (HCC): ICD-10-CM

## 2021-12-27 RX ORDER — ALBUTEROL SULFATE 90 UG/1
AEROSOL, METERED RESPIRATORY (INHALATION)
Qty: 18 G | Refills: 5 | Status: SHIPPED | OUTPATIENT
Start: 2021-12-27 | End: 2022-04-25 | Stop reason: ALTCHOICE

## 2022-01-16 DIAGNOSIS — J41.1 MUCOPURULENT CHRONIC BRONCHITIS (HCC): ICD-10-CM

## 2022-01-17 RX ORDER — MONTELUKAST SODIUM 10 MG/1
TABLET ORAL
Qty: 90 TABLET | Refills: 1 | Status: SHIPPED | OUTPATIENT
Start: 2022-01-17 | End: 2022-07-13

## 2022-01-19 ENCOUNTER — HOSPITAL ENCOUNTER (OUTPATIENT)
Dept: RADIOLOGY | Facility: CLINIC | Age: 72
Discharge: HOME | End: 2022-01-19
Attending: ANESTHESIOLOGY
Payer: MEDICARE

## 2022-01-19 ENCOUNTER — TELEPHONE (OUTPATIENT)
Dept: PAIN MEDICINE | Facility: CLINIC | Age: 72
End: 2022-01-19

## 2022-01-19 VITALS
RESPIRATION RATE: 20 BRPM | HEART RATE: 57 BPM | DIASTOLIC BLOOD PRESSURE: 75 MMHG | SYSTOLIC BLOOD PRESSURE: 132 MMHG | OXYGEN SATURATION: 97 %

## 2022-01-19 DIAGNOSIS — M47.816 LUMBAR SPONDYLOSIS: ICD-10-CM

## 2022-01-19 PROCEDURE — 64636 DESTROY L/S FACET JNT ADDL: CPT | Performed by: ANESTHESIOLOGY

## 2022-01-19 PROCEDURE — 64635 DESTROY LUMB/SAC FACET JNT: CPT | Performed by: ANESTHESIOLOGY

## 2022-01-19 RX ORDER — BUPIVACAINE HYDROCHLORIDE 5 MG/ML
30 INJECTION, SOLUTION EPIDURAL; INTRACAUDAL ONCE
Status: COMPLETED | OUTPATIENT
Start: 2022-01-19 | End: 2022-01-19

## 2022-01-19 RX ORDER — LIDOCAINE HYDROCHLORIDE 10 MG/ML
10 INJECTION, SOLUTION EPIDURAL; INFILTRATION; INTRACAUDAL; PERINEURAL ONCE
Status: COMPLETED | OUTPATIENT
Start: 2022-01-19 | End: 2022-01-19

## 2022-01-19 RX ADMIN — BUPIVACAINE HYDROCHLORIDE 3 ML: 5 INJECTION, SOLUTION EPIDURAL; INTRACAUDAL at 15:09

## 2022-01-19 RX ADMIN — LIDOCAINE HYDROCHLORIDE 3 ML: 20 INJECTION, SOLUTION EPIDURAL; INFILTRATION; INTRACAUDAL; PERINEURAL at 15:09

## 2022-01-19 RX ADMIN — LIDOCAINE HYDROCHLORIDE 10 ML: 10 INJECTION, SOLUTION EPIDURAL; INFILTRATION; INTRACAUDAL; PERINEURAL at 15:02

## 2022-01-19 NOTE — H&P
History of Present Illness: The patient is a 80 y o  male who presents with complaints of low back pain  Patient Active Problem List   Diagnosis    Allergic rhinitis    Enlarged prostate with lower urinary tract symptoms (LUTS)    Chronic bronchitis (HCC)    Hyperlipidemia    Hypertension    Impaired fasting glucose    Male erectile dysfunction    Multifocal atrial tachycardia (HCC)    Obesity (BMI 30-39  9)    Osteoarthritis of knee    Osteoarthritis of hip    Seasonal allergies    Spinal stenosis of lumbar region with neurogenic claudication    Supraventricular tachycardia (HCC)    Anxiety and depression    Arthritis of left hip    COPD (chronic obstructive pulmonary disease) (HCC)    Elevated prostate specific antigen (PSA)    Lumbosacral spondylosis without myelopathy    Incomplete emptying of bladder    Retention of urine    Screening for prostate cancer    Thoracic or lumbosacral neuritis or radiculitis    Urethral stricture    Spondylolisthesis, acquired    Degeneration of lumbosacral intervertebral disc    Full thickness rotator cuff tear    Knee pain    Localized, primary osteoarthritis    Primary localized osteoarthritis of pelvic region and thigh    Low back pain    Lumbosacral neuritis    Pain in limb    Pseudoclaudication syndrome    Shoulder pain    Cervical stenosis of spinal canal    Subclinical hypothyroidism    Lumbar radiculopathy    Chronic pain syndrome    Status post lumbar and lumbosacral fusion by anterior technique    Status post insertion of spinal cord stimulator    Lumbar spondylosis    PITTMAN (dyspnea on exertion)       Past Medical History:   Diagnosis Date    Bronchitis     [facility] obstructive[facility] exacerbation [facility]     Last Assessed: 5/17/2013    Chronic pain disorder     [facility]     Last Assessed: 4/13/2015    Depression, controlled 8/30/2017    Transitioned From: Depression    Diabetes mellitus ([facility] )     Elevated PSA     Bx neg 2012; Last Assessed: 11/23/2012    Hypertension     Irregular heart beat     MAT, afib    Lung nodule - resolved 2017 CT 1/4/2017    Postoperative urinary retention        Past Surgical History:   Procedure Laterality Date    BACK SURGERY  07/25/2012    decompression of spinal stenosis from lower thoracic through the lumbar spine    COLONOSCOPY  10/2010    neg   recheck in 5 years   [address] ARTHROSCOPY  01/2013    IN PERCUT IMPLNT NEUROELECT,EPIDURAL Right 8/25/2020    Procedure: INSERTION THORACIC DORSAL COLUMN SPINAL CORD STIMULATOR PERCUTANEOUS W IMPLANTABLE PULSE GENERATOR, RIGHT;  Surgeon: Bety Ngo MD;  Location[facility];  Service: Neurosurgery    IN WRIST Jinnie Footman LIG Left 3/4/2020    Procedure: RELEASE CARPAL TUNNEL ENDOSCOPIC;  Surgeon: Emerita Quintanilla MD;  [facility];  Service: Orthopedics    PROSTATE BIOPSY  2012    for elevated PSA of about 7; neg    TRANSURETHRAL RESECTION OF PROSTATE  06/27/2013         Current Outpatient Medications:     albuterol (PROVENTIL HFA,VENTOLIN HFA) 90 mcg/act inhaler, TAKE 2 PUFFS BY MOUTH EVERY 6 HOURS AS NEEDED FOR WHEEZE OR FOR SHORTNESS OF BREATH, Disp: 18 g, Rfl: 5    Cialis 5 MG tablet, TAKE 1 TABLET BY MOUTH EVERY DAY, Disp: 90 tablet, Rfl: 0    clonazePAM (KlonoPIN) 0 5 mg tablet, Take 1 tablet (0 5 mg total) by mouth daily at bedtime, Disp: 30 tablet, Rfl: 1    dextromethorphan-guaifenesin (MUCINEX DM)  MG per 12 hr tablet, Take 1 tablet by mouth every 12 (twelve) hours, Disp: , Rfl:     diclofenac sodium (VOLTAREN) 50 mg EC tablet, TAKE 1 TABLET BY MOUTH TWICE A DAY, Disp: 180 tablet, Rfl: 1    DULoxetine (CYMBALTA) 30 mg delayed release capsule, Take 90 mg by mouth daily , Disp: , Rfl:     DULoxetine (CYMBALTA) 60 mg delayed release capsule, Take 1 capsule by mouth daily, Disp: , Rfl:     fluticasone (FLONASE) 50 mcg/act nasal spray, fluticasone propionate 50 mcg/actuation nasal spray,suspension, Disp: , Rfl:     gabapentin (NEURONTIN) 300 mg capsule, Take 300 mg by mouth daily , Disp: , Rfl:     ipratropium-albuterol (DUO-NEB) 0 5-2 5 mg/3 mL nebulizer solution, Take 1 vial (3 mL total) by nebulization every 6 (six) hours as needed for wheezing or shortness of breath, Disp: 60 vial, Rfl: 1    metFORMIN (GLUCOPHAGE-XR) 500 mg 24 hr tablet, TAKE 1 TABLET BY MOUTH EVERY DAY WITH DINNER, Disp: 90 tablet, Rfl: 0    metoprolol tartrate (LOPRESSOR) 25 mg tablet, TAKE 1 TABLET (25 MG TOTAL) BY MOUTH EVERY 12 (TWELVE) HOURS, Disp: 180 tablet, Rfl: 2    metoprolol tartrate (LOPRESSOR) 50 mg tablet, TAKE 1 TABLET BY MOUTH TWICE A DAY IN ADDITION TO 25 MG TABLET, Disp: 180 tablet, Rfl: 3    metoprolol tartrate (LOPRESSOR) 50 mg tablet, Take 1 tablet (50 mg total) by mouth 2 (two) times a day Take with 25 mg tab bid, Disp: 180 tablet, Rfl: 3    montelukast (SINGULAIR) 10 mg tablet, TAKE 1 TABLET BY MOUTH EVERYDAY AT BEDTIME, Disp: 90 tablet, Rfl: 1    Multiple Vitamin (MULTI-VITAMIN DAILY) TABS, Take 1 tablet by mouth daily, Disp: , Rfl:     NIFEdipine (PROCARDIA XL) 90 mg 24 hr tablet, TAKE 1 TABLET BY MOUTH EVERY DAY, Disp: 90 tablet, Rfl: 3    oxybutynin (DITROPAN) 5 mg tablet, Take 5 mg by mouth 2 (two) times a day as needed, Disp: , Rfl:     rosuvastatin (CRESTOR) 40 MG tablet, Take 1 tablet (40 mg total) by mouth daily, Disp: 90 tablet, Rfl: 3    Wixela Inhub 500-50 MCG/DOSE inhaler, INHALE 1 PUFF 2 TIMES A DAY RINSE MOUTH AFTER USE , Disp: 60 blister, Rfl: 1    No Known Allergies    Physical Exam:   Vitals:    01/19/22 1449   BP: 130/75   Pulse: 56   Resp: 20   SpO2: 98%     General: Awake, Alert, Oriented x 3, Mood and affect appropriate  Respiratory: Respirations even and unlabored  Cardiovascular: Peripheral pulses intact; no edema  Musculoskeletal Exam:  Left lumbar paraspinals tender to palpation      ASA Score: 3    Patient/Chart Verification  Patient ID Verified: Verbal  ID Band Applied: No  Consents Confirmed: Procedural,To be obtained in the Pre-Procedure area  H&P( within 30 days) Verified: To be obtained in the Pre-Procedure area  Allergies Reviewed: Yes  Anticoag/NSAID held?: NA  Currently on antibiotics?: No    Assessment:   1   Lumbar spondylosis        Plan: LEFT L3-5 RFA

## 2022-01-19 NOTE — TELEPHONE ENCOUNTER
Patient is s/p a Left L3-L5 RFA c/ JW on 1/19/22  Next RFA scheduled for 2/2/22  No OVS scheduled  Please call on 1/20/22 post RFA   Thanks

## 2022-01-19 NOTE — DISCHARGE INSTRUCTIONS

## 2022-01-20 NOTE — TELEPHONE ENCOUNTER
RN s/w pt  Pt states that he has had no pain so far after the procedure  Pt denies sunburn like sensation, fevers or signs of infection  Pt aware it yohan take 2 weeks to notice a difference and 4-6 weeks for the full effect     Confirmed next OPRO

## 2022-01-21 ENCOUNTER — OFFICE VISIT (OUTPATIENT)
Dept: CARDIOLOGY CLINIC | Facility: CLINIC | Age: 72
End: 2022-01-21
Payer: MEDICARE

## 2022-01-21 VITALS
OXYGEN SATURATION: 97 % | DIASTOLIC BLOOD PRESSURE: 60 MMHG | BODY MASS INDEX: 27.74 KG/M2 | HEART RATE: 55 BPM | HEIGHT: 69 IN | SYSTOLIC BLOOD PRESSURE: 104 MMHG | WEIGHT: 187.3 LBS

## 2022-01-21 DIAGNOSIS — I47.1 SUPRAVENTRICULAR TACHYCARDIA (HCC): ICD-10-CM

## 2022-01-21 DIAGNOSIS — E78.2 MIXED HYPERLIPIDEMIA: ICD-10-CM

## 2022-01-21 DIAGNOSIS — I47.1 MULTIFOCAL ATRIAL TACHYCARDIA (HCC): Primary | ICD-10-CM

## 2022-01-21 DIAGNOSIS — R06.00 DOE (DYSPNEA ON EXERTION): ICD-10-CM

## 2022-01-21 DIAGNOSIS — I10 PRIMARY HYPERTENSION: ICD-10-CM

## 2022-01-21 DIAGNOSIS — J44.9 CHRONIC OBSTRUCTIVE PULMONARY DISEASE, UNSPECIFIED COPD TYPE (HCC): ICD-10-CM

## 2022-01-21 PROCEDURE — 93000 ELECTROCARDIOGRAM COMPLETE: CPT | Performed by: INTERNAL MEDICINE

## 2022-01-21 PROCEDURE — 99214 OFFICE O/P EST MOD 30 MIN: CPT | Performed by: INTERNAL MEDICINE

## 2022-01-21 NOTE — PROGRESS NOTES
Cardiology Follow Up    Laci Rodriguez  1950  [id number]  [address] [facility]  [address]    1  Multifocal atrial tachycardia (HCC)  POCT ECG   2  Supraventricular tachycardia ([address] )     3  Primary hypertension     4  Chronic obstructive pulmonary disease, unspecified COPD type ([address] )     5  Mixed hyperlipidemia     6  PITTMAN (dyspnea on exertion)         Discussion/Summary:   Overall he has been doing excellent he has lost about 50 lb since our last visit  He has been doing a strict diet and exercising 5 days a week  Functional capacity is excellent  Denies any recurrence of atrial tachycardia  Blood pressure and lipids have been very well controlled no further testing will see him back in 8 months  Interval History:   71-year-old gentleman with history of multifocal atrial tachycardia, hypertension, dyslipidemia, obesity presents for routine scheduled follow-up visit  He has been doing some new diet and exercise trying to lose weight but has had a hard time doing this  Overall he has been doing great since our last visit  He has been working on diet and exercise and has lost quite a bit of weight since her last appointment  He denies any chest pain, shortness of breath, palpitations, lightheadedness, dizziness, or syncope  He has been a lotion edema, PND, orthopnea      Problem List     Allergic rhinitis    Enlarged prostate with lower urinary tract symptoms (LUTS)    Chronic bronchitis (HCC)    Depression, controlled    Overview Signed 2/27/2018  1:10 PM by Darron Jeronimo MD     Transitioned From: Depression         Elevated ALT measurement    Hyperlipidemia    Hypertension    Impaired fasting glucose    Lung nodule    Male erectile dysfunction    Multifocal atrial tachycardia (HCC)    Obesity    Osteoarthritis of knee    Osteoarthritis of left hip    Seasonal allergies    Spinal stenosis of lumbar region    Overview Addendum 3/6/2018  3:00 PM by Mordechai Nyhan, MD     Description: Cervical and lumbar  Sees Dr Apoorva Wilson  Had surgical decompression T12 to L1 2012           Supraventricular tachycardia (HCC)    Anxiety and depression    Arthritis of left hip    COPD (chronic obstructive pulmonary disease) (HCC)    Elevated prostate specific antigen (PSA)    Lumbosacral spondylosis    Incomplete emptying of bladder    Other affections of shoulder region, not elsewhere classified    Personal history of other disorder of urinary system    Retention of urine    Screening for prostate cancer    Thoracic or lumbosacral neuritis or radiculitis    Urethral stricture    Increased frequency of urination        Past Medical History:   Diagnosis Date    Bronchitis     Bronchitis, chronic obstructive, with exacerbation ([facility] [address] )     Last Assessed: 2013    Chronic pain disorder     COPD with acute exacerbation ([facility] )     Last Assessed: 2015    Depression, controlled 2017    Transitioned From: Depression    Diabetes mellitus ([facility] )     Elevated PSA     Bx neg ; Last Assessed: 2012    Hypertension     Irregular heart beat     MAT, afib    Lung nodule - resolved  CT 2017    Postoperative urinary retention      Social History     Socioeconomic History    Marital status: /Civil Union     Spouse name: Not on file    Number of children: Not on file    Years of education: Not on file    Highest education level: Not on file   Occupational History    Not on file   Tobacco Use    Smoking status: Former Smoker     Packs/day: 1 50     Years: 23 00     Pack years: 34 50     Types: Cigarettes     Start date:      Quit date:      Years since quittin 0    Smokeless tobacco: Never Used   Vaping Use    Vaping Use: Never used   Substance and Sexual Activity    Alcohol use: No     Comment: Stopped drinking    Drug use: No    Sexual activity: Yes   Other Topics Concern    Not on file   Social History Narrative    [employer]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, torch    Asbestos removal - use [address] [place] [address]     Social Determinants of Health     Financial Resource Strain: Not on [employer] Insecurity: Not on file   Transportation Needs: Not on file   Physical Activity: Not on file   Stress: Not on file   Social Connections: Not on file   Intimate Partner Violence: Not on file   Housing Stability: Not on file      Family History   Problem Relation Age of Onset    Breast cancer Mother     Pancreatitis Father     Diabetes Maternal Grandmother     Heart attack Maternal Grandfather     Heart attack Paternal Grandmother     Diabetes Paternal Grandmother     Diabetes Paternal Grandfather     No Known Problems Brother     Lung disease Paternal Uncle      Past Surgical History:   Procedure Laterality Date    BACK SURGERY  07/25/2012    decompression of spinal stenosis from lower thoracic through the lumbar spine    COLONOSCOPY  10/2010    neg   recheck in 5 years   [address] ARTHROSCOPY  01/2013    NH PERCUT IMPLNT NEUROELECT,EPIDURAL Right 8/25/2020    Procedure: INSERTION THORACIC DORSAL COLUMN SPINAL CORD STIMULATOR PERCUTANEOUS W IMPLANTABLE PULSE GENERATOR, RIGHT;  Surgeon: Donavan Landaverde MD;  Location[facility];  Service: Neurosurgery    NH WRIST Julianna Cohen LIG Left 3/4/2020    Procedure: RELEASE CARPAL TUNNEL ENDOSCOPIC;  Surgeon: Charlotte Ybarra MD;  [facility];  Service: Orthopedics    PROSTATE BIOPSY  2012    for elevated PSA of about 7; neg    TRANSURETHRAL RESECTION OF PROSTATE  06/27/2013       Current Outpatient Medications:     Cialis 5 MG tablet, TAKE 1 TABLET BY MOUTH EVERY DAY, Disp: 90 tablet, Rfl: 0    clonazePAM (KlonoPIN) 0 5 mg tablet, Take 1 tablet (0 5 mg total) by mouth daily at bedtime, Disp: 30 tablet, Rfl: 1    dextromethorphan-guaifenesin (MUCINEX DM)  MG per 12 hr tablet, Take 1 tablet by mouth every 12 (twelve) hours, Disp: , Rfl:     diclofenac sodium (VOLTAREN) 50 mg EC tablet, TAKE 1 TABLET BY MOUTH TWICE A DAY, Disp: 180 tablet, Rfl: 1    DULoxetine (CYMBALTA) 30 mg delayed release capsule, Take 90 mg by mouth daily , Disp: , Rfl:     fluticasone (FLONASE) 50 mcg/act nasal spray, fluticasone propionate 50 mcg/actuation nasal spray,suspension, Disp: , Rfl:     gabapentin (NEURONTIN) 300 mg capsule, Take 300 mg by mouth daily , Disp: , Rfl:     metFORMIN (GLUCOPHAGE-XR) 500 mg 24 hr tablet, TAKE 1 TABLET BY MOUTH EVERY DAY WITH DINNER, Disp: 90 tablet, Rfl: 0    metoprolol tartrate (LOPRESSOR) 50 mg tablet, Take 1 tablet (50 mg total) by mouth 2 (two) times a day Take with 25 mg tab bid (Patient taking differently: Take 50 mg by mouth 2 (two) times a day  ), Disp: 180 tablet, Rfl: 3    montelukast (SINGULAIR) 10 mg tablet, TAKE 1 TABLET BY MOUTH EVERYDAY AT BEDTIME, Disp: 90 tablet, Rfl: 1    Multiple Vitamin (MULTI-VITAMIN DAILY) TABS, Take 1 tablet by mouth daily, Disp: , Rfl:     NIFEdipine (PROCARDIA XL) 90 mg 24 hr tablet, TAKE 1 TABLET BY MOUTH EVERY DAY, Disp: 90 tablet, Rfl: 3    oxybutynin (DITROPAN) 5 mg tablet, Take 5 mg by mouth 2 (two) times a day as needed, Disp: , Rfl:     rosuvastatin (CRESTOR) 40 MG tablet, Take 1 tablet (40 mg total) by mouth daily, Disp: 90 tablet, Rfl: 3    Wixela Inhub 500-50 MCG/DOSE inhaler, INHALE 1 PUFF 2 TIMES A DAY RINSE MOUTH AFTER USE , Disp: 60 blister, Rfl: 1    albuterol (PROVENTIL HFA,VENTOLIN HFA) 90 mcg/act inhaler, TAKE 2 PUFFS BY MOUTH EVERY 6 HOURS AS NEEDED FOR WHEEZE OR FOR SHORTNESS OF BREATH, Disp: 18 g, Rfl: 5    DULoxetine (CYMBALTA) 60 mg delayed release capsule, Take 1 capsule by mouth daily (Patient not taking: Reported on 1/21/2022 ), Disp: , Rfl:     ipratropium-albuterol (DUO-NEB) 0 5-2 5 mg/3 mL nebulizer solution, Take 1 vial (3 mL total) by nebulization every 6 (six) hours as needed for wheezing or shortness of breath, Disp: 60 vial, Rfl: 1    metoprolol tartrate (LOPRESSOR) 25 mg tablet, TAKE 1 TABLET (25 MG TOTAL) BY MOUTH EVERY 12 (TWELVE) HOURS, Disp: 180 tablet, Rfl: 2    metoprolol tartrate (LOPRESSOR) 50 mg tablet, TAKE 1 TABLET BY MOUTH TWICE A DAY IN ADDITION TO 25 MG TABLET, Disp: 180 tablet, Rfl: 3  No Known Allergies    Labs:     Chemistry        Component Value Date/Time     09/08/2015 1027    K 4 1 10/08/2021 0859    K 4 6 09/08/2015 1027     10/08/2021 0859     09/08/2015 1027    CO2 28 10/08/2021 0859    CO2 27 09/08/2015 1027    BUN 14 10/08/2021 0859    BUN 15 09/08/2015 1027    CREATININE 1 14 10/08/2021 0859    CREATININE 0 81 09/08/2015 1027        Component Value Date/Time    CALCIUM 9 4 10/08/2021 0859    CALCIUM 9 3 09/08/2015 1027    ALKPHOS 63 10/08/2021 0859    ALKPHOS 121 07/21/2014 0910    AST 11 10/08/2021 0859    AST 18 07/21/2014 0910    ALT 19 10/08/2021 0859    ALT 34 07/21/2014 0910    BILITOT 0 45 07/21/2014 0910            Lab Results   Component Value Date    CHOL 190 07/21/2014     Lab Results   Component Value Date    HDL 37 (L) 10/08/2021    HDL 46 03/24/2021    HDL 40 09/20/2019     Lab Results   Component Value Date    LDLCALC 63 10/08/2021    LDLCALC 105 (H) 03/24/2021    LDLCALC 97 09/20/2019     Lab Results   Component Value Date    TRIG 109 10/08/2021    TRIG 200 (H) 03/24/2021    TRIG 131 09/20/2019     No results found for: CHOLHDL    Imaging: No results found  ECG:    Normal sinus rhythm      Review of Systems   Constitutional: Negative  HENT: Negative  Eyes: Negative  Cardiovascular: Negative  Respiratory: Negative  Endocrine: Negative  Hematologic/Lymphatic: Negative  Skin: Negative  Musculoskeletal: Negative  Gastrointestinal: Negative  Genitourinary: Negative  Neurological: Negative  Psychiatric/Behavioral: Negative          Vitals:    01/21/22 1125   BP: 104/60   Pulse: 55   SpO2: 97% Vitals:    01/21/22 1125   Weight: 85 kg (187 lb 4 8 oz)     Height: 5' 8 5" (174 cm)   Body mass index is 28 06 kg/m²  Physical Exam:  Vital signs reviewed  General:  Alert and cooperative, appears stated age, no acute distress  HEENT:  PERRLA, EOMI, no scleral icterus, no conjunctival pallor  Neck:  No lymphadenopathy, no thyromegaly, no carotid bruits, no elevated JVP  Heart:  Regular rate and rhythm, normal S1/S2, no S3/S4, no murmur, rubs or gallops  PMI nondisplaced  Lungs:  Clear to auscultation bilaterally, no wheezes rales or rhonchi  Abdomen:  Soft, non-tender, positive bowel sounds, no rebound or guarding,   no organomegaly   Extremities:  Normal range of motion    No clubbing, cyanosis or edema   Vascular:  2+ pedal pulses  Skin:  No rashes or lesions on exposed skin  Neurologic:  Cranial nerves II-XII grossly intact without focal deficits  Psych:  Normal mood and affect

## 2022-01-26 DIAGNOSIS — F41.9 ANXIETY AND DEPRESSION: ICD-10-CM

## 2022-01-26 DIAGNOSIS — F32.A ANXIETY AND DEPRESSION: ICD-10-CM

## 2022-01-26 RX ORDER — CLONAZEPAM 0.5 MG/1
0.5 TABLET ORAL
Qty: 30 TABLET | Refills: 1 | Status: SHIPPED | OUTPATIENT
Start: 2022-01-26 | End: 2022-03-23 | Stop reason: SDUPTHER

## 2022-02-02 ENCOUNTER — HOSPITAL ENCOUNTER (OUTPATIENT)
Dept: RADIOLOGY | Facility: CLINIC | Age: 72
Discharge: HOME | End: 2022-02-02
Attending: ANESTHESIOLOGY | Admitting: ANESTHESIOLOGY
Payer: MEDICARE

## 2022-02-02 ENCOUNTER — TELEPHONE (OUTPATIENT)
Dept: PAIN MEDICINE | Facility: CLINIC | Age: 72
End: 2022-02-02

## 2022-02-02 VITALS
HEART RATE: 65 BPM | RESPIRATION RATE: 20 BRPM | DIASTOLIC BLOOD PRESSURE: 69 MMHG | TEMPERATURE: 98.2 F | SYSTOLIC BLOOD PRESSURE: 138 MMHG | OXYGEN SATURATION: 99 %

## 2022-02-02 DIAGNOSIS — M47.816 LUMBAR SPONDYLOSIS: ICD-10-CM

## 2022-02-02 PROCEDURE — 64636 DESTROY L/S FACET JNT ADDL: CPT | Performed by: ANESTHESIOLOGY

## 2022-02-02 PROCEDURE — 64635 DESTROY LUMB/SAC FACET JNT: CPT | Performed by: ANESTHESIOLOGY

## 2022-02-02 RX ORDER — BUPIVACAINE HYDROCHLORIDE 5 MG/ML
30 INJECTION, SOLUTION EPIDURAL; INTRACAUDAL ONCE
Status: COMPLETED | OUTPATIENT
Start: 2022-02-02 | End: 2022-02-02

## 2022-02-02 RX ORDER — LIDOCAINE HYDROCHLORIDE 10 MG/ML
10 INJECTION, SOLUTION EPIDURAL; INFILTRATION; INTRACAUDAL; PERINEURAL ONCE
Status: COMPLETED | OUTPATIENT
Start: 2022-02-02 | End: 2022-02-02

## 2022-02-02 RX ADMIN — BUPIVACAINE HYDROCHLORIDE 3 ML: 5 INJECTION, SOLUTION EPIDURAL; INTRACAUDAL at 14:55

## 2022-02-02 RX ADMIN — LIDOCAINE HYDROCHLORIDE 3 ML: 20 INJECTION, SOLUTION EPIDURAL; INFILTRATION; INTRACAUDAL; PERINEURAL at 14:56

## 2022-02-02 RX ADMIN — LIDOCAINE HYDROCHLORIDE 7 ML: 10 INJECTION, SOLUTION EPIDURAL; INFILTRATION; INTRACAUDAL; PERINEURAL at 14:55

## 2022-02-02 NOTE — DISCHARGE INSTRUCTIONS

## 2022-02-02 NOTE — TELEPHONE ENCOUNTER
Patient is S/P a Right L3-L5 RFA c/ JW on 2/2/22  No OVS scheduled  Please call on 2/3/22 post RFA   Thanks

## 2022-02-02 NOTE — H&P
History of Present Illness: The patient is a 80 y o  male who presents with complaints of low back pain  Patient Active Problem List   Diagnosis    Allergic rhinitis    Enlarged prostate with lower urinary tract symptoms (LUTS)    Chronic bronchitis (HCC)    Hyperlipidemia    Hypertension    Impaired fasting glucose    Male erectile dysfunction    Multifocal atrial tachycardia (HCC)    Obesity (BMI 30-39  9)    Osteoarthritis of knee    Osteoarthritis of hip    Seasonal allergies    Spinal stenosis of lumbar region with neurogenic claudication    Supraventricular tachycardia (HCC)    Anxiety and depression    Arthritis of left hip    COPD (chronic obstructive pulmonary disease) (HCC)    Elevated prostate specific antigen (PSA)    Lumbosacral spondylosis without myelopathy    Incomplete emptying of bladder    Retention of urine    Screening for prostate cancer    Thoracic or lumbosacral neuritis or radiculitis    Urethral stricture    Spondylolisthesis, acquired    Degeneration of lumbosacral intervertebral disc    Full thickness rotator cuff tear    Knee pain    Localized, primary osteoarthritis    Primary localized osteoarthritis of pelvic region and thigh    Low back pain    Lumbosacral neuritis    Pain in limb    Pseudoclaudication syndrome    Shoulder pain    Cervical stenosis of spinal canal    Subclinical hypothyroidism    Lumbar radiculopathy    Chronic pain syndrome    Status post lumbar and lumbosacral fusion by anterior technique    Status post insertion of spinal cord stimulator    Lumbar spondylosis    PITTMAN (dyspnea on exertion)       Past Medical History:   Diagnosis Date    Bronchitis     Bronchitis, chronic obstructive, with exacerbation ([facility])     Last Assessed: 5/17/2013    Chronic pain disorder     [facility] exacerbation [facility] )     Last Assessed: 4/13/2015    Depression, controlled 8/30/2017    Transitioned From: Depression    Diabetes mellitus ([facility] )     Elevated PSA     Bx neg 2012; Last Assessed: 11/23/2012    Hypertension     Irregular heart beat     MAT, afib    Lung nodule - resolved 2017 CT 1/4/2017    Postoperative urinary retention        Past Surgical History:   Procedure Laterality Date    BACK SURGERY  07/25/2012    decompression of spinal stenosis from lower thoracic through the lumbar spine    COLONOSCOPY  10/2010    neg   recheck in 5 years   [address] ARTHROSCOPY  01/2013    [place] PERCUT IMPLNT NEUROELECT,EPIDURAL Right 8/25/2020    Procedure: INSERTION THORACIC DORSAL COLUMN SPINAL CORD STIMULATOR PERCUTANEOUS W IMPLANTABLE PULSE GENERATOR, RIGHT;  Surgeon: Ju Burnette MD;  [facility];  Service: Neurosurgery    DC WRIST [place] LIG Left 3/4/2020    Procedure: RELEASE CARPAL TUNNEL ENDOSCOPIC;  Surgeon: Marily Rogers MD;  [facility];  Service: Orthopedics    PROSTATE BIOPSY  2012    for elevated PSA of about 7; neg    TRANSURETHRAL RESECTION OF PROSTATE  06/27/2013         Current Outpatient Medications:     albuterol (PROVENTIL HFA,VENTOLIN HFA) 90 mcg/act inhaler, TAKE 2 PUFFS BY MOUTH EVERY 6 HOURS AS NEEDED FOR WHEEZE OR FOR SHORTNESS OF BREATH, Disp: 18 g, Rfl: 5    Cialis 5 MG tablet, TAKE 1 TABLET BY MOUTH EVERY DAY, Disp: 90 tablet, Rfl: 0    clonazePAM (KlonoPIN) 0 5 mg tablet, Take 1 tablet (0 5 mg total) by mouth daily at bedtime, Disp: 30 tablet, Rfl: 1    dextromethorphan-guaifenesin (MUCINEX DM)  MG per 12 hr tablet, Take 1 tablet by mouth every 12 (twelve) hours, Disp: , Rfl:     diclofenac sodium (VOLTAREN) 50 mg EC tablet, TAKE 1 TABLET BY MOUTH TWICE A DAY, Disp: 180 tablet, Rfl: 1    DULoxetine (CYMBALTA) 30 mg delayed release capsule, Take 90 mg by mouth daily , Disp: , Rfl:     DULoxetine (CYMBALTA) 60 mg delayed release capsule, Take 1 capsule by mouth daily (Patient not taking: Reported on 1/21/2022 ), Disp: , Rfl:     fluticasone (FLONASE) 50 mcg/act nasal spray, fluticasone propionate 50 mcg/actuation nasal spray,suspension, Disp: , Rfl:     gabapentin (NEURONTIN) 300 mg capsule, Take 300 mg by mouth daily , Disp: , Rfl:     ipratropium-albuterol (DUO-NEB) 0 5-2 5 mg/3 mL nebulizer solution, Take 1 vial (3 mL total) by nebulization every 6 (six) hours as needed for wheezing or shortness of breath, Disp: 60 vial, Rfl: 1    metFORMIN (GLUCOPHAGE-XR) 500 mg 24 hr tablet, TAKE 1 TABLET BY MOUTH EVERY DAY WITH DINNER, Disp: 90 tablet, Rfl: 0    metoprolol tartrate (LOPRESSOR) 25 mg tablet, TAKE 1 TABLET (25 MG TOTAL) BY MOUTH EVERY 12 (TWELVE) HOURS, Disp: 180 tablet, Rfl: 2    metoprolol tartrate (LOPRESSOR) 50 mg tablet, TAKE 1 TABLET BY MOUTH TWICE A DAY IN ADDITION TO 25 MG TABLET, Disp: 180 tablet, Rfl: 3    metoprolol tartrate (LOPRESSOR) 50 mg tablet, Take 1 tablet (50 mg total) by mouth 2 (two) times a day Take with 25 mg tab bid (Patient taking differently: Take 50 mg by mouth 2 (two) times a day  ), Disp: 180 tablet, Rfl: 3    montelukast (SINGULAIR) 10 mg tablet, TAKE 1 TABLET BY MOUTH EVERYDAY AT BEDTIME, Disp: 90 tablet, Rfl: 1    Multiple Vitamin (MULTI-VITAMIN DAILY) TABS, Take 1 tablet by mouth daily, Disp: , Rfl:     NIFEdipine (PROCARDIA XL) 90 mg 24 hr tablet, TAKE 1 TABLET BY MOUTH EVERY DAY, Disp: 90 tablet, Rfl: 3    oxybutynin (DITROPAN) 5 mg tablet, Take 5 mg by mouth 2 (two) times a day as needed, Disp: , Rfl:     rosuvastatin (CRESTOR) 40 MG tablet, Take 1 tablet (40 mg total) by mouth daily, Disp: 90 tablet, Rfl: 3    Wixela Inhub 500-50 MCG/DOSE inhaler, INHALE 1 PUFF 2 TIMES A DAY RINSE MOUTH AFTER USE , Disp: 60 blister, Rfl: 1    Current Facility-Administered Medications:     bupivacaine (PF) (MARCAINE) 0 5 % injection 30 mL, 30 mL, Injection, Once, Emery Ann, DO    lidocaine (PF) (XYLOCAINE-MPF) 1 % injection 10 mL, 10 mL, Other, Once, Emery Ann, DO    lidocaine (PF) (XYLOCAINE-MPF) 2 % injection 4 mL, 4 mL, Other, Once, Alexa Ann, DO    No Known Allergies    Physical Exam:   Vitals:    02/02/22 1429   BP: 138/66   Pulse: 57   Resp: 20   Temp: 98 2 °F (36 8 °C)   SpO2: 97%     General: Awake, Alert, Oriented x 3, Mood and affect appropriate  Respiratory: Respirations even and unlabored  Cardiovascular: Peripheral pulses intact; no edema  Musculoskeletal Exam:  Right lumbar paraspinals tender to palpation  ASA Score: 3    Patient/Chart Verification  Patient ID Verified: Verbal  ID Band Applied: No  Consents Confirmed: Procedural  H&P( within 30 days) Verified: To be obtained in the Pre-Procedure area  Interval H&P(within 24 hr) Complete (required for Outpatients and Surgery Admit only): To be obtained in the Pre-Procedure area  Allergies Reviewed: Yes  Anticoag/NSAID held?: No  Currently on antibiotics?: No  Pre-op Lab/Test Results Available: N/A    Assessment:   1   Lumbar spondylosis        Plan: RIGHT L3-5 RFA

## 2022-02-03 NOTE — TELEPHONE ENCOUNTER
RN s/w pt  Pt states that he has not had any pain post procedure  Pt denies fever, signs of infection or sunburn like sensation  Pt aware it may take 2 weeks to notice a difference and 4-6 weeks for the full effect of the procedure    Confirmed f/u [facility]

## 2022-02-09 DIAGNOSIS — I49.1 PAC (PREMATURE ATRIAL CONTRACTION): ICD-10-CM

## 2022-02-09 RX ORDER — METOPROLOL TARTRATE 50 MG/1
TABLET, FILM COATED ORAL
Qty: 180 TABLET | Refills: 3 | Status: SHIPPED | OUTPATIENT
Start: 2022-02-09

## 2022-02-24 ENCOUNTER — VBI (OUTPATIENT)
Dept: ADMINISTRATIVE | Facility: OTHER | Age: 72
End: 2022-02-24

## 2022-03-14 DIAGNOSIS — R73.03 PREDIABETES: ICD-10-CM

## 2022-03-14 RX ORDER — METFORMIN HYDROCHLORIDE 500 MG/1
TABLET, EXTENDED RELEASE ORAL
Qty: 90 TABLET | Refills: 0 | Status: SHIPPED | OUTPATIENT
Start: 2022-03-14 | End: 2022-05-23

## 2022-03-16 ENCOUNTER — OFFICE VISIT (OUTPATIENT)
Dept: PAIN MEDICINE | Facility: CLINIC | Age: 72
End: 2022-03-16
Payer: MEDICARE

## 2022-03-16 VITALS
SYSTOLIC BLOOD PRESSURE: 85 MMHG | BODY MASS INDEX: 27.55 KG/M2 | HEIGHT: 69 IN | WEIGHT: 186 LBS | HEART RATE: 61 BPM | DIASTOLIC BLOOD PRESSURE: 45 MMHG

## 2022-03-16 DIAGNOSIS — M48.062 SPINAL STENOSIS OF LUMBAR REGION WITH NEUROGENIC CLAUDICATION: ICD-10-CM

## 2022-03-16 DIAGNOSIS — M54.16 LUMBAR RADICULOPATHY: ICD-10-CM

## 2022-03-16 DIAGNOSIS — M51.37 DEGENERATION OF LUMBOSACRAL INTERVERTEBRAL DISC: ICD-10-CM

## 2022-03-16 DIAGNOSIS — Z98.1 STATUS POST LUMBAR AND LUMBOSACRAL FUSION BY ANTERIOR TECHNIQUE: ICD-10-CM

## 2022-03-16 DIAGNOSIS — Z96.89 S/P INSERTION OF SPINAL CORD STIMULATOR: ICD-10-CM

## 2022-03-16 DIAGNOSIS — M47.817 LUMBOSACRAL SPONDYLOSIS WITHOUT MYELOPATHY: ICD-10-CM

## 2022-03-16 DIAGNOSIS — M48.02 CERVICAL STENOSIS OF SPINAL CANAL: ICD-10-CM

## 2022-03-16 DIAGNOSIS — G89.4 CHRONIC PAIN SYNDROME: Primary | ICD-10-CM

## 2022-03-16 DIAGNOSIS — M47.816 LUMBAR SPONDYLOSIS: ICD-10-CM

## 2022-03-16 DIAGNOSIS — M43.10 SPONDYLOLISTHESIS, ACQUIRED: ICD-10-CM

## 2022-03-16 DIAGNOSIS — M54.17 LUMBOSACRAL NEURITIS: ICD-10-CM

## 2022-03-16 DIAGNOSIS — Z96.89 STATUS POST INSERTION OF SPINAL CORD STIMULATOR: ICD-10-CM

## 2022-03-16 PROCEDURE — 99213 OFFICE O/P EST LOW 20 MIN: CPT | Performed by: NURSE PRACTITIONER

## 2022-03-16 NOTE — PROGRESS NOTES
Assessment:  1  Chronic pain syndrome    2  Lumbosacral neuritis    3  Lumbar radiculopathy    4  Lumbosacral spondylosis without myelopathy    5  Spondylolisthesis, acquired    6  Degeneration of lumbosacral intervertebral disc    7  Lumbar spondylosis    8  Spinal stenosis of lumbar region with neurogenic claudication    9  Cervical stenosis of spinal canal    10  Status post lumbar and lumbosacral fusion by anterior technique    11  Status post insertion of spinal cord stimulator        Plan:  1  We can repeat bilateral L3-5 RFA p r n   2  Patient will continue to reprogrammed his spinal cord stimulator device as needed   3  I will provide the patient with a referral to Neurosurgery for evaluation of his spinal cord stimulator battery placement   4  Patients blood pressure is low at today's office visit  He states since his weight loss it has been running this low for quite some time  He denies any symptoms of hypotension  He is scheduled to see his PCP within the next week or 2  5  Patient will follow-up on a p r n  basis at this time    M*Modal software was used to dictate this note  It may contain errors with dictating incorrect words or incorrect spelling  Please contact the provider directly with any questions  History of Present Illness: The patient is a 80 y o  male with a history of an L5-S1 ALIF [employer] spinal cord stimulator implant last seen on 8/9/21 who presents for a follow up office visit in regards to chronic low back pain that will radiate into the buttocks secondary to lumbar degenerative disc disease, lumbar spondylosis, lumbar stenosis, lumbar radiculopathy and chronic pain syndrome  The patient denies bowel or bladder incontinence or saddle anesthesia  The patient has had lumbar radiofrequency ablation completed on February 2, 2022 and reports ongoing moderate relief of his low back pain from the procedure  Patient has had bilateral S1 TFESI without relief   He recently lost [de-identified] lbs however and feels the the battery pack of his SCS device is protruding and causing some discomfort  He'd like to see Neurosurgery  He states that the spinal cord stimulator device does provide optimal relief to his lower extremity symptoms but not his back pain  Patient rates his pain a 5/10 on the numeric pain rating scale  He states the pain is occasional in nature and bothersome throughout the day  He characterizes the pain as throbbing, pressure-like, dull aching, numbness and pins and needles    I have personally reviewed and/or updated the patient's past medical history, past surgical history, family history, social history, current medications, allergies, and vital signs today  Review of Systems:    Review of Systems   Respiratory: Negative for shortness of breath  Cardiovascular: Negative for chest pain  Gastrointestinal: Negative for constipation, diarrhea, nausea and vomiting  Musculoskeletal: Positive for gait problem  Negative for arthralgias, joint swelling and myalgias  Skin: Negative for rash  Neurological: Negative for dizziness, seizures and weakness  All other systems reviewed and are negative  Past Medical History:   Diagnosis Date    Bronchitis     Bronchitis[facility] chronic obstructive[facility] with exacerbation ([facility] )     Last Assessed: 5/17/2013    Chronic pain disorder     [facility]     Last Assessed: 4/13/2015    Depression, controlled 8/30/2017    Transitioned From: Depression    Diabetes mellitus ([facility] )     Elevated PSA     Bx neg 2012; Last Assessed: 11/23/2012    Hypertension     Irregular heart beat     MAT, afib    Lung nodule - resolved 2017 CT 1/4/2017    Postoperative urinary retention        Past Surgical History:   Procedure Laterality Date    BACK SURGERY  07/25/2012    decompression of spinal stenosis from lower thoracic through the lumbar spine    COLONOSCOPY  10/2010    neg   recheck in 5 years   [facility] ARTHROSCOPY  2013    [place] PERCUT IMPLNT NEUROELECT,EPIDURAL Right 2020    Procedure: INSERTION THORACIC DORSAL COLUMN SPINAL CORD STIMULATOR PERCUTANEOUS W IMPLANTABLE PULSE GENERATOR, RIGHT;  Surgeon: Allison Kehr, MD;  Location: [facility];  Service: Neurosurgery    [place] WRIST [place] LIG Left 3/4/2020    Procedure: RELEASE CARPAL TUNNEL ENDOSCOPIC;  Surgeon: Josefa Pack MD;  [facility];  Service: Orthopedics    PROSTATE BIOPSY      for elevated PSA of about 7; neg    TRANSURETHRAL RESECTION OF PROSTATE  2013       Family History   Problem Relation Age of Onset    Breast cancer Mother     Pancreatitis Father     Diabetes Maternal Grandmother     Heart attack Maternal Grandfather     Heart attack Paternal Grandmother     Diabetes Paternal Grandmother     Diabetes Paternal Grandfather     No Known Problems Brother     Lung disease Paternal Uncle        Social History     Occupational History    Not on file   Tobacco Use    Smoking status: Former Smoker     Packs/day: 1 50     Years: 23 00     Pack years: 34 50     Types: Cigarettes     Start date:      Quit date:      Years since quittin 2    Smokeless tobacco: Never Used   Vaping Use    Vaping Use: Never used   Substance and Sexual Activity    Alcohol use: No     Comment: Stopped drinking    Drug use: No    Sexual activity: Yes         Current Outpatient Medications:     albuterol (PROVENTIL HFA,VENTOLIN HFA) 90 mcg/act inhaler, TAKE 2 PUFFS BY MOUTH EVERY 6 HOURS AS NEEDED FOR WHEEZE OR FOR SHORTNESS OF BREATH, Disp: 18 g, Rfl: 5    Cialis 5 MG tablet, TAKE 1 TABLET BY MOUTH EVERY DAY, Disp: 90 tablet, Rfl: 0    clonazePAM (KlonoPIN) 0 5 mg tablet, Take 1 tablet (0 5 mg total) by mouth daily at bedtime, Disp: 30 tablet, Rfl: 1    dextromethorphan-guaifenesin (MUCINEX DM)  MG per 12 hr tablet, Take 1 tablet by mouth every 12 (twelve) hours, Disp: , Rfl:     diclofenac sodium (VOLTAREN) 50 mg EC tablet, TAKE 1 TABLET BY MOUTH TWICE A DAY, Disp: 180 tablet, Rfl: 1    DULoxetine (CYMBALTA) 30 mg delayed release capsule, Take 90 mg by mouth daily , Disp: , Rfl:     DULoxetine (CYMBALTA) 60 mg delayed release capsule, Take 1 capsule by mouth daily (Patient not taking: Reported on 1/21/2022 ), Disp: , Rfl:     fluticasone (FLONASE) 50 mcg/act nasal spray, fluticasone propionate 50 mcg/actuation nasal spray,suspension, Disp: , Rfl:     fluticasone-salmeterol (Wixela Inhub) 500-50 mcg/dose inhaler, Inhale 1 puff 2 (two) times a day, Disp: 60 blister, Rfl: 1    gabapentin (NEURONTIN) 300 mg capsule, Take 300 mg by mouth daily , Disp: , Rfl:     ipratropium-albuterol (DUO-NEB) 0 5-2 5 mg/3 mL nebulizer solution, Take 1 vial (3 mL total) by nebulization every 6 (six) hours as needed for wheezing or shortness of breath, Disp: 60 vial, Rfl: 1    metFORMIN (GLUCOPHAGE-XR) 500 mg 24 hr tablet, TAKE 1 TABLET BY MOUTH EVERY DAY WITH DINNER, Disp: 90 tablet, Rfl: 0    metoprolol tartrate (LOPRESSOR) 25 mg tablet, TAKE 1 TABLET (25 MG TOTAL) BY MOUTH EVERY 12 (TWELVE) HOURS, Disp: 180 tablet, Rfl: 2    metoprolol tartrate (LOPRESSOR) 50 mg tablet, TAKE 1 TABLET BY MOUTH TWICE A DAY IN ADDITION TO 25 MG TABLET, Disp: 180 tablet, Rfl: 3    metoprolol tartrate (LOPRESSOR) 50 mg tablet, TAKE 1 TABLET (50 MG TOTAL) BY MOUTH 2 (TWO) TIMES A DAY TAKE WITH 25 MG TABS, Disp: 180 tablet, Rfl: 3    montelukast (SINGULAIR) 10 mg tablet, TAKE 1 TABLET BY MOUTH EVERYDAY AT BEDTIME, Disp: 90 tablet, Rfl: 1    Multiple Vitamin (MULTI-VITAMIN DAILY) TABS, Take 1 tablet by mouth daily, Disp: , Rfl:     NIFEdipine (PROCARDIA XL) 90 mg 24 hr tablet, TAKE 1 TABLET BY MOUTH EVERY DAY, Disp: 90 tablet, Rfl: 3    oxybutynin (DITROPAN) 5 mg tablet, Take 5 mg by mouth 2 (two) times a day as needed, Disp: , Rfl:     rosuvastatin (CRESTOR) 40 MG tablet, Take 1 tablet (40 mg total) by mouth daily, Disp: 90 tablet, Rfl: 3    No Known Allergies    Physical Exam:    BP (!) 85/45   Pulse 61   Ht 5' 8 5" (1 74 m)   Wt 84 4 kg (186 lb)   BMI 27 87 kg/m²     Constitutional:normal, well developed, well nourished, alert, in no distress and non-toxic and no overt pain behavior  Eyes:anicteric  HEENT:grossly intact  Neck:supple, symmetric, trachea midline and no masses   Pulmonary:even and unlabored  Cardiovascular:No edema or pitting edema present  Skin:Normal without rashes or lesions and well hydrated  Psychiatric:Mood and affect appropriate  Neurologic:Cranial Nerves II-XII grossly intact  Musculoskeletal:normal gait      Imaging  No orders to display     MRI LUMBAR SPINE WITH AND WITHOUT CONTRAST   INDICATION: [id number]: Radiculopathy, lumbar region   M48 [id number]: Spinal stenosis, lumbar region with neurogenic claudication  COMPARISON: 9/19/2019 x-rays   TECHNIQUE: Sagittal T1, sagittal T2, sagittal inversion recovery, axial T1 and axial T2, coronal T2  Sagittal and axial T1 postcontrast    IV Contrast: 10 mL of gadobutrol injection (MULTI-DOSE)   IMAGE QUALITY: Diagnostic   FINDINGS:   VERTEBRAL BODIES: Grade 1 degenerative retrolisthesis L1-2, L2-3  Grade 1 degenerative anterolisthesis L4-5  No scoliosis  No compression fracture  Normal marrow signal is identified within the visualized bony structures  No discrete marrow   lesion  SACRUM: Normal signal within the sacrum  No evidence of insufficiency or stress fracture  DISTAL CORD AND CONUS: Normal size and signal within the distal cord and conus  PARASPINAL SOFT TISSUES: Paraspinal soft tissues are unremarkable  LOWER THORACIC DISC SPACES: Normal disc height and signal  No disc herniation, canal stenosis or foraminal narrowing  Transitional configuration of vertebral bodies at the lumbosacral junction considered to represent lumbarization of the 1st sacral segment  Utilizing this numbering convention, the anterior fusion hardware is at the L5-S1 level   This is consistent with   recent x-rays  LUMBAR DISC SPACES:   L1-L2: Moderate to severe degenerative spondylosis, grade 1 degenerative retrolisthesis, mild to moderate bilateral foraminal stenosis, mild central canal stenosis   L2-L3: Moderate to severe degenerative spondylosis, grade 1 retrolisthesis, left laminotomy, moderate central canal and bilateral foraminal stenosis   L3-L4: Moderate degenerative spondylosis, mild to moderate central canal and bilateral foraminal stenosis   L4-L5: Moderate degenerative spondylosis, bulging annulus, grade 1 anterolisthesis, mild central canal stenosis, moderate bilateral foraminal stenosis, left laminotomy   L5-S1: Anterior interbody fusion  Moderate bilateral foraminal stenosis  POSTCONTRAST IMAGING: No abnormal enhancement  IMPRESSION:   Multilevel degenerative spondylosis   Transitional anatomy at the lumbosacral junction   Mild central canal stenosis, grade 1 retrolisthesis, mild to moderate bilateral foraminal stenosis L1-2   Grade 1 retrolisthesis, left laminotomy, moderate central canal and bilateral foraminal stenosis L2-3   Mild to moderate central canal and bilateral foraminal stenosis L3-4   Grade 1 anterolisthesis, mild central canal stenosis, moderate bilateral foraminal stenosis, left laminotomy L4-5   Anterior interbody fusion, moderate bilateral foraminal stenosis L5-S1  No orders of the defined types were placed in this encounter

## 2022-03-23 DIAGNOSIS — F41.9 ANXIETY AND DEPRESSION: ICD-10-CM

## 2022-03-23 DIAGNOSIS — F32.A ANXIETY AND DEPRESSION: ICD-10-CM

## 2022-03-23 RX ORDER — CLONAZEPAM 0.5 MG/1
0.5 TABLET ORAL
Qty: 30 TABLET | Refills: 1 | Status: SHIPPED | OUTPATIENT
Start: 2022-03-23 | End: 2022-05-18 | Stop reason: SDUPTHER

## 2022-03-25 ENCOUNTER — VBI (OUTPATIENT)
Dept: ADMINISTRATIVE | Facility: OTHER | Age: 72
End: 2022-03-25

## 2022-04-07 ENCOUNTER — OFFICE VISIT (OUTPATIENT)
Dept: NEUROSURGERY | Facility: CLINIC | Age: 72
End: 2022-04-07
Payer: MEDICARE

## 2022-04-07 VITALS
RESPIRATION RATE: 16 BRPM | SYSTOLIC BLOOD PRESSURE: 126 MMHG | TEMPERATURE: 97.4 F | DIASTOLIC BLOOD PRESSURE: 63 MMHG | WEIGHT: 183 LBS | HEART RATE: 61 BPM | BODY MASS INDEX: 27.11 KG/M2 | HEIGHT: 69 IN

## 2022-04-07 DIAGNOSIS — Z96.89 S/P INSERTION OF SPINAL CORD STIMULATOR: ICD-10-CM

## 2022-04-07 DIAGNOSIS — Z96.89 STATUS POST INSERTION OF SPINAL CORD STIMULATOR: ICD-10-CM

## 2022-04-07 DIAGNOSIS — G89.4 CHRONIC PAIN SYNDROME: Primary | ICD-10-CM

## 2022-04-07 PROCEDURE — 99214 OFFICE O/P EST MOD 30 MIN: CPT | Performed by: NEUROLOGICAL SURGERY

## 2022-04-07 RX ORDER — CHLORHEXIDINE GLUCONATE 0.12 MG/ML
15 RINSE ORAL ONCE
Status: CANCELLED | OUTPATIENT
Start: 2022-04-07 | End: 2022-04-07

## 2022-04-07 RX ORDER — CEFAZOLIN SODIUM 2 G/50ML
2000 SOLUTION INTRAVENOUS ONCE
Status: CANCELLED | OUTPATIENT
Start: 2022-04-07 | End: 2022-04-07

## 2022-04-07 NOTE — PROGRESS NOTES
Assessment/Plan:    Chronic pain syndrome  As addressed in HPI  As addressed in spinal cord Stimulator  Status post insertion of spinal cord stimulator  · As addressed in HPI  · Failed spinal cord stimulator efficacy, would like SCS generator removed  · The [employer] rep met with patient , discussion content unknown but patient wishes to proceed with SCS system removal at least the generator if entire system cannot be  Removed  · Has a right buttock SCS generator, incision is healed , skin is loose and sagging, generator freely moves  He does not exhibit pain in area during palpation assessment  PLAN  DR Sim  · Met with patient , assessed the generator sight  · He reviewed interventional imagining  that provides partial view of SCS system  · He explained the surgical procedure in detail, benefits and risks, and the possibility of retained electrode lead material if scar tissue formation prevents safe removal of entire dual leads  · Explained the benefits of removing the entire SCS system will allow for future MRI  · Patient agrees to proceed with surgery and verbalized an understanding  · Verbal and written consent received  · Contact information provided if additional questions arise  Preoperative examination, unspecified  · Surgery Procedure- REMOVAL IMPLANTABLE PULSE GENERATOR right buttock and DORSAL SPINAL COLUMN STIMULATOR ELECTRODE LEADS   · Surgeon- Dr Odalis Irene  · Date: not scheduled yet  · Location- [place]   · Product -[employer]    Preoperative Assessment   · HO adverse response to general anesthesia:denies    · Surgical Procedures past 6 months:-denies    · Infection (MRSA ESBL  CDIFF ):denies  · Cardiac:denies but has cardiologist management for HTN, multifocal atrial tachycardia (MAT) SVT PAC , mixed hyperlipidemia    Will need EKG for sx protocol > 60 required     · Pulmonary: Smoke (stopped 2000 ) O2 use ( NO) RODRIGUE/CPAP (NO ) COPD/Asthma ( Yes ) recurrent chronic bronchitis in winter months  , worse in winter months around [place] requiring treatment with prednisone , occasionally ABX , does not normally need to use prn albuterol  Has not used or filled albuterol inhaler in a long time  Treating with singular  No COPD exacerbation this past winter  · Denies dyspnea or chest pain when climbing 2 sets of stairs or walking 2 city blocks  · Endocrine:  DM HgA1C 5 8, 10/8/21 reports is no longer managed by Dr Ashli Nicholson    ·  :He is self-cath every 2 weeks ureteral  stricture /scar tissue formation in bladder neck status post TURP as per patient  Dr Padilla Del Valle -Urologist    · Personal history of venous thromboembolic disease - denies  Surgery Clearance    · PCP/Medical Clearance- pending completion    · Cardiac- EKG required and cardiac clearance HO cardiac disease, Dr Monica Kraus  · PAT- pending completion   · Imagining requirement - Thoracolumbar Xray --- ordered , need to complete before surgery---done 4/8/2021 , need report  Teaching   · Pain management:  post op med oxycodone 5 mg will add acetaminophen 1000 mg with each dose, 5 day order only  May need antispasmodic  Has problem with muscle spasm prior surgery  · Hold Anticoagulant agents pre and postoperative (AC, Antiplatelet, ASA, NSAID, vitamins , dietary supplements , OTC, immunosuppressant ) ASA, diclofenac , vitamin and dietary supplement   · Provided preoperative hygiene products for   · Provided overview of surgical process from office appointment thru 6 weeks post op:--donr written document     Patient verbalized understanding, all questions were answered and contact information provided if additional questions arise  If additional questions or concerns call office           S/P insertion of spinal cord stimulator  -     Ambulatory referral to Neurosurgery        Subjective: The spinal cord stimulator is not helping my back or lag pain and I would like the battery removed       Patient ID: Libia Nichole is a 80 y o  male     HPI   Chronic pain syndrome affecting bilateral low back and lower extremities   HO 2 lumbar spine surgeries the first he describes as likely a lumbar laminectomy and decompression , The second is documented as Lumbosacral anterior fusion  He has a history of failing conservative treatments therapy and pain management medicinal and interventional   Has a [employer]onic Spinal Cord Stimulator  Inserted 8/25/2020 by Dr Colleen Delgado  INSERTION THORACIC DORSAL COLUMN SPINAL CORD STIMULATOR PERCUTANEOUS W IMPLANTABLE PULSE GENERATOR, RIGHT (Right Spine Thoracic)  Reported SCS trial delivered significant efficacy , greater than 50 %, and has not achieved similar efficacy since insertion  Has undergone multiple SCS programing sessions with [employer] to no avail for chronic pain symptom relief  At 6 week postoperative visit with surgeon is documented patient is doing well  Started a spinal cord stimulator holiday in  December 2021, did not experience worsening chronic pain symptoms  He continues care with pain management, Dr Kasey Rhodes, for multimodal treatments, inclusive of interventional  April 14 2021 he underwent a Medial Branch Nerve/Dorsal Ramus Blocks of the bilateral L3-4, L4-5, and L5-S1 facet joint(s) and continues to undergo pain management interventional procedures  His most recent spine procedure was 2/2/2022  Radiofrequency denervation of the right L4-5 and L5-S1 facet joint(s)  He plans t continue receiving interventional procedures with pain management  He has am intentional significant weight loss since spinal cord stimulator insertion in 2020  From weight of 229 lbs to  182 LBS and now complains of pain and burning  in right buttock generator site area, and sense that generator moves  He wishes to have spinal cord stimulator removed and presents today to discuss with surgeon            Dr Kin Saha and I have spent 50 minutes with patient today in which greater than 50% of this time was spent in counseling/coordination of care regarding proposed surgical procedure options and impressions, imagining review, explaining risks and benefits and surgical procedure, all questions were answered in detail, patient verbalized an understanding and was provided with contact information if additional questions arise         REVIEW OF SYSTEMS  Review of Systems   Constitutional: Negative  HENT: Negative  Eyes: Negative  Respiratory: Negative  Cardiovascular: Negative  Gastrointestinal: Negative  Endocrine: Negative  Genitourinary: Negative  Musculoskeletal: Positive for back pain (radiates to buttocks and posterior legs to knees) and gait problem (uses a cane for support )  Discomfort when walking and standing   Recent RFA with Dr Dread Roberts has provided significant relief  Pain / discomfort at [facility] s/p 80 lb weight loss   Skin: Negative  Allergic/Immunologic: Positive for environmental allergies  Neurological: Negative for weakness and numbness  Hematological: Negative  Psychiatric/Behavioral: Negative            Meds/Allergies     Current Outpatient Medications   Medication Sig Dispense Refill    Cialis 5 MG tablet TAKE 1 TABLET BY MOUTH EVERY DAY 90 tablet 0    clonazePAM (KlonoPIN) 0 5 mg tablet Take 1 tablet (0 5 mg total) by mouth daily at bedtime 30 tablet 1    dextromethorphan-guaifenesin (MUCINEX DM)  MG per 12 hr tablet Take 1 tablet by mouth every 12 (twelve) hours      diclofenac sodium (VOLTAREN) 50 mg EC tablet TAKE 1 TABLET BY MOUTH TWICE A  tablet 1    DULoxetine (CYMBALTA) 30 mg delayed release capsule Take 90 mg by mouth daily       fluticasone (FLONASE) 50 mcg/act nasal spray fluticasone propionate 50 mcg/actuation nasal spray,suspension      gabapentin (NEURONTIN) 300 mg capsule Take 300 mg by mouth daily       metFORMIN (GLUCOPHAGE-XR) 500 mg 24 hr tablet TAKE 1 TABLET BY MOUTH EVERY DAY WITH DINNER 90 tablet 0    metoprolol tartrate (LOPRESSOR) 50 mg tablet TAKE 1 TABLET (50 MG TOTAL) BY MOUTH 2 (TWO) TIMES A DAY TAKE WITH 25 MG TABS (Patient taking differently: Take 50 mg by mouth every 12 (twelve) hours  ) 180 tablet 3    montelukast (SINGULAIR) 10 mg tablet TAKE 1 TABLET BY MOUTH EVERYDAY AT BEDTIME 90 tablet 1    Multiple Vitamin (MULTI-VITAMIN DAILY) TABS Take 1 tablet by mouth daily      NIFEdipine (PROCARDIA XL) 90 mg 24 hr tablet TAKE 1 TABLET BY MOUTH EVERY DAY 90 tablet 3    oxybutynin (DITROPAN) 5 mg tablet Take 5 mg by mouth 2 (two) times a day as needed      rosuvastatin (CRESTOR) 40 MG tablet Take 1 tablet (40 mg total) by mouth daily 90 tablet 3    albuterol (PROVENTIL HFA,VENTOLIN HFA) 90 mcg/act inhaler TAKE 2 PUFFS BY MOUTH EVERY 6 HOURS AS NEEDED FOR WHEEZE OR FOR SHORTNESS OF BREATH (Patient not taking: Reported on 4/7/2022) 18 g 5    DULoxetine (CYMBALTA) 60 mg delayed release capsule Take 1 capsule by mouth daily (Patient not taking: Reported on 1/21/2022 )      fluticasone-salmeterol (Wixela Inhub) 500-50 mcg/dose inhaler Inhale 1 puff 2 (two) times a day (Patient not taking: Reported on 4/7/2022 ) 60 blister 1    ipratropium-albuterol (DUO-NEB) 0 5-2 5 mg/3 mL nebulizer solution Take 1 vial (3 mL total) by nebulization every 6 (six) hours as needed for wheezing or shortness of breath (Patient not taking: Reported on 4/7/2022 ) 60 vial 1     No current facility-administered medications for this visit         No Known Allergies    PAST HISTORY    Past Medical History:   Diagnosis Date    Bronchitis     [facility]     Last Assessed: 5/17/2013    Chronic pain disorder     [facility]     Last Assessed: 4/13/2015    Depression, controlled 8/30/2017    Transitioned From: Depression    [facility]     Elevated PSA     Bx neg 2012; Last Assessed: 11/23/2012    Hypertension     Irregular heart beat     MAT, afib    Lung nodule - resolved 2017 CT 2017    Postoperative urinary retention        Past Surgical History:   Procedure Laterality Date    BACK SURGERY  2012    decompression of spinal stenosis from lower thoracic through the lumbar spine    COLONOSCOPY  10/2010    neg   recheck in 5 years   Melonie Fuentes [address] KNEE ARTHROSCOPY  2013    NH PERCUT IMPLNT [address] Right 2020    Procedure: INSERTION THORACIC DORSAL COLUMN SPINAL CORD STIMULATOR PERCUTANEOUS W IMPLANTABLE PULSE GENERATOR, RIGHT;  Surgeon: Dariana Key MD;  Location: [facility];  Service: Neurosurgery    NH WRIST Angelica Isaac LIG Left 3/4/2020    Procedure: RELEASE CARPAL TUNNEL ENDOSCOPIC;  Surgeon: Jose Ceballos MD;  Location: [facility];  Service: Orthopedics    PROSTATE BIOPSY      for elevated PSA of about 7; neg    TRANSURETHRAL RESECTION OF PROSTATE  2013       Social History     Tobacco Use    Smoking status: Former Smoker     Packs/day: 1 50     Years: 23 00     Pack years: 34 50     Types: Cigarettes     Start date:      Quit date:      Years since quittin 2    Smokeless tobacco: Never Used   Vaping Use    Vaping Use: Never used   Substance Use Topics    Alcohol use: No     Comment: Stopped drinking    Drug use: No       Family History   Problem Relation Age of Onset    Breast cancer Mother     Pancreatitis Father     Diabetes Maternal Grandmother     Heart attack Maternal Grandfather     Heart attack Paternal Grandmother     Diabetes Paternal Grandmother     Diabetes Paternal Grandfather     No Known Problems Brother     Lung disease Paternal Uncle        The following portions of the patient's history were reviewed and updated as appropriate: allergies, current medications, past family history, past medical history, past social history, past surgical history and problem list       EXAM    Vitals:Blood pressure 126/63, pulse 61, temperature (!) 97 4 °F (36 3 °C), temperature source Tympanic, resp  rate 16, height 5' 8 5" (1 74 m), weight 83 kg (183 lb)  ,Body mass index is 27 42 kg/m²  Physical Exam  Vitals and nursing note reviewed  Constitutional:       General: He is not in acute distress  Appearance: Normal appearance  He is normal weight  He is not ill-appearing, toxic-appearing or diaphoretic  HENT:      Head: Normocephalic and atraumatic  Nose: No congestion or rhinorrhea  Eyes:      General: No scleral icterus  Right eye: No discharge  Left eye: No discharge  Conjunctiva/sclera: Conjunctivae normal    Cardiovascular:      Heart sounds: Normal heart sounds  Pulmonary:      Effort: No respiratory distress  Breath sounds: Normal breath sounds  No stridor  No wheezing, rhonchi or rales  Chest:      Chest wall: No tenderness  Abdominal:      General: Bowel sounds are normal  There is no distension  Palpations: There is no mass  Tenderness: There is no abdominal tenderness  There is no guarding  Hernia: No hernia is present  Musculoskeletal:      Right lower leg: No edema  Left lower leg: No edema  Skin:     General: Skin is warm and dry  Neurological:      General: No focal deficit present  Mental Status: He is alert  Psychiatric:         Mood and Affect: Mood normal          Behavior: Behavior normal          Neurologic Exam    Imaging Studies  No results found

## 2022-04-07 NOTE — H&P (VIEW-ONLY)
Assessment/Plan:    Chronic pain syndrome  As addressed in HPI  As addressed in spinal cord Stimulator  Status post insertion of spinal cord stimulator  · As addressed in HPI  · Failed spinal cord stimulator efficacy, would like SCS generator removed  · The [employer] rep met with patient , discussion content unknown but patient wishes to proceed with SCS system removal at least the generator if entire system cannot be  Removed  · Has a right buttock SCS generator, incision is healed , skin is loose and sagging, generator freely moves  He does not exhibit pain in area during palpation assessment  PLAN  DR Sim  · Met with patient , assessed the generator sight  · He reviewed interventional imagining  that provides partial view of SCS system  · He explained the surgical procedure in detail, benefits and risks, and the possibility of retained electrode lead material if scar tissue formation prevents safe removal of entire dual leads  · Explained the benefits of removing the entire SCS system will allow for future MRI  · Patient agrees to proceed with surgery and verbalized an understanding  · Verbal and written consent received  · Contact information provided if additional questions arise  Preoperative examination, unspecified  · Surgery Procedure- REMOVAL IMPLANTABLE PULSE GENERATOR right buttock and DORSAL SPINAL COLUMN STIMULATOR ELECTRODE LEADS   · Surgeon- Dr Phoenix School  · Date: not scheduled yet  · Location- [place]   · Product -[employer]    Preoperative Assessment   · HO adverse response to general anesthesia:denies    · Surgical Procedures past 6 months:-denies    · Infection (MRSA ESBL  CDIFF ):denies  · Cardiac:denies but has cardiologist management for HTN, multifocal atrial tachycardia (MAT) SVT PAC , mixed hyperlipidemia    Will need EKG for sx protocol > 60 required     · Pulmonary: Smoke (stopped 2000 ) O2 use ( NO) RODRIGUE/CPAP (NO ) COPD/Asthma ( Yes ) recurrent chronic bronchitis in winter months  , worse in winter months around [place] requiring treatment with prednisone , occasionally ABX , does not normally need to use prn albuterol  Has not used or filled albuterol inhaler in a long time  Treating with singular  No COPD exacerbation this past winter  · Denies dyspnea or chest pain when climbing 2 sets of stairs or walking 2 city blocks  · Endocrine:  DM HgA1C 5 8, 10/8/21 reports is no longer managed by Dr Kathryn Shepherd    ·  :He is self-cath every 2 weeks ureteral  stricture /scar tissue formation in bladder neck status post TURP as per patient  Dr Michelle Barcenas -Urologist    · Personal history of venous thromboembolic disease - denies  Surgery Clearance    · PCP/Medical Clearance- pending completion    · Cardiac- EKG required and cardiac clearance HO cardiac disease, Dr Gilbert Rasheed  · PAT- pending completion   · Imagining requirement - Thoracolumbar Xray --- ordered , need to complete before surgery---done 4/8/2021 , need report  Teaching   · Pain management:  post op med oxycodone 5 mg will add acetaminophen 1000 mg with each dose, 5 day order only  May need antispasmodic  Has problem with muscle spasm prior surgery  · Hold Anticoagulant agents pre and postoperative (AC, Antiplatelet, ASA, NSAID, vitamins , dietary supplements , OTC, immunosuppressant ) ASA, diclofenac , vitamin and dietary supplement   · Provided preoperative hygiene products for   · Provided overview of surgical process from office appointment thru 6 weeks post op:--donr written document     Patient verbalized understanding, all questions were answered and contact information provided if additional questions arise  If additional questions or concerns call office           S/P insertion of spinal cord stimulator  -     Ambulatory referral to Neurosurgery        Subjective: The spinal cord stimulator is not helping my back or lag pain and I would like the battery removed       Patient ID: Janelle Hagan is a 80 y o  male     HPI   Chronic pain syndrome affecting bilateral low back and lower extremities   HO 2 lumbar spine surgeries the first he describes as likely a lumbar laminectomy and decompression , The second is documented as Lumbosacral anterior fusion  He has a history of failing conservative treatments therapy and pain management medicinal and interventional   Has a [employer] Spinal Cord Stimulator  Inserted 8/25/2020 by Dr Belen Guadalupe  INSERTION THORACIC DORSAL COLUMN SPINAL CORD STIMULATOR PERCUTANEOUS W IMPLANTABLE PULSE GENERATOR, RIGHT (Right Spine Thoracic)  Reported SCS trial delivered significant efficacy , greater than 50 %, and has not achieved similar efficacy since insertion  Has undergone multiple SCS programing sessions with [employer] to no avail for chronic pain symptom relief  At 6 week postoperative visit with surgeon is documented patient is doing well  Started a spinal cord stimulator holiday in  December 2021, did not experience worsening chronic pain symptoms  He continues care with pain management, Dr Sharon Alvarez, for multimodal treatments, inclusive of interventional  April 14 2021 he underwent a Medial Branch Nerve/Dorsal Ramus Blocks of the bilateral L3-4, L4-5, and L5-S1 facet joint(s) and continues to undergo pain management interventional procedures  His most recent spine procedure was 2/2/2022  Radiofrequency denervation of the right L4-5 and L5-S1 facet joint(s)  He plans t continue receiving interventional procedures with pain management  He has am intentional significant weight loss since spinal cord stimulator insertion in 2020  From weight of 229 lbs to  182 LBS and now complains of pain and burning  in right buttock generator site area, and sense that generator moves  He wishes to have spinal cord stimulator removed and presents today to discuss with surgeon            Dr Goldy Sol and I have spent 50 minutes with patient today in which greater than 50% of this time was spent in counseling/coordination of care regarding proposed surgical procedure options and impressions, imagining review, explaining risks and benefits and surgical procedure, all questions were answered in detail, patient verbalized an understanding and was provided with contact information if additional questions arise         REVIEW OF SYSTEMS  Review of Systems   Constitutional: Negative  HENT: Negative  Eyes: Negative  Respiratory: Negative  Cardiovascular: Negative  Gastrointestinal: Negative  Endocrine: Negative  Genitourinary: Negative  Musculoskeletal: Positive for back pain (radiates to buttocks and posterior legs to knees) and gait problem (uses a cane for support )  Discomfort when walking and standing   Recent RFA with Dr Marissa Price has provided significant relief  Pain / discomfort at [facility] site s/p 80 lb weight loss   Skin: Negative  Allergic/Immunologic: Positive for environmental allergies  Neurological: Negative for weakness and numbness  Hematological: Negative  Psychiatric/Behavioral: Negative            Meds/Allergies     Current Outpatient Medications   Medication Sig Dispense Refill    Cialis 5 MG tablet TAKE 1 TABLET BY MOUTH EVERY DAY 90 tablet 0    clonazePAM (KlonoPIN) 0 5 mg tablet Take 1 tablet (0 5 mg total) by mouth daily at bedtime 30 tablet 1    dextromethorphan-guaifenesin (MUCINEX DM)  MG per 12 hr tablet Take 1 tablet by mouth every 12 (twelve) hours      diclofenac sodium (VOLTAREN) 50 mg EC tablet TAKE 1 TABLET BY MOUTH TWICE A  tablet 1    DULoxetine (CYMBALTA) 30 mg delayed release capsule Take 90 mg by mouth daily       fluticasone (FLONASE) 50 mcg/act nasal spray fluticasone propionate 50 mcg/actuation nasal spray,suspension      gabapentin (NEURONTIN) 300 mg capsule Take 300 mg by mouth daily       metFORMIN (GLUCOPHAGE-XR) 500 mg 24 hr tablet TAKE 1 TABLET BY MOUTH EVERY DAY WITH DINNER 90 tablet 0    metoprolol tartrate (LOPRESSOR) 50 mg tablet TAKE 1 TABLET (50 MG TOTAL) BY MOUTH 2 (TWO) TIMES A DAY TAKE WITH 25 MG TABS (Patient taking differently: Take 50 mg by mouth every 12 (twelve) hours  ) 180 tablet 3    montelukast (SINGULAIR) 10 mg tablet TAKE 1 TABLET BY MOUTH EVERYDAY AT BEDTIME 90 tablet 1    Multiple Vitamin (MULTI-VITAMIN DAILY) TABS Take 1 tablet by mouth daily      NIFEdipine (PROCARDIA XL) 90 mg 24 hr tablet TAKE 1 TABLET BY MOUTH EVERY DAY 90 tablet 3    oxybutynin (DITROPAN) 5 mg tablet Take 5 mg by mouth 2 (two) times a day as needed      rosuvastatin (CRESTOR) 40 MG tablet Take 1 tablet (40 mg total) by mouth daily 90 tablet 3    albuterol (PROVENTIL HFA,VENTOLIN HFA) 90 mcg/act inhaler TAKE 2 PUFFS BY MOUTH EVERY 6 HOURS AS NEEDED FOR WHEEZE OR FOR SHORTNESS OF BREATH (Patient not taking: Reported on 4/7/2022) 18 g 5    DULoxetine (CYMBALTA) 60 mg delayed release capsule Take 1 capsule by mouth daily (Patient not taking: Reported on 1/21/2022 )      fluticasone-salmeterol (Wixela Inhub) 500-50 mcg/dose inhaler Inhale 1 puff 2 (two) times a day (Patient not taking: Reported on 4/7/2022 ) 60 blister 1    ipratropium-albuterol (DUO-NEB) 0 5-2 5 mg/3 mL nebulizer solution Take 1 vial (3 mL total) by nebulization every 6 (six) hours as needed for wheezing or shortness of breath (Patient not taking: Reported on 4/7/2022 ) 60 vial 1     No current facility-administered medications for this visit         No Known Allergies    PAST HISTORY    Past Medical History:   Diagnosis Date    Bronchitis     [facility]     Last Assessed: 5/17/2013    Chronic pain disorder     COPD with acute exacerbation [facility]     Last Assessed: 4/13/2015    Depression, controlled 8/30/2017    Transitioned From: Depression    Diabetes mellitus [facility] )     Elevated PSA     Bx neg 2012; Last Assessed: 11/23/2012    Hypertension     Irregular heart beat     MAT, afib    Lung nodule - resolved 2017 CT 2017    Postoperative urinary retention        Past Surgical History:   Procedure Laterality Date    BACK SURGERY  2012    decompression of spinal stenosis from lower thoracic through the lumbar spine    COLONOSCOPY  10/2010    neg   recheck in 5 years   Mel[address] KNEE ARTHROSCOPY  2013    MA PERCUT IMPLNT [address] Right 2020    Procedure: INSERTION THORACIC DORSAL COLUMN SPINAL CORD STIMULATOR PERCUTANEOUS W IMPLANTABLE PULSE GENERATOR, RIGHT;  Surgeon: Debi Schroeder MD;  Location: [facility];  Service: Neurosurgery    MA WRIST Ebbie Aw LIG Left 3/4/2020    Procedure: RELEASE CARPAL TUNNEL ENDOSCOPIC;  Surgeon: Luca Medina MD;  Location: [facility];  Service: Orthopedics    PROSTATE BIOPSY      for elevated PSA of about 7; neg    TRANSURETHRAL RESECTION OF PROSTATE  2013       Social History     Tobacco Use    Smoking status: Former Smoker     Packs/day: 1 50     Years: 23 00     Pack years: 34 50     Types: Cigarettes     Start date:      Quit date:      Years since quittin 2    Smokeless tobacco: Never Used   Vaping Use    Vaping Use: Never used   Substance Use Topics    Alcohol use: No     Comment: Stopped drinking    Drug use: No       Family History   Problem Relation Age of Onset    Breast cancer Mother     Pancreatitis Father     Diabetes Maternal Grandmother     Heart attack Maternal Grandfather     Heart attack Paternal Grandmother     Diabetes Paternal Grandmother     Diabetes Paternal Grandfather     No Known Problems Brother     Lung disease Paternal Uncle        The following portions of the patient's history were reviewed and updated as appropriate: allergies, current medications, past family history, past medical history, past social history, past surgical history and problem list       EXAM    Vitals:Blood pressure 126/63, pulse 61, temperature (!) 97 4 °F (36 3 °C), temperature source Tympanic, resp  rate 16, height 5' 8 5" (1 74 m), weight 83 kg (183 lb)  ,Body mass index is 27 42 kg/m²  Physical Exam  Vitals and nursing note reviewed  Constitutional:       General: He is not in acute distress  Appearance: Normal appearance  He is normal weight  He is not ill-appearing, toxic-appearing or diaphoretic  HENT:      Head: Normocephalic and atraumatic  Nose: No congestion or rhinorrhea  Eyes:      General: No scleral icterus  Right eye: No discharge  Left eye: No discharge  Conjunctiva/sclera: Conjunctivae normal    Cardiovascular:      Heart sounds: Normal heart sounds  Pulmonary:      Effort: No respiratory distress  Breath sounds: Normal breath sounds  No stridor  No wheezing, rhonchi or rales  Chest:      Chest wall: No tenderness  Abdominal:      General: Bowel sounds are normal  There is no distension  Palpations: There is no mass  Tenderness: There is no abdominal tenderness  There is no guarding  Hernia: No hernia is present  Musculoskeletal:      Right lower leg: No edema  Left lower leg: No edema  Skin:     General: Skin is warm and dry  Neurological:      General: No focal deficit present  Mental Status: He is alert  Psychiatric:         Mood and Affect: Mood normal          Behavior: Behavior normal          Neurologic Exam    Imaging Studies  No results found

## 2022-04-08 ENCOUNTER — APPOINTMENT (OUTPATIENT)
Dept: RADIOLOGY | Age: 72
End: 2022-04-08
Payer: MEDICARE

## 2022-04-08 DIAGNOSIS — Z96.89 S/P INSERTION OF SPINAL CORD STIMULATOR: ICD-10-CM

## 2022-04-08 PROCEDURE — 72080 X-RAY EXAM THORACOLMB 2/> VW: CPT

## 2022-04-09 PROBLEM — Z01.818 PREOPERATIVE EXAMINATION, UNSPECIFIED: Status: ACTIVE | Noted: 2022-04-09

## 2022-04-09 NOTE — ASSESSMENT & PLAN NOTE
· As addressed in HPI  · Failed spinal cord stimulator efficacy, would like SCS generator removed  · The [employer] rep met with patient , discussion content unknown but patient wishes to proceed with SCS system removal at least the generator if entire system cannot be  Removed  · Has a right buttock SCS generator, incision is healed , skin is loose and sagging, generator freely moves  He does not exhibit pain in area during palpation assessment  PLAN  DR Sim  · Met with patient , assessed the generator sight  · He reviewed interventional imagining  that provides partial view of SCS system  · He explained the surgical procedure in detail, benefits and risks, and the possibility of retained electrode lead material if scar tissue formation prevents safe removal of entire dual leads  · Explained the benefits of removing the entire SCS system will allow for future MRI  · Patient agrees to proceed with surgery and verbalized an understanding  · Verbal and written consent received  · Contact information provided if additional questions arise

## 2022-04-09 NOTE — ASSESSMENT & PLAN NOTE
· Surgery Procedure- REMOVAL IMPLANTABLE PULSE GENERATOR right buttock and DORSAL SPINAL COLUMN STIMULATOR ELECTRODE LEADS   · Surgeon- Dr Frances Islas  · Date: not scheduled yet  · Location- [place]   · Product -[employer]    Preoperative Assessment   · HO adverse response to general anesthesia:denies    · Surgical Procedures past 6 months:-denies    · Infection (MRSA ESBL  CDIFF ):denies  · Cardiac:denies but has cardiologist management for HTN, multifocal atrial tachycardia (MAT) SVT PAC , mixed hyperlipidemia   Will need EKG for sx protocol > 60 required     · Pulmonary: Smoke (stopped 2000 ) O2 use ( NO) RODRIGUE/CPAP (NO ) COPD/Asthma ( Yes ) recurrent chronic bronchitis in winter months  , worse in winter months around [place] requiring treatment with prednisone , occasionally ABX , does not normally need to use prn albuterol  Has not used or filled albuterol inhaler in a long time  Treating with singular  No COPD exacerbation this past winter  · Denies dyspnea or chest pain when climbing 2 sets of stairs or walking 2 city blocks  · Endocrine:  DM HgA1C 5 8, 10/8/21 reports is no longer managed by Dr Martha Lopez    ·  :He is self-cath every 2 weeks ureteral  stricture /scar tissue formation in bladder neck status post TURP as per patient  Dr Damian Pizarro -Urologist    · Personal history of venous thromboembolic disease - denies  Surgery Clearance    · PCP/Medical Clearance- pending completion    · Cardiac- EKG required and cardiac clearance HO cardiac disease, Dr Bro Shields  · PAT- pending completion   · Imagining requirement - Thoracolumbar Xray --- ordered , need to complete before surgery---done 4/8/2021 , need report  Teaching   · Pain management:  post op med oxycodone 5 mg will add acetaminophen 1000 mg with each dose, 5 day order only  May need antispasmodic  Has problem with muscle spasm prior surgery     · Hold Anticoagulant agents pre and postoperative (AC, Antiplatelet, ASA, NSAID, vitamins , dietary supplements , OTC, immunosuppressant ) ASA, diclofenac , vitamin and dietary supplement   · Provided preoperative hygiene products for   · Provided overview of surgical process from office appointment thru 6 weeks post op:--donr written document     Patient verbalized understanding, all questions were answered and contact information provided if additional questions arise  If additional questions or concerns call office

## 2022-04-11 DIAGNOSIS — J44.1 COPD WITH ACUTE EXACERBATION (HCC): ICD-10-CM

## 2022-04-19 ENCOUNTER — RA CDI HCC (OUTPATIENT)
Dept: OTHER | Facility: HOSPITAL | Age: 72
End: 2022-04-19

## 2022-04-19 NOTE — PROGRESS NOTES
[facility] [address]  coding opportunities       Chart reviewed, no opportunity found: CHART REVIEWED, NO OPPORTUNITY FOUND        Patients Insurance     Medicare Insurance: Medicare

## 2022-04-20 ENCOUNTER — APPOINTMENT (OUTPATIENT)
Dept: LAB | Facility: CLINIC | Age: 72
End: 2022-04-20
Payer: MEDICARE

## 2022-04-20 DIAGNOSIS — Z96.89 STATUS POST INSERTION OF SPINAL CORD STIMULATOR: ICD-10-CM

## 2022-04-20 DIAGNOSIS — R73.01 IMPAIRED FASTING GLUCOSE: ICD-10-CM

## 2022-04-20 DIAGNOSIS — E78.2 MIXED HYPERLIPIDEMIA: ICD-10-CM

## 2022-04-20 DIAGNOSIS — I10 PRIMARY HYPERTENSION: ICD-10-CM

## 2022-04-20 DIAGNOSIS — G89.4 CHRONIC PAIN SYNDROME: ICD-10-CM

## 2022-04-20 DIAGNOSIS — Z96.89 S/P INSERTION OF SPINAL CORD STIMULATOR: ICD-10-CM

## 2022-04-20 LAB
ALBUMIN SERPL BCP-MCNC: 3.8 G/DL (ref 3.5–5)
ALP SERPL-CCNC: 61 U/L (ref 46–116)
ALT SERPL W P-5'-P-CCNC: 27 U/L (ref 12–78)
ANION GAP SERPL CALCULATED.3IONS-SCNC: 0 MMOL/L (ref 4–13)
APTT PPP: 35 SECONDS (ref 23–37)
AST SERPL W P-5'-P-CCNC: 25 U/L (ref 5–45)
BACTERIA UR QL AUTO: NORMAL /HPF
BASOPHILS # BLD AUTO: 0.07 THOUSANDS/ΜL (ref 0–0.1)
BASOPHILS NFR BLD AUTO: 1 % (ref 0–1)
BILIRUB SERPL-MCNC: 0.82 MG/DL (ref 0.2–1)
BILIRUB UR QL STRIP: NEGATIVE
BUN SERPL-MCNC: 17 MG/DL (ref 5–25)
CALCIUM SERPL-MCNC: 9.6 MG/DL (ref 8.3–10.1)
CHLORIDE SERPL-SCNC: 108 MMOL/L (ref 100–108)
CHOLEST SERPL-MCNC: 140 MG/DL
CLARITY UR: CLEAR
CO2 SERPL-SCNC: 31 MMOL/L (ref 21–32)
COLOR UR: YELLOW
CREAT SERPL-MCNC: 1.04 MG/DL (ref 0.6–1.3)
EOSINOPHIL # BLD AUTO: 0.37 THOUSAND/ΜL (ref 0–0.61)
EOSINOPHIL NFR BLD AUTO: 7 % (ref 0–6)
ERYTHROCYTE [DISTWIDTH] IN BLOOD BY AUTOMATED COUNT: 12.7 % (ref 11.6–15.1)
EST. AVERAGE GLUCOSE BLD GHB EST-MCNC: 111 MG/DL
GFR SERPL CREATININE-BSD FRML MDRD: 72 ML/MIN/1.73SQ M
GLUCOSE P FAST SERPL-MCNC: 92 MG/DL (ref 65–99)
GLUCOSE UR STRIP-MCNC: NEGATIVE MG/DL
HBA1C MFR BLD: 5.5 %
HCT VFR BLD AUTO: 41.1 % (ref 36.5–49.3)
HDLC SERPL-MCNC: 47 MG/DL
HGB BLD-MCNC: 13.3 G/DL (ref 12–17)
HGB UR QL STRIP.AUTO: NEGATIVE
IMM GRANULOCYTES # BLD AUTO: 0.01 THOUSAND/UL (ref 0–0.2)
IMM GRANULOCYTES NFR BLD AUTO: 0 % (ref 0–2)
INR PPP: 0.95 (ref 0.84–1.19)
KETONES UR STRIP-MCNC: NEGATIVE MG/DL
LDLC SERPL CALC-MCNC: 75 MG/DL (ref 0–100)
LEUKOCYTE ESTERASE UR QL STRIP: NEGATIVE
LYMPHOCYTES # BLD AUTO: 2.06 THOUSANDS/ΜL (ref 0.6–4.47)
LYMPHOCYTES NFR BLD AUTO: 38 % (ref 14–44)
MCH RBC QN AUTO: 30.9 PG (ref 26.8–34.3)
MCHC RBC AUTO-ENTMCNC: 32.4 G/DL (ref 31.4–37.4)
MCV RBC AUTO: 95 FL (ref 82–98)
MONOCYTES # BLD AUTO: 0.58 THOUSAND/ΜL (ref 0.17–1.22)
MONOCYTES NFR BLD AUTO: 11 % (ref 4–12)
NEUTROPHILS # BLD AUTO: 2.3 THOUSANDS/ΜL (ref 1.85–7.62)
NEUTS SEG NFR BLD AUTO: 43 % (ref 43–75)
NITRITE UR QL STRIP: NEGATIVE
NON-SQ EPI CELLS URNS QL MICRO: NORMAL /HPF
NONHDLC SERPL-MCNC: 93 MG/DL
NRBC BLD AUTO-RTO: 0 /100 WBCS
PH UR STRIP.AUTO: 6 [PH]
POTASSIUM SERPL-SCNC: 4.5 MMOL/L (ref 3.5–5.3)
PROT SERPL-MCNC: 7 G/DL (ref 6.4–8.2)
PROT UR STRIP-MCNC: ABNORMAL MG/DL
PROTHROMBIN TIME: 12.3 SECONDS (ref 11.6–14.5)
RBC # BLD AUTO: 4.31 MILLION/UL (ref 3.88–5.62)
RBC #/AREA URNS AUTO: NORMAL /HPF
SODIUM SERPL-SCNC: 139 MMOL/L (ref 136–145)
SP GR UR STRIP.AUTO: 1.02 (ref 1–1.03)
TRIGL SERPL-MCNC: 88 MG/DL
UROBILINOGEN UR STRIP-ACNC: <2 MG/DL
WBC # BLD AUTO: 5.39 THOUSAND/UL (ref 4.31–10.16)
WBC #/AREA URNS AUTO: NORMAL /HPF

## 2022-04-20 PROCEDURE — 80053 COMPREHEN METABOLIC PANEL: CPT

## 2022-04-20 PROCEDURE — 80061 LIPID PANEL: CPT

## 2022-04-20 PROCEDURE — 83036 HEMOGLOBIN GLYCOSYLATED A1C: CPT

## 2022-04-20 PROCEDURE — 85025 COMPLETE CBC W/AUTO DIFF WBC: CPT

## 2022-04-20 PROCEDURE — 81001 URINALYSIS AUTO W/SCOPE: CPT | Performed by: NEUROLOGICAL SURGERY

## 2022-04-20 PROCEDURE — 85610 PROTHROMBIN TIME: CPT

## 2022-04-20 PROCEDURE — 85730 THROMBOPLASTIN TIME PARTIAL: CPT

## 2022-04-20 PROCEDURE — 36415 COLL VENOUS BLD VENIPUNCTURE: CPT

## 2022-04-25 ENCOUNTER — OFFICE VISIT (OUTPATIENT)
Dept: FAMILY MEDICINE CLINIC | Facility: CLINIC | Age: 72
End: 2022-04-25
Payer: MEDICARE

## 2022-04-25 VITALS
OXYGEN SATURATION: 95 % | SYSTOLIC BLOOD PRESSURE: 112 MMHG | HEIGHT: 68 IN | RESPIRATION RATE: 20 BRPM | BODY MASS INDEX: 26.95 KG/M2 | DIASTOLIC BLOOD PRESSURE: 50 MMHG | HEART RATE: 56 BPM | TEMPERATURE: 97.7 F | WEIGHT: 177.8 LBS

## 2022-04-25 DIAGNOSIS — E78.2 MIXED HYPERLIPIDEMIA: ICD-10-CM

## 2022-04-25 DIAGNOSIS — E03.8 SUBCLINICAL HYPOTHYROIDISM: ICD-10-CM

## 2022-04-25 DIAGNOSIS — M54.16 LUMBAR RADICULOPATHY: ICD-10-CM

## 2022-04-25 DIAGNOSIS — Z01.818 PREOP EXAMINATION: Primary | ICD-10-CM

## 2022-04-25 DIAGNOSIS — R73.01 IMPAIRED FASTING GLUCOSE: ICD-10-CM

## 2022-04-25 DIAGNOSIS — I10 PRIMARY HYPERTENSION: ICD-10-CM

## 2022-04-25 PROCEDURE — 99214 OFFICE O/P EST MOD 30 MIN: CPT | Performed by: FAMILY MEDICINE

## 2022-04-25 RX ORDER — NIFEDIPINE 60 MG/1
60 TABLET, FILM COATED, EXTENDED RELEASE ORAL DAILY
Qty: 90 TABLET | Refills: 1 | Status: SHIPPED | OUTPATIENT
Start: 2022-04-25

## 2022-04-25 NOTE — ASSESSMENT & PLAN NOTE
BP is low today  Decrease nifedipine to 60mg QD  Continue metoprolol 50mg bid  Check BP at home 2/day and call office if BP always <90/60 or >140/90

## 2022-04-25 NOTE — LETTER
April 25, 2022     Eder Sullivan [place] [address]    Patient: Radames Parks   YOB: 1950   Date of Visit: 4/25/2022       Dear Dr Lizett Ash:    Thank you for referring Apolonia Ventura to me for evaluation  Below are my notes for this consultation  If you have questions, please do not hesitate to call me  I look forward to following your patient along with you  Sincerely,        Ricky Williamson MD        CC: No Recipients  Ricky Williamson MD  4/25/2022  3:16 PM  Sign when Signing Visit  Chief Complaint   Patient presents with    Pre-op Clearance     REMOVAL OF SPINAL CORD STIMULATOR SYSTEM WITH BATTERY surgery scheduled 05/03/22 with Dr Eder Sullivan  Health Maintenance   Topic Date Due    DTaP,Tdap,and Td Vaccines (1 - Tdap) Never done    PT PLAN OF CARE  10/30/2019    COVID-19 Vaccine (3 - Booster for Moderna series) 09/06/2021    BMI: Followup Plan  02/05/2022    Colorectal Cancer Screening  03/24/2022    Fall Risk  04/14/2022    Medicare Annual Wellness Visit (AWV)  04/14/2022    BMI: Adult  04/25/2023    Hepatitis C Screening  Completed    Pneumococcal Vaccine: 65+ Years  Completed    Influenza Vaccine  Completed    HIB Vaccine  Aged Out    Hepatitis B Vaccine  Aged Out    IPV Vaccine  Aged Out    Hepatitis A Vaccine  Aged Out    Meningococcal ACWY Vaccine  Aged Out    HPV Vaccine  Aged Out           Subjective:     Radames Parks is a 80 y o  male who presents to the office today for a preoperative consultation at the request of surgeon Dr Lizett Ash who plans on performing remove stimulator on 5/3/2022  This consultation is requested for the specific conditions prompting preoperative evaluation (i e  because of potential affect on operative risk): preop  Planned anesthesia: general  The patient has the following known anesthesia issues: no problem  Patients bleeding risk: no recent abnormal bleeding       Chronic lower back pain--for years  Hx of back surgery in 2012 at [facility] pain specialist now  Got injections which helped  Got Stimulator in 8/2020 which helped little  Plan to remove it  Pt attend Nutrition program since 6/2021  He lost 80 lbs in last year  Felt back pain is better and COPD is controlled  MAT/HTN----[facility] cardiology for MAT, HTN  Pt lost 80 lbs in last year  BP was low sometimes  Felt dizzy when he gets up from bed  Cardiology decreased metoprolol to 50mg bid  He is on nifedipine 90mg Qd      Hyperlipidemia---He is on crestor 40mg qhs per cardiology  Denies side effects  4/22/2022 Lipid 140/88/47/75 ok       IFG---He is on metformin 500mg QD  Denies SE  Tried to eat healthy  4/20/2022 hgA1C 5 5 normal       Anxiety/depression/Insomnia----[facility] psychiatry Dr Kaiden Bettencourt Q 3 month  He is on cymbalta 90mg QD which works for him  He uses clonazepam 0 5mg qhs for insomnia  He is on gabapentin 300mg am and 1200mg pm per psychiatrist        COPD/chronic bronchitis---[facility] pulmonary  He is on singulair 10mg QD and mucinex-DM daily    1/2021 PFT showed mild restrictive    Quit smoking in 2000        BPH---[facility] urology Q 6 months for BPH s/p TURP in 2013  Had cystoscopy and dilation in 9/2014  He is self-cath 3-4/week  Cialis 5mg QD helped  He is on oxybutynin 5mg Qd also        The following portions of the patient's history were reviewed and updated as appropriate: allergies, current medications, past family history, past medical history, past social history, past surgical history and problem list     Review of Systems  Pertinent items are noted in HPI       Objective:     /50 (BP Location: Left arm, Patient Position: Sitting, Cuff Size: Adult)   Pulse 56   Temp 97 7 °F (36 5 °C) (Tympanic)   Resp 20   Ht 5' 7 5" (1 715 m)   Wt 80 6 kg (177 lb 12 8 oz)   SpO2 95%   BMI 27 44 kg/m²     General Appearance:    Alert, cooperative, no distress, appears stated age   Head:    Normocephalic, without obvious abnormality, atraumatic   Eyes:    PERRL, conjunctiva/corneas clear, EOM's intact                   Neck:   Supple, symmetrical, trachea midline, no adenopathy;        thyroid:  No enlargement/tenderness/nodules; no carotid    bruit or JVD       Lungs:     Clear to auscultation bilaterally, respirations unlabored       Heart:    Regular rate and rhythm, S1 and S2 normal, no murmur, rub   or gallop   Abdomen:     Soft, non-tender, bowel sounds active all four quadrants,     no masses, no organomegaly           Extremities:   Extremities normal, atraumatic, no cyanosis or edema                       Predictors of intubation difficulty:   Morbid obesity? no     Lab Review   Appointment on 04/20/2022   Component Date Value    Sodium 04/20/2022 139     Potassium 04/20/2022 4 5     Chloride 04/20/2022 108     CO2 04/20/2022 31     ANION GAP 04/20/2022 0*    BUN 04/20/2022 17     Creatinine 04/20/2022 1 04     Glucose, Fasting 04/20/2022 92     Calcium 04/20/2022 9 6     AST 04/20/2022 25     ALT 04/20/2022 27     Alkaline Phosphatase 04/20/2022 61     Total Protein 04/20/2022 7 0     Albumin 04/20/2022 3 8     Total Bilirubin 04/20/2022 0 82     eGFR 04/20/2022 72     Hemoglobin A1C 04/20/2022 5 5     EAG 04/20/2022 111     Cholesterol 04/20/2022 140     Triglycerides 04/20/2022 88     HDL, Direct 04/20/2022 47     LDL Calculated 04/20/2022 75     Non-HDL-Chol (CHOL-HDL) 04/20/2022 93     WBC 04/20/2022 5 39     RBC 04/20/2022 4 31     Hemoglobin 04/20/2022 13 3     Hematocrit 04/20/2022 41 1     MCV 04/20/2022 95     MCH 04/20/2022 30 9     MCHC 04/20/2022 32 4     RDW 04/20/2022 12 7     Platelets 39/16/8724      nRBC 04/20/2022 0     Neutrophils Relative 04/20/2022 43     Immat GRANS % 04/20/2022 0     Lymphocytes Relative 04/20/2022 38     Monocytes Relative 04/20/2022 11     Eosinophils Relative 04/20/2022 7*    Basophils Relative 04/20/2022 1     Neutrophils Absolute 04/20/2022 2 30     Immature Grans Absolute 04/20/2022 0 01     Lymphocytes Absolute 04/20/2022 2 06     Monocytes Absolute 04/20/2022 0 58     Eosinophils Absolute 04/20/2022 0 37     Basophils Absolute 04/20/2022 0 07     Protime 04/20/2022 12 3     INR 04/20/2022 0 95     PTT 04/20/2022 35    Office Visit on 04/07/2022   Component Date Value    Color, UA 04/20/2022 Yellow     Clarity, UA 04/20/2022 Clear     Specific Gravity, UA 04/20/2022 1 023     pH, UA 04/20/2022 6 0     Leukocytes, UA 04/20/2022 Negative     Nitrite, UA 04/20/2022 Negative     Protein, UA 04/20/2022 Trace*    Glucose, UA 04/20/2022 Negative     Ketones, UA 04/20/2022 Negative     Urobilinogen, UA 04/20/2022 <2 0     Bilirubin, UA 04/20/2022 Negative     Blood, UA 04/20/2022 Negative     RBC, UA 04/20/2022 None Seen     WBC, UA 04/20/2022 None Seen     Epithelial Cells 04/20/2022 None Seen     Bacteria, UA 04/20/2022 None Seen         Assessment:     80 y o  male with planned surgery as above  Known risk factors for perioperative complications: None    Difficulty with intubation is not anticipated  Cardiac Risk Estimation: Low      Plan:     1  Preoperative workup as follows none  2  Change in medication regimen before surgery: none, continue medication regimen including morning of surgery, with sip of water  3  Prophylaxis for cardiac events with perioperative beta-blockers: not indicated  4  Invasive hemodynamic monitoring perioperatively: not indicated    5  Deep vein thrombosis prophylaxis postoperatively:regimen to be chosen by surgical team

## 2022-04-25 NOTE — PROGRESS NOTES
Chief Complaint   Patient presents with    Pre-op Clearance     REMOVAL OF SPINAL CORD STIMULATOR SYSTEM WITH BATTERY surgery scheduled 05/03/22 with Dr Judith Rascon  Health Maintenance   Topic Date Due    DTaP,Tdap,and Td Vaccines (1 - Tdap) Never done    PT PLAN OF CARE  10/30/2019    COVID-19 Vaccine (3 - Booster for Moderna series) 09/06/2021    BMI: Followup Plan  02/05/2022    Colorectal Cancer Screening  03/24/2022    Fall Risk  04/14/2022    Medicare Annual Wellness Visit (AWV)  04/14/2022    BMI: Adult  04/25/2023    Hepatitis C Screening  Completed    Pneumococcal Vaccine: 65+ Years  Completed    Influenza Vaccine  Completed    HIB Vaccine  Aged Out    Hepatitis B Vaccine  Aged Out    IPV Vaccine  Aged Out    Hepatitis A Vaccine  Aged Out    Meningococcal ACWY Vaccine  Aged Out    HPV Vaccine  Aged Out           Subjective:     Emily Medrano is a 80 y o  male who presents to the office today for a preoperative consultation at the request of surgeon Dr Casey Guzmán who plans on performing remove stimulator on 5/3/2022  This consultation is requested for the specific conditions prompting preoperative evaluation (i e  because of potential affect on operative risk): preop  Planned anesthesia: general  The patient has the following known anesthesia issues: no problem  Patients bleeding risk: no recent abnormal bleeding  Chronic lower back pain--for years  Hx of back surgery in 2012 at [facility]  [facility] pain specialist now  Got injections which helped  Got Stimulator in 8/2020 which helped little  Plan to remove it  Pt attend Nutrition program since 6/2021  He lost 80 lbs in last year  Felt back pain is better and COPD is controlled  MAT/HTN----[facility] cardiology for MAT, HTN  Pt lost 80 lbs in last year  BP was low sometimes  Felt dizzy when he gets up from bed  Cardiology decreased metoprolol to 50mg bid     He is on nifedipine 90mg Qd      Hyperlipidemia---He is on crestor 40mg qhs per cardiology  Denies side effects  4/22/2022 Lipid 140/88/47/75 ok       IFG---He is on metformin 500mg QD  Denies SE  Tried to eat healthy  4/20/2022 hgA1C 5 5 normal       Anxiety/depression/Insomnia----[facility] psychiatry Dr Cyrus Nicole Q 3 month  He is on cymbalta 90mg QD which works for him  He uses clonazepam 0 5mg qhs for insomnia  He is on gabapentin 300mg am and 1200mg pm per psychiatrist        COPD/chronic bronchitis---[facility] pulmonary  He is on singulair 10mg QD and mucinex-DM daily    1/2021 PFT showed mild restrictive    Quit smoking in 2000        BPH---[facility] urology Q 6 months for BPH s/p TURP in 2013  Had cystoscopy and dilation in 9/2014  He is self-cath 3-4/week  Cialis 5mg QD helped  He is on oxybutynin 5mg Qd also        The following portions of the patient's history were reviewed and updated as appropriate: allergies, current medications, past family history, past medical history, past social history, past surgical history and problem list     Review of Systems  Pertinent items are noted in HPI       Objective:     /50 (BP Location: Left arm, Patient Position: Sitting, Cuff Size: Adult)   Pulse 56   Temp 97 7 °F (36 5 °C) (Tympanic)   Resp 20   Ht 5' 7 5" (1 715 m)   Wt 80 6 kg (177 lb 12 8 oz)   SpO2 95%   BMI 27 44 kg/m²     General Appearance:    Alert, cooperative, no distress, appears stated age   Head:    Normocephalic, without obvious abnormality, atraumatic   Eyes:    PERRL, conjunctiva/corneas clear, EOM's intact                   Neck:   Supple, symmetrical, trachea midline, no adenopathy;        thyroid:  No enlargement/tenderness/nodules; no carotid    bruit or JVD       Lungs:     Clear to auscultation bilaterally, respirations unlabored       Heart:    Regular rate and rhythm, S1 and S2 normal, no murmur, rub   or gallop   Abdomen:     Soft, non-tender, bowel sounds active all four quadrants,     no masses, no organomegaly           Extremities:   Extremities normal, atraumatic, no cyanosis or edema                       Predictors of intubation difficulty:   Morbid obesity? no     Lab Review   Appointment on 04/20/2022   Component Date Value    Sodium 04/20/2022 139     Potassium 04/20/2022 4 5     Chloride 04/20/2022 108     CO2 04/20/2022 31     ANION GAP 04/20/2022 0*    BUN 04/20/2022 17     Creatinine 04/20/2022 1 04     Glucose, Fasting 04/20/2022 92     Calcium 04/20/2022 9 6     AST 04/20/2022 25     ALT 04/20/2022 27     Alkaline Phosphatase 04/20/2022 61     Total Protein 04/20/2022 7 0     Albumin 04/20/2022 3 8     Total Bilirubin 04/20/2022 0 82     eGFR 04/20/2022 72     Hemoglobin A1C 04/20/2022 5 5     EAG 04/20/2022 111     Cholesterol 04/20/2022 140     Triglycerides 04/20/2022 88     HDL, Direct 04/20/2022 47     LDL Calculated 04/20/2022 75     Non-HDL-Chol (CHOL-HDL) 04/20/2022 93     WBC 04/20/2022 5 39     RBC 04/20/2022 4 31     Hemoglobin 04/20/2022 13 3     Hematocrit 04/20/2022 41 1     MCV 04/20/2022 95     MCH 04/20/2022 30 9     MCHC 04/20/2022 32 4     RDW 04/20/2022 12 7     Platelets 55/32/8036      nRBC 04/20/2022 0     Neutrophils Relative 04/20/2022 43     Immat GRANS % 04/20/2022 0     Lymphocytes Relative 04/20/2022 38     Monocytes Relative 04/20/2022 11     Eosinophils Relative 04/20/2022 7*    Basophils Relative 04/20/2022 1     Neutrophils Absolute 04/20/2022 2 30     Immature Grans Absolute 04/20/2022 0 01     Lymphocytes Absolute 04/20/2022 2 06     Monocytes Absolute 04/20/2022 0 58     Eosinophils Absolute 04/20/2022 0 37     Basophils Absolute 04/20/2022 0 07     Protime 04/20/2022 12 3     INR 04/20/2022 0 95     PTT 04/20/2022 35    Office Visit on 04/07/2022   Component Date Value    Color, UA 04/20/2022 Yellow     Clarity, UA 04/20/2022 Clear     Specific Gravity, UA 04/20/2022 1 023     pH, UA 04/20/2022 6 0     Leukocytes, UA 04/20/2022 Negative     Nitrite, UA 04/20/2022 Negative     Protein, UA 04/20/2022 Trace*    Glucose, UA 04/20/2022 Negative     Ketones, UA 04/20/2022 Negative     Urobilinogen, UA 04/20/2022 <2 0     Bilirubin, UA 04/20/2022 Negative     Blood, UA 04/20/2022 Negative     RBC, UA 04/20/2022 None Seen     WBC, UA 04/20/2022 None Seen     Epithelial Cells 04/20/2022 None Seen     Bacteria, UA 04/20/2022 None Seen         Assessment:     80 y o  male with planned surgery as above  Known risk factors for perioperative complications: None    Difficulty with intubation is not anticipated  Cardiac Risk Estimation: Low      Plan:     1  Preoperative workup as follows none  2  Change in medication regimen before surgery: none, continue medication regimen including morning of surgery, with sip of water  3  Prophylaxis for cardiac events with perioperative beta-blockers: not indicated  4  Invasive hemodynamic monitoring perioperatively: not indicated    5  Deep vein thrombosis prophylaxis postoperatively:regimen to be chosen by surgical team

## 2022-04-27 NOTE — PRE-PROCEDURE INSTRUCTIONS
Pre-Surgery Instructions:   Medication Instructions    ASPIRIN 81 PO Stop taking 7 days prior to surgery   Cialis 5 MG tablet Hold day of surgery  pt will hold 2 days before surgery    clonazePAM (KlonoPIN) 0 5 mg tablet Take night before surgery    diclofenac sodium (VOLTAREN) 50 mg EC tablet Stop taking 7 days prior to surgery   DULoxetine (CYMBALTA) 30 mg delayed release capsule Take day of surgery   gabapentin (NEURONTIN) 300 mg capsule Take day of surgery   metFORMIN (GLUCOPHAGE-XR) 500 mg 24 hr tablet Hold day of surgery   metoprolol tartrate (LOPRESSOR) 50 mg tablet Take day of surgery   montelukast (SINGULAIR) 10 mg tablet Take night before surgery    Multiple Vitamin (MULTI-VITAMIN DAILY) TABS Stop taking 7 days prior to surgery   NIFEdipine ER (ADALAT CC) 60 MG 24 hr tablet Take day of surgery  if BP low is to call PCP before taking    oxybutynin (DITROPAN) 5 mg tablet Hold day of surgery   rosuvastatin (CRESTOR) 40 MG tablet Take day of surgery    Patient / instructed on use of chlorhexidine soap per hospital protocol    Patient instructed to stop all ASA, NSAIDS, vitamins and herbal supplements one week prior to surgery or per Dr Kin Saha

## 2022-04-28 ENCOUNTER — ANESTHESIA EVENT (OUTPATIENT)
Dept: PERIOP | Facility: HOSPITAL | Age: 72
End: 2022-04-28
Payer: MEDICARE

## 2022-04-28 NOTE — ANESTHESIA PREPROCEDURE EVALUATION
Procedure:  REMOVAL OF SPINAL CORD STIMULATOR SYSTEM WITH BATTERY (N/A Buttocks)    Relevant Problems   CARDIO   (+) PITTMAN (dyspnea on exertion)   (+) Hyperlipidemia   (+) Hypertension   (+) Multifocal atrial tachycardia (HCC)   (+) Supraventricular tachycardia (HCC)      ENDO   (+) Subclinical hypothyroidism      /RENAL   (+) Enlarged prostate with lower urinary tract symptoms (LUTS)      MUSCULOSKELETAL   (+) Arthritis of left hip   (+) Degeneration of lumbosacral intervertebral disc   (+) Localized, primary osteoarthritis   (+) Low back pain   (+) Lumbar spondylosis   (+) Lumbosacral spondylosis without myelopathy   (+) Osteoarthritis of hip   (+) Osteoarthritis of knee   (+) Primary localized osteoarthritis of pelvic region and thigh   (+) Pseudoclaudication syndrome      NEURO/PSYCH   (+) Anxiety and depression   (+) Chronic pain syndrome   (+) Pseudoclaudication syndrome      PULMONARY   (+) COPD (chronic obstructive pulmonary disease) (HCC)   (+) Chronic bronchitis (HCC)   (+) PITTMAN (dyspnea on exertion)      Pain/psych regimen: klonopin, voltaren, cymbalta, gabapentin  Tylenol and gabapentin ordered preop  Lab Results   Component Value Date    WBC 5 39 04/20/2022    HGB 13 3 04/20/2022    HCT 41 1 04/20/2022    MCV 95 04/20/2022    PLT  04/20/2022      Comment:      Unable to perform due to clumped platelets     Lab Results   Component Value Date    GLUCOSE 99 09/08/2015    CALCIUM 9 6 04/20/2022     09/08/2015    K 4 5 04/20/2022    CO2 31 04/20/2022     04/20/2022    BUN 17 04/20/2022    CREATININE 1 04 04/20/2022     Lab Results   Component Value Date    INR 0 95 04/20/2022    INR 0 97 08/06/2020    INR 0 96 06/08/2020    PROTIME 12 3 04/20/2022    PROTIME 12 9 08/06/2020    PROTIME 12 4 06/08/2020     Lab Results   Component Value Date    PTT 35 04/20/2022     Stress test 6/2021:  IMPRESSIONS: Normal study after vasodilation and low level exercise without reproduction of symptoms   Myocardial perfusion imaging was normal at rest and with stress  Left ventricular systolic function was normal        Physical Exam    Airway    Mallampati score: I  TM Distance: >3 FB  Neck ROM: full     Dental   Comment: Cap on bottom right  Denies having any loose teeth ,     Cardiovascular  Rhythm: regular, Rate: normal,     Pulmonary  Comment: Speaking in full sentences, normal work of breathing, not tachypneic,     Other Findings        Anesthesia Plan  ASA Score- 3     Anesthesia Type- general with ASA Monitors  Additional Monitors:   Airway Plan: ETT  Plan Factors-Exercise tolerance (METS): >4 METS  Chart reviewed  Patient is not a current smoker  Obstructive sleep apnea risk education given perioperatively  Induction- intravenous  Postoperative Plan- Plan for postoperative opioid use  Informed Consent- Anesthetic plan and risks discussed with patient  I personally reviewed this patient with the CRNA  Discussed and agreed on the Anesthesia Plan with the CRNA  Eunice Horton

## 2022-05-02 ENCOUNTER — DOCUMENTATION (OUTPATIENT)
Dept: NEUROSURGERY | Facility: CLINIC | Age: 72
End: 2022-05-02

## 2022-05-03 ENCOUNTER — ANESTHESIA (OUTPATIENT)
Dept: PERIOP | Facility: HOSPITAL | Age: 72
End: 2022-05-03
Payer: MEDICARE

## 2022-05-03 ENCOUNTER — APPOINTMENT (OUTPATIENT)
Dept: RADIOLOGY | Facility: HOSPITAL | Age: 72
End: 2022-05-03
Payer: MEDICARE

## 2022-05-03 ENCOUNTER — HOSPITAL ENCOUNTER (OUTPATIENT)
Facility: HOSPITAL | Age: 72
Setting detail: OUTPATIENT SURGERY
Discharge: HOME | End: 2022-05-03
Attending: NEUROLOGICAL SURGERY | Admitting: NEUROLOGICAL SURGERY
Payer: MEDICARE

## 2022-05-03 VITALS
HEART RATE: 84 BPM | OXYGEN SATURATION: 98 % | WEIGHT: 177 LBS | RESPIRATION RATE: 16 BRPM | HEIGHT: 68 IN | DIASTOLIC BLOOD PRESSURE: 75 MMHG | SYSTOLIC BLOOD PRESSURE: 134 MMHG | BODY MASS INDEX: 26.83 KG/M2 | TEMPERATURE: 96.7 F

## 2022-05-03 DIAGNOSIS — G89.29 CHRONIC BILATERAL LOW BACK PAIN, UNSPECIFIED WHETHER SCIATICA PRESENT: Primary | ICD-10-CM

## 2022-05-03 DIAGNOSIS — M54.50 CHRONIC BILATERAL LOW BACK PAIN, UNSPECIFIED WHETHER SCIATICA PRESENT: Primary | ICD-10-CM

## 2022-05-03 LAB — GLUCOSE SERPL-MCNC: 103 MG/DL (ref 65–140)

## 2022-05-03 PROCEDURE — 82948 REAGENT STRIP/BLOOD GLUCOSE: CPT

## 2022-05-03 PROCEDURE — 72070 X-RAY EXAM THORAC SPINE 2VWS: CPT

## 2022-05-03 PROCEDURE — 63688 REV/RMV IMP SP NPG/R DTCH CN: CPT | Performed by: NEUROLOGICAL SURGERY

## 2022-05-03 PROCEDURE — 63661 REMOVE SPINE ELTRD PERQ ARAY: CPT | Performed by: NEUROLOGICAL SURGERY

## 2022-05-03 RX ORDER — OXYCODONE HYDROCHLORIDE AND ACETAMINOPHEN 5; 325 MG/1; MG/1
1 TABLET ORAL ONCE
Status: COMPLETED | OUTPATIENT
Start: 2022-05-03 | End: 2022-05-03

## 2022-05-03 RX ORDER — ALBUMIN, HUMAN INJ 5% 5 %
SOLUTION INTRAVENOUS CONTINUOUS PRN
Status: DISCONTINUED | OUTPATIENT
Start: 2022-05-03 | End: 2022-05-03

## 2022-05-03 RX ORDER — BUPIVACAINE HYDROCHLORIDE AND EPINEPHRINE 2.5; 5 MG/ML; UG/ML
INJECTION, SOLUTION EPIDURAL; INFILTRATION; INTRACAUDAL; PERINEURAL AS NEEDED
Status: DISCONTINUED | OUTPATIENT
Start: 2022-05-03 | End: 2022-05-03 | Stop reason: HOSPADM

## 2022-05-03 RX ORDER — GABAPENTIN 300 MG/1
300 CAPSULE ORAL ONCE
Status: COMPLETED | OUTPATIENT
Start: 2022-05-03 | End: 2022-05-03

## 2022-05-03 RX ORDER — SUCCINYLCHOLINE/SOD CL,ISO/PF 100 MG/5ML
SYRINGE (ML) INTRAVENOUS AS NEEDED
Status: DISCONTINUED | OUTPATIENT
Start: 2022-05-03 | End: 2022-05-03

## 2022-05-03 RX ORDER — CHLORHEXIDINE GLUCONATE 0.12 MG/ML
15 RINSE ORAL ONCE
Status: COMPLETED | OUTPATIENT
Start: 2022-05-03 | End: 2022-05-03

## 2022-05-03 RX ORDER — EPHEDRINE SULFATE 50 MG/ML
INJECTION INTRAVENOUS AS NEEDED
Status: DISCONTINUED | OUTPATIENT
Start: 2022-05-03 | End: 2022-05-03

## 2022-05-03 RX ORDER — FENTANYL CITRATE/PF 50 MCG/ML
50 SYRINGE (ML) INJECTION
Status: DISCONTINUED | OUTPATIENT
Start: 2022-05-03 | End: 2022-05-03 | Stop reason: HOSPADM

## 2022-05-03 RX ORDER — PROPOFOL 10 MG/ML
INJECTION, EMULSION INTRAVENOUS AS NEEDED
Status: DISCONTINUED | OUTPATIENT
Start: 2022-05-03 | End: 2022-05-03

## 2022-05-03 RX ORDER — LIDOCAINE HYDROCHLORIDE AND EPINEPHRINE 10; 10 MG/ML; UG/ML
INJECTION, SOLUTION INFILTRATION; PERINEURAL AS NEEDED
Status: DISCONTINUED | OUTPATIENT
Start: 2022-05-03 | End: 2022-05-03 | Stop reason: HOSPADM

## 2022-05-03 RX ORDER — NEOSTIGMINE METHYLSULFATE 1 MG/ML
INJECTION INTRAVENOUS AS NEEDED
Status: DISCONTINUED | OUTPATIENT
Start: 2022-05-03 | End: 2022-05-03

## 2022-05-03 RX ORDER — FENTANYL CITRATE 50 UG/ML
INJECTION, SOLUTION INTRAMUSCULAR; INTRAVENOUS AS NEEDED
Status: DISCONTINUED | OUTPATIENT
Start: 2022-05-03 | End: 2022-05-03

## 2022-05-03 RX ORDER — LIDOCAINE HYDROCHLORIDE 10 MG/ML
INJECTION, SOLUTION EPIDURAL; INFILTRATION; INTRACAUDAL; PERINEURAL AS NEEDED
Status: DISCONTINUED | OUTPATIENT
Start: 2022-05-03 | End: 2022-05-03

## 2022-05-03 RX ORDER — DEXAMETHASONE SODIUM PHOSPHATE 10 MG/ML
INJECTION, SOLUTION INTRAMUSCULAR; INTRAVENOUS AS NEEDED
Status: DISCONTINUED | OUTPATIENT
Start: 2022-05-03 | End: 2022-05-03

## 2022-05-03 RX ORDER — GLYCOPYRROLATE 0.2 MG/ML
INJECTION INTRAMUSCULAR; INTRAVENOUS AS NEEDED
Status: DISCONTINUED | OUTPATIENT
Start: 2022-05-03 | End: 2022-05-03

## 2022-05-03 RX ORDER — ACETAMINOPHEN 325 MG/1
975 TABLET ORAL ONCE
Status: COMPLETED | OUTPATIENT
Start: 2022-05-03 | End: 2022-05-03

## 2022-05-03 RX ORDER — ONDANSETRON 2 MG/ML
INJECTION INTRAMUSCULAR; INTRAVENOUS AS NEEDED
Status: DISCONTINUED | OUTPATIENT
Start: 2022-05-03 | End: 2022-05-03

## 2022-05-03 RX ORDER — HYDROMORPHONE HCL IN WATER/PF 6 MG/30 ML
0.2 PATIENT CONTROLLED ANALGESIA SYRINGE INTRAVENOUS
Status: DISCONTINUED | OUTPATIENT
Start: 2022-05-03 | End: 2022-05-03 | Stop reason: HOSPADM

## 2022-05-03 RX ORDER — CEFAZOLIN SODIUM 2 G/50ML
2000 SOLUTION INTRAVENOUS ONCE
Status: COMPLETED | OUTPATIENT
Start: 2022-05-03 | End: 2022-05-03

## 2022-05-03 RX ORDER — CEPHALEXIN 500 MG/1
500 CAPSULE ORAL EVERY 8 HOURS SCHEDULED
Qty: 15 CAPSULE | Refills: 0 | Status: SHIPPED | OUTPATIENT
Start: 2022-05-03 | End: 2022-05-08

## 2022-05-03 RX ORDER — ONDANSETRON 2 MG/ML
4 INJECTION INTRAMUSCULAR; INTRAVENOUS ONCE AS NEEDED
Status: DISCONTINUED | OUTPATIENT
Start: 2022-05-03 | End: 2022-05-03 | Stop reason: HOSPADM

## 2022-05-03 RX ORDER — SODIUM CHLORIDE, SODIUM LACTATE, POTASSIUM CHLORIDE, CALCIUM CHLORIDE 600; 310; 30; 20 MG/100ML; MG/100ML; MG/100ML; MG/100ML
INJECTION, SOLUTION INTRAVENOUS CONTINUOUS PRN
Status: DISCONTINUED | OUTPATIENT
Start: 2022-05-03 | End: 2022-05-03

## 2022-05-03 RX ORDER — ROCURONIUM BROMIDE 10 MG/ML
INJECTION, SOLUTION INTRAVENOUS AS NEEDED
Status: DISCONTINUED | OUTPATIENT
Start: 2022-05-03 | End: 2022-05-03

## 2022-05-03 RX ADMIN — SODIUM CHLORIDE, SODIUM LACTATE, POTASSIUM CHLORIDE, AND CALCIUM CHLORIDE: .6; .31; .03; .02 INJECTION, SOLUTION INTRAVENOUS at 08:49

## 2022-05-03 RX ADMIN — EPHEDRINE SULFATE 5 MG: 50 INJECTION INTRAVENOUS at 07:54

## 2022-05-03 RX ADMIN — FENTANYL CITRATE 100 MCG: 50 INJECTION INTRAMUSCULAR; INTRAVENOUS at 07:35

## 2022-05-03 RX ADMIN — LIDOCAINE HYDROCHLORIDE 50 MG: 10 INJECTION, SOLUTION EPIDURAL; INFILTRATION; INTRACAUDAL; PERINEURAL at 07:35

## 2022-05-03 RX ADMIN — GABAPENTIN 300 MG: 300 CAPSULE ORAL at 06:37

## 2022-05-03 RX ADMIN — ONDANSETRON 4 MG: 2 INJECTION INTRAMUSCULAR; INTRAVENOUS at 08:34

## 2022-05-03 RX ADMIN — CEFAZOLIN SODIUM 2000 MG: 2 SOLUTION INTRAVENOUS at 07:37

## 2022-05-03 RX ADMIN — OXYCODONE HYDROCHLORIDE AND ACETAMINOPHEN 1 TABLET: 5; 325 TABLET ORAL at 13:13

## 2022-05-03 RX ADMIN — SODIUM CHLORIDE, SODIUM LACTATE, POTASSIUM CHLORIDE, AND CALCIUM CHLORIDE: .6; .31; .03; .02 INJECTION, SOLUTION INTRAVENOUS at 07:30

## 2022-05-03 RX ADMIN — ACETAMINOPHEN 975 MG: 325 TABLET ORAL at 06:37

## 2022-05-03 RX ADMIN — DEXAMETHASONE SODIUM PHOSPHATE 10 MG: 10 INJECTION, SOLUTION INTRAMUSCULAR; INTRAVENOUS at 07:36

## 2022-05-03 RX ADMIN — ALBUMIN (HUMAN): 12.5 INJECTION, SOLUTION INTRAVENOUS at 08:01

## 2022-05-03 RX ADMIN — Medication 100 MG: at 07:35

## 2022-05-03 RX ADMIN — PROPOFOL 50 MG: 10 INJECTION, EMULSION INTRAVENOUS at 08:41

## 2022-05-03 RX ADMIN — NEOSTIGMINE METHYLSULFATE 3 MG: 1 INJECTION INTRAVENOUS at 08:34

## 2022-05-03 RX ADMIN — PROPOFOL 200 MG: 10 INJECTION, EMULSION INTRAVENOUS at 07:35

## 2022-05-03 RX ADMIN — ROCURONIUM BROMIDE 20 MG: 50 INJECTION INTRAVENOUS at 07:35

## 2022-05-03 RX ADMIN — PHENYLEPHRINE HYDROCHLORIDE 50 MCG/MIN: 10 INJECTION INTRAVENOUS at 07:58

## 2022-05-03 RX ADMIN — GLYCOPYRROLATE 0.4 MG: 0.2 INJECTION, SOLUTION INTRAMUSCULAR; INTRAVENOUS at 08:34

## 2022-05-03 RX ADMIN — CHLORHEXIDINE GLUCONATE 15 ML: 1.2 SOLUTION ORAL at 06:37

## 2022-05-03 RX ADMIN — GLYCOPYRROLATE 0.2 MG: 0.2 INJECTION, SOLUTION INTRAMUSCULAR; INTRAVENOUS at 07:51

## 2022-05-03 RX ADMIN — EPHEDRINE SULFATE 10 MG: 50 INJECTION INTRAVENOUS at 07:59

## 2022-05-03 NOTE — ANESTHESIA POSTPROCEDURE EVALUATION
Post-Op Assessment Note    CV Status:  Stable  Pain Score: 0    Pain management: adequate     Mental Status:  Alert and awake   Hydration Status:  Euvolemic   PONV Controlled:  Controlled   Airway Patency:  Patent and adequate      Post Op Vitals Reviewed: Yes      Staff: CRNA         No complications documented      /51 (05/03/22 0856)    Temp 97 7 °F (36 5 °C) (05/03/22 0856)    Pulse 80 (05/03/22 0856)   Resp 16 (05/03/22 0856)    SpO2 100 % (05/03/22 0856)

## 2022-05-03 NOTE — OP NOTE
OPERATIVE REPORT  PATIENT NAME: Vladislav Pizarro    :  1950  MRN: [id number]  Pt Location: [facility] OR ROOM 17    SURGERY DATE: 5/3/2022    Surgeon(s) and Role:     * Ingrid Florian MD - Primary    Preop Diagnosis:  S/P insertion of spinal cord stimulator [Z96 89]    Post-Op Diagnosis Codes:     * S/P insertion of spinal cord stimulator [Z96 89]    Procedure(s) (LRB):  REMOVAL OF SPINAL CORD STIMULATOR SYSTEM WITH BATTERY (N/A)    Specimen(s):  * No specimens in log *    Estimated Blood Loss:   Minimal    Drains:  * No LDAs found *    Anesthesia Type:   General    Operative Indications:  S/P insertion of spinal cord stimulator [Z96 89]  This is a 31-year-old gentleman who had a nonfunctioning spinal cord stimulator that was causing him significant discomfort  As such we discussed the risks and benefits of removal   He understood that these included bleeding, injury, epidural hematoma and death he elected to proceed  Operative Findings:  Easy removal of percutaneously placed spinal cord stimulator    Complications:   None    Procedure and Technique:  After obtaining informed consent patient was brought to the operating room  General endotracheal anesthesia was induced  Patient was then flipped prone onto a Yogi frame with all his pressure points padded and positioned in a super man position  Next his prior incisions were prepped and draped in the usual sterile fashion  Surgical time-out was performed  1% lidocaine with 100,000 epinephrine was infiltrated along his prior lumbar incision as well as his flank incision  The flank incision was then opened with a 10 blade  The battery was rapidly encountered  It was detached from the leads  The battery was then removed without difficulty  Serial number was:  [id number]  The percutaneous leads were then dissected free  Next the lumbar incision was opened with a 10 blade    Immediately the wires were encountered and dissected free using monopolar cautery  The 2 fascial anchors were found  These were dissected free as well  The leads were easily removed without any resistance from the epidural space  Next the leads were removed from the pocket  Fluoroscopy was brought into the field to ensure no residual hardware  Next all the wounds were copiously irrigated antibiotic irrigation  The deep layers were closed with 2-0 Vicryl  The skin was closed with a Monocryl  Histoacryl glue was placed over this and sterile dressings  The patient was then flipped supine onto his hospital bed and found to be in stable neurologic condition  There are 2 uninterrupted the correct surgical counts  Surgical sign-out was performed       I was present for the entire procedure    Patient Disposition:  PACU       SIGNATURE: Dori Demarco MD  DATE: May 3, 2022  TIME: 8:57 AM

## 2022-05-03 NOTE — DISCHARGE INSTRUCTIONS
Discharge Instructions  Battery (IPG) removal    Activity:  1  Do not lift more than 10 pounds for 6 weeks  2  Avoid bending, lifting and twisting for 6 weeks  3  No strenuous activities  No driving for 2 weeks  4  When able to shower, continue to use clean towel and washcloth for 2 weeks post-op  5  Continue to change bed linens and pajamas more frequently  Wear clean clothes daily  Surgical incision care:  1  Keep dressing in place for 3 days  2  Keep incision dry for 3 days  3  May shower with mild antimicrobial soap after 3 days  4  After 3 days, incisions may be left open to air, but must remain clean  5  Do not immerse the incisions in water for 6 weeks  6  Do not apply any creams or ointments to the incision for 6 weeks, unless otherwise instructed by [facility]  7  Contact office if increasing redness, drainage, pain or swelling around the incisions  Postoperative medication:  1  Complete course of antibiotic as directed  2  [facility] will provide pain medication as coordinated with your pain specialist  All prescriptions must come from a single provider  a  Take all medications as prescribed  Call office with any questions/concerns  3  Please contact office for questions regarding dosage and modifications  4  No antiplatelet, anticoagulation or Nonsteroidal anti-inflammatory (NSAIDs) medication until cleared by [email or web address], unless otherwise instructed  5  Do not operate heavy machinery or vehicles while taking sedating medications  6  Use a bowel regimen while on opioids as they induce constipation  Ie  Senokot-S, Miralax, Colace, etc  Increase fiber and water intake

## 2022-05-03 NOTE — INTERVAL H&P NOTE
H&P reviewed  After examining the patient I find no changes in the patients condition since the H&P had been written      Vitals:    05/03/22 0633   BP: 103/71   Pulse: 55   Resp: 18   Temp: (!) 96 °F (35 6 °C)   SpO2: 97%

## 2022-05-04 ENCOUNTER — TELEPHONE (OUTPATIENT)
Dept: NEUROSURGERY | Facility: CLINIC | Age: 72
End: 2022-05-04

## 2022-05-04 DIAGNOSIS — E78.2 MIXED HYPERLIPIDEMIA: ICD-10-CM

## 2022-05-04 RX ORDER — ROSUVASTATIN CALCIUM 40 MG/1
TABLET, COATED ORAL
Qty: 90 TABLET | Refills: 1 | Status: SHIPPED | OUTPATIENT
Start: 2022-05-04

## 2022-05-17 ENCOUNTER — CLINICAL SUPPORT (OUTPATIENT)
Dept: NEUROSURGERY | Facility: CLINIC | Age: 72
End: 2022-05-17

## 2022-05-17 VITALS — SYSTOLIC BLOOD PRESSURE: 120 MMHG | HEART RATE: 68 BPM | TEMPERATURE: 98.2 F | DIASTOLIC BLOOD PRESSURE: 58 MMHG

## 2022-05-17 DIAGNOSIS — Z98.890 POST-OPERATIVE STATE: Primary | ICD-10-CM

## 2022-05-17 PROCEDURE — 99024 POSTOP FOLLOW-UP VISIT: CPT

## 2022-05-17 NOTE — PROGRESS NOTES
Post-Op Visit- Neurosurgery    Wilver Spivey 80 y o  male MRN: [id number]    Chief Complaint:  Patient presents post: Removal Of Spinal Cord Stimulator System With Battery    History of Present Illness:  Patient presents for 2 week POV for incision check  Arrived unaccompanied and ambulated well with cane  Reports pain is 0/10  Is overall feeling well   Is scheduled to have another nerve ablation with Dr Brandon Breaux, this has worked for him in the past         Current Outpatient Medications:     clonazePAM (KlonoPIN) 0 5 mg tablet, Take 1 tablet (0 5 mg total) by mouth daily at bedtime, Disp: 30 tablet, Rfl: 1    dextromethorphan-guaifenesin (MUCINEX DM)  MG per 12 hr tablet, Take 1 tablet by mouth if needed  , Disp: , Rfl:     diclofenac sodium (VOLTAREN) 50 mg EC tablet, TAKE 1 TABLET BY MOUTH TWICE A DAY, Disp: 180 tablet, Rfl: 1    DULoxetine (CYMBALTA) 30 mg delayed release capsule, Take 90 mg by mouth daily , Disp: , Rfl:     fluticasone (FLONASE) 50 mcg/act nasal spray, fluticasone propionate 50 mcg/actuation nasal spray,suspension, Disp: , Rfl:     Fluticasone-Salmeterol (Marisa Barnacle [place]), Inhale if needed Per pt usually takes in the Fall, Disp: , Rfl:     gabapentin (NEURONTIN) 300 mg capsule, Take 300 mg by mouth daily , Disp: , Rfl:     metFORMIN (GLUCOPHAGE-XR) 500 mg 24 hr tablet, TAKE 1 TABLET BY MOUTH EVERY DAY WITH DINNER, Disp: 90 tablet, Rfl: 0    metoprolol tartrate (LOPRESSOR) 50 mg tablet, TAKE 1 TABLET (50 MG TOTAL) BY MOUTH 2 (TWO) TIMES A DAY TAKE WITH 25 MG TABS (Patient taking differently: Take 50 mg by mouth every 12 (twelve) hours), Disp: 180 tablet, Rfl: 3    montelukast (SINGULAIR) 10 mg tablet, TAKE 1 TABLET BY MOUTH EVERYDAY AT BEDTIME, Disp: 90 tablet, Rfl: 1    Multiple Vitamin (MULTI-VITAMIN DAILY) TABS, Take 1 tablet by mouth daily, Disp: , Rfl:     NIFEdipine ER (ADALAT CC) 60 MG 24 hr tablet, Take 1 tablet (60 mg total) by mouth daily, Disp: 90 tablet, Rfl: 1    oxybutynin (DITROPAN) 5 mg tablet, Take 5 mg by mouth 2 (two) times a day as needed, Disp: , Rfl:     rosuvastatin (CRESTOR) 40 MG tablet, TAKE 1 TABLET BY MOUTH EVERY DAY, Disp: 90 tablet, Rfl: 1    Cialis 5 MG tablet, TAKE 1 TABLET BY MOUTH EVERY DAY, Disp: 90 tablet, Rfl: 0     Allergies   Allergen Reactions    Pollen Extract Sneezing          Assessment:    Vitals:    05/17/22 1342   BP: 120/58   Pulse: 68   Temp: 98 2 °F (36 8 °C)       Wound Exam: Surgical site assessment  See below:               Procedure:  Surgical site assessment  Complications: None  Discussion/Summary  Doing well postoperatively  Reviewed incision care with patient including daily observation for s/s infection including: increased erythema, edema, drainage, dehiscence of incision or fever >101  Should these be observed, he understands that he is to call and/or return immediately for reassessment  Advised patient to continue cleansing area with mild soap and water and pat dry  Not to apply any lotions, creams, or ointments, & not to submerge in any water for 4  more weeks  He is to maintain activity restrictions for approximately 4 more weeks  He states he was told by Dr Alexandru Kim that he may return to working out after 2 week post op  Suggested he avoid very strenuous activities or those that cause pulling on his surgical sites for another 4 weeks, ambulation is encouraged as tolerated  Since incision is well healed patient may follow-up on an as needed basis  Canceled 6 week post op  He is to call the office with any further questions or concerns, or if any incisional issues or fevers would arise

## 2022-05-18 DIAGNOSIS — F32.A ANXIETY AND DEPRESSION: ICD-10-CM

## 2022-05-18 DIAGNOSIS — F41.9 ANXIETY AND DEPRESSION: ICD-10-CM

## 2022-05-18 DIAGNOSIS — N40.0 BENIGN PROSTATIC HYPERPLASIA, UNSPECIFIED WHETHER LOWER URINARY TRACT SYMPTOMS PRESENT: ICD-10-CM

## 2022-05-18 RX ORDER — CLONAZEPAM 0.5 MG/1
0.5 TABLET ORAL
Qty: 30 TABLET | Refills: 0 | Status: SHIPPED | OUTPATIENT
Start: 2022-05-18 | End: 2022-06-15 | Stop reason: SDUPTHER

## 2022-05-18 RX ORDER — TADALAFIL 5 MG
5 TABLET ORAL DAILY
Qty: 90 TABLET | Refills: 0 | Status: SHIPPED | OUTPATIENT
Start: 2022-05-18

## 2022-05-21 DIAGNOSIS — M17.0 OSTEOARTHRITIS OF BOTH KNEES, UNSPECIFIED OSTEOARTHRITIS TYPE: ICD-10-CM

## 2022-05-23 DIAGNOSIS — R73.03 PREDIABETES: ICD-10-CM

## 2022-05-23 RX ORDER — METFORMIN HYDROCHLORIDE 500 MG/1
TABLET, EXTENDED RELEASE ORAL
Qty: 90 TABLET | Refills: 0 | Status: SHIPPED | OUTPATIENT
Start: 2022-05-23

## 2022-06-14 ENCOUNTER — TELEPHONE (OUTPATIENT)
Dept: PAIN MEDICINE | Facility: CLINIC | Age: 72
End: 2022-06-14

## 2022-06-15 DIAGNOSIS — F32.A ANXIETY AND DEPRESSION: ICD-10-CM

## 2022-06-15 DIAGNOSIS — F41.9 ANXIETY AND DEPRESSION: ICD-10-CM

## 2022-06-15 RX ORDER — CLONAZEPAM 0.5 MG/1
0.5 TABLET ORAL
Qty: 30 TABLET | Refills: 0 | Status: SHIPPED | OUTPATIENT
Start: 2022-06-15 | End: 2022-07-14

## 2022-06-15 RX ORDER — CLONAZEPAM 0.5 MG/1
0.5 TABLET ORAL
Qty: 30 TABLET | Refills: 0 | Status: CANCELLED | OUTPATIENT
Start: 2022-06-15

## 2022-06-15 NOTE — TELEPHONE ENCOUNTER
Medication: clonazePAM (KlonoPIN) 0 5 mg tablet  PDMP please review duplicate  Active agreement on file -No
BMP/CBC

## 2022-07-13 DIAGNOSIS — J41.1 MUCOPURULENT CHRONIC BRONCHITIS (HCC): ICD-10-CM

## 2022-07-13 DIAGNOSIS — F41.9 ANXIETY AND DEPRESSION: ICD-10-CM

## 2022-07-13 DIAGNOSIS — F32.A ANXIETY AND DEPRESSION: ICD-10-CM

## 2022-07-13 RX ORDER — MONTELUKAST SODIUM 10 MG/1
TABLET ORAL
Qty: 90 TABLET | Refills: 1 | Status: SHIPPED | OUTPATIENT
Start: 2022-07-13

## 2022-07-14 RX ORDER — CLONAZEPAM 0.5 MG/1
TABLET ORAL
Qty: 30 TABLET | Refills: 0 | Status: SHIPPED | OUTPATIENT
Start: 2022-07-14 | End: 2022-08-09 | Stop reason: SDUPTHER

## 2022-08-01 NOTE — TELEPHONE ENCOUNTER
Advised patient unable to move forward until January because insurance only pays for 2 a year 
Patient is requesting a repeat ablation   Please reach out to him at # [phone]
Patient recvd a letter from his insurance apologizing for an error on their end stating that they wrongfully denied claims from 1/1/2022 until 5/7/2022 and that they would review his claims and submit for reprocessing, will hold off for a bit to then schedule for repeat
Patient returning schedulers call-transferred to  -thank you
Please reach out for scheduling, thank you
Pt called asking for procedure  to please call him back      Pt # [phone]
Pt called for status of procedure- thank you      [phone]
RFA can only be repeated every 6 months so in August 
Reached out to patient to advise unable to schedule a repeat at this time because previous procedure is currently being denied and once I figure out why I can call him back 
S/w pt  Pt states that he is having the same pain as before his last RFA 2/2/2022 and would like repeat RFA  Advised will notify [facility] and [facility] with recommendation 
S/w pt informed him of below  Pt will call back in July to schedule 
normal (ped)...

## 2022-08-09 DIAGNOSIS — F32.A ANXIETY AND DEPRESSION: ICD-10-CM

## 2022-08-09 DIAGNOSIS — F41.9 ANXIETY AND DEPRESSION: ICD-10-CM

## 2022-08-10 RX ORDER — CLONAZEPAM 0.5 MG/1
0.5 TABLET ORAL
Qty: 30 TABLET | Refills: 0 | Status: SHIPPED | OUTPATIENT
Start: 2022-08-10 | End: 2022-09-07 | Stop reason: SDUPTHER

## 2022-08-20 DIAGNOSIS — R73.03 PREDIABETES: ICD-10-CM

## 2022-08-20 RX ORDER — METFORMIN HYDROCHLORIDE 500 MG/1
TABLET, EXTENDED RELEASE ORAL
Qty: 90 TABLET | Refills: 0 | Status: SHIPPED | OUTPATIENT
Start: 2022-08-20

## 2022-09-07 DIAGNOSIS — F41.9 ANXIETY AND DEPRESSION: ICD-10-CM

## 2022-09-07 DIAGNOSIS — F32.A ANXIETY AND DEPRESSION: ICD-10-CM

## 2022-09-07 RX ORDER — CLONAZEPAM 0.5 MG/1
0.5 TABLET ORAL
Qty: 30 TABLET | Refills: 0 | Status: SHIPPED | OUTPATIENT
Start: 2022-09-07 | End: 2022-10-05 | Stop reason: SDUPTHER

## 2022-09-16 ENCOUNTER — OFFICE VISIT (OUTPATIENT)
Dept: PAIN MEDICINE | Facility: CLINIC | Age: 72
End: 2022-09-16
Payer: MEDICARE

## 2022-09-16 VITALS
HEART RATE: 50 BPM | DIASTOLIC BLOOD PRESSURE: 70 MMHG | BODY MASS INDEX: 28.34 KG/M2 | HEIGHT: 68 IN | WEIGHT: 187 LBS | SYSTOLIC BLOOD PRESSURE: 116 MMHG

## 2022-09-16 DIAGNOSIS — M47.817 LUMBOSACRAL SPONDYLOSIS WITHOUT MYELOPATHY: ICD-10-CM

## 2022-09-16 DIAGNOSIS — Z98.1 STATUS POST LUMBAR AND LUMBOSACRAL FUSION BY ANTERIOR TECHNIQUE: ICD-10-CM

## 2022-09-16 DIAGNOSIS — M54.16 LUMBAR RADICULOPATHY: ICD-10-CM

## 2022-09-16 DIAGNOSIS — M43.10 SPONDYLOLISTHESIS, ACQUIRED: ICD-10-CM

## 2022-09-16 DIAGNOSIS — M48.062 SPINAL STENOSIS OF LUMBAR REGION WITH NEUROGENIC CLAUDICATION: ICD-10-CM

## 2022-09-16 DIAGNOSIS — M47.816 LUMBAR SPONDYLOSIS: ICD-10-CM

## 2022-09-16 DIAGNOSIS — G89.4 CHRONIC PAIN SYNDROME: Primary | ICD-10-CM

## 2022-09-16 DIAGNOSIS — M51.37 DEGENERATION OF LUMBOSACRAL INTERVERTEBRAL DISC: ICD-10-CM

## 2022-09-16 PROCEDURE — 99214 OFFICE O/P EST MOD 30 MIN: CPT | Performed by: NURSE PRACTITIONER

## 2022-09-16 RX ORDER — DICLOFENAC SODIUM 75 MG/1
75 TABLET, DELAYED RELEASE ORAL 2 TIMES DAILY PRN
Qty: 60 TABLET | Refills: 1 | Status: SHIPPED | OUTPATIENT
Start: 2022-09-16

## 2022-09-16 NOTE — PROGRESS NOTES
Assessment:  1  Chronic pain syndrome    2  Lumbar spondylosis    3  Lumbar radiculopathy    4  Lumbosacral spondylosis without myelopathy    5  Spondylolisthesis, acquired    6  Spinal stenosis of lumbar region with neurogenic claudication    7  Status post lumbar and lumbosacral fusion by anterior technique    8  Degeneration of lumbosacral intervertebral disc        Plan:  1  Patient has had greater than 80% relief with bilateral L3-5 RFA completed in February 2022 which lasted up until recently however insurance will not approve another RFA until February 2022   2  I will increase diclofenac 75 mg twice a day p r n  3  Patient may continue Tylenol p r n  should not exceed more than 3000 mg in 24 hours   4  Medical marijuana information was provided to the patient   5  Will continue with home exercise program  6  Continue gabapentin and duloxetine as prescribed by psychiatry  7  Follow-up in 4 months or sooner if needed    History of Present Illness: The patient is a 71 y o  male with a history of L5 S1 ALIF and [employer] spinal cord stimulator explant last seen on 3/16/22 who presents for a follow up office visit in regards to chronic low back pain that radiates into the buttocks secondary to lumbar degenerative disc disease, lumbar spondylosis, stenosis, spondylolisthesis, radiculopathy and chronic pain syndrome  The patient denies bowel or bladder incontinence or saddle anesthesia  Patient had his spinal cord stimulator explanted in May after losing 80 lb he felt like the battery was causing issues  He has had bilateral L3-5 RFA completed in February 2022 and reported a greater than 80% improvement of his pain up until recently  Did request to reschedule another ablation, however his insurance denied it until February 2023  He has had bilateral S1 TFESI without relief    He rates his pain as 7/10 on the numeric pain rating scale    He occasionally has pain which is described as dull aching, pressure-like, numbness and pins and needles  He continues on gabapentin 1500 mg daily, duloxetine 90 mg daily, and diclofenac 50 mg b i d  p r n     I have personally reviewed and/or updated the patient's past medical history, past surgical history, family history, social history, current medications, allergies, and vital signs today  Review of Systems:    Review of Systems   Respiratory: Negative for shortness of breath  Cardiovascular: Negative for chest pain  Gastrointestinal: Negative for constipation, diarrhea, nausea and vomiting  Musculoskeletal: Positive for gait problem  Negative for arthralgias, joint swelling and myalgias  Skin: Negative for rash  Neurological: Negative for dizziness, seizures and weakness  All other systems reviewed and are negative          Past Medical History:   Diagnosis Date    Arthritis     back    Atrial fibrillation ([facility] [address] )     per pt "controlled with metoprolol" sees [facility] cardio Dr Boykin Daily 2x/year    Bronchitis, chronic obstructive, with exacerbation ([facility] [address] )     Last Assessed: 5/17/2013    Chronic pain disorder     back    COPD with acute exacerbation ([facility] [address] )     Last Assessed: 4/13/2015    Depression, controlled 8/30/2017    Transitioned From: Depression    Elevated PSA     Bx neg 2012; Last Assessed: 11/23/2012    Environmental and seasonal allergies     Exercises 3 to 4 times per week     "2hrs --4x/week"elliptical and Boflex    History of prediabetes     Hyperlipidemia     history of/better on meds    Hypertension     has gotten better since weight loss and pt is keeping check of BP at home    Irregular heart beat     MAT, afib    Lung nodule - resolved 2017 CT 1/4/2017    Postoperative urinary retention     Self-catheterizes urinary bladder     per pt every 2 weeks as per urologist    Spinal cord stimulator status     Wears glasses     Weight loss     per pt recent intention loss of 80lbs recently       Past Surgical History:   Procedure Laterality Date    BACK SURGERY  2012    decompression of spinal stenosis from lower thoracic through the lumbar spine    COLONOSCOPY  10/2010    neg   recheck in 5 years   [address]      multiple to remove scar tissue    JOINT REPLACEMENT Left     hip    JOINT REPLACEMENT Bilateral     knees    KNEE ARTHROSCOPY  2013    [place] PERCUT IMPLNT NEUROELECT,EPIDURAL Right 2020    Procedure: INSERTION THORACIC DORSAL COLUMN SPINAL CORD STIMULATOR PERCUTANEOUS W IMPLANTABLE PULSE GENERATOR, RIGHT;  Surgeon: Kacey Sanders MD;  Location: [facility];  Service: Neurosurgery    [place] REVISE/REMOVE SPINAL NEUROSTIM/ N/A 5/3/2022    Procedure: REMOVAL OF SPINAL CORD STIMULATOR SYSTEM WITH BATTERY;  Surgeon: Teetee Rodriguez MD;  Location: [facility];  Service: Neurosurgery    [place] WRIST [place] LIG Left 3/4/2020    Procedure: RELEASE CARPAL TUNNEL ENDOSCOPIC;  Surgeon: Michael Mayorga MD;  Location: [facility];  Service: Orthopedics    PROSTATE BIOPSY      for elevated PSA of about 7; neg    ROTATOR CUFF REPAIR Right     SPINAL FUSION      TRANSURETHRAL RESECTION OF PROSTATE  2013       Family History   Problem Relation Age of Onset    Breast cancer Mother     Pancreatitis Father     Diabetes Maternal Grandmother     Heart attack Maternal Grandfather     Heart attack Paternal Grandmother     Diabetes Paternal Grandmother     Diabetes Paternal Grandfather     No Known Problems Brother     Lung disease Paternal Uncle        Social History     Occupational History    Not on file   Tobacco Use    Smoking status: Former Smoker     Packs/day: 1 50     Years: 23 00     Pack years: 34 50     Types: Cigarettes     Start date:      Quit date:      Years since quittin 7    Smokeless tobacco: Never Used   Vaping Use    Vaping Use: Never used   Substance and Sexual Activity    Alcohol use: No     Comment: Stopped drinking    Drug use: No    Sexual activity: Not on file     Comment: defer         Current Outpatient Medications:     diclofenac (VOLTAREN) 75 mg EC tablet, Take 1 tablet (75 mg total) by mouth 2 (two) times a day as needed (pain), Disp: 60 tablet, Rfl: 1    Cialis 5 MG tablet, Take 1 tablet (5 mg total) by mouth in the morning , Disp: 90 tablet, Rfl: 0    clonazePAM (KlonoPIN) 0 5 mg tablet, Take 1 tablet (0 5 mg total) by mouth daily at bedtime, Disp: 30 tablet, Rfl: 0    dextromethorphan-guaifenesin (MUCINEX DM)  MG per 12 hr tablet, Take 1 tablet by mouth if needed  , Disp: , Rfl:     DULoxetine (CYMBALTA) 30 mg delayed release capsule, Take 90 mg by mouth daily , Disp: , Rfl:     fluticasone (FLONASE) 50 mcg/act nasal spray, fluticasone propionate 50 mcg/actuation nasal spray,suspension, Disp: , Rfl:     Fluticasone-Salmeterol (Mechele Ebenezer IN), Inhale if needed Per pt usually takes in the Fall, Disp: , Rfl:     gabapentin (NEURONTIN) 300 mg capsule, Take 300 mg by mouth daily , Disp: , Rfl:     metFORMIN (GLUCOPHAGE-XR) 500 mg 24 hr tablet, TAKE 1 TABLET BY MOUTH EVERY DAY WITH DINNER, Disp: 90 tablet, Rfl: 0    metoprolol tartrate (LOPRESSOR) 50 mg tablet, TAKE 1 TABLET (50 MG TOTAL) BY MOUTH 2 (TWO) TIMES A DAY TAKE WITH 25 MG TABS (Patient taking differently: Take 50 mg by mouth every 12 (twelve) hours), Disp: 180 tablet, Rfl: 3    montelukast (SINGULAIR) 10 mg tablet, TAKE 1 TABLET BY MOUTH EVERYDAY AT BEDTIME, Disp: 90 tablet, Rfl: 1    Multiple Vitamin (MULTI-VITAMIN DAILY) TABS, Take 1 tablet by mouth daily, Disp: , Rfl:     NIFEdipine ER (ADALAT CC) 60 MG 24 hr tablet, Take 1 tablet (60 mg total) by mouth daily, Disp: 90 tablet, Rfl: 1    oxybutynin (DITROPAN) 5 mg tablet, Take 5 mg by mouth 2 (two) times a day as needed, Disp: , Rfl:     rosuvastatin (CRESTOR) 40 MG tablet, TAKE 1 TABLET BY MOUTH EVERY DAY, Disp: 90 tablet, Rfl: 1    Allergies   Allergen Reactions    Pollen Extract Sneezing Physical Exam:    /70   Pulse (!) 50   Ht 5' 7 5" (1 715 m)   Wt 84 8 kg (187 lb)   BMI 28 86 kg/m²     Constitutional:normal, well developed, well nourished, alert, in no distress and non-toxic and no overt pain behavior  Eyes:anicteric  HEENT:grossly intact  Neck:supple, symmetric, trachea midline and no masses   Pulmonary:even and unlabored  Cardiovascular:No edema or pitting edema present  Skin:Normal without rashes or lesions and well hydrated  Psychiatric:Mood and affect appropriate  Neurologic:Cranial Nerves II-XII grossly intact  Musculoskeletal:antalgic gait, ambulates with a cane      Imaging  No orders to display     MRI LUMBAR SPINE WITH AND WITHOUT CONTRAST   INDICATION: M54 [id number]: Radiculopathy, lumbar region   M48 062: Spinal stenosis, lumbar region with neurogenic claudication  COMPARISON: 9/19/2019 x-rays   TECHNIQUE: Sagittal T1, sagittal T2, sagittal inversion recovery, axial T1 and axial T2, coronal T2  Sagittal and axial T1 postcontrast    IV Contrast: 10 mL of gadobutrol injection (MULTI-DOSE)   IMAGE QUALITY: Diagnostic   FINDINGS:   VERTEBRAL BODIES: Grade 1 degenerative retrolisthesis L1-2, L2-3  Grade 1 degenerative anterolisthesis L4-5  No scoliosis  No compression fracture  Normal marrow signal is identified within the visualized bony structures  No discrete marrow   lesion  SACRUM: Normal signal within the sacrum  No evidence of insufficiency or stress fracture  DISTAL CORD AND CONUS: Normal size and signal within the distal cord and conus  PARASPINAL SOFT TISSUES: Paraspinal soft tissues are unremarkable  LOWER THORACIC DISC SPACES: Normal disc height and signal  No disc herniation, canal stenosis or foraminal narrowing  Transitional configuration of vertebral bodies at the lumbosacral junction considered to represent lumbarization of the 1st sacral segment  Utilizing this numbering convention, the anterior fusion hardware is at the L5-S1 level   This is consistent with   recent x-rays  LUMBAR DISC SPACES:   L1-L2: Moderate to severe degenerative spondylosis, grade 1 degenerative retrolisthesis, mild to moderate bilateral foraminal stenosis, mild central canal stenosis   L2-L3: Moderate to severe degenerative spondylosis, grade 1 retrolisthesis, left laminotomy, moderate central canal and bilateral foraminal stenosis   L3-L4: Moderate degenerative spondylosis, mild to moderate central canal and bilateral foraminal stenosis   L4-L5: Moderate degenerative spondylosis, bulging annulus, grade 1 anterolisthesis, mild central canal stenosis, moderate bilateral foraminal stenosis, left laminotomy   L5-S1: Anterior interbody fusion  Moderate bilateral foraminal stenosis  POSTCONTRAST IMAGING: No abnormal enhancement  IMPRESSION:   Multilevel degenerative spondylosis   Transitional anatomy at the lumbosacral junction   Mild central canal stenosis, grade 1 retrolisthesis, mild to moderate bilateral foraminal stenosis L1-2   Grade 1 retrolisthesis, left laminotomy, moderate central canal and bilateral foraminal stenosis L2-3   Mild to moderate central canal and bilateral foraminal stenosis L3-4   Grade 1 anterolisthesis, mild central canal stenosis, moderate bilateral foraminal stenosis, left laminotomy L4-5   Anterior interbody fusion, moderate bilateral foraminal stenosis L5-S1  Workstation performed: [id number]    Imaging      No orders of the defined types were placed in this encounter

## 2022-10-05 DIAGNOSIS — F41.9 ANXIETY AND DEPRESSION: ICD-10-CM

## 2022-10-05 DIAGNOSIS — F32.A ANXIETY AND DEPRESSION: ICD-10-CM

## 2022-10-05 RX ORDER — CLONAZEPAM 0.5 MG/1
0.5 TABLET ORAL
Qty: 30 TABLET | Refills: 0 | Status: SHIPPED | OUTPATIENT
Start: 2022-10-05

## 2022-10-17 ENCOUNTER — APPOINTMENT (OUTPATIENT)
Dept: LAB | Facility: CLINIC | Age: 72
End: 2022-10-17
Payer: MEDICARE

## 2022-10-17 DIAGNOSIS — E03.8 SUBCLINICAL HYPOTHYROIDISM: ICD-10-CM

## 2022-10-17 DIAGNOSIS — I10 PRIMARY HYPERTENSION: ICD-10-CM

## 2022-10-17 DIAGNOSIS — R73.01 IMPAIRED FASTING GLUCOSE: ICD-10-CM

## 2022-10-17 DIAGNOSIS — E78.2 MIXED HYPERLIPIDEMIA: ICD-10-CM

## 2022-10-17 LAB
ALBUMIN SERPL BCP-MCNC: 3.6 G/DL (ref 3.5–5)
ALP SERPL-CCNC: 67 U/L (ref 46–116)
ALT SERPL W P-5'-P-CCNC: 31 U/L (ref 12–78)
ANION GAP SERPL CALCULATED.3IONS-SCNC: 4 MMOL/L (ref 4–13)
AST SERPL W P-5'-P-CCNC: 20 U/L (ref 5–45)
BILIRUB SERPL-MCNC: 0.66 MG/DL (ref 0.2–1)
BUN SERPL-MCNC: 19 MG/DL (ref 5–25)
CALCIUM SERPL-MCNC: 9.3 MG/DL (ref 8.3–10.1)
CHLORIDE SERPL-SCNC: 108 MMOL/L (ref 96–108)
CHOLEST SERPL-MCNC: 150 MG/DL
CO2 SERPL-SCNC: 27 MMOL/L (ref 21–32)
CREAT SERPL-MCNC: 1.11 MG/DL (ref 0.6–1.3)
EST. AVERAGE GLUCOSE BLD GHB EST-MCNC: 117 MG/DL
GFR SERPL CREATININE-BSD FRML MDRD: 66 ML/MIN/1.73SQ M
GLUCOSE P FAST SERPL-MCNC: 104 MG/DL (ref 65–99)
HBA1C MFR BLD: 5.7 %
HDLC SERPL-MCNC: 48 MG/DL
LDLC SERPL CALC-MCNC: 86 MG/DL (ref 0–100)
NONHDLC SERPL-MCNC: 102 MG/DL
POTASSIUM SERPL-SCNC: 4.6 MMOL/L (ref 3.5–5.3)
PROT SERPL-MCNC: 7.2 G/DL (ref 6.4–8.4)
SODIUM SERPL-SCNC: 139 MMOL/L (ref 135–147)
TRIGL SERPL-MCNC: 82 MG/DL
TSH SERPL DL<=0.05 MIU/L-ACNC: 4.14 UIU/ML (ref 0.45–4.5)

## 2022-10-17 PROCEDURE — 83036 HEMOGLOBIN GLYCOSYLATED A1C: CPT

## 2022-10-17 PROCEDURE — 80061 LIPID PANEL: CPT

## 2022-10-17 PROCEDURE — 80053 COMPREHEN METABOLIC PANEL: CPT

## 2022-10-17 PROCEDURE — 36415 COLL VENOUS BLD VENIPUNCTURE: CPT

## 2022-10-17 PROCEDURE — 84443 ASSAY THYROID STIM HORMONE: CPT

## 2022-10-20 DIAGNOSIS — I10 PRIMARY HYPERTENSION: ICD-10-CM

## 2022-10-20 RX ORDER — NIFEDIPINE 60 MG/1
TABLET, FILM COATED, EXTENDED RELEASE ORAL
Qty: 90 TABLET | Refills: 1 | Status: SHIPPED | OUTPATIENT
Start: 2022-10-20

## 2022-10-25 ENCOUNTER — OFFICE VISIT (OUTPATIENT)
Dept: FAMILY MEDICINE CLINIC | Facility: CLINIC | Age: 72
End: 2022-10-25
Payer: MEDICARE

## 2022-10-25 VITALS
DIASTOLIC BLOOD PRESSURE: 62 MMHG | TEMPERATURE: 97.6 F | HEART RATE: 64 BPM | WEIGHT: 188.8 LBS | BODY MASS INDEX: 29.63 KG/M2 | RESPIRATION RATE: 20 BRPM | OXYGEN SATURATION: 98 % | SYSTOLIC BLOOD PRESSURE: 120 MMHG | HEIGHT: 67 IN

## 2022-10-25 DIAGNOSIS — R73.01 IMPAIRED FASTING GLUCOSE: ICD-10-CM

## 2022-10-25 DIAGNOSIS — J41.1 MUCOPURULENT CHRONIC BRONCHITIS (HCC): ICD-10-CM

## 2022-10-25 DIAGNOSIS — Z00.00 MEDICARE ANNUAL WELLNESS VISIT, SUBSEQUENT: ICD-10-CM

## 2022-10-25 DIAGNOSIS — F41.9 ANXIETY AND DEPRESSION: ICD-10-CM

## 2022-10-25 DIAGNOSIS — I10 PRIMARY HYPERTENSION: Primary | ICD-10-CM

## 2022-10-25 DIAGNOSIS — Z12.11 SCREEN FOR COLON CANCER: ICD-10-CM

## 2022-10-25 DIAGNOSIS — F32.A ANXIETY AND DEPRESSION: ICD-10-CM

## 2022-10-25 DIAGNOSIS — M47.816 LUMBAR SPONDYLOSIS: ICD-10-CM

## 2022-10-25 DIAGNOSIS — E78.2 MIXED HYPERLIPIDEMIA: ICD-10-CM

## 2022-10-25 PROBLEM — E03.8 SUBCLINICAL HYPOTHYROIDISM: Status: RESOLVED | Noted: 2019-07-24 | Resolved: 2022-10-25

## 2022-10-25 PROBLEM — Z01.818 PREOPERATIVE EXAMINATION, UNSPECIFIED: Status: RESOLVED | Noted: 2022-04-09 | Resolved: 2022-10-25

## 2022-10-25 PROBLEM — Z96.89 STATUS POST INSERTION OF SPINAL CORD STIMULATOR: Status: RESOLVED | Noted: 2020-09-09 | Resolved: 2022-10-25

## 2022-10-25 PROCEDURE — 99214 OFFICE O/P EST MOD 30 MIN: CPT | Performed by: FAMILY MEDICINE

## 2022-10-25 PROCEDURE — G0439 PPPS, SUBSEQ VISIT: HCPCS | Performed by: FAMILY MEDICINE

## 2022-10-25 NOTE — PROGRESS NOTES
Chief Complaint   Patient presents with   • Medicare Wellness Visit     Subsequent  • Follow-up     6 months and review labs  Health Maintenance   Topic Date Due   • PT PLAN OF CARE  10/30/2019   • COVID-19 Vaccine (3 - Booster for Moderna series) 09/06/2021   • BMI: Followup Plan  02/05/2022   • Colorectal Cancer Screening  03/24/2022   • Medicare Annual Wellness Visit (AWV)  04/14/2022   • Influenza Vaccine (1) 09/01/2022   • Fall Risk  10/25/2023   • BMI: Adult  10/25/2023   • Hepatitis C Screening  Completed   • Pneumococcal Vaccine: 65+ Years  Completed   • HIB Vaccine  Aged Out   • Hepatitis B Vaccine  Aged Out   • IPV Vaccine  Aged Out   • Hepatitis A Vaccine  Aged Out   • Meningococcal ACWY Vaccine  Aged Out   • HPV Vaccine  Aged Out      Assessment and Plan:     Problem List Items Addressed This Visit        Endocrine    Impaired fasting glucose     10/2022 hgA1C 5 7 stable  Low carb diet  Continue metformin 500mg QD per endocrinology  Relevant Orders    Comprehensive metabolic panel    Hemoglobin A1C    Lipid panel    CBC       Respiratory    Chronic bronchitis (HCC)     Controlled  Continue singulair 10mg QD  Cardiovascular and Mediastinum    Hypertension - Primary     Controlled  DASH diet  Continue nifedipine 60mg QD and metoprolol 50mg bid  Relevant Orders    Comprehensive metabolic panel    Hemoglobin A1C    Lipid panel    CBC       Musculoskeletal and Integument    Lumbar spondylosis     [facility] pain specialist              Other    Hyperlipidemia     10/2022 lipid ok  Low fat diet  Continue crestor 40mg qhs per cardiology  Relevant Orders    Comprehensive metabolic panel    Hemoglobin A1C    Lipid panel    CBC    Anxiety and depression     Stable  [facility] psychiatrist  Continue cymbalta 90mg QD and gabapentin  Use clonazepam 0 5mg qhs  SE educated pt              Other Visit Diagnoses     Screen for colon cancer        Relevant Orders    Ambulatory Referral to Colorectal Surgery    Medicare annual wellness visit, subsequent            BMI Counseling: Body mass index is 29 57 kg/m²  The BMI is above normal  Nutrition recommendations include decreasing portion sizes, encouraging healthy choices of fruits and vegetables, decreasing fast food intake, consuming healthier snacks and limiting drinks that contain sugar  Exercise recommendations include moderate physical activity 150 minutes/week  No pharmacotherapy was ordered  Rationale for BMI follow-up plan is due to patient being overweight or obese  Reviewed lab in 10/2022  TSH normal  CMP ok  HgA1C 5 7 stable  Lipid 150/82/48/86 good        Flu shot yearly  Got it at pharmacy this season per pt  Got covid19 vaccine and boosters  Got PCV13 and zostavax in 2016  Got pneumovax in 2018    Got shingrix in pharmacy  PSA yearly per urology  Pros and cons educated pt  2/2022 PSA 1 55 normal    Colonoscopy 3/2017  Repeat in 5 years per pt  Give referral today  RTO in 6 months        Preventive health issues were discussed with patient, and age appropriate screening tests were ordered as noted in patient's After Visit Summary  Personalized health advice and appropriate referrals for health education or preventive services given if needed, as noted in patient's After Visit Summary  History of Present Illness:     Patient presents for a Medicare Wellness Visit    HPI     Pt is here by himself       MAT/HTN----[facility] cardiology for MAT, HTN  He is on metoprolol 75mg bid and nifedipine 90mg Qd  Denies headache, vision change, dizzy, SOB, palpitation or chest pain      Hyperlipidemia---He is on crestor 40mg qhs per cardiology  Denies side effects      IFG---He is on metformin 500mg QD per endocrinology  Denies SE  Tried to eat healthy       Anxiety/depression/Insomnia----[facility] psychiatry Dr Jorge Walls Q 3 month  He is on cymbalta 90mg QD which works for him   Use clonazepam 0 5mg qhs for insomnia  He is on gabapentin 300mg am and 1200mg pm per psychiatrist        COPD/chronic bronchitis---Controlled  He is on singulair 10mg QD    Quit smoking in 2000       Chronic lower back pain--for years  Hx of back surgery in 2012 at [facility] pain specialist  Got shot which helped a lot per pt  He is on diclofenac 75mg bid        BPH---[facility] urology Q 6 months for BPH s/p TURP in 2013  Had cystoscopy and dilation in 9/2014  He does not need to self-cath now  He is on oxybutynin and has dry mouth  He can tolerate       Quit smoking since 20 years ago    No alcohol       Lives with wife  Does all ADL's  Denies recent falls  Walk with a cane if he walks far           Patient Care Team:  Florencio Amaral MD as PCP - MD Mavis Sparks MD Jobe Dredge, MD Gilda Part, PA-C as Physician Assistant (Endocrinology)  Deirdre Coulter MD (Endocrinology)  Ebony Stark DO (Pain Medicine)     Review of Systems:     Review of Systems   Constitutional: Negative for appetite change, chills and fever  HENT: Negative for congestion, ear pain, sinus pain and sore throat  Eyes: Negative for discharge and itching  Respiratory: Negative for apnea, cough, chest tightness, shortness of breath and wheezing  Cardiovascular: Negative for chest pain, palpitations and leg swelling  Gastrointestinal: Negative for abdominal pain, anal bleeding, constipation, diarrhea, nausea and vomiting  Endocrine: Negative for cold intolerance, heat intolerance and polyuria  Genitourinary: Negative for difficulty urinating and dysuria  Musculoskeletal: Positive for back pain  Negative for arthralgias and myalgias  Skin: Negative for rash  Neurological: Negative for dizziness and headaches  Psychiatric/Behavioral: Negative for agitation          Problem List:     Patient Active Problem List   Diagnosis   • Allergic rhinitis   • Enlarged prostate with lower urinary tract symptoms (LUTS)   • Chronic bronchitis (HCC) • Hyperlipidemia   • Hypertension   • Impaired fasting glucose   • Male erectile dysfunction   • Multifocal atrial tachycardia (HCC)   • Osteoarthritis of knee   • Osteoarthritis of hip   • Seasonal allergies   • Spinal stenosis of lumbar region with neurogenic claudication   • Supraventricular tachycardia (HCC)   • Anxiety and depression   • Arthritis of left hip   • COPD (chronic obstructive pulmonary disease) (HCC)   • Elevated prostate specific antigen (PSA)   • Lumbosacral spondylosis without myelopathy   • Incomplete emptying of bladder   • Retention of urine   • Screening for prostate cancer   • Thoracic or lumbosacral neuritis or radiculitis   • Urethral stricture   • Spondylolisthesis, acquired   • Degeneration of lumbosacral intervertebral disc   • Full thickness rotator cuff tear   • Knee pain   • Localized, primary osteoarthritis   • Primary localized osteoarthritis of pelvic region and thigh   • Low back pain   • Lumbosacral neuritis   • Pain in limb   • Pseudoclaudication syndrome   • Shoulder pain   • Cervical stenosis of spinal canal   • Lumbar radiculopathy   • Chronic pain syndrome   • Status post lumbar and lumbosacral fusion by anterior technique   • Lumbar spondylosis   • PITTMAN (dyspnea on exertion)      Past Medical and Surgical History:     Past Medical History:   Diagnosis Date   • Arthritis     back   • Atrial fibrillation ([address] )     per pt "controlled with metoprolol" sees [facility] cardio Dr Deejay Orourke 2x/year   • Bronchitis, chronic obstructive, with exacerbation ([address] )     Last Assessed: 5/17/2013   • Chronic pain disorder     back   • COPD with acute exacerbation ([address] )     Last Assessed: 4/13/2015   • Depression, controlled 8/30/2017    Transitioned From: Depression   • Elevated PSA     Bx neg 2012; Last Assessed: 11/23/2012   • Environmental and seasonal allergies    • Exercises 3 to 4 times per week     "2hrs --4x/week"elliptical and Boflex   • History of prediabetes    • Hyperlipidemia history of/better on meds   • Hypertension     has gotten better since weight loss and pt is keeping check of BP at home   • Irregular heart beat     MAT, afib   • Lung nodule - resolved 2017 CT 1/4/2017   • Postoperative urinary retention    • Self-catheterizes urinary bladder     per pt every 2 weeks as per urologist   • Spinal cord stimulator status    • Status post insertion of spinal cord stimulator 9/9/2020   • Wears glasses    • Weight loss     per pt recent intention loss of 80lbs recently     Past Surgical History:   Procedure Laterality Date   • BACK SURGERY  07/25/2012    decompression of spinal stenosis from lower thoracic through the lumbar spine   • COLONOSCOPY  10/2010    neg   recheck in 5 years   • [address]      multiple to remove scar tissue   • JOINT REPLACEMENT Left     hip   • JOINT REPLACEMENT Bilateral     knees   • KNEE ARTHROSCOPY  01/2013   • WA PERCUT IMPLNT NEUROELECT,EPIDURAL Right 8/25/2020    Procedure: INSERTION THORACIC DORSAL COLUMN SPINAL CORD STIMULATOR PERCUTANEOUS W IMPLANTABLE PULSE GENERATOR, RIGHT;  Surgeon: Alban Westfall MD;  Location: [facility];  Service: Neurosurgery   • WA REVISE/REMOVE SPINAL NEUROSTIM/ N/A 5/3/2022    Procedure: REMOVAL OF SPINAL CORD STIMULATOR SYSTEM WITH BATTERY;  Surgeon: Yao Salazar MD;  Location: [facility];  Service: Neurosurgery   • WA WRIST [place]  LIG Left 3/4/2020    Procedure: RELEASE CARPAL TUNNEL ENDOSCOPIC;  Surgeon: Fidencio Golden MD;  Location: [facility];  Service: Orthopedics   • PROSTATE BIOPSY  2012    for elevated PSA of about 7; neg   • ROTATOR CUFF REPAIR Right    • SPINAL FUSION     • TRANSURETHRAL RESECTION OF PROSTATE  06/27/2013      Family History:     Family History   Problem Relation Age of Onset   • Breast cancer Mother    • Pancreatitis Father    • Diabetes Maternal Grandmother    • Heart attack Maternal Grandfather    • Heart attack Paternal Grandmother    • Diabetes Paternal Grandmother    • Diabetes Paternal Grandfather    • No Known Problems Brother    • Lung disease Paternal Uncle       Social History:     Social History     Socioeconomic History   • Marital status: /Civil Union     Spouse name: None   • Number of children: None   • Years of education: None   • Highest education level: None   Occupational History   • None   Tobacco Use   • Smoking status: Former Smoker     Packs/day: 1 50     Years: 23 00     Pack years: 34 50     Types: Cigarettes     Start date: 0     Quit date:      Years since quittin 8   • Smokeless tobacco: Never Used   Vaping Use   • Vaping Use: Never used   Substance and Sexual Activity   • Alcohol use: No     Comment: Stopped drinking   • Drug use: No   • Sexual activity: None     Comment: defer   Other Topics Concern   • None   Social History Narrative    [employer]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, torch    Asbestos removal - use [employer]    [address]     Social Determinants of Health     Financial Resource Strain: Not on file   Food Insecurity: Not on file   Transportation Needs: Not on file   Physical Activity: Not on file   Stress: Not on file   Social Connections: Not on file   Intimate Partner Violence: Not on file   Housing Stability: Not on file      Medications and Allergies:     Current Outpatient Medications   Medication Sig Dispense Refill   • clonazePAM (KlonoPIN) 0 5 mg tablet Take 1 tablet (0 5 mg total) by mouth daily at bedtime 30 tablet 0   • diclofenac (VOLTAREN) 75 mg EC tablet Take 1 tablet (75 mg total) by mouth 2 (two) times a day as needed (pain) 60 tablet 1   • DULoxetine (CYMBALTA) 30 mg delayed release capsule Take 90 mg by mouth daily      • fluticasone (FLONASE) 50 mcg/act nasal spray fluticasone propionate 50 mcg/actuation nasal spray,suspension     • gabapentin (NEURONTIN) 300 mg capsule Take 300 mg by mouth daily      • metFORMIN (GLUCOPHAGE-XR) 500 mg 24 hr tablet TAKE 1 TABLET BY MOUTH EVERY DAY WITH DINNER 90 tablet 0   • metoprolol tartrate (LOPRESSOR) 50 mg tablet TAKE 1 TABLET (50 MG TOTAL) BY MOUTH 2 (TWO) TIMES A DAY TAKE WITH 25 MG TABS (Patient taking differently: Take 50 mg by mouth every 12 (twelve) hours) 180 tablet 3   • montelukast (SINGULAIR) 10 mg tablet TAKE 1 TABLET BY MOUTH EVERYDAY AT BEDTIME 90 tablet 1   • Multiple Vitamin (MULTI-VITAMIN DAILY) TABS Take 1 tablet by mouth daily     • NIFEdipine ER (ADALAT CC) 60 MG 24 hr tablet TAKE 1 TABLET BY MOUTH EVERY DAY 90 tablet 1   • oxybutynin (DITROPAN) 5 mg tablet Take 5 mg by mouth 2 (two) times a day as needed     • rosuvastatin (CRESTOR) 40 MG tablet TAKE 1 TABLET BY MOUTH EVERY DAY 90 tablet 1     No current facility-administered medications for this visit  Allergies   Allergen Reactions   • Pollen Extract Sneezing      Immunizations:     Immunization History   Administered Date(s) Administered   • COVID-19 MODERNA VACC 0 5 ML IM 03/06/2021, 04/06/2021   • INFLUENZA 01/18/2013, 01/31/2014, 01/23/2015, 10/02/2017   • Influenza Quadrivalent, 6-35 Months IM 10/02/2015   • Influenza Split High Dose Preservative Free IM 11/18/2016, 10/02/2017   • Influenza, high dose seasonal 0 7 mL 09/06/2018, 11/22/2019, 10/09/2020, 10/14/2021   • Influenza, seasonal, injectable 11/11/2014   • Pneumococcal Conjugate 13-Valent 08/18/2016   • Pneumococcal Polysaccharide PPV23 10/10/2012, 09/06/2018   • Zoster 11/18/2016      Health Maintenance:         Topic Date Due   • Colorectal Cancer Screening  03/24/2022   • Hepatitis C Screening  Completed         Topic Date Due   • COVID-19 Vaccine (3 - Booster for Moderna series) 09/06/2021   • Influenza Vaccine (1) 09/01/2022      Medicare Screening Tests and Risk Assessments:     Brianda Delcid is here for his Subsequent Wellness visit  Health Risk Assessment:   Patient rates overall health as very good  Patient feels that their physical health rating is same   Patient is very satisfied with their life  Qiana Nolan was rated as same  Hearing was rated as same  Patient feels that their emotional and mental health rating is same  Patients states they are never, rarely angry  Patient states they are never, rarely unusually tired/fatigued  Pain experienced in the last 7 days has been a lot  Patient's pain rating has been 8/10  Patient states that he has experienced no weight loss or gain in last 6 months  Depression Screening:   PHQ-9 Score: 0      Fall Risk Screening: In the past year, patient has experienced: no history of falling in past year      Home Safety:  Patient does not have trouble with stairs inside or outside of their home  Patient has working smoke alarms and has no working carbon monoxide detector  Home safety hazards include: none  Nutrition:   Current diet is Other (please comment)  Nutrisystem    Medications:   Patient is currently taking over-the-counter supplements  OTC medications include: see medication list  Patient is able to manage medications  Activities of Daily Living (ADLs)/Instrumental Activities of Daily Living (IADLs):   Walk and transfer into and out of bed and chair?: Yes  Dress and groom yourself?: Yes    Bathe or shower yourself?: Yes    Feed yourself? Yes  Do your laundry/housekeeping?: Yes  Manage your money, pay your bills and track your expenses?: Yes  Make your own meals?: Yes    Do your own shopping?: Yes    Previous Hospitalizations:   Any hospitalizations or ED visits within the last 12 months?: Yes    How many hospitalizations have you had in the last year?: 1-2    Advance Care Planning:   Living will: Yes    Durable POA for healthcare:  Yes    Advanced directive: Yes      PREVENTIVE SCREENINGS      Cardiovascular Screening:    General: History Lipid Disorder and Screening Current      Diabetes Screening:     General: Screening Current      Colorectal Cancer Screening:     General: Screening Current      Prostate Cancer Screening:    General: Screening Current      Abdominal Aortic Aneurysm (AAA) Screening:    Risk factors include: age between 73-69 yo and tobacco use        Lung Cancer Screening:     General: Screening Not Indicated      Hepatitis C Screening:    General: Screening Current    Screening, Brief Intervention, and Referral to Treatment (SBIRT)    Screening  Typical number of drinks in a day: 0  Typical number of drinks in a week: 0  Interpretation: Low risk drinking behavior  Single Item Drug Screening:  How often have you used an illegal drug (including marijuana) or a prescription medication for non-medical reasons in the past year? never    Single Item Drug Screen Score: 0  Interpretation: Negative screen for possible drug use disorder    No exam data present     Physical Exam:     /62 (BP Location: Left arm, Patient Position: Sitting, Cuff Size: Adult)   Pulse 64   Temp 97 6 °F (36 4 °C) (Tympanic)   Resp 20   Ht 5' 7" (1 702 m)   Wt 85 6 kg (188 lb 12 8 oz)   SpO2 98%   BMI 29 57 kg/m²     Physical Exam  Vitals reviewed  Constitutional:       Appearance: Normal appearance  HENT:      Head: Normocephalic and atraumatic  Eyes:      General:         Right eye: No discharge  Left eye: No discharge  Conjunctiva/sclera: Conjunctivae normal    Neck:      Vascular: No carotid bruit  Cardiovascular:      Rate and Rhythm: Normal rate and regular rhythm  Heart sounds: Normal heart sounds  No murmur heard  No friction rub  No gallop  Pulmonary:      Effort: Pulmonary effort is normal  No respiratory distress  Breath sounds: Normal breath sounds  No wheezing or rales  Abdominal:      General: Bowel sounds are normal  There is no distension  Palpations: Abdomen is soft  Tenderness: There is no abdominal tenderness  Musculoskeletal:         General: No swelling, tenderness or deformity  Normal range of motion  Cervical back: Normal range of motion and neck supple  No muscular tenderness  Lymphadenopathy:      Cervical: No cervical adenopathy  Neurological:      Mental Status: He is alert     Psychiatric:         Mood and Affect: Mood normal           Shahriar Mcgrath MD

## 2022-10-25 NOTE — ASSESSMENT & PLAN NOTE
Stable  [facility] psychiatrist  Continue cymbalta 90mg QD and gabapentin  Use clonazepam 0 5mg qhs  SE educated pt

## 2022-10-25 NOTE — PATIENT INSTRUCTIONS
Medicare Preventive Visit Patient Instructions  Thank you for completing your Welcome to Medicare Visit or Medicare Annual Wellness Visit today  Your next wellness visit will be due in one year (10/26/2023)  The screening/preventive services that you may require over the next 5-10 years are detailed below  Some tests may not apply to you based off risk factors and/or age  Screening tests ordered at today's visit but not completed yet may show as past due  Also, please note that scanned in results may not display below  Preventive Screenings:  Service Recommendations Previous Testing/Comments   Colorectal Cancer Screening  · Colonoscopy    · Fecal Occult Blood Test (FOBT)/Fecal Immunochemical Test (FIT)  · Fecal DNA/Cologuard Test  · Flexible Sigmoidoscopy Age: 39-70 years old   Colonoscopy: every 10 years (May be performed more frequently if at higher risk)  OR  FOBT/FIT: every 1 year  OR  Cologuard: every 3 years  OR  Sigmoidoscopy: every 5 years  Screening may be recommended earlier than age 39 if at higher risk for colorectal cancer  Also, an individualized decision between you and your healthcare provider will decide whether screening between the ages of 74-80 would be appropriate  Colonoscopy: 03/24/2017  FOBT/FIT: Not on file  Cologuard: Not on file  Sigmoidoscopy: Not on file          Prostate Cancer Screening Individualized decision between patient and health care provider in men between ages of 53-78   Medicare will cover every 12 months beginning on the day after your 50th birthday PSA: 0 9 ng/mL           Hepatitis C Screening Once for adults born between 1945 and 1965  More frequently in patients at high risk for Hepatitis C Hep C Antibody: 09/12/2018        Diabetes Screening 1-2 times per year if you're at risk for diabetes or have pre-diabetes Fasting glucose: 104 mg/dL (10/17/2022)  A1C: 5 7 % (10/17/2022)      Cholesterol Screening Once every 5 years if you don't have a lipid disorder   May order more often based on risk factors  Lipid panel: 10/17/2022         Other Preventive Screenings Covered by Medicare:  1  Abdominal Aortic Aneurysm (AAA) Screening: covered once if your at risk  You're considered to be at risk if you have a family history of AAA or a male between the age of 73-68 who smoking at least 100 cigarettes in your lifetime  2  Lung Cancer Screening: covers low dose CT scan once per year if you meet all of the following conditions: (1) Age 50-69; (2) No signs or symptoms of lung cancer; (3) Current smoker or have quit smoking within the last 15 years; (4) You have a tobacco smoking history of at least 20 pack years (packs per day x number of years you smoked); (5) You get a written order from a healthcare provider  3  Glaucoma Screening: covered annually if you're considered high risk: (1) You have diabetes OR (2) Family history of glaucoma OR (3)  aged 48 and older OR (3)  American aged 72 and older  3  Osteoporosis Screening: covered every 2 years if you meet one of the following conditions: (1) Have a vertebral abnormality; (2) On glucocorticoid therapy for more than 3 months; (3) Have primary hyperparathyroidism; (4) On osteoporosis medications and need to assess response to drug therapy  5  HIV Screening: covered annually if you're between the age of 12-76  Also covered annually if you are younger than 13 and older than 72 with risk factors for HIV infection  For pregnant patients, it is covered up to 3 times per pregnancy      Immunizations:  Immunization Recommendations   Influenza Vaccine Annual influenza vaccination during flu season is recommended for all persons aged >= 6 months who do not have contraindications   Pneumococcal Vaccine   * Pneumococcal conjugate vaccine = PCV13 (Prevnar 13), PCV15 (Vaxneuvance), PCV20 (Prevnar 20)  * Pneumococcal polysaccharide vaccine = PPSV23 (Pneumovax) Adults 25-60 years old: 1-3 doses may be recommended based on certain risk factors  Adults 72 years old: 1-2 doses may be recommended based off what pneumonia vaccine you previously received   Hepatitis B Vaccine 3 dose series if at intermediate or high risk (ex: diabetes, end stage renal disease, liver disease)   Tetanus (Td) Vaccine - COST NOT COVERED BY MEDICARE PART B Following completion of primary series, a booster dose should be given every 10 years to maintain immunity against tetanus  Td may also be given as tetanus wound prophylaxis  Tdap Vaccine - COST NOT COVERED BY MEDICARE PART B Recommended at least once for all adults  For pregnant patients, recommended with each pregnancy  Shingles Vaccine (Shingrix) - COST NOT COVERED BY MEDICARE PART B  2 shot series recommended in those aged 48 and above     Health Maintenance Due:      Topic Date Due   • Colorectal Cancer Screening  03/24/2022   • Hepatitis C Screening  Completed     Immunizations Due:      Topic Date Due   • COVID-19 Vaccine (3 - Booster for Moderna series) 09/06/2021   • Influenza Vaccine (1) 09/01/2022     Advance Directives   What are advance directives? Advance directives are legal documents that state your wishes and plans for medical care  These plans are made ahead of time in case you lose your ability to make decisions for yourself  Advance directives can apply to any medical decision, such as the treatments you want, and if you want to donate organs  What are the types of advance directives? There are many types of advance directives, and each state has rules about how to use them  You may choose a combination of any of the following:  · Living will: This is a written record of the treatment you want  You can also choose which treatments you do not want, which to limit, and which to stop at a certain time  This includes surgery, medicine, IV fluid, and tube feedings  · Durable power of  for healthcare [facility]):   This is a written record that states who you want to make healthcare choices for you when you are unable to make them for yourself  This person, called a proxy, is usually a family member or a friend  You may choose more than 1 proxy  · Do not resuscitate (DNR) order:  A DNR order is used in case your heart stops beating or you stop breathing  It is a request not to have certain forms of treatment, such as CPR  A DNR order may be included in other types of advance directives  · Medical directive: This covers the care that you want if you are in a coma, near death, or unable to make decisions for yourself  You can list the treatments you want for each condition  Treatment may include pain medicine, surgery, blood transfusions, dialysis, IV or tube feedings, and a ventilator (breathing machine)  · Values history: This document has questions about your views, beliefs, and how you feel and think about life  This information can help others choose the care that you would choose  Why are advance directives important? An advance directive helps you control your care  Although spoken wishes may be used, it is better to have your wishes written down  Spoken wishes can be misunderstood, or not followed  Treatments may be given even if you do not want them  An advance directive may make it easier for your family to make difficult choices about your care  Weight Management   Why it is important to manage your weight:  Being overweight increases your risk of health conditions such as heart disease, high blood pressure, type 2 diabetes, and certain types of cancer  It can also increase your risk for osteoarthritis, sleep apnea, and other respiratory problems  Aim for a slow, steady weight loss  Even a small amount of weight loss can lower your risk of health problems  How to lose weight safely:  A safe and healthy way to lose weight is to eat fewer calories and get regular exercise   You can lose up about 1 pound a week by decreasing the number of calories you eat by 500 calories each day    Healthy meal plan for weight management:  A healthy meal plan includes a variety of foods, contains fewer calories, and helps you stay healthy  A healthy meal plan includes the following:  · Eat whole-grain foods more often  A healthy meal plan should contain fiber  Fiber is the part of grains, fruits, and vegetables that is not broken down by your body  Whole-grain foods are healthy and provide extra fiber in your diet  Some examples of whole-grain foods are whole-wheat breads and pastas, oatmeal, brown rice, and bulgur  · Eat a variety of vegetables every day  Include dark, leafy greens such as spinach, kale, ramirez greens, and mustard greens  Eat yellow and orange vegetables such as carrots, sweet potatoes, and winter squash  · Eat a variety of fruits every day  Choose fresh or canned fruit (canned in its own juice or light syrup) instead of juice  Fruit juice has very little or no fiber  · Eat low-fat dairy foods  Drink fat-free (skim) milk or 1% milk  Eat fat-free yogurt and low-fat cottage cheese  Try low-fat cheeses such as mozzarella and other reduced-fat cheeses  · Choose meat and other protein foods that are low in fat  Choose beans or other legumes such as split peas or lentils  Choose fish, skinless poultry (chicken or turkey), or lean cuts of red meat (beef or pork)  Before you cook meat or poultry, cut off any visible fat  · Use less fat and oil  Try baking foods instead of frying them  Add less fat, such as margarine, sour cream, regular salad dressing and mayonnaise to foods  Eat fewer high-fat foods  Some examples of high-fat foods include french fries, doughnuts, ice cream, and cakes  · Eat fewer sweets  Limit foods and drinks that are high in sugar  This includes candy, cookies, regular soda, and sweetened drinks  Exercise:  Exercise at least 30 minutes per day on most days of the week  Some examples of exercise include walking, biking, dancing, and swimming   You can also fit in more physical activity by taking the stairs instead of the elevator or parking farther away from stores  Ask your healthcare provider about the best exercise plan for you  © Copyright [employer] 2018 Information is for End User's use only and may not be sold, redistributed or otherwise used for commercial purposes   All illustrations and images included in CareNotes® are the copyrighted property of A D A M , Inc  or [address] [place] [address]

## 2022-10-28 DIAGNOSIS — E78.2 MIXED HYPERLIPIDEMIA: ICD-10-CM

## 2022-10-28 RX ORDER — ROSUVASTATIN CALCIUM 40 MG/1
TABLET, COATED ORAL
Qty: 90 TABLET | Refills: 1 | Status: SHIPPED | OUTPATIENT
Start: 2022-10-28

## 2022-11-02 ENCOUNTER — OFFICE VISIT (OUTPATIENT)
Dept: CARDIOLOGY CLINIC | Facility: CLINIC | Age: 72
End: 2022-11-02

## 2022-11-02 VITALS
BODY MASS INDEX: 28.79 KG/M2 | OXYGEN SATURATION: 98 % | HEIGHT: 68 IN | HEART RATE: 62 BPM | WEIGHT: 190 LBS | SYSTOLIC BLOOD PRESSURE: 104 MMHG | DIASTOLIC BLOOD PRESSURE: 58 MMHG

## 2022-11-02 DIAGNOSIS — F32.A ANXIETY AND DEPRESSION: ICD-10-CM

## 2022-11-02 DIAGNOSIS — I47.1 SUPRAVENTRICULAR TACHYCARDIA (HCC): ICD-10-CM

## 2022-11-02 DIAGNOSIS — F41.9 ANXIETY AND DEPRESSION: ICD-10-CM

## 2022-11-02 DIAGNOSIS — I47.1 MULTIFOCAL ATRIAL TACHYCARDIA (HCC): ICD-10-CM

## 2022-11-02 DIAGNOSIS — E78.2 MIXED HYPERLIPIDEMIA: ICD-10-CM

## 2022-11-02 DIAGNOSIS — J41.1 MUCOPURULENT CHRONIC BRONCHITIS (HCC): ICD-10-CM

## 2022-11-02 DIAGNOSIS — I10 PRIMARY HYPERTENSION: Primary | ICD-10-CM

## 2022-11-02 RX ORDER — CLONAZEPAM 0.5 MG/1
0.5 TABLET ORAL
Qty: 30 TABLET | Refills: 0 | Status: SHIPPED | OUTPATIENT
Start: 2022-11-02

## 2022-11-02 NOTE — PROGRESS NOTES
Cardiology Follow Up    Elsie Rodriguez  1950  [id number]  [address] [facility]  [address] [facility] [address]    1  Primary hypertension     2  Multifocal atrial tachycardia (HCC)     3  Supraventricular tachycardia ([address] )     4  Mucopurulent chronic bronchitis ([address] )     5  Mixed hyperlipidemia         Discussion/Summary:    Overall he continues to do well he has kept his weight off and remains quite physically active  Denies any chest pain or other symptoms  He did change the timing on his metoprolol to allow better heart rate response at the gym  No further MA T  Continue diet and exercise I will see him back next year  Blood pressures have been well controlled  Interval History:   71-year-old gentleman with history of multifocal atrial tachycardia, hypertension, dyslipidemia, obesity presents for routine scheduled follow-up visit  He has been doing some new diet and exercise trying to lose weight but has had a hard time doing this  Overall patient has been doing well denies any chest pain, shortness of breath, palpitations, lightheadedness, dizziness, or syncope  His been no lower extremity edema, PND, orthopnea  He has been taking all medications as prescribed  He remains well hydrated  Overall no concerns no return of MAT      Problem List     Allergic rhinitis    Enlarged prostate with lower urinary tract symptoms (LUTS)    Chronic bronchitis (HCC)    Depression, controlled    Overview Signed 2/27/2018  1:10 PM by Crissy Tanner MD     Transitioned From: Depression         Elevated ALT measurement    Hyperlipidemia    Hypertension    Impaired fasting glucose    Lung nodule    Male erectile dysfunction    Multifocal atrial tachycardia (HCC)    Obesity    Osteoarthritis of knee    Osteoarthritis of left hip    Seasonal allergies    Spinal stenosis of lumbar region    Overview Addendum 3/6/2018  3:00 PM by Gayatri Lara MD     Description: Cervical and lumbar  Sees Dr Luz Steve  Had surgical decompression T12 to L1 7/2012           Supraventricular tachycardia (HCC)    Anxiety and depression    Arthritis of left hip    COPD (chronic obstructive pulmonary disease) (HCC)    Elevated prostate specific antigen (PSA)    Lumbosacral spondylosis    Incomplete emptying of bladder    Other affections of shoulder region, not elsewhere classified    Personal history of other disorder of urinary system    Retention of urine    Screening for prostate cancer    Thoracic or lumbosacral neuritis or radiculitis    Urethral stricture    Increased frequency of urination        Past Medical History:   Diagnosis Date   • Arthritis     back   • Atrial fibrillation ([address] )     per pt "controlled with metoprolol" sees [facility] cardio Dr Luis King 2x/year   • Bronchitis, chronic obstructive, with exacerbation ([address] )     Last Assessed: 5/17/2013   • Chronic pain disorder     back   • COPD with acute exacerbation ([address] )     Last Assessed: 4/13/2015   • Depression, controlled 8/30/2017    Transitioned From: Depression   • Elevated PSA     Bx neg 2012; Last Assessed: 11/23/2012   • Environmental and seasonal allergies    • Exercises 3 to 4 times per week     "2hrs --4x/week"elliptical and Boflex   • History of prediabetes    • Hyperlipidemia     history of/better on meds   • Hypertension     has gotten better since weight loss and pt is keeping check of BP at home   • Irregular heart beat     MAT, afib   • Lung nodule - resolved 2017 CT 1/4/2017   • Postoperative urinary retention    • Self-catheterizes urinary bladder     per pt every 2 weeks as per urologist   • Spinal cord stimulator status    • Status post insertion of spinal cord stimulator 9/9/2020   • Wears glasses    • Weight loss     per pt recent intention loss of 80lbs recently     Social History     Socioeconomic History   • Marital status: /Civil Union     Spouse name: Not on file   • Number of children: Not on file   • Years of education: Not on file   • Highest education level: Not on file   Occupational History   • Not on file   Tobacco Use   • Smoking status: Former Smoker     Packs/day: 1 50     Years: 23 00     Pack years: 34 50     Types: Cigarettes     Start date: 0     Quit date: 2000     Years since quittin 8   • Smokeless tobacco: Never Used   Vaping Use   • Vaping Use: Never used   Substance and Sexual Activity   • Alcohol use: No     Comment: Stopped drinking   • Drug use: No   • Sexual activity: Not on file     Comment: defer   Other Topics Concern   • Not on file   Social History Narrative    [employer]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, torch    Asbestos removal - use [address] [place] [address]     Social Determinants of Health     Financial Resource Strain: Not on file   Food Insecurity: Not on file   Transportation Needs: Not on file   Physical Activity: Not on file   Stress: Not on file   Social Connections: Not on file   Intimate Partner Violence: Not on file   Housing Stability: Not on file      Family History   Problem Relation Age of Onset   • Breast cancer Mother    • Pancreatitis Father    • Diabetes Maternal Grandmother    • Heart attack Maternal Grandfather    • Heart attack Paternal Grandmother    • Diabetes Paternal Grandmother    • Diabetes Paternal Grandfather    • No Known Problems Brother    • Lung disease Paternal Uncle      Past Surgical History:   Procedure Laterality Date   • BACK SURGERY  2012    decompression of spinal stenosis from lower thoracic through the lumbar spine   • COLONOSCOPY  10/2010    neg   recheck in 5 years   • INCISION OF BLADDER NECK CONTRACTURE      multiple to remove scar tissue   • JOINT REPLACEMENT Left     hip   • JOINT REPLACEMENT Bilateral     knees   • KNEE ARTHROSCOPY  2013   • WI PERCUT IMPLNT NEUROELECT,EPIDURAL Right 2020    Procedure: INSERTION THORACIC DORSAL COLUMN SPINAL CORD STIMULATOR PERCUTANEOUS W IMPLANTABLE PULSE GENERATOR, RIGHT;  Surgeon: Cayden Curry MD;  Location: [facility];  Service: Neurosurgery   • MO REVISE/REMOVE SPINAL NEUROSTIM/ N/A 5/3/2022    Procedure: REMOVAL OF SPINAL CORD STIMULATOR SYSTEM WITH BATTERY;  Surgeon: Leela Bragg MD;  Location: [facility];  Service: Neurosurgery   • MO WRIST Pepe Guevara LIG Left 3/4/2020    Procedure: RELEASE CARPAL TUNNEL ENDOSCOPIC;  Surgeon: Funmi Escalante MD;  Location: [facility];  Service: Orthopedics   • PROSTATE BIOPSY  2012    for elevated PSA of about 7; neg   • ROTATOR CUFF REPAIR Right    • SPINAL FUSION     • TRANSURETHRAL RESECTION OF PROSTATE  06/27/2013       Current Outpatient Medications:   •  clonazePAM (KlonoPIN) 0 5 mg tablet, Take 1 tablet (0 5 mg total) by mouth daily at bedtime, Disp: 30 tablet, Rfl: 0  •  diclofenac (VOLTAREN) 75 mg EC tablet, Take 1 tablet (75 mg total) by mouth 2 (two) times a day as needed (pain), Disp: 60 tablet, Rfl: 1  •  DULoxetine (CYMBALTA) 30 mg delayed release capsule, Take 90 mg by mouth daily , Disp: , Rfl:   •  fluticasone (FLONASE) 50 mcg/act nasal spray, fluticasone propionate 50 mcg/actuation nasal spray,suspension, Disp: , Rfl:   •  gabapentin (NEURONTIN) 300 mg capsule, Take 300 mg by mouth daily , Disp: , Rfl:   •  metFORMIN (GLUCOPHAGE-XR) 500 mg 24 hr tablet, TAKE 1 TABLET BY MOUTH EVERY DAY WITH DINNER, Disp: 90 tablet, Rfl: 0  •  metoprolol tartrate (LOPRESSOR) 50 mg tablet, TAKE 1 TABLET (50 MG TOTAL) BY MOUTH 2 (TWO) TIMES A DAY TAKE WITH 25 MG TABS (Patient taking differently: Take 50 mg by mouth every 12 (twelve) hours), Disp: 180 tablet, Rfl: 3  •  montelukast (SINGULAIR) 10 mg tablet, TAKE 1 TABLET BY MOUTH EVERYDAY AT BEDTIME, Disp: 90 tablet, Rfl: 1  •  Multiple Vitamin (MULTI-VITAMIN DAILY) TABS, Take 1 tablet by mouth daily, Disp: , Rfl:   •  NIFEdipine ER (ADALAT CC) 60 MG 24 hr tablet, TAKE 1 TABLET BY MOUTH EVERY DAY, Disp: 90 tablet, Rfl: 1  •  oxybutynin (DITROPAN) 5 mg tablet, Take 5 mg by mouth 2 (two) times a day as needed, Disp: , Rfl:   •  rosuvastatin (CRESTOR) 40 MG tablet, TAKE 1 TABLET BY MOUTH EVERY DAY, Disp: 90 tablet, Rfl: 1  Allergies   Allergen Reactions   • Pollen Extract Sneezing       Labs:     Chemistry        Component Value Date/Time     09/08/2015 1027    K 4 6 10/17/2022 0816    K 4 6 09/08/2015 1027     10/17/2022 0816     09/08/2015 1027    CO2 27 10/17/2022 0816    CO2 27 09/08/2015 1027    BUN 19 10/17/2022 0816    BUN 15 09/08/2015 1027    CREATININE 1 11 10/17/2022 0816    CREATININE 0 81 09/08/2015 1027        Component Value Date/Time    CALCIUM 9 3 10/17/2022 0816    CALCIUM 9 3 09/08/2015 1027    ALKPHOS 67 10/17/2022 0816    ALKPHOS 121 07/21/2014 0910    AST 20 10/17/2022 0816    AST 18 07/21/2014 0910    ALT 31 10/17/2022 0816    ALT 34 07/21/2014 0910    BILITOT 0 45 07/21/2014 0910            Lab Results   Component Value Date    CHOL 190 07/21/2014     Lab Results   Component Value Date    HDL 48 10/17/2022    HDL 47 04/20/2022    HDL 37 (L) 10/08/2021     Lab Results   Component Value Date    LDLCALC 86 10/17/2022    LDLCALC 75 04/20/2022    LDLCALC 63 10/08/2021     Lab Results   Component Value Date    TRIG 82 10/17/2022    TRIG 88 04/20/2022    TRIG 109 10/08/2021     No results found for: CHOLHDL    Imaging: No results found  ECG:    Normal sinus rhythm      Review of Systems   Constitutional: Negative  HENT: Negative  Eyes: Negative  Cardiovascular: Negative  Respiratory: Negative  Endocrine: Negative  Hematologic/Lymphatic: Negative  Skin: Negative  Musculoskeletal: Negative  Gastrointestinal: Negative  Genitourinary: Negative  Neurological: Negative  Psychiatric/Behavioral: Negative          Vitals:    11/02/22 1402   BP: 104/58   Pulse: 62   SpO2: 98%     Vitals:    11/02/22 1402   Weight: 86 2 kg (190 lb)     Height: 5' 8" (172 7 cm)   Body mass index is 28 89 kg/m²  Physical Exam:  Vital signs reviewed  General:  Alert and cooperative, appears stated age, no acute distress  HEENT:  PERRLA, EOMI, no scleral icterus, no conjunctival pallor  Neck:  No lymphadenopathy, no thyromegaly, no carotid bruits, no elevated JVP  Heart:  Regular rate and rhythm, normal S1/S2, no S3/S4, no murmur, rubs or gallops  PMI nondisplaced  Lungs:  Clear to auscultation bilaterally, no wheezes rales or rhonchi  Abdomen:  Soft, non-tender, positive bowel sounds, no rebound or guarding,   no organomegaly   Extremities:  Normal range of motion    No clubbing, cyanosis or edema   Vascular:  2+ pedal pulses  Skin:  No rashes or lesions on exposed skin  Neurologic:  Cranial nerves II-XII grossly intact without focal deficits  Psych:  Normal mood and affect

## 2022-11-17 DIAGNOSIS — R73.03 PREDIABETES: ICD-10-CM

## 2022-11-17 RX ORDER — METFORMIN HYDROCHLORIDE 500 MG/1
TABLET, EXTENDED RELEASE ORAL
Qty: 90 TABLET | Refills: 0 | OUTPATIENT
Start: 2022-11-17

## 2022-11-30 DIAGNOSIS — F41.9 ANXIETY AND DEPRESSION: ICD-10-CM

## 2022-11-30 DIAGNOSIS — F32.A ANXIETY AND DEPRESSION: ICD-10-CM

## 2022-11-30 RX ORDER — CLONAZEPAM 0.5 MG/1
0.5 TABLET ORAL
Qty: 30 TABLET | Refills: 0 | Status: SHIPPED | OUTPATIENT
Start: 2022-11-30

## 2022-12-01 RX ORDER — CLONAZEPAM 0.5 MG/1
0.5 TABLET ORAL
Qty: 30 TABLET | Refills: 0 | Status: SHIPPED | OUTPATIENT
Start: 2022-12-01

## 2022-12-01 NOTE — TELEPHONE ENCOUNTER
Medication:clonazepam  PDMP please review  Active agreement on file -No    You filled yesterday duplicate

## 2022-12-17 DIAGNOSIS — R73.03 PREDIABETES: ICD-10-CM

## 2022-12-19 RX ORDER — METFORMIN HYDROCHLORIDE 500 MG/1
TABLET, EXTENDED RELEASE ORAL
Qty: 90 TABLET | Refills: 0 | Status: SHIPPED | OUTPATIENT
Start: 2022-12-19

## 2022-12-27 DIAGNOSIS — F41.9 ANXIETY AND DEPRESSION: ICD-10-CM

## 2022-12-27 DIAGNOSIS — F32.A ANXIETY AND DEPRESSION: ICD-10-CM

## 2022-12-28 RX ORDER — CLONAZEPAM 0.5 MG/1
0.5 TABLET ORAL
Qty: 30 TABLET | Refills: 0 | Status: SHIPPED | OUTPATIENT
Start: 2022-12-28

## 2023-01-05 DIAGNOSIS — J41.1 MUCOPURULENT CHRONIC BRONCHITIS (HCC): ICD-10-CM

## 2023-01-05 RX ORDER — MONTELUKAST SODIUM 10 MG/1
TABLET ORAL
Qty: 90 TABLET | Refills: 1 | Status: SHIPPED | OUTPATIENT
Start: 2023-01-05

## 2023-01-05 NOTE — PROGRESS NOTES
Assessment:  1  Lumbar spondylosis        Plan:   1  Patient will be scheduled for repeat bilateral L3-5 RFA for axial low back pain  Patient received 80% relief of his low back pain from previous RFA for approximately 6 months  Complete risks and benefits including bleeding, infection, tissue reaction, nerve injury and allergic reaction were discussed  The patient was agreeable and verbalized an understanding  2  Patient may continue diclofenac as prescribed  He does not require refills today  3  Patient may continue gabapentin and duloxetine as prescribed by psychiatry  4  Continue with home exercise program  5  Follow-up after ablation or sooner if needed    History of Present Illness: The patient is a 71 y o  male with a history of L5-S1 ALIF and [employer] spinal cord stimulator explant last seen on 09/16/2022 who presents for a follow up office visit in regards to chronic axial low back pain  Patient denies bowel or bladder incontinence or saddle anesthesia  Patient is status post bilateral L3-5 RFA completed in February 2022 which provided 80% relief for approximately 6-7 months  The same pain has returned at this time and he would like to repeat this injection  He did not have a favorable response in the past to a lumbar epidural steroid injection  Continues on diclofenac 75 mg twice daily as needed  He rates his pain a 7 out of 10 on the numeric pain rating scale  His pain is described as burning, pins-and-needles and dull aching    I have personally reviewed and/or updated the patient's past medical history, past surgical history, family history, social history, current medications, allergies, and vital signs today  Review of Systems:    Review of Systems   Respiratory: Negative for shortness of breath  Cardiovascular: Negative for chest pain  Gastrointestinal: Negative for constipation, diarrhea, nausea and vomiting  Musculoskeletal: Positive for gait problem   Negative for arthralgias, joint swelling and myalgias  Skin: Negative for rash  Neurological: Negative for dizziness, seizures and weakness  All other systems reviewed and are negative  Past Medical History:   Diagnosis Date   • Arthritis     back   • Atrial fibrillation ([facility] [address] )     per pt "controlled with metoprolol" sees [facility] cardio Dr Gregory Saeed 2x/year   • Bronchitis, chronic obstructive, with exacerbation ([facility] [address] )     Last Assessed: 5/17/2013   • Chronic pain disorder     back   • COPD with acute exacerbation ([facility] [address] )     Last Assessed: 4/13/2015   • Depression, controlled 8/30/2017    Transitioned From: Depression   • Elevated PSA     Bx neg 2012; Last Assessed: 11/23/2012   • Environmental and seasonal allergies    • Exercises 3 to 4 times per week     "2hrs --4x/week"elliptical and Boflex   • History of prediabetes    • Hyperlipidemia     history of/better on meds   • Hypertension     has gotten better since weight loss and pt is keeping check of BP at home   • Irregular heart beat     MAT, afib   • Lung nodule - resolved 2017 CT 1/4/2017   • Postoperative urinary retention    • Self-catheterizes urinary bladder     per pt every 2 weeks as per urologist   • Spinal cord stimulator status    • Status post insertion of spinal cord stimulator 9/9/2020   • Wears glasses    • Weight loss     per pt recent intention loss of 80lbs recently       Past Surgical History:   Procedure Laterality Date   • BACK SURGERY  07/25/2012    decompression of spinal stenosis from lower thoracic through the lumbar spine   • COLONOSCOPY  10/2010    neg   recheck in 5 years   • INCISION OF BLADDER NECK CONTRACTURE      multiple to remove scar tissue   • JOINT REPLACEMENT Left     hip   • JOINT REPLACEMENT Bilateral     knees   • KNEE ARTHROSCOPY  01/2013   • [place] NDSC WRST SURG W/RLS TRANSVRS CARPL LIGM Left 3/4/2020    Procedure: RELEASE CARPAL TUNNEL ENDOSCOPIC;  Surgeon: Jearldine Goodell, MD;  [facility];  Service: Orthopedics   • [place] PRQ IMPLTJ NSTIM ELECTRODE ARRAY EPIDURAL Right 2020    Procedure: INSERTION THORACIC DORSAL COLUMN SPINAL CORD STIMULATOR PERCUTANEOUS W IMPLANTABLE PULSE GENERATOR, RIGHT;  Surgeon: Diamond Carrillo MD;  [facility];  Service: Neurosurgery   • [place] REVJ/RMVL IMPLANTED SPINAL NEUROSTIM GENERATOR N/A 5/3/2022    Procedure: REMOVAL OF SPINAL CORD STIMULATOR SYSTEM WITH BATTERY;  Surgeon: Dasia Marc MD;  [facility];  Service: Neurosurgery   • PROSTATE BIOPSY      for elevated PSA of about 7; neg   • ROTATOR CUFF REPAIR Right    • SPINAL FUSION     • TRANSURETHRAL RESECTION OF PROSTATE  2013       Family History   Problem Relation Age of Onset   • Breast cancer Mother    • Pancreatitis Father    • Diabetes Maternal Grandmother    • Heart attack Maternal Grandfather    • Heart attack Paternal Grandmother    • Diabetes Paternal Grandmother    • Diabetes Paternal Grandfather    • No Known Problems Brother    • Lung disease Paternal Uncle        Social History     Occupational History   • Not on file   Tobacco Use   • Smoking status: Former     Packs/day: 1 50     Years: 23 00     Pack years: 34 50     Types: Cigarettes     Start date: 0     Quit date:      Years since quittin 0   • Smokeless tobacco: Never   Vaping Use   • Vaping Use: Never used   Substance and Sexual Activity   • Alcohol use: No     Comment: Stopped drinking   • Drug use: No   • Sexual activity: Not on file     Comment: defer         Current Outpatient Medications:   •  diclofenac (VOLTAREN) 75 mg EC tablet, Take 1 tablet (75 mg total) by mouth 2 (two) times a day as needed (pain), Disp: 60 tablet, Rfl: 1  •  DULoxetine (CYMBALTA) 30 mg delayed release capsule, Take 90 mg by mouth daily , Disp: , Rfl:   •  gabapentin (NEURONTIN) 300 mg capsule, Take 300 mg by mouth daily , Disp: , Rfl:   •  Multiple Vitamin (MULTI-VITAMIN DAILY) TABS, Take 1 tablet by mouth daily, Disp: , Rfl:   •  oxybutynin (DITROPAN) 5 mg tablet, Take 5 mg by mouth 2 (two) times a day as needed, Disp: , Rfl:   •  rosuvastatin (CRESTOR) 40 MG tablet, TAKE 1 TABLET BY MOUTH EVERY DAY, Disp: 90 tablet, Rfl: 1  •  clonazePAM (KlonoPIN) 0 5 mg tablet, Take 1 tablet (0 5 mg total) by mouth daily at bedtime, Disp: 30 tablet, Rfl: 0  •  clonazePAM (KlonoPIN) 0 5 mg tablet, Take 1 tablet (0 5 mg total) by mouth daily at bedtime, Disp: 30 tablet, Rfl: 0  •  fluticasone (FLONASE) 50 mcg/act nasal spray, fluticasone propionate 50 mcg/actuation nasal spray,suspension, Disp: , Rfl:   •  metFORMIN (GLUCOPHAGE-XR) 500 mg 24 hr tablet, TAKE 1 TABLET BY MOUTH EVERY DAY WITH DINNER, Disp: 90 tablet, Rfl: 0  •  metoprolol tartrate (LOPRESSOR) 50 mg tablet, TAKE 1 TABLET (50 MG TOTAL) BY MOUTH 2 (TWO) TIMES A DAY TAKE WITH 25 MG TABS (Patient taking differently: Take 50 mg by mouth every 12 (twelve) hours), Disp: 180 tablet, Rfl: 3  •  montelukast (SINGULAIR) 10 mg tablet, TAKE 1 TABLET BY MOUTH EVERYDAY AT BEDTIME, Disp: 90 tablet, Rfl: 1  •  NIFEdipine ER (ADALAT CC) 60 MG 24 hr tablet, TAKE 1 TABLET BY MOUTH EVERY DAY, Disp: 90 tablet, Rfl: 1    Allergies   Allergen Reactions   • Pollen Extract Sneezing       Physical Exam:    /69   Pulse 61   Ht 5' 8" (1 727 m)   Wt 86 2 kg (190 lb)   BMI 28 89 kg/m²     Constitutional:normal, well developed, well nourished, alert, in no distress and non-toxic and no overt pain behavior  Eyes:anicteric  HEENT:grossly intact  Neck:supple, symmetric, trachea midline and no masses   Pulmonary:even and unlabored  Cardiovascular:No edema or pitting edema present  Skin:Normal without rashes or lesions and well hydrated  Psychiatric:Mood and affect appropriate  Neurologic:Cranial Nerves II-XII grossly intact  Musculoskeletal:antalgic gait, ambulates with a cane    Positive lumbar facet loading maneuvers      Imaging  FL spine and pain procedure    (Results Pending)   FL spine and pain procedure    (Results Pending)         Orders Placed This Encounter   Procedures   • FL spine and pain procedure   • FL spine and pain procedure

## 2023-01-06 ENCOUNTER — OFFICE VISIT (OUTPATIENT)
Dept: PAIN MEDICINE | Facility: CLINIC | Age: 73
End: 2023-01-06

## 2023-01-06 VITALS
DIASTOLIC BLOOD PRESSURE: 69 MMHG | SYSTOLIC BLOOD PRESSURE: 130 MMHG | HEIGHT: 68 IN | WEIGHT: 190 LBS | BODY MASS INDEX: 28.79 KG/M2 | HEART RATE: 61 BPM

## 2023-01-06 DIAGNOSIS — M47.816 LUMBAR SPONDYLOSIS: Primary | ICD-10-CM

## 2023-01-11 ENCOUNTER — HOSPITAL ENCOUNTER (OUTPATIENT)
Dept: RADIOLOGY | Facility: CLINIC | Age: 73
Discharge: HOME | End: 2023-01-11
Admitting: ANESTHESIOLOGY

## 2023-01-11 ENCOUNTER — TELEPHONE (OUTPATIENT)
Dept: PAIN MEDICINE | Facility: CLINIC | Age: 73
End: 2023-01-11

## 2023-01-11 VITALS
HEART RATE: 56 BPM | OXYGEN SATURATION: 98 % | DIASTOLIC BLOOD PRESSURE: 74 MMHG | RESPIRATION RATE: 20 BRPM | SYSTOLIC BLOOD PRESSURE: 157 MMHG | TEMPERATURE: 97.2 F

## 2023-01-11 DIAGNOSIS — M47.816 LUMBAR SPONDYLOSIS: ICD-10-CM

## 2023-01-11 RX ORDER — LIDOCAINE HYDROCHLORIDE 10 MG/ML
10 INJECTION, SOLUTION EPIDURAL; INFILTRATION; INTRACAUDAL; PERINEURAL ONCE
Status: COMPLETED | OUTPATIENT
Start: 2023-01-11 | End: 2023-01-11

## 2023-01-11 RX ORDER — BUPIVACAINE HYDROCHLORIDE 5 MG/ML
30 INJECTION, SOLUTION EPIDURAL; INTRACAUDAL ONCE
Status: COMPLETED | OUTPATIENT
Start: 2023-01-11 | End: 2023-01-11

## 2023-01-11 RX ADMIN — LIDOCAINE HYDROCHLORIDE 3 ML: 20 INJECTION, SOLUTION EPIDURAL; INFILTRATION; INTRACAUDAL; PERINEURAL at 15:01

## 2023-01-11 RX ADMIN — BUPIVACAINE HYDROCHLORIDE 3 ML: 5 INJECTION, SOLUTION EPIDURAL; INTRACAUDAL; PERINEURAL at 15:01

## 2023-01-11 RX ADMIN — LIDOCAINE HYDROCHLORIDE 9 ML: 10 INJECTION, SOLUTION EPIDURAL; INFILTRATION; INTRACAUDAL; PERINEURAL at 15:01

## 2023-01-11 NOTE — H&P
History of Present Illness: The patient is a 71 y o  male who presents with complaints of low back pain  Past Medical History:   Diagnosis Date   • Arthritis     back   • Atrial fibrillation ([address] )     per pt "controlled with metoprolol" sees [facility] cardio Dr Ashli Chavarria 2x/year   • Bronchitis, chronic obstructive, with exacerbation ([facility] [address] )     Last Assessed: 5/17/2013   • Chronic pain disorder     back   • COPD with acute exacerbation ([facility] [address] )     Last Assessed: 4/13/2015   • Depression, controlled 8/30/2017    Transitioned From: Depression   • Elevated PSA     Bx neg 2012; Last Assessed: 11/23/2012   • Environmental and seasonal allergies    • Exercises 3 to 4 times per week     "2hrs --4x/week"elliptical and Boflex   • History of prediabetes    • Hyperlipidemia     history of/better on meds   • Hypertension     has gotten better since weight loss and pt is keeping check of BP at home   • Irregular heart beat     MAT, afib   • Lung nodule - resolved 2017 CT 1/4/2017   • Postoperative urinary retention    • Self-catheterizes urinary bladder     per pt every 2 weeks as per urologist   • Spinal cord stimulator status    • Status post insertion of spinal cord stimulator 9/9/2020   • Wears glasses    • Weight loss     per pt recent intention loss of 80lbs recently       Past Surgical History:   Procedure Laterality Date   • BACK SURGERY  07/25/2012    decompression of spinal stenosis from lower thoracic through the lumbar spine   • COLONOSCOPY  10/2010    neg   recheck in 5 years   • INCISION OF BLADDER NECK CONTRACTURE      multiple to remove scar tissue   • JOINT REPLACEMENT Left     hip   • JOINT REPLACEMENT Bilateral     knees   • KNEE ARTHROSCOPY  01/2013   • [place] NDSC WRST SURG W/RLS TRANSVRS CARPL LIGM Left 3/4/2020    Procedure: RELEASE CARPAL TUNNEL ENDOSCOPIC;  Surgeon: Hope Salas MD;  [facility];  Service: Orthopedics   • [place] PRQ IMPLTJ NSTIM ELECTRODE ARRAY EPIDURAL Right 8/25/2020    Procedure: INSERTION THORACIC DORSAL COLUMN SPINAL CORD STIMULATOR PERCUTANEOUS W IMPLANTABLE PULSE GENERATOR, RIGHT;  Surgeon: Doug Rangel MD;  [facility]: [facility];  Service: Neurosurgery   • [place] REVJ/RMVL IMPLANTED SPINAL NEUROSTIM GENERATOR N/A 5/3/2022    Procedure: REMOVAL OF SPINAL CORD STIMULATOR SYSTEM WITH BATTERY;  Surgeon: Gogo Pollard MD;  [facility];  Service: Neurosurgery   • PROSTATE BIOPSY  2012    for elevated PSA of about 7; neg   • ROTATOR CUFF REPAIR Right    • SPINAL FUSION     • TRANSURETHRAL RESECTION OF PROSTATE  06/27/2013         Current Outpatient Medications:   •  clonazePAM (KlonoPIN) 0 5 mg tablet, Take 1 tablet (0 5 mg total) by mouth daily at bedtime, Disp: 30 tablet, Rfl: 0  •  clonazePAM (KlonoPIN) 0 5 mg tablet, Take 1 tablet (0 5 mg total) by mouth daily at bedtime, Disp: 30 tablet, Rfl: 0  •  diclofenac (VOLTAREN) 75 mg EC tablet, Take 1 tablet (75 mg total) by mouth 2 (two) times a day as needed (pain), Disp: 60 tablet, Rfl: 1  •  DULoxetine (CYMBALTA) 30 mg delayed release capsule, Take 90 mg by mouth daily , Disp: , Rfl:   •  fluticasone (FLONASE) 50 mcg/act nasal spray, fluticasone propionate 50 mcg/actuation nasal spray,suspension, Disp: , Rfl:   •  gabapentin (NEURONTIN) 300 mg capsule, Take 300 mg by mouth daily , Disp: , Rfl:   •  metFORMIN (GLUCOPHAGE-XR) 500 mg 24 hr tablet, TAKE 1 TABLET BY MOUTH EVERY DAY WITH DINNER, Disp: 90 tablet, Rfl: 0  •  metoprolol tartrate (LOPRESSOR) 50 mg tablet, TAKE 1 TABLET (50 MG TOTAL) BY MOUTH 2 (TWO) TIMES A DAY TAKE WITH 25 MG TABS (Patient taking differently: Take 50 mg by mouth every 12 (twelve) hours), Disp: 180 tablet, Rfl: 3  •  montelukast (SINGULAIR) 10 mg tablet, TAKE 1 TABLET BY MOUTH EVERYDAY AT BEDTIME, Disp: 90 tablet, Rfl: 1  •  Multiple Vitamin (MULTI-VITAMIN DAILY) TABS, Take 1 tablet by mouth daily, Disp: , Rfl:   •  NIFEdipine ER (ADALAT CC) 60 MG 24 hr tablet, TAKE 1 TABLET BY MOUTH EVERY DAY, Disp: 90 tablet, Rfl: 1  •  oxybutynin (DITROPAN) 5 mg tablet, Take 5 mg by mouth 2 (two) times a day as needed, Disp: , Rfl:   •  rosuvastatin (CRESTOR) 40 MG tablet, TAKE 1 TABLET BY MOUTH EVERY DAY, Disp: 90 tablet, Rfl: 1  No current facility-administered medications for this encounter  Allergies   Allergen Reactions   • Pollen Extract Sneezing       Physical Exam:   Vitals:    01/11/23 1511   BP: 157/74   Pulse: 56   Resp: 20   Temp:    SpO2: 98%     General: Awake, Alert, Oriented x 3, Mood and affect appropriate  Respiratory: Respirations even and unlabored  Cardiovascular: Peripheral pulses intact; no edema  Musculoskeletal Exam: Right lumbar paraspinals tender to palpation    ASA Score: 3    Patient/Chart Verification  Patient ID Verified: Verbal  ID Band Applied: No  Consents Confirmed: Procedural, To be obtained in the Pre-Procedure area  Interval H&P(within 24 hr) Complete (required for Outpatients and Surgery Admit only): To be obtained in the Pre-Procedure area  Allergies Reviewed: Yes  Anticoag/NSAID held?: NA  Currently on antibiotics?: No    Assessment:   1   Lumbar spondylosis        Plan: Right L3-5 RFA

## 2023-01-11 NOTE — DISCHARGE INSTRUCTIONS
Medial Branch Radiofrequency Ablation     WHAT YOU NEED TO KNOW:   Medial branch radiofrequency ablation (RFA) is a procedure used to treat facet joint pain in your neck, mid back, or lower back  Facet joints are found at the back of each vertebra  A needle electrode is used to send electrical currents to the nerves in your facet joint  The electrical currents create heat that damages the nerve so it cannot send pain signals  ACTIVITY  Do not drive or operate machinery today  No strenuous activity today - bending, lifting, etc   You may shower today, but do not sit in a tub of water  Resume normal activities tomorrow as tolerated  CARE OF THE INJECTION SITE  If you have soreness or pain, apply ice to the area today (20 minutes on/20 minutes off)  Starting tomorrow, you may use warm, moist heat or ice if needed  Notify [facility] if you have any of the following: redness, drainage, swelling, or fever above 100°F     SPECIAL INSTRUCTIONS  Our office will call you tomorrow for a progress report and make an appointment for a follow up visit in 4 weeks  If you feel a sunburn-like sensation in the area of your procedure, call our office  MEDICATIONS  Continue to take all routine medications  Our office may have instructed you to hold some medications  As no general anesthesia was used in today's procedure, you should not experience any side effects related to anesthesia  If you have a problem specifically related to your procedure, please call our office at [phone]  Problems not related to your procedure should be directed to your primary care physician

## 2023-01-11 NOTE — TELEPHONE ENCOUNTER
Please call in am      Pt scheduled for Left L3-5 RFA 1/25 with 245 arrival    Pt has 6 week post RFA appt 2/22 with 645 arrival

## 2023-01-12 NOTE — TELEPHONE ENCOUNTER
Stephanie  S/w pt s/p Rt L3-5 RFA on 1/11/23 JW  Pt stated needle sites look good, denies S/S of infect, denies fever, denies soreness and denies sun burn like sensation  Advised pt if they have any further pain to take OTC/prescribed meds and/or use ice/heat and that it can take 4-6wks to see full effect  Confirmed nxt appt w/ pt on 1/25/23 for Lt L3-5 RFA and f/u on 2/22/23 w/ [facility]   Pt verbalized understanding and appreciative of c/b

## 2023-01-12 NOTE — TELEPHONE ENCOUNTER
Caller: Lise Smith    Doctor: Carter Capellan    Reason for call: patient retruning call from nurses    Call back#:[phone]

## 2023-01-25 ENCOUNTER — HOSPITAL ENCOUNTER (OUTPATIENT)
Dept: RADIOLOGY | Facility: CLINIC | Age: 73
Discharge: HOME | End: 2023-01-25
Admitting: ANESTHESIOLOGY

## 2023-01-25 ENCOUNTER — TELEPHONE (OUTPATIENT)
Dept: PAIN MEDICINE | Facility: CLINIC | Age: 73
End: 2023-01-25

## 2023-01-25 VITALS
SYSTOLIC BLOOD PRESSURE: 157 MMHG | HEART RATE: 63 BPM | DIASTOLIC BLOOD PRESSURE: 76 MMHG | RESPIRATION RATE: 20 BRPM | TEMPERATURE: 98.3 F | OXYGEN SATURATION: 95 %

## 2023-01-25 DIAGNOSIS — M47.816 LUMBAR SPONDYLOSIS: ICD-10-CM

## 2023-01-25 DIAGNOSIS — F32.A ANXIETY AND DEPRESSION: ICD-10-CM

## 2023-01-25 DIAGNOSIS — F41.9 ANXIETY AND DEPRESSION: ICD-10-CM

## 2023-01-25 RX ORDER — BUPIVACAINE HYDROCHLORIDE 5 MG/ML
30 INJECTION, SOLUTION EPIDURAL; INTRACAUDAL ONCE
Status: COMPLETED | OUTPATIENT
Start: 2023-01-25 | End: 2023-01-25

## 2023-01-25 RX ORDER — LIDOCAINE HYDROCHLORIDE 10 MG/ML
10 INJECTION, SOLUTION EPIDURAL; INFILTRATION; INTRACAUDAL; PERINEURAL ONCE
Status: COMPLETED | OUTPATIENT
Start: 2023-01-25 | End: 2023-01-25

## 2023-01-25 RX ORDER — CLONAZEPAM 0.5 MG/1
0.5 TABLET ORAL
Qty: 30 TABLET | Refills: 0 | Status: SHIPPED | OUTPATIENT
Start: 2023-01-25

## 2023-01-25 RX ADMIN — LIDOCAINE HYDROCHLORIDE 10 ML: 10 INJECTION, SOLUTION EPIDURAL; INFILTRATION; INTRACAUDAL; PERINEURAL at 14:53

## 2023-01-25 RX ADMIN — BUPIVACAINE HYDROCHLORIDE 3 ML: 5 INJECTION, SOLUTION EPIDURAL; INTRACAUDAL; PERINEURAL at 14:54

## 2023-01-25 RX ADMIN — LIDOCAINE HYDROCHLORIDE 3 ML: 20 INJECTION, SOLUTION EPIDURAL; INFILTRATION; INTRACAUDAL; PERINEURAL at 14:54

## 2023-01-25 NOTE — TELEPHONE ENCOUNTER
Please call in am    Pt has 4 week f/u with [facility] on 2/22/23 with 645 arrival

## 2023-01-25 NOTE — H&P
History of Present Illness: The patient is a 71 y o  male who presents with complaints of low back pain  Past Medical History:   Diagnosis Date   • Arthritis     back   • Atrial fibrillation ([facility] [address] )     per pt "controlled with metoprolol" sees [facility] cardio Dr Chito Martell 2x/year   • Bronchitis, chronic obstructive, with exacerbation ([facility] [address] )     Last Assessed: 5/17/2013   • Chronic pain disorder     back   • COPD with acute exacerbation ([facility] [address] )     Last Assessed: 4/13/2015   • Depression, controlled 8/30/2017    Transitioned From: Depression   • Elevated PSA     Bx neg 2012; Last Assessed: 11/23/2012   • Environmental and seasonal allergies    • Exercises 3 to 4 times per week     "2hrs --4x/week"elliptical and Boflex   • History of prediabetes    • Hyperlipidemia     history of/better on meds   • Hypertension     has gotten better since weight loss and pt is keeping check of BP at home   • Irregular heart beat     MAT, afib   • Lung nodule - resolved 2017 CT 1/4/2017   • Postoperative urinary retention    • Self-catheterizes urinary bladder     per pt every 2 weeks as per urologist   • Spinal cord stimulator status    • Status post insertion of spinal cord stimulator 9/9/2020   • Wears glasses    • Weight loss     per pt recent intention loss of 80lbs recently       Past Surgical History:   Procedure Laterality Date   • BACK SURGERY  07/25/2012    decompression of spinal stenosis from lower thoracic through the lumbar spine   • COLONOSCOPY  10/2010    neg   recheck in 5 years   • INCISION OF BLADDER NECK CONTRACTURE      multiple to remove scar tissue   • JOINT REPLACEMENT Left     hip   • JOINT REPLACEMENT Bilateral     knees   • KNEE ARTHROSCOPY  01/2013   • [place] NDSC WRST SURG W/RLS TRANSVRS CARPL LIGM Left 3/4/2020    Procedure: RELEASE CARPAL TUNNEL ENDOSCOPIC;  Surgeon: Jamal Headley MD;  [facility];  Service: Orthopedics   • [place] PRQ IMPLTJ NSTIM ELECTRODE ARRAY EPIDURAL Right 8/25/2020    Procedure: INSERTION THORACIC DORSAL COLUMN SPINAL CORD STIMULATOR PERCUTANEOUS W IMPLANTABLE PULSE GENERATOR, RIGHT;  Surgeon: Kay Gastelum MD;  [facility];  Service: Neurosurgery   • [place] REVJ/RMVL IMPLANTED SPINAL NEUROSTIM GENERATOR N/A 5/3/2022    Procedure: REMOVAL OF SPINAL CORD STIMULATOR SYSTEM WITH BATTERY;  Surgeon: Netta Palma MD;  [facility];  Service: Neurosurgery   • PROSTATE BIOPSY  2012    for elevated PSA of about 7; neg   • ROTATOR CUFF REPAIR Right    • SPINAL FUSION     • TRANSURETHRAL RESECTION OF PROSTATE  06/27/2013         Current Outpatient Medications:   •  clonazePAM (KlonoPIN) 0 5 mg tablet, Take 1 tablet (0 5 mg total) by mouth daily at bedtime, Disp: 30 tablet, Rfl: 0  •  clonazePAM (KlonoPIN) 0 5 mg tablet, Take 1 tablet (0 5 mg total) by mouth daily at bedtime, Disp: 30 tablet, Rfl: 0  •  diclofenac (VOLTAREN) 75 mg EC tablet, Take 1 tablet (75 mg total) by mouth 2 (two) times a day as needed (pain), Disp: 60 tablet, Rfl: 1  •  DULoxetine (CYMBALTA) 30 mg delayed release capsule, Take 90 mg by mouth daily , Disp: , Rfl:   •  fluticasone (FLONASE) 50 mcg/act nasal spray, fluticasone propionate 50 mcg/actuation nasal spray,suspension, Disp: , Rfl:   •  gabapentin (NEURONTIN) 300 mg capsule, Take 300 mg by mouth daily , Disp: , Rfl:   •  metFORMIN (GLUCOPHAGE-XR) 500 mg 24 hr tablet, TAKE 1 TABLET BY MOUTH EVERY DAY WITH DINNER, Disp: 90 tablet, Rfl: 0  •  metoprolol tartrate (LOPRESSOR) 50 mg tablet, TAKE 1 TABLET (50 MG TOTAL) BY MOUTH 2 (TWO) TIMES A DAY TAKE WITH 25 MG TABS (Patient taking differently: Take 50 mg by mouth every 12 (twelve) hours), Disp: 180 tablet, Rfl: 3  •  montelukast (SINGULAIR) 10 mg tablet, TAKE 1 TABLET BY MOUTH EVERYDAY AT BEDTIME, Disp: 90 tablet, Rfl: 1  •  Multiple Vitamin (MULTI-VITAMIN DAILY) TABS, Take 1 tablet by mouth daily, Disp: , Rfl:   •  NIFEdipine ER (ADALAT CC) 60 MG 24 hr tablet, TAKE 1 TABLET BY MOUTH EVERY DAY, Disp: 90 tablet, Rfl: 1  •  oxybutynin (DITROPAN) 5 mg tablet, Take 5 mg by mouth 2 (two) times a day as needed, Disp: , Rfl:   •  rosuvastatin (CRESTOR) 40 MG tablet, TAKE 1 TABLET BY MOUTH EVERY DAY, Disp: 90 tablet, Rfl: 1    Current Facility-Administered Medications:   •  bupivacaine (PF) (MARCAINE) 0 5 % injection 30 mL, 30 mL, Injection, Once, Emery Ann, DO  •  lidocaine (PF) (XYLOCAINE-MPF) 1 % injection 10 mL, 10 mL, Other, Once, Emery Vazquezt, DO  •  lidocaine (PF) (XYLOCAINE-MPF) 2 % injection 4 mL, 4 mL, Other, Once, Emery Ann, DO    Allergies   Allergen Reactions   • Pollen Extract Sneezing       Physical Exam:   Vitals:    01/25/23 1421   BP: 155/71   Pulse: 62   Resp: 22   Temp: 98 3 °F (36 8 °C)   SpO2: 98%     General: Awake, Alert, Oriented x 3, Mood and affect appropriate  Respiratory: Respirations even and unlabored  Cardiovascular: Peripheral pulses intact; no edema  Musculoskeletal Exam: Left lumbar paraspinals tender to palpation    ASA Score: 3    Patient/Chart Verification  Patient ID Verified: Verbal  ID Band Applied: No  Consents Confirmed: Procedural  H&P( within 30 days) Verified: To be obtained in the Pre-Procedure area  Interval H&P(within 24 hr) Complete (required for Outpatients and Surgery Admit only): To be obtained in the Pre-Procedure area  Allergies Reviewed: Yes  Anticoag/NSAID held?: NA  Currently on antibiotics?: No  Pre-op Lab/Test Results Available: N/A    Assessment:   1   Lumbar spondylosis        Plan: Left L3-5 RFA

## 2023-01-25 NOTE — DISCHARGE INSTRUCTIONS
Medial Branch Radiofrequency Ablation     WHAT YOU NEED TO KNOW:   Medial branch radiofrequency ablation (RFA) is a procedure used to treat facet joint pain in your neck, mid back, or lower back  Facet joints are found at the back of each vertebra  A needle electrode is used to send electrical currents to the nerves in your facet joint  The electrical currents create heat that damages the nerve so it cannot send pain signals  ACTIVITY  Do not drive or operate machinery today  No strenuous activity today - bending, lifting, etc   You may shower today, but do not sit in a tub of water  Resume normal activities tomorrow as tolerated  CARE OF THE INJECTION SITE  If you have soreness or pain, apply ice to the area today (20 minutes on/20 minutes off)  Starting tomorrow, you may use warm, moist heat or ice if needed  Notify the [facility] if you have any of the following: redness, drainage, swelling, or fever above 100°F     SPECIAL INSTRUCTIONS  Our office will call you tomorrow for a progress report and make an appointment for a follow up visit in 4 weeks  If you feel a sunburn-like sensation in the area of your procedure, call our office  MEDICATIONS  Continue to take all routine medications  Our office may have instructed you to hold some medications  As no general anesthesia was used in today's procedure, you should not experience any side effects related to anesthesia  If you have a problem specifically related to your procedure, please call our office at [phone]  Problems not related to your procedure should be directed to your primary care physician

## 2023-01-26 NOTE — TELEPHONE ENCOUNTER
Caller: Ruby Clay   Doctor/office: Dr Veronica Stanford   #: [phone]    % of improvement: 80%  Pain Scale (1-10): 0/10

## 2023-01-30 NOTE — TELEPHONE ENCOUNTER
Pt did well over night no s/s of infection or sunburn sensation  Aware it takes 2 weeks to notice pain relief and 4-6 weeks for full pain effect to be achieved  Aware to medicate as previous for discomfort and may use ice or heat  Confirmed next appt  2/22 @7am with [address]  Call if any questions or concerns prior to next appt

## 2023-02-05 DIAGNOSIS — I49.1 PAC (PREMATURE ATRIAL CONTRACTION): ICD-10-CM

## 2023-02-06 RX ORDER — METOPROLOL TARTRATE 50 MG/1
TABLET, FILM COATED ORAL
Qty: 180 TABLET | Refills: 3 | Status: SHIPPED | OUTPATIENT
Start: 2023-02-06

## 2023-02-21 NOTE — PROGRESS NOTES
Assessment:  1  Chronic pain syndrome    2  Status post lumbar and lumbosacral fusion by anterior technique    3  Lumbar spondylosis        Plan:  1  We can repeat L3-5 RFA as needed  Positive procedure  2  Patient may continue diclofenac as prescribed  I did advise him to use this medication as sparingly as possible  He does not require refills today  3  Patient may continue gabapentin and duloxetine as prescribed  4  Continue with home exercise program  5  Follow-up on a as needed basis at this time    History of Present Illness: The patient is a 71 y o  male with a history of L5-S1 ALIF and [employer] spinal cord stimulator explant last seen on 01/06/2023 who presents for a follow up office visit in regards to chronic axial low back pain  Patient is status post bilateral L3-5 RFA completed on January 25, 2023 with an ongoing 50% improvement of his back pain from the procedure  He uses diclofenac 75 mg twice daily for pain relief  He has not had favorable responses to lumbar epidural steroid injections in the past   He rates his pain a 4 out of 10 on the numeric pain rating scale  He occasionally has pain which is described as dull aching, numbness and pins-and-needles    I have personally reviewed and/or updated the patient's past medical history, past surgical history, family history, social history, current medications, allergies, and vital signs today  Review of Systems:    Review of Systems   Respiratory: Negative for shortness of breath  Cardiovascular: Negative for chest pain  Gastrointestinal: Negative for constipation, diarrhea, nausea and vomiting  Musculoskeletal: Negative for arthralgias, gait problem, joint swelling and myalgias  Skin: Negative for rash  Neurological: Negative for dizziness, seizures and weakness  All other systems reviewed and are negative          Past Medical History:   Diagnosis Date   • Arthritis     back   • Atrial fibrillation ([facility] [address] )     per pt "controlled with metoprolol" sees [facility] cardio Dr Brenda Smith 2x/year   • Bronchitis, chronic obstructive, with exacerbation ([facility] [address] )     Last Assessed: 5/17/2013   • Chronic pain disorder     back   • COPD with acute exacerbation ([facility] [address] )     Last Assessed: 4/13/2015   • Depression, controlled 8/30/2017    Transitioned From: Depression   • Elevated PSA     Bx neg 2012; Last Assessed: 11/23/2012   • Environmental and seasonal allergies    • Exercises 3 to 4 times per week     "2hrs --4x/week"elliptical and Boflex   • History of prediabetes    • Hyperlipidemia     history of/better on meds   • Hypertension     has gotten better since weight loss and pt is keeping check of BP at home   • Irregular heart beat     MAT, afib   • Lung nodule - resolved 2017 CT 1/4/2017   • Postoperative urinary retention    • Self-catheterizes urinary bladder     per pt every 2 weeks as per urologist   • Spinal cord stimulator status    • Status post insertion of spinal cord stimulator 9/9/2020   • Wears glasses    • Weight loss     per pt recent intention loss of 80lbs recently       Past Surgical History:   Procedure Laterality Date   • BACK SURGERY  07/25/2012    decompression of spinal stenosis from lower thoracic through the lumbar spine   • COLONOSCOPY  10/2010    neg   recheck in 5 years   • INCISION OF BLADDER NECK CONTRACTURE      multiple to remove scar tissue   • JOINT REPLACEMENT Left     hip   • JOINT REPLACEMENT Bilateral     knees   • KNEE ARTHROSCOPY  01/2013   • DC [facility] WRST SURG W/RLS TRANSVRS CARPL LIGM Left 3/4/2020    Procedure: RELEASE CARPAL TUNNEL ENDOSCOPIC;  Surgeon: Vignesh Falk MD;  [facility];  Service: Orthopedics   • DC PRQ [address] NSTIM ELECTRODE ARRAY EPIDURAL Right 8/25/2020    Procedure: INSERTION THORACIC DORSAL COLUMN SPINAL CORD STIMULATOR PERCUTANEOUS W IMPLANTABLE PULSE GENERATOR, RIGHT;  Surgeon: Jorge Luis Kaur MD;  [facility];  Service: Neurosurgery   • DC REVJ/RMVL IMPLANTED SPINAL NEUROSTIM GENERATOR N/A 5/3/2022    Procedure: REMOVAL OF SPINAL CORD STIMULATOR SYSTEM WITH BATTERY;  Surgeon: Andreia Bella MD;  [facility];  Service: Neurosurgery   • PROSTATE BIOPSY      for elevated PSA of about 7; neg   • ROTATOR CUFF REPAIR Right    • SPINAL FUSION     • TRANSURETHRAL RESECTION OF PROSTATE  2013       Family History   Problem Relation Age of Onset   • Breast cancer Mother    • Pancreatitis Father    • Diabetes Maternal Grandmother    • Heart attack Maternal Grandfather    • Heart attack Paternal Grandmother    • Diabetes Paternal Grandmother    • Diabetes Paternal Grandfather    • No Known Problems Brother    • Lung disease Paternal Uncle        Social History     Occupational History   • Not on file   Tobacco Use   • Smoking status: Former     Packs/day: 1 50     Years: 23 00     Pack years: 34 50     Types: Cigarettes     Start date: 0     Quit date:      Years since quittin 1   • Smokeless tobacco: Never   Vaping Use   • Vaping Use: Never used   Substance and Sexual Activity   • Alcohol use: No     Comment: Stopped drinking   • Drug use: No   • Sexual activity: Not on file     Comment: defer         Current Outpatient Medications:   •  clonazePAM (KlonoPIN) 0 5 mg tablet, Take 1 tablet (0 5 mg total) by mouth daily at bedtime, Disp: 30 tablet, Rfl: 0  •  diclofenac (VOLTAREN) 75 mg EC tablet, Take 1 tablet (75 mg total) by mouth 2 (two) times a day as needed (pain), Disp: 60 tablet, Rfl: 1  •  DULoxetine (CYMBALTA) 30 mg delayed release capsule, Take 90 mg by mouth daily , Disp: , Rfl:   •  fluticasone (FLONASE) 50 mcg/act nasal spray, fluticasone propionate 50 mcg/actuation nasal spray,suspension, Disp: , Rfl:   •  gabapentin (NEURONTIN) 300 mg capsule, Take 300 mg by mouth daily , Disp: , Rfl:   •  metFORMIN (GLUCOPHAGE-XR) 500 mg 24 hr tablet, TAKE 1 TABLET BY MOUTH EVERY DAY WITH DINNER, Disp: 90 tablet, Rfl: 0  •  metoprolol tartrate (LOPRESSOR) 50 mg tablet, TAKE 1 TABLET (50 MG TOTAL) BY MOUTH 2 (TWO) TIMES A DAY TAKE WITH 25 MG TABS, Disp: 180 tablet, Rfl: 3  •  montelukast (SINGULAIR) 10 mg tablet, TAKE 1 TABLET BY MOUTH EVERYDAY AT BEDTIME, Disp: 90 tablet, Rfl: 1  •  Multiple Vitamin (MULTI-VITAMIN DAILY) TABS, Take 1 tablet by mouth daily, Disp: , Rfl:   •  NIFEdipine ER (ADALAT CC) 60 MG 24 hr tablet, TAKE 1 TABLET BY MOUTH EVERY DAY, Disp: 90 tablet, Rfl: 1  •  oxybutynin (DITROPAN) 5 mg tablet, Take 5 mg by mouth 2 (two) times a day as needed, Disp: , Rfl:   •  rosuvastatin (CRESTOR) 40 MG tablet, TAKE 1 TABLET BY MOUTH EVERY DAY, Disp: 90 tablet, Rfl: 1    Allergies   Allergen Reactions   • Pollen Extract Sneezing       Physical Exam:    /61   Pulse 57   Ht 5' 8" (1 727 m)   Wt 89 8 kg (198 lb)   BMI 30 11 kg/m²     Constitutional:normal, well developed, well nourished, alert, in no distress and non-toxic and no overt pain behavior  Eyes:anicteric  HEENT:grossly intact  Neck:supple, symmetric, trachea midline and no masses   Pulmonary:even and unlabored  Cardiovascular:No edema or pitting edema present  Skin:Normal without rashes or lesions and well hydrated  Psychiatric:Mood and affect appropriate  Neurologic:Cranial Nerves II-XII grossly intact  Musculoskeletal:normal gait      Imaging  No orders to display         No orders of the defined types were placed in this encounter

## 2023-02-22 ENCOUNTER — OFFICE VISIT (OUTPATIENT)
Dept: PAIN MEDICINE | Facility: CLINIC | Age: 73
End: 2023-02-22

## 2023-02-22 VITALS
WEIGHT: 198 LBS | HEART RATE: 57 BPM | HEIGHT: 68 IN | DIASTOLIC BLOOD PRESSURE: 61 MMHG | SYSTOLIC BLOOD PRESSURE: 112 MMHG | BODY MASS INDEX: 30.01 KG/M2

## 2023-02-22 DIAGNOSIS — G89.4 CHRONIC PAIN SYNDROME: Primary | ICD-10-CM

## 2023-02-22 DIAGNOSIS — Z98.1 STATUS POST LUMBAR AND LUMBOSACRAL FUSION BY ANTERIOR TECHNIQUE: ICD-10-CM

## 2023-02-22 DIAGNOSIS — F32.A ANXIETY AND DEPRESSION: ICD-10-CM

## 2023-02-22 DIAGNOSIS — M47.816 LUMBAR SPONDYLOSIS: ICD-10-CM

## 2023-02-22 DIAGNOSIS — F41.9 ANXIETY AND DEPRESSION: ICD-10-CM

## 2023-02-22 RX ORDER — CLONAZEPAM 0.5 MG/1
0.5 TABLET ORAL
Qty: 30 TABLET | Refills: 0 | Status: SHIPPED | OUTPATIENT
Start: 2023-02-22

## 2023-03-21 ENCOUNTER — TELEPHONE (OUTPATIENT)
Dept: PAIN MEDICINE | Facility: CLINIC | Age: 73
End: 2023-03-21

## 2023-03-21 DIAGNOSIS — M47.816 LUMBAR SPONDYLOSIS: ICD-10-CM

## 2023-03-21 RX ORDER — DICLOFENAC SODIUM 75 MG/1
75 TABLET, DELAYED RELEASE ORAL 2 TIMES DAILY PRN
Qty: 60 TABLET | Refills: 1 | OUTPATIENT
Start: 2023-03-21

## 2023-03-22 DIAGNOSIS — F32.A ANXIETY AND DEPRESSION: ICD-10-CM

## 2023-03-22 DIAGNOSIS — F41.9 ANXIETY AND DEPRESSION: ICD-10-CM

## 2023-03-23 RX ORDER — CLONAZEPAM 0.5 MG/1
0.5 TABLET ORAL
Qty: 30 TABLET | Refills: 0 | Status: SHIPPED | OUTPATIENT
Start: 2023-03-23

## 2023-03-24 NOTE — TELEPHONE ENCOUNTER
My mistake  I don't know how I messed up the appointment dates  Still looking at his CMP, would recommend discontinuing diclofenac at this time   This medication is intended for short term use and his kidney function isn't the best

## 2023-03-24 NOTE — TELEPHONE ENCOUNTER
Pt contacted Call Center requested refill of their medication  Medication Name: Diclofenac       Dosage of Med: 75 mg      Frequency of Med: 2 x a day      Remaining Medication: 4      Pharmacy and Location: [facility]      Pt  Preferred Callback Phone Number: [phone]      Thank you

## 2023-03-24 NOTE — TELEPHONE ENCOUNTER
Would recommend discontinuing this medication at this time secondary to declining kidney function and try and manage more with tylenol PRN max 3000mg daily  Can discuss further management with PCP since we have not seen the patient in 6 months

## 2023-03-27 ENCOUNTER — TELEPHONE (OUTPATIENT)
Dept: PAIN MEDICINE | Facility: CLINIC | Age: 73
End: 2023-03-27

## 2023-03-27 NOTE — TELEPHONE ENCOUNTER
S/w pt who states that after his last RFA he did not have as much relief as he normally does and now the pain is worse than ever  OVS scheduled for evaluation  Pt appreciative of call

## 2023-03-27 NOTE — TELEPHONE ENCOUNTER
Caller: Tobi    Doctor: Mitchell Lundberg    Reason for call: update last ablation did not work, please advise next step?     Call back#: [phone]

## 2023-03-29 NOTE — PROGRESS NOTES
Assessment:  1  Chronic pain syndrome    2  Lumbar radiculopathy    3  Lumbosacral spondylosis without myelopathy    4  Degeneration of lumbosacral intervertebral disc    5  Lumbar spondylosis    6  Spinal stenosis of lumbar region with neurogenic claudication    7  Cervical stenosis of spinal canal    8  S/P lumbar fusion        Plan:  1  I will order an updated MRI lumbar spine with and without contrast   He is considering further surgical evaluation since he continues with ongoing pain post surgical intervention and has failed epidural steroid injections, lumbar rhizotomy, and spinal cord stimulator which has been explanted  2  Continue gabapentin and duloxetine as prescribed  3  Continue with home exercise program as taught by physical therapy  4  Patient may continue diclofenac 75 mg twice daily as needed  He does use this medication sparingly  He does not require refills today  5  Follow-up after imaging or sooner if needed    History of Present Illness: The patient is a 71 y o  male with a history of L5-S1 ALIF and [employer] spinal cord stimulator which has been explanted secondary to ineffectiveness last seen on 02/22/2023 who presents for a follow up office visit in regards to chronic axial low back pain that is nonradiating into the lower extremities  Patient is status post bilateral L3-5 RFA completed in January 2023 and does feel like the pain has returned to baseline already at this point  He uses diclofenac 75 mg twice daily as needed  He has unfortunately failed lumbar epidural steroid injections in the past and does not have any evidence of SI joint pathology on exam   He did have a spinal cord stimulator device which was explanted secondary to ineffectiveness  He rates his pain a 6 out of 10 on the numeric pain rating scale    His pain is described as dull aching, pressure-like and pins-and-needles    I have personally reviewed and/or updated the patient's past medical history, past "surgical history, family history, social history, current medications, allergies, and vital signs today  Review of Systems:    Review of Systems   Respiratory: Negative for shortness of breath  Cardiovascular: Negative for chest pain  Gastrointestinal: Negative for constipation, diarrhea, nausea and vomiting  Musculoskeletal: Positive for gait problem  Negative for arthralgias, joint swelling and myalgias  Skin: Negative for rash  Neurological: Negative for dizziness, seizures and weakness  All other systems reviewed and are negative  Past Medical History:   Diagnosis Date   • Arthritis     back   • Atrial fibrillation ([address] )     per pt \"controlled with metoprolol\" sees  cardio Dr Th[place] 2x/year   • Bronchitis, chronic obstructive, with exacerbation ([facility] [address] )     Last Assessed: 5/17/2013   • Chronic pain disorder     back   • COPD with acute exacerbation ([facility] [address] )     Last Assessed: 4/13/2015   • Depression, controlled 8/30/2017    Transitioned From: Depression   • Elevated PSA     Bx neg 2012; Last Assessed: 11/23/2012   • Environmental and seasonal allergies    • Exercises 3 to 4 times per week     \"2hrs --4x/week\"elliptical and Boflex   • History of prediabetes    • Hyperlipidemia     history of/better on meds   • Hypertension     has gotten better since weight loss and pt is keeping check of BP at home   • Irregular heart beat     MAT, afib   • Lung nodule - resolved 2017 CT 1/4/2017   • Postoperative urinary retention    • Self-catheterizes urinary bladder     per pt every 2 weeks as per urologist   • Spinal cord stimulator status    • Status post insertion of spinal cord stimulator 9/9/2020   • Wears glasses    • Weight loss     per pt recent intention loss of 80lbs recently       Past Surgical History:   Procedure Laterality Date   • BACK SURGERY  07/25/2012    decompression of spinal stenosis from lower thoracic through the lumbar spine   • COLONOSCOPY  10/2010    neg   recheck in 5 years " • INCISION OF BLADDER NECK CONTRACTURE      multiple to remove scar tissue   • JOINT REPLACEMENT Left     hip   • JOINT REPLACEMENT Bilateral     knees   • KNEE ARTHROSCOPY  2013   • IN [facility] WRST SURG W/RLS TRANSVRS CARPL LIGM Left 3/4/2020    Procedure: RELEASE CARPAL TUNNEL ENDOSCOPIC;  Surgeon: Subhash Vega MD;  [facility];  Service: Orthopedics   • IN PRQ [address] NSTIM ELECTRODE ARRAY EPIDURAL Right 2020    Procedure: INSERTION THORACIC DORSAL COLUMN SPINAL CORD STIMULATOR PERCUTANEOUS W IMPLANTABLE PULSE GENERATOR, RIGHT;  Surgeon: Cris Agarwal MD;  [facility];  Service: Neurosurgery   • IN REVJ/RMVL IMPLANTED SPINAL NEUROSTIM GENERATOR N/A 5/3/2022    Procedure: REMOVAL OF SPINAL CORD STIMULATOR SYSTEM WITH BATTERY;  Surgeon: Erma Phna MD;  [facility]: [facility];  Service: Neurosurgery   • PROSTATE BIOPSY      for elevated PSA of about 7; neg   • ROTATOR CUFF REPAIR Right    • SPINAL FUSION     • TRANSURETHRAL RESECTION OF PROSTATE  2013       Family History   Problem Relation Age of Onset   • Breast cancer Mother    • Pancreatitis Father    • Diabetes Maternal Grandmother    • Heart attack Maternal Grandfather    • Heart attack Paternal Grandmother    • Diabetes Paternal Grandmother    • Diabetes Paternal Grandfather    • No Known Problems Brother    • Lung disease Paternal Uncle        Social History     Occupational History   • Not on file   Tobacco Use   • Smoking status: Former     Packs/day: 1 50     Years: 23 00     Pack years: 34 50     Types: Cigarettes     Start date: 0     Quit date:      Years since quittin 2   • Smokeless tobacco: Never   Vaping Use   • Vaping Use: Never used   Substance and Sexual Activity   • Alcohol use: No     Comment: Stopped drinking   • Drug use: No   • Sexual activity: Not on file     Comment: defer         Current Outpatient Medications:   •  clonazePAM (KlonoPIN) 0 5 mg tablet, Take 1 tablet (0 5 mg total) by "mouth daily at bedtime, Disp: 30 tablet, Rfl: 0  •  DULoxetine (CYMBALTA) 30 mg delayed release capsule, Take 90 mg by mouth daily , Disp: , Rfl:   •  metFORMIN (GLUCOPHAGE-XR) 500 mg 24 hr tablet, TAKE 1 TABLET BY MOUTH EVERY DAY WITH DINNER, Disp: 90 tablet, Rfl: 0  •  metoprolol tartrate (LOPRESSOR) 50 mg tablet, TAKE 1 TABLET (50 MG TOTAL) BY MOUTH 2 (TWO) TIMES A DAY TAKE WITH 25 MG TABS, Disp: 180 tablet, Rfl: 3  •  montelukast (SINGULAIR) 10 mg tablet, TAKE 1 TABLET BY MOUTH EVERYDAY AT BEDTIME, Disp: 90 tablet, Rfl: 1  •  Multiple Vitamin (MULTI-VITAMIN DAILY) TABS, Take 1 tablet by mouth daily, Disp: , Rfl:   •  NIFEdipine ER (ADALAT CC) 60 MG 24 hr tablet, TAKE 1 TABLET BY MOUTH EVERY DAY, Disp: 90 tablet, Rfl: 1  •  oxybutynin (DITROPAN) 5 mg tablet, Take 5 mg by mouth 2 (two) times a day as needed, Disp: , Rfl:   •  rosuvastatin (CRESTOR) 40 MG tablet, TAKE 1 TABLET BY MOUTH EVERY DAY, Disp: 90 tablet, Rfl: 1  •  diclofenac (VOLTAREN) 75 mg EC tablet, Take 1 tablet (75 mg total) by mouth 2 (two) times a day as needed (pain) (Patient not taking: Reported on 3/30/2023), Disp: 60 tablet, Rfl: 1  •  fluticasone (FLONASE) 50 mcg/act nasal spray, fluticasone propionate 50 mcg/actuation nasal spray,suspension, Disp: , Rfl:   •  gabapentin (NEURONTIN) 300 mg capsule, Take 300 mg by mouth daily , Disp: , Rfl:     Allergies   Allergen Reactions   • Pollen Extract Sneezing       Physical Exam:    /72   Pulse 61   Ht 5' 8\" (1 727 m)   Wt 89 4 kg (197 lb)   BMI 29 95 kg/m²     Constitutional:normal, well developed, well nourished, alert, in no distress and non-toxic and no overt pain behavior    Eyes:anicteric  HEENT:grossly intact  Neck:supple, symmetric, trachea midline and no masses   Pulmonary:even and unlabored  Cardiovascular:No edema or pitting edema present  Skin:Normal without rashes or lesions and well hydrated  Psychiatric:Mood and affect appropriate  Neurologic:Cranial Nerves II-XII grossly " intact  Musculoskeletal:antalgic gait  Bilateral lower extremity strength 5 out of 5 in all muscle groups  Equivocal straight leg raise bilaterally    Negative William's test bilaterally      Imaging  MRI lumbar spine w wo contrast    (Results Pending)         Orders Placed This Encounter   Procedures   • MRI lumbar spine w wo contrast

## 2023-03-30 ENCOUNTER — OFFICE VISIT (OUTPATIENT)
Dept: PAIN MEDICINE | Facility: CLINIC | Age: 73
End: 2023-03-30

## 2023-03-30 VITALS
HEART RATE: 61 BPM | WEIGHT: 197 LBS | BODY MASS INDEX: 29.86 KG/M2 | SYSTOLIC BLOOD PRESSURE: 148 MMHG | HEIGHT: 68 IN | DIASTOLIC BLOOD PRESSURE: 72 MMHG

## 2023-03-30 DIAGNOSIS — G89.4 CHRONIC PAIN SYNDROME: Primary | ICD-10-CM

## 2023-03-30 DIAGNOSIS — M48.062 SPINAL STENOSIS OF LUMBAR REGION WITH NEUROGENIC CLAUDICATION: ICD-10-CM

## 2023-03-30 DIAGNOSIS — M48.02 CERVICAL STENOSIS OF SPINAL CANAL: ICD-10-CM

## 2023-03-30 DIAGNOSIS — M51.37 DEGENERATION OF LUMBOSACRAL INTERVERTEBRAL DISC: ICD-10-CM

## 2023-03-30 DIAGNOSIS — M47.816 LUMBAR SPONDYLOSIS: ICD-10-CM

## 2023-03-30 DIAGNOSIS — M47.817 LUMBOSACRAL SPONDYLOSIS WITHOUT MYELOPATHY: ICD-10-CM

## 2023-03-30 DIAGNOSIS — Z98.1 S/P LUMBAR FUSION: ICD-10-CM

## 2023-03-30 DIAGNOSIS — M54.16 LUMBAR RADICULOPATHY: ICD-10-CM

## 2023-04-03 ENCOUNTER — APPOINTMENT (OUTPATIENT)
Dept: LAB | Facility: CLINIC | Age: 73
End: 2023-04-03

## 2023-04-03 DIAGNOSIS — R73.01 IMPAIRED FASTING GLUCOSE: ICD-10-CM

## 2023-04-03 DIAGNOSIS — I10 PRIMARY HYPERTENSION: ICD-10-CM

## 2023-04-03 DIAGNOSIS — E78.2 MIXED HYPERLIPIDEMIA: ICD-10-CM

## 2023-04-03 LAB
ALBUMIN SERPL BCP-MCNC: 3.8 G/DL (ref 3.5–5)
ALP SERPL-CCNC: 57 U/L (ref 46–116)
ALT SERPL W P-5'-P-CCNC: 24 U/L (ref 12–78)
ANION GAP SERPL CALCULATED.3IONS-SCNC: 2 MMOL/L (ref 4–13)
AST SERPL W P-5'-P-CCNC: 19 U/L (ref 5–45)
BILIRUB SERPL-MCNC: 0.55 MG/DL (ref 0.2–1)
BUN SERPL-MCNC: 17 MG/DL (ref 5–25)
CALCIUM SERPL-MCNC: 9.1 MG/DL (ref 8.3–10.1)
CHLORIDE SERPL-SCNC: 106 MMOL/L (ref 96–108)
CHOLEST SERPL-MCNC: 152 MG/DL
CO2 SERPL-SCNC: 29 MMOL/L (ref 21–32)
CREAT SERPL-MCNC: 0.95 MG/DL (ref 0.6–1.3)
ERYTHROCYTE [DISTWIDTH] IN BLOOD BY AUTOMATED COUNT: 12.1 % (ref 11.6–15.1)
EST. AVERAGE GLUCOSE BLD GHB EST-MCNC: 114 MG/DL
GFR SERPL CREATININE-BSD FRML MDRD: 80 ML/MIN/1.73SQ M
GLUCOSE P FAST SERPL-MCNC: 105 MG/DL (ref 65–99)
HBA1C MFR BLD: 5.6 %
HCT VFR BLD AUTO: 43.1 % (ref 36.5–49.3)
HDLC SERPL-MCNC: 57 MG/DL
HGB BLD-MCNC: 14 G/DL (ref 12–17)
LDLC SERPL CALC-MCNC: 82 MG/DL (ref 0–100)
MCH RBC QN AUTO: 30.1 PG (ref 26.8–34.3)
MCHC RBC AUTO-ENTMCNC: 32.5 G/DL (ref 31.4–37.4)
MCV RBC AUTO: 93 FL (ref 82–98)
NONHDLC SERPL-MCNC: 95 MG/DL
PLATELET # BLD AUTO: 205 THOUSANDS/UL (ref 149–390)
PMV BLD AUTO: 10.1 FL (ref 8.9–12.7)
POTASSIUM SERPL-SCNC: 4 MMOL/L (ref 3.5–5.3)
PROT SERPL-MCNC: 6.9 G/DL (ref 6.4–8.4)
RBC # BLD AUTO: 4.65 MILLION/UL (ref 3.88–5.62)
SODIUM SERPL-SCNC: 137 MMOL/L (ref 135–147)
TRIGL SERPL-MCNC: 66 MG/DL
WBC # BLD AUTO: 6.19 THOUSAND/UL (ref 4.31–10.16)

## 2023-04-24 ENCOUNTER — TELEPHONE (OUTPATIENT)
Dept: PAIN MEDICINE | Facility: CLINIC | Age: 73
End: 2023-04-24

## 2023-04-24 DIAGNOSIS — M54.16 LUMBAR RADICULOPATHY: Primary | ICD-10-CM

## 2023-04-24 DIAGNOSIS — M48.062 SPINAL STENOSIS OF LUMBAR REGION WITH NEUROGENIC CLAUDICATION: ICD-10-CM

## 2023-04-24 NOTE — TELEPHONE ENCOUNTER
----- Message from [employer] sent at 4/24/2023  8:15 AM EDT -----  MRI of the lumbar spine reveals stable anterior fixation hardware at L5-S1  There is grade 1 retrolisthesis (shifting of the vertebrae) at L1-2 and L2-3 and anterolisthesis at L4-5  Throughout the lumbar spine there is multilevel degenerative disc   disease and arthritis with various degrees of central and foraminal stenosis most severe at L2-3 ,left L4-5, and left L5-S1 neural foraminal narrowing with encroachment of the exiting bilateral L2, left L4, and left L5 nerve roots

## 2023-04-24 NOTE — TELEPHONE ENCOUNTER
S/w pt and advised of same  Pt states that he was advised that he should not see a surgeon until he has exhausted all conservative tx  Pt feels he is at that point and is questioning if he should see a neurosurgeon or an orthopedic surgeon    Advised will discuss with [facility] and cb

## 2023-04-24 NOTE — TELEPHONE ENCOUNTER
Caller: Deanna Castillo PT    Doctor: [facility]    Reason for call: MRI results    Call back#: [phone]

## 2023-04-25 ENCOUNTER — OFFICE VISIT (OUTPATIENT)
Dept: FAMILY MEDICINE CLINIC | Facility: CLINIC | Age: 73
End: 2023-04-25

## 2023-04-25 VITALS
HEART RATE: 57 BPM | TEMPERATURE: 97 F | BODY MASS INDEX: 29.7 KG/M2 | SYSTOLIC BLOOD PRESSURE: 130 MMHG | OXYGEN SATURATION: 99 % | WEIGHT: 196 LBS | HEIGHT: 68 IN | DIASTOLIC BLOOD PRESSURE: 61 MMHG | RESPIRATION RATE: 17 BRPM

## 2023-04-25 DIAGNOSIS — F32.A ANXIETY AND DEPRESSION: ICD-10-CM

## 2023-04-25 DIAGNOSIS — I10 PRIMARY HYPERTENSION: ICD-10-CM

## 2023-04-25 DIAGNOSIS — E78.2 MIXED HYPERLIPIDEMIA: ICD-10-CM

## 2023-04-25 DIAGNOSIS — F41.9 ANXIETY AND DEPRESSION: ICD-10-CM

## 2023-04-25 DIAGNOSIS — I47.1 MULTIFOCAL ATRIAL TACHYCARDIA (HCC): ICD-10-CM

## 2023-04-25 DIAGNOSIS — J41.1 MUCOPURULENT CHRONIC BRONCHITIS (HCC): ICD-10-CM

## 2023-04-25 DIAGNOSIS — R73.01 IMPAIRED FASTING GLUCOSE: ICD-10-CM

## 2023-04-25 DIAGNOSIS — M47.816 LUMBAR SPONDYLOSIS: Primary | ICD-10-CM

## 2023-04-25 RX ORDER — ROSUVASTATIN CALCIUM 40 MG/1
TABLET, COATED ORAL
Qty: 90 TABLET | Refills: 1 | Status: SHIPPED | OUTPATIENT
Start: 2023-04-25

## 2023-04-25 RX ORDER — DICLOFENAC SODIUM 75 MG/1
75 TABLET, DELAYED RELEASE ORAL 2 TIMES DAILY PRN
Qty: 60 TABLET | Refills: 1 | Status: SHIPPED | OUTPATIENT
Start: 2023-04-25

## 2023-04-25 NOTE — TELEPHONE ENCOUNTER
Caller: patient    Doctor: Bradly Falk    Reason for call: he wants the orthopedic surgeons referral, call with name and number       Call back#: [phone]

## 2023-04-25 NOTE — PROGRESS NOTES
Name: Sanjeev Varela      : 1950      MRN: [id number]  Encounter Provider: Castro Quiroga MD  Encounter Date: 2023   Encounter department: [address] [place] [address]     1  Primary hypertension  -     Comprehensive metabolic panel; Future; Expected date: 10/12/2023  -     Hemoglobin A1C; Future; Expected date: 10/12/2023  -     Lipid panel; Future; Expected date: 10/12/2023    2  Multifocal atrial tachycardia (HCC)    3  Mixed hyperlipidemia  -     Comprehensive metabolic panel; Future; Expected date: 10/12/2023  -     Hemoglobin A1C; Future; Expected date: 10/12/2023  -     Lipid panel; Future; Expected date: 10/12/2023    4  Impaired fasting glucose  -     Comprehensive metabolic panel; Future; Expected date: 10/12/2023  -     Hemoglobin A1C; Future; Expected date: 10/12/2023  -     Lipid panel; Future; Expected date: 10/12/2023    5  Anxiety and depression  -     Comprehensive metabolic panel; Future; Expected date: 10/12/2023  -     Hemoglobin A1C; Future; Expected date: 10/12/2023  -     Lipid panel; Future; Expected date: 10/12/2023    6  Mucopurulent chronic bronchitis (HCC)    7  Lumbar spondylosis  -     diclofenac (VOLTAREN) 75 mg EC tablet; Take 1 tablet (75 mg total) by mouth 2 (two) times a day as needed (pain)      BMI Counseling: Body mass index is 29 8 kg/m²  The BMI is above normal  Nutrition recommendations include decreasing portion sizes, encouraging healthy choices of fruits and vegetables, decreasing fast food intake, consuming healthier snacks and limiting drinks that contain sugar  No pharmacotherapy was ordered  Rationale for BMI follow-up plan is due to patient being overweight or obese  Reviewed lab in 2023  CBC ok  CMP ok  HgA1C 5 6 normal  Lipid 152/66/57/82 ok    Flu shot yearly  Got covid19 vaccine and boosters  Got PCV13 and zostavax in 2016  Got pneumovax in 2018    Got shingrix in pharmacy    PSA yearly per urology   Pros and cons educated pt    Colonoscopy 3/2017  Repeat in 10 years  RTO in 6 months        Subjective      HPI     Pt is here by himself    Chronic lower back pain--for years  Hx of back surgery in 2012 at [facility] pain specialist, got injection but no help  Refer to orthopedics recently  He is on diclofenac 75mg bid       MAT/HTN----[facility]ology for MAT, HTN  He is on metoprolol 75mg bid and nifedipine 90mg Qd  Denies headache, vision change, dizzy, SOB, palpitation or chest pain      Hyperlipidemia---He is on crestor 40mg qhs per cardiology  Denies side effects      IFG---He is on metformin 500mg QD per endocrinology  Denies SE  Tried to eat healthy       Anxiety/depression/Insomnia----[facility] psychiatry Dr Urban Kaba Q 3 month  He is on cymbalta 90mg QD which works for him  Use clonazepam 0 5mg qhs for insomnia  He is on gabapentin 300mg am and 1200mg pm per psychiatrist        COPD/chronic bronchitis---Controlled  He is on singulair 10mg QD    Quit smoking in 2000          BPH---[facility] urology Q 6 months for BPH s/p TURP in 2013  Had cystoscopy and dilation in 9/2014  He does not need to self-cath now  He is on oxybutynin and has dry mouth  He can tolerate       Quit smoking since 20 years ago    No alcohol       Lives with wife  Does all ADL's  Denies recent falls  Review of Systems   Constitutional: Negative for appetite change, chills and fever  HENT: Negative for congestion, ear pain, sinus pain and sore throat  Eyes: Negative for discharge and itching  Respiratory: Negative for apnea, cough, chest tightness, shortness of breath and wheezing  Cardiovascular: Negative for chest pain, palpitations and leg swelling  Gastrointestinal: Negative for abdominal pain, anal bleeding, constipation, diarrhea, nausea and vomiting  Endocrine: Negative for cold intolerance, heat intolerance and polyuria  Genitourinary: Negative for difficulty urinating and dysuria  Musculoskeletal: Positive for back pain  "Negative for arthralgias and myalgias  Skin: Negative for rash  Neurological: Negative for dizziness and headaches  Psychiatric/Behavioral: Negative for agitation  Current Outpatient Medications on File Prior to Visit   Medication Sig   • clonazePAM (KlonoPIN) 0 5 mg tablet Take 1 tablet (0 5 mg total) by mouth daily at bedtime   • DULoxetine (CYMBALTA) 30 mg delayed release capsule Take 90 mg by mouth daily    • fluticasone (FLONASE) 50 mcg/act nasal spray fluticasone propionate 50 mcg/actuation nasal spray,suspension   • gabapentin (NEURONTIN) 300 mg capsule Take 300 mg by mouth daily    • metFORMIN (GLUCOPHAGE-XR) 500 mg 24 hr tablet TAKE 1 TABLET BY MOUTH EVERY DAY WITH DINNER   • metoprolol tartrate (LOPRESSOR) 50 mg tablet TAKE 1 TABLET (50 MG TOTAL) BY MOUTH 2 (TWO) TIMES A DAY TAKE WITH 25 MG TABS   • montelukast (SINGULAIR) 10 mg tablet TAKE 1 TABLET BY MOUTH EVERYDAY AT BEDTIME   • Multiple Vitamin (MULTI-VITAMIN DAILY) TABS Take 1 tablet by mouth daily   • NIFEdipine ER (ADALAT CC) 60 MG 24 hr tablet TAKE 1 TABLET BY MOUTH EVERY DAY   • oxybutynin (DITROPAN) 5 mg tablet Take 5 mg by mouth 2 (two) times a day as needed   • [DISCONTINUED] diclofenac (VOLTAREN) 75 mg EC tablet Take 1 tablet (75 mg total) by mouth 2 (two) times a day as needed (pain) (Patient not taking: Reported on 3/30/2023)   • [DISCONTINUED] rosuvastatin (CRESTOR) 40 MG tablet TAKE 1 TABLET BY MOUTH EVERY DAY       Objective     /61   Pulse 57   Temp (!) 97 °F (36 1 °C)   Resp 17   Ht 5' 8\" (1 727 m)   Wt 88 9 kg (196 lb)   SpO2 99%   BMI 29 80 kg/m²     Physical Exam  Constitutional:       Appearance: He is well-developed  HENT:      Head: Normocephalic and atraumatic  Eyes:      General:         Right eye: No discharge  Left eye: No discharge  Conjunctiva/sclera: Conjunctivae normal    Cardiovascular:      Rate and Rhythm: Normal rate and regular rhythm  Heart sounds: Normal heart sounds   " No murmur heard  No friction rub  No gallop  Pulmonary:      Effort: Pulmonary effort is normal  No respiratory distress  Breath sounds: Normal breath sounds  No wheezing or rales  Abdominal:      General: Bowel sounds are normal  There is no distension  Palpations: Abdomen is soft  Tenderness: There is no abdominal tenderness  There is no guarding  Musculoskeletal:         General: Normal range of motion  Cervical back: Normal range of motion and neck supple  Right lower leg: No edema  Left lower leg: No edema  Neurological:      Mental Status: He is alert         Joe Benjamin MD

## 2023-05-02 ENCOUNTER — OFFICE VISIT (OUTPATIENT)
Dept: OBGYN CLINIC | Facility: HOSPITAL | Age: 73
End: 2023-05-02

## 2023-05-02 ENCOUNTER — HOSPITAL ENCOUNTER (OUTPATIENT)
Dept: RADIOLOGY | Facility: HOSPITAL | Age: 73
Discharge: HOME | End: 2023-05-02
Attending: ORTHOPAEDIC SURGERY

## 2023-05-02 VITALS
WEIGHT: 195.99 LBS | SYSTOLIC BLOOD PRESSURE: 124 MMHG | BODY MASS INDEX: 29.7 KG/M2 | HEIGHT: 68 IN | DIASTOLIC BLOOD PRESSURE: 68 MMHG | HEART RATE: 60 BPM

## 2023-05-02 DIAGNOSIS — R52 PAIN: ICD-10-CM

## 2023-05-02 DIAGNOSIS — R52 PAIN: Primary | ICD-10-CM

## 2023-05-02 DIAGNOSIS — M54.16 LUMBAR RADICULOPATHY: ICD-10-CM

## 2023-05-02 DIAGNOSIS — M48.062 SPINAL STENOSIS OF LUMBAR REGION WITH NEUROGENIC CLAUDICATION: ICD-10-CM

## 2023-05-02 NOTE — PROGRESS NOTES
"NAME: Constanza Chase  : 1950  PCP: Ren Arrington MD      Chief Complaint: back and bilateral leg pain    HPI:  Constanza Chase is a 71 y o  male presenting for initial visit with chief complaint of back ajnd bilateral leg pain  PMH four level laminectomy about 12 years ago with improvement in leg symptoms at that time, ALIF L5-S1 in 2015 with improvement after surgery  Surgeries performed by Dr Moyer Guadalupita  Also had a spinal cord stimulator placement and subsequent explant on 5/3/2022  Currently, patient describes gradually worsening back pain with radiation into his bilateral posterior lower extremities to level of knee  Describes pain as \"electric numbing\" with an ache  Pain and symptoms worse with prolonged standing and walking  Has been using cane for ambulation  He needs to stop and rest when walking more than a few blocks before resuming  Pain improved when lying in bed and sitting  Subjective LE weakness, noting he feels like his legs are going to give out with prologned standing and walking  Denies any uzma trauma  Denies fever or chills  Denies any bladder or bowel changes  PMH bilateral knee replacement, right hip replacement  Conservative therapy includes the following:   Tylenol, ibuprofen  Diclofenac, gabapentin  Spine & pain (Dr Haily Miranda) - patient had initial improvement with RFA but no longer efficacious  19 - bilateral S1 TFESI  20 - bilateral S1 TFESI  Multiple RFA and MBB between 2023 - RFA right L4-5, L5-S1   2023 - RFA left L4-5, L5-S1   Was in physical therapy in past  Denies recent formal physical therapy  These therapeutic modalities were ineffective  The patient has noticed significant impairment in performing the following ADLs: Bill Haji used to work at [employer] and for [employer]  Patient is able to function independently and perform ADLs but notes increased difficulty due to ambulatory dysfunction      FAMILY " "HISTORY   Family History   Problem Relation Age of Onset   [facility] Breast cancer Mother     Pancreatitis Father     Diabetes Maternal Grandmother     Heart attack Maternal Grandfather     Heart attack Paternal Grandmother     Diabetes Paternal Grandmother     Diabetes Paternal Grandfather     No Known Problems Brother     Lung disease Paternal Uncle        PAST MEDICAL HISTORY:   Past Medical History:   Diagnosis Date    Arthritis     back    Atrial fibrillation ([facility] [address] )     per pt \"controlled with metoprolol\" sees [facility] cardio Dr Donya Rdz 2x/year    Bronchitis, chronic obstructive, with exacerbation ([facility] [address] )     Last Assessed: 5/17/2013    Chronic pain disorder     back    COPD with acute exacerbation ([facility] [address] )     Last Assessed: 4/13/2015    Depression, controlled 8/30/2017    Transitioned From: Depression    Elevated PSA     Bx neg 2012; Last Assessed: 11/23/2012    Environmental and seasonal allergies     Exercises 3 to 4 times per week     \"2hrs --4x/week\"elliptical and Boflex    History of prediabetes     Hyperlipidemia     history of/better on meds    Hypertension     has gotten better since weight loss and pt is keeping check of BP at home    Irregular heart beat     MAT, afib    Lung nodule - resolved 2017 CT 1/4/2017    Postoperative urinary retention     Self-catheterizes urinary bladder     per pt every 2 weeks as per urologist    Spinal cord stimulator status     Status post insertion of spinal cord stimulator 9/9/2020    Wears glasses     Weight loss     per pt recent intention loss of 80lbs recently       MEDICATIONS:  Current Outpatient Medications   Medication Sig Dispense Refill    clonazePAM (KlonoPIN) 0 5 mg tablet Take 1 tablet (0 5 mg total) by mouth daily at bedtime 30 tablet 0    diclofenac (VOLTAREN) 75 mg EC tablet Take 1 tablet (75 mg total) by mouth 2 (two) times a day as needed (pain) 60 tablet 1    DULoxetine (CYMBALTA) 30 mg delayed release capsule Take 90 mg by mouth daily  "  fluticasone (FLONASE) 50 mcg/act nasal spray fluticasone propionate 50 mcg/actuation nasal spray,suspension      gabapentin (NEURONTIN) 300 mg capsule Take 300 mg by mouth daily       metFORMIN (GLUCOPHAGE-XR) 500 mg 24 hr tablet TAKE 1 TABLET BY MOUTH EVERY DAY WITH DINNER 90 tablet 0    metoprolol tartrate (LOPRESSOR) 50 mg tablet TAKE 1 TABLET (50 MG TOTAL) BY MOUTH 2 (TWO) TIMES A DAY TAKE WITH 25 MG TABS 180 tablet 3    montelukast (SINGULAIR) 10 mg tablet TAKE 1 TABLET BY MOUTH EVERYDAY AT BEDTIME 90 tablet 1    Multiple Vitamin (MULTI-VITAMIN DAILY) TABS Take 1 tablet by mouth daily      NIFEdipine ER (ADALAT CC) 60 MG 24 hr tablet TAKE 1 TABLET BY MOUTH EVERY DAY 90 tablet 1    oxybutynin (DITROPAN) 5 mg tablet Take 5 mg by mouth 2 (two) times a day as needed      rosuvastatin (CRESTOR) 40 MG tablet TAKE 1 TABLET BY MOUTH EVERY DAY 90 tablet 1     No current facility-administered medications for this visit  PAST SURGICAL HISTORY:  Past Surgical History:   Procedure Laterality Date    BACK SURGERY  07/25/2012    decompression of spinal stenosis from lower thoracic through the lumbar spine    COLONOSCOPY  10/2010    neg   recheck in 5 years   [address]      multiple to remove scar tissue    JOINT REPLACEMENT Left     hip    JOINT REPLACEMENT Bilateral     knees    KNEE ARTHROSCOPY  01/2013    NC NDSC WRST SURG W/RLS TRANSVRS CARPL LIGM Left 3/4/2020    Procedure: RELEASE CARPAL TUNNEL ENDOSCOPIC;  Surgeon: Danny Cevallos MD;  [facility];  Service: Orthopedics    NC PRQ [address] NSTIM ELECTRODE ARRAY EPIDURAL Right 8/25/2020    Procedure: INSERTION THORACIC DORSAL COLUMN SPINAL CORD STIMULATOR PERCUTANEOUS W IMPLANTABLE PULSE GENERATOR, RIGHT;  Surgeon: Whitney Carrion MD;  Location[facility];  Service: Neurosurgery    NC REVJ/RMVL IMPLANTED SPINAL [address] N/A 5/3/2022    Procedure: REMOVAL OF SPINAL CORD STIMULATOR SYSTEM WITH BATTERY;  Surgeon: Nya Bruner MD;  [facility];  Service: Neurosurgery    PROSTATE BIOPSY  2012    for elevated PSA of about 7; neg    ROTATOR CUFF REPAIR Right     SPINAL FUSION      TRANSURETHRAL RESECTION OF PROSTATE  2013       SOCIAL HISTORY:  Social History     Socioeconomic History    Marital status: /Civil Union     Spouse name: Not on file    Number of children: Not on file    Years of education: Not on file    Highest education level: Not on file   Occupational History    Not on file   Tobacco Use    Smoking status: Former     Packs/day: 1 50     Years: 23 00     Pack years: 34 50     Types: Cigarettes     Start date: 0     Quit date: 2000     Years since quittin 3    Smokeless tobacco: Never   Vaping Use    Vaping Use: Never used   Substance and Sexual Activity    Alcohol use: No     Comment: Stopped drinking    Drug use: No    Sexual activity: Not Currently     Comment: defer   Other Topics Concern    Not on file   Social History Narrative    [employer]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, torch    Asbestos removal - use [address]  Determinants of Health     Financial Resource Strain: Not on file   Food Insecurity: Not on file   Transportation Needs: Not on file   Physical Activity: Not on file   Stress: Not on file   Social Connections: Not on file   Intimate Partner Violence: Not on file   Housing Stability: Not on file       ALLERGIES:  Allergies   Allergen Reactions    Pollen Extract Sneezing       ROS:   Constitutional:  No fever, chills, night sweats, loss of appetite   HEENT No no sore throat, difficulty swallowing   Cardiovascular:  No chest pain, palpitations, BLE edema, PITTMAN   Respiratory:  No SOB, coughing, chest congestion   Gastrointestinal:  No nausea, vomiting, abdominal pain   Genitourinary:  No dysuria, hematuria, urinary urgency/frequency   Musculoskeletal:  See HPI   Skin:  No rash, erythema, edema "  Neurologic:  See HPI   Psychiatric Illness:  No depression, anxiety, mood disorder, substance abuse disorder     PHYSICAL EXAM:  /68   Pulse 60   Ht 5' 8\" (1 727 m)   Wt 88 9 kg (195 lb 15 8 oz)   BMI 29 80 kg/m²        General:  Well-developed,appears well, no acute distress   Respiratory:  No SOB, no abnormal effort (use of accessory muscles)  GI / Abdominal:  Soft  No abdominal masses or tenderness  Nondistended  Gait & balance No evidence of myelopathic gait  Lumbar spine range of motion:  -Forward flexion to 60  -Extension to neutral  There is no point tenderness with palpation along the posterior cervical, thoracic, lumbar spine      +well healed posterior lumbar incision    Neurologic:    Lower Extremity Motor Function   Right  Left    Iliopsoas  5/5  5/5    Quadriceps 5/5 5/5   Tibialis anterior  5/5  5/5    EHL  4+/5  4+/5    Gastroc  muscle  5/5  5/5    Heel rise  4+/5  4+/5    Toe rise  4+/5  4+/5      Sensory: light touch is intact to bilateral upper and lower extremities    Reflexes:    Right Left   Patellar 1+ 1+   Achilles 1+ 1+   Babinski neg neg     Other tests:  Straight Leg Raise: negative  Cooper's: negative  Clonus: negative  Samson SI: negative  JUANCARLOS SI: negative  Greater troch: no tenderness  Internal/external hip ROM: intact, no pain; right not assessed 2/2 hip replacement  Flexion/extension knee ROM: intact, no pain     IMAGING: I have personally reviewed the images and these are my findings:  Lumbar spine xray from 5/2/2023: Multilevel lumbar spondylosis, loss of disc base height L1-5, endplate sclerosis, osteophyte formation, diffuse facet hypertrophy, L5-S1 anterior fusion construct with plate and screws, interbody cage device, with broken screws, no apparent spondylolisthesis, no obvious instability on flexion-extension radiographs      MRI lumbar spine from 4/18/2023: Multilevel lumbar disc degeneration L1-L5, evidence of L5-S1 interbody fusion construct, varying " degrees of central lateral recess and foraminal stenosis throughout lumbar spine      ASSESSMENT / Medical Decision Making (MDM):  71 y o  male with history of ongoing low back and bilateral posterior thigh pain  Ambulatory dysfunction  PMH four level laminectomy about 12 years ago, ALIF L5-S1 in 2015  No incontinence of bowel/bladder, no fever, no chills, no night sweats  Physical exam showing mild lower extremity weakness; mild difficulty with heel/toe rise    Imaging reviewed as above  Findings most notable for lumbar spondylosis    Impression of lumbar degenerative disease, previous lumbar fusion surgery    Plan: The above clinical, physical and imaging findings were reviewed with the patient  Wilfred Cook  has a constellation of findings consistent with lumbar radiculopathy, neurogenic claudication in setting lumbar degenerative disease, previous lumbar fusion surgery with hardware failure  Also with ambulatory dysfunction  Patient describes worsening low back and bilateral posterior thigh pain  Pain worse with prolonged standing and walking  He is only able to walk a few blocks before needing to stop and rest  Using cane for ambulation  He did have improvement after his 2 previous lumbar surgeries, but his pain and symptoms have been gradually worsening  He has tried oral medications, physical therapy and interventional spine procedures without relief  He notes initial improvement in pain/symptoms with nerve ablations, however the injections have become less efficacious  Given previous history of lumbar fusion surgery and broken screw/hardware noted on xray, will plan to obtain CT lumbar spine to further evaluate patient's symptoms and to evaluate previous fusion/hardware  Will review CT lumbar in detail with patient at follow-up visit and discuss further treatment options at that time  Continue with medications as previously prescribed if providing pain/symptom relief    Patient instructed to return to office/ER sooner if symptoms are not improving, getting worse, or new worrisome/neurologic symptoms arise

## 2023-05-09 ENCOUNTER — HOSPITAL ENCOUNTER (OUTPATIENT)
Dept: RADIOLOGY | Facility: HOSPITAL | Age: 73
Discharge: HOME | End: 2023-05-09

## 2023-05-09 DIAGNOSIS — M48.062 SPINAL STENOSIS OF LUMBAR REGION WITH NEUROGENIC CLAUDICATION: ICD-10-CM

## 2023-05-09 DIAGNOSIS — M54.16 LUMBAR RADICULOPATHY: ICD-10-CM

## 2023-05-17 DIAGNOSIS — F32.A ANXIETY AND DEPRESSION: ICD-10-CM

## 2023-05-17 DIAGNOSIS — F41.9 ANXIETY AND DEPRESSION: ICD-10-CM

## 2023-05-17 RX ORDER — CLONAZEPAM 0.5 MG/1
0.5 TABLET ORAL
Qty: 30 TABLET | Refills: 0 | Status: SHIPPED | OUTPATIENT
Start: 2023-05-17

## 2023-05-22 DIAGNOSIS — M47.816 LUMBAR SPONDYLOSIS: ICD-10-CM

## 2023-05-22 RX ORDER — DICLOFENAC SODIUM 75 MG/1
75 TABLET, DELAYED RELEASE ORAL 2 TIMES DAILY PRN
Qty: 60 TABLET | Refills: 0 | Status: SHIPPED | OUTPATIENT
Start: 2023-05-22

## 2023-05-23 ENCOUNTER — OFFICE VISIT (OUTPATIENT)
Dept: OBGYN CLINIC | Facility: HOSPITAL | Age: 73
End: 2023-05-23

## 2023-05-23 VITALS
SYSTOLIC BLOOD PRESSURE: 115 MMHG | HEART RATE: 65 BPM | BODY MASS INDEX: 29.7 KG/M2 | WEIGHT: 195.99 LBS | HEIGHT: 68 IN | DIASTOLIC BLOOD PRESSURE: 62 MMHG

## 2023-05-23 DIAGNOSIS — M54.16 LUMBAR RADICULOPATHY: Primary | ICD-10-CM

## 2023-05-23 NOTE — PROGRESS NOTES
"NAME: Temi John  : 1950  PCP: Ellen Montiel MD      Chief Complaint: back and bilateral leg pain    HPI:  Temi John is a 71 y o  male presenting for initial visit with chief complaint of back ajnd bilateral leg pain  PMH four level laminectomy about 12 years ago with improvement in leg symptoms at that time, ALIF L5-S1 in 2015 with improvement after surgery  Surgeries performed by Dr Thomas Smith  Also had a spinal cord stimulator placement and subsequent explant on 5/3/2022  Currently, patient describes gradually worsening back pain with radiation into his bilateral posterior lower extremities to level of knee  Describes pain as \"electric numbing\" with an ache  Pain and symptoms worse with prolonged standing and walking  Has been using cane for ambulation  He needs to stop and rest when walking more than a few blocks before resuming  Pain improved when lying in bed and sitting  Subjective LE weakness, noting he feels like his legs are going to give out with prologned standing and walking  Denies any uzma trauma  Denies fever or chills  Denies any bladder or bowel changes  PMH bilateral knee replacement, right hip replacement  Conservative therapy includes the following:   Tylenol, ibuprofen  Diclofenac, gabapentin  Spine & pain (Dr Gladys Dahl) - patient had initial improvement with RFA but no longer efficacious  19 - bilateral S1 TFESI  20 - bilateral S1 TFESI  Multiple RFA and MBB between 2023 - RFA right L4-5, L5-S1   2023 - RFA left L4-5, L5-S1   Was in physical therapy in past  Denies recent formal physical therapy  These therapeutic modalities were ineffective  The patient has noticed significant impairment in performing the following ADLs: Karon Sandhoff used to work at [employer] and for [employer]  Patient is able to function independently and perform ADLs but notes increased difficulty due to ambulatory dysfunction      2023 " "update  Gurjit Quiñones is here for follow-up  He obtained a CT scan lumbar spine in the interim  He denies any changes in his symptoms  He continues with back pain and bilateral leg pain that radiates from his gluteal regions posterior thigh to the level of his knee        FAMILY HISTORY   Family History   Problem Relation Age of Onset   • Breast cancer Mother    • Pancreatitis Father    • Diabetes Maternal Grandmother    • Heart attack Maternal Grandfather    • Heart attack Paternal Grandmother    • Diabetes Paternal Grandmother    • Diabetes Paternal Grandfather    • No Known Problems Brother    • Lung disease Paternal Uncle        PAST MEDICAL HISTORY:   Past Medical History:   Diagnosis Date   • Arthritis     back   • Atrial fibrillation ([facility] [address] )     per pt \"controlled with metoprolol\" sees [facility] cardio Dr Rios Tran 2x/year   • Bronchitis, chronic obstructive, with exacerbation ([facility] [address] )     Last Assessed: 5/17/2013   • Chronic pain disorder     back   • COPD with acute exacerbation ([facility] [address] )     Last Assessed: 4/13/2015   • Depression, controlled 8/30/2017    Transitioned From: Depression   • Elevated PSA     Bx neg 2012; Last Assessed: 11/23/2012   • Environmental and seasonal allergies    • Exercises 3 to 4 times per week     \"2hrs --4x/week\"elliptical and Boflex   • History of prediabetes    • Hyperlipidemia     history of/better on meds   • Hypertension     has gotten better since weight loss and pt is keeping check of BP at home   • Irregular heart beat     MAT, afib   • Lung nodule - resolved 2017 CT 1/4/2017   • Postoperative urinary retention    • Self-catheterizes urinary bladder     per pt every 2 weeks as per urologist   • Spinal cord stimulator status    • Status post insertion of spinal cord stimulator 9/9/2020   • Wears glasses    • Weight loss     per pt recent intention loss of 80lbs recently       MEDICATIONS:  Current Outpatient Medications   Medication Sig Dispense Refill   • clonazePAM (KlonoPIN) 0 5 mg tablet Take " 1 tablet (0 5 mg total) by mouth daily at bedtime 30 tablet 0   • diclofenac (VOLTAREN) 75 mg EC tablet Take 1 tablet (75 mg total) by mouth 2 (two) times a day as needed (pain) 60 tablet 0   • DULoxetine (CYMBALTA) 30 mg delayed release capsule Take 90 mg by mouth daily      • fluticasone (FLONASE) 50 mcg/act nasal spray fluticasone propionate 50 mcg/actuation nasal spray,suspension     • gabapentin (NEURONTIN) 300 mg capsule Take 300 mg by mouth daily      • metFORMIN (GLUCOPHAGE-XR) 500 mg 24 hr tablet TAKE 1 TABLET BY MOUTH EVERY DAY WITH DINNER 90 tablet 0   • metoprolol tartrate (LOPRESSOR) 50 mg tablet TAKE 1 TABLET (50 MG TOTAL) BY MOUTH 2 (TWO) TIMES A DAY TAKE WITH 25 MG TABS 180 tablet 3   • montelukast (SINGULAIR) 10 mg tablet TAKE 1 TABLET BY MOUTH EVERYDAY AT BEDTIME 90 tablet 1   • Multiple Vitamin (MULTI-VITAMIN DAILY) TABS Take 1 tablet by mouth daily     • NIFEdipine ER (ADALAT CC) 60 MG 24 hr tablet TAKE 1 TABLET BY MOUTH EVERY DAY 90 tablet 1   • oxybutynin (DITROPAN) 5 mg tablet Take 5 mg by mouth 2 (two) times a day as needed     • rosuvastatin (CRESTOR) 40 MG tablet TAKE 1 TABLET BY MOUTH EVERY DAY 90 tablet 1     No current facility-administered medications for this visit  PAST SURGICAL HISTORY:  Past Surgical History:   Procedure Laterality Date   • BACK SURGERY  07/25/2012    decompression of spinal stenosis from lower thoracic through the lumbar spine   • COLONOSCOPY  10/2010    neg   recheck in 5 years   • INCISION OF BLADDER NECK CONTRACTURE      multiple to remove scar tissue   • JOINT REPLACEMENT Left     hip   • JOINT REPLACEMENT Bilateral     knees   • KNEE ARTHROSCOPY  01/2013   • OR NDSC WRST SURG W/RLS TRANSVRS CARPL LIGM Left 3/4/2020    Procedure: RELEASE CARPAL TUNNEL ENDOSCOPIC;  Surgeon: Kimberly Mendoza MD;  [facility]: [facility];  Service: Orthopedics   • OR PRQ IMPLTJ NSTIM ELECTRODE ARRAY EPIDURAL Right 8/25/2020    Procedure: INSERTION THORACIC DORSAL COLUMN SPINAL CORD STIMULATOR PERCUTANEOUS W IMPLANTABLE PULSE GENERATOR, RIGHT;  Surgeon: Veronica Cross MD;  Location:  MAIN OR;  Service: Neurosurgery   • NE REVJ/RMVL IMPLANTED SPINAL NEUROSTIM GENERATOR N/A 5/3/2022    Procedure: REMOVAL OF SPINAL CORD STIMULATOR SYSTEM WITH BATTERY;  Surgeon: Jay Rdz MD;  Location:  MAIN OR;  Service: Neurosurgery   • PROSTATE BIOPSY      for elevated PSA of about 7; neg   • ROTATOR CUFF REPAIR Right    • SPINAL FUSION     • TRANSURETHRAL RESECTION OF PROSTATE  2013       SOCIAL HISTORY:  Social History     Socioeconomic History   • Marital status: /Civil Union     Spouse name: Not on file   • Number of children: Not on file   • Years of education: Not on file   • Highest education level: Not on file   Occupational History   • Not on file   Tobacco Use   • Smoking status: Former     Packs/day: 1 50     Years: 23 00     Pack years: 34 50     Types: Cigarettes     Start date: 0     Quit date: 2000     Years since quittin 4   • Smokeless tobacco: Never   Vaping Use   • Vaping Use: Never used   Substance and Sexual Activity   • Alcohol use: No     Comment: Stopped drinking   • Drug use: No   • Sexual activity: Not Currently     Comment: defer   Other Topics Concern   • Not on file   Social History Narrative    [employer]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, torch    Asbestos removal - use [employer]    [address]     Social Determinants of Health     Financial Resource Strain: Not on file   Food Insecurity: Not on file   Transportation Needs: Not on file   Physical Activity: Not on file   Stress: Not on file   Social Connections: Not on file   Intimate Partner Violence: Not on file   Housing Stability: Not on file       ALLERGIES:  Allergies   Allergen Reactions   • Pollen Extract Sneezing       ROS:   Constitutional:  No fever, chills, night sweats, loss of appetite   HEENT No no sore throat, difficulty swallowing   Cardiovascular:  No chest pain, "palpitations, BLE edema, PITTMAN   Respiratory:  No SOB, coughing, chest congestion   Gastrointestinal:  No nausea, vomiting, abdominal pain   Genitourinary:  No dysuria, hematuria, urinary urgency/frequency   Musculoskeletal:  See HPI   Skin:  No rash, erythema, edema   Neurologic:  See HPI   Psychiatric Illness:  No depression, anxiety, mood disorder, substance abuse disorder     PHYSICAL EXAM:  /62   Pulse 65   Ht 5' 8\" (1 727 m)   Wt 88 9 kg (195 lb 15 8 oz)   BMI 29 80 kg/m²        General:  Well-developed,appears well, no acute distress   Respiratory:  No SOB, no abnormal effort (use of accessory muscles)  GI / Abdominal:  Soft  No abdominal masses or tenderness  Nondistended  Gait & balance No evidence of myelopathic gait  Lumbar spine range of motion:  -Forward flexion to 60  -Extension to neutral  There is no point tenderness with palpation along the posterior cervical, thoracic, lumbar spine      +well healed posterior lumbar incision    Neurologic:    Lower Extremity Motor Function   Right  Left    Iliopsoas  5/5  5/5    Quadriceps 5/5 5/5   Tibialis anterior  5/5  5/5    EHL  4+/5  4+/5    Gastroc  muscle  5/5  5/5    Heel rise  4+/5  4+/5    Toe rise  4+/5  4+/5      Sensory: light touch is intact to bilateral upper and lower extremities    Reflexes:    Right Left   Patellar 1+ 1+   Achilles 1+ 1+   Babinski neg neg     Other tests:  Straight Leg Raise: negative  Cooper's: negative  Clonus: negative  Samson SI: negative  JUANCARLOS SI: negative  Greater troch: no tenderness  Internal/external hip ROM: intact, no pain; right not assessed 2/2 hip replacement  Flexion/extension knee ROM: intact, no pain     IMAGING: I have personally reviewed the images and these are my findings:  Lumbar spine xray from 5/2/2023: Multilevel lumbar spondylosis, loss of disc base height L1-5, endplate sclerosis, osteophyte formation, diffuse facet hypertrophy, L5-S1 anterior fusion construct with plate and " screws, interbody cage device, no apparent spondylolisthesis, no obvious instability on flexion-extension radiographs      MRI lumbar spine from 4/18/2023: Multilevel lumbar disc degeneration L1-L5, evidence of L5-S1 interbody fusion construct, varying degrees of central lateral recess and foraminal stenosis throughout lumbar spine      CT scan lumbar spine from 5/9/2023: Multilevel lumbar spondylosis with disc degeneration T12-S1, L5-S1 interbody fusion device intact, no evidence of hardware malfunction, multilevel facet hypertrophy      ASSESSMENT / Medical Decision Making (MDM):  71 y o  male with history of ongoing low back and bilateral posterior thigh pain  Ambulatory dysfunction  No incontinence of bowel/bladder, no fever, no chills, no night sweats  Physical exam showing mild lower extremity weakness; mild difficulty with heel/toe rise    Imaging reviewed as above  Findings most notable for lumbar spondylosis, L5-S1 anterior & interbody fusion construct intact     Impression of lumbar degenerative disease, previous lumbar fusion surgery    Plan: The above clinical, physical and imaging findings were reviewed with the patient  Franky Vilchis  has a constellation of findings consistent with lumbar radiculopathy, neurogenic claudication in setting lumbar degenerative disease, previous lumbar fusion surgery with hardware failure  Also with ambulatory dysfunction  Patient describes worsening low back and bilateral posterior thigh pain  Pain worse with prolonged standing and walking  He is only able to walk a few blocks before needing to stop and rest  Using cane for ambulation  He did have improvement after his 2 previous lumbar surgeries, but his pain and symptoms have been gradually worsening  He has tried oral medications, physical therapy and interventional spine procedures without relief  He notes initial improvement in pain/symptoms with nerve ablations, however the injections have become less efficacious  Thanh Villarreal is here for follow-up  He continues with symptoms of posterior leg pain  We discussed the CT findings in detail  At this time I do not recommend any surgical intervention  We did discuss following back up with Dr Shaka Del Angel for additional interventional spine procedures  Also discussed possible follow-up with his surgeon of record at [facility] for alternative opinion  Thanh Villarreal will follow-up as needed

## 2023-05-24 ENCOUNTER — TELEPHONE (OUTPATIENT)
Dept: PAIN MEDICINE | Facility: CLINIC | Age: 73
End: 2023-05-24

## 2023-05-24 NOTE — TELEPHONE ENCOUNTER
----- Message from Bradley Hendricks DO sent at 5/24/2023  1:22 PM EDT -----  Regarding: RE: thoracic injection  Martin Jin,    Yes I do thoracic injections  We will see him back and see what we can do for him  Thank you for your communication  Mimi Ibarra,  Can we please schedule him for a follow-up office visit for reevaluation? Thank you    Evette Rodriguez      ----- Message -----  From: Dorian Webb MD  Sent: 5/23/2023   2:47 PM EDT  To: Bradley Hendricks, DO  Subject: thoracic injection                               Martin Grubbs,  Do you do thoracic injections? Carletta Prader has multi level lumbar facet disease, but noticed on his MRI stenosis at T11-12  Not sure if this is generating his pain, but not sure what else we can do for him      Thanks,Davin

## 2023-05-31 NOTE — PROGRESS NOTES
"Assessment:  1  Thoracic spinal stenosis        Plan:  1  Patient will be scheduled for T11-12 thoracic epidural steroid injection  We will first request permission for the patient to hold aspirin for 6 days prior to the procedure  Complete risks and benefits including bleeding, infection, tissue reaction, nerve injury and allergic reaction were discussed  The patient was agreeable and verbalized an understanding   2  Patient may continue diclofenac, gabapentin and duloxetine as prescribed  3  Continue with home exercise program  4  Follow-up after procedure or sooner if needed    History of Present Illness: The patient is a 71 y o  male with history of an L5-S1 ALIF and [employer] spinal cord stimulator explant last seen on 03/30/2023 who presents for a follow up office visit in regards to chronic back pain that radiates into the lower extremities  He unfortunately has failed to obtain any significant long-term relief from lumbar L3-5 RFA completed in January 2023  He has no evidence of SI joint pathology on exam   He had a spinal cord stimulator device which was explanted secondary to ineffectiveness  He was recently evaluated by orthopedic surgery who recommended an epidural injection at T11-12 to see if this provides pain relief  He continues on diclofenac 75 mg twice a day gabapentin 300 mg daily and duloxetine 90 mg daily    The patient rates his pain a 6 out of 10 on the numeric pain rating scale  He occasionally has pain which is described as pins-and-needles, dull aching and pressure-like    I have personally reviewed and/or updated the patient's past medical history, past surgical history, family history, social history, current medications, allergies, and vital signs today         Review of Systems:    Review of Systems      Past Medical History:   Diagnosis Date   • Arthritis     back   • Atrial fibrillation ([address] )     per pt \"controlled with metoprolol\" sees [facility] cardio Dr Roel Maurer 2x/year   • " "Bronchitis, chronic obstructive, with exacerbation ([facility] [address] )     Last Assessed: 5/17/2013   • Chronic pain disorder     back   • [facility]     Last Assessed: 4/13/2015   • Depression, controlled 8/30/2017    Transitioned From: Depression   • Elevated PSA     Bx neg 2012; Last Assessed: 11/23/2012   • Environmental and seasonal allergies    • Exercises 3 to 4 times per week     \"2hrs --4x/week\"elliptical and Boflex   • History of prediabetes    • Hyperlipidemia     history of/better on meds   • Hypertension     has gotten better since weight loss and pt is keeping check of BP at home   • Irregular heart beat     MAT, afib   • Lung nodule - resolved 2017 CT 1/4/2017   • Postoperative urinary retention    • Self-catheterizes urinary bladder     per pt every 2 weeks as per urologist   • Spinal cord stimulator status    • Status post insertion of spinal cord stimulator 9/9/2020   • Wears glasses    • Weight loss     per pt recent intention loss of 80lbs recently       Past Surgical History:   Procedure Laterality Date   • BACK SURGERY  07/25/2012    decompression of spinal stenosis from lower thoracic through the lumbar spine   • COLONOSCOPY  10/2010    neg   recheck in 5 years   • [address]      multiple to remove scar tissue   • JOINT REPLACEMENT Left     hip   • JOINT REPLACEMENT Bilateral     knees   • KNEE ARTHROSCOPY  01/2013   • CT NDSC WRST SURG W/RLS TRANSVRS CARPL LIGM Left 3/4/2020    Procedure: RELEASE CARPAL TUNNEL ENDOSCOPIC;  Surgeon: Alvarado Kellogg MD;  [facility]: [facility];  Service: Orthopedics   • CT PRQ [address] NSTIM ELECTRODE ARRAY EPIDURAL Right 8/25/2020    Procedure: INSERTION THORACIC DORSAL COLUMN SPINAL CORD STIMULATOR PERCUTANEOUS W IMPLANTABLE PULSE GENERATOR, RIGHT;  Surgeon: Esvin Sim MD;  Location:  MAIN OR;  Service: Neurosurgery   • CT REVJ/RMVL IMPLANTED SPINAL NEUROSTIM GENERATOR N/A 5/3/2022    Procedure: REMOVAL OF SPINAL CORD " STIMULATOR SYSTEM WITH BATTERY;  Surgeon: Melina Khan MD;  [facility]: [facility];  Service: Neurosurgery   • PROSTATE BIOPSY      for elevated PSA of about 7; neg   • ROTATOR CUFF REPAIR Right    • SPINAL FUSION     • TRANSURETHRAL RESECTION OF PROSTATE  2013       Family History   Problem Relation Age of Onset   • Breast cancer Mother    • Pancreatitis Father    • Diabetes Maternal Grandmother    • Heart attack Maternal Grandfather    • Heart attack Paternal Grandmother    • Diabetes Paternal Grandmother    • Diabetes Paternal Grandfather    • No Known Problems Brother    • Lung disease Paternal Uncle        Social History     Occupational History   • Not on file   Tobacco Use   • Smoking status: Former     Packs/day: 1 50     Years: 23 00     Total pack years: 34 50     Types: Cigarettes     Start date: 0     Quit date:      Years since quittin 4   • Smokeless tobacco: Never   Vaping Use   • Vaping Use: Never used   Substance and Sexual Activity   • Alcohol use: No     Comment: Stopped drinking   • Drug use: No   • Sexual activity: Not Currently     Comment: defer         Current Outpatient Medications:   •  clonazePAM (KlonoPIN) 0 5 mg tablet, Take 1 tablet (0 5 mg total) by mouth daily at bedtime, Disp: 30 tablet, Rfl: 0  •  diclofenac (VOLTAREN) 75 mg EC tablet, Take 1 tablet (75 mg total) by mouth 2 (two) times a day as needed (pain), Disp: 60 tablet, Rfl: 0  •  DULoxetine (CYMBALTA) 30 mg delayed release capsule, Take 90 mg by mouth daily , Disp: , Rfl:   •  metFORMIN (GLUCOPHAGE-XR) 500 mg 24 hr tablet, TAKE 1 TABLET BY MOUTH EVERY DAY WITH DINNER, Disp: 90 tablet, Rfl: 0  •  metoprolol tartrate (LOPRESSOR) 50 mg tablet, TAKE 1 TABLET (50 MG TOTAL) BY MOUTH 2 (TWO) TIMES A DAY TAKE WITH 25 MG TABS, Disp: 180 tablet, Rfl: 3  •  montelukast (SINGULAIR) 10 mg tablet, TAKE 1 TABLET BY MOUTH EVERYDAY AT BEDTIME, Disp: 90 tablet, Rfl: 1  •  Multiple Vitamin (MULTI-VITAMIN DAILY) TABS, Take "1 tablet by mouth daily, Disp: , Rfl:   •  NIFEdipine ER (ADALAT CC) 60 MG 24 hr tablet, TAKE 1 TABLET BY MOUTH EVERY DAY, Disp: 90 tablet, Rfl: 1  •  oxybutynin (DITROPAN) 5 mg tablet, Take 5 mg by mouth 2 (two) times a day as needed, Disp: , Rfl:   •  rosuvastatin (CRESTOR) 40 MG tablet, TAKE 1 TABLET BY MOUTH EVERY DAY, Disp: 90 tablet, Rfl: 1  •  fluticasone (FLONASE) 50 mcg/act nasal spray, fluticasone propionate 50 mcg/actuation nasal spray,suspension, Disp: , Rfl:   •  gabapentin (NEURONTIN) 300 mg capsule, Take 300 mg by mouth daily , Disp: , Rfl:     Allergies   Allergen Reactions   • Pollen Extract Sneezing       Physical Exam:    /63   Pulse 65   Ht 5' 8\" (1 727 m)   Wt 88 5 kg (195 lb)   BMI 29 65 kg/m²     Constitutional:normal, well developed, well nourished, alert, in no distress and non-toxic and no overt pain behavior    Eyes:anicteric  HEENT:grossly intact  Neck:supple, symmetric, trachea midline and no masses   Pulmonary:even and unlabored  Cardiovascular:No edema or pitting edema present  Skin:Normal without rashes or lesions and well hydrated  Psychiatric:Mood and affect appropriate  Neurologic:Cranial Nerves II-XII grossly intact  Musculoskeletal:Slightly antalgic gait but steady without assistive devices      Imaging  FL spine and pain procedure    (Results Pending)         Orders Placed This Encounter   Procedures   • FL spine and pain procedure     "

## 2023-06-01 ENCOUNTER — TELEPHONE (OUTPATIENT)
Dept: RADIOLOGY | Facility: CLINIC | Age: 73
End: 2023-06-01

## 2023-06-01 ENCOUNTER — OFFICE VISIT (OUTPATIENT)
Dept: PAIN MEDICINE | Facility: CLINIC | Age: 73
End: 2023-06-01

## 2023-06-01 VITALS
BODY MASS INDEX: 29.55 KG/M2 | DIASTOLIC BLOOD PRESSURE: 63 MMHG | WEIGHT: 195 LBS | SYSTOLIC BLOOD PRESSURE: 136 MMHG | HEIGHT: 68 IN | HEART RATE: 65 BPM

## 2023-06-01 DIAGNOSIS — M48.04 THORACIC SPINAL STENOSIS: Primary | ICD-10-CM

## 2023-06-01 NOTE — TELEPHONE ENCOUNTER
----- Message from Jesenia Conde DO sent at 6/1/2023  2:43 PM EDT -----  Regarding: RE: Jennie Bent hold for procedure  Yes that is okay  ----- Message -----  From: Terence Smith  Sent: 6/1/2023   2:26 PM EDT  To: Jesenia Conde DO  Subject: Asprin hold for procedure                        Good afternoon Dr Paddy Hodgkins! Idania Kim has placed and order for patient to have a transforaminal epidural steroid injection by Dr Lanny Gardner  This procedure would require patient to hold his Aspirin for 6 days prior to his procedure  Do you give permission for patient to hold his medication for this procedure? Please advise  Thank you!     Michael0 [facility]

## 2023-06-01 NOTE — TELEPHONE ENCOUNTER
Called patient scheduled procedure  Reviewed all instructions by phone upon scheduling    Mailed copy to home  Last dose of Aspirin prior to procedure will be on 6/19

## 2023-06-08 DIAGNOSIS — M47.816 LUMBAR SPONDYLOSIS: ICD-10-CM

## 2023-06-08 RX ORDER — DICLOFENAC SODIUM 75 MG/1
75 TABLET, DELAYED RELEASE ORAL 2 TIMES DAILY PRN
Qty: 60 TABLET | Refills: 0 | Status: SHIPPED | OUTPATIENT
Start: 2023-06-08

## 2023-06-14 DIAGNOSIS — F41.9 ANXIETY AND DEPRESSION: ICD-10-CM

## 2023-06-14 DIAGNOSIS — F32.A ANXIETY AND DEPRESSION: ICD-10-CM

## 2023-06-14 RX ORDER — CLONAZEPAM 0.5 MG/1
0.5 TABLET ORAL
Qty: 30 TABLET | Refills: 0 | Status: SHIPPED | OUTPATIENT
Start: 2023-06-14

## 2023-06-26 ENCOUNTER — HOSPITAL ENCOUNTER (OUTPATIENT)
Dept: RADIOLOGY | Facility: CLINIC | Age: 73
Discharge: HOME | End: 2023-06-26
Admitting: ANESTHESIOLOGY
Payer: COMMERCIAL

## 2023-06-26 VITALS
OXYGEN SATURATION: 97 % | HEART RATE: 55 BPM | SYSTOLIC BLOOD PRESSURE: 149 MMHG | RESPIRATION RATE: 18 BRPM | TEMPERATURE: 97.5 F | DIASTOLIC BLOOD PRESSURE: 75 MMHG

## 2023-06-26 DIAGNOSIS — M48.04 THORACIC SPINAL STENOSIS: ICD-10-CM

## 2023-06-26 PROCEDURE — 62321 NJX INTERLAMINAR CRV/THRC: CPT | Performed by: ANESTHESIOLOGY

## 2023-06-26 RX ORDER — PAPAVERINE HCL 150 MG
10 CAPSULE, EXTENDED RELEASE ORAL ONCE
Status: COMPLETED | OUTPATIENT
Start: 2023-06-26 | End: 2023-06-26

## 2023-06-26 RX ADMIN — DEXAMETHASONE SODIUM PHOSPHATE 10 MG: 10 INJECTION, SOLUTION INTRAMUSCULAR; INTRAVENOUS at 14:34

## 2023-06-26 RX ADMIN — IOHEXOL 1 ML: 300 INJECTION, SOLUTION INTRAVENOUS at 14:34

## 2023-06-26 NOTE — H&P
"History of Present Illness: The patient is a 68 y o  male who presents with complaints of mid back and rib pain  Past Medical History:   Diagnosis Date   • Arthritis     back   • Atrial fibrillation ([facility] )     per pt \"controlled with metoprolol\" sees [facility] cardio Dr Namita Jones 2x/year   • Bronchitis, chronic obstructive, with exacerbation ([facility] )     Last Assessed: 5/17/2013   • Chronic pain disorder     back   • COPD with acute exacerbation ([facility] )     Last Assessed: 4/13/2015   • Depression, controlled 8/30/2017    Transitioned From: Depression   • Elevated PSA     Bx neg 2012; Last Assessed: 11/23/2012   • Environmental and seasonal allergies    • Exercises 3 to 4 times per week     \"2hrs --4x/week\"elliptical and Boflex   • History of prediabetes    • Hyperlipidemia     history of/better on meds   • Hypertension     has gotten better since weight loss and pt is keeping check of BP at home   • Irregular heart beat     MAT, afib   • Lung nodule - resolved 2017 CT 1/4/2017   • Postoperative urinary retention    • Self-catheterizes urinary bladder     per pt every 2 weeks as per urologist   • Spinal cord stimulator status    • Status post insertion of spinal cord stimulator 9/9/2020   • Wears glasses    • Weight loss     per pt recent intention loss of 80lbs recently       Past Surgical History:   Procedure Laterality Date   • BACK SURGERY  07/25/2012    decompression of spinal stenosis from lower thoracic through the lumbar spine   • COLONOSCOPY  10/2010    neg   recheck in 5 years   • INCISION OF BLADDER NECK CONTRACTURE      multiple to remove scar tissue   • JOINT REPLACEMENT Left     hip   • JOINT REPLACEMENT Bilateral     knees   • KNEE ARTHROSCOPY  01/2013   • [place] NDSC WRST SURG W/RLS TRANSVRS CARPL LIGM Left 3/4/2020    Procedure: RELEASE CARPAL TUNNEL ENDOSCOPIC;  Surgeon: Brandon Hills MD;  [facility];  Service: Orthopedics   • [place] PRQ IMPLTJ NSTIM ELECTRODE ARRAY EPIDURAL Right 8/25/2020    Procedure: " INSERTION THORACIC DORSAL COLUMN SPINAL CORD STIMULATOR PERCUTANEOUS W IMPLANTABLE PULSE GENERATOR, RIGHT;  Surgeon: Lenore Villatoro MD;  [facility];  Service: Neurosurgery   • [place] REVJ/RMVL IMPLANTED SPINAL NEUROSTIM GENERATOR N/A 5/3/2022    Procedure: REMOVAL OF SPINAL CORD STIMULATOR SYSTEM WITH BATTERY;  Surgeon: Paddy Aleman MD;  [facility];  Service: Neurosurgery   • PROSTATE BIOPSY  2012    for elevated PSA of about 7; neg   • ROTATOR CUFF REPAIR Right    • SPINAL FUSION     • TRANSURETHRAL RESECTION OF PROSTATE  06/27/2013         Current Outpatient Medications:   •  clonazePAM (KlonoPIN) 0 5 mg tablet, Take 1 tablet (0 5 mg total) by mouth daily at bedtime, Disp: 30 tablet, Rfl: 0  •  diclofenac (VOLTAREN) 75 mg EC tablet, Take 1 tablet (75 mg total) by mouth 2 (two) times a day as needed (pain), Disp: 60 tablet, Rfl: 0  •  DULoxetine (CYMBALTA) 30 mg delayed release capsule, Take 90 mg by mouth daily , Disp: , Rfl:   •  fluticasone (FLONASE) 50 mcg/act nasal spray, fluticasone propionate 50 mcg/actuation nasal spray,suspension, Disp: , Rfl:   •  gabapentin (NEURONTIN) 300 mg capsule, Take 300 mg by mouth daily , Disp: , Rfl:   •  metFORMIN (GLUCOPHAGE-XR) 500 mg 24 hr tablet, Take 1 tablet (500 mg total) by mouth daily with dinner, Disp: 90 tablet, Rfl: 3  •  metoprolol tartrate (LOPRESSOR) 50 mg tablet, TAKE 1 TABLET (50 MG TOTAL) BY MOUTH 2 (TWO) TIMES A DAY TAKE WITH 25 MG TABS, Disp: 180 tablet, Rfl: 3  •  montelukast (SINGULAIR) 10 mg tablet, TAKE 1 TABLET BY MOUTH EVERYDAY AT BEDTIME, Disp: 90 tablet, Rfl: 1  •  Multiple Vitamin (MULTI-VITAMIN DAILY) TABS, Take 1 tablet by mouth daily, Disp: , Rfl:   •  NIFEdipine ER (ADALAT CC) 60 MG 24 hr tablet, TAKE 1 TABLET BY MOUTH EVERY DAY, Disp: 90 tablet, Rfl: 1  •  oxybutynin (DITROPAN) 5 mg tablet, Take 5 mg by mouth 2 (two) times a day as needed, Disp: , Rfl:   •  rosuvastatin (CRESTOR) 40 MG tablet, TAKE 1 TABLET BY MOUTH EVERY DAY, Disp: 90 tablet, Rfl: 1    Allergies   Allergen Reactions   • Pollen Extract Sneezing       Physical Exam:   Vitals:    06/26/23 1410   BP: 131/70   Pulse: 58   Resp: 18   Temp: 97 5 °F (36 4 °C)   SpO2: 96%     General: Awake, Alert, Oriented x 3, Mood and affect appropriate  Respiratory: Respirations even and unlabored  Cardiovascular: Peripheral pulses intact; no edema  Musculoskeletal Exam: Bilateral thoracic paraspinals tender to palpation    ASA Score: 3         Assessment:   1   Thoracic spinal stenosis    #2 thoracic radiculitis    Plan: T11-12 TESI

## 2023-06-26 NOTE — DISCHARGE INSTR - LAB
Epidural Steroid Injection   WHAT YOU NEED TO KNOW:   An epidural steroid injection (DEN) is a procedure to inject steroid medicine into the epidural space  The epidural space is between your spinal cord and vertebrae  Steroids reduce inflammation and fluid buildup in your spine that may be causing pain  You may be given pain medicine along with the steroids  ACTIVITY  Do not drive or operate machinery today  No strenuous activity today - bending, lifting, etc   You may resume normal activites starting tomorrow - start slowly and as tolerated  You may shower today, but no tub baths or hot tubs  You may have numbness for several hours from the local anesthetic  Please use caution and common sense, especially with weight-bearing activities  CARE OF THE INJECTION SITE  If you have soreness or pain, apply ice to the area today (20 minutes on/20 minutes off)  Starting tomorrow, you may use warm, moist heat or ice if needed  You may have an increase or change in your discomfort for 36-48 hours after your treatment  Apply ice and continue with any pain medication you have been prescribed  Notify the [facility] if you have any of the following: redness, drainage, swelling, headache, stiff neck or fever above 100°F     SPECIAL INSTRUCTIONS  Our office will contact you in approximately 7 days for a progress report  MEDICATIONS  Continue to take all routine medications  Please resume Aspirin 81mg tomorrow (6/27/23) morning at your normal dosing schedule  Our office may have instructed you to hold some medications  As no general anesthesia was used in today's procedure, you should not experience any side effects related to anesthesia  If you are diabetic, the steroids used in today's injection may temporarily increase your blood sugar levels after the first few days after your injection   Please keep a close eye on your sugars and alert the doctor who manages your diabetes if your sugars are significantly high from your baseline or you are symptomatic  If you have a problem specifically related to your procedure, please call our office at [phone]  Problems not related to your procedure should be directed to your primary care physician

## 2023-07-03 ENCOUNTER — TELEPHONE (OUTPATIENT)
Dept: PAIN MEDICINE | Facility: CLINIC | Age: 73
End: 2023-07-03

## 2023-07-06 DIAGNOSIS — M47.816 LUMBAR SPONDYLOSIS: ICD-10-CM

## 2023-07-06 RX ORDER — DICLOFENAC SODIUM 75 MG/1
75 TABLET, DELAYED RELEASE ORAL 2 TIMES DAILY PRN
Qty: 60 TABLET | Refills: 0 | Status: SHIPPED | OUTPATIENT
Start: 2023-07-06

## 2023-07-10 NOTE — TELEPHONE ENCOUNTER
.Caller: pt    Doctor: Sinan Carlos    Reason for call: Pt said that he only got 20% relief.  Pain is a 7-8 only when he is standing or walking too long    Call back#: [phone]

## 2023-07-11 DIAGNOSIS — F41.9 ANXIETY AND DEPRESSION: ICD-10-CM

## 2023-07-11 DIAGNOSIS — F32.A ANXIETY AND DEPRESSION: ICD-10-CM

## 2023-07-11 RX ORDER — CLONAZEPAM 0.5 MG/1
0.5 TABLET ORAL
Qty: 30 TABLET | Refills: 0 | Status: SHIPPED | OUTPATIENT
Start: 2023-07-11

## 2023-07-13 DIAGNOSIS — J41.1 MUCOPURULENT CHRONIC BRONCHITIS (HCC): ICD-10-CM

## 2023-07-13 RX ORDER — MONTELUKAST SODIUM 10 MG/1
TABLET ORAL
Qty: 90 TABLET | Refills: 1 | Status: SHIPPED | OUTPATIENT
Start: 2023-07-13

## 2023-08-08 DIAGNOSIS — F41.9 ANXIETY AND DEPRESSION: ICD-10-CM

## 2023-08-08 DIAGNOSIS — F32.A ANXIETY AND DEPRESSION: ICD-10-CM

## 2023-08-08 RX ORDER — CLONAZEPAM 0.5 MG/1
0.5 TABLET ORAL
Qty: 30 TABLET | Refills: 0 | Status: SHIPPED | OUTPATIENT
Start: 2023-08-08

## 2023-09-04 DIAGNOSIS — F32.A ANXIETY AND DEPRESSION: ICD-10-CM

## 2023-09-04 DIAGNOSIS — F41.9 ANXIETY AND DEPRESSION: ICD-10-CM

## 2023-09-05 RX ORDER — CLONAZEPAM 0.5 MG/1
0.5 TABLET ORAL
Qty: 30 TABLET | Refills: 0 | Status: SHIPPED | OUTPATIENT
Start: 2023-09-05

## 2023-09-29 DIAGNOSIS — I10 PRIMARY HYPERTENSION: ICD-10-CM

## 2023-09-29 DIAGNOSIS — E78.2 MIXED HYPERLIPIDEMIA: ICD-10-CM

## 2023-09-29 RX ORDER — NIFEDIPINE 60 MG/1
TABLET, FILM COATED, EXTENDED RELEASE ORAL
Qty: 90 TABLET | Refills: 1 | Status: SHIPPED | OUTPATIENT
Start: 2023-09-29

## 2023-10-02 RX ORDER — ROSUVASTATIN CALCIUM 40 MG/1
TABLET, COATED ORAL
Qty: 90 TABLET | Refills: 1 | Status: SHIPPED | OUTPATIENT
Start: 2023-10-02

## 2023-10-03 DIAGNOSIS — F41.9 ANXIETY AND DEPRESSION: ICD-10-CM

## 2023-10-03 DIAGNOSIS — F32.A ANXIETY AND DEPRESSION: ICD-10-CM

## 2023-10-03 RX ORDER — CLONAZEPAM 0.5 MG/1
0.5 TABLET ORAL
Qty: 30 TABLET | Refills: 0 | Status: SHIPPED | OUTPATIENT
Start: 2023-10-03

## 2023-10-19 ENCOUNTER — LAB (OUTPATIENT)
Dept: LAB | Facility: CLINIC | Age: 73
End: 2023-10-19
Payer: COMMERCIAL

## 2023-10-19 DIAGNOSIS — F41.9 ANXIETY AND DEPRESSION: ICD-10-CM

## 2023-10-19 DIAGNOSIS — R73.01 IMPAIRED FASTING GLUCOSE: ICD-10-CM

## 2023-10-19 DIAGNOSIS — I10 PRIMARY HYPERTENSION: ICD-10-CM

## 2023-10-19 DIAGNOSIS — E78.2 MIXED HYPERLIPIDEMIA: ICD-10-CM

## 2023-10-19 DIAGNOSIS — F32.A ANXIETY AND DEPRESSION: ICD-10-CM

## 2023-10-19 LAB
ALBUMIN SERPL BCP-MCNC: 4.2 G/DL (ref 3.5–5)
ALP SERPL-CCNC: 52 U/L (ref 34–104)
ALT SERPL W P-5'-P-CCNC: 18 U/L (ref 7–52)
ANION GAP SERPL CALCULATED.3IONS-SCNC: 9 MMOL/L
AST SERPL W P-5'-P-CCNC: 24 U/L (ref 13–39)
BILIRUB SERPL-MCNC: 0.68 MG/DL (ref 0.2–1)
BUN SERPL-MCNC: 17 MG/DL (ref 5–25)
CALCIUM SERPL-MCNC: 9.3 MG/DL (ref 8.4–10.2)
CHLORIDE SERPL-SCNC: 103 MMOL/L (ref 96–108)
CHOLEST SERPL-MCNC: 141 MG/DL
CO2 SERPL-SCNC: 27 MMOL/L (ref 21–32)
CREAT SERPL-MCNC: 1.04 MG/DL (ref 0.6–1.3)
EST. AVERAGE GLUCOSE BLD GHB EST-MCNC: 123 MG/DL
GFR SERPL CREATININE-BSD FRML MDRD: 71 ML/MIN/1.73SQ M
GLUCOSE P FAST SERPL-MCNC: 93 MG/DL (ref 65–99)
HBA1C MFR BLD: 5.9 %
HDLC SERPL-MCNC: 46 MG/DL
LDLC SERPL CALC-MCNC: 71 MG/DL (ref 0–100)
NONHDLC SERPL-MCNC: 95 MG/DL
POTASSIUM SERPL-SCNC: 4 MMOL/L (ref 3.5–5.3)
PROT SERPL-MCNC: 6.9 G/DL (ref 6.4–8.4)
SODIUM SERPL-SCNC: 139 MMOL/L (ref 135–147)
TRIGL SERPL-MCNC: 119 MG/DL

## 2023-10-19 PROCEDURE — 83036 HEMOGLOBIN GLYCOSYLATED A1C: CPT

## 2023-10-19 PROCEDURE — 36415 COLL VENOUS BLD VENIPUNCTURE: CPT

## 2023-10-19 PROCEDURE — 80053 COMPREHEN METABOLIC PANEL: CPT

## 2023-10-19 PROCEDURE — 80061 LIPID PANEL: CPT

## 2023-10-30 ENCOUNTER — OFFICE VISIT (OUTPATIENT)
Dept: CARDIOLOGY CLINIC | Facility: CLINIC | Age: 73
End: 2023-10-30
Payer: COMMERCIAL

## 2023-10-30 ENCOUNTER — OFFICE VISIT (OUTPATIENT)
Dept: FAMILY MEDICINE CLINIC | Facility: CLINIC | Age: 73
End: 2023-10-30
Payer: COMMERCIAL

## 2023-10-30 VITALS
SYSTOLIC BLOOD PRESSURE: 144 MMHG | HEART RATE: 62 BPM | OXYGEN SATURATION: 94 % | WEIGHT: 212.6 LBS | BODY MASS INDEX: 31.49 KG/M2 | DIASTOLIC BLOOD PRESSURE: 70 MMHG | RESPIRATION RATE: 18 BRPM | HEIGHT: 69 IN | TEMPERATURE: 97.2 F

## 2023-10-30 VITALS
OXYGEN SATURATION: 97 % | DIASTOLIC BLOOD PRESSURE: 60 MMHG | SYSTOLIC BLOOD PRESSURE: 110 MMHG | HEIGHT: 69 IN | BODY MASS INDEX: 31.19 KG/M2 | HEART RATE: 58 BPM | WEIGHT: 210.6 LBS

## 2023-10-30 DIAGNOSIS — Z00.00 MEDICARE ANNUAL WELLNESS VISIT, SUBSEQUENT: ICD-10-CM

## 2023-10-30 DIAGNOSIS — F32.A ANXIETY AND DEPRESSION: ICD-10-CM

## 2023-10-30 DIAGNOSIS — I65.23 CAROTID STENOSIS, ASYMPTOMATIC, BILATERAL: ICD-10-CM

## 2023-10-30 DIAGNOSIS — J44.9 CHRONIC OBSTRUCTIVE PULMONARY DISEASE, UNSPECIFIED COPD TYPE (HCC): ICD-10-CM

## 2023-10-30 DIAGNOSIS — M25.512 CHRONIC LEFT SHOULDER PAIN: ICD-10-CM

## 2023-10-30 DIAGNOSIS — E78.2 MIXED HYPERLIPIDEMIA: ICD-10-CM

## 2023-10-30 DIAGNOSIS — H54.7 VISION PROBLEM: Primary | ICD-10-CM

## 2023-10-30 DIAGNOSIS — E66.9 OBESITY (BMI 30-39.9): ICD-10-CM

## 2023-10-30 DIAGNOSIS — G89.29 CHRONIC LEFT SHOULDER PAIN: ICD-10-CM

## 2023-10-30 DIAGNOSIS — R73.01 IMPAIRED FASTING GLUCOSE: ICD-10-CM

## 2023-10-30 DIAGNOSIS — I47.19 MULTIFOCAL ATRIAL TACHYCARDIA: Primary | ICD-10-CM

## 2023-10-30 DIAGNOSIS — F41.9 ANXIETY AND DEPRESSION: ICD-10-CM

## 2023-10-30 DIAGNOSIS — I47.10 SUPRAVENTRICULAR TACHYCARDIA: ICD-10-CM

## 2023-10-30 DIAGNOSIS — I10 PRIMARY HYPERTENSION: ICD-10-CM

## 2023-10-30 PROCEDURE — 1170F FXNL STATUS ASSESSED: CPT | Performed by: FAMILY MEDICINE

## 2023-10-30 PROCEDURE — 3074F SYST BP LT 130 MM HG: CPT | Performed by: INTERNAL MEDICINE

## 2023-10-30 PROCEDURE — 3078F DIAST BP <80 MM HG: CPT | Performed by: INTERNAL MEDICINE

## 2023-10-30 PROCEDURE — 99214 OFFICE O/P EST MOD 30 MIN: CPT | Performed by: FAMILY MEDICINE

## 2023-10-30 PROCEDURE — 1160F RVW MEDS BY RX/DR IN RCRD: CPT | Performed by: INTERNAL MEDICINE

## 2023-10-30 PROCEDURE — G0439 PPPS, SUBSEQ VISIT: HCPCS | Performed by: FAMILY MEDICINE

## 2023-10-30 PROCEDURE — 1159F MED LIST DOCD IN RCRD: CPT | Performed by: INTERNAL MEDICINE

## 2023-10-30 PROCEDURE — 93000 ELECTROCARDIOGRAM COMPLETE: CPT | Performed by: INTERNAL MEDICINE

## 2023-10-30 PROCEDURE — 1125F AMNT PAIN NOTED PAIN PRSNT: CPT | Performed by: FAMILY MEDICINE

## 2023-10-30 PROCEDURE — 3288F FALL RISK ASSESSMENT DOCD: CPT | Performed by: FAMILY MEDICINE

## 2023-10-30 PROCEDURE — 99214 OFFICE O/P EST MOD 30 MIN: CPT | Performed by: INTERNAL MEDICINE

## 2023-10-30 RX ORDER — FLUTICASONE PROPIONATE AND SALMETEROL 250; 50 UG/1; UG/1
1 POWDER RESPIRATORY (INHALATION) 2 TIMES DAILY
Qty: 60 BLISTER | Refills: 2 | Status: SHIPPED | OUTPATIENT
Start: 2023-10-30 | End: 2024-04-27

## 2023-10-30 RX ORDER — CIPROFLOXACIN 500 MG/1
500 TABLET, FILM COATED ORAL EVERY 12 HOURS SCHEDULED
COMMUNITY
Start: 2023-10-27

## 2023-10-30 NOTE — PROGRESS NOTES
Cardiology Follow Up    Taylor Rodriguez  1950  [id number]  [address] [facility] [address]  [address] [facility]    1. Multifocal atrial tachycardia  POCT ECG      2. Supraventricular tachycardia        3. Primary hypertension        4. Mixed hyperlipidemia        5. Carotid stenosis, asymptomatic, bilateral  VAS carotid complete study          Discussion/Summary:    Continues to do well functional capacity is excellent he works out a few days a week. Mainly limited by back pain which is chronic due to spinal stenosis. Multifocal atrial tachycardia is controlled with beta-blockers. Blood pressure well controlled lipids have been doing great. He is due for 2-year follow-up for mild bilateral carotid stenosis. Interval History:   43-year-old gentleman with history of multifocal atrial tachycardia, hypertension, dyslipidemia, obesity presents for routine scheduled follow-up visit. He has been doing some new diet and exercise trying to lose weight but has had a hard time doing this. Overall he has been feeling well he continues to remain active works out a few times a week. Denies any exertional chest pain, shortness of breath, palpitations, lightheadedness, dizziness, or syncope. Is been a lower extremity edema, PND, orthopnea. He is taking all medications as prescribed.   Problem List       Allergic rhinitis    Enlarged prostate with lower urinary tract symptoms (LUTS)    Chronic bronchitis (HCC)    Depression, controlled    Overview Signed 2/27/2018  1:10 PM by Evangelista Cheatham MD     Transitioned From: Depression         Elevated ALT measurement    Hyperlipidemia    Hypertension    Impaired fasting glucose    Lung nodule    Male erectile dysfunction    Multifocal atrial tachycardia (HCC)    Obesity    Osteoarthritis of knee    Osteoarthritis of left hip    Seasonal allergies    Spinal stenosis of lumbar region    Overview Addendum 3/6/2018  3:00 PM by Yoana Everett MD     Description: Cervical and lumbar. Sees Dr. Eliud Mcknight. Had surgical decompression T12 to L1 7/2012.          Supraventricular tachycardia (HCC)    Anxiety and depression    Arthritis of left hip    COPD (chronic obstructive pulmonary disease) (HCC)    Elevated prostate specific antigen (PSA)    Lumbosacral spondylosis    Incomplete emptying of bladder    Other affections of shoulder region, not elsewhere classified    Personal history of other disorder of urinary system    Retention of urine    Screening for prostate cancer    Thoracic or lumbosacral neuritis or radiculitis    Urethral stricture    Increased frequency of urination          Past Medical History:   Diagnosis Date    Arthritis     back    Atrial fibrillation ([address])     per pt "controlled with metoprolol" sees [facility] cardio Dr Josue Bar 2x/year    Bronchitis, chronic obstructive, with exacerbation ([address])     Last Assessed: 5/17/2013    Chronic pain disorder     back    COPD with acute exacerbation ([address])     Last Assessed: 4/13/2015    Depression, controlled 8/30/2017    Transitioned From: Depression    Elevated PSA     Bx neg 2012; Last Assessed: 11/23/2012    Environmental and seasonal allergies     Exercises 3 to 4 times per week     "2hrs --4x/week"elliptical and Boflex    History of prediabetes     Hyperlipidemia     history of/better on meds    Hypertension     has gotten better since weight loss and pt is keeping check of BP at home    Irregular heart beat     MAT, afib    Lung nodule - resolved 2017 CT 1/4/2017    Postoperative urinary retention     Self-catheterizes urinary bladder     per pt every 2 weeks as per urologist    Spinal cord stimulator status     Status post insertion of spinal cord stimulator 9/9/2020    Wears glasses     Weight loss     per pt recent intention loss of 80lbs recently     Social History     Socioeconomic History    Marital status: /Civil Union Spouse name: Not on file    Number of children: Not on file    Years of education: Not on file    Highest education level: Not on file   Occupational History    Not on file   Tobacco Use    Smoking status: Former     Packs/day: 1.50     Years: 23.00     Total pack years: 34.50     Types: Cigarettes     Start date: 0     Quit date: 2000     Years since quittin.8    Smokeless tobacco: Never   Vaping Use    Vaping Use: Never used   Substance and Sexual Activity    Alcohol use: No     Comment: Stopped drinking    Drug use: No    Sexual activity: Not Currently     Comment: defer   Other Topics Concern    Not on file   Social History Narrative    [employer]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, torch    Asbestos removal - use [address] Determinants of Health     Financial Resource Strain: Low Risk  (10/23/2023)    Overall Financial Resource Strain (CARDIA)     Difficulty of Paying Living Expenses: Not very hard   Food Insecurity: Not on file   Transportation Needs: No Transportation Needs (10/23/2023)    PRAPARE - Transportation     Lack of Transportation (Medical): No     Lack of Transportation (Non-Medical): No   Physical Activity: Not on file   Stress: Not on file   Social Connections: Not on file   Intimate Partner Violence: Not on file   Housing Stability: Not on file      Family History   Problem Relation Age of Onset    Breast cancer Mother     Pancreatitis Father     Diabetes Maternal Grandmother     Heart attack Maternal Grandfather     Heart attack Paternal Grandmother     Diabetes Paternal Grandmother     Diabetes Paternal Grandfather     No Known Problems Brother     Lung disease Paternal Uncle      Past Surgical History:   Procedure Laterality Date    BACK SURGERY  2012    decompression of spinal stenosis from lower thoracic through the lumbar spine    COLONOSCOPY  10/2010    neg.  recheck in 5 years    INCISION OF BLADDER NECK CONTRACTURE      multiple to remove scar tissue    JOINT REPLACEMENT Left     hip    JOINT REPLACEMENT Bilateral     knees    KNEE ARTHROSCOPY  01/2013    [place] NDSC WRST SURG W/RLS TRANSVRS CARPL LIGM Left 3/4/2020    Procedure: RELEASE CARPAL TUNNEL ENDOSCOPIC;  Surgeon: Wilmar Shaver MD;  [facility]: [facility];  Service: Orthopedics    TX PRQ [address] NSTIM ELECTRODE ARRAY EPIDURAL Right 8/25/2020    Procedure: INSERTION THORACIC DORSAL COLUMN SPINAL CORD STIMULATOR PERCUTANEOUS W IMPLANTABLE PULSE GENERATOR, RIGHT;  Surgeon: Florencio Wright MD;  [facility]: [facility];  Service: Neurosurgery    [place] REVJ/RMVL IMPLANTED SPINAL [address] N/A 5/3/2022    Procedure: REMOVAL OF SPINAL CORD STIMULATOR SYSTEM WITH BATTERY;  Surgeon: Tolu Marmolejo MD;  [facility];  Service: Neurosurgery    PROSTATE BIOPSY  2012    for elevated PSA of about 7; neg    ROTATOR CUFF REPAIR Right     SPINAL FUSION      TRANSURETHRAL RESECTION OF PROSTATE  06/27/2013       Current Outpatient Medications:     ciprofloxacin (CIPRO) 500 mg tablet, Take 500 mg by mouth every 12 (twelve) hours Pt has 2-3 days left, Disp: , Rfl:     clonazePAM (KlonoPIN) 0.5 mg tablet, Take 1 tablet (0.5 mg total) by mouth daily at bedtime, Disp: 30 tablet, Rfl: 0    diclofenac (VOLTAREN) 75 mg EC tablet, Take 1 tablet (75 mg total) by mouth 2 (two) times a day as needed (pain), Disp: 60 tablet, Rfl: 0    diclofenac (VOLTAREN) 75 mg EC tablet, TAKE 1 TABLET (75 MG TOTAL) BY MOUTH 2 (TWO) TIMES A DAY AS NEEDED (PAIN), Disp: 60 tablet, Rfl: 5    DULoxetine (CYMBALTA) 30 mg delayed release capsule, Take 90 mg by mouth daily , Disp: , Rfl:     fluticasone (FLONASE) 50 mcg/act nasal spray, fluticasone propionate 50 mcg/actuation nasal spray,suspension, Disp: , Rfl:     gabapentin (NEURONTIN) 300 mg capsule, Take 300 mg by mouth daily , Disp: , Rfl:     metFORMIN (GLUCOPHAGE-XR) 500 mg 24 hr tablet, Take 1 tablet (500 mg total) by mouth daily with dinner, Disp: 90 tablet, Rfl: 3    metoprolol tartrate (LOPRESSOR) 50 mg tablet, TAKE 1 TABLET (50 MG TOTAL) BY MOUTH 2 (TWO) TIMES A DAY TAKE WITH 25 MG TABS (Patient taking differently: Take 50 mg by mouth every 12 (twelve) hours), Disp: 180 tablet, Rfl: 3    montelukast (SINGULAIR) 10 mg tablet, TAKE 1 TABLET BY MOUTH EVERYDAY AT BEDTIME, Disp: 90 tablet, Rfl: 1    Multiple Vitamin (MULTI-VITAMIN DAILY) TABS, Take 1 tablet by mouth daily, Disp: , Rfl:     NIFEdipine ER (ADALAT CC) 60 MG 24 hr tablet, TAKE 1 TABLET BY MOUTH EVERY DAY, Disp: 90 tablet, Rfl: 1    oxybutynin (DITROPAN) 5 mg tablet, Take 5 mg by mouth 2 (two) times a day as needed, Disp: , Rfl:     rosuvastatin (CRESTOR) 40 MG tablet, TAKE 1 TABLET BY MOUTH EVERY DAY, Disp: 90 tablet, Rfl: 1  Allergies   Allergen Reactions    Pollen Extract Sneezing       Labs:     Chemistry        Component Value Date/Time     09/08/2015 1027    K 4.0 10/19/2023 0813    K 4.6 09/08/2015 1027     10/19/2023 0813     09/08/2015 1027    CO2 27 10/19/2023 0813    CO2 27 09/08/2015 1027    BUN 17 10/19/2023 0813    BUN 15 09/08/2015 1027    CREATININE 1.04 10/19/2023 0813    CREATININE 0.81 09/08/2015 1027        Component Value Date/Time    CALCIUM 9.3 10/19/2023 0813    CALCIUM 9.3 09/08/2015 1027    ALKPHOS 52 10/19/2023 0813    ALKPHOS 121 07/21/2014 0910    AST 24 10/19/2023 0813    AST 18 07/21/2014 0910    ALT 18 10/19/2023 0813    ALT 34 07/21/2014 0910    BILITOT 0.45 07/21/2014 0910            Lab Results   Component Value Date    CHOL 190 07/21/2014     Lab Results   Component Value Date    HDL 46 10/19/2023    HDL 57 04/03/2023    HDL 48 10/17/2022     Lab Results   Component Value Date    LDLCALC 71 10/19/2023    LDLCALC 82 04/03/2023    LDLCALC 86 10/17/2022     Lab Results   Component Value Date    TRIG 119 10/19/2023    TRIG 66 04/03/2023    TRIG 82 10/17/2022     No results found for: "CHOLHDL"    Imaging: No results found.     ECG:    Normal sinus rhythm      Review of Systems Constitutional: Negative. HENT: Negative. Eyes: Negative. Cardiovascular: Negative. Respiratory: Negative. Endocrine: Negative. Hematologic/Lymphatic: Negative. Skin: Negative. Musculoskeletal: Negative. Gastrointestinal: Negative. Genitourinary: Negative. Neurological: Negative. Psychiatric/Behavioral: Negative. Vitals:    10/30/23 1013   BP: 110/60   Pulse: 58   SpO2: 97%     Vitals:    10/30/23 1013   Weight: 95.5 kg (210 lb 9.6 oz)     Height: 5' 8.5" (174 cm)   Body mass index is 31.56 kg/m². Physical Exam:  Vital signs reviewed  General:  Alert and cooperative, appears stated age, no acute distress  HEENT:  PERRLA, EOMI, no scleral icterus, no conjunctival pallor  Neck:  No lymphadenopathy, no thyromegaly, no carotid bruits, no elevated JVP  Heart:  Regular rate and rhythm, normal S1/S2, no S3/S4, no murmur, rubs or gallops. PMI nondisplaced  Lungs:  Clear to auscultation bilaterally, no wheezes rales or rhonchi  Abdomen:  Soft, non-tender, positive bowel sounds, no rebound or guarding,   no organomegaly   Extremities:  Normal range of motion.   No clubbing, cyanosis or edema   Vascular:  2+ pedal pulses  Skin:  No rashes or lesions on exposed skin  Neurologic:  Cranial nerves II-XII grossly intact without focal deficits  Psych:  Normal mood and affect

## 2023-10-30 NOTE — PROGRESS NOTES
Assessment and Plan:     Problem List Items Addressed This Visit          Endocrine    Impaired fasting glucose     10/2023 HgA1C 5.9 stable. Low carb diet. Continue metformin 500mg QD. Relevant Orders    CBC    Comprehensive metabolic panel    Hemoglobin A1C    Lipid panel       Respiratory    COPD (chronic obstructive pulmonary disease) (HCC)     Give refill of advair. Continue singulair. Relevant Medications    Fluticasone-Salmeterol (Advair) 250-50 mcg/dose inhaler    Other Relevant Orders    CBC    Comprehensive metabolic panel    Hemoglobin A1C    Lipid panel       Cardiovascular and Mediastinum    Hypertension     Controlled. DASH diet. Continue metoprolol and nifedipine 90mg QD. Relevant Orders    CBC    Comprehensive metabolic panel    Hemoglobin A1C    Lipid panel       Other    Hyperlipidemia     10/2023 lipid ok. Low fat diet. Continue crestor 40mg qhs per cardiology. Relevant Orders    CBC    Comprehensive metabolic panel    Hemoglobin A1C    Lipid panel    Obesity (BMI 30-39. 9)    Anxiety and depression     Stable. FU psychiatrist. Continue cymbalta and clonazepam. SE educated pt. Relevant Orders    CBC    Comprehensive metabolic panel    Hemoglobin A1C    Lipid panel    Shoulder pain     Check left shoulder xray. Refer to PT and orthopedics. Relevant Orders    XR shoulder 2+ vw left    Ambulatory Referral to Orthopedic Surgery    Ambulatory Referral to Physical Therapy    Vision problem - Primary     Refer to ophthalmology. Relevant Orders    Ambulatory Referral to Ophthalmology     Other Visit Diagnoses       Medicare annual wellness visit, subsequent                 Reviewed lab in 10/2023  hgA1C 5.9 stable  CMP ok  Lipid 141/119/46/71 good    Flu shot yearly. Got it already this season. Got covid19 vaccine and boosters. Got PCV13 and zostavax in 2016. Got pneumovax in 2018. Got shingrix in pharmacy. PSA yearly per urology.  Pros and cons educated pt. Colonoscopy 3/2017. Repeat in 10 years. RTO in 6 months. POA---wife  Living will---DNR        Preventive health issues were discussed with patient, and age appropriate screening tests were ordered as noted in patient's After Visit Summary. Personalized health advice and appropriate referrals for health education or preventive services given if needed, as noted in patient's After Visit Summary. History of Present Illness:     Patient presents for a Medicare Wellness Visit    HPI     Pt is here by himself. C/o black floater in right eye recently. His eye doc appt will be in 5/2024. Left shoulder pain for 4 months. Come and go. Worse if he hold his left arm wrong way. 5/10. No injury or fall but he works out daily. Denies numbness/tingling. Hx of right shoulder tear. IFG---He is on metformin 500mg QD per endocrinology. Denies SE. Tried to eat healthy. Chronic lower back pain--for years. Hx of back surgery in 2012 at [address] [place] [address]. FU pain specialist, got injection but no help. He is on diclofenac 75mg bid. MAT/HTN----FU cardiology for MAT, HTN. He is on metoprolol 75mg bid and nifedipine 90mg Qd. Denies headache, vision change, dizzy, SOB, palpitation or chest pain. Hyperlipidemia---He is on crestor 40mg qhs per cardiology. Denies side effects. Anxiety/depression/Insomnia----FU psychiatry Dr. Marly Lew Q 3 month. He is on cymbalta 90mg QD which works for him. Use clonazepam 0.5mg qhs for insomnia. He is on gabapentin 300mg am and 1200mg pm per psychiatrist.       COPD/chronic bronchitis---Controlled. He is on singulair 10mg QD. He uses [facility] in winter just in case. Quit smoking in 2000. BPH---FU urology Q 6 months for BPH s/p TURP in 2013. Had cystoscopy and dilation in 9/2014. He does not need to self-cath now. He is on oxybutynin and has dry mouth. He can tolerate. Quit smoking since 20 years ago. No alcohol. Lives with wife. Does all ADL's. Denies recent falls. Patient Care Team:  Nicolás Clements MD as PCP - MD Delaney Rosen MD Larey Blowers, MD Jeraldine Beckmann, PA-C as Physician Assistant (Endocrinology)  Fani Levy MD (Endocrinology)  Aura Laughlin DO (Pain Medicine)     Review of Systems:     Review of Systems   Constitutional:  Negative for appetite change, chills and fever. HENT:  Negative for congestion, ear pain, sinus pain and sore throat. Eyes:  Negative for discharge and itching. Respiratory:  Negative for apnea, cough, chest tightness, shortness of breath and wheezing. Cardiovascular:  Negative for chest pain, palpitations and leg swelling. Gastrointestinal:  Negative for abdominal pain, anal bleeding, constipation, diarrhea, nausea and vomiting. Endocrine: Negative for cold intolerance, heat intolerance and polyuria. Genitourinary:  Negative for difficulty urinating and dysuria. Musculoskeletal:  Positive for arthralgias and back pain. Negative for myalgias. Skin:  Negative for rash. Neurological:  Negative for dizziness and headaches. Psychiatric/Behavioral:  Negative for agitation. Problem List:     Patient Active Problem List   Diagnosis    Allergic rhinitis    Enlarged prostate with lower urinary tract symptoms (LUTS)    Chronic bronchitis (HCC)    Hyperlipidemia    Hypertension    Impaired fasting glucose    Male erectile dysfunction    Multifocal atrial tachycardia    Obesity (BMI 30-39. 9)    Osteoarthritis of knee    Osteoarthritis of hip    Seasonal allergies    Spinal stenosis of lumbar region with neurogenic claudication    Supraventricular tachycardia    Anxiety and depression    Arthritis of left hip    COPD (chronic obstructive pulmonary disease) (HCC)    Elevated prostate specific antigen (PSA)    Lumbosacral spondylosis without myelopathy    Incomplete emptying of bladder    Retention of urine    Screening for prostate cancer    Thoracic or lumbosacral neuritis or radiculitis    Urethral stricture    Spondylolisthesis, acquired    Degeneration of lumbosacral intervertebral disc    Full thickness rotator cuff tear    Knee pain    Localized, primary osteoarthritis    Primary localized osteoarthritis of pelvic region and thigh    Low back pain    Lumbosacral neuritis    Pain in limb    Pseudoclaudication syndrome    Shoulder pain    Cervical stenosis of spinal canal    Lumbar radiculopathy    Chronic pain syndrome    Status post lumbar and lumbosacral fusion by anterior technique    Lumbar spondylosis    PITTMAN (dyspnea on exertion)    Thoracic spinal stenosis    Vision problem      Past Medical and Surgical History:     Past Medical History:   Diagnosis Date    Arthritis     back    Atrial fibrillation ([address])     per pt "controlled with metoprolol" sees [facility] cardio Dr Rebeca Roman 2x/year    Bronchitis, chronic obstructive, with exacerbation ([address])     Last Assessed: 5/17/2013    Chronic pain disorder     back    COPD with acute exacerbation ([address])     Last Assessed: 4/13/2015    Depression, controlled 8/30/2017    Transitioned From: Depression    Elevated PSA     Bx neg 2012; Last Assessed: 11/23/2012    Environmental and seasonal allergies     Exercises 3 to 4 times per week     "2hrs --4x/week"elliptical and Boflex    History of prediabetes     Hyperlipidemia     history of/better on meds    Hypertension     has gotten better since weight loss and pt is keeping check of BP at home    Irregular heart beat     MAT, afib    Lung nodule - resolved 2017 CT 1/4/2017    Postoperative urinary retention     Self-catheterizes urinary bladder     per pt every 2 weeks as per urologist    Spinal cord stimulator status     Status post insertion of spinal cord stimulator 9/9/2020    Wears glasses     Weight loss     per pt recent intention loss of 80lbs recently     Past Surgical History:   Procedure Laterality Date    BACK SURGERY  07/25/2012 decompression of spinal stenosis from lower thoracic through the lumbar spine    COLONOSCOPY  10/2010    neg.  recheck in 5 years    INCISION OF BLADDER NECK CONTRACTURE      multiple to remove scar tissue    JOINT REPLACEMENT Left     hip    JOINT REPLACEMENT Bilateral     knees    KNEE ARTHROSCOPY  2013    FL NDSC WRST SURG W/RLS TRANSVRS CARPL LIGM Left 3/4/2020    Procedure: RELEASE CARPAL TUNNEL ENDOSCOPIC;  Surgeon: Ric Correia MD;  Location: [facility];  Service: Orthopedics    FL PRQ [address] NSTIM ELECTRODE ARRAY EPIDURAL Right 2020    Procedure: INSERTION THORACIC DORSAL COLUMN SPINAL CORD STIMULATOR PERCUTANEOUS W IMPLANTABLE PULSE GENERATOR, RIGHT;  Surgeon: Faith Taylor MD;  Location: [facility];  Service: Neurosurgery    FL REVJ/RMVL IMPLANTED SPINAL NEUROSTIM GENERATOR N/A 5/3/2022    Procedure: REMOVAL OF SPINAL CORD STIMULATOR SYSTEM WITH BATTERY;  Surgeon: Nabila Edgar MD;  Location: [facility];  Service: Neurosurgery    PROSTATE BIOPSY      for elevated PSA of about 7; neg    ROTATOR CUFF REPAIR Right     SPINAL FUSION      TRANSURETHRAL RESECTION OF PROSTATE  2013      Family History:     Family History   Problem Relation Age of Onset    Breast cancer Mother     Pancreatitis Father     Diabetes Maternal Grandmother     Heart attack Maternal Grandfather     Heart attack Paternal Grandmother     Diabetes Paternal Grandmother     Diabetes Paternal Grandfather     No Known Problems Brother     Lung disease Paternal Uncle       Social History:     Social History     Socioeconomic History    Marital status: /Civil Union     Spouse name: None    Number of children: None    Years of education: None    Highest education level: None   Occupational History    None   Tobacco Use    Smoking status: Former     Packs/day: 1.50     Years: 23.00     Total pack years: 34.50     Types: Cigarettes     Start date: 0     Quit date: 2000     Years since quittin.8    Smokeless tobacco: Never   Vaping Use    Vaping Use: Never used   Substance and Sexual Activity    Alcohol use: No     Comment: Stopped drinking    Drug use: No    Sexual activity: Not Currently     Comment: defer   Other Topics Concern    None   Social History Narrative    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, torch    Asbestos removal - use [employer]    [address]     Social Determinants of Health     Financial Resource Strain: Low Risk  (10/23/2023)    Overall Financial Resource Strain (CARDIA)     Difficulty of Paying Living Expenses: Not very hard   Food Insecurity: Not on file   Transportation Needs: No Transportation Needs (10/23/2023)    PRAPARE - Transportation     Lack of Transportation (Medical): No     Lack of Transportation (Non-Medical): No   Physical Activity: Not on file   Stress: Not on file   Social Connections: Not on file   Intimate Partner Violence: Not on file   Housing Stability: Not on file      Medications and Allergies:     Current Outpatient Medications   Medication Sig Dispense Refill    ciprofloxacin (CIPRO) 500 mg tablet Take 500 mg by mouth every 12 (twelve) hours Pt has 2-3 days left      clonazePAM (KlonoPIN) 0.5 mg tablet Take 1 tablet (0.5 mg total) by mouth daily at bedtime 30 tablet 0    diclofenac (VOLTAREN) 75 mg EC tablet TAKE 1 TABLET (75 MG TOTAL) BY MOUTH 2 (TWO) TIMES A DAY AS NEEDED (PAIN) 60 tablet 5    DULoxetine (CYMBALTA) 30 mg delayed release capsule Take 90 mg by mouth daily       fluticasone (FLONASE) 50 mcg/act nasal spray fluticasone propionate 50 mcg/actuation nasal spray,suspension      Fluticasone-Salmeterol (Advair) 250-50 mcg/dose inhaler Inhale 1 puff 2 (two) times a day Rinse mouth after use.  60 blister 2    gabapentin (NEURONTIN) 300 mg capsule Take 300 mg by mouth daily       metFORMIN (GLUCOPHAGE-XR) 500 mg 24 hr tablet Take 1 tablet (500 mg total) by mouth daily with dinner 90 tablet 3    metoprolol tartrate (LOPRESSOR) 50 mg tablet TAKE 1 TABLET (50 MG TOTAL) BY MOUTH 2 (TWO) TIMES A DAY TAKE WITH 25 MG TABS (Patient taking differently: Take 50 mg by mouth every 12 (twelve) hours) 180 tablet 3    montelukast (SINGULAIR) 10 mg tablet TAKE 1 TABLET BY MOUTH EVERYDAY AT BEDTIME 90 tablet 1    Multiple Vitamin (MULTI-VITAMIN DAILY) TABS Take 1 tablet by mouth daily      NIFEdipine ER (ADALAT CC) 60 MG 24 hr tablet TAKE 1 TABLET BY MOUTH EVERY DAY 90 tablet 1    oxybutynin (DITROPAN) 5 mg tablet Take 5 mg by mouth 2 (two) times a day as needed      rosuvastatin (CRESTOR) 40 MG tablet TAKE 1 TABLET BY MOUTH EVERY DAY 90 tablet 1     No current facility-administered medications for this visit. Allergies   Allergen Reactions    Pollen Extract Sneezing      Immunizations:     Immunization History   Administered Date(s) Administered    COVID-19 MODERNA VACC 0.5 ML IM 03/06/2021, 04/06/2021, 09/19/2023    INFLUENZA 01/18/2013, 01/31/2014, 01/23/2015, 10/02/2017    Influenza Quadrivalent, 6-35 Months IM 10/02/2015    Influenza Split High Dose Preservative Free IM 11/18/2016, 10/02/2017, 09/19/2023    Influenza, high dose seasonal 0.7 mL 09/06/2018, 11/22/2019, 10/09/2020, 10/14/2021    Influenza, seasonal, injectable 11/11/2014    Pneumococcal Conjugate 13-Valent 08/18/2016    Pneumococcal Polysaccharide PPV23 10/10/2012, 09/06/2018    Zoster 11/18/2016      Health Maintenance:         Topic Date Due    Colorectal Cancer Screening  03/24/2027    Hepatitis C Screening  Completed     There are no preventive care reminders to display for this patient. Medicare Screening Tests and Risk Assessments:     Aniyah Whitman is here for his Subsequent Wellness visit. Health Risk Assessment:   Patient rates overall health as good. Patient feels that their physical health rating is slightly worse. Patient is satisfied with their life. Eyesight was rated as slightly worse. Hearing was rated as same. Patient feels that their emotional and mental health rating is same.  Patients states they are never, rarely angry. Patient states they are sometimes unusually tired/fatigued. Pain experienced in the last 7 days has been some. Patient's pain rating has been 6/10. Patient states that he has experienced no weight loss or gain in last 6 months. Fall Risk Screening: In the past year, patient has experienced: no history of falling in past year      Home Safety:  Patient does not have trouble with stairs inside or outside of their home. Patient has working smoke alarms and has no working carbon monoxide detector. Home safety hazards include: none. Nutrition:   Current diet is Regular. Medications:   Patient is currently taking over-the-counter supplements. OTC medications include: see medication list. Patient is able to manage medications. Activities of Daily Living (ADLs)/Instrumental Activities of Daily Living (IADLs):   Walk and transfer into and out of bed and chair?: Yes  Dress and groom yourself?: Yes    Bathe or shower yourself?: Yes    Feed yourself? Yes  Do your laundry/housekeeping?: Yes  Manage your money, pay your bills and track your expenses?: Yes  Make your own meals?: Yes    Do your own shopping?: Yes    Previous Hospitalizations:   Any hospitalizations or ED visits within the last 12 months?: No      Advance Care Planning:   Living will: Yes    Durable POA for healthcare:  Yes    Advanced directive: Yes    End of Life Decisions reviewed with patient: Yes    Provider agrees with end of life decisions: Yes      Cognitive Screening:   Provider or family/friend/caregiver concerned regarding cognition?: No    PREVENTIVE SCREENINGS      Cardiovascular Screening:    General: History Lipid Disorder and Screening Current      Diabetes Screening:     General: Screening Current      Colorectal Cancer Screening:     General: Screening Current      Prostate Cancer Screening:    General: Screening Not Indicated      Osteoporosis Screening:    General: Screening Current Abdominal Aortic Aneurysm (AAA) Screening:    Risk factors include: age between 70-75 yo and tobacco use        Lung Cancer Screening:     General: Screening Not Indicated      Hepatitis C Screening:    General: Screening Current    Screening, Brief Intervention, and Referral to Treatment (SBIRT)    Screening  Typical number of drinks in a day: 0  Typical number of drinks in a week: 0  Interpretation: Low risk drinking behavior. AUDIT-C Screenin) How often did you have a drink containing alcohol in the past year? monthly or less  2) How many drinks did you have on a typical day when you were drinking in the past year? 1 to 2  3) How often did you have 6 or more drinks on one occasion in the past year? never    AUDIT-C Score: 1  Interpretation: Score 0-3 (male): Negative screen for alcohol misuse    Single Item Drug Screening:  How often have you used an illegal drug (including marijuana) or a prescription medication for non-medical reasons in the past year? never    Single Item Drug Screen Score: 0  Interpretation: Negative screen for possible drug use disorder    No results found. Physical Exam:     /70   Pulse 62   Temp (!) 97.2 °F (36.2 °C) (Temporal)   Resp 18   Ht 5' 8.5" (1.74 m)   Wt 96.4 kg (212 lb 9.6 oz)   SpO2 94%   BMI 31.86 kg/m²     Physical Exam  Vitals reviewed. Constitutional:       Appearance: Normal appearance. HENT:      Head: Normocephalic and atraumatic. Eyes:      General:         Right eye: No discharge. Left eye: No discharge. Conjunctiva/sclera: Conjunctivae normal.   Neck:      Vascular: No carotid bruit. Cardiovascular:      Rate and Rhythm: Normal rate and regular rhythm. Heart sounds: Normal heart sounds. No murmur heard. No friction rub. No gallop. Pulmonary:      Effort: Pulmonary effort is normal. No respiratory distress. Breath sounds: Normal breath sounds. No wheezing or rales.    Abdominal:      General: Bowel sounds are normal. There is no distension. Palpations: Abdomen is soft. Tenderness: There is no abdominal tenderness. Musculoskeletal:         General: Tenderness present. No swelling or deformity. Normal range of motion. Cervical back: Normal range of motion and neck supple. No muscular tenderness. Lymphadenopathy:      Cervical: No cervical adenopathy. Neurological:      Mental Status: He is alert.    Psychiatric:         Mood and Affect: Mood normal.          Kenia Barker MD

## 2023-10-30 NOTE — PATIENT INSTRUCTIONS
Medicare Preventive Visit Patient Instructions  Thank you for completing your Welcome to Medicare Visit or Medicare Annual Wellness Visit today. Your next wellness visit will be due in one year (10/30/2024). The screening/preventive services that you may require over the next 5-10 years are detailed below. Some tests may not apply to you based off risk factors and/or age. Screening tests ordered at today's visit but not completed yet may show as past due. Also, please note that scanned in results may not display below. Preventive Screenings:  Service Recommendations Previous Testing/Comments   Colorectal Cancer Screening  Colonoscopy    Fecal Occult Blood Test (FOBT)/Fecal Immunochemical Test (FIT)  Fecal DNA/Cologuard Test  Flexible Sigmoidoscopy Age: 43-73 years old   Colonoscopy: every 10 years (May be performed more frequently if at higher risk)  OR  FOBT/FIT: every 1 year  OR  Cologuard: every 3 years  OR  Sigmoidoscopy: every 5 years  Screening may be recommended earlier than age 39 if at higher risk for colorectal cancer. Also, an individualized decision between you and your healthcare provider will decide whether screening between the ages of 77-80 would be appropriate.  Colonoscopy: 03/24/2017  FOBT/FIT: Not on file  Cologuard: Not on file  Sigmoidoscopy: Not on file    Screening Current     Prostate Cancer Screening Individualized decision between patient and health care provider in men between ages of 53-66   Medicare will cover every 12 months beginning on the day after your 50th birthday PSA: No results in last 5 years           Hepatitis C Screening Once for adults born between 1945 and 1965  More frequently in patients at high risk for Hepatitis C Hep C Antibody: 09/12/2018    Screening Current   Diabetes Screening 1-2 times per year if you're at risk for diabetes or have pre-diabetes Fasting glucose: 93 mg/dL (10/19/2023)  A1C: 5.9 % (10/19/2023)  Screening Current   Cholesterol Screening Once every 5 years if you don't have a lipid disorder. May order more often based on risk factors. Lipid panel: 10/19/2023  Screening Not Indicated  History Lipid Disorder      Other Preventive Screenings Covered by Medicare:  Abdominal Aortic Aneurysm (AAA) Screening: covered once if your at risk. You're considered to be at risk if you have a family history of AAA or a male between the age of 70-76 who smoking at least 100 cigarettes in your lifetime. Lung Cancer Screening: covers low dose CT scan once per year if you meet all of the following conditions: (1) Age 48-67; (2) No signs or symptoms of lung cancer; (3) Current smoker or have quit smoking within the last 15 years; (4) You have a tobacco smoking history of at least 20 pack years (packs per day x number of years you smoked); (5) You get a written order from a healthcare provider. Glaucoma Screening: covered annually if you're considered high risk: (1) You have diabetes OR (2) Family history of glaucoma OR (3)  aged 48 and older OR (3)  American aged 72 and older  Osteoporosis Screening: covered every 2 years if you meet one of the following conditions: (1) Have a vertebral abnormality; (2) On glucocorticoid therapy for more than 3 months; (3) Have primary hyperparathyroidism; (4) On osteoporosis medications and need to assess response to drug therapy. HIV Screening: covered annually if you're between the age of 14-79. Also covered annually if you are younger than 13 and older than 72 with risk factors for HIV infection. For pregnant patients, it is covered up to 3 times per pregnancy.     Immunizations:  Immunization Recommendations   Influenza Vaccine Annual influenza vaccination during flu season is recommended for all persons aged >= 6 months who do not have contraindications   Pneumococcal Vaccine   * Pneumococcal conjugate vaccine = PCV13 (Prevnar 13), PCV15 (Vaxneuvance), PCV20 (Prevnar 20)  * Pneumococcal polysaccharide vaccine = PPSV23 (Pneumovax) Adults 38-86 yo with certain risk factors or if 69+ yo  If never received any pneumonia vaccine: recommend Prevnar 20 (PCV20)  Give PCV20 if previously received 1 dose of PCV13 or PPSV23   Hepatitis B Vaccine 3 dose series if at intermediate or high risk (ex: diabetes, end stage renal disease, liver disease)   Respiratory syncytial virus (RSV) Vaccine - COVERED BY MEDICARE PART D  * RSVPreF3 (Arexvy) CDC recommends that adults 61years of age and older may receive a single dose of RSV vaccine using shared clinical decision-making (SCDM)   Tetanus (Td) Vaccine - COST NOT COVERED BY MEDICARE PART B Following completion of primary series, a booster dose should be given every 10 years to maintain immunity against tetanus. Td may also be given as tetanus wound prophylaxis. Tdap Vaccine - COST NOT COVERED BY MEDICARE PART B Recommended at least once for all adults. For pregnant patients, recommended with each pregnancy. Shingles Vaccine (Shingrix) - COST NOT COVERED BY MEDICARE PART B  2 shot series recommended in those 19 years and older who have or will have weakened immune systems or those 50 years and older     Health Maintenance Due:      Topic Date Due   • Colorectal Cancer Screening  03/24/2027   • Hepatitis C Screening  Completed     Immunizations Due:  There are no preventive care reminders to display for this patient. Advance Directives   What are advance directives? Advance directives are legal documents that state your wishes and plans for medical care. These plans are made ahead of time in case you lose your ability to make decisions for yourself. Advance directives can apply to any medical decision, such as the treatments you want, and if you want to donate organs. What are the types of advance directives? There are many types of advance directives, and each state has rules about how to use them. You may choose a combination of any of the following:  Living will:   This is a written record of the treatment you want. You can also choose which treatments you do not want, which to limit, and which to stop at a certain time. This includes surgery, medicine, IV fluid, and tube feedings. Durable power of  for [facility]): This is a written record that states who you want to make healthcare choices for you when you are unable to make them for yourself. This person, called a proxy, is usually a family member or a friend. You may choose more than 1 proxy. Do not resuscitate (DNR) order:  A DNR order is used in case your heart stops beating or you stop breathing. It is a request not to have certain forms of treatment, such as CPR. A DNR order may be included in other types of advance directives. Medical directive: This covers the care that you want if you are in a coma, near death, or unable to make decisions for yourself. You can list the treatments you want for each condition. Treatment may include pain medicine, surgery, blood transfusions, dialysis, IV or tube feedings, and a ventilator (breathing machine). Values history: This document has questions about your views, beliefs, and how you feel and think about life. This information can help others choose the care that you would choose. Why are advance directives important? An advance directive helps you control your care. Although spoken wishes may be used, it is better to have your wishes written down. Spoken wishes can be misunderstood, or not followed. Treatments may be given even if you do not want them. An advance directive may make it easier for your family to make difficult choices about your care. Weight Management   Why it is important to manage your weight:  Being overweight increases your risk of health conditions such as heart disease, high blood pressure, type 2 diabetes, and certain types of cancer. It can also increase your risk for osteoarthritis, sleep apnea, and other respiratory problems.  Aim for a slow, steady weight loss. Even a small amount of weight loss can lower your risk of health problems. How to lose weight safely:  A safe and healthy way to lose weight is to eat fewer calories and get regular exercise. You can lose up about 1 pound a week by decreasing the number of calories you eat by 500 calories each day. Healthy meal plan for weight management:  A healthy meal plan includes a variety of foods, contains fewer calories, and helps you stay healthy. A healthy meal plan includes the following:  Eat whole-grain foods more often. A healthy meal plan should contain fiber. Fiber is the part of grains, fruits, and vegetables that is not broken down by your body. Whole-grain foods are healthy and provide extra fiber in your diet. Some examples of whole-grain foods are whole-wheat breads and pastas, oatmeal, brown rice, and bulgur. Eat a variety of vegetables every day. Include dark, leafy greens such as spinach, kale, ramirez greens, and mustard greens. Eat yellow and orange vegetables such as carrots, sweet potatoes, and winter squash. Eat a variety of fruits every day. Choose fresh or canned fruit (canned in its own juice or light syrup) instead of juice. Fruit juice has very little or no fiber. Eat low-fat dairy foods. Drink fat-free (skim) milk or 1% milk. Eat fat-free yogurt and low-fat cottage cheese. Try low-fat cheeses such as mozzarella and other reduced-fat cheeses. Choose meat and other protein foods that are low in fat. Choose beans or other legumes such as split peas or lentils. Choose fish, skinless poultry (chicken or turkey), or lean cuts of red meat (beef or pork). Before you cook meat or poultry, cut off any visible fat. Use less fat and oil. Try baking foods instead of frying them. Add less fat, such as margarine, sour cream, regular salad dressing and mayonnaise to foods. Eat fewer high-fat foods.  Some examples of high-fat foods include french fries, doughnuts, ice cream, and cakes. Eat fewer sweets. Limit foods and drinks that are high in sugar. This includes candy, cookies, regular soda, and sweetened drinks. Exercise:  Exercise at least 30 minutes per day on most days of the week. Some examples of exercise include walking, biking, dancing, and swimming. You can also fit in more physical activity by taking the stairs instead of the elevator or parking farther away from stores. Ask your healthcare provider about the best exercise plan for you. © Copyright [employer] 2018 Information is for End User's use only and may not be sold, redistributed or otherwise used for commercial purposes.  All illustrations and images included in CareNotes® are the copyrighted property of [employer].A[employer] Inc[employer] or  [employer]

## 2023-11-01 ENCOUNTER — APPOINTMENT (OUTPATIENT)
Dept: RADIOLOGY | Age: 73
End: 2023-11-01
Payer: COMMERCIAL

## 2023-11-01 DIAGNOSIS — F41.9 ANXIETY AND DEPRESSION: ICD-10-CM

## 2023-11-01 DIAGNOSIS — G89.29 CHRONIC LEFT SHOULDER PAIN: ICD-10-CM

## 2023-11-01 DIAGNOSIS — M25.512 CHRONIC LEFT SHOULDER PAIN: ICD-10-CM

## 2023-11-01 DIAGNOSIS — F32.A ANXIETY AND DEPRESSION: ICD-10-CM

## 2023-11-01 PROCEDURE — 73030 X-RAY EXAM OF SHOULDER: CPT

## 2023-11-01 RX ORDER — CLONAZEPAM 0.5 MG/1
0.5 TABLET ORAL
Qty: 30 TABLET | Refills: 0 | Status: SHIPPED | OUTPATIENT
Start: 2023-11-01

## 2023-11-02 ENCOUNTER — TELEPHONE (OUTPATIENT)
Dept: FAMILY MEDICINE CLINIC | Facility: CLINIC | Age: 73
End: 2023-11-02

## 2023-11-02 NOTE — TELEPHONE ENCOUNTER
Pt aware has appointment scheduled for Monday with orthopedics----- Message from Ivonne Haines MD sent at 11/2/2023 11:41 AM EDT -----  Left shoulder xray showed moderate/severe arthritis. Please ask pt to follow orthopedics as scheduled.

## 2023-11-06 ENCOUNTER — NEW PATIENT (OUTPATIENT)
Dept: URBAN - METROPOLITAN AREA CLINIC 6 | Facility: CLINIC | Age: 73
End: 2023-11-06

## 2023-11-06 ENCOUNTER — OFFICE VISIT (OUTPATIENT)
Dept: OBGYN CLINIC | Facility: OTHER | Age: 73
End: 2023-11-06
Payer: COMMERCIAL

## 2023-11-06 VITALS
DIASTOLIC BLOOD PRESSURE: 55 MMHG | WEIGHT: 210 LBS | HEART RATE: 69 BPM | SYSTOLIC BLOOD PRESSURE: 141 MMHG | HEIGHT: 69 IN | BODY MASS INDEX: 31.1 KG/M2

## 2023-11-06 DIAGNOSIS — H43.391: ICD-10-CM

## 2023-11-06 DIAGNOSIS — H25.13: ICD-10-CM

## 2023-11-06 DIAGNOSIS — H04.123: ICD-10-CM

## 2023-11-06 DIAGNOSIS — M19.012 PRIMARY OSTEOARTHRITIS OF LEFT SHOULDER: Primary | ICD-10-CM

## 2023-11-06 PROCEDURE — 92250 FUNDUS PHOTOGRAPHY W/I&R: CPT

## 2023-11-06 PROCEDURE — 92004 COMPRE OPH EXAM NEW PT 1/>: CPT

## 2023-11-06 PROCEDURE — 20610 DRAIN/INJ JOINT/BURSA W/O US: CPT | Performed by: ORTHOPAEDIC SURGERY

## 2023-11-06 PROCEDURE — 99204 OFFICE O/P NEW MOD 45 MIN: CPT | Performed by: ORTHOPAEDIC SURGERY

## 2023-11-06 RX ORDER — BUPIVACAINE HYDROCHLORIDE 2.5 MG/ML
2 INJECTION, SOLUTION INFILTRATION; PERINEURAL
Status: COMPLETED | OUTPATIENT
Start: 2023-11-06 | End: 2023-11-06

## 2023-11-06 RX ORDER — BETAMETHASONE SODIUM PHOSPHATE AND BETAMETHASONE ACETATE 3; 3 MG/ML; MG/ML
6 INJECTION, SUSPENSION INTRA-ARTICULAR; INTRALESIONAL; INTRAMUSCULAR; SOFT TISSUE
Status: COMPLETED | OUTPATIENT
Start: 2023-11-06 | End: 2023-11-06

## 2023-11-06 RX ADMIN — BUPIVACAINE HYDROCHLORIDE 2 ML: 2.5 INJECTION, SOLUTION INFILTRATION; PERINEURAL at 09:45

## 2023-11-06 RX ADMIN — BETAMETHASONE SODIUM PHOSPHATE AND BETAMETHASONE ACETATE 6 MG: 3; 3 INJECTION, SUSPENSION INTRA-ARTICULAR; INTRALESIONAL; INTRAMUSCULAR; SOFT TISSUE at 09:45

## 2023-11-06 ASSESSMENT — VISUAL ACUITY
OD_CC: 20/30+2
OS_CC: 20/40-2
OU_CC: J2

## 2023-11-06 ASSESSMENT — TONOMETRY
OD_IOP_MMHG: 11
OS_IOP_MMHG: 13

## 2023-11-06 NOTE — PATIENT INSTRUCTIONS
CORTICOSTEROID INJECTION  What is a corticosteroid? Injuries or disease such as arthritis, bursitis or tendonitis result in inflammation. In turn, this inflammation can cause swelling and pain. A local injection of a corticosteroid is provided to diminish inflammation. By doing so, it will also decrease pain and swelling which is making you uncomfortable. Is this the same thing as a Cortisone Injection? Cortisone® is a brand name of a corticosteroid used commonly in the past.  Today I commonly use a more water-soluble corticosteroid named Celestone®. Will the injection hurt? As with any injection, you may feel pain at the time of the injection. Typically, I use a local anesthetic (Guadalupe Reed) in addition to the corticosteroid to determine if the injection has been placed in the appropriate location. Hence it is important to monitor your symptoms 4-6 hours after the injection, as the area will be anesthetized (numb) while the local anesthetic is working. Once the local anesthetic wears off, the intensity of pain can be the same as it was prior to the injection, or even worse. This does not mean that the injection is not working. The corticosteroid may take 24-72 hours to begin having a positive effect. If you do experience an increase in pain, the use of an ice pack on the area for 20 minutes at a time should help. It is also helpful to take an oral anti-inflammatory such as Tylenol® or Motrin® if you are able to medically do so. For this reason it is best to avoid activities that put stress on the area the first 24 hours after the injection. How long will pain relief last?  This will vary according to the type and severity of the symptoms being treated and the severity of the condition. Symptom relief may last weeks to months. I typically couple injections with physical therapy so that the underlying problem causing the inflammation may be treated as the pain diminishes.   If the combination is not successful, you may be a surgical candidate. I have read bad things about steroids. Will these things happen to me? Corticosteroids, when utilized properly, are safe and effective drugs. When used in a low dose, potential adverse reactions are very rare. Some patients may experience a sensation of flushing for several days. Very rarely, there can be a local reaction which may include increased discomfort for a period of time in the areas that has been injected. A steroid should not be used over and over again. Multiple injections in the same area can produce adverse effects such as tissue atrophy and degeneration of tendon or cartilage. A small percentage of patients (< 0.1%) may develop an infection in the joint after injection. This is a treatable problem, but if neglected, may result in permanent disability. Signs of infection include redness, swelling, discharge, fevers, increasing pain and drainage from the injection site. This represents an emergency and you should contact our office immediately or seek treatment in the ER if after hours. If I have diabetes, will this injection affect me? If you are diabetic, an injection of a corticosteroid can raise your blood sugar level, requiring more insulin for a brief period of time. This may necessitate careful blood sugar maintenance. If the elevated sugars are not able to be controlled, contact your diabetic doctor for guidance.

## 2023-11-06 NOTE — PROGRESS NOTES
Assessment  Diagnoses and all orders for this visit:    Primary osteoarthritis of left shoulder    Discussion and Plan:    Explained to the patient that his examination and diagnostic studies are consistent with moderate glenohumeral joint osteoarthritis of his left shoulder. The pathoanatomy and natural history of this diagnosis was explained to the patient in detail today in the office. He understood and all questions were answered  He was provided with a cortisone injection today in the office. This is documented appropriately below. We can repeat these injections once every 4-6 months as his symptoms warrant  Instructed to perform gentle stretching and ROM exercises as tolerated avoid heavy lifting exercises. If his symptoms worsen or fail to improve with proper non operative treatments then a total shoulder arthroplasty would be the appropriate surgical procedure. Follow up on an as needed basis    Subjective:   Patient ID: Yesika Gibson is a 68 y.o. male    The patient presents today for an orthopedic surgery consultation visit at the request of Dr Jamal Velázquez on 10/30/23 with a chief complaint of left shoulder pain. The pain began a few month(s) ago and is not associated with an acute injury. The patient describes the pain as aching and dull in intensity,  intermittent in timing, and localizes the pain to the  left glenohumeral joint, deltoid. The pain is worse with overhead work, overuse, and raising arm over head and relieved by rest, ice, avoiding the painful activities. The pain is not associated with numbness and tingling. The pain is not associated with constitutional symptoms. The patient is awoken at night by the pain. The patient has had no prior treatment.       The following portions of the patient's history were reviewed and updated as appropriate: allergies, current medications, past family history, past medical history, past social history, past surgical history and problem list.      Objective:  /55 (BP Location: Right arm, Patient Position: Sitting, Cuff Size: Standard)   Pulse 69   Ht 5' 8.5" (1.74 m)   Wt 95.3 kg (210 lb)   BMI 31.47 kg/m²       Left Shoulder Exam     Range of Motion   External rotation:  50   Forward flexion:  150     Muscle Strength   Abduction: 5/5   External rotation: 5/5     Other   Erythema: absent  Sensation: normal  Pulse: present             Physical Exam  Constitutional:       General: He is not in acute distress. Appearance: He is well-developed. Eyes:      Conjunctiva/sclera: Conjunctivae normal.      Pupils: Pupils are equal, round, and reactive to light. Cardiovascular:      Rate and Rhythm: Normal rate and regular rhythm. Pulmonary:      Effort: Pulmonary effort is normal.      Breath sounds: Normal breath sounds. Abdominal:      General: Bowel sounds are normal.      Palpations: Abdomen is soft. Musculoskeletal:      Cervical back: Normal range of motion and neck supple. Skin:     General: Skin is warm and dry. Findings: No erythema or rash. Neurological:      Mental Status: He is alert and oriented to person, place, and time. Deep Tendon Reflexes: Reflexes are normal and symmetric. Psychiatric:         Behavior: Behavior normal.         Large joint arthrocentesis: L glenohumeral  Universal Protocol:  Consent: Verbal consent obtained. Risks and benefits: risks, benefits and alternatives were discussed  Consent given by: patient  Time out: Immediately prior to procedure a "time out" was called to verify the correct patient, procedure, equipment, support staff and site/side marked as required. Site marked: the operative site was marked  Radiology Images displayed and confirmed.  If images not available, report reviewed: imaging studies available  Patient identity confirmed: verbally with patient  Supporting Documentation  Indications: pain and diagnostic evaluation   Procedure Details  Location: shoulder - L glenohumeral  Preparation: Patient was prepped and draped in the usual sterile fashion  Needle size: 22 G  Ultrasound guidance: no  Approach: posterior  Medications administered: 2 mL bupivacaine 0.25 %; 6 mg betamethasone acetate-betamethasone sodium phosphate 6 (3-3) mg/mL    Patient tolerance: patient tolerated the procedure well with no immediate complications  Dressing:  Sterile dressing applied        I have personally reviewed pertinent films in PACS and my interpretation is as follows. X Ray Left Shoulder 11/6/23: Moderate glenohumeral joint osteoarthritis. No other acute osseous abnormalities.      Scribe Attestation      I,:  Clyde Crigler am acting as a scribe while in the presence of the attending physician.:       I,:  Alvaro Pinto MD personally performed the services described in this documentation    as scribed in my presence.:

## 2023-11-29 ENCOUNTER — HOSPITAL ENCOUNTER (OUTPATIENT)
Dept: NON INVASIVE DIAGNOSTICS | Facility: CLINIC | Age: 73
Discharge: HOME | End: 2023-11-29
Payer: COMMERCIAL

## 2023-11-29 DIAGNOSIS — F32.A ANXIETY AND DEPRESSION: ICD-10-CM

## 2023-11-29 DIAGNOSIS — I65.23 CAROTID STENOSIS, ASYMPTOMATIC, BILATERAL: ICD-10-CM

## 2023-11-29 DIAGNOSIS — F41.9 ANXIETY AND DEPRESSION: ICD-10-CM

## 2023-11-29 PROCEDURE — 93880 EXTRACRANIAL BILAT STUDY: CPT | Performed by: SURGERY

## 2023-11-29 PROCEDURE — 93880 EXTRACRANIAL BILAT STUDY: CPT

## 2023-11-29 RX ORDER — CLONAZEPAM 0.5 MG/1
0.5 TABLET ORAL
Qty: 30 TABLET | Refills: 0 | Status: SHIPPED | OUTPATIENT
Start: 2023-11-29

## 2023-12-17 DIAGNOSIS — J41.1 MUCOPURULENT CHRONIC BRONCHITIS (HCC): ICD-10-CM

## 2023-12-17 DIAGNOSIS — I49.1 PAC (PREMATURE ATRIAL CONTRACTION): ICD-10-CM

## 2023-12-17 RX ORDER — MONTELUKAST SODIUM 10 MG/1
TABLET ORAL
Qty: 90 TABLET | Refills: 1 | Status: SHIPPED | OUTPATIENT
Start: 2023-12-17

## 2023-12-18 RX ORDER — METOPROLOL TARTRATE 50 MG/1
TABLET, FILM COATED ORAL
Qty: 180 TABLET | Refills: 3 | Status: SHIPPED | OUTPATIENT
Start: 2023-12-18

## 2023-12-27 DIAGNOSIS — F41.9 ANXIETY AND DEPRESSION: ICD-10-CM

## 2023-12-27 DIAGNOSIS — F32.A ANXIETY AND DEPRESSION: ICD-10-CM

## 2023-12-27 RX ORDER — CLONAZEPAM 0.5 MG/1
0.5 TABLET ORAL
Qty: 30 TABLET | Refills: 0 | Status: SHIPPED | OUTPATIENT
Start: 2023-12-27

## 2024-01-24 DIAGNOSIS — F32.A ANXIETY AND DEPRESSION: ICD-10-CM

## 2024-01-24 DIAGNOSIS — F41.9 ANXIETY AND DEPRESSION: ICD-10-CM

## 2024-01-24 RX ORDER — CLONAZEPAM 0.5 MG/1
0.5 TABLET ORAL
Qty: 30 TABLET | Refills: 2 | Status: SHIPPED | OUTPATIENT
Start: 2024-01-24

## 2024-01-25 ENCOUNTER — VBI (OUTPATIENT)
Dept: ADMINISTRATIVE | Facility: OTHER | Age: 74
End: 2024-01-25

## 2024-02-21 DIAGNOSIS — F41.9 ANXIETY AND DEPRESSION: ICD-10-CM

## 2024-02-21 DIAGNOSIS — F32.A ANXIETY AND DEPRESSION: ICD-10-CM

## 2024-02-21 RX ORDER — CLONAZEPAM 0.5 MG/1
0.5 TABLET ORAL
Qty: 30 TABLET | Refills: 0 | Status: SHIPPED | OUTPATIENT
Start: 2024-02-21

## 2024-03-11 DIAGNOSIS — I10 PRIMARY HYPERTENSION: ICD-10-CM

## 2024-03-19 DIAGNOSIS — F32.A ANXIETY AND DEPRESSION: ICD-10-CM

## 2024-03-19 DIAGNOSIS — F41.9 ANXIETY AND DEPRESSION: ICD-10-CM

## 2024-03-19 RX ORDER — CLONAZEPAM 0.5 MG/1
0.5 TABLET ORAL
Qty: 30 TABLET | Refills: 0 | Status: SHIPPED | OUTPATIENT
Start: 2024-03-19

## 2024-04-16 DIAGNOSIS — F32.A ANXIETY AND DEPRESSION: ICD-10-CM

## 2024-04-16 DIAGNOSIS — F41.9 ANXIETY AND DEPRESSION: ICD-10-CM

## 2024-04-16 RX ORDER — CLONAZEPAM 0.5 MG/1
0.5 TABLET ORAL
Qty: 30 TABLET | Refills: 0 | Status: SHIPPED | OUTPATIENT
Start: 2024-04-16

## 2024-04-25 ENCOUNTER — LAB (OUTPATIENT)
Dept: LAB | Facility: CLINIC | Age: 74
End: 2024-04-25
Payer: COMMERCIAL

## 2024-04-25 ENCOUNTER — RA CDI HCC (OUTPATIENT)
Dept: OTHER | Facility: HOSPITAL | Age: 74
End: 2024-04-25

## 2024-04-25 DIAGNOSIS — R73.01 IMPAIRED FASTING GLUCOSE: ICD-10-CM

## 2024-04-25 DIAGNOSIS — I10 PRIMARY HYPERTENSION: ICD-10-CM

## 2024-04-25 DIAGNOSIS — F32.A ANXIETY AND DEPRESSION: ICD-10-CM

## 2024-04-25 DIAGNOSIS — F41.9 ANXIETY AND DEPRESSION: ICD-10-CM

## 2024-04-25 DIAGNOSIS — J44.9 CHRONIC OBSTRUCTIVE PULMONARY DISEASE, UNSPECIFIED COPD TYPE (HCC): ICD-10-CM

## 2024-04-25 DIAGNOSIS — E78.2 MIXED HYPERLIPIDEMIA: ICD-10-CM

## 2024-04-25 LAB
ALBUMIN SERPL BCP-MCNC: 4.2 G/DL (ref 3.5–5)
ALP SERPL-CCNC: 50 U/L (ref 34–104)
ALT SERPL W P-5'-P-CCNC: 14 U/L (ref 7–52)
ANION GAP SERPL CALCULATED.3IONS-SCNC: 8 MMOL/L (ref 4–13)
AST SERPL W P-5'-P-CCNC: 18 U/L (ref 13–39)
BILIRUB SERPL-MCNC: 0.71 MG/DL (ref 0.2–1)
BUN SERPL-MCNC: 18 MG/DL (ref 5–25)
CALCIUM SERPL-MCNC: 9.4 MG/DL (ref 8.4–10.2)
CHLORIDE SERPL-SCNC: 103 MMOL/L (ref 96–108)
CHOLEST SERPL-MCNC: 141 MG/DL
CO2 SERPL-SCNC: 28 MMOL/L (ref 21–32)
CREAT SERPL-MCNC: 0.99 MG/DL (ref 0.6–1.3)
ERYTHROCYTE [DISTWIDTH] IN BLOOD BY AUTOMATED COUNT: 12.6 % (ref 11.6–15.1)
EST. AVERAGE GLUCOSE BLD GHB EST-MCNC: 126 MG/DL
GFR SERPL CREATININE-BSD FRML MDRD: 75 ML/MIN/1.73SQ M
GLUCOSE P FAST SERPL-MCNC: 103 MG/DL (ref 65–99)
HBA1C MFR BLD: 6 %
HCT VFR BLD AUTO: 44.7 % (ref 36.5–49.3)
HDLC SERPL-MCNC: 46 MG/DL
HGB BLD-MCNC: 14.7 G/DL (ref 12–17)
LDLC SERPL CALC-MCNC: 74 MG/DL (ref 0–100)
MCH RBC QN AUTO: 30.4 PG (ref 26.8–34.3)
MCHC RBC AUTO-ENTMCNC: 32.9 G/DL (ref 31.4–37.4)
MCV RBC AUTO: 93 FL (ref 82–98)
NONHDLC SERPL-MCNC: 95 MG/DL
PLATELET # BLD AUTO: 211 THOUSANDS/UL (ref 149–390)
PMV BLD AUTO: 11.2 FL (ref 8.9–12.7)
POTASSIUM SERPL-SCNC: 4.9 MMOL/L (ref 3.5–5.3)
PROT SERPL-MCNC: 6.8 G/DL (ref 6.4–8.4)
RBC # BLD AUTO: 4.83 MILLION/UL (ref 3.88–5.62)
SODIUM SERPL-SCNC: 139 MMOL/L (ref 135–147)
TRIGL SERPL-MCNC: 107 MG/DL
WBC # BLD AUTO: 7.27 THOUSAND/UL (ref 4.31–10.16)

## 2024-04-25 PROCEDURE — 36415 COLL VENOUS BLD VENIPUNCTURE: CPT

## 2024-04-25 PROCEDURE — 83036 HEMOGLOBIN GLYCOSYLATED A1C: CPT

## 2024-04-25 PROCEDURE — 80061 LIPID PANEL: CPT

## 2024-04-25 PROCEDURE — 80053 COMPREHEN METABOLIC PANEL: CPT

## 2024-04-25 PROCEDURE — 85027 COMPLETE CBC AUTOMATED: CPT

## 2024-05-01 ENCOUNTER — OFFICE VISIT (OUTPATIENT)
Dept: FAMILY MEDICINE CLINIC | Facility: CLINIC | Age: 74
End: 2024-05-01
Payer: COMMERCIAL

## 2024-05-01 VITALS
OXYGEN SATURATION: 96 % | BODY MASS INDEX: 31.87 KG/M2 | TEMPERATURE: 97 F | DIASTOLIC BLOOD PRESSURE: 78 MMHG | RESPIRATION RATE: 16 BRPM | WEIGHT: 215.2 LBS | HEART RATE: 61 BPM | HEIGHT: 69 IN | SYSTOLIC BLOOD PRESSURE: 122 MMHG

## 2024-05-01 DIAGNOSIS — F41.9 ANXIETY AND DEPRESSION: ICD-10-CM

## 2024-05-01 DIAGNOSIS — E66.9 OBESITY (BMI 30-39.9): ICD-10-CM

## 2024-05-01 DIAGNOSIS — G89.4 CHRONIC PAIN SYNDROME: ICD-10-CM

## 2024-05-01 DIAGNOSIS — I10 PRIMARY HYPERTENSION: ICD-10-CM

## 2024-05-01 DIAGNOSIS — I47.19 MULTIFOCAL ATRIAL TACHYCARDIA: ICD-10-CM

## 2024-05-01 DIAGNOSIS — R73.01 IMPAIRED FASTING GLUCOSE: ICD-10-CM

## 2024-05-01 DIAGNOSIS — E78.2 MIXED HYPERLIPIDEMIA: ICD-10-CM

## 2024-05-01 DIAGNOSIS — F32.A ANXIETY AND DEPRESSION: ICD-10-CM

## 2024-05-01 DIAGNOSIS — J41.1 MUCOPURULENT CHRONIC BRONCHITIS (HCC): Primary | ICD-10-CM

## 2024-05-01 PROCEDURE — 3288F FALL RISK ASSESSMENT DOCD: CPT | Performed by: FAMILY MEDICINE

## 2024-05-01 PROCEDURE — 3725F SCREEN DEPRESSION PERFORMED: CPT | Performed by: FAMILY MEDICINE

## 2024-05-01 PROCEDURE — 3078F DIAST BP <80 MM HG: CPT | Performed by: FAMILY MEDICINE

## 2024-05-01 PROCEDURE — 3074F SYST BP LT 130 MM HG: CPT | Performed by: FAMILY MEDICINE

## 2024-05-01 PROCEDURE — 1101F PT FALLS ASSESS-DOCD LE1/YR: CPT | Performed by: FAMILY MEDICINE

## 2024-05-01 PROCEDURE — G2211 COMPLEX E/M VISIT ADD ON: HCPCS | Performed by: FAMILY MEDICINE

## 2024-05-01 PROCEDURE — 1160F RVW MEDS BY RX/DR IN RCRD: CPT | Performed by: FAMILY MEDICINE

## 2024-05-01 PROCEDURE — 99214 OFFICE O/P EST MOD 30 MIN: CPT | Performed by: FAMILY MEDICINE

## 2024-05-01 PROCEDURE — 1159F MED LIST DOCD IN RCRD: CPT | Performed by: FAMILY MEDICINE

## 2024-05-01 NOTE — PROGRESS NOTES
Name: Morgan Rodriguez      : 1950      MRN: [id number]  Encounter Provider: Antonio Haider MD  Encounter Date: 2024   Encounter department: [facility]  Chief Complaint   Patient presents with    Follow-up     6 month f/u      Health Maintenance   Topic Date Due    PT PLAN OF CARE  10/30/2019    COVID-19 Vaccine ( season) 2024    Medicare Annual Wellness Visit (AWV)  10/30/2024    Fall Risk  2025    Depression Screening  2025    Colorectal Cancer Screening  2027    Hepatitis C Screening  Completed    Zoster Vaccine  Completed    Pneumococcal Vaccine: 65+ Years  Completed    Influenza Vaccine  Completed    HIB Vaccine  Aged Out    IPV Vaccine  Aged Out    Hepatitis A Vaccine  Aged Out    Meningococcal ACWY Vaccine  Aged Out    HPV Vaccine  Aged Out     Assessment & Plan     1. Mucopurulent chronic bronchitis (HCC)  Assessment & Plan:  Continue singulair daily. Use advair prn.       2. Primary hypertension  Assessment & Plan:  Controlled. DASH diet. Continue current meds.     Orders:  -     Comprehensive metabolic panel; Future; Expected date: 10/18/2024  -     Hemoglobin A1C; Future; Expected date: 10/18/2024  -     Lipid panel; Future; Expected date: 10/18/2024    3. Multifocal atrial tachycardia  Assessment & Plan:  FU cardiology.       4. Impaired fasting glucose  Assessment & Plan:  2024 hgA1C 6.0 stable. Low carb diet. Continue metformin per endocrinology.     Orders:  -     Comprehensive metabolic panel; Future; Expected date: 10/18/2024  -     Hemoglobin A1C; Future; Expected date: 10/18/2024  -     Lipid panel; Future; Expected date: 10/18/2024    5. Mixed hyperlipidemia  Assessment & Plan:  Low fat diet. Continue crestor per cardiology.     Orders:  -     Comprehensive metabolic panel; Future; Expected date: 10/18/2024  -     Hemoglobin A1C; Future; Expected date: 10/18/2024  -     Lipid panel; Future; Expected date: 10/18/2024    6.  Anxiety and depression  Assessment & Plan:  Stable. FU psychiatrist. Continue clonazepam 0.5mg qhs prn. SE educated pt.       7. Chronic pain syndrome  Assessment & Plan:  Continue diclofenac 75mg bid. FU pain specialist.       8. Obesity (BMI 30-39.9)        Depression Screening and Follow-up Plan: Patient was screened for depression during today's encounter. They screened negative with a PHQ-9 score of 2.      Reviewed lab in 4/2024  CBC normal  CMP Ok  HgA1C 6.0 stable  Lipid 141/107/46/74 ok    Flu shot yearly.   Got covid19 vaccine and boosters.   Got PCV13 and zostavax in 2016. Got pneumovax in 2018.   Got shingrix in pharmacy.   PSA yearly per urology. Pros and cons educated pt.   Colonoscopy 3/2017. Repeat in 10 years.   RTO in 6 months.       Subjective      HPI    Pt is here by himself.     COPD/chronic bronchitis---Controlled. He is on singulair 10mg QD.   He uses wixela in winter just in case.   Quit smoking in 2000.      IFG---He is on metformin 500mg QD per endocrinology. Denies SE. Tried to eat healthy.      MAT/HTN----FU cardiology for MAT, HTN. He is on metoprolol 75mg bid and nifedipine 90mg Qd.  Denies headache, vision change, dizzy, SOB, palpitation or chest pain.     Hyperlipidemia---He is on crestor 40mg qhs per cardiology. Denies side effects.      Anxiety/depression/Insomnia----FU psychiatry Dr. Dennis Q 3 month. He is on cymbalta 90mg QD which works for him. Use clonazepam 0.5mg qhs for insomnia. He is on gabapentin 300mg am and 1200mg pm per psychiatrist.      Chronic lower back pain--for years. Hx of back surgery in 2012 at [facility]. FU pain specialist, got injection but no help.  He is on diclofenac 75mg bid.        BPH---FU urology Q 6 months for BPH s/p TURP in 2013. Had cystoscopy and dilation in 9/2014. He does not need to self-cath now.   He is on oxybutynin and has dry mouth. He can tolerate.      Quit smoking since 20 years ago.   No alcohol.      Lives with wife. Does all ADL's. Walk  with cane if more than 1 block.   Denies recent falls.      Review of Systems   Constitutional:  Negative for appetite change, chills and fever.   HENT:  Negative for congestion, ear pain, sinus pain and sore throat.    Eyes:  Negative for discharge and itching.   Respiratory:  Negative for apnea, cough, chest tightness, shortness of breath and wheezing.    Cardiovascular:  Negative for chest pain, palpitations and leg swelling.   Gastrointestinal:  Negative for abdominal pain, anal bleeding, constipation, diarrhea, nausea and vomiting.   Endocrine: Negative for cold intolerance, heat intolerance and polyuria.   Genitourinary:  Negative for difficulty urinating and dysuria.   Musculoskeletal:  Positive for arthralgias, back pain and neck pain. Negative for myalgias.   Skin:  Negative for rash.   Neurological:  Negative for dizziness and headaches.   Psychiatric/Behavioral:  Negative for agitation.        Current Outpatient Medications on File Prior to Visit   Medication Sig    clonazePAM (KlonoPIN) 0.5 mg tablet Take 1 tablet (0.5 mg total) by mouth daily at bedtime    diclofenac (VOLTAREN) 75 mg EC tablet TAKE 1 TABLET (75 MG TOTAL) BY MOUTH 2 (TWO) TIMES A DAY AS NEEDED (PAIN)    DULoxetine (CYMBALTA) 30 mg delayed release capsule Take 90 mg by mouth daily     fluticasone (FLONASE) 50 mcg/act nasal spray fluticasone propionate 50 mcg/actuation nasal spray,suspension    Fluticasone-Salmeterol (Advair) 250-50 mcg/dose inhaler Inhale 1 puff 2 (two) times a day Rinse mouth after use.    gabapentin (NEURONTIN) 300 mg capsule Take 300 mg by mouth daily     metFORMIN (GLUCOPHAGE-XR) 500 mg 24 hr tablet Take 1 tablet (500 mg total) by mouth daily with dinner    metoprolol tartrate (LOPRESSOR) 50 mg tablet TAKE 1 TABLET (50 MG TOTAL) BY MOUTH 2 (TWO) TIMES A DAY TAKE WITH 25 MG TABS (Patient taking differently: 50 mg every 12 (twelve) hours)    montelukast (SINGULAIR) 10 mg tablet TAKE 1 TABLET BY MOUTH EVERYDAY AT BEDTIME  "   Multiple Vitamin (MULTI-VITAMIN DAILY) TABS Take 1 tablet by mouth daily    NIFEdipine ER (ADALAT CC) 60 MG 24 hr tablet TAKE 1 TABLET BY MOUTH EVERY DAY    oxybutynin (DITROPAN) 5 mg tablet Take 5 mg by mouth 2 (two) times a day as needed    rosuvastatin (CRESTOR) 40 MG tablet TAKE 1 TABLET BY MOUTH EVERY DAY    [DISCONTINUED] ciprofloxacin (CIPRO) 500 mg tablet Take 500 mg by mouth every 12 (twelve) hours Pt has 2-3 days left       Objective     /78   Pulse 61   Temp (!) 97 °F (36.1 °C) (Tympanic)   Resp 16   Ht 5' 8.5\" (1.74 m)   Wt 97.6 kg (215 lb 3.2 oz)   SpO2 96%   BMI 32.25 kg/m²     Physical Exam  Constitutional:       Appearance: He is well-developed.   HENT:      Head: Normocephalic and atraumatic.   Eyes:      General:         Right eye: No discharge.         Left eye: No discharge.      Conjunctiva/sclera: Conjunctivae normal.   Cardiovascular:      Rate and Rhythm: Normal rate and regular rhythm.      Heart sounds: Normal heart sounds. No murmur heard.     No friction rub. No gallop.   Pulmonary:      Effort: Pulmonary effort is normal. No respiratory distress.      Breath sounds: Normal breath sounds. No wheezing or rales.   Abdominal:      General: Bowel sounds are normal. There is no distension.      Palpations: Abdomen is soft.      Tenderness: There is no abdominal tenderness. There is no guarding.   Musculoskeletal:         General: Tenderness present.      Cervical back: Normal range of motion and neck supple. No tenderness.      Right lower leg: No edema.      Left lower leg: No edema.      Comments: Use cane   Lymphadenopathy:      Cervical: No cervical adenopathy.   Neurological:      Mental Status: He is alert.       Antonio Haider MD    "

## 2024-05-03 ENCOUNTER — VBI (OUTPATIENT)
Dept: ADMINISTRATIVE | Facility: OTHER | Age: 74
End: 2024-05-03

## 2024-05-09 ENCOUNTER — OFFICE VISIT (OUTPATIENT)
Dept: OBGYN CLINIC | Facility: OTHER | Age: 74
End: 2024-05-09
Payer: COMMERCIAL

## 2024-05-09 VITALS
WEIGHT: 215 LBS | HEART RATE: 55 BPM | BODY MASS INDEX: 31.84 KG/M2 | SYSTOLIC BLOOD PRESSURE: 146 MMHG | HEIGHT: 69 IN | DIASTOLIC BLOOD PRESSURE: 71 MMHG

## 2024-05-09 DIAGNOSIS — M19.012 PRIMARY OSTEOARTHRITIS OF LEFT SHOULDER: Primary | ICD-10-CM

## 2024-05-09 PROCEDURE — 99213 OFFICE O/P EST LOW 20 MIN: CPT | Performed by: ORTHOPAEDIC SURGERY

## 2024-05-09 PROCEDURE — 20610 DRAIN/INJ JOINT/BURSA W/O US: CPT | Performed by: ORTHOPAEDIC SURGERY

## 2024-05-09 RX ORDER — BETAMETHASONE SODIUM PHOSPHATE AND BETAMETHASONE ACETATE 3; 3 MG/ML; MG/ML
6 INJECTION, SUSPENSION INTRA-ARTICULAR; INTRALESIONAL; INTRAMUSCULAR; SOFT TISSUE
Status: COMPLETED | OUTPATIENT
Start: 2024-05-09 | End: 2024-05-09

## 2024-05-09 RX ORDER — BUPIVACAINE HYDROCHLORIDE 2.5 MG/ML
2 INJECTION, SOLUTION INFILTRATION; PERINEURAL
Status: COMPLETED | OUTPATIENT
Start: 2024-05-09 | End: 2024-05-09

## 2024-05-09 RX ADMIN — BETAMETHASONE SODIUM PHOSPHATE AND BETAMETHASONE ACETATE 6 MG: 3; 3 INJECTION, SUSPENSION INTRA-ARTICULAR; INTRALESIONAL; INTRAMUSCULAR; SOFT TISSUE at 13:00

## 2024-05-09 RX ADMIN — BUPIVACAINE HYDROCHLORIDE 2 ML: 2.5 INJECTION, SOLUTION INFILTRATION; PERINEURAL at 13:00

## 2024-05-09 NOTE — PROGRESS NOTES
"  Assessment  Diagnoses and all orders for this visit:    Primary osteoarthritis of left shoulder      Discussion and Plan:    The patient has an examination consistent with left shoulder osteoarthritis.  I have discussed with the patient the pathophysiology of this diagnosis and reviewed how the examination correlates with this diagnosis.  Surgical vs conservative treatment options were discussed at length and after discussing these treatment options, the patient elected for a repeat CS injection today.  Injection can be repeated in 4-6 mos if needed.  Explained the definitive treatment would be total shoulder arthoplasty.      Subjective:   Patient ID: Morgan Rodriguez is a 73 y.o. male      HPI  Patient presents today for follow up regarding left shoulder pain. Patient has known left shoulder GH osteoarthritis.  He has been treating conservatively and was provided with a CS injection on 11/06/23. Patient reports the injection lasted for 6 mos.  Patient reports increased pain with ROM and activities.      The following portions of the patient's history were reviewed and updated as appropriate: allergies, current medications, past family history, past medical history, past social history, past surgical history and problem list.        Objective:  /71 (BP Location: Right arm, Patient Position: Sitting, Cuff Size: Standard)   Pulse 55   Ht 5' 8.5\" (1.74 m)   Wt 97.5 kg (215 lb)   BMI 32.22 kg/m²       Left Shoulder Exam      Range of Motion   External rotation:  50   Forward flexion:  150      Muscle Strength   Abduction: 5/5   External rotation: 5/5      Other   Erythema: absent  Sensation: normal  Pulse: present     Physical Exam  Vitals reviewed.   Constitutional:       Appearance: He is well-developed.   HENT:      Head: Normocephalic.   Eyes:      Pupils: Pupils are equal, round, and reactive to light.   Pulmonary:      Effort: Pulmonary effort is normal.   Abdominal:      General: Abdomen is flat. " There is no distension.   Skin:     General: Skin is warm and dry.       Large joint arthrocentesis: R glenohumeral  [place] Protocol:  Consent: Verbal consent obtained.  Consent given by: patient  Patient understanding: patient states understanding of the procedure being performed  Site marked: the operative site was marked  Patient identity confirmed: verbally with patient  Supporting Documentation  Indications: pain   Procedure Details  Location: shoulder - R glenohumeral  Needle size: 22 G  Ultrasound guidance: no  Approach: lateral  Medications administered: 2 mL bupivacaine 0.25 %; 6 mg betamethasone acetate-betamethasone sodium phosphate 6 (3-3) mg/mL    Patient tolerance: patient tolerated the procedure well with no immediate complications  Dressing:  Sterile dressing applied           I have personally reviewed pertinent films in PACS and my interpretation is as follows.    X Ray Left Shoulder 11/6/23: Moderate glenohumeral joint osteoarthritis. No other acute osseous abnormalities.      Scribe Attestation      I,:  Teressa Gill am acting as a scribe while in the presence of the attending physician.:       I,:  Herman Douglas MD personally performed the services described in this documentation    as scribed in my presence.:

## 2024-05-14 DIAGNOSIS — F41.9 ANXIETY AND DEPRESSION: ICD-10-CM

## 2024-05-14 DIAGNOSIS — F32.A ANXIETY AND DEPRESSION: ICD-10-CM

## 2024-05-15 RX ORDER — CLONAZEPAM 0.5 MG/1
0.5 TABLET ORAL
Qty: 30 TABLET | Refills: 0 | Status: SHIPPED | OUTPATIENT
Start: 2024-05-15

## 2024-06-05 ENCOUNTER — OFFICE VISIT (OUTPATIENT)
Dept: FAMILY MEDICINE CLINIC | Facility: CLINIC | Age: 74
End: 2024-06-05
Payer: COMMERCIAL

## 2024-06-05 VITALS
HEART RATE: 71 BPM | WEIGHT: 223 LBS | OXYGEN SATURATION: 95 % | BODY MASS INDEX: 33.03 KG/M2 | SYSTOLIC BLOOD PRESSURE: 148 MMHG | RESPIRATION RATE: 18 BRPM | HEIGHT: 69 IN | TEMPERATURE: 98.7 F | DIASTOLIC BLOOD PRESSURE: 84 MMHG

## 2024-06-05 DIAGNOSIS — J44.1 COPD EXACERBATION (HCC): Primary | ICD-10-CM

## 2024-06-05 DIAGNOSIS — J44.9 CHRONIC OBSTRUCTIVE PULMONARY DISEASE, UNSPECIFIED COPD TYPE (HCC): ICD-10-CM

## 2024-06-05 PROCEDURE — G2211 COMPLEX E/M VISIT ADD ON: HCPCS | Performed by: FAMILY MEDICINE

## 2024-06-05 PROCEDURE — 99213 OFFICE O/P EST LOW 20 MIN: CPT | Performed by: FAMILY MEDICINE

## 2024-06-05 RX ORDER — DEXTROMETHORPHAN HYDROBROMIDE AND PROMETHAZINE HYDROCHLORIDE 15; 6.25 MG/5ML; MG/5ML
5 SYRUP ORAL 4 TIMES DAILY PRN
Qty: 118 ML | Refills: 0 | Status: SHIPPED | OUTPATIENT
Start: 2024-06-05

## 2024-06-05 RX ORDER — AZITHROMYCIN 250 MG/1
TABLET, FILM COATED ORAL
Qty: 6 TABLET | Refills: 0 | Status: SHIPPED | OUTPATIENT
Start: 2024-06-05 | End: 2024-06-06 | Stop reason: ALTCHOICE

## 2024-06-05 RX ORDER — FLUTICASONE PROPIONATE AND SALMETEROL 250; 50 UG/1; UG/1
1 POWDER RESPIRATORY (INHALATION) 2 TIMES DAILY
Qty: 60 BLISTER | Refills: 5 | Status: SHIPPED | OUTPATIENT
Start: 2024-06-05 | End: 2024-12-02

## 2024-06-05 RX ORDER — PREDNISONE 10 MG/1
TABLET ORAL
Qty: 28 TABLET | Refills: 0 | Status: SHIPPED | OUTPATIENT
Start: 2024-06-05 | End: 2024-06-06 | Stop reason: ALTCHOICE

## 2024-06-05 RX ORDER — ALBUTEROL SULFATE 2.5 MG/3ML
2.5 SOLUTION RESPIRATORY (INHALATION) EVERY 6 HOURS PRN
Qty: 180 ML | Refills: 2 | Status: SHIPPED | OUTPATIENT
Start: 2024-06-05 | End: 2024-06-07 | Stop reason: SDUPTHER

## 2024-06-05 NOTE — ASSESSMENT & PLAN NOTE
Give prednisone and zpack. SE educated pt. Give albuterol nebulizer solution. Continue advair bid.

## 2024-06-05 NOTE — PROGRESS NOTES
Ambulatory Visit  Name: Morgan Rodriguez      : 1950      MRN: [id number]  Encounter Provider: Antonio Haider MD  Encounter Date: 2024   Encounter department: [facility]  Chief Complaint   Patient presents with    Bronchitis     Possible bronchitis     Health Maintenance   Topic Date Due    RSV Vaccine Age 60+ Years (1 - 1-dose 60+ series) Never done    PT PLAN OF CARE  10/30/2019    Medicare Annual Wellness Visit (AWV)  10/30/2024    Fall Risk  2025    Depression Screening  2025    Colorectal Cancer Screening  2027    Hepatitis C Screening  Completed    Zoster Vaccine  Completed    Pneumococcal Vaccine: 65+ Years  Completed    Influenza Vaccine  Completed    COVID-19 Vaccine  Completed    RSV Vaccine age 0-20 Months  Aged Out    HIB Vaccine  Aged Out    IPV Vaccine  Aged Out    Hepatitis A Vaccine  Aged Out    Meningococcal ACWY Vaccine  Aged Out    HPV Vaccine  Aged Out     Assessment & Plan   1. COPD exacerbation (HCC)  Assessment & Plan:  Give prednisone and zpack. SE educated pt. Give albuterol nebulizer solution. Continue advair bid.   Orders:  -     albuterol (2.5 mg/3 mL) 0.083 % nebulizer solution; Take 3 mL (2.5 mg total) by nebulization every 6 (six) hours as needed for wheezing or shortness of breath  -     predniSONE 10 mg tablet; Take 4 tabs for 4 days, then 3 tabs for 2 days, then 2 tabs for 2 days and then 1 tab for 2 day  -     azithromycin (ZITHROMAX) 250 mg tablet; Take 2 tabs on day 1, then take 1 tab daily from day 2-day 5.  -     promethazine-dextromethorphan (PHENERGAN-DM) 6.25-15 mg/5 mL oral syrup; Take 5 mL by mouth 4 (four) times a day as needed for cough  2. Chronic obstructive pulmonary disease, unspecified COPD type (HCC)  -     Fluticasone-Salmeterol (Advair) 250-50 mcg/dose inhaler; Inhale 1 puff 2 (two) times a day Rinse mouth after use.       History of Present Illness     HPI    Pt is here by himself.     C/o cough for 4 days.  "  Dry cough, wheezing. Little SOB.   Denies fever.   Denies n/v/diarrhea.   Home covid19 test negative.   Hx of COPD. He is on advair 250-50 bid. Need refills.     Review of Systems   Constitutional:  Negative for appetite change, chills and fever.   HENT:  Negative for congestion, ear pain, sinus pain and sore throat.    Eyes:  Negative for discharge and itching.   Respiratory:  Positive for cough, shortness of breath and wheezing. Negative for apnea and chest tightness.    Cardiovascular:  Negative for chest pain, palpitations and leg swelling.   Gastrointestinal:  Negative for abdominal pain, anal bleeding, constipation, diarrhea, nausea and vomiting.   Endocrine: Negative for cold intolerance, heat intolerance and polyuria.   Genitourinary:  Negative for difficulty urinating and dysuria.   Musculoskeletal:  Negative for arthralgias, back pain and myalgias.   Skin:  Negative for rash.   Neurological:  Negative for dizziness and headaches.   Psychiatric/Behavioral:  Negative for agitation.        Objective     /84   Pulse 71   Temp 98.7 °F (37.1 °C) (Temporal)   Resp 18   Ht 5' 8.5\" (1.74 m)   Wt 101 kg (223 lb)   SpO2 95%   BMI 33.41 kg/m²     Physical Exam  Constitutional:       Appearance: He is well-developed.   HENT:      Head: Normocephalic and atraumatic.   Eyes:      General:         Right eye: No discharge.         Left eye: No discharge.      Conjunctiva/sclera: Conjunctivae normal.   Cardiovascular:      Rate and Rhythm: Normal rate and regular rhythm.      Heart sounds: Normal heart sounds. No murmur heard.     No friction rub. No gallop.   Pulmonary:      Effort: Pulmonary effort is normal. No respiratory distress.      Breath sounds: Rhonchi present. No wheezing or rales.   Abdominal:      General: Bowel sounds are normal. There is no distension.      Palpations: Abdomen is soft.      Tenderness: There is no abdominal tenderness. There is no guarding.   Musculoskeletal:         General: " Normal range of motion.      Cervical back: Normal range of motion and neck supple.   Neurological:      Mental Status: He is alert.

## 2024-06-06 ENCOUNTER — TELEPHONE (OUTPATIENT)
Age: 74
End: 2024-06-06

## 2024-06-06 DIAGNOSIS — U07.1 COVID-19: Primary | ICD-10-CM

## 2024-06-06 RX ORDER — NIRMATRELVIR AND RITONAVIR 300-100 MG
3 KIT ORAL 2 TIMES DAILY
Qty: 30 TABLET | Refills: 0 | Status: SHIPPED | OUTPATIENT
Start: 2024-06-06 | End: 2024-06-11

## 2024-06-06 NOTE — TELEPHONE ENCOUNTER
Please ask pt to stop prednisone and zpack. I sent paxlovid to his pharmacy which is antivirus medication. Go to ER if pulse ox<90%.

## 2024-06-06 NOTE — TELEPHONE ENCOUNTER
Called and spoke to  patient and made aware of recommendations. Patient will stop current medications

## 2024-06-06 NOTE — TELEPHONE ENCOUNTER
Patient called stating he was in office 6/5 and has since tested positive for Covid.  He wanted to know if he needs to do anything else since testing positive.  Patient stated his bronchitis symptoms are improving.  Please contact patient.

## 2024-06-07 DIAGNOSIS — J44.1 COPD EXACERBATION (HCC): ICD-10-CM

## 2024-06-07 RX ORDER — ALBUTEROL SULFATE 2.5 MG/3ML
2.5 SOLUTION RESPIRATORY (INHALATION) EVERY 6 HOURS PRN
Qty: 1080 ML | Refills: 2 | Status: SHIPPED | OUTPATIENT
Start: 2024-06-07

## 2024-06-07 NOTE — TELEPHONE ENCOUNTER
Pt called stating his  albuterol (2.5 mg/3 mL) 0.083 % nebulizer solution has not been sent to pharmacy yet. Pt requesting it please be sent to [facility]/pharmacy #[id number] - [place], [place] - [address]

## 2024-06-12 DIAGNOSIS — J44.1 COPD EXACERBATION (HCC): ICD-10-CM

## 2024-06-12 DIAGNOSIS — M47.816 LUMBAR SPONDYLOSIS: ICD-10-CM

## 2024-06-12 DIAGNOSIS — F32.A ANXIETY AND DEPRESSION: ICD-10-CM

## 2024-06-12 DIAGNOSIS — F41.9 ANXIETY AND DEPRESSION: ICD-10-CM

## 2024-06-12 RX ORDER — ALBUTEROL SULFATE 2.5 MG/3ML
2.5 SOLUTION RESPIRATORY (INHALATION) EVERY 6 HOURS PRN
Qty: 1080 ML | Refills: 0 | OUTPATIENT
Start: 2024-06-12

## 2024-06-12 RX ORDER — CLONAZEPAM 0.5 MG/1
0.5 TABLET ORAL
Qty: 30 TABLET | Refills: 0 | Status: SHIPPED | OUTPATIENT
Start: 2024-06-12

## 2024-06-13 RX ORDER — DICLOFENAC SODIUM 75 MG/1
75 TABLET, DELAYED RELEASE ORAL 2 TIMES DAILY PRN
Qty: 60 TABLET | Refills: 5 | Status: SHIPPED | OUTPATIENT
Start: 2024-06-13

## 2024-06-14 DIAGNOSIS — E78.2 MIXED HYPERLIPIDEMIA: ICD-10-CM

## 2024-06-14 DIAGNOSIS — R73.03 PREDIABETES: ICD-10-CM

## 2024-06-14 DIAGNOSIS — J41.1 MUCOPURULENT CHRONIC BRONCHITIS (HCC): ICD-10-CM

## 2024-06-14 RX ORDER — ROSUVASTATIN CALCIUM 40 MG/1
TABLET, COATED ORAL
Qty: 90 TABLET | Refills: 1 | Status: SHIPPED | OUTPATIENT
Start: 2024-06-14

## 2024-06-14 RX ORDER — MONTELUKAST SODIUM 10 MG/1
TABLET ORAL
Qty: 90 TABLET | Refills: 1 | Status: SHIPPED | OUTPATIENT
Start: 2024-06-14

## 2024-06-14 RX ORDER — METFORMIN HYDROCHLORIDE 500 MG/1
500 TABLET, EXTENDED RELEASE ORAL
Qty: 90 TABLET | Refills: 3 | Status: SHIPPED | OUTPATIENT
Start: 2024-06-14

## 2024-06-19 ENCOUNTER — TELEPHONE (OUTPATIENT)
Age: 74
End: 2024-06-19

## 2024-06-19 DIAGNOSIS — J02.9 SORE THROAT: ICD-10-CM

## 2024-06-19 DIAGNOSIS — J44.1 COPD EXACERBATION (HCC): ICD-10-CM

## 2024-06-19 DIAGNOSIS — R05.2 SUBACUTE COUGH: Primary | ICD-10-CM

## 2024-06-19 RX ORDER — LIDOCAINE HYDROCHLORIDE 20 MG/ML
15 SOLUTION OROPHARYNGEAL 4 TIMES DAILY PRN
Qty: 100 ML | Refills: 0 | Status: SHIPPED | OUTPATIENT
Start: 2024-06-19

## 2024-06-19 RX ORDER — BENZONATATE 200 MG/1
200 CAPSULE ORAL 3 TIMES DAILY PRN
Qty: 20 CAPSULE | Refills: 0 | Status: SHIPPED | OUTPATIENT
Start: 2024-06-19

## 2024-06-19 NOTE — TELEPHONE ENCOUNTER
"Patient called to report that last night he developed a sore throat and feels like his bronchitis is coming back. He said that last week he tested positive for covid and was given paxlovid to take, he did finish the course of medication. He said that after starting to feel not so good last night he tested for covid again and it is still positive and his daughter informed him about \"rebound covid\" for those who have taken paxlovid and he is curious as to if he needs to take anything else or what he should do since he's still not feeling well.      Please advise and call patient.   "

## 2024-06-19 NOTE — TELEPHONE ENCOUNTER
Usually symptomatic treatment. I will send cough medication to his pharmacy. He may use albuterol prn. Go to ER if SOB/pulse ox <90%.

## 2024-06-19 NOTE — TELEPHONE ENCOUNTER
Pt called back to see what Dr. Haider recommends regarding covid symptoms. Pt states he has no SOB, wheezing, fever, nausea, diarrhea, or vomiting. His pulse ox is about 90%.    He has a sore throat, chest congestion and chronic bronchitis. He would like to know what he can do to feel better.    Please advise patient.    Thanks

## 2024-06-19 NOTE — TELEPHONE ENCOUNTER
Any fever, wheezing or SOB, nausea, vomiting or diarrhea? What is his pulse ox at home? If Pulse ox less than 90%, go to ER.

## 2024-07-10 DIAGNOSIS — F32.A ANXIETY AND DEPRESSION: ICD-10-CM

## 2024-07-10 DIAGNOSIS — F41.9 ANXIETY AND DEPRESSION: ICD-10-CM

## 2024-07-10 RX ORDER — CLONAZEPAM 0.5 MG/1
0.5 TABLET ORAL
Qty: 30 TABLET | Refills: 0 | Status: SHIPPED | OUTPATIENT
Start: 2024-07-10

## 2024-08-07 DIAGNOSIS — F41.9 ANXIETY AND DEPRESSION: ICD-10-CM

## 2024-08-07 DIAGNOSIS — F32.A ANXIETY AND DEPRESSION: ICD-10-CM

## 2024-08-07 RX ORDER — CLONAZEPAM 0.5 MG/1
0.5 TABLET ORAL
Qty: 30 TABLET | Refills: 0 | Status: SHIPPED | OUTPATIENT
Start: 2024-08-07

## 2024-09-03 DIAGNOSIS — F32.A ANXIETY AND DEPRESSION: ICD-10-CM

## 2024-09-03 DIAGNOSIS — F41.9 ANXIETY AND DEPRESSION: ICD-10-CM

## 2024-09-03 RX ORDER — CLONAZEPAM 0.5 MG/1
0.5 TABLET ORAL
Qty: 30 TABLET | Refills: 0 | Status: SHIPPED | OUTPATIENT
Start: 2024-09-03

## 2024-09-12 DIAGNOSIS — I10 PRIMARY HYPERTENSION: ICD-10-CM

## 2024-09-30 ENCOUNTER — OFFICE VISIT (OUTPATIENT)
Dept: OBGYN CLINIC | Facility: OTHER | Age: 74
End: 2024-09-30
Payer: COMMERCIAL

## 2024-09-30 VITALS
SYSTOLIC BLOOD PRESSURE: 132 MMHG | DIASTOLIC BLOOD PRESSURE: 63 MMHG | BODY MASS INDEX: 34.36 KG/M2 | WEIGHT: 232 LBS | HEIGHT: 69 IN | HEART RATE: 69 BPM

## 2024-09-30 DIAGNOSIS — M19.012 PRIMARY OSTEOARTHRITIS OF LEFT SHOULDER: Primary | ICD-10-CM

## 2024-09-30 PROCEDURE — 99213 OFFICE O/P EST LOW 20 MIN: CPT | Performed by: ORTHOPAEDIC SURGERY

## 2024-09-30 PROCEDURE — 20610 DRAIN/INJ JOINT/BURSA W/O US: CPT | Performed by: ORTHOPAEDIC SURGERY

## 2024-09-30 RX ORDER — BETAMETHASONE SODIUM PHOSPHATE AND BETAMETHASONE ACETATE 3; 3 MG/ML; MG/ML
6 INJECTION, SUSPENSION INTRA-ARTICULAR; INTRALESIONAL; INTRAMUSCULAR; SOFT TISSUE
Status: COMPLETED | OUTPATIENT
Start: 2024-09-30 | End: 2024-09-30

## 2024-09-30 RX ORDER — BUPIVACAINE HYDROCHLORIDE 2.5 MG/ML
2 INJECTION, SOLUTION INFILTRATION; PERINEURAL
Status: COMPLETED | OUTPATIENT
Start: 2024-09-30 | End: 2024-09-30

## 2024-09-30 RX ADMIN — BUPIVACAINE HYDROCHLORIDE 2 ML: 2.5 INJECTION, SOLUTION INFILTRATION; PERINEURAL at 14:45

## 2024-09-30 RX ADMIN — BETAMETHASONE SODIUM PHOSPHATE AND BETAMETHASONE ACETATE 6 MG: 3; 3 INJECTION, SUSPENSION INTRA-ARTICULAR; INTRALESIONAL; INTRAMUSCULAR; SOFT TISSUE at 14:45

## 2024-09-30 NOTE — PROGRESS NOTES
"  Assessment  Diagnoses and all orders for this visit:    Primary osteoarthritis of left shoulder      Discussion and Plan:    The patient has an examination consistent with left shoulder osteoarthritis.  I have discussed with the patient the pathophysiology of this diagnosis and reviewed how the examination correlates with this diagnosis.  Surgical vs conservative treatment options were discussed at length and after discussing these treatment options, the patient elected for a repeat CS injection today.  Injection can be repeated in 4-6 mos if needed.  Explained the definitive treatment would be total shoulder arthoplasty.      Subjective:   Patient ID: Morgan Rodriguez is a 74 y.o. male      HPI  Patient presents today for follow up regarding left shoulder pain. Patient has known left shoulder GH osteoarthritis.  He has been treating conservatively and was provided with a CS injection on 5/09/24. Patient reports the injection lasted for 4 mos.  Patient reports increased pain with ROM and activities.      The following portions of the patient's history were reviewed and updated as appropriate: allergies, current medications, past family history, past medical history, past social history, past surgical history and problem list.        Objective:  /63   Pulse 69   Ht 5' 8.5\" (1.74 m)   Wt 105 kg (232 lb)   BMI 34.76 kg/m²       Left Shoulder Exam      Range of Motion   External rotation:  50   Forward flexion:  160      Muscle Strength   Abduction: 5/5   External rotation: 5/5      Other   Erythema: absent  Sensation: normal  Pulse: present     Positive Pop-eye deformity     Physical Exam  Vitals reviewed.   Constitutional:       Appearance: He is well-developed.   HENT:      Head: Normocephalic.   Eyes:      Pupils: Pupils are equal, round, and reactive to light.   Pulmonary:      Effort: Pulmonary effort is normal.   Abdominal:      General: Abdomen is flat. There is no distension.   Skin:     " General: Skin is warm and dry.       Large joint arthrocentesis: L glenohumeral  Universal Protocol:  procedure performed by consultantConsent: Verbal consent obtained.  Consent given by: patient  Patient understanding: patient states understanding of the procedure being performed  Site marked: the operative site was marked  Patient identity confirmed: verbally with patient  Supporting Documentation  Indications: pain   Procedure Details  Location: shoulder - L glenohumeral  Needle size: 22 G  Ultrasound guidance: no  Approach: lateral  Medications administered: 2 mL bupivacaine 0.25 %; 6 mg betamethasone acetate-betamethasone sodium phosphate 6 (3-3) mg/mL    Patient tolerance: patient tolerated the procedure well with no immediate complications  Dressing:  Sterile dressing applied           I have personally reviewed pertinent films in PACS and my interpretation is as follows.    X Ray Left Shoulder 11/6/23: Moderate glenohumeral joint osteoarthritis. No other acute osseous abnormalities.      Scribe Attestation      I,:  Teressa Gill MA am acting as a scribe while in the presence of the attending physician.:       I,:  Herman Douglas MD personally performed the services described in this documentation    as scribed in my presence.:

## 2024-10-01 DIAGNOSIS — F41.9 ANXIETY AND DEPRESSION: ICD-10-CM

## 2024-10-01 DIAGNOSIS — F32.A ANXIETY AND DEPRESSION: ICD-10-CM

## 2024-10-01 RX ORDER — CLONAZEPAM 0.5 MG/1
0.5 TABLET ORAL
Qty: 30 TABLET | Refills: 0 | Status: SHIPPED | OUTPATIENT
Start: 2024-10-01

## 2024-10-22 ENCOUNTER — LAB (OUTPATIENT)
Dept: LAB | Facility: CLINIC | Age: 74
End: 2024-10-22
Payer: COMMERCIAL

## 2024-10-22 DIAGNOSIS — I10 PRIMARY HYPERTENSION: ICD-10-CM

## 2024-10-22 DIAGNOSIS — R73.01 IMPAIRED FASTING GLUCOSE: ICD-10-CM

## 2024-10-22 DIAGNOSIS — E78.2 MIXED HYPERLIPIDEMIA: ICD-10-CM

## 2024-10-22 LAB
ALBUMIN SERPL BCG-MCNC: 4.1 G/DL (ref 3.5–5)
ALP SERPL-CCNC: 51 U/L (ref 34–104)
ALT SERPL W P-5'-P-CCNC: 21 U/L (ref 7–52)
ANION GAP SERPL CALCULATED.3IONS-SCNC: 11 MMOL/L (ref 4–13)
AST SERPL W P-5'-P-CCNC: 22 U/L (ref 13–39)
BILIRUB SERPL-MCNC: 0.59 MG/DL (ref 0.2–1)
BUN SERPL-MCNC: 14 MG/DL (ref 5–25)
CALCIUM SERPL-MCNC: 9 MG/DL (ref 8.4–10.2)
CHLORIDE SERPL-SCNC: 104 MMOL/L (ref 96–108)
CHOLEST SERPL-MCNC: 167 MG/DL
CO2 SERPL-SCNC: 25 MMOL/L (ref 21–32)
CREAT SERPL-MCNC: 0.9 MG/DL (ref 0.6–1.3)
EST. AVERAGE GLUCOSE BLD GHB EST-MCNC: 126 MG/DL
GFR SERPL CREATININE-BSD FRML MDRD: 84 ML/MIN/1.73SQ M
GLUCOSE P FAST SERPL-MCNC: 93 MG/DL (ref 65–99)
HBA1C MFR BLD: 6 %
HDLC SERPL-MCNC: 46 MG/DL
LDLC SERPL CALC-MCNC: 96 MG/DL (ref 0–100)
NONHDLC SERPL-MCNC: 121 MG/DL
POTASSIUM SERPL-SCNC: 4.1 MMOL/L (ref 3.5–5.3)
PROT SERPL-MCNC: 7 G/DL (ref 6.4–8.4)
SODIUM SERPL-SCNC: 140 MMOL/L (ref 135–147)
TRIGL SERPL-MCNC: 125 MG/DL

## 2024-10-22 PROCEDURE — 83036 HEMOGLOBIN GLYCOSYLATED A1C: CPT

## 2024-10-22 PROCEDURE — 80061 LIPID PANEL: CPT

## 2024-10-22 PROCEDURE — 80053 COMPREHEN METABOLIC PANEL: CPT

## 2024-10-22 PROCEDURE — 36415 COLL VENOUS BLD VENIPUNCTURE: CPT

## 2024-10-28 DIAGNOSIS — F32.A ANXIETY AND DEPRESSION: ICD-10-CM

## 2024-10-28 DIAGNOSIS — F41.9 ANXIETY AND DEPRESSION: ICD-10-CM

## 2024-10-28 RX ORDER — CLONAZEPAM 0.5 MG/1
0.5 TABLET ORAL
Qty: 30 TABLET | Refills: 0 | Status: SHIPPED | OUTPATIENT
Start: 2024-10-28

## 2024-11-06 ENCOUNTER — RA CDI HCC (OUTPATIENT)
Dept: OTHER | Facility: HOSPITAL | Age: 74
End: 2024-11-06

## 2024-11-11 ENCOUNTER — ESTABLISHED COMPREHENSIVE EXAM (OUTPATIENT)
Dept: URBAN - METROPOLITAN AREA CLINIC 6 | Facility: CLINIC | Age: 74
End: 2024-11-11

## 2024-11-11 DIAGNOSIS — H25.813: ICD-10-CM

## 2024-11-11 DIAGNOSIS — H43.391: ICD-10-CM

## 2024-11-11 DIAGNOSIS — H04.123: ICD-10-CM

## 2024-11-11 PROCEDURE — 92014 COMPRE OPH EXAM EST PT 1/>: CPT

## 2024-11-11 ASSESSMENT — VISUAL ACUITY
OU_CC: J1
OS_CC: 20/40-1
OD_PH: 20/30
OD_CC: 20/40-2

## 2024-11-11 ASSESSMENT — TONOMETRY
OD_IOP_MMHG: 9
OS_IOP_MMHG: 10

## 2024-11-12 ENCOUNTER — OFFICE VISIT (OUTPATIENT)
Dept: FAMILY MEDICINE CLINIC | Facility: CLINIC | Age: 74
End: 2024-11-12
Payer: COMMERCIAL

## 2024-11-12 VITALS
HEART RATE: 72 BPM | TEMPERATURE: 96.2 F | HEIGHT: 69 IN | WEIGHT: 234.2 LBS | SYSTOLIC BLOOD PRESSURE: 140 MMHG | DIASTOLIC BLOOD PRESSURE: 80 MMHG | BODY MASS INDEX: 34.69 KG/M2 | OXYGEN SATURATION: 92 % | RESPIRATION RATE: 16 BRPM

## 2024-11-12 DIAGNOSIS — R73.01 IMPAIRED FASTING GLUCOSE: ICD-10-CM

## 2024-11-12 DIAGNOSIS — M54.16 LUMBAR RADICULOPATHY: ICD-10-CM

## 2024-11-12 DIAGNOSIS — F41.9 ANXIETY AND DEPRESSION: ICD-10-CM

## 2024-11-12 DIAGNOSIS — J41.0 SIMPLE CHRONIC BRONCHITIS (HCC): Primary | ICD-10-CM

## 2024-11-12 DIAGNOSIS — Z00.00 MEDICARE ANNUAL WELLNESS VISIT, SUBSEQUENT: ICD-10-CM

## 2024-11-12 DIAGNOSIS — E78.2 MIXED HYPERLIPIDEMIA: ICD-10-CM

## 2024-11-12 DIAGNOSIS — F32.A ANXIETY AND DEPRESSION: ICD-10-CM

## 2024-11-12 DIAGNOSIS — I10 PRIMARY HYPERTENSION: ICD-10-CM

## 2024-11-12 PROCEDURE — G0439 PPPS, SUBSEQ VISIT: HCPCS | Performed by: FAMILY MEDICINE

## 2024-11-12 PROCEDURE — 99214 OFFICE O/P EST MOD 30 MIN: CPT | Performed by: FAMILY MEDICINE

## 2024-11-12 NOTE — PROGRESS NOTES
Ambulatory Visit  Name: Morgan Rodriguez      : 1950      MRN: [id number]  Encounter Provider: Antonio Haider MD  Encounter Date: 2024   Encounter department: [facility]  Chief Complaint   Patient presents with    Medicare Wellness Visit     Subsequent Visit    Follow-up     6 month f/u      Health Maintenance   Topic Date Due    RSV Vaccine Age 60+ Years (1 - 1-dose 60+ series) Never done    PT PLAN OF CARE  10/30/2019    Medicare Annual Wellness Visit (AWV)  10/30/2024    Depression Screening  2025    Fall Risk  2025    Colorectal Cancer Screening  2027    Hepatitis C Screening  Completed    Zoster Vaccine  Completed    Pneumococcal Vaccine: 65+ Years  Completed    Influenza Vaccine  Completed    COVID-19 Vaccine  Completed    RSV Vaccine age 0-20 Months  Aged Out    HIB Vaccine  Aged Out    IPV Vaccine  Aged Out    Hepatitis A Vaccine  Aged Out    Meningococcal ACWY Vaccine  Aged Out    HPV Vaccine  Aged Out     Assessment & Plan  Simple chronic bronchitis (HCC)  Continue advair prn.   Orders:    CBC; Future    Comprehensive metabolic panel; Future    Hemoglobin A1C; Future    Lipid panel; Future    Impaired fasting glucose  Low carb diet. Continue metformin 500mg QD.   Orders:    CBC; Future    Comprehensive metabolic panel; Future    Hemoglobin A1C; Future    Lipid panel; Future    Mixed hyperlipidemia  Low fat diet. Continue crestor 40mg qhs.   Orders:    CBC; Future    Comprehensive metabolic panel; Future    Hemoglobin A1C; Future    Lipid panel; Future    Primary hypertension  Controlled. DASH diet. Continue metoprolol  and nifedipine.   Orders:    CBC; Future    Comprehensive metabolic panel; Future    Hemoglobin A1C; Future    Lipid panel; Future    Lumbar radiculopathy  Continue diclofenac prn.  Orders:    CBC; Future    Comprehensive metabolic panel; Future    Hemoglobin A1C; Future    Lipid panel; Future    Anxiety and depression  Stable. FU  psychiatry. Continue clonazepam 0.5mg qhs.     Orders:    CBC; Future    Comprehensive metabolic panel; Future    Hemoglobin A1C; Future    Lipid panel; Future    Medicare annual wellness visit, subsequent            Reviewed lab in 10/2024  CMP ok  HgA1C 6.0 stable  Lipid 167/125/46/96 ok    Flu shot yearly. Got it already.   Got covid19 vaccine and boosters. Got it this season.   Got PCV13 and zostavax in 2016. Got pneumovax in 2018.   Got shingrix in pharmacy.   PSA yearly per urology. Pros and cons educated pt.   Colonoscopy 3/2017. Repeat in 10 years.   RTO in 6 months.     POA---wife  Living will---DNR if end of life      Preventive health issues were discussed with patient, and age appropriate screening tests were ordered as noted in patient's After Visit Summary. Personalized health advice and appropriate referrals for health education or preventive services given if needed, as noted in patient's After Visit Summary.    History of Present Illness     HPI     Pt is here by himself.      COPD/chronic bronchitis---Controlled. He is on singulair 10mg QD.   He uses wixela in winter just in case.   Quit smoking in 2000.      IFG---He is on metformin 500mg QD per endocrinology. Denies SE. Tried to eat healthy.      MAT/HTN----FU cardiology for MAT, HTN. He is on metoprolol 75mg bid and nifedipine 90mg Qd.  Denies headache, vision change, dizzy, SOB, palpitation or chest pain.     Hyperlipidemia---He is on crestor 40mg qhs per cardiology. Denies side effects.      Anxiety/depression/Insomnia----FU psychiatry Dr. Dennis Q 3 month. He is on cymbalta 90mg QD which works for him. Use clonazepam 0.5mg qhs for insomnia. He is on gabapentin 300mg am and 1200mg pm per psychiatrist.       Chronic lower back pain--for years. Hx of back surgery in 2012 at [facility]. FU pain specialist, got injection but no help.  He is on diclofenac 75mg bid.        BPH---FU urology Q 6 months for BPH s/p TURP in 2013. Had cystoscopy and dilation  in 9/2014. He does not need to self-cath now.   He is on oxybutynin and has dry mouth. He can tolerate.      Quit smoking since 20 years ago.   No alcohol.      Lives with wife. Does all ADL's. Walk with cane if more than 1 block.   Fell 1 week ago, no injury.       Patient Care Team:  Antonio Haider MD as PCP - General  MD Franky Rodriguez DO Mohamed Abdulhafid Turki, MD Fan Cheng, MD Priya D Rana, PA-C as Physician Assistant (Endocrinology)  Leonard Jhaveri MD (Endocrinology)  Emery Ann DO (Pain Medicine)    Review of Systems   Constitutional:  Negative for appetite change, chills and fever.   HENT:  Negative for congestion, ear pain, sinus pain and sore throat.    Eyes:  Negative for discharge and itching.   Respiratory:  Negative for apnea, cough, chest tightness, shortness of breath and wheezing.    Cardiovascular:  Negative for chest pain, palpitations and leg swelling.   Gastrointestinal:  Negative for abdominal pain, anal bleeding, constipation, diarrhea, nausea and vomiting.   Endocrine: Negative for cold intolerance, heat intolerance and polyuria.   Genitourinary:  Negative for difficulty urinating and dysuria.   Musculoskeletal:  Positive for back pain and gait problem. Negative for arthralgias and myalgias.   Skin:  Negative for rash.   Neurological:  Negative for dizziness and headaches.   Psychiatric/Behavioral:  Negative for agitation.      Medical History Reviewed by provider this encounter:  Problems  Med Hx  Surg Hx       Annual Wellness Visit Questionnaire   Mogran is here for his Subsequent Wellness visit.     Health Risk Assessment:   Patient rates overall health as good. Patient feels that their physical health rating is slightly worse. Patient is satisfied with their life. Eyesight was rated as same. Hearing was rated as same. Patient feels that their emotional and mental health rating is same. Patients states they are never, rarely angry. Patient states they are  sometimes unusually tired/fatigued. Pain experienced in the last 7 days has been some. Patient's pain rating has been 5/10. Patient states that he has experienced no weight loss or gain in last 6 months.     Depression Screening:   PHQ-9 Score: 1      Fall Risk Screening:   In the past year, patient has experienced: history of falling in past year      Home Safety:  Patient does not have trouble with stairs inside or outside of their home. Patient has working smoke alarms and has working carbon monoxide detector. Home safety hazards include: none.     Nutrition:   Current diet is Regular.     Medications:   Patient is not currently taking any over-the-counter supplements. Patient is able to manage medications.     Activities of Daily Living (ADLs)/Instrumental Activities of Daily Living (IADLs):   Walk and transfer into and out of bed and chair?: Yes  Dress and groom yourself?: Yes    Bathe or shower yourself?: Yes    Feed yourself? Yes  Do your laundry/housekeeping?: Yes  Manage your money, pay your bills and track your expenses?: Yes  Make your own meals?: Yes    Do your own shopping?: Yes    Previous Hospitalizations:   Any hospitalizations or ED visits within the last 12 months?: No      Advance Care Planning:   Living will: Yes    Durable POA for healthcare: Yes    Advanced directive: Yes    End of Life Decisions reviewed with patient: Yes    Provider agrees with end of life decisions: Yes      Cognitive Screening:   Provider or family/friend/caregiver concerned regarding cognition?: No    PREVENTIVE SCREENINGS      Cardiovascular Screening:    General: History Lipid Disorder and Screening Current      Diabetes Screening:     General: Screening Current      Colorectal Cancer Screening:     General: Screening Current      Abdominal Aortic Aneurysm (AAA) Screening:    Risk factors include: age between 65-74 yo and tobacco use        Lung Cancer Screening:     General: Screening Not Indicated      Hepatitis C  "Screening:    General: Screening Current    Screening, Brief Intervention, and Referral to Treatment (SBIRT)    Screening  Typical number of drinks in a day: 0  Typical number of drinks in a week: 0  Interpretation: Low risk drinking behavior.    AUDIT-C Screenin) How often did you have a drink containing alcohol in the past year? monthly or less  2) How many drinks did you have on a typical day when you were drinking in the past year? 3 to 4  3) How often did you have 6 or more drinks on one occasion in the past year? never    AUDIT-C Score: 1  Interpretation: Score 0-3 (male): Negative screen for alcohol misuse    Single Item Drug Screening:  How often have you used an illegal drug (including marijuana) or a prescription medication for non-medical reasons in the past year? never    Single Item Drug Screen Score: 0  Interpretation: Negative screen for possible drug use disorder    Social Determinants of Health     Financial Resource Strain: Low Risk  (10/23/2023)    Overall Financial Resource Strain (CARDIA)     Difficulty of Paying Living Expenses: Not very hard   Food Insecurity: No Food Insecurity (2024)    Hunger Vital Sign     Worried About Running Out of Food in the Last Year: Never true     Ran Out of Food in the Last Year: Never true   Transportation Needs: No Transportation Needs (2024)    PRAPARE - Transportation     Lack of Transportation (Medical): No     Lack of Transportation (Non-Medical): No   Housing Stability: Low Risk  (2024)    Housing Stability Vital Sign     Unable to Pay for Housing in the Last Year: No     Number of Times Moved in the Last Year: 0     Homeless in the Last Year: No   Utilities: Not At Risk (2024)    [facility] Utilities     Threatened with loss of utilities: No     No results found.    Objective     /80   Pulse 72   Temp (!) 96.2 °F (35.7 °C) (Tympanic)   Resp 16   Ht 5' 8.5\" (1.74 m)   Wt 106 kg (234 lb 3.2 oz)   SpO2 92%   BMI 35.09 kg/m² "     Physical Exam  Vitals reviewed.   Constitutional:       Appearance: Normal appearance.   HENT:      Head: Normocephalic and atraumatic.   Eyes:      General:         Right eye: No discharge.         Left eye: No discharge.      Conjunctiva/sclera: Conjunctivae normal.   Neck:      Vascular: No carotid bruit.   Cardiovascular:      Rate and Rhythm: Normal rate and regular rhythm.      Heart sounds: Normal heart sounds. No murmur heard.     No friction rub. No gallop.   Pulmonary:      Effort: Pulmonary effort is normal. No respiratory distress.      Breath sounds: Normal breath sounds. No wheezing or rales.   Abdominal:      General: Bowel sounds are normal. There is no distension.      Palpations: Abdomen is soft.      Tenderness: There is no abdominal tenderness.   Musculoskeletal:         General: No swelling, tenderness or deformity.      Cervical back: Normal range of motion and neck supple. No muscular tenderness.      Comments: Use cane   Lymphadenopathy:      Cervical: No cervical adenopathy.   Neurological:      Mental Status: He is alert.   Psychiatric:         Mood and Affect: Mood normal.

## 2024-11-12 NOTE — ASSESSMENT & PLAN NOTE
Low fat diet. Continue crestor 40mg qhs.   Orders:    CBC; Future    Comprehensive metabolic panel; Future    Hemoglobin A1C; Future    Lipid panel; Future

## 2024-11-12 NOTE — ASSESSMENT & PLAN NOTE
Continue diclofenac prn.  Orders:    CBC; Future    Comprehensive metabolic panel; Future    Hemoglobin A1C; Future    Lipid panel; Future

## 2024-11-12 NOTE — ASSESSMENT & PLAN NOTE
Low carb diet. Continue metformin 500mg QD.   Orders:    CBC; Future    Comprehensive metabolic panel; Future    Hemoglobin A1C; Future    Lipid panel; Future

## 2024-11-12 NOTE — PATIENT INSTRUCTIONS
Medicare Preventive Visit Patient Instructions  Thank you for completing your Welcome to Medicare Visit or Medicare Annual Wellness Visit today. Your next wellness visit will be due in one year (11/13/2025).  The screening/preventive services that you may require over the next 5-10 years are detailed below. Some tests may not apply to you based off risk factors and/or age. Screening tests ordered at today's visit but not completed yet may show as past due. Also, please note that scanned in results may not display below.  Preventive Screenings:  Service Recommendations Previous Testing/Comments   Colorectal Cancer Screening  Colonoscopy    Fecal Occult Blood Test (FOBT)/Fecal Immunochemical Test (FIT)  Fecal DNA/Cologuard Test  Flexible Sigmoidoscopy Age: 45-75 years old   Colonoscopy: every 10 years (May be performed more frequently if at higher risk)  OR  FOBT/FIT: every 1 year  OR  Cologuard: every 3 years  OR  Sigmoidoscopy: every 5 years  Screening may be recommended earlier than age 45 if at higher risk for colorectal cancer. Also, an individualized decision between you and your healthcare provider will decide whether screening between the ages of 76-85 would be appropriate. Colonoscopy: 03/24/2017  FOBT/FIT: Not on file  Cologuard: Not on file  Sigmoidoscopy: Not on file    Screening Current     Prostate Cancer Screening Individualized decision between patient and health care provider in men between ages of 55-69   Medicare will cover every 12 months beginning on the day after your 50th birthday PSA: No results in last 5 years           Hepatitis C Screening Once for adults born between 1945 and 1965  More frequently in patients at high risk for Hepatitis C Hep C Antibody: 09/12/2018    Screening Current   Diabetes Screening 1-2 times per year if you're at risk for diabetes or have pre-diabetes Fasting glucose: 93 mg/dL (10/22/2024)  A1C: 6.0 % (10/22/2024)  Screening Current   Cholesterol Screening Once every  5 years if you don't have a lipid disorder. May order more often based on risk factors. Lipid panel: 10/22/2024  Screening Not Indicated  History Lipid Disorder      Other Preventive Screenings Covered by Medicare:  Abdominal Aortic Aneurysm (AAA) Screening: covered once if your at risk. You're considered to be at risk if you have a family history of AAA or a male between the age of 65-75 who smoking at least 100 cigarettes in your lifetime.  Lung Cancer Screening: covers low dose CT scan once per year if you meet all of the following conditions: (1) Age 55-77; (2) No signs or symptoms of lung cancer; (3) Current smoker or have quit smoking within the last 15 years; (4) You have a tobacco smoking history of at least 20 pack years (packs per day x number of years you smoked); (5) You get a written order from a healthcare provider.  Glaucoma Screening: covered annually if you're considered high risk: (1) You have diabetes OR (2) Family history of glaucoma OR (3)  aged 50 and older OR (4)  American aged 65 and older  Osteoporosis Screening: covered every 2 years if you meet one of the following conditions: (1) Have a vertebral abnormality; (2) On glucocorticoid therapy for more than 3 months; (3) Have primary hyperparathyroidism; (4) On osteoporosis medications and need to assess response to drug therapy.  HIV Screening: covered annually if you're between the age of 15-65. Also covered annually if you are younger than 15 and older than 65 with risk factors for HIV infection. For pregnant patients, it is covered up to 3 times per pregnancy.    Immunizations:  Immunization Recommendations   Influenza Vaccine Annual influenza vaccination during flu season is recommended for all persons aged >= 6 months who do not have contraindications   Pneumococcal Vaccine   * Pneumococcal conjugate vaccine = PCV13 (Prevnar 13), PCV15 (Vaxneuvance), PCV20 (Prevnar 20)  * Pneumococcal polysaccharide vaccine =  PPSV23 (Pneumovax) Adults 19-63 yo with certain risk factors or if 65+ yo  If never received any pneumonia vaccine: recommend Prevnar 20 (PCV20)  Give PCV20 if previously received 1 dose of PCV13 or PPSV23   Hepatitis B Vaccine 3 dose series if at intermediate or high risk (ex: diabetes, end stage renal disease, liver disease)   Respiratory syncytial virus (RSV) Vaccine - COVERED BY MEDICARE PART D  * RSVPreF3 (Arexvy) CDC recommends that adults 60 years of age and older may receive a single dose of RSV vaccine using shared clinical decision-making (SCDM)   Tetanus (Td) Vaccine - COST NOT COVERED BY MEDICARE PART B Following completion of primary series, a booster dose should be given every 10 years to maintain immunity against tetanus. Td may also be given as tetanus wound prophylaxis.   Tdap Vaccine - COST NOT COVERED BY MEDICARE PART B Recommended at least once for all adults. For pregnant patients, recommended with each pregnancy.   Shingles Vaccine (Shingrix) - COST NOT COVERED BY MEDICARE PART B  2 shot series recommended in those 19 years and older who have or will have weakened immune systems or those 50 years and older     Health Maintenance Due:      Topic Date Due   • Colorectal Cancer Screening  03/24/2027   • Hepatitis C Screening  Completed     Immunizations Due:  There are no preventive care reminders to display for this patient.  Advance Directives   What are advance directives?  Advance directives are legal documents that state your wishes and plans for medical care. These plans are made ahead of time in case you lose your ability to make decisions for yourself. Advance directives can apply to any medical decision, such as the treatments you want, and if you want to donate organs.   What are the types of advance directives?  There are many types of advance directives, and each state has rules about how to use them. You may choose a combination of any of the following:  Living will:  This is a  written record of the treatment you want. You can also choose which treatments you do not want, which to limit, and which to stop at a certain time. This includes surgery, medicine, IV fluid, and tube feedings.   Durable power of  for healthcare (DPAHC):  This is a written record that states who you want to make healthcare choices for you when you are unable to make them for yourself. This person, called a proxy, is usually a family member or a friend. You may choose more than 1 proxy.  Do not resuscitate (DNR) order:  A DNR order is used in case your heart stops beating or you stop breathing. It is a request not to have certain forms of treatment, such as CPR. A DNR order may be included in other types of advance directives.  Medical directive:  This covers the care that you want if you are in a coma, near death, or unable to make decisions for yourself. You can list the treatments you want for each condition. Treatment may include pain medicine, surgery, blood transfusions, dialysis, IV or tube feedings, and a ventilator (breathing machine).  Values history:  This document has questions about your views, beliefs, and how you feel and think about life. This information can help others choose the care that you would choose.  Why are advance directives important?  An advance directive helps you control your care. Although spoken wishes may be used, it is better to have your wishes written down. Spoken wishes can be misunderstood, or not followed. Treatments may be given even if you do not want them. An advance directive may make it easier for your family to make difficult choices about your care.   Fall Prevention    Fall prevention  includes ways to make your home and other areas safer. It also includes ways you can move more carefully to prevent a fall. Health conditions that cause changes in your blood pressure, vision, or muscle strength and coordination may increase your risk for falls. Medicines may  also increase your risk for falls if they make you dizzy, weak, or sleepy.   Fall prevention tips:   Stand or sit up slowly.    Use assistive devices as directed.    Wear shoes that fit well and have soles that .    Wear a personal alarm.    Stay active.    Manage your medical conditions.    Home Safety Tips:  Add items to prevent falls in the bathroom.    Keep paths clear.    Install bright lights in your home.    Keep items you use often on shelves within reach.    Paint or place reflective tape on the edges of your stairs.    Weight Management   Why it is important to manage your weight:  Being overweight increases your risk of health conditions such as heart disease, high blood pressure, type 2 diabetes, and certain types of cancer. It can also increase your risk for osteoarthritis, sleep apnea, and other respiratory problems. Aim for a slow, steady weight loss. Even a small amount of weight loss can lower your risk of health problems.  How to lose weight safely:  A safe and healthy way to lose weight is to eat fewer calories and get regular exercise. You can lose up about 1 pound a week by decreasing the number of calories you eat by 500 calories each day.   Healthy meal plan for weight management:  A healthy meal plan includes a variety of foods, contains fewer calories, and helps you stay healthy. A healthy meal plan includes the following:  Eat whole-grain foods more often.  A healthy meal plan should contain fiber. Fiber is the part of grains, fruits, and vegetables that is not broken down by your body. Whole-grain foods are healthy and provide extra fiber in your diet. Some examples of whole-grain foods are whole-wheat breads and pastas, oatmeal, brown rice, and bulgur.  Eat a variety of vegetables every day.  Include dark, leafy greens such as spinach, kale, ramirez greens, and mustard greens. Eat yellow and orange vegetables such as carrots, sweet potatoes, and winter squash.   Eat a variety of  fruits every day.  Choose fresh or canned fruit (canned in its own juice or light syrup) instead of juice. Fruit juice has very little or no fiber.  Eat low-fat dairy foods.  Drink fat-free (skim) milk or 1% milk. Eat fat-free yogurt and low-fat cottage cheese. Try low-fat cheeses such as mozzarella and other reduced-fat cheeses.  Choose meat and other protein foods that are low in fat.  Choose beans or other legumes such as split peas or lentils. Choose fish, skinless poultry (chicken or turkey), or lean cuts of red meat (beef or pork). Before you cook meat or poultry, cut off any visible fat.   Use less fat and oil.  Try baking foods instead of frying them. Add less fat, such as margarine, sour cream, regular salad dressing and mayonnaise to foods. Eat fewer high-fat foods. Some examples of high-fat foods include french fries, doughnuts, ice cream, and cakes.  Eat fewer sweets.  Limit foods and drinks that are high in sugar. This includes candy, cookies, regular soda, and sweetened drinks.  Exercise:  Exercise at least 30 minutes per day on most days of the week. Some examples of exercise include walking, biking, dancing, and swimming. You can also fit in more physical activity by taking the stairs instead of the elevator or parking farther away from stores. Ask your healthcare provider about the best exercise plan for you.      © Copyright [employer] 2018 Information is for End User's use only and may not be sold, redistributed or otherwise used for commercial purposes. All illustrations and images included in CareNotes® are the copyrighted property of [employer]., Inc. or [employer] (?????)

## 2024-11-12 NOTE — ASSESSMENT & PLAN NOTE
Stable. FU psychiatry. Continue clonazepam 0.5mg qhs.     Orders:    CBC; Future    Comprehensive metabolic panel; Future    Hemoglobin A1C; Future    Lipid panel; Future

## 2024-11-12 NOTE — ASSESSMENT & PLAN NOTE
Continue advair prn.   Orders:    CBC; Future    Comprehensive metabolic panel; Future    Hemoglobin A1C; Future    Lipid panel; Future

## 2024-11-12 NOTE — ASSESSMENT & PLAN NOTE
Controlled. DASH diet. Continue metoprolol  and nifedipine.   Orders:    CBC; Future    Comprehensive metabolic panel; Future    Hemoglobin A1C; Future    Lipid panel; Future

## 2024-11-24 DIAGNOSIS — M47.816 LUMBAR SPONDYLOSIS: ICD-10-CM

## 2024-11-25 ENCOUNTER — OFFICE VISIT (OUTPATIENT)
Dept: FAMILY MEDICINE CLINIC | Facility: CLINIC | Age: 74
End: 2024-11-25
Payer: COMMERCIAL

## 2024-11-25 VITALS
WEIGHT: 239.2 LBS | TEMPERATURE: 97.6 F | BODY MASS INDEX: 35.43 KG/M2 | HEART RATE: 70 BPM | SYSTOLIC BLOOD PRESSURE: 130 MMHG | OXYGEN SATURATION: 96 % | DIASTOLIC BLOOD PRESSURE: 64 MMHG | HEIGHT: 69 IN | RESPIRATION RATE: 18 BRPM

## 2024-11-25 DIAGNOSIS — J44.1 COPD EXACERBATION (HCC): Primary | ICD-10-CM

## 2024-11-25 DIAGNOSIS — F32.A ANXIETY AND DEPRESSION: ICD-10-CM

## 2024-11-25 DIAGNOSIS — F41.9 ANXIETY AND DEPRESSION: ICD-10-CM

## 2024-11-25 PROCEDURE — 99213 OFFICE O/P EST LOW 20 MIN: CPT | Performed by: FAMILY MEDICINE

## 2024-11-25 PROCEDURE — G2211 COMPLEX E/M VISIT ADD ON: HCPCS | Performed by: FAMILY MEDICINE

## 2024-11-25 RX ORDER — PREDNISONE 10 MG/1
TABLET ORAL
Qty: 28 TABLET | Refills: 0 | Status: SHIPPED | OUTPATIENT
Start: 2024-11-25

## 2024-11-25 RX ORDER — CLONAZEPAM 0.5 MG/1
0.5 TABLET ORAL
Qty: 30 TABLET | Refills: 0 | Status: SHIPPED | OUTPATIENT
Start: 2024-11-25

## 2024-11-25 RX ORDER — DEXTROMETHORPHAN HYDROBROMIDE AND PROMETHAZINE HYDROCHLORIDE 15; 6.25 MG/5ML; MG/5ML
5 SYRUP ORAL 4 TIMES DAILY PRN
Qty: 180 ML | Refills: 0 | Status: SHIPPED | OUTPATIENT
Start: 2024-11-25

## 2024-11-25 RX ORDER — AZITHROMYCIN 250 MG/1
TABLET, FILM COATED ORAL
Qty: 6 TABLET | Refills: 0 | Status: SHIPPED | OUTPATIENT
Start: 2024-11-25 | End: 2024-11-29

## 2024-11-25 NOTE — ASSESSMENT & PLAN NOTE
A lot of fluids and rest. Tylenol or motrin for fever or pain.   Give zpack. Side effects educated patient.  Give cough medication. Side effects educated patient.   Give prednisone tapering.   Call office if symptoms no improving or worse.     Orders:    predniSONE 10 mg tablet; Take 4 tabs for 4 days, then 3 tabs for 2 days, then 2 tabs for 2 days and then 1 tab for 2 day    azithromycin (ZITHROMAX) 250 mg tablet; Take 2 tabs on day 1, then take 1 tab daily from day 2-day 5.    promethazine-dextromethorphan (PHENERGAN-DM) 6.25-15 mg/5 mL oral syrup; Take 5 mL by mouth 4 (four) times a day as needed for cough

## 2024-11-25 NOTE — PROGRESS NOTES
Name: Morgan Rodriguez      : 1950      MRN: [id number]  Encounter Provider: Antonio Haider MD  Encounter Date: 2024   Encounter department: [facility]  :  Assessment & Plan  COPD exacerbation (HCC)    A lot of fluids and rest. Tylenol or motrin for fever or pain.   Give zpack. Side effects educated patient.  Give cough medication. Side effects educated patient.   Give prednisone tapering.   Call office if symptoms no improving or worse.     Orders:    predniSONE 10 mg tablet; Take 4 tabs for 4 days, then 3 tabs for 2 days, then 2 tabs for 2 days and then 1 tab for 2 day    azithromycin (ZITHROMAX) 250 mg tablet; Take 2 tabs on day 1, then take 1 tab daily from day 2-day 5.    promethazine-dextromethorphan (PHENERGAN-DM) 6.25-15 mg/5 mL oral syrup; Take 5 mL by mouth 4 (four) times a day as needed for cough           History of Present Illness     HPI    Pt is here by himself.   C/o cough with phlegm for 3 days.   Wheezing. Denies SOB.   Denies fever.   Denies n/v/abd pain.   He checked Covid19 negative at home per pt.   Hx of COPD. He used advair and albuterol bid recently.   He is on singulair 10mg QD.   Quit smoking in .       Review of Systems   Constitutional:  Negative for appetite change, chills and fever.   HENT:  Negative for congestion, ear pain, sinus pain and sore throat.    Eyes:  Negative for discharge and itching.   Respiratory:  Positive for cough and wheezing. Negative for apnea, chest tightness and shortness of breath.    Cardiovascular:  Negative for chest pain, palpitations and leg swelling.   Gastrointestinal:  Negative for abdominal pain, anal bleeding, constipation, diarrhea, nausea and vomiting.   Endocrine: Negative for cold intolerance, heat intolerance and polyuria.   Genitourinary:  Negative for difficulty urinating and dysuria.   Musculoskeletal:  Negative for arthralgias, back pain and myalgias.   Skin:  Negative for rash.   Neurological:   "Negative for dizziness and headaches.   Psychiatric/Behavioral:  Negative for agitation.           Objective   /64   Pulse 70   Temp 97.6 °F (36.4 °C) (Tympanic)   Resp 18   Ht 5' 8.5\" (1.74 m)   Wt 109 kg (239 lb 3.2 oz)   SpO2 96%   BMI 35.84 kg/m²      Physical Exam  Constitutional:       Appearance: He is well-developed.   HENT:      Head: Normocephalic and atraumatic.   Eyes:      General:         Right eye: No discharge.         Left eye: No discharge.      Conjunctiva/sclera: Conjunctivae normal.   Cardiovascular:      Rate and Rhythm: Normal rate and regular rhythm.      Heart sounds: Normal heart sounds. No murmur heard.     No friction rub. No gallop.   Pulmonary:      Effort: Pulmonary effort is normal. No respiratory distress.      Breath sounds: Rhonchi present. No wheezing or rales.   Abdominal:      General: Bowel sounds are normal. There is no distension.      Palpations: Abdomen is soft.      Tenderness: There is no abdominal tenderness. There is no guarding.   Musculoskeletal:         General: Normal range of motion.      Cervical back: Normal range of motion and neck supple.      Right lower leg: No edema.      Left lower leg: No edema.   Neurological:      Mental Status: He is alert.         "

## 2024-11-26 RX ORDER — DICLOFENAC SODIUM 75 MG/1
75 TABLET, DELAYED RELEASE ORAL 2 TIMES DAILY PRN
Qty: 60 TABLET | Refills: 5 | Status: SHIPPED | OUTPATIENT
Start: 2024-11-26

## 2024-12-12 DIAGNOSIS — E78.2 MIXED HYPERLIPIDEMIA: ICD-10-CM

## 2024-12-12 DIAGNOSIS — J41.1 MUCOPURULENT CHRONIC BRONCHITIS (HCC): ICD-10-CM

## 2024-12-12 DIAGNOSIS — I49.1 PAC (PREMATURE ATRIAL CONTRACTION): ICD-10-CM

## 2024-12-12 RX ORDER — MONTELUKAST SODIUM 10 MG/1
10 TABLET ORAL
Qty: 90 TABLET | Refills: 1 | Status: SHIPPED | OUTPATIENT
Start: 2024-12-12

## 2024-12-12 RX ORDER — METOPROLOL TARTRATE 50 MG
50 TABLET ORAL 2 TIMES DAILY
Qty: 180 TABLET | Refills: 3 | Status: SHIPPED | OUTPATIENT
Start: 2024-12-12

## 2024-12-12 RX ORDER — ROSUVASTATIN CALCIUM 40 MG/1
40 TABLET, COATED ORAL DAILY
Qty: 90 TABLET | Refills: 1 | Status: SHIPPED | OUTPATIENT
Start: 2024-12-12

## 2024-12-12 NOTE — TELEPHONE ENCOUNTER
Patient is calling    [phone]    FYI Patient is being referred to Dr Callum Chawla from [facility]  Patient had 2 previous sx on his lumbar spine 4 yrs & 7 yrs ago  Patient has seen spine & pain several times  I do not see the op records in the patient's chart but was given okay to schedule with Dr Callum Chawla per Tali Concepcion  No

## 2024-12-22 DIAGNOSIS — F32.A ANXIETY AND DEPRESSION: ICD-10-CM

## 2024-12-22 DIAGNOSIS — F41.9 ANXIETY AND DEPRESSION: ICD-10-CM

## 2024-12-26 RX ORDER — CLONAZEPAM 0.5 MG/1
0.5 TABLET ORAL
Qty: 30 TABLET | Refills: 0 | Status: SHIPPED | OUTPATIENT
Start: 2024-12-26

## 2024-12-26 NOTE — TELEPHONE ENCOUNTER
Patient called rx refill line stating he is completely without his medication. Please address as soon as available.

## 2025-01-20 ENCOUNTER — OFFICE VISIT (OUTPATIENT)
Dept: FAMILY MEDICINE CLINIC | Facility: CLINIC | Age: 75
End: 2025-01-20
Payer: COMMERCIAL

## 2025-01-20 VITALS
OXYGEN SATURATION: 96 % | DIASTOLIC BLOOD PRESSURE: 68 MMHG | HEIGHT: 69 IN | TEMPERATURE: 98 F | RESPIRATION RATE: 18 BRPM | BODY MASS INDEX: 36.32 KG/M2 | HEART RATE: 78 BPM | WEIGHT: 245.2 LBS | SYSTOLIC BLOOD PRESSURE: 156 MMHG

## 2025-01-20 DIAGNOSIS — M54.16 LUMBAR RADICULOPATHY: ICD-10-CM

## 2025-01-20 DIAGNOSIS — I47.19 MULTIFOCAL ATRIAL TACHYCARDIA (HCC): ICD-10-CM

## 2025-01-20 DIAGNOSIS — E66.01 SEVERE OBESITY (BMI 35.0-39.9) WITH COMORBIDITY (HCC): ICD-10-CM

## 2025-01-20 DIAGNOSIS — F32.A ANXIETY AND DEPRESSION: ICD-10-CM

## 2025-01-20 DIAGNOSIS — J44.1 COPD EXACERBATION (HCC): Primary | ICD-10-CM

## 2025-01-20 DIAGNOSIS — F41.9 ANXIETY AND DEPRESSION: ICD-10-CM

## 2025-01-20 PROCEDURE — 99214 OFFICE O/P EST MOD 30 MIN: CPT | Performed by: FAMILY MEDICINE

## 2025-01-20 PROCEDURE — G2211 COMPLEX E/M VISIT ADD ON: HCPCS | Performed by: FAMILY MEDICINE

## 2025-01-20 RX ORDER — DEXTROMETHORPHAN HYDROBROMIDE AND PROMETHAZINE HYDROCHLORIDE 15; 6.25 MG/5ML; MG/5ML
5 SYRUP ORAL 4 TIMES DAILY PRN
Qty: 180 ML | Refills: 0 | Status: SHIPPED | OUTPATIENT
Start: 2025-01-20

## 2025-01-20 RX ORDER — ARIPIPRAZOLE 5 MG/1
5 TABLET ORAL DAILY
COMMUNITY

## 2025-01-20 RX ORDER — DOXYCYCLINE HYCLATE 100 MG
100 TABLET ORAL 2 TIMES DAILY
Qty: 14 TABLET | Refills: 0 | Status: SHIPPED | OUTPATIENT
Start: 2025-01-20 | End: 2025-01-27

## 2025-01-20 RX ORDER — CLONAZEPAM 0.5 MG/1
0.5 TABLET ORAL
Qty: 30 TABLET | Refills: 0 | Status: SHIPPED | OUTPATIENT
Start: 2025-01-20

## 2025-01-20 RX ORDER — PREDNISONE 10 MG/1
TABLET ORAL
Qty: 28 TABLET | Refills: 0 | Status: SHIPPED | OUTPATIENT
Start: 2025-01-20

## 2025-01-20 NOTE — ASSESSMENT & PLAN NOTE
[facility].   Orders:    clonazePAM (KlonoPIN) 0.5 mg tablet; Take 1 tablet (0.5 mg total) by mouth daily at bedtime

## 2025-01-20 NOTE — PROGRESS NOTES
Name: Morgan Rodriguez      : 1950      MRN: [id number]  Encounter Provider: Antonio Haider MD  Encounter Date: 2025   Encounter department: [facility]  :  Assessment & Plan  COPD exacerbation (HCC)  A lot of fluids and rest. Tylenol or motrin for fever or pain.   Give doxycycline. Side effects educated patient.  Give prednisone.   Give cough medication. Side effects educated patient.   Advised pt to use advair.   Call office if symptoms no improving or worse.     Orders:    doxycycline hyclate (VIBRA-TABS) 100 mg tablet; Take 1 tablet (100 mg total) by mouth 2 (two) times a day for 7 days    predniSONE 10 mg tablet; Take 4 tabs for 4 days, then 3 tabs for 2 days, then 2 tabs for 2 days and then 1 tab for 2 day    promethazine-dextromethorphan (PHENERGAN-DM) 6.25-15 mg/5 mL oral syrup; Take 5 mL by mouth 4 (four) times a day as needed for cough    Lumbar radiculopathy    Orders:    Ambulatory Referral to Neurosurgery; Future    Anxiety and depression    [facility].   Orders:    clonazePAM (KlonoPIN) 0.5 mg tablet; Take 1 tablet (0.5 mg total) by mouth daily at bedtime    Severe obesity (BMI 35.0-39.9) with comorbidity (HCC)      Orders:    Ambulatory Referral to Weight Management; Future    Multifocal atrial tachycardia (HCC)  [facility].               History of Present Illness   Shortness of Breath  The current episode started 1 to 4 weeks ago. The problem occurs intermittently. The problem has been waxing and waning since onset. Associated symptoms include coughing, orthopnea and wheezing. Pertinent negatives include no chest pain, dizziness, leg swelling, palpitations, rhinorrhea or sore throat. The symptoms are aggravated by exercise.       Pt is here by himself.   C/o cough/wheezing/SOB for 5 days.  Denies fever.   Denies CP, n/v/abd pain.   Home covid negative per pt.   Hx of COPD. He is on singulair 10mg QD. He did not use advair 250-50 bid.     Lumbar  "radiculopathy---Saw pain specialist but no help. Would like to see neurosurgeon for 2nd opinion.     BMI 36.74 Would like to see wt loss program.     Anxiety/depression---[facility]. Got abilify 5mg daily which helped a lot. Need refill of clonazepam 0.5mg qhs today.           Review of Systems   Constitutional:  Negative for appetite change, chills and fever.   HENT:  Negative for congestion, ear pain, rhinorrhea, sinus pain and sore throat.    Eyes:  Negative for discharge and itching.   Respiratory:  Positive for cough, shortness of breath and wheezing. Negative for apnea and chest tightness.    Cardiovascular:  Positive for orthopnea. Negative for chest pain, palpitations and leg swelling.   Gastrointestinal:  Negative for abdominal pain, anal bleeding, constipation, diarrhea, nausea and vomiting.   Endocrine: Negative for cold intolerance, heat intolerance and polyuria.   Genitourinary:  Negative for difficulty urinating and dysuria.   Musculoskeletal:  Negative for arthralgias, back pain, myalgias and neck pain.   Skin:  Negative for rash.   Neurological:  Negative for dizziness and headaches.   Psychiatric/Behavioral:  Negative for agitation.        Objective   /68   Pulse 78   Temp 98 °F (36.7 °C) (Tympanic)   Resp 18   Ht 5' 8.5\" (1.74 m)   Wt 111 kg (245 lb 3.2 oz)   SpO2 96%   BMI 36.74 kg/m²      Physical Exam  Constitutional:       Appearance: He is well-developed.   HENT:      Head: Normocephalic and atraumatic.   Eyes:      General:         Right eye: No discharge.         Left eye: No discharge.      Conjunctiva/sclera: Conjunctivae normal.   Cardiovascular:      Rate and Rhythm: Normal rate and regular rhythm.      Heart sounds: Normal heart sounds. No murmur heard.     No friction rub. No gallop.   Pulmonary:      Effort: Pulmonary effort is normal. No respiratory distress.      Breath sounds: Wheezing and rhonchi present. No rales.   Abdominal:      General: Bowel sounds are " normal. There is no distension.      Palpations: Abdomen is soft.      Tenderness: There is no abdominal tenderness. There is no guarding.   Musculoskeletal:         General: Normal range of motion.      Cervical back: Normal range of motion and neck supple.      Right lower leg: No edema.      Left lower leg: No edema.   Neurological:      Mental Status: He is alert.         Answers submitted by the patient for this visit:  Shortness of Breath Questionnaire (Submitted on 1/19/2025)  Chief Complaint: Shortness of breath  Chronicity: recurrent  Episode duration: 1 Minutes  hemoptysis: No  sputum production: Yes  PND: No  syncope: No  claudication: No  leg pain: No  coryza: Yes  swollen glands: No

## 2025-01-20 NOTE — ASSESSMENT & PLAN NOTE
A lot of fluids and rest. Tylenol or motrin for fever or pain.   Give doxycycline. Side effects educated patient.  Give prednisone.   Give cough medication. Side effects educated patient.   Advised pt to use advair.   Call office if symptoms no improving or worse.     Orders:    doxycycline hyclate (VIBRA-TABS) 100 mg tablet; Take 1 tablet (100 mg total) by mouth 2 (two) times a day for 7 days    predniSONE 10 mg tablet; Take 4 tabs for 4 days, then 3 tabs for 2 days, then 2 tabs for 2 days and then 1 tab for 2 day    promethazine-dextromethorphan (PHENERGAN-DM) 6.25-15 mg/5 mL oral syrup; Take 5 mL by mouth 4 (four) times a day as needed for cough

## 2025-01-22 DIAGNOSIS — J44.9 CHRONIC OBSTRUCTIVE PULMONARY DISEASE, UNSPECIFIED COPD TYPE (HCC): ICD-10-CM

## 2025-01-22 RX ORDER — FLUTICASONE PROPIONATE AND SALMETEROL 250; 50 UG/1; UG/1
1 POWDER RESPIRATORY (INHALATION) 2 TIMES DAILY
Qty: 60 BLISTER | Refills: 5 | Status: SHIPPED | OUTPATIENT
Start: 2025-01-22 | End: 2025-07-21

## 2025-01-22 NOTE — TELEPHONE ENCOUNTER
Reason for call:   [x] Refill   [] Prior Auth  [] Other:     Office:   [x] PCP/Provider -Antonio Haider MD   [] Specialty/Provider -     Medication: Fluticasone-Salmeterol (Advair) 250-50 mcg/dose inhaler / Inhale 1 puff 2 (two) times a day Rinse mouth after use     [id number] [address]     Does the patient have enough for 3 days?   [] Yes   [x] No - Send as HP to POD

## 2025-01-27 ENCOUNTER — OFFICE VISIT (OUTPATIENT)
Dept: FAMILY MEDICINE CLINIC | Facility: CLINIC | Age: 75
End: 2025-01-27
Payer: COMMERCIAL

## 2025-01-27 VITALS
OXYGEN SATURATION: 91 % | WEIGHT: 245.8 LBS | RESPIRATION RATE: 18 BRPM | BODY MASS INDEX: 36.4 KG/M2 | HEIGHT: 69 IN | TEMPERATURE: 97.8 F | SYSTOLIC BLOOD PRESSURE: 130 MMHG | HEART RATE: 83 BPM | DIASTOLIC BLOOD PRESSURE: 74 MMHG

## 2025-01-27 DIAGNOSIS — J44.1 COPD EXACERBATION (HCC): ICD-10-CM

## 2025-01-27 DIAGNOSIS — J40 BRONCHITIS: Primary | ICD-10-CM

## 2025-01-27 DIAGNOSIS — J41.0 SIMPLE CHRONIC BRONCHITIS (HCC): ICD-10-CM

## 2025-01-27 DIAGNOSIS — B34.9 VIRAL ILLNESS: ICD-10-CM

## 2025-01-27 LAB
SARS-COV-2 AG UPPER RESP QL IA: NEGATIVE
SL AMB POCT RAPID FLU A: NEGATIVE
SL AMB POCT RAPID FLU B: NEGATIVE
VALID CONTROL: NORMAL

## 2025-01-27 PROCEDURE — 99214 OFFICE O/P EST MOD 30 MIN: CPT | Performed by: NURSE PRACTITIONER

## 2025-01-27 PROCEDURE — G2211 COMPLEX E/M VISIT ADD ON: HCPCS | Performed by: NURSE PRACTITIONER

## 2025-01-27 PROCEDURE — 87811 SARS-COV-2 COVID19 W/OPTIC: CPT | Performed by: NURSE PRACTITIONER

## 2025-01-27 PROCEDURE — 87804 INFLUENZA ASSAY W/OPTIC: CPT | Performed by: NURSE PRACTITIONER

## 2025-01-27 RX ORDER — BENZONATATE 200 MG/1
200 CAPSULE ORAL 3 TIMES DAILY PRN
Qty: 30 CAPSULE | Refills: 0 | Status: SHIPPED | OUTPATIENT
Start: 2025-01-27

## 2025-01-27 NOTE — PROGRESS NOTES
Name: Morgan Rodriguez      : 1950      MRN: [id number]  Encounter Provider: SHAHANA Coronado  Encounter Date: 2025   Encounter department: [facility]  :  Assessment & Plan  Bronchitis  The patient presents to the office today with continuation of respiratory symptoms. He was seen in the office on  and was prescribed an antibiotic as well as a ten day tapering dose of steroids and phenergan Dm.  . He states he continues with cough at home.  He denies any fever. He has some nasal congestion, rhinorrhea, post nasal drip and body aches.  Rapid covid and flu in the office today negative. He has received his RSV vaccine in early January.  He has three more days of prednisone.  He should complete this dose. Increase nebulizer to every 6 hours, he has been using this twice daily Tessalon perles sent to pharmacy. CT chest ordered..  If his symptoms worsen or he develops any new symptoms he should be evaluated in the emergency room.   Orders:    benzonatate (TESSALON) 200 MG capsule; Take 1 capsule (200 mg total) by mouth 3 (three) times a day as needed for cough    Simple chronic bronchitis (HCC)  Patient continues with respiratory symptoms. History of chronic bronchitis. He last saw pulmonology in .  He was treated with two courses of antibiotics since November along with two courses of antibiotics. He has three more days of prednisone at home.  CT chest ordered. Refer back to pulmonology, History of smoking.    Orders:    CT chest wo contrast; Future    Ambulatory Referral to Pulmonology; Future    COPD exacerbation (HCC)    Orders:    Ambulatory Referral to Pulmonology; Future    Viral illness    Orders:    POCT Rapid Covid Ag    POCT rapid flu A and B           History of Present Illness   Patient presents to the office today with cough, post nasal drip, rhinorrhea, shortness of breath and wheezing.  He denies fevers. Has an extensive smoking history and a  "diagnosis of chronic bronchitis. Seen one week ago in the office and prescribed antibiotics and 10 day tapering course of steroids. He states the antibiotics did not help.  Symptoms worsened over the weekend.  No acute distress in the office today, no wheezing or shortness of breath        Cough  This is a recurrent problem. The current episode started in the past 7 days. The problem has been waxing and waning. The problem occurs every few minutes. The cough is Productive of sputum. Associated symptoms include myalgias, nasal congestion, postnasal drip, rhinorrhea, shortness of breath and wheezing. Pertinent negatives include no chest pain, ear congestion, ear pain, headaches, heartburn, hemoptysis, rash, sore throat, sweats or weight loss. Nothing aggravates the symptoms.     Review of Systems   Constitutional: Negative.  Negative for fatigue and weight loss.   HENT:  Positive for postnasal drip and rhinorrhea. Negative for congestion, ear pain, sore throat and trouble swallowing.    Eyes: Negative.  Negative for visual disturbance.   Respiratory:  Positive for cough, shortness of breath and wheezing. Negative for hemoptysis and choking.    Cardiovascular: Negative.  Negative for chest pain.   Gastrointestinal: Negative.  Negative for heartburn.   Endocrine: Negative.    Genitourinary: Negative.    Musculoskeletal:  Positive for myalgias. Negative for arthralgias, back pain and neck pain.   Skin: Negative.  Negative for rash.   Neurological:  Negative for dizziness and headaches.   Psychiatric/Behavioral: Negative.         Objective   /72   Pulse 83   Temp 97.8 °F (36.6 °C) (Tympanic)   Resp 18   Ht 5' 8.5\" (1.74 m)   Wt 111 kg (245 lb 12.8 oz)   SpO2 91%   BMI 36.83 kg/m²      Physical Exam  Vitals reviewed.   Constitutional:       Appearance: Normal appearance. He is ill-appearing.   HENT:      Head: Normocephalic and atraumatic.      Nose: Congestion and rhinorrhea present.      Mouth/Throat:      " Mouth: Mucous membranes are moist.   Eyes:      Extraocular Movements: Extraocular movements intact.      Pupils: Pupils are equal, round, and reactive to light.   Cardiovascular:      Rate and Rhythm: Normal rate and regular rhythm.      Pulses: Normal pulses.      Heart sounds: Normal heart sounds.   Pulmonary:      Effort: Pulmonary effort is normal. No respiratory distress.      Breath sounds: Normal breath sounds. No stridor. No wheezing, rhonchi or rales.   Musculoskeletal:         General: Normal range of motion.   Skin:     General: Skin is warm.   Neurological:      General: No focal deficit present.      Mental Status: He is alert and oriented to person, place, and time.   Psychiatric:         Mood and Affect: Mood normal.         Behavior: Behavior normal.         Thought Content: Thought content normal.         Judgment: Judgment normal.

## 2025-01-27 NOTE — ASSESSMENT & PLAN NOTE
Patient continues with respiratory symptoms. History of chronic bronchitis. He last saw pulmonology in 2021.  He was treated with two courses of antibiotics since November along with two courses of antibiotics. He has three more days of prednisone at home.  CT chest ordered. Refer back to pulmonology, History of smoking.    Orders:    CT chest wo contrast; Future    Ambulatory Referral to Pulmonology; Future

## 2025-02-01 ENCOUNTER — HOSPITAL ENCOUNTER (OUTPATIENT)
Dept: CT IMAGING | Facility: HOSPITAL | Age: 75
Discharge: HOME | End: 2025-02-01
Payer: COMMERCIAL

## 2025-02-01 DIAGNOSIS — J41.0 SIMPLE CHRONIC BRONCHITIS (HCC): ICD-10-CM

## 2025-02-01 PROCEDURE — 71250 CT THORAX DX C-: CPT

## 2025-02-07 ENCOUNTER — RESULTS FOLLOW-UP (OUTPATIENT)
Dept: FAMILY MEDICINE CLINIC | Facility: CLINIC | Age: 75
End: 2025-02-07

## 2025-02-10 ENCOUNTER — APPOINTMENT (OUTPATIENT)
Dept: LAB | Facility: CLINIC | Age: 75
End: 2025-02-10
Payer: COMMERCIAL

## 2025-02-10 ENCOUNTER — OFFICE VISIT (OUTPATIENT)
Dept: FAMILY MEDICINE CLINIC | Facility: CLINIC | Age: 75
End: 2025-02-10
Payer: COMMERCIAL

## 2025-02-10 VITALS
OXYGEN SATURATION: 94 % | HEIGHT: 69 IN | HEART RATE: 72 BPM | DIASTOLIC BLOOD PRESSURE: 76 MMHG | BODY MASS INDEX: 38.21 KG/M2 | RESPIRATION RATE: 18 BRPM | TEMPERATURE: 98.2 F | WEIGHT: 258 LBS | SYSTOLIC BLOOD PRESSURE: 124 MMHG

## 2025-02-10 DIAGNOSIS — R06.02 SOB (SHORTNESS OF BREATH) ON EXERTION: ICD-10-CM

## 2025-02-10 DIAGNOSIS — E66.9 OBESITY (BMI 30-39.9): ICD-10-CM

## 2025-02-10 DIAGNOSIS — R73.01 IMPAIRED FASTING GLUCOSE: ICD-10-CM

## 2025-02-10 DIAGNOSIS — I10 PRIMARY HYPERTENSION: ICD-10-CM

## 2025-02-10 DIAGNOSIS — J41.0 SIMPLE CHRONIC BRONCHITIS (HCC): ICD-10-CM

## 2025-02-10 DIAGNOSIS — E78.2 MIXED HYPERLIPIDEMIA: ICD-10-CM

## 2025-02-10 DIAGNOSIS — M54.16 LUMBAR RADICULOPATHY: ICD-10-CM

## 2025-02-10 DIAGNOSIS — J44.1 COPD EXACERBATION (HCC): Primary | ICD-10-CM

## 2025-02-10 DIAGNOSIS — F41.9 ANXIETY AND DEPRESSION: ICD-10-CM

## 2025-02-10 DIAGNOSIS — F32.A ANXIETY AND DEPRESSION: ICD-10-CM

## 2025-02-10 LAB
BNP SERPL-MCNC: 16 PG/ML (ref 0–100)
ERYTHROCYTE [DISTWIDTH] IN BLOOD BY AUTOMATED COUNT: 13.2 % (ref 11.6–15.1)
HCT VFR BLD AUTO: 41.6 % (ref 36.5–49.3)
HGB BLD-MCNC: 13.8 G/DL (ref 12–17)
MCH RBC QN AUTO: 30.4 PG (ref 26.8–34.3)
MCHC RBC AUTO-ENTMCNC: 33.2 G/DL (ref 31.4–37.4)
MCV RBC AUTO: 92 FL (ref 82–98)
PLATELET # BLD AUTO: 231 THOUSANDS/UL (ref 149–390)
PMV BLD AUTO: 10.5 FL (ref 8.9–12.7)
RBC # BLD AUTO: 4.54 MILLION/UL (ref 3.88–5.62)
WBC # BLD AUTO: 7.24 THOUSAND/UL (ref 4.31–10.16)

## 2025-02-10 PROCEDURE — G2211 COMPLEX E/M VISIT ADD ON: HCPCS | Performed by: FAMILY MEDICINE

## 2025-02-10 PROCEDURE — 85027 COMPLETE CBC AUTOMATED: CPT

## 2025-02-10 PROCEDURE — 99214 OFFICE O/P EST MOD 30 MIN: CPT | Performed by: FAMILY MEDICINE

## 2025-02-10 PROCEDURE — 80048 BASIC METABOLIC PNL TOTAL CA: CPT

## 2025-02-10 PROCEDURE — 36415 COLL VENOUS BLD VENIPUNCTURE: CPT

## 2025-02-10 PROCEDURE — 83880 ASSAY OF NATRIURETIC PEPTIDE: CPT

## 2025-02-10 RX ORDER — PREDNISONE 10 MG/1
TABLET ORAL
Qty: 28 TABLET | Refills: 0 | Status: SHIPPED | OUTPATIENT
Start: 2025-02-10

## 2025-02-10 NOTE — PROGRESS NOTES
"Name: Morgan Rodriguez      : 1950      MRN: [id number]  Encounter Provider: Antonio Haider MD  Encounter Date: 2/10/2025   Encounter department: [facility]  :  Assessment & Plan  COPD exacerbation (HCC)  Reviewed 2025 CT chest report.   Give prednisone. SE educated pt.   Continue advair 250-50 bid, singulair 10mg Qd and albuterol prn.   [facility] as scheduled.   Orders:    predniSONE 10 mg tablet; Take 4 tabs for 4 days, then 3 tabs for 2 days, then 2 tabs for 2 days and then 1 tab for 2 day    Obesity (BMI 30-39.9)      Orders:    Ambulatory Referral to Nutrition Services; Future    SOB (shortness of breath) on exertion    Orders:    B-Type Natriuretic Peptide(BNP); Future    Basic metabolic panel; Future           History of Present Illness   Cough  This is a recurrent problem. The current episode started more than 1 month ago. The problem has been waxing and waning. The cough is Productive of sputum. Associated symptoms include nasal congestion, postnasal drip, rhinorrhea, shortness of breath and wheezing. Pertinent negatives include no chest pain, chills, ear congestion, ear pain, fever, headaches, heartburn, hemoptysis, myalgias, rash, sore throat, sweats or weight loss. Nothing aggravates the symptoms. Risk factors for lung disease include animal exposure.       Pt is here by himself.   C/o cough for months. Worse recently.   He got doxycycline, prednisone on 2025 and felt better.   After he finished prednisone, he felt Worse again.   Denies fever.   Denies chest pain, n/v/abd pain.   2025 CT chest showed   \"Mild emphysema with sequela of previous granulomatous disease. Mild subpleural scarring left lung.   Dilated pulmonary artery which may be indicative of pulmonary hypertension.   8 mm nonobstructing left renal calculus.\"  Will see pulmonary in 3/2025.   Pt states he also felt back pain got worse recently. Hard to walk. Will see neurosurgeon.   Gained 13 lbs in " "last 2 weeks. Pt would like to see dietitian.       Review of Systems   Constitutional:  Negative for appetite change, chills, fever and weight loss.   HENT:  Positive for postnasal drip and rhinorrhea. Negative for congestion, ear pain, sinus pain and sore throat.    Eyes:  Negative for discharge and itching.   Respiratory:  Positive for cough, shortness of breath and wheezing. Negative for apnea, hemoptysis and chest tightness.    Cardiovascular:  Negative for chest pain, palpitations and leg swelling.   Gastrointestinal:  Negative for abdominal pain, anal bleeding, constipation, diarrhea, heartburn, nausea and vomiting.   Endocrine: Negative for cold intolerance, heat intolerance and polyuria.   Genitourinary:  Negative for difficulty urinating and dysuria.   Musculoskeletal:  Negative for arthralgias, back pain and myalgias.   Skin:  Negative for rash.   Neurological:  Negative for dizziness and headaches.   Psychiatric/Behavioral:  Negative for agitation.        Objective   /76   Pulse 72   Temp 98.2 °F (36.8 °C) (Temporal)   Resp 18   Ht 5' 8.5\" (1.74 m)   Wt 117 kg (258 lb)   SpO2 94%   BMI 38.66 kg/m²      Physical Exam  Constitutional:       Appearance: He is well-developed.   HENT:      Head: Normocephalic and atraumatic.   Eyes:      General:         Right eye: No discharge.         Left eye: No discharge.      Conjunctiva/sclera: Conjunctivae normal.   Cardiovascular:      Rate and Rhythm: Normal rate and regular rhythm.      Heart sounds: Normal heart sounds. No murmur heard.     No friction rub. No gallop.   Pulmonary:      Effort: Pulmonary effort is normal. No respiratory distress.      Breath sounds: Normal breath sounds. No wheezing or rales.   Abdominal:      General: Bowel sounds are normal. There is no distension.      Palpations: Abdomen is soft.      Tenderness: There is no abdominal tenderness. There is no guarding.   Musculoskeletal:      Cervical back: Normal range of motion " and neck supple.      Right lower leg: No edema.      Left lower leg: No edema.      Comments: Use cane   Neurological:      Mental Status: He is alert.

## 2025-02-10 NOTE — ASSESSMENT & PLAN NOTE
Reviewed 2/1/2025 CT chest report.   Give prednisone. SE educated pt.   Continue advair 250-50 bid, singulair 10mg Qd and albuterol prn.   [facility] as scheduled.   Orders:    predniSONE 10 mg tablet; Take 4 tabs for 4 days, then 3 tabs for 2 days, then 2 tabs for 2 days and then 1 tab for 2 day

## 2025-02-11 ENCOUNTER — RESULTS FOLLOW-UP (OUTPATIENT)
Dept: FAMILY MEDICINE CLINIC | Facility: CLINIC | Age: 75
End: 2025-02-11

## 2025-02-11 LAB
ANION GAP SERPL CALCULATED.3IONS-SCNC: 10 MMOL/L (ref 4–13)
BUN SERPL-MCNC: 17 MG/DL (ref 5–25)
CALCIUM SERPL-MCNC: 9 MG/DL (ref 8.4–10.2)
CHLORIDE SERPL-SCNC: 100 MMOL/L (ref 96–108)
CO2 SERPL-SCNC: 25 MMOL/L (ref 21–32)
CREAT SERPL-MCNC: 0.9 MG/DL (ref 0.6–1.3)
GFR SERPL CREATININE-BSD FRML MDRD: 84 ML/MIN/1.73SQ M
GLUCOSE SERPL-MCNC: 132 MG/DL (ref 65–140)
POTASSIUM SERPL-SCNC: 4.1 MMOL/L (ref 3.5–5.3)
SODIUM SERPL-SCNC: 135 MMOL/L (ref 135–147)

## 2025-02-14 ENCOUNTER — OFFICE VISIT (OUTPATIENT)
Dept: NEUROSURGERY | Facility: CLINIC | Age: 75
End: 2025-02-14
Payer: COMMERCIAL

## 2025-02-14 VITALS
TEMPERATURE: 97.7 F | HEIGHT: 68 IN | BODY MASS INDEX: 38.19 KG/M2 | SYSTOLIC BLOOD PRESSURE: 138 MMHG | HEART RATE: 68 BPM | DIASTOLIC BLOOD PRESSURE: 82 MMHG | OXYGEN SATURATION: 98 % | WEIGHT: 252 LBS | RESPIRATION RATE: 18 BRPM

## 2025-02-14 DIAGNOSIS — M48.062 SPINAL STENOSIS OF LUMBAR REGION WITH NEUROGENIC CLAUDICATION: Primary | ICD-10-CM

## 2025-02-14 DIAGNOSIS — M54.16 LUMBAR RADICULOPATHY: ICD-10-CM

## 2025-02-14 DIAGNOSIS — M43.27 FUSION OF LUMBOSACRAL SPINE: ICD-10-CM

## 2025-02-14 PROCEDURE — 99215 OFFICE O/P EST HI 40 MIN: CPT | Performed by: NURSE PRACTITIONER

## 2025-02-14 RX ORDER — ASPIRIN 81 MG/1
81 TABLET, CHEWABLE ORAL DAILY
COMMUNITY

## 2025-02-14 NOTE — PROGRESS NOTES
`Name: Morgan Rodriguez      : 1950      MRN: [id number]  Encounter Provider: SHAHANA Dominguez  Encounter Date: 2025   Encounter department: [facility] NEUROSURGICAL [facility]  :  Assessment & Plan  Spinal stenosis of lumbar region with neurogenic claudication  Symptoms as documented in HPI  He reports a chronic urinary problem due to scar tissue at the bladder neck and requires straight cathing at least once every 2 weeks to break up the scar tissue.  Reports he has had this problem since  after back surgery when he had a Estevez catheter once it was removed the problem started.  He denies bowel incontinence or saddle anesthesia symptoms.  He reports weakness in his leg when he stands or walks too long legs give out and he falls.  He reports symptoms of spinal stenosis with neurogenic claudication.  Nonfocal neurologic examination      Imaging  2023 MRI of the lumbar spine with and without contrast--The lumbar vertebral body heights are maintained.Stable grade 1 degenerative retrolisthesis at the L1-2 and L2-3 levels.  Stable grade 1 degenerative anterolisthesis at the L4-5 level. Multilevel degenerative disc disease similar to 10/25/2019 with no new disc herniation.  Mild central canal stenosis is again noted at the L1-2 and L3-4 levels.  The central canal is patent at the L2-3, L4-5, and L5-S1 level secondary to left hemilaminectomy laminectomies.  Moderate to severe bilateral L2-3 ,left L4-5, and left L5-S1 neural foraminal narrowing with encroachment of the exiting bilateral L2, left L4, and left L5 nerve roots. . Stable postoperative changes related to anterior interbody fusion at the L5-S1 level.    2023 CT lumbar spine without----Postoperative and multilevel degenerative changes of the lumbar spine, as described above. No evidence for hardware complication. Overall degree of canal stenosis and foraminal narrowing is similar to prior MRI.    2023 x-ray lumbar spine  4 views--Anterior spinal fusion at L5-S1.Grade I spondylolithesis L4 on L5.Intervertebral disc base narrowing at all levels with endplate osteophytes and facet arthropathy.Multilevel degenerative changes of the lumbar spine.     Plan  Reviewed prior imaging with patient from 2023 MRI lumbar, CT lumbar, and x-ray flexion and extension lumbar.  Ordered MRI of lumbar spine with and without contrast given the patient's prior surgery assess for progression of degenerative arthritis in lumbar spine from last imaging in 2023, patient is status post lumbar spine fusion, assess progression of degenerative retrolisthesis and lumbar spine.  Patient presents with symptoms of spinal stenosis with neurogenic claudication.  Ordered x-ray lumbar spine 6 views flexion extension to assess dynamic stability of the lumbar spine in the setting of degenerative retrolisthesis and also assess fusion hardware for stability.  Advised patient to complete x-ray imaging over the next few days  [facility]  He is concerned about attending physical therapy secondary to copayment costs.  Provided patient with copies of home exercises for spinal stenosis strengthening and stretching, advised when he returns to office for follow-up visit to bring the exercises and identify which exercises he is performing at home on a regular basis.  Reviewed red flag signs with patient advised if he develops he should return to the closest emergency department for assessment.  Advised patient if he has additional questions or concerns she should contact neurosurgery.  Request checkout schedule a follow-up appointment within 6 to 8 weeks with a spine surgeon to allow time for follow-up with pain management and received interventional treatments if indicated.     Orders:    MRI lumbar spine with and without contrast; Future    XR spine lumbar complete w bending minimum 6 views; Future    Ambulatory referral to Spine & Pain  Management; Future    Fusion of lumbosacral spine  As addressed in HPI and as demonstrated in imaging of lumbar spine.  Orders:    MRI lumbar spine with and without contrast; Future    XR spine lumbar complete w bending minimum 6 views; Future    Ambulatory referral to Spine & Pain Management; Future        History of Present Illness   HPI  He is well known to neurosurgery for lumbar spine, chronic pain syndrome.  He returns to the office as a consult referral from his PCP to Dr. Seay.  Last OV -Last OV --5/17/22 POST op 2 qweek visit  5/3/2022  REMOVAL OF SPINAL CORD STIMULATOR SYSTEM WITH BATTERY (Spine Thoracic)   8/25/2020 INSERTION THORACIC DORSAL COLUMN SPINAL CORD STIMULATOR PERCUTANEOUS W IMPLANTABLE PULSE GENERATOR, RIGHT (Right: Spine Thoracic). DR. Jose Vásquez.  He has a history of prior spine surgery:   [facility]  Dr Gill  about 14  yrs ago for stenosis amd a second surgery has a  lumbar fusion same unsure of levels.  An Xray of lumbar spine  5/2/2025 demonstrates Anterior spinal fusion at L5-S1.   He reports today symptoms of bilateral low back pain with distribution into both hands, buttocks, posterior thighs to just above the knees.  He reports the symptoms are a burning numbness starting in the lower part of his buttocks with distribution down to just above the knees.  He reports this pain is so intense in his legs when he is standing and walking that they become weak and he has had 2 recent falls.  He reports cracking in the left sided low back and pelvic area while pointing to the L5-S1 and into the left SI joint area.  Reports the cracking in his left sided back into the pelvic area occurs when he is transitioning from lying to sitting with the onset of this symptom about 3 to 4 months ago.  He reports the onset of the other symptoms for over 14 years when he was initially diagnosed with lumbar spinal stenosis and underwent surgery in 2014.  He reports over the past 6 months the symptoms have  progressively worsened and are interfering with his ability to ambulate.  He denies an inciting event for this current episode of illness.  He reports the quality of his symptoms is burning, electric sensation, numbness, weakness in his legs.he reports aggravating factor as standing or walking.  He reports a relieving factor as sitting or lying, using an assistive device his cane or walker, or leaning forward onto a cart or another object.  He reports the severity of his symptoms as 10/10, symptoms worsen the longer he stands and tries to walk.      Multimodal Treatments::  PT--none in the past 6 months.  He reports he has an elliptical and a BIOflex.  He reports exercising 4 times a week for about half an hour.  He reports the BIOflex provides exercises for core and his back.  He reports remembering some of the exercises that he received previously and he is performing those as well.  PM --DR. Ann --- he denies receiving interventional treatments with pain management over the past 6 months -1 year.    Orthopedic  surgeon --- appointment for his worsening back problem and his left shoulder arthritis sought ot for back problem   Medicinal --- prednisone ordered for COPD/ chronic bronchitis ,  gabapentin  1500 mg /day and Cymbalta prescribed by psychiatrist for anxiety. Diclofenac -- arthritis in hands, shoulder and does help his back a little.  Reports he also treats with tylenol   4 tabs 500 mg 2 tabs twice a day. Ibuprofen  2 tabs  for muscle pain in shoulder    Topical--TIDL spray--like bengay on back , CAM  heating pad   -    Social:   lives with wife  Smoke  --- stopped  16 years ago     Co morbid  --  DM ---  COPD/bronchitis-managed by his PCP  Cardiac-multifocal atrial tachycardia/SVT/carotid stenosis.  Anxiety and depression-treating with a psychiatrist taking gabapentin and Cymbalta.  Orthopedics-bilateral total knee arthroplasty and left total hip.    Review of Systems   Constitutional: Negative.   "  HENT: Negative.     Respiratory: Negative.     Cardiovascular: Negative.    Gastrointestinal: Negative.    Endocrine: Negative.    Genitourinary: Negative.    Musculoskeletal:  Positive for back pain (lower back pain radiates to to both hips  buttock  and behind legs to knee).   Skin: Negative.    Allergic/Immunologic: Negative.    Neurological:  Positive for weakness (legs) and numbness (burning numbness sensation  buttocks to back of knee when standing or walking long distances).   Hematological:  Bruises/bleeds easily (asa).   Psychiatric/Behavioral: Negative.      I have personally reviewed the MA's review of systems and made changes as necessary.    Pertinent Medical History         Medical History Reviewed by provider this encounter:     .  Past Medical History   Past Medical History:   Diagnosis Date    Allergic 1955    Anxiety ?    Arthritis     back    Asthma     Atrial fibrillation (HCC)     per pt \"controlled with metoprolol\" sees [facility] cardio Dr Ace 2x/year    Bronchitis, chronic obstructive, with exacerbation (HCC)     Last Assessed: 5/17/2013    Chronic pain disorder     back    COPD (chronic obstructive pulmonary disease) (HCC) 1987    COPD with acute exacerbation (HCC)     Last Assessed: 4/13/2015    Depression 1995    Depression, controlled 08/30/2017    Transitioned From: Depression    Diabetes mellitus (HCC) 2000    Elevated PSA     Bx neg 2012; Last Assessed: 11/23/2012    Environmental and seasonal allergies     Exercises 3 to 4 times per week     \"2hrs --4x/week\"elliptical and Boflex    History of prediabetes     Hyperlipidemia     history of/better on meds    Hypertension     has gotten better since weight loss and pt is keeping check of BP at home    Irregular heart beat     MAT, afib    Lung nodule - resolved 2017 CT 01/04/2017    Postoperative urinary retention     Self-catheterizes urinary bladder     per pt every 2 weeks as per urologist    Spinal cord stimulator status     Status post " insertion of spinal cord stimulator 09/09/2020    Wears glasses     Weight loss     per pt recent intention loss of 80lbs recently     Past Surgical History:   Procedure Laterality Date    BACK SURGERY  07/25/2012    decompression of spinal stenosis from lower thoracic through the lumbar spine    COLONOSCOPY  10/2010    neg. recheck in 5 years    HIP SURGERY  01/2016    INCISION OF BLADDER NECK CONTRACTURE      multiple to remove scar tissue    JOINT REPLACEMENT Left     hip    JOINT REPLACEMENT Bilateral     knees    KNEE ARTHROSCOPY  01/2013    AR NDSC WRST SURG W/RLS TRANSVRS CARPL LIGM Left 03/04/2020    Procedure: RELEASE CARPAL TUNNEL ENDOSCOPIC;  Surgeon: Mendel Paula MD;  Location: [facility];  Service: Orthopedics    AR PRQ IMPLTJ NSTIM ELECTRODE ARRAY EPIDURAL Right 08/25/2020    Procedure: INSERTION THORACIC DORSAL COLUMN SPINAL CORD STIMULATOR PERCUTANEOUS W IMPLANTABLE PULSE GENERATOR, RIGHT;  Surgeon: Jose Vásquez MD;  Location: [facility];  Service: Neurosurgery    AR REVJ/RMVL IMPL SPI NPG/RCVR DTCH CONNJ ELTRD RA N/A 05/03/2022    Procedure: REMOVAL OF SPINAL CORD STIMULATOR SYSTEM WITH BATTERY;  Surgeon: Evangelista Sim MD;  Location: [facility];  Service: Neurosurgery    PROSTATE BIOPSY  2012    for elevated PSA of about 7; neg    ROTATOR CUFF REPAIR Right     SPINAL FUSION      TRANSURETHRAL RESECTION OF PROSTATE  06/27/2013     Family History   Problem Relation Age of Onset    Breast cancer Mother     Depression Mother     Dementia Mother     Pancreatitis Father     Diabetes Maternal Grandmother     Arthritis Maternal Grandmother     Heart attack Maternal Grandfather     Heart attack Paternal Grandmother     Diabetes Paternal Grandmother     Diabetes Paternal Grandfather     No Known Problems Brother     Lung disease Paternal Uncle       reports that he quit smoking about 25 years ago. His smoking use included cigarettes. He started smoking about 48 years ago. He has a 34.5 pack-year  smoking history. He has never been exposed to tobacco smoke. He has never used smokeless tobacco. He reports that he does not currently use alcohol after a past usage of about 1.0 standard drink of alcohol per week. He reports that he does not use drugs.  Current Outpatient Medications on File Prior to Visit   Medication Sig Dispense Refill    albuterol (2.5 mg/3 mL) 0.083 % nebulizer solution Take 3 mL (2.5 mg total) by nebulization every 6 (six) hours as needed for wheezing or shortness of breath 1080 mL 2    ARIPiprazole (ABILIFY) 5 mg tablet Take 5 mg by mouth daily      aspirin 81 mg chewable tablet Chew 81 mg daily      benzonatate (TESSALON) 200 MG capsule Take 1 capsule (200 mg total) by mouth 3 (three) times a day as needed for cough 30 capsule 0    clonazePAM (KlonoPIN) 0.5 mg tablet Take 1 tablet (0.5 mg total) by mouth daily at bedtime 30 tablet 0    diclofenac (VOLTAREN) 75 mg EC tablet TAKE 1 TABLET (75 MG TOTAL) BY MOUTH 2 (TWO) TIMES A DAY AS NEEDED (PAIN) 60 tablet 5    DULoxetine (CYMBALTA) 30 mg delayed release capsule Take 90 mg by mouth daily       fluticasone (FLONASE) 50 mcg/act nasal spray fluticasone propionate 50 mcg/actuation nasal spray,suspension      Fluticasone-Salmeterol (Advair) 250-50 mcg/dose inhaler Inhale 1 puff 2 (two) times a day Rinse mouth after use. 60 blister 5    gabapentin (NEURONTIN) 300 mg capsule Take 300 mg by mouth daily       metFORMIN (GLUCOPHAGE-XR) 500 mg 24 hr tablet TAKE 1 TABLET BY MOUTH EVERY DAY WITH DINNER 90 tablet 3    metoprolol tartrate (LOPRESSOR) 50 mg tablet TAKE 1 TABLET (50 MG TOTAL) BY MOUTH 2 (TWO) TIMES A DAY TAKE WITH 25 MG TABS 180 tablet 3    montelukast (SINGULAIR) 10 mg tablet TAKE 1 TABLET BY MOUTH EVERYDAY AT BEDTIME 90 tablet 1    Multiple Vitamin (MULTI-VITAMIN DAILY) TABS Take 1 tablet by mouth daily      NIFEdipine ER (ADALAT CC) 60 MG 24 hr tablet TAKE 1 TABLET BY MOUTH EVERY DAY 90 tablet 1    oxybutynin (DITROPAN) 5 mg tablet Take  5 mg by mouth 2 (two) times a day as needed      predniSONE 10 mg tablet Take 4 tabs for 4 days, then 3 tabs for 2 days, then 2 tabs for 2 days and then 1 tab for 2 day 28 tablet 0    promethazine-dextromethorphan (PHENERGAN-DM) 6.25-15 mg/5 mL oral syrup Take 5 mL by mouth 4 (four) times a day as needed for cough 180 mL 0    rosuvastatin (CRESTOR) 40 MG tablet TAKE 1 TABLET BY MOUTH EVERY DAY 90 tablet 1     No current facility-administered medications on file prior to visit.     Allergies   Allergen Reactions    Pollen Extract Sneezing      Current Outpatient Medications on File Prior to Visit   Medication Sig Dispense Refill    albuterol (2.5 mg/3 mL) 0.083 % nebulizer solution Take 3 mL (2.5 mg total) by nebulization every 6 (six) hours as needed for wheezing or shortness of breath 1080 mL 2    ARIPiprazole (ABILIFY) 5 mg tablet Take 5 mg by mouth daily      aspirin 81 mg chewable tablet Chew 81 mg daily      benzonatate (TESSALON) 200 MG capsule Take 1 capsule (200 mg total) by mouth 3 (three) times a day as needed for cough 30 capsule 0    clonazePAM (KlonoPIN) 0.5 mg tablet Take 1 tablet (0.5 mg total) by mouth daily at bedtime 30 tablet 0    diclofenac (VOLTAREN) 75 mg EC tablet TAKE 1 TABLET (75 MG TOTAL) BY MOUTH 2 (TWO) TIMES A DAY AS NEEDED (PAIN) 60 tablet 5    DULoxetine (CYMBALTA) 30 mg delayed release capsule Take 90 mg by mouth daily       fluticasone (FLONASE) 50 mcg/act nasal spray fluticasone propionate 50 mcg/actuation nasal spray,suspension      Fluticasone-Salmeterol (Advair) 250-50 mcg/dose inhaler Inhale 1 puff 2 (two) times a day Rinse mouth after use. 60 blister 5    gabapentin (NEURONTIN) 300 mg capsule Take 300 mg by mouth daily       metFORMIN (GLUCOPHAGE-XR) 500 mg 24 hr tablet TAKE 1 TABLET BY MOUTH EVERY DAY WITH DINNER 90 tablet 3    metoprolol tartrate (LOPRESSOR) 50 mg tablet TAKE 1 TABLET (50 MG TOTAL) BY MOUTH 2 (TWO) TIMES A DAY TAKE WITH 25 MG TABS 180 tablet 3    montelukast  "(SINGULAIR) 10 mg tablet TAKE 1 TABLET BY MOUTH EVERYDAY AT BEDTIME 90 tablet 1    Multiple Vitamin (MULTI-VITAMIN DAILY) TABS Take 1 tablet by mouth daily      NIFEdipine ER (ADALAT CC) 60 MG 24 hr tablet TAKE 1 TABLET BY MOUTH EVERY DAY 90 tablet 1    oxybutynin (DITROPAN) 5 mg tablet Take 5 mg by mouth 2 (two) times a day as needed      predniSONE 10 mg tablet Take 4 tabs for 4 days, then 3 tabs for 2 days, then 2 tabs for 2 days and then 1 tab for 2 day 28 tablet 0    promethazine-dextromethorphan (PHENERGAN-DM) 6.25-15 mg/5 mL oral syrup Take 5 mL by mouth 4 (four) times a day as needed for cough 180 mL 0    rosuvastatin (CRESTOR) 40 MG tablet TAKE 1 TABLET BY MOUTH EVERY DAY 90 tablet 1     No current facility-administered medications on file prior to visit.      Social History     Tobacco Use    Smoking status: Former     Current packs/day: 0.00     Average packs/day: 1.5 packs/day for 23.0 years (34.5 ttl pk-yrs)     Types: Cigarettes     Start date:      Quit date:      Years since quittin.1     Passive exposure: Never    Smokeless tobacco: Never   Vaping Use    Vaping status: Never Used   Substance and Sexual Activity    Alcohol use: Not Currently     Alcohol/week: 1.0 standard drink of alcohol     Types: 1 Cans of beer per week     Comment: Stopped drinking    Drug use: No    Sexual activity: Yes     Partners: Female     Birth control/protection: Post-menopausal, None     Comment: defer        Objective   /82 (BP Location: Left arm, Patient Position: Sitting, Cuff Size: Standard)   Pulse 68   Temp 97.7 °F (36.5 °C) (Temporal)   Resp 18   Ht 5' 8\" (1.727 m)   Wt 114 kg (252 lb)   SpO2 98%   BMI 38.32 kg/m²     Physical Exam  Vitals and nursing note reviewed. Exam conducted with a chaperone present (Wife).   Constitutional:       General: He is not in acute distress.     Appearance: He is not ill-appearing.   Pulmonary:      Effort: Pulmonary effort is normal. No respiratory " distress.   Musculoskeletal:         General: No tenderness.      Cervical back: Normal range of motion.      Right lower leg: No edema.      Left lower leg: No edema.      Comments: Limitation in lumbar egg extension elicits pain in back and lower extremities and when standing erect for a period of time.   Skin:     General: Skin is warm and dry.   Neurological:      General: No focal deficit present.      Mental Status: He is alert.      Motor: Motor strength is normal.  Psychiatric:         Mood and Affect: Mood normal.         Behavior: Behavior normal.       Neurological Exam  Mental Status  Alert. Oriented to person, place, time and situation.    Motor  Normal muscle bulk throughout. Normal muscle tone. Strength is 5/5 throughout all four extremities.    Sensory  Light touch is normal in upper and lower extremities.     Gait   Toe walking abnormality: Heel walking abnormality: Tandem gait abnormality: Able to rise from chair without using arms.  Single-point cane unable to perform toe walking or hill and tandem gait due to pain in lower extremities and demonstrated poor balance control.      Radiology Results Review: I have reviewed radiology reports from [facility]/PACS including: CT lumbar, MRI spine, and xray(s).    Administrative Statements   I have spent a total time of 50 minutes in caring for this patient on the day of the visit/encounter including Diagnostic results, Risks and benefits of tx options, Instructions for management, Patient and family education, Importance of tx compliance, Risk factor reductions, Impressions, Counseling / Coordination of care, Documenting in the medical record, Reviewing / ordering tests, medicine, procedures  , Obtaining or reviewing history  , and Communicating with other healthcare professionals .

## 2025-02-15 NOTE — ASSESSMENT & PLAN NOTE
Symptoms as documented in HPI  He reports a chronic urinary problem due to scar tissue at the bladder neck and requires straight cathing at least once every 2 weeks to break up the scar tissue.  Reports he has had this problem since 2014 after back surgery when he had a Estevez catheter once it was removed the problem started.  He denies bowel incontinence or saddle anesthesia symptoms.  He reports weakness in his leg when he stands or walks too long legs give out and he falls.  He reports symptoms of spinal stenosis with neurogenic claudication.  Nonfocal neurologic examination      Imaging  4/18/2023 MRI of the lumbar spine with and without contrast--The lumbar vertebral body heights are maintained.Stable grade 1 degenerative retrolisthesis at the L1-2 and L2-3 levels.  Stable grade 1 degenerative anterolisthesis at the L4-5 level. Multilevel degenerative disc disease similar to 10/25/2019 with no new disc herniation.  Mild central canal stenosis is again noted at the L1-2 and L3-4 levels.  The central canal is patent at the L2-3, L4-5, and L5-S1 level secondary to left hemilaminectomy laminectomies.  Moderate to severe bilateral L2-3 ,left L4-5, and left L5-S1 neural foraminal narrowing with encroachment of the exiting bilateral L2, left L4, and left L5 nerve roots. . Stable postoperative changes related to anterior interbody fusion at the L5-S1 level.    5/9/2023 CT lumbar spine without----Postoperative and multilevel degenerative changes of the lumbar spine, as described above. No evidence for hardware complication. Overall degree of canal stenosis and foraminal narrowing is similar to prior MRI.    5/2/2023 x-ray lumbar spine 4 views--Anterior spinal fusion at L5-S1.Grade I spondylolithesis L4 on L5.Intervertebral disc base narrowing at all levels with endplate osteophytes and facet arthropathy.Multilevel degenerative changes of the lumbar spine.     Plan  Reviewed prior imaging with patient from 2023 MRI  lumbar, CT lumbar, and x-ray flexion and extension lumbar.  Ordered MRI of lumbar spine with and without contrast given the patient's prior surgery assess for progression of degenerative arthritis in lumbar spine from last imaging in 2023, patient is status post lumbar spine fusion, assess progression of degenerative retrolisthesis and lumbar spine.  Patient presents with symptoms of spinal stenosis with neurogenic claudication.  Ordered x-ray lumbar spine 6 views flexion extension to assess dynamic stability of the lumbar spine in the setting of degenerative retrolisthesis and also assess fusion hardware for stability.  Advised patient to complete x-ray imaging over the next few days  [facility]  He is concerned about attending physical therapy secondary to copayment costs.  Provided patient with copies of home exercises for spinal stenosis strengthening and stretching, advised when he returns to office for follow-up visit to bring the exercises and identify which exercises he is performing at home on a regular basis.  Reviewed red flag signs with patient advised if he develops he should return to the closest emergency department for assessment.  Advised patient if he has additional questions or concerns she should contact neurosurgery.  Request checkout schedule a follow-up appointment within 6 to 8 weeks with a spine surgeon to allow time for follow-up with pain management and received interventional treatments if indicated.     Orders:    MRI lumbar spine with and without contrast; Future    XR spine lumbar complete w bending minimum 6 views; Future    Ambulatory referral to Spine & Pain Management; Future

## 2025-02-17 ENCOUNTER — APPOINTMENT (OUTPATIENT)
Dept: RADIOLOGY | Age: 75
End: 2025-02-17
Payer: COMMERCIAL

## 2025-02-17 DIAGNOSIS — M43.27 FUSION OF LUMBOSACRAL SPINE: ICD-10-CM

## 2025-02-17 DIAGNOSIS — M48.062 SPINAL STENOSIS OF LUMBAR REGION WITH NEUROGENIC CLAUDICATION: ICD-10-CM

## 2025-02-17 DIAGNOSIS — M54.16 LUMBAR RADICULOPATHY: ICD-10-CM

## 2025-02-17 PROCEDURE — 72114 X-RAY EXAM L-S SPINE BENDING: CPT

## 2025-02-18 DIAGNOSIS — F32.A ANXIETY AND DEPRESSION: ICD-10-CM

## 2025-02-18 DIAGNOSIS — F41.9 ANXIETY AND DEPRESSION: ICD-10-CM

## 2025-02-18 RX ORDER — CLONAZEPAM 0.5 MG/1
0.5 TABLET ORAL
Qty: 30 TABLET | Refills: 0 | Status: SHIPPED | OUTPATIENT
Start: 2025-02-18

## 2025-02-26 ENCOUNTER — OFFICE VISIT (OUTPATIENT)
Dept: FAMILY MEDICINE CLINIC | Facility: CLINIC | Age: 75
End: 2025-02-26
Payer: COMMERCIAL

## 2025-02-26 VITALS
TEMPERATURE: 97.2 F | HEART RATE: 69 BPM | RESPIRATION RATE: 18 BRPM | BODY MASS INDEX: 38.21 KG/M2 | OXYGEN SATURATION: 96 % | HEIGHT: 69 IN | SYSTOLIC BLOOD PRESSURE: 112 MMHG | DIASTOLIC BLOOD PRESSURE: 64 MMHG | WEIGHT: 258 LBS

## 2025-02-26 DIAGNOSIS — R06.2 WHEEZING: Primary | ICD-10-CM

## 2025-02-26 DIAGNOSIS — I27.20 PULMONARY HYPERTENSION (HCC): ICD-10-CM

## 2025-02-26 DIAGNOSIS — M79.89 SWOLLEN FEET: ICD-10-CM

## 2025-02-26 PROCEDURE — 99214 OFFICE O/P EST MOD 30 MIN: CPT | Performed by: FAMILY MEDICINE

## 2025-02-26 PROCEDURE — G2211 COMPLEX E/M VISIT ADD ON: HCPCS | Performed by: FAMILY MEDICINE

## 2025-02-26 RX ORDER — PREDNISONE 10 MG/1
TABLET ORAL
Qty: 28 TABLET | Refills: 0 | Status: SHIPPED | OUTPATIENT
Start: 2025-02-26 | End: 2025-03-05 | Stop reason: SDUPTHER

## 2025-02-26 NOTE — PROGRESS NOTES
Name: Morgan Rodriguez      : 1950      MRN: [id number]  Encounter Provider: Antonio Haider MD  Encounter Date: 2025   Encounter department: [facility]  :  Assessment & Plan  Wheezing  Give prednisone. SE educated pt.   Continue advair, singulair and albuterol prn.   FU pulmonary as scheduled.   Orders:    predniSONE 10 mg tablet; Take 4 tabs for 4 days, then 3 tabs for 2 days, then 2 tabs for 2 days and then 1 tab for 2 day    Swollen feet  DD including prolonged sitting, side effects from prednisone, heart disease, kidney disease etc.   Will check Echo.   Orders:    Echo complete w/ contrast if indicated; Future    Pulmonary hypertension (HCC)    Orders:    Echo complete w/ contrast if indicated; Future           History of Present Illness   HPI    PT is here by himself.   C/o nasal congestion and cough for 4 days.   Cough with mucous. Wheezing and Sob sometimes.   Denies fever.   Denies chest pain.   Denies n/v/diarrhea.   Home covid19 negative yesterday. He is the only one sick at home.   Hx of COPD. He is on advair 250-50 bid, singulair daily and use albuterol nebulizer which helped.   He got COPD flare up frequently in winter. He got doxycycline and  prednisone in 2025, prednisone in 2/10/2025. Pt states only prednisone helped wheezing/SOB.   Will see pulmonary on 3/13/2025.     C/o feet swollen for a while.   Pt has chronic lower back pain worse recently. FU neurosurgeon. Plan to do MRI lumbar in 3/2025.   2/10/2025 BNP 16 normal, GFR 84 normal.   2025 CT chest showed pulmonary hypertension. No recent Echo.     Review of Systems   Constitutional:  Negative for appetite change, chills and fever.   HENT:  Positive for congestion. Negative for ear pain, sinus pain and sore throat.    Eyes:  Negative for discharge and itching.   Respiratory:  Positive for cough, shortness of breath and wheezing. Negative for apnea and chest tightness.    Cardiovascular:  Negative for  "chest pain, palpitations and leg swelling.   Gastrointestinal:  Negative for abdominal pain, anal bleeding, constipation, diarrhea, nausea and vomiting.   Endocrine: Negative for cold intolerance, heat intolerance and polyuria.   Genitourinary:  Negative for difficulty urinating and dysuria.   Musculoskeletal:  Positive for gait problem and joint swelling. Negative for arthralgias, back pain and myalgias.   Skin:  Negative for rash.   Neurological:  Negative for dizziness and headaches.   Psychiatric/Behavioral:  Negative for agitation.        Objective   /64   Pulse 69   Temp (!) 97.2 °F (36.2 °C) (Tympanic)   Resp 18   Ht 5' 8.5\" (1.74 m)   Wt 117 kg (258 lb)   SpO2 96%   BMI 38.66 kg/m²      Physical Exam  Constitutional:       Appearance: He is well-developed.   HENT:      Head: Normocephalic and atraumatic.   Eyes:      General:         Right eye: No discharge.         Left eye: No discharge.      Conjunctiva/sclera: Conjunctivae normal.   Cardiovascular:      Rate and Rhythm: Normal rate and regular rhythm.      Heart sounds: Normal heart sounds. No murmur heard.     No friction rub. No gallop.   Pulmonary:      Effort: Pulmonary effort is normal. No respiratory distress.      Breath sounds: Normal breath sounds. No wheezing or rales.   Abdominal:      General: Bowel sounds are normal. There is no distension.      Palpations: Abdomen is soft.      Tenderness: There is no abdominal tenderness. There is no guarding.   Musculoskeletal:      Cervical back: Normal range of motion and neck supple.      Comments: Use cane  B/L feet swollen   Neurological:      Mental Status: He is alert.         "

## 2025-03-05 ENCOUNTER — OFFICE VISIT (OUTPATIENT)
Dept: FAMILY MEDICINE CLINIC | Facility: CLINIC | Age: 75
End: 2025-03-05
Payer: COMMERCIAL

## 2025-03-05 VITALS
HEIGHT: 69 IN | WEIGHT: 255.4 LBS | HEART RATE: 75 BPM | BODY MASS INDEX: 37.83 KG/M2 | TEMPERATURE: 97 F | RESPIRATION RATE: 18 BRPM | OXYGEN SATURATION: 95 % | SYSTOLIC BLOOD PRESSURE: 140 MMHG | DIASTOLIC BLOOD PRESSURE: 88 MMHG

## 2025-03-05 DIAGNOSIS — R06.2 WHEEZING: Primary | ICD-10-CM

## 2025-03-05 DIAGNOSIS — J44.1 COPD EXACERBATION (HCC): ICD-10-CM

## 2025-03-05 PROCEDURE — G2211 COMPLEX E/M VISIT ADD ON: HCPCS | Performed by: FAMILY MEDICINE

## 2025-03-05 PROCEDURE — 99213 OFFICE O/P EST LOW 20 MIN: CPT | Performed by: FAMILY MEDICINE

## 2025-03-05 RX ORDER — PREDNISONE 10 MG/1
TABLET ORAL
Qty: 28 TABLET | Refills: 0 | Status: SHIPPED | OUTPATIENT
Start: 2025-03-05 | End: 2025-03-13 | Stop reason: ALTCHOICE

## 2025-03-05 NOTE — PROGRESS NOTES
Name: Morgan Rodriguez      : 1950      MRN: [id number]  Encounter Provider: Antonio Haider MD  Encounter Date: 3/5/2025   Encounter department: [facility] PRACT[facility]  :  Assessment & Plan  Wheezing  Give prednisone. SE educated pt.   Orders:    predniSONE 10 mg tablet; Take 4 tabs for 4 days, then 3 tabs for 2 days, then 2 tabs for 2 days and then 1 tab for 2 day    COPD exacerbation (HCC)  Pt refused higher dose of advair or spiriva. Would like to discuss with pulmonary first. Appt 3/13/2025.               History of Present Illness   Cough  This is a recurrent problem. The current episode started 1 to 4 weeks ago. The problem has been waxing and waning. The problem occurs every few minutes. The cough is Productive of sputum. Associated symptoms include postnasal drip, rhinorrhea, shortness of breath and wheezing. Pertinent negatives include no chest pain, chills, ear congestion, ear pain, fever, headaches, heartburn, hemoptysis, myalgias, nasal congestion, rash, sore throat, sweats or weight loss. Nothing aggravates the symptoms.       Pt is here by himself.   C/o cough and wheezing for a while.   Dry cough. Wheezing.   Denies fever.   Denies CP, n/v/diarrhea.   Hx of COPD. He is on advair 250-50 bid and albuterol nebulize 2-3 times per day.   Flare up recently, he was on prednisone X3 in last 2 months. Pt states he felt better after prednisone, but symptoms coming back when he weaned off prednisone.   Will see pulmonary on 3/13/2025.           Review of Systems   Constitutional:  Negative for appetite change, chills, fever and weight loss.   HENT:  Positive for postnasal drip and rhinorrhea. Negative for congestion, ear pain, sinus pain and sore throat.    Eyes:  Negative for discharge and itching.   Respiratory:  Positive for cough, shortness of breath and wheezing. Negative for apnea, hemoptysis and chest tightness.    Cardiovascular:  Negative for chest pain, palpitations and leg swelling.  "  Gastrointestinal:  Negative for abdominal pain, anal bleeding, constipation, diarrhea, heartburn, nausea and vomiting.   Endocrine: Negative for cold intolerance, heat intolerance and polyuria.   Genitourinary:  Negative for difficulty urinating and dysuria.   Musculoskeletal:  Negative for arthralgias, back pain and myalgias.   Skin:  Negative for rash.   Neurological:  Negative for dizziness and headaches.   Psychiatric/Behavioral:  Negative for agitation.        Objective   /88   Pulse 75   Temp (!) 97 °F (36.1 °C) (Tympanic)   Resp 18   Ht 5' 8.5\" (1.74 m)   Wt 116 kg (255 lb 6.4 oz)   SpO2 95%   BMI 38.27 kg/m²      Physical Exam  Constitutional:       Appearance: He is well-developed.   HENT:      Head: Normocephalic and atraumatic.   Eyes:      General:         Right eye: No discharge.         Left eye: No discharge.      Conjunctiva/sclera: Conjunctivae normal.   Cardiovascular:      Rate and Rhythm: Normal rate and regular rhythm.      Heart sounds: Normal heart sounds. No murmur heard.     No friction rub. No gallop.   Pulmonary:      Effort: Pulmonary effort is normal. No respiratory distress.      Breath sounds: Wheezing present. No rales.   Abdominal:      General: Bowel sounds are normal. There is no distension.      Palpations: Abdomen is soft.      Tenderness: There is no abdominal tenderness. There is no guarding.   Musculoskeletal:      Cervical back: Normal range of motion and neck supple.      Comments: Use cane   Neurological:      Mental Status: He is alert.         "

## 2025-03-05 NOTE — ASSESSMENT & PLAN NOTE
Pt refused higher dose of advair or spiriva. Would like to discuss with pulmonary first. Appt 3/13/2025.

## 2025-03-10 ENCOUNTER — HOSPITAL ENCOUNTER (OUTPATIENT)
Dept: MRI IMAGING | Facility: HOSPITAL | Age: 75
Discharge: HOME | End: 2025-03-10
Payer: COMMERCIAL

## 2025-03-10 ENCOUNTER — HOSPITAL ENCOUNTER (OUTPATIENT)
Dept: NON INVASIVE DIAGNOSTICS | Facility: CLINIC | Age: 75
End: 2025-03-10
Payer: COMMERCIAL

## 2025-03-10 DIAGNOSIS — I10 PRIMARY HYPERTENSION: ICD-10-CM

## 2025-03-10 DIAGNOSIS — M54.16 LUMBAR RADICULOPATHY: ICD-10-CM

## 2025-03-10 DIAGNOSIS — M48.062 SPINAL STENOSIS OF LUMBAR REGION WITH NEUROGENIC CLAUDICATION: ICD-10-CM

## 2025-03-10 DIAGNOSIS — M43.27 FUSION OF LUMBOSACRAL SPINE: ICD-10-CM

## 2025-03-10 PROCEDURE — A9585 GADOBUTROL INJECTION: HCPCS | Performed by: NURSE PRACTITIONER

## 2025-03-10 PROCEDURE — 72158 MRI LUMBAR SPINE W/O & W/DYE: CPT

## 2025-03-10 RX ORDER — GADOBUTROL 604.72 MG/ML
12 INJECTION INTRAVENOUS
Status: COMPLETED | OUTPATIENT
Start: 2025-03-10 | End: 2025-03-10

## 2025-03-10 RX ADMIN — GADOBUTROL 12 ML: 604.72 INJECTION INTRAVENOUS at 22:14

## 2025-03-13 ENCOUNTER — CONSULT (OUTPATIENT)
Dept: PULMONOLOGY | Facility: CLINIC | Age: 75
End: 2025-03-13
Payer: COMMERCIAL

## 2025-03-13 VITALS
DIASTOLIC BLOOD PRESSURE: 78 MMHG | HEIGHT: 69 IN | BODY MASS INDEX: 37.68 KG/M2 | HEART RATE: 93 BPM | OXYGEN SATURATION: 95 % | SYSTOLIC BLOOD PRESSURE: 142 MMHG | TEMPERATURE: 97.1 F | WEIGHT: 254.4 LBS

## 2025-03-13 DIAGNOSIS — J41.0 SIMPLE CHRONIC BRONCHITIS (HCC): ICD-10-CM

## 2025-03-13 DIAGNOSIS — J44.1 COPD EXACERBATION (HCC): ICD-10-CM

## 2025-03-13 DIAGNOSIS — J30.9 ALLERGIC RHINITIS, UNSPECIFIED SEASONALITY, UNSPECIFIED TRIGGER: ICD-10-CM

## 2025-03-13 DIAGNOSIS — R93.89 ABNORMAL CT SCAN, CHEST: ICD-10-CM

## 2025-03-13 DIAGNOSIS — J41.8 MIXED SIMPLE AND MUCOPURULENT CHRONIC BRONCHITIS (HCC): Primary | ICD-10-CM

## 2025-03-13 PROBLEM — R06.09 DOE (DYSPNEA ON EXERTION): Status: RESOLVED | Noted: 2021-05-25 | Resolved: 2025-03-13

## 2025-03-13 PROCEDURE — 99204 OFFICE O/P NEW MOD 45 MIN: CPT | Performed by: INTERNAL MEDICINE

## 2025-03-13 PROCEDURE — G2211 COMPLEX E/M VISIT ADD ON: HCPCS | Performed by: INTERNAL MEDICINE

## 2025-03-13 RX ORDER — MOMETASONE FUROATE MONOHYDRATE 50 UG/1
2 SPRAY, METERED NASAL DAILY
COMMUNITY

## 2025-03-14 PROBLEM — R93.89 ABNORMAL CT SCAN, CHEST: Status: ACTIVE | Noted: 2025-03-14

## 2025-03-14 NOTE — ASSESSMENT & PLAN NOTE
Continue Advair  I suggest that he use the nebulizer a bit more regularly especially first thing in the morning to help with his mucus production  Continue treatment of postnasal drip which seems to be a major contributor to his symptoms  Orders:    Ambulatory Referral to Pulmonology

## 2025-03-14 NOTE — PROGRESS NOTES
Consultation - Pulmonary Medicine   Name: Morgan Rodriguez      : 1950      MRN: [id number]  Encounter Provider: Franky Morrison MD  Encounter Date: 3/13/2025   Encounter department: [facility] PULMONARY [facility]    Requesting Provider:   Richard Guillen  [address] [place] [address]     Reason for Consult: Chronic bronchitis:  Assessment & Plan  Mixed simple and mucopurulent chronic bronchitis (HCC)  Continue Advair  I suggest that he use the nebulizer a bit more regularly especially first thing in the morning to help with his mucus production  Continue treatment of postnasal drip which seems to be a major contributor to his symptoms  Orders:    Ambulatory Referral to Pulmonology    Abnormal CT scan, chest  He does have some mixed subpleural scarring changes that could be representative of his prior asbestos exposure.  These have slightly progressed in comparison with prior.  At this point would follow symptoms but consider repeat CT in 1 year unless symptoms progress more profoundly.       Allergic rhinitis, unspecified seasonality, unspecified trigger  Continue mometasone nasal spray  Continue montelukast  We discussed adding nonsedating antihistamine such as Zyrtec to the regimen.         Return in about 6 months (around 2025).    History of Present Illness   Morgan Rodriguez is a 74 y.o. male who presents at the request of primary care for evaluation of chronic bronchitis and recent COPD exacerbation.  He has also had recent CAT scan which does show abnormal findings with some increased scarring.  He recently had upper respiratory infection with bronchitis and significant phlegm production.  The degree of phlegm production was quite bothersome to him.  He takes quite some time to clear the phlegm and for the cough to resolve.  He is now starting to feel better.  He does have contributing factors of significant nasal drip.    He does not report any chest pain.  He has  "not had any GERD.  No ongoing fever, chills, or significant weight changes.  He does have back pain and has difficulty walking.  He is not exercising with any regularity due to his back injury.    He does have significant occupational exposures in the past.  He worked for [employer] for many years and had exposures to asbestos and other aerosolized industrial dust    Review of systems:  Aside from what is mentioned in the HPI, ROS is otherwise negative    Medical History Reviewed by provider this encounter:  Tobacco  Allergies  Meds  Problems  Med Hx  Surg Hx  Fam Hx     .    Historical Information   Past Medical History:   Diagnosis Date    Allergic 1955    Allergic rhinitis 1955    Anxiety ?    Arthritis ?    back    Asthma     Atrial fibrillation (HCC)     per pt \"controlled with metoprolol\" sees [facility] cardio Dr Ace 2x/year    Bronchitis, chronic obstructive, with exacerbation (HCC)     Last Assessed: 5/17/2013    Chronic pain disorder     back    COPD (chronic obstructive pulmonary disease) (HCC) 1987    COPD with acute exacerbation (HCC)     Last Assessed: 4/13/2015    Depression 1995    Depression, controlled 08/30/2017    Transitioned From: Depression    Diabetes mellitus (HCC)     Elevated PSA     Bx neg 2012; Last Assessed: 11/23/2012    Environmental and seasonal allergies     Exercises 3 to 4 times per week     \"2hrs --4x/week\"elliptical and Boflex    History of prediabetes     Hyperlipidemia 2001    history of/better on meds    Hypertension     has gotten better since weight loss and pt is keeping check of BP at home    Irregular heart beat     MAT, afib    Low back pain 2001    Lumbosacral disc disease 1990    Lung nodule - resolved 2017 CT 01/04/2017    Osteoarthritis 1992    Postoperative urinary retention     Self-catheterizes urinary bladder     per pt every 2 weeks as per urologist    Spinal cord stimulator status     Status post insertion of spinal cord stimulator 09/09/2020    Wears " glasses     Weight loss     per pt recent intention loss of 80lbs recently     Past Surgical History:   Procedure Laterality Date    BACK SURGERY  2012    decompression of spinal stenosis from lower thoracic through the lumbar spine    CARPAL TUNNEL RELEASE  2017    COLONOSCOPY  10/2010    neg. recheck in 5 years    HIP SURGERY  2016    INCISION OF BLADDER NECK CONTRACTURE      multiple to remove scar tissue    JOINT REPLACEMENT Left     hip    JOINT REPLACEMENT Bilateral     knees    KNEE ARTHROSCOPY  2013    AR NDSC WRST SURG W/RLS TRANSVRS CARPL LIGM Left 2020    Procedure: RELEASE CARPAL TUNNEL ENDOSCOPIC;  Surgeon: Mendel Paula MD;  Location: [facility];  Service: Orthopedics    AR PRQ IMPLTJ NSTIM ELECTRODE ARRAY EPIDURAL Right 2020    Procedure: INSERTION THORACIC DORSAL COLUMN SPINAL CORD STIMULATOR PERCUTANEOUS W IMPLANTABLE PULSE GENERATOR, RIGHT;  Surgeon: Jose Vásquez MD;  Location: [facility];  Service: Neurosurgery    AR REVJ/RMVL IMPL SPI NPG/RCVR DTCH CONNJ ELTRD RA N/A 2022    Procedure: REMOVAL OF SPINAL CORD STIMULATOR SYSTEM WITH BATTERY;  Surgeon: Evangelista Sim MD;  Location: [facility];  Service: Neurosurgery    PROSTATE BIOPSY      for elevated PSA of about 7; neg    ROTATOR CUFF REPAIR Right     SPINAL FUSION      TRANSURETHRAL RESECTION OF PROSTATE  2013     Social History   Social History     Tobacco Use   Smoking Status Former    Current packs/day: 0.00    Average packs/day: 1.6 packs/day for 28.8 years (46.0 ttl pk-yrs)    Types: Cigarettes    Start date: 1977    Quit date: 2000    Years since quittin.2    Passive exposure: Never   Smokeless Tobacco Never       Occupational/Environmental history:  Retired from [employer]    Family History:   Family History   Problem Relation Age of Onset    Breast cancer Mother     Depression Mother     Dementia Mother     Pancreatitis Father     Diabetes Maternal Grandmother     Arthritis  "Maternal Grandmother     Heart attack Maternal Grandfather     Heart attack Paternal Grandmother     Diabetes Paternal Grandmother     Diabetes Paternal Grandfather     No Known Problems Brother     Lung disease Paternal Uncle        Objective   /78 (BP Location: Right arm, Patient Position: Sitting, Cuff Size: Large)   Pulse 93   Temp (!) 97.1 °F (36.2 °C) (Tympanic Core)   Ht 5' 8.5\" (1.74 m)   Wt 115 kg (254 lb 6.4 oz)   SpO2 95%   BMI 38.12 kg/m²      Physical Exam:   Gen:  Comfortable on room air.  No conversational dyspnea  HEENT:  Conjugate gaze.  sclerae anicteric.  Oropharynx moist  Neck: Trachea is midline. No JVD. No adenopathy  Chest: Slightly distant breath sounds.  No wheezes or crackles.  Cardiac: Regular. no murmur  Abdomen:  benign  Extremities: No edema  Neuro:  Normal speech and mentation.  He does have an antalgic gait and walks with a walker due to his spinal stenosis    Diagnostic Data:  Radiology results:    2/1/2025 chest CT scan was viewed on the PACS system.  This does show mild emphysema.  There is subpleural reticulation and groundglass with some scarring.  This has slightly increased in comparison with prior study which was far more remote in July 2017.  Degree of progression is only fairly minor since that time.    Franky Morrison MD      "

## 2025-03-14 NOTE — ASSESSMENT & PLAN NOTE
Continue mometasone nasal spray  Continue montelukast  We discussed adding nonsedating antihistamine such as Zyrtec to the regimen.

## 2025-03-14 NOTE — ASSESSMENT & PLAN NOTE
He does have some mixed subpleural scarring changes that could be representative of his prior asbestos exposure.  These have slightly progressed in comparison with prior.  At this point would follow symptoms but consider repeat CT in 1 year unless symptoms progress more profoundly.

## 2025-03-17 ENCOUNTER — OFFICE VISIT (OUTPATIENT)
Dept: NEUROSURGERY | Facility: CLINIC | Age: 75
End: 2025-03-17
Payer: COMMERCIAL

## 2025-03-17 VITALS
OXYGEN SATURATION: 96 % | HEIGHT: 71 IN | DIASTOLIC BLOOD PRESSURE: 70 MMHG | TEMPERATURE: 98.7 F | BODY MASS INDEX: 35.56 KG/M2 | SYSTOLIC BLOOD PRESSURE: 127 MMHG | WEIGHT: 254 LBS | HEART RATE: 86 BPM

## 2025-03-17 DIAGNOSIS — M43.27 FUSION OF LUMBOSACRAL SPINE: Primary | ICD-10-CM

## 2025-03-17 DIAGNOSIS — M48.062 SPINAL STENOSIS OF LUMBAR REGION WITH NEUROGENIC CLAUDICATION: ICD-10-CM

## 2025-03-17 PROCEDURE — 99213 OFFICE O/P EST LOW 20 MIN: CPT | Performed by: NEUROLOGICAL SURGERY

## 2025-03-17 RX ORDER — DULOXETIN HYDROCHLORIDE 60 MG/1
2 CAPSULE, DELAYED RELEASE ORAL DAILY
COMMUNITY
Start: 2025-03-12

## 2025-03-17 RX ORDER — ARIPIPRAZOLE 10 MG/1
1 TABLET ORAL DAILY
COMMUNITY
Start: 2025-03-12

## 2025-03-17 NOTE — ASSESSMENT & PLAN NOTE
He had a relatively acute exacerbation of his back pain and gait problems 3 weeks ago.  He is not interested in paying for PT, so he is doing exercises at home on his own  He has had previous surgery for lumbar stenosis.  His central canal appears relatively patent.  I do not get any sense of radiculopathy from the varying degrees of foraminal stenosis  I would not rec any additional lubmar spine surgery for him at this time.  I am uncertain if additional surgery would be indicated in the future.  He has already had SCS inserted and removed for lack of efficacy  He had an appt with pain management last week, but it was cancelled  I encourage him to follow up with his pain management doctor.  I am hopeful that he will improve with interventions and time.  I would be glad to see him back in neurosurgical follow up for further discussions if conservative measures fail to improve things to his liking  All questions answered

## 2025-03-17 NOTE — PROGRESS NOTES
Name: Morgan Rodriguez      : 1950      MRN: [id number]  Encounter Provider: Craig Robert Goldberg, MD  Encounter Date: 3/17/2025   Encounter department: [facility] [place]  :  Assessment & Plan  Fusion of lumbosacral spine  No issues with hardware.  No dynamic instability at this or any level.       Spinal stenosis of lumbar region with neurogenic claudication  He had a relatively acute exacerbation of his back pain and gait problems 3 weeks ago.  He is not interested in paying for PT, so he is doing exercises at home on his own  He has had previous surgery for lumbar stenosis.  His central canal appears relatively patent.  I do not get any sense of radiculopathy from the varying degrees of foraminal stenosis  I would not rec any additional lubmar spine surgery for him at this time.  I am uncertain if additional surgery would be indicated in the future.  He has already had SCS inserted and removed for lack of efficacy  He had an appt with pain management last week, but it was cancelled  I encourage him to follow up with his pain management doctor.  I am hopeful that he will improve with interventions and time.  I would be glad to see him back in neurosurgical follow up for further discussions if conservative measures fail to improve things to his liking  All questions answered               History of Present Illness     Morgan Rodriguez is a 74 y.o. male who presents for follow up after seeing Terri Mcmahon for his back    HPI   14 years ago he had stenosis    Trouble walking started about 3 weeks.  Fell coming out of a restaurant  Back numbness and weakness.   No specific times of day  Better sitting  Walking and standing are worse  Sees Dr Guerrero of pain management- last seen over 1 year ago, not since this episode  SCS inserted and removed for lack of efficacy  Not doing PT formally, doing exercises at home  Lumbar stenosis surgery 14 years  Fusion about 10 years  Bladder  "issues for 14 years.  Self catheterizes  Medications: gabapentin 1500mg qd, cymbalta 60mg qd for years    Review of Systems   Constitutional: Negative.    HENT: Negative.     Eyes: Negative.    Respiratory: Negative.     Cardiovascular: Negative.    Gastrointestinal: Negative.    Endocrine: Negative.    Genitourinary: Negative.    Musculoskeletal:  Positive for back pain and gait problem.        Lumbar stenosis w/neurogenic claudication   Lumbar Radiculopathy   L/S MRI on 3/10/25     6/10 Burning Lbp radiates to b/l buttocks,posterior thighs and stops behind the knee.   N/W on BLE and difficulty walking   Prolonged standing/walking aggravate his pain     Denies any B/B incontinence  Med: Gabapentin, Tylenol PRN- minimal relief  On Aspirin/former smoker   Back sx in 2011 @ [facility]        Skin: Negative.    Allergic/Immunologic: Negative.    Neurological:  Positive for weakness and numbness.   Hematological: Negative.    Psychiatric/Behavioral: Negative.     All other systems reviewed and are negative.    I have personally reviewed the MA's review of systems and made changes as necessary.    Past Medical History   Past Medical History:   Diagnosis Date   • Allergic 1955   • Allergic rhinitis 1955   • Anxiety ?   • Arthritis ?    back   • Asthma    • Atrial fibrillation (HCC)     per pt \"controlled with metoprolol\" sees  cardio Dr Ace 2x/year   • Bronchitis, chronic obstructive, with exacerbation (HCC)     Last Assessed: 5/17/2013   • Chronic pain disorder     back   • COPD (chronic obstructive pulmonary disease) (HCC) 1987   • COPD with acute exacerbation (HCC)     Last Assessed: 4/13/2015   • Depression 1995   • Depression, controlled 08/30/2017    Transitioned From: Depression   • Diabetes mellitus (HCC)    • Elevated PSA     Bx neg 2012; Last Assessed: 11/23/2012   • Environmental and seasonal allergies    • Exercises 3 to 4 times per week     \"2hrs --4x/week\"elliptical and Boflex   • History of prediabetes    • " Hyperlipidemia 2001    history of/better on meds   • Hypertension     has gotten better since weight loss and pt is keeping check of BP at home   • Irregular heart beat     MAT, afib   • Low back pain 2001   • Lumbosacral disc disease 1990   • Lung nodule - resolved 2017 CT 01/04/2017   • Osteoarthritis 1992   • Postoperative urinary retention    • Self-catheterizes urinary bladder     per pt every 2 weeks as per urologist   • Spinal cord stimulator status    • Status post insertion of spinal cord stimulator 09/09/2020   • Wears glasses    • Weight loss     per pt recent intention loss of 80lbs recently     Past Surgical History:   Procedure Laterality Date   • BACK SURGERY  07/25/2012    decompression of spinal stenosis from lower thoracic through the lumbar spine   • CARPAL TUNNEL RELEASE  2017   • COLONOSCOPY  10/2010    neg. recheck in 5 years   • HIP SURGERY  01/2016   • INCISION OF BLADDER NECK CONTRACTURE      multiple to remove scar tissue   • JOINT REPLACEMENT Left     hip   • JOINT REPLACEMENT Bilateral     knees   • KNEE ARTHROSCOPY  01/2013   • WA NDSC WRST SURG W/RLS TRANSVRS CARPL LIGM Left 03/04/2020    Procedure: RELEASE CARPAL TUNNEL ENDOSCOPIC;  Surgeon: Mendel Paula MD;  Location: [facility];  Service: Orthopedics   • WA PRQ IMPLTJ NSTIM ELECTRODE ARRAY EPIDURAL Right 08/25/2020    Procedure: INSERTION THORACIC DORSAL COLUMN SPINAL CORD STIMULATOR PERCUTANEOUS W IMPLANTABLE PULSE GENERATOR, RIGHT;  Surgeon: Jose Vásquez MD;  Location:  MAIN OR;  Service: Neurosurgery   • WA REVJ/RMVL IMPL SPI NPG/RCVR DTC CONNJ ELTRD RA N/A 05/03/2022    Procedure: REMOVAL OF SPINAL CORD STIMULATOR SYSTEM WITH BATTERY;  Surgeon: Evangelista Sim MD;  Location: [facility];  Service: Neurosurgery   • PROSTATE BIOPSY  2012    for elevated PSA of about 7; neg   • ROTATOR CUFF REPAIR Right    • SPINAL FUSION     • TRANSURETHRAL RESECTION OF PROSTATE  06/27/2013     Family History   Problem Relation Age of Onset    • Breast cancer Mother    • Depression Mother    • Dementia Mother    • Pancreatitis Father    • Diabetes Maternal Grandmother    • Arthritis Maternal Grandmother    • Heart attack Maternal Grandfather    • Heart attack Paternal Grandmother    • Diabetes Paternal Grandmother    • Diabetes Paternal Grandfather    • No Known Problems Brother    • Lung disease Paternal Uncle      he reports that he quit smoking about 25 years ago. His smoking use included cigarettes. He started smoking about 48 years ago. He has a 46 pack-year smoking history. He has never been exposed to tobacco smoke. He has never used smokeless tobacco. He reports that he does not currently use alcohol after a past usage of about 1.0 standard drink of alcohol per week. He reports that he does not use drugs.  Current Outpatient Medications   Medication Instructions   • albuterol 2.5 mg, Nebulization, Every 6 hours PRN   • ARIPiprazole (ABILIFY) 10 mg tablet 1 tablet, Daily   • ARIPiprazole (ABILIFY) 5 mg, Daily   • aspirin 81 mg, Daily   • clonazePAM (KLONOPIN) 0.5 mg, Oral, Daily at bedtime   • diclofenac (VOLTAREN) 75 mg, Oral, 2 times daily PRN   • DULoxetine (CYMBALTA) 60 mg delayed release capsule 2 capsules, Daily   • DULoxetine (CYMBALTA) 90 mg, Daily   • Fluticasone-Salmeterol (Advair) 250-50 mcg/dose inhaler 1 puff, Inhalation, 2 times daily, Rinse mouth after use.   • gabapentin (NEURONTIN) 300 mg, Daily   • metFORMIN (GLUCOPHAGE-XR) 500 mg, Oral, Daily with dinner   • metoprolol tartrate (LOPRESSOR) 50 mg, Oral, 2 times daily, Take with 25 mg tab   • mometasone (NASONEX) 50 mcg/act nasal spray 2 sprays, Daily   • montelukast (SINGULAIR) 10 mg, Oral, Daily at bedtime,      • Multiple Vitamin (MULTI-VITAMIN DAILY) TABS 1 tablet, Daily   • NIFEdipine ER (ADALAT CC) 60 mg, Oral, Daily   • oxybutynin (DITROPAN) 5 mg, 2 times daily PRN   • rosuvastatin (CRESTOR) 40 mg, Oral, Daily     Allergies   Allergen Reactions   • Pollen Extract Sneezing  "     Objective   /70   Pulse 86   Temp 98.7 °F (37.1 °C) (Temporal)   Ht 5' 11\" (1.803 m)   Wt 115 kg (254 lb)   SpO2 96%   BMI 35.43 kg/m²     Physical Exam  Neurological Exam  Physical exam:     Well-developed well-nourished  Appears stated age of 74 y.o.  Awake alert oriented x3  Verbally interactive and appropriate  Adequate fund of knowledge  Cranial nerves II through VIII intact   Motor strength 5 out of 5 except right foot dorsiflexion 4/5  No pronator drift  Sensory intact to light touch except lateral to knee replacements in shins/calves and into lateral right foot  No sensory changes on the dorsum of the spine.  Well healed midline incision and right flank former battery pack site  without pain to palpation over the Cervical, Thoracic, Lumbar, and Sacrum  Reflexes 1+and symmetric  Toes: downgoing  Gait: flexed, poor balance, unsteady  Heel walk: not assessed  Toe walk:: not assessed  Tandem walk: not assessed  Language: Fluent in English  Limited ROM lumbar extension without pain    MRI Images and Report of the Lumbar show previous fusion labeled L5/S1.  Varying degrees of stenosis at multiple levels.  Previous laminotomies at multiple levels.  X-ray Images and Report of the Lumbar show no dynamic instability.  Hardware intact          Administrative Statements   I have spent a total time of 20 minutes in caring for this patient on the day of the visit/encounter including Diagnostic results, Prognosis, Risks and benefits of tx options, Instructions for management, Impressions, Counseling / Coordination of care, Documenting in the medical record, Reviewing/placing orders in the medical record (including tests, medications, and/or procedures), and Obtaining or reviewing history  .      "

## 2025-03-18 DIAGNOSIS — F32.A ANXIETY AND DEPRESSION: ICD-10-CM

## 2025-03-18 DIAGNOSIS — F41.9 ANXIETY AND DEPRESSION: ICD-10-CM

## 2025-03-18 RX ORDER — CLONAZEPAM 0.5 MG/1
0.5 TABLET ORAL
Qty: 30 TABLET | Refills: 0 | Status: SHIPPED | OUTPATIENT
Start: 2025-03-18

## 2025-03-24 ENCOUNTER — TELEPHONE (OUTPATIENT)
Age: 75
End: 2025-03-24

## 2025-03-24 DIAGNOSIS — M54.16 LUMBAR RADICULOPATHY: Primary | ICD-10-CM

## 2025-03-24 NOTE — TELEPHONE ENCOUNTER
Morgan is requesting a referral for PT for his back. Please contact to advise once completed or if there are any questions.

## 2025-03-31 ENCOUNTER — RESULTS FOLLOW-UP (OUTPATIENT)
Dept: FAMILY MEDICINE CLINIC | Facility: CLINIC | Age: 75
End: 2025-03-31

## 2025-03-31 ENCOUNTER — HOSPITAL ENCOUNTER (OUTPATIENT)
Dept: NON INVASIVE DIAGNOSTICS | Facility: CLINIC | Age: 75
Discharge: HOME | End: 2025-03-31
Payer: COMMERCIAL

## 2025-03-31 VITALS
HEIGHT: 71 IN | HEART RATE: 86 BPM | BODY MASS INDEX: 35.56 KG/M2 | SYSTOLIC BLOOD PRESSURE: 127 MMHG | WEIGHT: 254 LBS | DIASTOLIC BLOOD PRESSURE: 70 MMHG

## 2025-03-31 DIAGNOSIS — M79.89 SWOLLEN FEET: ICD-10-CM

## 2025-03-31 DIAGNOSIS — I27.20 PULMONARY HYPERTENSION (HCC): ICD-10-CM

## 2025-03-31 LAB
AORTIC ROOT: 4 CM
ASCENDING AORTA: 3.6 CM
BSA FOR ECHO PROCEDURE: 2.33 M2
E WAVE DECELERATION TIME: 213 MS
E/A RATIO: 0.59
FRACTIONAL SHORTENING: 41 (ref 28–44)
INTERVENTRICULAR SEPTUM IN DIASTOLE (PARASTERNAL SHORT AXIS VIEW): 1.6 CM
INTERVENTRICULAR SEPTUM: 1.6 CM (ref 0.6–1.1)
LAAS-AP2: 16.9 CM2
LAAS-AP4: 19 CM2
LEFT ATRIUM SIZE: 4.9 CM
LEFT ATRIUM VOLUME (MOD BIPLANE): 46 ML
LEFT ATRIUM VOLUME INDEX (MOD BIPLANE): 19.7 ML/M2
LEFT INTERNAL DIMENSION IN SYSTOLE: 2.9 CM (ref 2.1–4)
LEFT VENTRICULAR INTERNAL DIMENSION IN DIASTOLE: 4.9 CM (ref 3.5–6)
LEFT VENTRICULAR POSTERIOR WALL IN END DIASTOLE: 1.1 CM
LEFT VENTRICULAR STROKE VOLUME: 79 ML
LV EF US.2D.A4C+ESTIMATED: 56 %
LVSV (TEICH): 79 ML
MV E'TISSUE VEL-LAT: 9 CM/S
MV E'TISSUE VEL-SEP: 7 CM/S
MV PEAK A VEL: 1.38 M/S
MV PEAK E VEL: 82 CM/S
MV STENOSIS PRESSURE HALF TIME: 62 MS
MV VALVE AREA P 1/2 METHOD: 3.5
RA PRESSURE ESTIMATED: 3 MMHG
RIGHT ATRIUM AREA SYSTOLE A4C: 14.9 CM2
RIGHT VENTRICLE ID DIMENSION: 3.9 CM
RV PSP: 39 MMHG
SL CV LEFT ATRIUM LENGTH A2C: 5.7 CM
SL CV LV EF: 60
SL CV PED ECHO LEFT VENTRICLE DIASTOLIC VOLUME (MOD BIPLANE) 2D: 111 ML
SL CV PED ECHO LEFT VENTRICLE SYSTOLIC VOLUME (MOD BIPLANE) 2D: 32 ML
SL CV SINUS OF VALSALVA 2D: 3.7 CM
TR MAX PG: 36 MMHG
TR PEAK VELOCITY: 3 M/S
TRICUSPID ANNULAR PLANE SYSTOLIC EXCURSION: 2.1 CM
TRICUSPID VALVE PEAK REGURGITATION VELOCITY: 2.98 M/S

## 2025-03-31 PROCEDURE — 93306 TTE W/DOPPLER COMPLETE: CPT | Performed by: INTERNAL MEDICINE

## 2025-03-31 PROCEDURE — 93306 TTE W/DOPPLER COMPLETE: CPT

## 2025-04-01 ENCOUNTER — EVALUATION (OUTPATIENT)
Dept: PHYSICAL THERAPY | Facility: REHABILITATION | Age: 75
End: 2025-04-01
Payer: COMMERCIAL

## 2025-04-01 DIAGNOSIS — M54.16 LUMBAR RADICULOPATHY: Primary | ICD-10-CM

## 2025-04-01 DIAGNOSIS — R26.9 GAIT ABNORMALITY: ICD-10-CM

## 2025-04-01 PROCEDURE — 97110 THERAPEUTIC EXERCISES: CPT

## 2025-04-01 PROCEDURE — 97162 PT EVAL MOD COMPLEX 30 MIN: CPT

## 2025-04-01 NOTE — PROGRESS NOTES
PT Evaluation     Today's date: 2025  Patient name: Morgan Rodriguez  : 1950  MRN: [id number]  Referring provider: Antonio Haider MD  Dx:   Encounter Diagnosis     ICD-10-CM    1. Lumbar radiculopathy  M54.16 Ambulatory Referral to Physical Therapy      2. Gait abnormality  R26.9           Start Time: 1215  Stop Time: 1305  Total time in clinic (min): 50 minutes    Assessment  Impairments: abnormal gait, abnormal muscle firing, abnormal or restricted ROM, abnormal movement, activity intolerance, impaired physical strength, lacks appropriate home exercise program, pain with function, safety issue, poor posture , poor body mechanics, unable to perform ADL, participation limitations, activity limitations and endurance  Symptom irritability: moderate    Assessment details: Morgan Rodriguez is a 74 y.o. male referred to physical therapy for lumbar radiculopathy. Primary impairments include increased pain with functional activities, decreased core/paraspinal and BLE strength, lumbar AROM dysfunction, lumbopelvic motor control dysfunction, and decreased gait quality which is limiting his ability to perform ADLs and recreational activities without pain or functional restrictions. Pt displayed most limitations with BL ankle strength and ROM as he displays foot drop at the R foot that has been affecting his walking and balance.     Pt was provided with a basic HEP which will be reviewed in the upcoming session. Pt was educated on anatomy and physiology of diagnosis and demonstrated verbal understanding. Pt would benefit from skilled PT interventions to increase functional lower extremity strength, increase pain free ROM, and facilitate return to recreational activities and ADL management/independence with less limitations and pain. 1:1 with Theron Claire DPT entirety of tx.  Barriers to intervention: medical complexity  Understanding of Dx/Px/POC: excellent     Prognosis: fair    Goals  Short term goals:    STGs to be met in 3-4 weeks:  1.  Increase BLE and core/paraspinal strength by half grade or more to increase functional strength.  2.  Decrease subjective report of pain by 25% or more to improve QoL.   3.  Sit/Stand for recreational activities, or ADLs for >/=30 minutes with minimal to no pain.  4.  Independent with basic HEP.     Long term goals:  LTG's to be met by DC:  1.  Ambulate community distances with little to no pain or difficulty.  2.  Perform all transfers without pain and difficulty.  3.  Perform recreational activities / ADLs with little pain or difficulty.  4.  Increase strength to 3+/5 or better in BLEs and core/paraspinal musculature.   5.  Independent with advanced HEP.   6.  Increase FOTO to predicted value by DC.    Plan  Patient would benefit from: skilled physical therapy and PT eval  Referral necessary: No    Planned therapy interventions: abdominal trunk stabilization, balance/weight bearing training, body mechanics training, functional ROM exercises, home exercise program, gait training, joint mobilization, manual therapy, massage, neuromuscular re-education, patient education, postural training, strengthening, therapeutic activities, therapeutic exercise, self care, graded motor, graded exercise, graded activity and nerve gliding    Frequency: 1-2x week  Duration in weeks: 8  Plan of Care beginning date: 4/1/2025  Plan of Care expiration date: 5/27/2025  Treatment plan discussed with: patient        Subjective  HPI: Pt referred to physical therapy for lumbar radiculopathy. Pt mentioned that his back pain began about 14 years ago and he has been dealing with this on and off. He mentioned that he has a history of a decompression surgery that was successful. He reports that his most recent back pain has been bothering him for about 2 months as he fell coming out a restaurant. He mentioned that he has trouble walking at times due to his ankles inverting, this is the reason why he is walking  "with a RW instead of a cane. He describes a \"burning electric weakness\" from his lower back down both of his legs to his knees.   Pain Location: Lower back  Pain Intensity: Current: 0/10, Worst: 9/10, Best: 0/10  DONAVON: Insidious  DOI: Chronic  Aggravating Factors: Standing, walking  Alleviating Factors: Sitting and laying down  Living Situation: 3SH, lives on 2 stories, Railing assist with steps  Dominant Side: R side  Goals: To walk with less difficulty and limitations with balance  PLOF: Retired, was active in martial arts, has elliptical 10 minutes 4x/week, doing upper body exercises     Objective  Vitals: BP- / HR -      Postural Findings:     Head Position  Protracted  X Neutral   Retracted   Scapular Position  Protracted  X Neutral   Retracted   Thoracic Spine  X Inc Kyphosis  Neutral       Lumbar Spine   Inc Lordosis   Neutral X Dec Lordosis   Pelvis   Anterior Tilt  Neutral  X Posterior Tilt   Iliac Crest   L elevated X Neutral   R elevated   Feet   Pronated  Neutral  X Supinated   Lateral Shift   Right   Left X None      Strength and ROM evaluated B from a regional biomechanical perspective and values relevant to this episode recorded in tables below.     ROM:   Joint / Motion  Right: 4/1/2025  Left: 4/1/2025    Lumbar Flexion  25% limited     Lumbar Extension  50% limited     Lumbar Sidebending  50% limited 50% limited   Hip ER  45 45   Hip IR  15 15         Strength: MMT revealed the following findings.  Joint Motion Right: 4/1/2025 Left: 4/1/2025   Hip Flexion 4/5 4/5   Hip Abduction 4/5 4/5   Hip Adduction 4/5 4/5   Hip Extension 4/5 4/5   Knee Extension 4/5 4/5   Knee Flexion 4/5 4/5   Ankle Plantarflexion 2+/5 2+/5   Ankle Dorsiflexion 2+/5 2+/5         Light Touch Sensation:  RLE - 5/5  LLE - 5/5    Deep Tendon Reflexes:  Patellar Reflex - absent BL  Ankle Reflex - absent BL      Additional Assessments:  Pain with palpation noted: Along paraspinals of lumbar spine  Passive intervertebral motion: Not " tested in prone  Gait Assessment: Ambulating with RW, hunched posture, decreased foot clearance at R foot due to ankle weakness during swing phase, Decreased step length     Special Tests:  Lumbar Specific and Neural Tension                                                                            Test / Measure  4/1/2025   Straight Leg raise N   Crossed straight leg raise N   Slump test Pos   Prone instability test N   Sural n (invert), tibial (tatum) N          Precautions: PMH includes anxiety, arthritis, asthma, a-fib, chronic pain disorder, depression, DM, LBP, HTN, OA, decompression of spinal stenosis of lower thoracic, L hip JORDON, BL TKA, Spinal fusion, R RTC repair      POC expires Unit limit Auth Expiration date PT/OT + Visit Limit?   5/27/25 BOMN TBD BOMN         Visit/Unit Tracking  AUTH Status:  Date 4/1        TBD Used 1         Remaining             Pertinent Findings:      POC End Date: 5/27/25                                                                                          Test / Measure  4/1/2025   FOTO (Predicted 42) 30   Lumbar ROM Decreased   BLE Strength  Decreased especially BL ankle      Access Code: [email or web address]  URL: [email or web address]  Date: 04/01/2025  Prepared by: Theron Claire    Exercises  - Long Sitting Calf Stretch with Strap  - 1 x daily - 7 x weekly - 1 sets - 5 reps - 15 seconds hold  - Long Sitting Ankle Plantar Flexion with Resistance  - 1 x daily - 7 x weekly - 3 sets - 10 reps  - Long Sitting Ankle Inversion with Resistance  - 1 x daily - 7 x weekly - 3 sets - 10 reps  - Long Sitting Ankle Eversion with Resistance  - 1 x daily - 7 x weekly - 3 sets - 10 reps  - Seated Toe Raise  - 1 x daily - 7 x weekly - 3 sets - 10 reps  - Standing Heel Raise with Support  - 1 x daily - 7 x weekly - 3 sets - 10 reps    Visit Number: 1            Manuals 4/1            BL Hip PROM                                                    Neuro Re-Ed             Ankle 4-Way 10x ea             TA Recruitment + Bridge             Supine Tball Press             Modified Dead Bug             Tball Press             Tball Press + Hip flex/abd             [place] Hyperext w/ Bosu             Feet Together EO/EC             Tandem Balance             E-Stim Ankle DF/EV                           Ther Ex             HEP Review + Pt Edu 10 min            NuStep             Heel/Toe Raises 10x ea            Calf Stretch 5x            LTRs             Eccentric STS             Standing March              Palloff Press ABCs             Standing Tband/Barbara Rotation             Side Steps w/ TB             Fwd Step Ups             Barbara Fwd/Bwd Ambulation             Suitcase Carry                                       Ther Activity                                       Gait Training                                       Modalities

## 2025-04-07 ENCOUNTER — OFFICE VISIT (OUTPATIENT)
Dept: PHYSICAL THERAPY | Facility: REHABILITATION | Age: 75
End: 2025-04-07
Payer: COMMERCIAL

## 2025-04-07 DIAGNOSIS — M54.16 LUMBAR RADICULOPATHY: Primary | ICD-10-CM

## 2025-04-07 DIAGNOSIS — R26.9 GAIT ABNORMALITY: ICD-10-CM

## 2025-04-07 PROCEDURE — 97112 NEUROMUSCULAR REEDUCATION: CPT

## 2025-04-07 PROCEDURE — 97110 THERAPEUTIC EXERCISES: CPT

## 2025-04-07 NOTE — PROGRESS NOTES
Daily Note     Today's date: 2025  Patient name: Morgan Rodriguez  : 1950  MRN: [id number]  Referring provider: Antonio Haider MD  Dx:   Encounter Diagnosis     ICD-10-CM    1. Lumbar radiculopathy  M54.16       2. Gait abnormality  R26.9           Start Time: 1345  Stop Time: 1430  Total time in clinic (min): 45 minutes    Subjective: Pt notes that he has been doing his exercises at home and has most difficulty with active ER/IR with resistance.       Objective: See treatment diary below      Assessment: Tolerated treatment well. Patient demonstrated fatigue post treatment and would benefit from continued PT. Pt was introduced to further functional LE strength and neuromuscular control exercises and responded well. NMES was utilized at the L anterior tibialis to stimulate ankle DF and pt responded well. He was challenged with functional movements in STS and standing march due to balance impairments as he was unable to do so without supervision and HHA. Continue to progress balance and neuromuscular control exercises as tolerated, 1:1 with Theron Claire DPT entirety of tx.      Plan: Continue per plan of care.      Precautions: PMH includes anxiety, arthritis, asthma, a-fib, chronic pain disorder, depression, DM, LBP, HTN, OA, decompression of spinal stenosis of lower thoracic, L hip JORDON, BL TKA, Spinal fusion, R RTC repair      POC expires Unit limit Auth Expiration date PT/OT + Visit Limit?   25 BOMN 25 BOMN         Visit/Unit Tracking  AUTH Status:  Date        Approved Used 1 2        Remaining  7 6          Pertinent Findings:      POC End Date: 25                                                                                          Test / Measure  2025   FOTO (Predicted 42) 30   Lumbar ROM Decreased   BLE Strength  Decreased especially BL ankle      Access Code: [email or web address]  URL: [email or web address]  Date: 2025  Prepared by: Theron  Chooi    Exercises  - Long Sitting Calf Stretch with Strap  - 1 x daily - 7 x weekly - 1 sets - 5 reps - 15 seconds hold  - Long Sitting Ankle Plantar Flexion with Resistance  - 1 x daily - 7 x weekly - 3 sets - 10 reps  - Long Sitting Ankle Inversion with Resistance  - 1 x daily - 7 x weekly - 3 sets - 10 reps  - Long Sitting Ankle Eversion with Resistance  - 1 x daily - 7 x weekly - 3 sets - 10 reps  - Seated Toe Raise  - 1 x daily - 7 x weekly - 3 sets - 10 reps  - Standing Heel Raise with Support  - 1 x daily - 7 x weekly - 3 sets - 10 reps    Visit Number: 1 2           Manuals 4/1 4/7           BL Hip PROM  MC                                                  Neuro Re-Ed             Ankle 4-Way 10x ea            TA Recruitment + Bridge             Supine Tball Press  10x, 5s hold           Tball Press             Tball Press + Hip flex/abd             Feet Together EO/EC             Tandem Balance             E-Stim Ankle DF/EV   3x20 ankle DF, L anterior tib                        Ther Ex             HEP Review + Pt Edu 10 min            NuStep  7', lvl 5           Heel/Toe Raises 10x ea            Calf Stretch 5x            LTRs             Eccentric STS  NMES, 3x7 HHA           Standing March   10x ea, HHA           Palloff Press ABCs  10x ea, 7#           Standing Tband/Barbara Rotation             Side Steps w/ TB             Fwd Step Ups             Barbara Fwd/Bwd Ambulation             Suitcase Carry                                       Ther Activity                                       Gait Training                                       Modalities

## 2025-04-09 ENCOUNTER — OFFICE VISIT (OUTPATIENT)
Dept: PHYSICAL THERAPY | Facility: REHABILITATION | Age: 75
End: 2025-04-09
Payer: COMMERCIAL

## 2025-04-09 DIAGNOSIS — R26.9 GAIT ABNORMALITY: ICD-10-CM

## 2025-04-09 DIAGNOSIS — M54.16 LUMBAR RADICULOPATHY: Primary | ICD-10-CM

## 2025-04-09 PROCEDURE — 97110 THERAPEUTIC EXERCISES: CPT

## 2025-04-09 NOTE — PROGRESS NOTES
Daily Note     Today's date: 2025  Patient name: Morgan Rodriguez  : 1950  MRN: [id number]  Referring provider: Antonio Haider MD  Dx:   Encounter Diagnosis     ICD-10-CM    1. Lumbar radiculopathy  M54.16       2. Gait abnormality  R26.9           Start Time: 1400  Stop Time: 1447  Total time in clinic (min): 47 minutes    Subjective: Patient states that his shoulder is sore after LV. He suspects it is from the paloff press.        Objective: See treatment diary below      Assessment: Tolerated treatment well. Cues used during new exercises for proper form. Regression made to paloff press due to increase shoulder pain after LV.  Patient demonstrated fatigue post treatment, exhibited good technique with therapeutic exercises, and would benefit from continued PT      Plan: Continue per plan of care.  Progress treatment as tolerated.       Precautions: PMH includes anxiety, arthritis, asthma, a-fib, chronic pain disorder, depression, DM, LBP, HTN, OA, decompression of spinal stenosis of lower thoracic, L hip JORDON, BL TKA, Spinal fusion, R RTC repair      POC expires Unit limit Auth Expiration date PT/OT + Visit Limit?   25 BOMN 25 BOMN         Visit/Unit Tracking  AUTH Status:  Date       Approved Used 1 2 3       Remaining  7 6 5         Pertinent Findings:      POC End Date: 25                                                                                          Test / Measure  2025   FOTO (Predicted 42) 30   Lumbar ROM Decreased   BLE Strength  Decreased especially BL ankle      Access Code: [email or web address]  URL: [email or web address]  Date: 2025  Prepared by: Theron Claire    Exercises  - Long Sitting Calf Stretch with Strap  - 1 x daily - 7 x weekly - 1 sets - 5 reps - 15 seconds hold  - Long Sitting Ankle Plantar Flexion with Resistance  - 1 x daily - 7 x weekly - 3 sets - 10 reps  - Long Sitting Ankle Inversion with Resistance  - 1 x daily - 7 x weekly -  "3 sets - 10 reps  - Long Sitting Ankle Eversion with Resistance  - 1 x daily - 7 x weekly - 3 sets - 10 reps  - Seated Toe Raise  - 1 x daily - 7 x weekly - 3 sets - 10 reps  - Standing Heel Raise with Support  - 1 x daily - 7 x weekly - 3 sets - 10 reps    Visit Number: 1 2 3          Manuals 4/1 4/7 4/9          BL Hip PROM  MC SW                                                 Neuro Re-Ed             Ankle 4-Way 10x ea            TA Recruitment + Bridge             Supine Tball Press  10x, 5s hold 10x, 5s hold          Tball Press             Tball Press + Hip flex/abd             Feet Together EO/EC             Tandem Balance             E-Stim Ankle DF/EV   3x20 ankle DF, L anterior tib                        Ther Ex             HEP Review + Pt Edu 10 min            NuStep  7', lvl 5 6' lvl 5           Heel/Toe Raises 10x ea  2x10          Calf Stretch 5x            LTRs             Eccentric STS  NMES, 3x7 HHA 2x10 HHA, foam           Standing March   10x ea, HHA 10x ea, HHA          Palloff Press ABCs  10x ea, 7# 5# x10           Standing Tband/[place] Rotation             Side Steps w/ TB   4 laps HHA          Fwd Step Ups   6\" [id number]           [place] Fwd/Bwd Ambulation             Suitcase Carry             SLR   2# 2X10ea          LAQ   4# x10 each           Ther Activity                                       Gait Training                                       Modalities                                                      "

## 2025-04-15 ENCOUNTER — OFFICE VISIT (OUTPATIENT)
Dept: PHYSICAL THERAPY | Facility: REHABILITATION | Age: 75
End: 2025-04-15
Payer: COMMERCIAL

## 2025-04-15 DIAGNOSIS — F41.9 ANXIETY AND DEPRESSION: ICD-10-CM

## 2025-04-15 DIAGNOSIS — R26.9 GAIT ABNORMALITY: ICD-10-CM

## 2025-04-15 DIAGNOSIS — F32.A ANXIETY AND DEPRESSION: ICD-10-CM

## 2025-04-15 DIAGNOSIS — M54.16 LUMBAR RADICULOPATHY: Primary | ICD-10-CM

## 2025-04-15 PROCEDURE — 97112 NEUROMUSCULAR REEDUCATION: CPT

## 2025-04-15 PROCEDURE — 97110 THERAPEUTIC EXERCISES: CPT

## 2025-04-15 RX ORDER — CLONAZEPAM 0.5 MG/1
0.5 TABLET ORAL
Qty: 30 TABLET | Refills: 0 | Status: SHIPPED | OUTPATIENT
Start: 2025-04-15

## 2025-04-15 NOTE — PROGRESS NOTES
Daily Note     Today's date: 4/15/2025  Patient name: Morgan Rodriguez  : 1950  MRN: [id number]  Referring provider: Antonio Haider MD  Dx:   Encounter Diagnosis     ICD-10-CM    1. Lumbar radiculopathy  M54.16       2. Gait abnormality  R26.9           Start Time: 1225  Stop Time: 1303  Total time in clinic (min): 38 minutes    Subjective: Pt reports no significant changes since last visit, feels he is making improvements with his R ankle ROM and strength.       Objective: See treatment diary below      Assessment: Tolerated treatment well. Patient would benefit from continued PT. Pt responded well to introduction to static balance exercises in SLS and tandem balance as he was appropriately challenged due to his ankle limitations. He showed good technique with supine bridge as lumbopelvic control is improving. He was most challenged with step ups due to strength limitations. Continue to progress as tolerated, 1:1 with Theron Claire DPT entirety of tx.      Plan: Continue per plan of care.      Precautions: PMH includes anxiety, arthritis, asthma, a-fib, chronic pain disorder, depression, DM, LBP, HTN, OA, decompression of spinal stenosis of lower thoracic, L hip JORDON, BL TKA, Spinal fusion, R RTC repair      POC expires Unit limit Auth Expiration date PT/OT + Visit Limit?   25 BOMN 25 BOMN         Visit/Unit Tracking  AUTH Status:  Date 4/1 4/7 4/9 4/15     Approved Used 1 2 3 4      Remaining  7 6 5 4        Pertinent Findings:      POC End Date: 25                                                                                          Test / Measure  2025   FOTO (Predicted 42) 30   Lumbar ROM Decreased   BLE Strength  Decreased especially BL ankle      Access Code: [email or web address]  URL: [email or web address]  Date: 2025  Prepared by: Theron Claire    Exercises  - Long Sitting Calf Stretch with Strap  - 1 x daily - 7 x weekly - 1 sets - 5 reps - 15 seconds hold  - Long  "Sitting Ankle Plantar Flexion with Resistance  - 1 x daily - 7 x weekly - 3 sets - 10 reps  - Long Sitting Ankle Inversion with Resistance  - 1 x daily - 7 x weekly - 3 sets - 10 reps  - Long Sitting Ankle Eversion with Resistance  - 1 x daily - 7 x weekly - 3 sets - 10 reps  - Seated Toe Raise  - 1 x daily - 7 x weekly - 3 sets - 10 reps  - Standing Heel Raise with Support  - 1 x daily - 7 x weekly - 3 sets - 10 reps    Visit Number: 1 2 3 4         Manuals 4/1 4/7 4/9 4/15         BL Hip PROM  MC SW                                                 Neuro Re-Ed             Ankle 4-Way 10x ea            TA Recruitment + Bridge    3x5         Supine Tball Press  10x, 5s hold 10x, 5s hold 10x, 5s hold         Tball Press             Tball Press + Hip flex/abd             Feet Together EO/EC    3x15s hold         Tandem Balance    2x15s hold ea         E-Stim Ankle DF/EV   3x20 ankle DF, L anterior tib  3x20 ankle DF, L anterior tib                      Ther Ex             HEP Review + Pt Edu 10 min            NuStep  7', lvl 5 6' lvl 5  7' lvl 5          Heel/Toe Raises 10x ea  2x10 3x10         Calf Stretch 5x            LTRs             Eccentric STS  NMES, 3x7 HHA 2x10 HHA, foam  3x8, HHA, foam         Standing March   10x ea, HHA 10x ea, HHA 2x10 ea, HHA         Palloff Press ABCs  10x ea, 7# 5# x10  Hold         Standing Tband/[place] Rotation             Side Steps w/ TB   4 laps HHA 4 laps HHA         Fwd Step Ups   6\" [id number]  4\" step, 2x5 ea          [place] Fwd/Bwd Ambulation             Suitcase Carry             SLR   2# 2X10ea          LAQ   4# x10 each           Ther Activity                                       Gait Training                                       Modalities                                                        "

## 2025-04-17 ENCOUNTER — APPOINTMENT (OUTPATIENT)
Dept: PHYSICAL THERAPY | Facility: REHABILITATION | Age: 75
End: 2025-04-17
Payer: COMMERCIAL

## 2025-04-22 ENCOUNTER — OFFICE VISIT (OUTPATIENT)
Dept: PHYSICAL THERAPY | Facility: REHABILITATION | Age: 75
End: 2025-04-22
Payer: COMMERCIAL

## 2025-04-22 DIAGNOSIS — M54.16 LUMBAR RADICULOPATHY: Primary | ICD-10-CM

## 2025-04-22 DIAGNOSIS — R26.9 GAIT ABNORMALITY: ICD-10-CM

## 2025-04-22 PROCEDURE — 97112 NEUROMUSCULAR REEDUCATION: CPT

## 2025-04-22 PROCEDURE — 97110 THERAPEUTIC EXERCISES: CPT

## 2025-04-22 NOTE — PROGRESS NOTES
Daily Note     Today's date: 2025  Patient name: Morgan Rodriguez  : 1950  MRN: [id number]  Referring provider: Antonio Haider MD  Dx:   Encounter Diagnosis     ICD-10-CM    1. Lumbar radiculopathy  M54.16       2. Gait abnormality  R26.9           Start Time: 1231  Stop Time: 1309  Total time in clinic (min): 38 minutes    Subjective: Pt mentioned that he fell last week and injured his L shoulder and arm. He reported no head strike or LOC. He will be going to an urgent care to have an x-ray. He shows bruising and swelling at L shoulder and elbow.       Objective: See treatment diary below      Assessment: Tolerated treatment fair. Patient demonstrated fatigue post treatment and would benefit from continued PT. Some exercises were held due to his recent fall and L shoulder pain. He mentioned that he will be going to urgent care after this appt and is scheduled to see Ortho. Continue to progress as tolerated, 1:1 with Theron Claire DPT entirety of tx.      Plan: Continue per plan of care.      Precautions: PMH includes anxiety, arthritis, asthma, a-fib, chronic pain disorder, depression, DM, LBP, HTN, OA, decompression of spinal stenosis of lower thoracic, L hip JORDON, BL TKA, Spinal fusion, R RTC repair      POC expires Unit limit Auth Expiration date PT/OT + Visit Limit?   25 BOMN 25 BOMN         Visit/Unit Tracking  AUTH Status:  Date 4/1 4/7 4/9 4/15 4/22    Approved Used 1 2 3 4 5     Remaining  7 6 5 4 3       Pertinent Findings:      POC End Date: 25                                                                                          Test / Measure  2025   FOTO (Predicted 42) 30   Lumbar ROM Decreased   BLE Strength  Decreased especially BL ankle      Access Code: [email or web address]  URL: [email or web address]  Date: 2025  Prepared by: Theron Claire    Exercises  - Long Sitting Calf Stretch with Strap  - 1 x daily - 7 x weekly - 1 sets - 5 reps - 15 seconds hold  -  "Long Sitting Ankle Plantar Flexion with Resistance  - 1 x daily - 7 x weekly - 3 sets - 10 reps  - Long Sitting Ankle Inversion with Resistance  - 1 x daily - 7 x weekly - 3 sets - 10 reps  - Long Sitting Ankle Eversion with Resistance  - 1 x daily - 7 x weekly - 3 sets - 10 reps  - Seated Toe Raise  - 1 x daily - 7 x weekly - 3 sets - 10 reps  - Standing Heel Raise with Support  - 1 x daily - 7 x weekly - 3 sets - 10 reps    Visit Number: 1 2 3 4 5        Manuals 4/1 4/7 4/9 4/15 4/22        BL Hip PROM   SW                                                 Neuro Re-Ed             Ankle 4-Way 10x ea            TA Recruitment + Bridge    3x5 3x5        Supine Tball Press  10x, 5s hold 10x, 5s hold 10x, 5s hold Held        Tball Press             Tball Press + Hip flex/abd             Feet Together EO/EC    3x15s hold Held        Tandem Balance    2x15s hold ea         E-Stim Ankle DF/EV   3x20 ankle DF, L anterior tib  3x20 ankle DF, L anterior tib 3x20 ankle DF, L anterior tib                     Ther Ex             HEP Review + Pt Edu 10 min            NuStep  7', lvl 5 6' lvl 5  7' lvl 5  7' lvl 5         Heel/Toe Raises 10x ea  2x10 3x10 3x10        Calf Stretch 5x            LTRs             Eccentric STS  NMES, 3x7 HHA 2x10 HHA, foam  3x8, HHA, foam 3x8, HHA, foam        Standing March   10x ea, HHA 10x ea, HHA 2x10 ea, HHA         Palloff Press ABCs  10x ea, 7# 5# x10  Hold         Standing Tband/[place] Rotation             Side Steps w/ TB   4 laps HHA 4 laps HHA         Fwd Step Ups   6\" [id number]  4\" step, 2x5 ea  4\" step, 2x5 ea         [place] Fwd/Bwd Ambulation             Suitcase Carry             SLR   2# 2X10ea  2#, 2x10        LAQ   4# x10 each   7.5#, 2x15        Ther Activity                                       Gait Training                                       Modalities                                                          "

## 2025-04-24 ENCOUNTER — APPOINTMENT (OUTPATIENT)
Dept: PHYSICAL THERAPY | Facility: REHABILITATION | Age: 75
End: 2025-04-24
Payer: COMMERCIAL

## 2025-04-28 ENCOUNTER — OFFICE VISIT (OUTPATIENT)
Dept: PAIN MEDICINE | Facility: CLINIC | Age: 75
End: 2025-04-28
Payer: COMMERCIAL

## 2025-04-28 VITALS — BODY MASS INDEX: 35.56 KG/M2 | HEIGHT: 71 IN | WEIGHT: 254 LBS

## 2025-04-28 DIAGNOSIS — M47.816 LUMBAR SPONDYLOSIS: ICD-10-CM

## 2025-04-28 DIAGNOSIS — M43.10 SPONDYLOLISTHESIS, ACQUIRED: ICD-10-CM

## 2025-04-28 DIAGNOSIS — G89.4 CHRONIC PAIN SYNDROME: ICD-10-CM

## 2025-04-28 DIAGNOSIS — M54.16 LUMBAR RADICULOPATHY: Primary | ICD-10-CM

## 2025-04-28 DIAGNOSIS — Z98.1 HISTORY OF LUMBAR FUSION: ICD-10-CM

## 2025-04-28 DIAGNOSIS — F11.20 UNCOMPLICATED OPIOID DEPENDENCE (HCC): ICD-10-CM

## 2025-04-28 DIAGNOSIS — Z98.1 STATUS POST LUMBAR AND LUMBOSACRAL FUSION BY ANTERIOR TECHNIQUE: ICD-10-CM

## 2025-04-28 DIAGNOSIS — Z79.891 LONG-TERM CURRENT USE OF OPIATE ANALGESIC: ICD-10-CM

## 2025-04-28 DIAGNOSIS — M48.062 SPINAL STENOSIS OF LUMBAR REGION WITH NEUROGENIC CLAUDICATION: ICD-10-CM

## 2025-04-28 PROCEDURE — G2211 COMPLEX E/M VISIT ADD ON: HCPCS | Performed by: ANESTHESIOLOGY

## 2025-04-28 PROCEDURE — 99214 OFFICE O/P EST MOD 30 MIN: CPT | Performed by: ANESTHESIOLOGY

## 2025-04-28 NOTE — PROGRESS NOTES
Assessment  1. Lumbar spondylosis    2. History of lumbar fusion    3. Status post lumbar and lumbosacral fusion by anterior technique    4. Spondylolisthesis, acquired    5. Spinal stenosis of lumbar region with neurogenic claudication    6. Lumbar radiculopathy        Plan  Patient is a 74-year-old male past medical history of ALIF at L5-S1 sent in as referral from Dr. 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  Patient reports chronic low back pain radiating down posterior thighs for the past couple years without any inciting event.  Patient states that he had an MRI of lumbar spine done recently and interpreted by me showing moderate to severe stenosis at L3-4, mild central stenosis at L4-5.  Patient states that he has been taking Tylenol 325 mg 4 times a day.  He states that he also has been taking Gabapentin 300 mg in morning time followed by 1200 mg in evening time in addition to Cymbalta 30 mg twice daily.  Patient states that he has been going to physical therapy which has not been helping with his symptoms.  Of note, patient recently saw Dr. Goldbergerg of neurosurgery and stated that not likely the benefit from additional surgery as patient has received a spinal cord stimulator implant in the past which was removed due to being ineffective.  At this time, I suspect the patient symptoms are secondary to lumbar radiculopathy lumbar spinal stenosis.    Given that clinical presentation of lumbar spinal stenosis matches MRI findings, I would like to proceed forward with performing B/L L3 TFESI. Risks vs benefits discussed in detail with the patient. These risks include, but are not limited to, bleeding, infection, nerve damage, paralysis. Patient is not on anticoagulant therapy. Patient denies contrast allergy. All patient’s questions were answered. Patient understands risks and is willing to pursue the procedure. The approach was demonstrated using models and literature was provided. Verbal consent  obtained.  Will collect UDS. If appropriate, will prescribe Norco 5-325 one to two times a day as needed.    My impressions and treatment recommendations were discussed in detail with the patient who verbalized understanding and had no further questions.  Discharge instructions were provided. I personally saw and examined the patient and I agree with the above discussed plan of care.    No orders of the defined types were placed in this encounter.    No orders of the defined types were placed in this encounter.      History of Present Illness    Morgan Rodriguez is a 74 y.o. male past medical history of ALIF at L5-S1 sent in as referral from Dr. 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  Patient reports chronic low back pain radiating down posterior thighs for the past couple years without any inciting event.  Patient states that he had an MRI of lumbar spine done recently and interpreted by me showing moderate to severe stenosis at L3-4, mild central stenosis at L4-5.  Patient states that he has been taking Tylenol 325 mg 4 times a day.  He states that he also has been taking Gabapentin 300 mg in morning time followed by 1200 mg in evening time in addition to Cymbalta 30 mg twice daily.  Patient states that he has been going to physical therapy which has not been helping with his symptoms.  Of note, patient recently saw Dr. Goldbergerg of neurosurgery and stated that not likely the benefit from additional surgery as patient has received a spinal cord stimulator implant in the past which was removed due to being ineffective.     I have personally reviewed and/or updated the patient's past medical history, past surgical history, family history, social history, current medications, allergies, and vital signs today.     Review of Systems   Constitutional:  Negative for fever and unexpected weight change.   HENT:  Negative for trouble swallowing.    Eyes:  Negative for visual disturbance.   Respiratory:   Negative for shortness of breath and wheezing.    Cardiovascular:  Negative for chest pain and palpitations.   Gastrointestinal:  Negative for constipation, diarrhea, nausea and vomiting.   Endocrine: Negative for cold intolerance, heat intolerance and polydipsia.   Genitourinary:  Negative for difficulty urinating and frequency.   Musculoskeletal:  Positive for back pain and gait problem. Negative for arthralgias, joint swelling and myalgias.   Skin:  Negative for rash.   Neurological:  Negative for dizziness, seizures, syncope, weakness and headaches.   Hematological:  Does not bruise/bleed easily.   Psychiatric/Behavioral:  Negative for dysphoric mood.    All other systems reviewed and are negative.  General: no fevers, chills, infections  Neuro: no saddle anesthesia, no dropping objects or balance issues  GI: no changes in bowel habits  : no changes in bladder habits  Hem: no bleeding      Patient Active Problem List   Diagnosis    Allergic rhinitis    Enlarged prostate with lower urinary tract symptoms (LUTS)    Mixed simple and mucopurulent chronic bronchitis (HCC)    Hyperlipidemia    Hypertension    Impaired fasting glucose    Male erectile dysfunction    Multifocal atrial tachycardia (HCC)    Obesity (BMI 30-39.9)    Osteoarthritis of knee    Osteoarthritis of hip    Seasonal allergies    Spinal stenosis of lumbar region with neurogenic claudication    Supraventricular tachycardia (HCC)    Anxiety and depression    Arthritis of left hip    Elevated prostate specific antigen (PSA)    Fusion of lumbosacral spine    Incomplete emptying of bladder    Retention of urine    Thoracic or lumbosacral neuritis or radiculitis    Urethral stricture    Spondylolisthesis, acquired    Degeneration of lumbosacral intervertebral disc    Full thickness rotator cuff tear    Knee pain    Localized, primary osteoarthritis    Primary localized osteoarthritis of pelvic region and thigh    Low back pain    Lumbosacral neuritis  "   Pain in limb    Pseudoclaudication syndrome    Shoulder pain    Cervical stenosis of spinal canal    Lumbar radiculopathy    Chronic pain syndrome    Status post lumbar and lumbosacral fusion by anterior technique    Lumbar spondylosis    Thoracic spinal stenosis    Vision problem    Primary osteoarthritis of left shoulder    COPD exacerbation (HCC)    Abnormal CT scan, chest       Past Medical History:   Diagnosis Date    Allergic 1955    Allergic rhinitis 1955    Anxiety ?    Arthritis ?    back    Asthma     Atrial fibrillation (HCC)     per pt \"controlled with metoprolol\" sees [facility] cardio Dr Ace 2x/year    Bronchitis, chronic obstructive, with exacerbation (HCC)     Last Assessed: 5/17/2013    Chronic pain disorder     back    COPD (chronic obstructive pulmonary disease) ([facility]) 1987    COPD with acute exacerbation ([facility])     Last Assessed: 4/13/2015    Depression 1995    Depression, controlled 08/30/2017    Transitioned From: Depression    Diabetes mellitus ([facility])     Elevated PSA     Bx neg 2012; Last Assessed: 11/23/2012    Environmental and seasonal allergies     Exercises 3 to 4 times per week     \"2hrs --4x/week\"elliptical and Boflex    History of prediabetes     Hyperlipidemia 2001    history of/better on meds    Hypertension     has gotten better since weight loss and pt is keeping check of BP at home    Irregular heart beat     MAT, afib    Low back pain 2001    Lumbosacral disc disease 1990    Lung nodule - resolved 2017 CT 01/04/2017    Osteoarthritis 1992    Postoperative urinary retention     Self-catheterizes urinary bladder     per pt every 2 weeks as per urologist    Spinal cord stimulator status     Status post insertion of spinal cord stimulator 09/09/2020    Wears glasses     Weight loss     per pt recent intention loss of 80lbs recently       Past Surgical History:   Procedure Laterality Date    BACK SURGERY  07/25/2012    decompression of spinal stenosis from lower thoracic through the " lumbar spine    CARPAL TUNNEL RELEASE  2017    COLONOSCOPY  10/2010    neg. recheck in 5 years    HIP SURGERY  2016    INCISION OF BLADDER NECK CONTRACTURE      multiple to remove scar tissue    JOINT REPLACEMENT Left     hip    JOINT REPLACEMENT Bilateral     knees    KNEE ARTHROSCOPY  2013    ND NDSC WRST SURG W/RLS TRANSVRS CARPL LIGM Left 2020    Procedure: RELEASE CARPAL TUNNEL ENDOSCOPIC;  Surgeon: Mendel Paula MD;  Location: [facility];  Service: Orthopedics    ND PRQ IMPLTJ NSTIM ELECTRODE ARRAY EPIDURAL Right 2020    Procedure: INSERTION THORACIC DORSAL COLUMN SPINAL CORD STIMULATOR PERCUTANEOUS W IMPLANTABLE PULSE GENERATOR, RIGHT;  Surgeon: Jose Vásquez MD;  Location: [facility];  Service: Neurosurgery    ND REVJ/RMVL IMPL SPI NPG/RCVR DTCH CONNJ ELTRD RA N/A 2022    Procedure: REMOVAL OF SPINAL CORD STIMULATOR SYSTEM WITH BATTERY;  Surgeon: Evangelista Sim MD;  Location: [facility];  Service: Neurosurgery    PROSTATE BIOPSY      for elevated PSA of about 7; neg    ROTATOR CUFF REPAIR Right     SPINAL FUSION      TRANSURETHRAL RESECTION OF PROSTATE  2013       Family History   Problem Relation Age of Onset    Breast cancer Mother     Depression Mother     Dementia Mother     Pancreatitis Father     Diabetes Maternal Grandmother     Arthritis Maternal Grandmother     Heart attack Maternal Grandfather     Heart attack Paternal Grandmother     Diabetes Paternal Grandmother     Diabetes Paternal Grandfather     No Known Problems Brother     Lung disease Paternal Uncle        Social History     Occupational History    Not on file   Tobacco Use    Smoking status: Former     Current packs/day: 0.00     Average packs/day: 1.6 packs/day for 28.8 years (46.0 ttl pk-yrs)     Types: Cigarettes     Start date: 1977     Quit date: 2000     Years since quittin.3     Passive exposure: Never    Smokeless tobacco: Never   Vaping Use    Vaping status: Never Used   Substance  and Sexual Activity    Alcohol use: Not Currently     Alcohol/week: 1.0 standard drink of alcohol     Types: 1 Cans of beer per week     Comment: Stopped drinking    Drug use: No    Sexual activity: Yes     Partners: Female     Birth control/protection: Post-menopausal, None     Comment: defer       Current Outpatient Medications on File Prior to Visit   Medication Sig    aspirin 81 mg chewable tablet Chew 81 mg daily    clonazePAM (KlonoPIN) 0.5 mg tablet Take 1 tablet (0.5 mg total) by mouth daily at bedtime    diclofenac (VOLTAREN) 75 mg EC tablet TAKE 1 TABLET (75 MG TOTAL) BY MOUTH 2 (TWO) TIMES A DAY AS NEEDED (PAIN)    DULoxetine (CYMBALTA) 60 mg delayed release capsule Take 2 capsules by mouth in the morning    Fluticasone-Salmeterol (Advair) 250-50 mcg/dose inhaler Inhale 1 puff 2 (two) times a day Rinse mouth after use.    gabapentin (NEURONTIN) 300 mg capsule Take 300 mg by mouth daily     metFORMIN (GLUCOPHAGE-XR) 500 mg 24 hr tablet TAKE 1 TABLET BY MOUTH EVERY DAY WITH DINNER    metoprolol tartrate (LOPRESSOR) 50 mg tablet TAKE 1 TABLET (50 MG TOTAL) BY MOUTH 2 (TWO) TIMES A DAY TAKE WITH 25 MG TABS    mometasone (NASONEX) 50 mcg/act nasal spray 2 sprays into each nostril daily    montelukast (SINGULAIR) 10 mg tablet TAKE 1 TABLET BY MOUTH EVERYDAY AT BEDTIME    Multiple Vitamin (MULTI-VITAMIN DAILY) TABS Take 1 tablet by mouth daily    NIFEdipine ER (ADALAT CC) 60 MG 24 hr tablet TAKE 1 TABLET BY MOUTH EVERY DAY    oxybutynin (DITROPAN) 5 mg tablet Take 5 mg by mouth 2 (two) times a day as needed    rosuvastatin (CRESTOR) 40 MG tablet TAKE 1 TABLET BY MOUTH EVERY DAY    albuterol (2.5 mg/3 mL) 0.083 % nebulizer solution Take 3 mL (2.5 mg total) by nebulization every 6 (six) hours as needed for wheezing or shortness of breath    ARIPiprazole (ABILIFY) 10 mg tablet Take 1 tablet by mouth in the morning    ARIPiprazole (ABILIFY) 5 mg tablet Take 5 mg by mouth daily (Patient taking differently: Take 10 mg  "by mouth daily)    DULoxetine (CYMBALTA) 30 mg delayed release capsule Take 90 mg by mouth daily  (Patient not taking: Reported on 3/17/2025)     No current facility-administered medications on file prior to visit.       Allergies   Allergen Reactions    Pollen Extract Sneezing       Physical Exam    Ht 5' 11\" (1.803 m)   Wt 115 kg (254 lb)   BMI 35.43 kg/m²     Constitutional: no apparent distress, does not appear sedated   HEENT: pupils equal and round, symmetric facial muscles   Neck: supple  Pulmonary: good chest wall excursion, breathing unlabored   Psych: appropriate affect and insight. no evidence of aberrant behavior   Neuro: cranial nerves II-XII grossly intact    MSK:   No signs of infection to low back  No tenderness to palpation of lumbar spine  5/5 strength in B/L LE   Sensation intact to light B/L LE   Gait: ambulates unassisted, gait is not antalgic      Imaging  "

## 2025-04-29 ENCOUNTER — OFFICE VISIT (OUTPATIENT)
Dept: PHYSICAL THERAPY | Facility: REHABILITATION | Age: 75
End: 2025-04-29
Attending: FAMILY MEDICINE
Payer: COMMERCIAL

## 2025-04-29 DIAGNOSIS — R26.9 GAIT ABNORMALITY: ICD-10-CM

## 2025-04-29 DIAGNOSIS — M54.16 LUMBAR RADICULOPATHY: Primary | ICD-10-CM

## 2025-04-29 PROCEDURE — 97112 NEUROMUSCULAR REEDUCATION: CPT

## 2025-04-29 PROCEDURE — 97110 THERAPEUTIC EXERCISES: CPT

## 2025-05-01 ENCOUNTER — APPOINTMENT (OUTPATIENT)
Dept: PHYSICAL THERAPY | Facility: REHABILITATION | Age: 75
End: 2025-05-01
Attending: FAMILY MEDICINE
Payer: COMMERCIAL

## 2025-05-05 ENCOUNTER — OFFICE VISIT (OUTPATIENT)
Dept: OBGYN CLINIC | Facility: OTHER | Age: 75
End: 2025-05-05
Payer: COMMERCIAL

## 2025-05-05 VITALS — HEIGHT: 71 IN | WEIGHT: 254 LBS | BODY MASS INDEX: 35.56 KG/M2

## 2025-05-05 DIAGNOSIS — M19.012 PRIMARY OSTEOARTHRITIS OF LEFT SHOULDER: Primary | ICD-10-CM

## 2025-05-05 PROCEDURE — 20610 DRAIN/INJ JOINT/BURSA W/O US: CPT | Performed by: ORTHOPAEDIC SURGERY

## 2025-05-05 PROCEDURE — 99213 OFFICE O/P EST LOW 20 MIN: CPT | Performed by: ORTHOPAEDIC SURGERY

## 2025-05-05 RX ORDER — BUPIVACAINE HYDROCHLORIDE 2.5 MG/ML
2 INJECTION, SOLUTION INFILTRATION; PERINEURAL
Status: COMPLETED | OUTPATIENT
Start: 2025-05-05 | End: 2025-05-05

## 2025-05-05 RX ORDER — BETAMETHASONE SODIUM PHOSPHATE AND BETAMETHASONE ACETATE 3; 3 MG/ML; MG/ML
6 INJECTION, SUSPENSION INTRA-ARTICULAR; INTRALESIONAL; INTRAMUSCULAR; SOFT TISSUE
Status: COMPLETED | OUTPATIENT
Start: 2025-05-05 | End: 2025-05-05

## 2025-05-05 RX ADMIN — BUPIVACAINE HYDROCHLORIDE 2 ML: 2.5 INJECTION, SOLUTION INFILTRATION; PERINEURAL at 14:45

## 2025-05-05 RX ADMIN — BETAMETHASONE SODIUM PHOSPHATE AND BETAMETHASONE ACETATE 6 MG: 3; 3 INJECTION, SUSPENSION INTRA-ARTICULAR; INTRALESIONAL; INTRAMUSCULAR; SOFT TISSUE at 14:45

## 2025-05-05 NOTE — PROGRESS NOTES
"  Assessment & Plan  Primary osteoarthritis of left shoulder  The patient has an examination consistent with exacerbation of his pre-existing left shoulder osteoarthritis secondary to his fall that he sustained on April 16, 2025.  I have discussed with the patient the pathophysiology of this diagnosis and reviewed how the examination correlates with this diagnosis.  Surgical vs conservative treatment options were discussed at length and after discussing these treatment options, the patient elected for a repeat CS injection today.  Injection can be repeated in 4-6 mos if needed.  Explained the definitive treatment would be total shoulder arthoplasty.   Orders:    Large joint arthrocentesis        Subjective:   Patient ID: Morgan Rodriguez is a 74 y.o. male      HPI  Patient presents today for follow up regarding left shoulder pain. Patient has known left shoulder GH osteoarthritis.  He has been treating conservatively and was provided with a CS injection on 9/30/24. Patient reports a fall on 4/16/25 related to his spinal stenosis.  [facility] and he was told there was no fracture. for 4 mos.  Patient reports increased pain with ROM and activities.      The following portions of the patient's history were reviewed and updated as appropriate: allergies, current medications, past family history, past medical history, past social history, past surgical history and problem list.        Objective:  Ht 5' 11\" (1.803 m)   Wt 115 kg (254 lb)   BMI 35.43 kg/m²       Left Shoulder Exam      Range of Motion   External rotation:  50   Forward flexion:  160      Muscle Strength   Abduction: 5/5   External rotation: 5/5      Other   Erythema: absent  Sensation: normal  Pulse: present     Positive Pop-eye deformity     Physical Exam  Vitals reviewed.   Constitutional:       Appearance: He is well-developed.   HENT:      Head: Normocephalic.   Eyes:      Pupils: Pupils are equal, " round, and reactive to light.   Pulmonary:      Effort: Pulmonary effort is normal.   Abdominal:      General: Abdomen is flat. There is no distension.   Skin:     General: Skin is warm and dry.       Large joint arthrocentesis: L glenohumeral  Universal Protocol:  procedure performed by consultantConsent: Verbal consent obtained.  Consent given by: patient  Patient understanding: patient states understanding of the procedure being performed  Site marked: the operative site was marked  Patient identity confirmed: verbally with patient  Supporting Documentation  Indications: pain     Is this a Visco injection? NoProcedure Details  Location: shoulder - L glenohumeral  Needle size: 22 G  Ultrasound guidance: no  Approach: posterior  Medications administered: 2 mL bupivacaine 0.25 %; 6 mg betamethasone acetate-betamethasone sodium phosphate 6 (3-3) mg/mL    Patient tolerance: patient tolerated the procedure well with no immediate complications  Dressing:  Sterile dressing applied           I have personally reviewed pertinent films in PACS and my interpretation is as follows.    X Ray Left Shoulder 4/22/25: Moderate glenohumeral joint osteoarthritis. No other acute osseous abnormalities.      Scribe Attestation      I,:  Teressa Gill MA am acting as a scribe while in the presence of the attending physician.:       I,:  Herman Douglas MD personally performed the services described in this documentation    as scribed in my presence.:

## 2025-05-05 NOTE — ASSESSMENT & PLAN NOTE
The patient has an examination consistent with exacerbation of his pre-existing left shoulder osteoarthritis secondary to his fall that he sustained on April 16, 2025.  I have discussed with the patient the pathophysiology of this diagnosis and reviewed how the examination correlates with this diagnosis.  Surgical vs conservative treatment options were discussed at length and after discussing these treatment options, the patient elected for a repeat CS injection today.  Injection can be repeated in 4-6 mos if needed.  Explained the definitive treatment would be total shoulder arthoplasty.   Orders:    Large joint arthrocentesis

## 2025-05-06 ENCOUNTER — OFFICE VISIT (OUTPATIENT)
Dept: PAIN MEDICINE | Facility: CLINIC | Age: 75
End: 2025-05-06
Payer: COMMERCIAL

## 2025-05-06 ENCOUNTER — APPOINTMENT (OUTPATIENT)
Dept: PHYSICAL THERAPY | Facility: REHABILITATION | Age: 75
End: 2025-05-06
Attending: FAMILY MEDICINE
Payer: COMMERCIAL

## 2025-05-06 VITALS — WEIGHT: 254 LBS | HEIGHT: 71 IN | BODY MASS INDEX: 35.56 KG/M2

## 2025-05-06 DIAGNOSIS — G89.4 CHRONIC PAIN SYNDROME: ICD-10-CM

## 2025-05-06 DIAGNOSIS — Z79.891 LONG-TERM CURRENT USE OF OPIATE ANALGESIC: ICD-10-CM

## 2025-05-06 DIAGNOSIS — M48.062 SPINAL STENOSIS OF LUMBAR REGION WITH NEUROGENIC CLAUDICATION: ICD-10-CM

## 2025-05-06 DIAGNOSIS — F11.20 UNCOMPLICATED OPIOID DEPENDENCE (HCC): ICD-10-CM

## 2025-05-06 DIAGNOSIS — M47.816 LUMBAR SPONDYLOSIS: ICD-10-CM

## 2025-05-06 DIAGNOSIS — M51.16 INTERVERTEBRAL DISC DISORDER WITH RADICULOPATHY OF LUMBAR REGION: Primary | ICD-10-CM

## 2025-05-06 PROCEDURE — 99214 OFFICE O/P EST MOD 30 MIN: CPT

## 2025-05-06 NOTE — PROGRESS NOTES
Assessment:  1. Intervertebral disc disorder with radiculopathy of lumbar region    2. Chronic pain syndrome    3. Spinal stenosis of lumbar region with neurogenic claudication    4. Lumbar spondylosis    5. Long-term current use of opiate analgesic    6. Uncomplicated opioid dependence (HCC)        Plan:  The patient is a 74-year-old male with a history of chronic pain secondary to low back pain, lumbar radiculopathy, lumbar spondylosis, lumbar stenosis with neurogenic claudication with a history of lumbar fusion who presents to the office with worsening bilateral low back pain and pain that radiates into bilateral buttocks and bilateral thighs posteriorly stopping at bilateral knees.    At this time, I did instruct patient he would need to provide a urine drug screen at this office visit, since he had insufficient amount when it was sent from his last office visit.  Patient is unable to go at this time despite receiving water.  He is scheduled for a lumbar epidural steroid injection tomorrow, 5/7/2025.  He states he will provide sample tomorrow.  If sample comes back appropriate, we will send in [place] 5/325 mg over to the patient's pharmacy to take as needed to help manage his pain.    An opioid contract was reviewed with the patient.  The patient was made aware they are only to receive opioid medication from our office, and must take the medication as prescribed.  If the medication is lost or stolen, it will not be replaced.  We also do not condone the use of illegal substances or alcohol with opioid medication.  Random urine drug screens and pill counts will also be performed at office visits. Lastly, the patient was informed that office visits are needed for refills.  Patient was agreeable and signed the contract.    The patient was also completed a BECKS depression inventory and SOAPP-R  today, as part of our yearly opioid management program.    [place] Prescription Drug Monitoring Program report was  reviewed and was appropriate     There are risks associated with opioid medications, including dependence, addiction and tolerance. The patient understands and agrees to use these medications only as prescribed. Potential side effects of the medications include, but are not limited to, constipation, drowsiness, addiction, impaired judgment and risk of fatal overdose if not taken as prescribed. The patient was warned against driving while taking sedation medications.  Sharing medications is a felony. At this point in time, the patient is showing no signs of addiction, abuse, diversion or suicidal ideation.    The patient will follow-up in 12 weeks for medication prescription refill and reevaluation. The patient was advised to contact the office should their symptoms worsen in the interim. The patient was agreeable and verbalized an understanding.        History of Present Illness:    The patient is a 74 y.o. male with a history of chronic pain secondary to low back pain, lumbar radiculopathy, lumbar spondylosis, lumbar stenosis with neurogenic claudication with a history of lumbar fusion.  He was last seen on 4/28/2025 where he was scheduled for a bilateral L3 TFESI which is scheduled for 5/7/2025.  At that office visit, it was also discussed starting him on Norco to help manage his pain, a UDS was obtained at that office visit however was denied by the lab due to insufficient amount.  He presents to the office with worsening bilateral low back pain and pain that radiates into bilateral buttocks and bilateral thighs posteriorly stopping at bilateral knees.     He states his pain is worse since the last office visit and intermittent.  He states his pain is worse with activity.  He rates the quality of his pain as burning, dull/aching, pressure-like, shooting, numbness and is currently rating his pain a 5/10 on a numeric scale.    Currently medications include Tylenol, diclofenac and gabapentin.    Pain Contract  "Signed: 5/6/2025  Last Urine Drug Screen: 5/6/2025  Becks/SOAPP 5/6/2025    I have personally reviewed and/or updated the patient's past medical history, past surgical history, family history, social history, current medications, allergies, and vital signs today.       Review of Systems:    Review of Systems      Past Medical History:   Diagnosis Date    Allergic 1955    Allergic rhinitis 1955    Anxiety ?    Arthritis ?    back    Asthma     Atrial fibrillation (HCC)     per pt \"controlled with metoprolol\" sees [facility] Dr Ace 2x/year    Bronchitis, chronic obstructive, with exacerbation (HCC)     Last Assessed: 5/17/2013    Chronic pain disorder     back    COPD (chronic obstructive pulmonary disease) (HCC) 1987    COPD with acute exacerbation (HCC)     Last Assessed: 4/13/2015    Depression 1995    Depression, controlled 08/30/2017    Transitioned From: Depression    Diabetes mellitus (HCC)     Elevated PSA     Bx neg 2012; Last Assessed: 11/23/2012    Environmental and seasonal allergies     Exercises 3 to 4 times per week     \"2hrs --4x/week\"elliptical and Boflex    History of prediabetes     Hyperlipidemia 2001    history of/better on meds    Hypertension     has gotten better since weight loss and pt is keeping check of BP at home    Irregular heart beat     MAT, afib    Low back pain 2001    Lumbosacral disc disease 1990    Lung nodule - resolved 2017 CT 01/04/2017    Osteoarthritis 1992    Postoperative urinary retention     Self-catheterizes urinary bladder     per pt every 2 weeks as per urologist    Spinal cord stimulator status     Status post insertion of spinal cord stimulator 09/09/2020    Wears glasses     Weight loss     per pt recent intention loss of 80lbs recently       Past Surgical History:   Procedure Laterality Date    BACK SURGERY  07/25/2012    decompression of spinal stenosis from lower thoracic through the lumbar spine    CARPAL TUNNEL RELEASE  2017    COLONOSCOPY  10/2010    neg. " recheck in 5 years    HIP SURGERY  2016    INCISION OF BLADDER NECK CONTRACTURE      multiple to remove scar tissue    JOINT REPLACEMENT Left     hip    JOINT REPLACEMENT Bilateral     knees    KNEE ARTHROSCOPY  2013    SC NDSC WRST SURG W/RLS TRANSVRS CARPL LIGM Left 2020    Procedure: RELEASE CARPAL TUNNEL ENDOSCOPIC;  Surgeon: Mendel Paula MD;  [facility];  Service: Orthopedics    SC PRQ IMPLTJ NSTIM ELECTRODE ARRAY EPIDURAL Right 2020    Procedure: INSERTION THORACIC DORSAL COLUMN SPINAL CORD STIMULATOR PERCUTANEOUS W IMPLANTABLE PULSE GENERATOR, RIGHT;  Surgeon: Jose Vásquez MD;  Location: [facility];  Service: Neurosurgery    SC REVJ/RMVL IMPL SPI NPG/RCVR DTCH CONNJ ELTRD RA N/A 2022    Procedure: REMOVAL OF SPINAL CORD STIMULATOR SYSTEM WITH BATTERY;  Surgeon: Evangelista Sim MD;  [facility];  Service: Neurosurgery    PROSTATE BIOPSY      for elevated PSA of about 7; neg    ROTATOR CUFF REPAIR Right     SPINAL FUSION      TRANSURETHRAL RESECTION OF PROSTATE  2013       Family History   Problem Relation Age of Onset    Breast cancer Mother     Depression Mother     Dementia Mother     Pancreatitis Father     Diabetes Maternal Grandmother     Arthritis Maternal Grandmother     Heart attack Maternal Grandfather     Heart attack Paternal Grandmother     Diabetes Paternal Grandmother     Diabetes Paternal Grandfather     No Known Problems Brother     Lung disease Paternal Uncle        Social History     Occupational History    Not on file   Tobacco Use    Smoking status: Former     Current packs/day: 0.00     Average packs/day: 1.6 packs/day for 28.8 years (46.0 ttl pk-yrs)     Types: Cigarettes     Start date: 1977     Quit date: 2000     Years since quittin.3     Passive exposure: Never    Smokeless tobacco: Never   Vaping Use    Vaping status: Never Used   Substance and Sexual Activity    Alcohol use: Not Currently     Alcohol/week: 1.0  standard drink of alcohol     Types: 1 Cans of beer per week     Comment: Stopped drinking    Drug use: No    Sexual activity: Yes     Partners: Female     Birth control/protection: Post-menopausal, None     Comment: defer         Current Outpatient Medications:     albuterol (2.5 mg/3 mL) 0.083 % nebulizer solution, Take 3 mL (2.5 mg total) by nebulization every 6 (six) hours as needed for wheezing or shortness of breath, Disp: 1080 mL, Rfl: 2    ARIPiprazole (ABILIFY) 10 mg tablet, Take 1 tablet by mouth in the morning, Disp: , Rfl:     ARIPiprazole (ABILIFY) 5 mg tablet, Take 5 mg by mouth daily (Patient taking differently: Take 10 mg by mouth daily), Disp: , Rfl:     aspirin 81 mg chewable tablet, Chew 81 mg daily, Disp: , Rfl:     clonazePAM (KlonoPIN) 0.5 mg tablet, Take 1 tablet (0.5 mg total) by mouth daily at bedtime, Disp: 30 tablet, Rfl: 0    diclofenac (VOLTAREN) 75 mg EC tablet, TAKE 1 TABLET (75 MG TOTAL) BY MOUTH 2 (TWO) TIMES A DAY AS NEEDED (PAIN), Disp: 60 tablet, Rfl: 5    DULoxetine (CYMBALTA) 30 mg delayed release capsule, Take 90 mg by mouth daily  (Patient not taking: Reported on 3/17/2025), Disp: , Rfl:     DULoxetine (CYMBALTA) 60 mg delayed release capsule, Take 2 capsules by mouth in the morning, Disp: , Rfl:     Fluticasone-Salmeterol (Advair) 250-50 mcg/dose inhaler, Inhale 1 puff 2 (two) times a day Rinse mouth after use., Disp: 60 blister, Rfl: 5    gabapentin (NEURONTIN) 300 mg capsule, Take 300 mg by mouth daily , Disp: , Rfl:     metFORMIN (GLUCOPHAGE-XR) 500 mg 24 hr tablet, TAKE 1 TABLET BY MOUTH EVERY DAY WITH DINNER, Disp: 90 tablet, Rfl: 3    metoprolol tartrate (LOPRESSOR) 50 mg tablet, TAKE 1 TABLET (50 MG TOTAL) BY MOUTH 2 (TWO) TIMES A DAY TAKE WITH 25 MG TABS, Disp: 180 tablet, Rfl: 3    mometasone (NASONEX) 50 mcg/act nasal spray, 2 sprays into each nostril daily, Disp: , Rfl:     montelukast (SINGULAIR) 10 mg tablet, TAKE 1 TABLET BY MOUTH EVERYDAY AT BEDTIME, Disp: 90  "tablet, Rfl: 1    Multiple Vitamin (MULTI-VITAMIN DAILY) TABS, Take 1 tablet by mouth daily, Disp: , Rfl:     NIFEdipine ER (ADALAT CC) 60 MG 24 hr tablet, TAKE 1 TABLET BY MOUTH EVERY DAY, Disp: 90 tablet, Rfl: 1    oxybutynin (DITROPAN) 5 mg tablet, Take 5 mg by mouth 2 (two) times a day as needed, Disp: , Rfl:     rosuvastatin (CRESTOR) 40 MG tablet, TAKE 1 TABLET BY MOUTH EVERY DAY, Disp: 90 tablet, Rfl: 1  No current facility-administered medications for this visit.    Allergies   Allergen Reactions    Pollen Extract Sneezing       Physical Exam:    Ht 5' 11\" (1.803 m)   Wt 115 kg (254 lb)   BMI 35.43 kg/m²     Constitutional:normal, well developed, well nourished, alert, in no distress and non-toxic and no overt pain behavior.  Eyes:anicteric  HEENT:grossly intact  Neck:supple, symmetric, trachea midline and no masses   Pulmonary:even and unlabored  Cardiovascular:No edema or pitting edema present  Skin:Normal without rashes or lesions and well hydrated  Psychiatric:Mood and affect appropriate  Neurologic:Cranial Nerves II-XII grossly intact  Musculoskeletal:antalgic and ambulates with a wheeled walker      Imaging  No orders to display         Orders Placed This Encounter   Procedures    MM ALL_Prescribed Meds and Special Instructions    MM DT_Alprazolam Definitive Test    MM DT_Amphetamine Definitive Test    MM DT_Aripiprazole Definitive Test    MM DT_Bath Salts Definitive Test    MM DT_Buprenorphine Definitive Test    MM DT_Butalbital Definitive Test    MM DT_Clonazepam Definitive Test    MM DT_Clozapine Definitive Test    MM DT_Cocaine Definitive Test    MM DT_Codeine Definitive Test    MM DT_Desipramine Definitive Test    MM DT_Dextromethorphan Definitive Test    MM Diazepam Definitive Test    MM DT_Ethyl Glucuronide/Ethyl Sulfate Definitive Test    MM DT_Fentanyl Definitive Test    MM DT_Haloperidol Definitive Test    MM DT_Heroin Definitive Test    MM DT_Hydrocodone Definitive Test    MM " DT_Hydromorphone Definitive Test    MM DT_Imipramine Definitive Test    MM DT_Kratom Definitive Test    MM DT_Levorphanol Definitive Test    MM Lorazepam Definitive Test    MM DT_MDMA Definitive Test    MM DT_Meperidine Definitive Test    MM DT_Methadone Definitive Test    MM DT_Methamphetamine Definitive Test    MM DT_Morphine Definitive Test    MM DT_Olanzapine Definitive Test    MM DT_Oxazepam Definitive Test    MM DT_Oxycodone Definitive Test    MM DT_Oxymorphone Definitive Test    MM DT_Phencyclidine Definitive Test    MM DT_Phenobarbital Definitive Test    MM DT_Phentermine Definitive Test    MM DT_Quetiapine Definitive Test    MM DT_Risperidone Definitive Test    MM DT_Secobarbital Definitive Test    MM DT_Spice Definitive Test    MM DT_Tapentadol Definitive Test    MM DT_Temazapam Definitive Test    MM DT_THC Definitive Test    MM DT_Tramadol Definitive Test    MM DT_Methylphenidate Definitive Test

## 2025-05-07 ENCOUNTER — HOSPITAL ENCOUNTER (OUTPATIENT)
Dept: RADIOLOGY | Facility: CLINIC | Age: 75
Discharge: HOME | End: 2025-05-07
Attending: ANESTHESIOLOGY
Payer: COMMERCIAL

## 2025-05-07 ENCOUNTER — TELEPHONE (OUTPATIENT)
Age: 75
End: 2025-05-07

## 2025-05-07 VITALS
HEART RATE: 82 BPM | OXYGEN SATURATION: 97 % | DIASTOLIC BLOOD PRESSURE: 77 MMHG | SYSTOLIC BLOOD PRESSURE: 162 MMHG | RESPIRATION RATE: 18 BRPM | TEMPERATURE: 97.8 F

## 2025-05-07 DIAGNOSIS — M54.16 LUMBAR RADICULOPATHY: ICD-10-CM

## 2025-05-07 PROCEDURE — 64483 NJX AA&/STRD TFRM EPI L/S 1: CPT | Performed by: ANESTHESIOLOGY

## 2025-05-07 RX ORDER — PAPAVERINE HCL 150 MG
15 CAPSULE, EXTENDED RELEASE ORAL ONCE
Status: COMPLETED | OUTPATIENT
Start: 2025-05-07 | End: 2025-05-07

## 2025-05-07 RX ADMIN — LIDOCAINE HYDROCHLORIDE 2 ML: 20 INJECTION, SOLUTION EPIDURAL; INFILTRATION; INTRACAUDAL; PERINEURAL at 11:33

## 2025-05-07 RX ADMIN — DEXAMETHASONE SODIUM PHOSPHATE 15 MG: 10 INJECTION, SOLUTION INTRAMUSCULAR; INTRAVENOUS at 11:33

## 2025-05-07 RX ADMIN — IOHEXOL 2 ML: 300 INJECTION, SOLUTION INTRAVENOUS at 11:33

## 2025-05-07 NOTE — TELEPHONE ENCOUNTER
Caller: denver Britton     Doctor: Dr. Ann    Reason for call: pt stated he got approved for the pain medication, pt  would like to know which pharmacy will it be coming from. Pt would like a call back .    Call back#: [phone]

## 2025-05-07 NOTE — TELEPHONE ENCOUNTER
S/w Pt, advised once UDS results are finalized and reviewed, if appropriate opioid prescription can be sent to [facility] pharmacy in [place] on file. Pt verbalized understanding.

## 2025-05-07 NOTE — H&P
"History of Present Illness: The patient is a 74 y.o. male who presents with complaints of low back and leg pain.    Past Medical History:   Diagnosis Date    Allergic 1955    Allergic rhinitis 1955    Anxiety ?    Arthritis ?    back    Asthma     Atrial fibrillation (HCC)     per pt \"controlled with metoprolol\" sees [facility] cardio Dr Ace 2x/year    Bronchitis, chronic obstructive, with exacerbation (HCC)     Last Assessed: 5/17/2013    Chronic pain disorder     back    COPD (chronic obstructive pulmonary disease) (HCC) 1987    COPD with acute exacerbation (HCC)     Last Assessed: 4/13/2015    Depression 1995    Depression, controlled 08/30/2017    Transitioned From: Depression    Diabetes mellitus (HCC)     Elevated PSA     Bx neg 2012; Last Assessed: 11/23/2012    Environmental and seasonal allergies     Exercises 3 to 4 times per week     \"2hrs --4x/week\"elliptical and Boflex    History of prediabetes     Hyperlipidemia 2001    history of/better on meds    Hypertension     has gotten better since weight loss and pt is keeping check of BP at home    Irregular heart beat     MAT, afib    Low back pain 2001    Lumbosacral disc disease 1990    Lung nodule - resolved 2017 CT 01/04/2017    Osteoarthritis 1992    Postoperative urinary retention     Self-catheterizes urinary bladder     per pt every 2 weeks as per urologist    Spinal cord stimulator status     Status post insertion of spinal cord stimulator 09/09/2020    Wears glasses     Weight loss     per pt recent intention loss of 80lbs recently       Past Surgical History:   Procedure Laterality Date    BACK SURGERY  07/25/2012    decompression of spinal stenosis from lower thoracic through the lumbar spine    CARPAL TUNNEL RELEASE  2017    COLONOSCOPY  10/2010    neg. recheck in 5 years    HIP SURGERY  01/2016    INCISION OF BLADDER NECK CONTRACTURE      multiple to remove scar tissue    JOINT REPLACEMENT Left     hip    JOINT REPLACEMENT Bilateral     knees    " KNEE ARTHROSCOPY  01/2013    DC NDSC WRST SURG W/RLS TRANSVRS CARPL LIGM Left 03/04/2020    Procedure: RELEASE CARPAL TUNNEL ENDOSCOPIC;  Surgeon: Mendel Paula MD;  [facility];  Service: Orthopedics    DC PRQ IMPLTJ NSTIM ELECTRODE ARRAY EPIDURAL Right 08/25/2020    Procedure: INSERTION THORACIC DORSAL COLUMN SPINAL CORD STIMULATOR PERCUTANEOUS W IMPLANTABLE PULSE GENERATOR, RIGHT;  Surgeon: Jose Vásquez MD;  Location: [facility];  Service: Neurosurgery    DC REVJ/RMVL IMPL SPI NPG/RCVR DTCH CONNJ ELTRD RA N/A 05/03/2022    Procedure: REMOVAL OF SPINAL CORD STIMULATOR SYSTEM WITH BATTERY;  Surgeon: Evangelista Sim MD;  [facility];  Service: Neurosurgery    PROSTATE BIOPSY  2012    for elevated PSA of about 7; neg    ROTATOR CUFF REPAIR Right     SPINAL FUSION      TRANSURETHRAL RESECTION OF PROSTATE  06/27/2013         Current Outpatient Medications:     albuterol (2.5 mg/3 mL) 0.083 % nebulizer solution, Take 3 mL (2.5 mg total) by nebulization every 6 (six) hours as needed for wheezing or shortness of breath, Disp: 1080 mL, Rfl: 2    ARIPiprazole (ABILIFY) 10 mg tablet, Take 1 tablet by mouth in the morning, Disp: , Rfl:     ARIPiprazole (ABILIFY) 5 mg tablet, Take 5 mg by mouth daily (Patient taking differently: Take 10 mg by mouth daily), Disp: , Rfl:     aspirin 81 mg chewable tablet, Chew 81 mg daily, Disp: , Rfl:     clonazePAM (KlonoPIN) 0.5 mg tablet, Take 1 tablet (0.5 mg total) by mouth daily at bedtime, Disp: 30 tablet, Rfl: 0    diclofenac (VOLTAREN) 75 mg EC tablet, TAKE 1 TABLET (75 MG TOTAL) BY MOUTH 2 (TWO) TIMES A DAY AS NEEDED (PAIN), Disp: 60 tablet, Rfl: 5    DULoxetine (CYMBALTA) 30 mg delayed release capsule, Take 90 mg by mouth daily  (Patient not taking: Reported on 3/17/2025), Disp: , Rfl:     DULoxetine (CYMBALTA) 60 mg delayed release capsule, Take 2 capsules by mouth in the morning, Disp: , Rfl:     Fluticasone-Salmeterol (Advair) 250-50 mcg/dose inhaler, Inhale  1 puff 2 (two) times a day Rinse mouth after use., Disp: 60 blister, Rfl: 5    gabapentin (NEURONTIN) 300 mg capsule, Take 300 mg by mouth daily , Disp: , Rfl:     metFORMIN (GLUCOPHAGE-XR) 500 mg 24 hr tablet, TAKE 1 TABLET BY MOUTH EVERY DAY WITH DINNER, Disp: 90 tablet, Rfl: 3    metoprolol tartrate (LOPRESSOR) 50 mg tablet, TAKE 1 TABLET (50 MG TOTAL) BY MOUTH 2 (TWO) TIMES A DAY TAKE WITH 25 MG TABS, Disp: 180 tablet, Rfl: 3    mometasone (NASONEX) 50 mcg/act nasal spray, 2 sprays into each nostril daily, Disp: , Rfl:     montelukast (SINGULAIR) 10 mg tablet, TAKE 1 TABLET BY MOUTH EVERYDAY AT BEDTIME, Disp: 90 tablet, Rfl: 1    Multiple Vitamin (MULTI-VITAMIN DAILY) TABS, Take 1 tablet by mouth daily, Disp: , Rfl:     NIFEdipine ER (ADALAT CC) 60 MG 24 hr tablet, TAKE 1 TABLET BY MOUTH EVERY DAY, Disp: 90 tablet, Rfl: 1    oxybutynin (DITROPAN) 5 mg tablet, Take 5 mg by mouth 2 (two) times a day as needed, Disp: , Rfl:     rosuvastatin (CRESTOR) 40 MG tablet, TAKE 1 TABLET BY MOUTH EVERY DAY, Disp: 90 tablet, Rfl: 1    Allergies   Allergen Reactions    Pollen Extract Sneezing       Physical Exam:   Vitals:    05/07/25 1112   BP: 157/77   Pulse: 79   Resp: 20   Temp: 97.8 °F (36.6 °C)   SpO2: 99%     General: Awake, Alert, Oriented x 3, Mood and affect appropriate  Respiratory: Respirations even and unlabored  Cardiovascular: Peripheral pulses intact; no edema  Musculoskeletal Exam: antalgic agit    ASA Score: 3         Assessment:   1. Lumbar radiculopathy        Plan: Bilateral L3 TFESI

## 2025-05-07 NOTE — DISCHARGE INSTR - LAB
Epidural Steroid Injection   WHAT YOU NEED TO KNOW:   An epidural steroid injection (DEN) is a procedure to inject steroid medicine into the epidural space. The epidural space is between your spinal cord and vertebrae. Steroids reduce inflammation and fluid buildup in your spine that may be causing pain. You may be given pain medicine along with the steroids.          ACTIVITY  Do not drive or operate machinery today.  No strenuous activity today - bending, lifting, etc.  You may resume normal activites starting tomorrow - start slowly and as tolerated.  You may shower today, but no tub baths or hot tubs.  You may have numbness for several hours from the local anesthetic. Please use caution and common sense, especially with weight-bearing activities.    CARE OF THE INJECTION SITE  If you have soreness or pain, apply ice to the area today (20 minutes on/20 minutes off).  Starting tomorrow, you may use warm, moist heat or ice if needed.  You may have an increase or change in your discomfort for 36-48 hours after your treatment.  Apply ice and continue with any pain medication you have been prescribed.  Notify the [facility] if you have any of the following: redness, drainage, swelling, headache, stiff neck or fever above 100°F.    SPECIAL INSTRUCTIONS  Our office will contact you in approximately 14 days for a progress report.    MEDICATIONS  Continue to take all routine medications.  Our office may have instructed you to hold some medications.    As no general anesthesia was used in today's procedure, you should not experience any side effects related to anesthesia.     If you are diabetic, the steroids used in today's injection may temporarily increase your blood sugar levels after the first few days after your injection. Please keep a close eye on your sugars and alert the doctor who manages your diabetes if your sugars are significantly high from your baseline or you are symptomatic.     If you have a  problem specifically related to your procedure, please call our office at [phone].  Problems not related to your procedure should be directed to your primary care physician.

## 2025-05-07 NOTE — TELEPHONE ENCOUNTER
Caller: denver Britton    Doctor: Dr. Ann    Reason for call: pt returning the nurse's call    Call back#: [phone]

## 2025-05-08 ENCOUNTER — OFFICE VISIT (OUTPATIENT)
Dept: PHYSICAL THERAPY | Facility: REHABILITATION | Age: 75
End: 2025-05-08
Attending: FAMILY MEDICINE
Payer: COMMERCIAL

## 2025-05-08 DIAGNOSIS — R26.9 GAIT ABNORMALITY: ICD-10-CM

## 2025-05-08 DIAGNOSIS — M54.16 LUMBAR RADICULOPATHY: Primary | ICD-10-CM

## 2025-05-08 PROCEDURE — 97110 THERAPEUTIC EXERCISES: CPT

## 2025-05-08 PROCEDURE — 97112 NEUROMUSCULAR REEDUCATION: CPT

## 2025-05-08 NOTE — PROGRESS NOTES
Daily Note     Today's date: 2025  Patient name: Morgan Rodriguez  : 1950  MRN: [id number]  Referring provider: Antonio Haider MD  Dx:   Encounter Diagnosis     ICD-10-CM    1. Lumbar radiculopathy  M54.16       2. Gait abnormality  R26.9           Start Time: 1218  Stop Time: 1300  Total time in clinic (min): 42 minutes    Subjective: Pt mentioned that his back and RLE has been feeling okay. He mentioned that he continues to have some limitations with his balance. He mentioned that he had an appointment with his Ortho for his shoulder and this went well, it has been feeling better since his injection.       Objective: See treatment diary below      Assessment: Tolerated treatment well. Patient would benefit from continued PT. Pt was introduced to tandem walking and was challenged as dynamic balance and endurance at the RLE continues to be limited. He continues to show good technique with STS, was able to perform a set without HHA as balance is slowly improving. He continues to be challenged by fatigue with increased physical activity and benefits from supervision due to fall risk. Continue to progress as tolerated, 1:1 with Theron Claire DPT entirety of tx.      Plan: Continue per plan of care.      Precautions: PMH includes anxiety, arthritis, asthma, a-fib, chronic pain disorder, depression, DM, LBP, HTN, OA, decompression of spinal stenosis of lower thoracic, L hip JORDON, BL TKA, Spinal fusion, R RTC repair      POC expires Unit limit Auth Expiration date PT/OT + Visit Limit?   25 BOMN 25 BOMN         Visit/Unit Tracking  AUTH Status:  Date 4/1 4/7 4/9 4/15 4/22 4/29 5/8   Approved Used 1 2 3 4 5 6 7    Remaining  7 6 5 4 3 2 1      Pertinent Findings:      POC End Date: 25                                                                                          Test / Measure  2025   FOTO (Predicted 42) 30   Lumbar ROM Decreased   BLE Strength  Decreased especially BL ankle       Access Code: [email or web address]  URL: [email or web address]  Date: 04/01/2025  Prepared by: Theron Claire    Exercises  - Long Sitting Calf Stretch with Strap  - 1 x daily - 7 x weekly - 1 sets - 5 reps - 15 seconds hold  - Long Sitting Ankle Plantar Flexion with Resistance  - 1 x daily - 7 x weekly - 3 sets - 10 reps  - Long Sitting Ankle Inversion with Resistance  - 1 x daily - 7 x weekly - 3 sets - 10 reps  - Long Sitting Ankle Eversion with Resistance  - 1 x daily - 7 x weekly - 3 sets - 10 reps  - Seated Toe Raise  - 1 x daily - 7 x weekly - 3 sets - 10 reps  - Standing Heel Raise with Support  - 1 x daily - 7 x weekly - 3 sets - 10 reps    Visit Number: 1 2 3 4 5 6 7      Manuals 4/1 4/7 4/9 4/15 4/22 4/29 5/8      BL Hip PROM  MC SW                                                 Neuro Re-Ed             Ankle 4-Way 10x ea            TA Recruitment + Bridge    3x5 3x5 3x5 3x5      Supine Tball Press  10x, 5s hold 10x, 5s hold 10x, 5s hold Held        Supine Knee to chest Core       2x6 ea      Tball Press + Hip flex/abd             Feet Together EO/EC    3x15s hold Held 3x15s hold, weight shift 4 directions 3x15s hold, weight shift 4 directions      Tandem Balance    2x15s hold ea  2x15s hold ea 1 Lap windows supervision      E-Stim Ankle DF/EV   3x20 ankle DF, L anterior tib  3x20 ankle DF, L anterior tib 3x20 ankle DF, L anterior tib                     Ther Ex             HEP Review + Pt Edu 10 min            NuStep  7', lvl 5 6' lvl 5  7' lvl 5  7' lvl 5  7' lvl 5  7' lvl 5       Heel/Toe Raises 10x ea  2x10 3x10 3x10 3x10 3x10      Calf Stretch 5x            LTRs             Eccentric STS  NMES, 3x7 HHA 2x10 HHA, foam  3x8, HHA, foam 3x8, HHA, foam 3x8, HHA, foam 3x8, HHA, foam      Standing March   10x ea, HHA 10x ea, HHA 2x10 ea, HHA  On foam, 10x ea On foam, 10x ea      Palloff Press ABCs  10x ea, 7# 5# x10  Hold         Standing Tband/[place] Rotation             Side Steps w/ TB   4 laps HHA 4  "susy HHA         Fwd Step Ups   6\" [id number]  4\" step, 2x5 ea  4\" step, 2x5 ea  4\" step, 2x5 ea  NV      Barbara Fwd/Bwd Ambulation             Suitcase Carry             SLR   2# 2X10ea  2#, 2x10 2#, 2x10 2#, 2x10      LAQ   4# x10 each   7.5#, 2x15 7.5#, 2x15 8#, 2x15      Ther Activity                                       Gait Training                                       Modalities                                                              "

## 2025-05-09 LAB
6MAM UR QL CFM: NEGATIVE NG/ML
7AMINOCLONAZEPAM UR QL CFM: NORMAL NG/ML
A-OH ALPRAZ UR QL CFM: ABNORMAL NG/ML
AMPHET UR QL CFM: NEGATIVE NG/ML
AMPHET UR QL CFM: NEGATIVE NG/ML
BUPRENORPHINE UR QL CFM: NEGATIVE NG/ML
BUTALBITAL UR QL CFM: NEGATIVE NG/ML
BZE UR QL CFM: NEGATIVE NG/ML
CODEINE UR QL CFM: NEGATIVE NG/ML
DESIPRAMINE UR QL CFM: NEGATIVE NG/ML
DESIPRAMINE UR QL CFM: NEGATIVE NG/ML
EDDP UR QL CFM: NEGATIVE NG/ML
ETHYL GLUCURONIDE UR QL CFM: NEGATIVE NG/ML
ETHYL SULFATE UR QL SCN: NEGATIVE NG/ML
EUTYLONE UR QL: NEGATIVE NG/ML
FENTANYL UR QL CFM: NEGATIVE NG/ML
GLIADIN IGG SER IA-ACNC: NEGATIVE NG/ML
GLUCOSE 30M P 50 G LAC PO SERPL-MCNC: ABNORMAL NG/ML
HYDROCODONE UR QL CFM: NEGATIVE NG/ML
HYDROCODONE UR QL CFM: NEGATIVE NG/ML
HYDROMORPHONE UR QL CFM: NEGATIVE NG/ML
IMIPRAMINE UR QL CFM: NEGATIVE NG/ML
LORAZEPAM UR QL CFM: NEGATIVE NG/ML
MDMA UR QL CFM: NEGATIVE NG/ML
ME-PHENIDATE UR QL CFM: NEGATIVE NG/ML
MEPERIDINE UR QL CFM: NEGATIVE NG/ML
METHADONE UR QL CFM: NEGATIVE NG/ML
METHAMPHET UR QL CFM: NEGATIVE NG/ML
MORPHINE UR QL CFM: NEGATIVE NG/ML
MORPHINE UR QL CFM: NEGATIVE NG/ML
NORBUPRENORPHINE UR QL CFM: NEGATIVE NG/ML
NORDIAZEPAM UR QL CFM: NEGATIVE NG/ML
NORFENTANYL UR QL CFM: NEGATIVE NG/ML
NORHYDROCODONE UR QL CFM: NEGATIVE NG/ML
NORHYDROCODONE UR QL CFM: NEGATIVE NG/ML
NORMEPERIDINE UR QL CFM: NEGATIVE NG/ML
NOROXYCODONE UR QL CFM: NEGATIVE NG/ML
OLANZAPINE QUANTIFICATION: NEGATIVE NG/ML
OPC-3373 QUANTIFICATION: ABNORMAL NG/ML
OXAZEPAM UR QL CFM: NEGATIVE NG/ML
OXYCODONE UR QL CFM: NEGATIVE NG/ML
OXYMORPHONE UR QL CFM: NEGATIVE NG/ML
OXYMORPHONE UR QL CFM: NEGATIVE NG/ML
PARA-FLUOROFENTANYL QUANTIFICATION: NORMAL NG/ML
PCP UR QL CFM: NEGATIVE NG/ML
PHENOBARB UR QL CFM: NEGATIVE NG/ML
RESULT ALL_PRESCRIBED MEDS AND SPECIAL INSTRUCTIONS: NORMAL
SECOBARBITAL UR QL CFM: NEGATIVE NG/ML
SL AMB 5F-ADB-M7 METABOLITE QUANTIFICATION: NEGATIVE NG/ML
SL AMB 7-OH-MITRAGYNINE (KRATOM ALKALOID) QUANTIFICATION: NEGATIVE NG/ML
SL AMB AB-FUBINACA-M3 METABOLITE QUANTIFICATION: NEGATIVE NG/ML
SL AMB ACETYL FENTANYL QUANTIFICATION: NORMAL NG/ML
SL AMB ACETYL NORFENTANYL QUANTIFICATION: NORMAL NG/ML
SL AMB ACRYL FENTANYL QUANTIFICATION: NORMAL NG/ML
SL AMB CARFENTANIL QUANTIFICATION: NORMAL NG/ML
SL AMB CLOZAPINE QUANTIFICATION: NEGATIVE NG/ML
SL AMB CTHC (MARIJUANA METABOLITE) QUANTIFICATION: NEGATIVE NG/ML
SL AMB DEXTROMETHORPHAN QUANTIFICATION: ABNORMAL NG/ML
SL AMB DEXTRORPHAN (DEXTROMETHORPHAN METABOLITE) QUANT: ABNORMAL NG/ML
SL AMB DEXTRORPHAN (DEXTROMETHORPHAN METABOLITE) QUANT: ABNORMAL NG/ML
SL AMB HALOPERIDOL  QUANTIFICATION: NEGATIVE NG/ML
SL AMB HALOPERIDOL METABOLITE QUANTIFICATION: NEGATIVE NG/ML
SL AMB HYDROXYRISPERIDONE QUANTIFICATION: NEGATIVE NG/ML
SL AMB JWH018 METABOLITE QUANTIFICATION: NEGATIVE NG/ML
SL AMB JWH073 METABOLITE QUANTIFICATION: NEGATIVE NG/ML
SL AMB MDMB-FUBINACA-M1 METABOLITE QUANTIFICATION: NEGATIVE NG/ML
SL AMB METHYLONE QUANTIFICATION: NORMAL NG/ML
SL AMB N-DESMETHYL-TRAMADOL QUANTIFICATION: NEGATIVE NG/ML
SL AMB N-DESMETHYLCLOZAPINE QUANTIFICATION: NEGATIVE NG/ML
SL AMB NORQUETIAPINE QUANTIFICATION: NEGATIVE NG/ML
SL AMB PHENTERMINE QUANTIFICATION: NEGATIVE NG/ML
SL AMB QUETIAPINE QUANTIFICATION: NEGATIVE NG/ML
SL AMB RCS4 METABOLITE QUANTIFICATION: NEGATIVE NG/ML
SL AMB RISPERIDONE QUANTIFICATION: NEGATIVE NG/ML
SL AMB RITALINIC ACID QUANTIFICATION: NEGATIVE NG/ML
SPECIMEN DRAWN SERPL: NEGATIVE NG/ML
TAPENTADOL UR QL CFM: NEGATIVE NG/ML
TEMAZEPAM UR QL CFM: NEGATIVE NG/ML
TEMAZEPAM UR QL CFM: NEGATIVE NG/ML
TRAMADOL UR QL CFM: NEGATIVE NG/ML
URATE/CREAT 24H UR: NEGATIVE NG/ML

## 2025-05-12 ENCOUNTER — APPOINTMENT (OUTPATIENT)
Dept: PHYSICAL THERAPY | Facility: REHABILITATION | Age: 75
End: 2025-05-12
Attending: FAMILY MEDICINE
Payer: COMMERCIAL

## 2025-05-13 DIAGNOSIS — F41.9 ANXIETY AND DEPRESSION: ICD-10-CM

## 2025-05-13 DIAGNOSIS — F32.A ANXIETY AND DEPRESSION: ICD-10-CM

## 2025-05-13 RX ORDER — CLONAZEPAM 0.5 MG/1
0.5 TABLET ORAL
Qty: 30 TABLET | Refills: 0 | Status: SHIPPED | OUTPATIENT
Start: 2025-05-13

## 2025-05-15 ENCOUNTER — EVALUATION (OUTPATIENT)
Dept: PHYSICAL THERAPY | Facility: REHABILITATION | Age: 75
End: 2025-05-15
Attending: FAMILY MEDICINE
Payer: COMMERCIAL

## 2025-05-15 DIAGNOSIS — M54.16 LUMBAR RADICULOPATHY: Primary | ICD-10-CM

## 2025-05-15 DIAGNOSIS — R26.9 GAIT ABNORMALITY: ICD-10-CM

## 2025-05-15 PROCEDURE — 97112 NEUROMUSCULAR REEDUCATION: CPT

## 2025-05-15 PROCEDURE — 97110 THERAPEUTIC EXERCISES: CPT

## 2025-05-15 PROCEDURE — 97164 PT RE-EVAL EST PLAN CARE: CPT

## 2025-05-15 NOTE — TELEPHONE ENCOUNTER
Caller: Tobi    Doctor: Dr. Ann    Reason for call: returning nurses phone call     Call back#: [phone]

## 2025-05-15 NOTE — TELEPHONE ENCOUNTER
Caller: Tobi    Doctor: Dr. Ann    Reason for call: patient calling for results of UDS please advise     Call back#: [phone]

## 2025-05-15 NOTE — PROGRESS NOTES
Daily Note     Today's date: 5/15/2025  Patient name: Morgan Rodriguez  : 1950  MRN: [id number]  Referring provider: Antonio Haider MD  Dx:   Encounter Diagnosis     ICD-10-CM    1. Lumbar radiculopathy  M54.16       2. Gait abnormality  R26.9                    Assessment  Impairments: abnormal gait, abnormal muscle firing, abnormal or restricted ROM, abnormal movement, activity intolerance, impaired physical strength, lacks appropriate home exercise program, pain with function, safety issue, poor posture , poor body mechanics, unable to perform ADL, participation limitations, activity limitations and endurance  Symptom irritability: moderate    Assessment details: Morgan Rodriguez is a 74 y.o. male attending physical therapy for lumbar radiculopathy. Pt has been responding well to physical therapy as displayed by improving MMT values and subjective report of ambulation improvements. He continues to have limitations with gait quality, endurance, static/ dynamic balance and BLE strength which affects his ability to perform ADLs and recreational activities. Primary impairments continue to include increased pain with functional activities, decreased core/paraspinal and BLE strength, lumbar AROM dysfunction, lumbopelvic motor control dysfunction, and decreased gait quality.  Pt would benefit from continued skilled PT interventions to increase functional lower extremity strength, increase pain free ROM, and facilitate return to recreational activities and ADL management/independence with less limitations and pain. 1:1 with Theron Claire DPT entirety of tx.  Barriers to intervention: medical complexity  Understanding of Dx/Px/POC: excellent     Prognosis: fair    Goals  Short term goals:   STGs to be met in 3-4 weeks:  1.  Increase BLE and core/paraspinal strength by half grade or more to increase functional strength. (Progressing)  2.  Decrease subjective report of pain by 25% or more to improve QoL.  (Progressing)  3.  Sit/Stand for recreational activities, or ADLs for >/=30 minutes with minimal to no pain. (Progressing)  4.  Independent with basic HEP. (Met)    Long term goals:  LTG's to be met by DC:  1.  Ambulate community distances with little to no pain or difficulty. (Not met)  2.  Perform all transfers without pain and difficulty. (Not met)  3.  Perform recreational activities / ADLs with little pain or difficulty. (Not met)  4.  Increase strength to 3+/5 or better in BLEs and core/paraspinal musculature. (Not met)  5.  Independent with advanced HEP. (Not met)  6.  Increase FOTO to predicted value by DC. (Not met)    Plan  Patient would benefit from: skilled physical therapy and PT eval  Referral necessary: No    Planned therapy interventions: abdominal trunk stabilization, balance/weight bearing training, body mechanics training, functional ROM exercises, home exercise program, gait training, joint mobilization, manual therapy, massage, neuromuscular re-education, patient education, postural training, strengthening, therapeutic activities, therapeutic exercise, self care, graded motor, graded exercise, graded activity and nerve gliding    Frequency: 1x week  Duration in weeks: 8  Plan of Care expiration date: 4/10/25  Treatment plan discussed with: patient        Subjective  5/15/2025 = PT Re-Evaluation: Pt believes physical therapy has been helping with his pain and functional limitations as he believes he is 20% improved. Pt mentioned that he feels he has improving control of his ankle as well as gait quality which has been helping with his functional mobility and performance with ADLs. He still feels he  could work on his balance and ambulation without his RW. In terms of pain, his current pain is a 1/10 and the worst it has been in the last week was a 7/10.       HPI: Pt referred to physical therapy for lumbar radiculopathy. Pt mentioned that his back pain began about 14 years ago and he has been  "dealing with this on and off. He mentioned that he has a history of a decompression surgery that was successful. He reports that his most recent back pain has been bothering him for about 2 months as he fell coming out a restaurant. He mentioned that he has trouble walking at times due to his ankles inverting, this is the reason why he is walking with a RW instead of a cane. He describes a \"burning electric weakness\" from his lower back down both of his legs to his knees.   Pain Location: Lower back  Pain Intensity: Current: 0/10, Worst: 9/10, Best: 0/10  DONAVON: Insidious  DOI: Chronic  Aggravating Factors: Standing, walking  Alleviating Factors: Sitting and laying down  Living Situation: 3SH, lives on 2 stories, Railing assist with steps  Dominant Side: R side  Goals: To walk with less difficulty and limitations with balance  PLOF: Retired, was active in martial arts, has elliptical 10 minutes 4x/week, doing upper body exercises     Objective      Strength and ROM evaluated B from a regional biomechanical perspective and values relevant to this episode recorded in tables below.     ROM:   Joint / Motion  Right: 4/1/2025  Left: 4/1/2025    Lumbar Flexion  25% limited     Lumbar Extension  50% limited     Lumbar Sidebending  50% limited 50% limited   Hip ER  45 45   Hip IR  15 15         Strength: MMT revealed the following findings.  Joint Motion Right: 5/15/2025 Left: 5/15/2025   Hip Flexion 4+/5 4+/5   Hip Abduction 4/5 4/5   Hip Adduction 4/5 4/5   Hip Extension 4/5 4/5   Knee Extension 4+/5 4+/5   Knee Flexion 4/5 4/5   Ankle Plantarflexion 2+/5 2+/5   Ankle Dorsiflexion 2+/5 2+/5        Additional Assessments:  Pain with palpation noted: Along paraspinals of lumbar spine  Gait Assessment: Ambulating with RW, hunched posture, improving foot clearance with swing phase, continues to be challenged when fatigued.     5xSTS = 5/15/2025: no foam, HHA on RW 12.1s  TUG = 5/15/2025: 23.41s     Special Tests:  Lumbar " Specific and Neural Tension                                                                            Test / Measure  4/1/2025 5/15/25   Straight Leg raise N N   Crossed straight leg raise N N   Slump test Pos Pos   Prone instability test N N   Sural n (invert), tibial (tatum) N N          Precautions: PMH includes anxiety, arthritis, asthma, a-fib, chronic pain disorder, depression, DM, LBP, HTN, OA, decompression of spinal stenosis of lower thoracic, L hip JORDON, BL TKA, Spinal fusion, R RTC repair      POC expires Unit limit Auth Expiration date PT/OT + Visit Limit?   5/27/25 BOMN 9/27/25 BOMN         Visit/Unit Tracking  AUTH Status:  Date 4/1 4/7 4/9 4/15 4/22 4/29 5/8 5/15   Approved Used 1 2 3 4 5 6 7 8    Remaining  7 6 5 4 3 2 1 0      Pertinent Findings:      POC End Date: 5/27/25                                                                                          Test / Measure  4/1/2025   FOTO (Predicted 42) 30   Lumbar ROM Decreased   BLE Strength  Decreased especially BL ankle      Access Code: [email or web address]  URL: [email or web address]  Date: 04/01/2025  Prepared by: Theron Claire    Exercises  - Long Sitting Calf Stretch with Strap  - 1 x daily - 7 x weekly - 1 sets - 5 reps - 15 seconds hold  - Long Sitting Ankle Plantar Flexion with Resistance  - 1 x daily - 7 x weekly - 3 sets - 10 reps  - Long Sitting Ankle Inversion with Resistance  - 1 x daily - 7 x weekly - 3 sets - 10 reps  - Long Sitting Ankle Eversion with Resistance  - 1 x daily - 7 x weekly - 3 sets - 10 reps  - Seated Toe Raise  - 1 x daily - 7 x weekly - 3 sets - 10 reps  - Standing Heel Raise with Support  - 1 x daily - 7 x weekly - 3 sets - 10 reps    Visit Number: 1 2 3 4 5 6 7 8 - RE     Manuals 4/1 4/7 4/9 4/15 4/22 4/29 5/8 5/15     BL Hip PROM  MC SW                                                 Neuro Re-Ed             Ankle 4-Way 10x ea            TA Recruitment + Bridge    3x5 3x5 3x5 3x5 3x5, 5#     Supine Tball  "Press  10x, 5s hold 10x, 5s hold 10x, 5s hold Held        Supine Knee to chest Core       2x6 ea 2x6 ea     Tball Press + Hip flex/abd             Feet Together EO/EC    3x15s hold Held 3x15s hold, weight shift 4 directions 3x15s hold, weight shift 4 directions 3x15s hold, weight shift 4 directions     Tandem Balance    2x15s hold ea  2x15s hold ea  1 Lap windows supervision     E-Stim Ankle DF/EV   3x20 ankle DF, L anterior tib  3x20 ankle DF, L anterior tib 3x20 ankle DF, L anterior tib                     Ther Ex             HEP Review + Pt Edu 10 min       RE     NuStep  7', lvl 5 6' lvl 5  7' lvl 5  7' lvl 5  7' lvl 5  7' lvl 5  7' lvl 5      Heel/Toe Raises 10x ea  2x10 3x10 3x10 3x10 3x10 3x10     Calf Stretch 5x            LTRs             Eccentric STS  NMES, 3x7 HHA 2x10 HHA, foam  3x8, HHA, foam 3x8, HHA, foam 3x8, HHA, foam 3x8, HHA, foam 3x8, HHA, foam     Standing March   10x ea, HHA 10x ea, HHA 2x10 ea, HHA  On foam, 10x ea On foam, 10x ea On foam, 10x ea     Palloff Press ABCs  10x ea, 7# 5# x10  Hold         Standing Tband/[place] Rotation             Side Steps w/ TB   4 laps HHA 4 laps HHA         Fwd Step Ups   6\" [id number]  4\" step, 2x5 ea  4\" step, 2x5 ea  4\" step, 2x5 ea  NV      Barbara Fwd/Bwd Ambulation             Ambulating at Bar       2 laps windows 2 laps windows     SLR   2# 2X10ea  2#, 2x10 2#, 2x10 2#, 2x10 2#, 2x10     LAQ   4# x10 each   7.5#, 2x15 7.5#, 2x15 8#, 2x15 10#, 2x15     Ther Activity                                       Gait Training                                       Modalities                                                                "

## 2025-05-15 NOTE — TELEPHONE ENCOUNTER
S/w pt an advised of same. Pt verbalized understanding and appreciation.       Pt reports that he was nevous the day of his injection so he took 2 xanax that he had from years ago. He took the xanax before his PCP changed him to Clonazepam. This was the same day that he supplied the UDS.     Pt also takes Mucinex DM for chronic bronchitis    Will JW prescribe opioid?

## 2025-05-15 NOTE — TELEPHONE ENCOUNTER
The patient's UDS was positive for clonazepam which is prescribed as well as aripiprazole which is also prescribed.  However, the patient was also noted to have a metabolite of Xanax/alprazolam in his urine screen.  This is not prescribed.  Can the patient explain this?  He was also noted to have metabolites of dextromethorphan which is commonly found in Robitussin, NyQuil, and other cough medicines

## 2025-05-20 ENCOUNTER — OFFICE VISIT (OUTPATIENT)
Dept: PHYSICAL THERAPY | Facility: REHABILITATION | Age: 75
End: 2025-05-20
Attending: FAMILY MEDICINE
Payer: COMMERCIAL

## 2025-05-20 DIAGNOSIS — R26.9 GAIT ABNORMALITY: ICD-10-CM

## 2025-05-20 DIAGNOSIS — M54.16 LUMBAR RADICULOPATHY: Primary | ICD-10-CM

## 2025-05-20 PROCEDURE — 97110 THERAPEUTIC EXERCISES: CPT

## 2025-05-20 PROCEDURE — 97112 NEUROMUSCULAR REEDUCATION: CPT

## 2025-05-20 NOTE — PROGRESS NOTES
Daily Note     Today's date: 2025  Patient name: Morgan Rodriguez  : 1950  MRN: [id number]  Referring provider: Antonio Haider MD  Dx:   Encounter Diagnosis     ICD-10-CM    1. Lumbar radiculopathy  M54.16       2. Gait abnormality  R26.9           Start Time: 1228  Stop Time: 1310  Total time in clinic (min): 42 minutes    Subjective: Pt mentioned that his back and LE symptoms have been getting slowly better, he continues to have problems with balance and walking endurance. He mentioned that he began walking with a rollator instead of RW and this has been going well.       Objective: See treatment diary below      Assessment: Tolerated treatment well. Patient would benefit from continued PT. No new exercises were added as pt continues to be appropriately challenged by exercise plan. He was progressed with increased resistance with leg ext as BLE strength is improving. He showed improving foot clearance with marching and step up onto airex this visit. He continues to respond well to Vcs for glute activation with SLS on L side. Continue to progress as tolerated, 1:1 with Theron Claire DPT entirety of tx.      Plan: Continue per plan of care.      Precautions: PMH includes anxiety, arthritis, asthma, a-fib, chronic pain disorder, depression, DM, LBP, HTN, OA, decompression of spinal stenosis of lower thoracic, L hip JORDON, BL TKA, Spinal fusion, R RTC repair      POC expires Unit limit Auth Expiration date PT/OT + Visit Limit?   25 BOMN 25 BOMN         Visit/Unit Tracking  AUTH Status:  Date 4/1 4/7 4/9 4/15 4/22 4/29 5/8 5/15 5/20   Approved Used 1 2 3 4 5 6 7 8 1    Remaining  7 6 5 4 3 2 1 0 7      Pertinent Findings:      POC End Date: 25                                                                                          Test / Measure  2025   FOTO (Predicted 42) 30   Lumbar ROM Decreased   BLE Strength  Decreased especially BL ankle      Access Code: [email or web address]  URL:  [email or web address]  Date: 04/01/2025  Prepared by: Theron Claire    Exercises  - Long Sitting Calf Stretch with Strap  - 1 x daily - 7 x weekly - 1 sets - 5 reps - 15 seconds hold  - Long Sitting Ankle Plantar Flexion with Resistance  - 1 x daily - 7 x weekly - 3 sets - 10 reps  - Long Sitting Ankle Inversion with Resistance  - 1 x daily - 7 x weekly - 3 sets - 10 reps  - Long Sitting Ankle Eversion with Resistance  - 1 x daily - 7 x weekly - 3 sets - 10 reps  - Seated Toe Raise  - 1 x daily - 7 x weekly - 3 sets - 10 reps  - Standing Heel Raise with Support  - 1 x daily - 7 x weekly - 3 sets - 10 reps    Visit Number: 1 2 3 4 5 6 7 8 - RE 9    Manuals 4/1 4/7 4/9 4/15 4/22 4/29 5/8 5/15     BL Hip PROM  MC SW                                                 Neuro Re-Ed             Ankle 4-Way 10x ea            TA Recruitment + Bridge    3x5 3x5 3x5 3x5 3x5, 5# 3x6, 5#    Supine Tball Press  10x, 5s hold 10x, 5s hold 10x, 5s hold Held        Supine Knee to chest Core       2x6 ea 2x6 ea 2x6 ea    Tball Press + Hip flex/abd             Feet Together EO/EC    3x15s hold Held 3x15s hold, weight shift 4 directions 3x15s hold, weight shift 4 directions 3x15s hold, weight shift 4 directions 3x15s hold, weight shift 4 directions    Tandem Balance    2x15s hold ea  2x15s hold ea  1 Lap windows supervision 1 Lap windows supervision    E-Stim Ankle DF/EV   3x20 ankle DF, L anterior tib  3x20 ankle DF, L anterior tib 3x20 ankle DF, L anterior tib                     Ther Ex             HEP Review + Pt Edu 10 min       RE     NuStep  7', lvl 5 6' lvl 5  7' lvl 5  7' lvl 5  7' lvl 5  7' lvl 5  7' lvl 5  7' lvl 5     Heel/Toe Raises 10x ea  2x10 3x10 3x10 3x10 3x10 3x10 3x10    Calf Stretch 5x            LTRs             Eccentric STS  NMES, 3x7 HHA 2x10 HHA, foam  3x8, HHA, foam 3x8, HHA, foam 3x8, HHA, foam 3x8, HHA, foam 3x8, HHA, foam 3x8, HHA, foam    Standing March   10x ea, HHA 10x ea, HHA 2x10 ea, HHA  On  "foam, 10x ea On foam, 10x ea On foam, 10x ea On foam, 10x ea    Palloff Press ABCs  10x ea, 7# 5# x10  Hold         Standing Tband/[place] Rotation             Side Steps w/ TB   4 laps HHA 4 laps HHA         Fwd Step Ups   6\" [id number]  4\" step, 2x5 ea  4\" step, 2x5 ea  4\" step, 2x5 ea  NV      Barbara Fwd/Bwd Ambulation             Ambulating at Bar       2 laps windows 2 laps windows 2 laps windows    SLR   2# 2X10ea  2#, 2x10 2#, 2x10 2#, 2x10 2#, 2x10 2#, 2x10    LAQ   4# x10 each   7.5#, 2x15 7.5#, 2x15 8#, 2x15 10#, 2x15 12#, 2x15    Ther Activity                                       Gait Training                                       Modalities                                                                  "

## 2025-05-21 ENCOUNTER — TELEPHONE (OUTPATIENT)
Dept: PAIN MEDICINE | Facility: MEDICAL CENTER | Age: 75
End: 2025-05-21

## 2025-05-21 DIAGNOSIS — M54.16 LUMBAR RADICULOPATHY: Primary | ICD-10-CM

## 2025-05-21 DIAGNOSIS — G89.4 CHRONIC PAIN SYNDROME: ICD-10-CM

## 2025-05-21 RX ORDER — HYDROCODONE BITARTRATE AND ACETAMINOPHEN 5; 325 MG/1; MG/1
1 TABLET ORAL 2 TIMES DAILY PRN
Qty: 60 TABLET | Refills: 0 | Status: SHIPPED | OUTPATIENT
Start: 2025-05-21

## 2025-05-21 NOTE — TELEPHONE ENCOUNTER
S/w pt who is requesting JW review response regarding UDS concern and advise on opioid script.    Would you like pt to f/u 3 months or sooner?

## 2025-05-21 NOTE — TELEPHONE ENCOUNTER
Prescription for Norco 5/325 mg twice daily as needed sent to pharmacy on file.  Because he is also on clonazepam, prescription for Narcan spray was also prescribed.  Please schedule follow-up office visit in 4 weeks or less for reevaluation to determine effectiveness of therapy.

## 2025-05-21 NOTE — TELEPHONE ENCOUNTER
S/w pt and advised of same.Reviewed medication, dose, side effects. Pt aware to be careful driving or operating machinery until he is sure how this affects him. Reviewed Narcan use. OVS scheduled.  Pt verbalized understanding and appreciation.     Clerical- Pt scheduled 7/10/25, no sooner available. Please contact pt is sooner appt available. Thank you

## 2025-05-21 NOTE — TELEPHONE ENCOUNTER
Patient reports 75% improvement post inj  Pain level 1/10   Patient was inquiring regarding his next appointment with Dr. Ann.  Will forward message to  clinical staff to follow up with the patient to discuss.

## 2025-05-22 ENCOUNTER — OFFICE VISIT (OUTPATIENT)
Dept: CARDIOLOGY CLINIC | Facility: CLINIC | Age: 75
End: 2025-05-22
Payer: COMMERCIAL

## 2025-05-22 VITALS
WEIGHT: 231 LBS | SYSTOLIC BLOOD PRESSURE: 114 MMHG | DIASTOLIC BLOOD PRESSURE: 60 MMHG | BODY MASS INDEX: 35.01 KG/M2 | HEIGHT: 68 IN | OXYGEN SATURATION: 94 % | HEART RATE: 64 BPM

## 2025-05-22 DIAGNOSIS — E78.2 MIXED HYPERLIPIDEMIA: ICD-10-CM

## 2025-05-22 DIAGNOSIS — I47.19 MULTIFOCAL ATRIAL TACHYCARDIA (HCC): Primary | ICD-10-CM

## 2025-05-22 DIAGNOSIS — I10 PRIMARY HYPERTENSION: ICD-10-CM

## 2025-05-22 DIAGNOSIS — I25.10 CORONARY ARTERY DISEASE INVOLVING NATIVE CORONARY ARTERY OF NATIVE HEART WITHOUT ANGINA PECTORIS: ICD-10-CM

## 2025-05-22 DIAGNOSIS — I65.23 CAROTID STENOSIS, ASYMPTOMATIC, BILATERAL: ICD-10-CM

## 2025-05-22 PROCEDURE — 99214 OFFICE O/P EST MOD 30 MIN: CPT | Performed by: INTERNAL MEDICINE

## 2025-05-22 PROCEDURE — 93000 ELECTROCARDIOGRAM COMPLETE: CPT | Performed by: INTERNAL MEDICINE

## 2025-05-22 RX ORDER — MIRTAZAPINE 30 MG/1
30 TABLET, FILM COATED ORAL
COMMUNITY

## 2025-05-22 NOTE — PROGRESS NOTES
Cardiology Follow Up    Morgan Rodriguez  1950  [id number]  HEART & VASCULAR [facility]  [address]  [place] [address]    1. Multifocal atrial tachycardia (HCC)  POCT ECG      2. Primary hypertension  POCT ECG      3. Mixed hyperlipidemia  POCT ECG      4. Carotid stenosis, asymptomatic, bilateral  VAS carotid complete study      5. Coronary artery disease involving native coronary artery of native heart without angina pectoris            Discussion/Summary:    MAT has been quite stable on beta-blocker.  Functional capacity mainly limited by orthopedic issue.  Blood pressure well-controlled.  Lipids have been doing well on high intensity Crestor.  He is due for a 2-year follow-up on bilateral carotid stenosis follow-up yearly.      Interval History:   73-year-old gentleman with history of multifocal atrial tachycardia, hypertension, dyslipidemia, obesity presents for routine scheduled follow-up visit.  He has been doing some new diet and exercise trying to lose weight but has had a hard time doing this.      Overall he has been doing well functional capacity limited by orthopedic issues.  No cardiac symptoms.  Denies any chest pain, shortness of breath, palpitations, lightheadedness, dizziness, or syncope.  Has been lower extreme edema, PND, orthopnea.  He has been taking all medications as prescribed.             Problem List       Allergic rhinitis    Enlarged prostate with lower urinary tract symptoms (LUTS)    Chronic bronchitis (HCC)    Depression, controlled    Overview Signed 2/27/2018  1:10 PM by Antonio Haider MD     Transitioned From: Depression         Elevated ALT measurement    Hyperlipidemia    Hypertension    Impaired fasting glucose    Lung nodule    Male erectile dysfunction    Multifocal atrial tachycardia (HCC)    Obesity    Osteoarthritis of knee    Osteoarthritis of left hip    Seasonal allergies    Spinal  "stenosis of lumbar region    Overview Addendum 3/6/2018  3:00 PM by Antonio Haider MD     Description: Cervical and lumbar. Sees Dr. Matta.Had surgical decompression T12 to L1 7/2012.         Supraventricular tachycardia (HCC)    Anxiety and depression    Arthritis of left hip    COPD (chronic obstructive pulmonary disease) (HCC)    Elevated prostate specific antigen (PSA)    Lumbosacral spondylosis    Incomplete emptying of bladder    Other affections of shoulder region, not elsewhere classified    Personal history of other disorder of urinary system    Retention of urine    Screening for prostate cancer    Thoracic or lumbosacral neuritis or radiculitis    Urethral stricture    Increased frequency of urination          Past Medical History:   Diagnosis Date    Allergic 1955    Allergic rhinitis 1955    Anxiety ?    Arthritis ?    back    Asthma     Atrial fibrillation (HCC)     per pt \"controlled with metoprolol\" sees [facility] cardio Dr Ace 2x/year    Bronchitis, chronic obstructive, with exacerbation (HCC)     Last Assessed: 5/17/2013    Chronic pain disorder     back    COPD (chronic obstructive pulmonary disease) (HCC) 1987    COPD with acute exacerbation (HCC)     Last Assessed: 4/13/2015    Depression 1995    Depression, controlled 08/30/2017    Transitioned From: Depression    Diabetes mellitus (HCC)     Elevated PSA     Bx neg 2012; Last Assessed: 11/23/2012    Environmental and seasonal allergies     Exercises 3 to 4 times per week     \"2hrs --4x/week\"elliptical and Boflex    History of prediabetes     Hyperlipidemia 2001    history of/better on meds    Hypertension     has gotten better since weight loss and pt is keeping check of BP at home    Irregular heart beat     MAT, afib    Low back pain 2001    Lumbosacral disc disease 1990    Lung nodule - resolved 2017 CT 01/04/2017    Osteoarthritis 1992    Postoperative urinary retention     Self-catheterizes urinary bladder     per pt every 2 weeks as per " urologist    Spinal cord stimulator status     Status post insertion of spinal cord stimulator 2020    Wears glasses     Weight loss     per pt recent intention loss of 80lbs recently     Social History     Socioeconomic History    Marital status: /Civil Union     Spouse name: Not on file    Number of children: Not on file    Years of education: Not on file    Highest education level: Not on file   Occupational History    Not on file   Tobacco Use    Smoking status: Former     Current packs/day: 0.00     Average packs/day: 1.6 packs/day for 28.8 years (46.0 ttl pk-yrs)     Types: Cigarettes     Start date: 1977     Quit date:      Years since quittin.4     Passive exposure: Never    Smokeless tobacco: Never   Vaping Use    Vaping status: Never Used   Substance and Sexual Activity    Alcohol use: Not Currently     Alcohol/week: 1.0 standard drink of alcohol     Types: 1 Cans of beer per week     Comment: Stopped drinking    Drug use: No    Sexual activity: Yes     Partners: Female     Birth control/protection: Post-menopausal, None     Comment: defer   Other Topics Concern    Not on file   Social History Narrative    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, torch    Asbestos removal - use PAPR    [employer]     Social Drivers of Health     Financial Resource Strain: Low Risk  (10/23/2023)    Overall Financial Resource Strain (CARDIA)     Difficulty of Paying Living Expenses: Not very hard   Food Insecurity: No Food Insecurity (2024)    Nursing - Inadequate Food Risk Classification     Worried About Running Out of Food in the Last Year: Never true     Ran Out of Food in the Last Year: Never true     Ran Out of Food in the Last Year: Not on file   Transportation Needs: No Transportation Needs (2024)    PRAPARE - Transportation     Lack of Transportation (Medical): No     Lack of Transportation (Non-Medical): No   Physical Activity: Not on file   Stress: Not on file    Social Connections: Not on file   Intimate Partner Violence: Not on file   Housing Stability: Low Risk  (11/9/2024)    Housing Stability Vital Sign     Unable to Pay for Housing in the Last Year: No     Number of Times Moved in the Last Year: 0     Homeless in the Last Year: No      Family History   Problem Relation Name Age of Onset    Breast cancer Mother Mother     Depression Mother Mother     Dementia Mother Mother     Pancreatitis Father      Diabetes Maternal Grandmother Ayde Schiffert     Arthritis Maternal Grandmother Ayde Schiffert     Heart attack Maternal Grandfather      Heart attack Paternal Grandmother Rachelle Gutierrezeningsen     Diabetes Paternal Grandmother Rachelle Brueningsen     Diabetes Paternal Grandfather Beny Gutierrezeningsen     No Known Problems Brother      Lung disease Paternal Uncle       Past Surgical History:   Procedure Laterality Date    BACK SURGERY  07/25/2012    decompression of spinal stenosis from lower thoracic through the lumbar spine    CARPAL TUNNEL RELEASE  2017    COLONOSCOPY  10/2010    neg. recheck in 5 years    HIP SURGERY  01/2016    INCISION OF BLADDER NECK CONTRACTURE      multiple to remove scar tissue    JOINT REPLACEMENT Left     hip    JOINT REPLACEMENT Bilateral     knees    KNEE ARTHROSCOPY  01/2013    [facility] W/RLS TRANSVRS CARPL LIGM Left 03/04/2020    Procedure: RELEASE CARPAL TUNNEL ENDOSCOPIC;  Surgeon: Mendel Paula MD;  Location: [facility];  Service: Orthopedics    [facility] PRQ IMPLTJ NSTIM ELECTRODE ARRAY EPIDURAL Right 08/25/2020    Procedure: INSERTION THORACIC DORSAL COLUMN SPINAL CORD STIMULATOR PERCUTANEOUS W IMPLANTABLE PULSE GENERATOR, RIGHT;  Surgeon: Jose Vásquez MD;  Location: [facility];  Service: Neurosurgery    [facility] REVJ/RMVL IMPL SPI NPG/RCVR DTCH CONNJ ELTRD RA N/A 05/03/2022    Procedure: REMOVAL OF SPINAL CORD STIMULATOR SYSTEM WITH BATTERY;  Surgeon: Evangelista Sim MD;  Location: [facility];  Service: Neurosurgery    PROSTATE BIOPSY   2012    for elevated PSA of about 7; neg    ROTATOR CUFF REPAIR Right     SPINAL FUSION      TRANSURETHRAL RESECTION OF PROSTATE  06/27/2013       Current Outpatient Medications:     albuterol (2.5 mg/3 mL) 0.083 % nebulizer solution, Take 3 mL (2.5 mg total) by nebulization every 6 (six) hours as needed for wheezing or shortness of breath, Disp: 1080 mL, Rfl: 2    ARIPiprazole (ABILIFY) 15 mg tablet, Take 1 tablet by mouth daily, Disp: , Rfl:     aspirin 81 mg chewable tablet, Chew 81 mg in the morning., Disp: , Rfl:     clonazePAM (KlonoPIN) 0.5 mg tablet, Take 1 tablet (0.5 mg total) by mouth daily at bedtime, Disp: 30 tablet, Rfl: 0    dextromethorphan-guaifenesin (MUCINEX DM)  MG per 12 hr tablet, Take 1 tablet by mouth every 12 (twelve) hours, Disp: , Rfl:     diclofenac (VOLTAREN) 75 mg EC tablet, TAKE 1 TABLET (75 MG TOTAL) BY MOUTH 2 (TWO) TIMES A DAY AS NEEDED (PAIN), Disp: 60 tablet, Rfl: 5    DULoxetine (CYMBALTA) 60 mg delayed release capsule, Take 2 capsules by mouth in the morning, Disp: , Rfl:     Fluticasone-Salmeterol (Advair) 250-50 mcg/dose inhaler, Inhale 1 puff 2 (two) times a day Rinse mouth after use., Disp: 60 blister, Rfl: 5    gabapentin (NEURONTIN) 300 mg capsule, Take 300 mg by mouth in the morning., Disp: , Rfl:     HYDROcodone-acetaminophen (NORCO) 5-325 mg per tablet, Take 1 tablet by mouth 2 (two) times a day as needed for pain Max Daily Amount: 2 tablets, Disp: 60 tablet, Rfl: 0    metFORMIN (GLUCOPHAGE-XR) 500 mg 24 hr tablet, TAKE 1 TABLET BY MOUTH EVERY DAY WITH DINNER, Disp: 90 tablet, Rfl: 3    metoprolol tartrate (LOPRESSOR) 50 mg tablet, TAKE 1 TABLET (50 MG TOTAL) BY MOUTH 2 (TWO) TIMES A DAY TAKE WITH 25 MG TABS (Patient taking differently: Take 50 mg by mouth in the morning and 50 mg before bedtime.), Disp: 180 tablet, Rfl: 3    mirtazapine (REMERON) 30 mg tablet, Take 30 mg by mouth daily at bedtime, Disp: , Rfl:     mometasone (NASONEX) 50 mcg/act nasal spray, 2  sprays into each nostril in the morning., Disp: , Rfl:     montelukast (SINGULAIR) 10 mg tablet, TAKE 1 TABLET BY MOUTH EVERYDAY AT BEDTIME, Disp: 90 tablet, Rfl: 1    Multiple Vitamin (MULTI-VITAMIN DAILY) TABS, Take 1 tablet by mouth in the morning., Disp: , Rfl:     naloxone (NARCAN) 4 mg/0.1 mL nasal spray, Administer 1 spray into a nostril. If no response after 2-3 minutes, give another dose in the other nostril using a new spray., Disp: 1 each, Rfl: 1    NIFEdipine ER (ADALAT CC) 60 MG 24 hr tablet, TAKE 1 TABLET BY MOUTH EVERY DAY, Disp: 90 tablet, Rfl: 1    oxybutynin (DITROPAN) 5 mg tablet, Take 5 mg by mouth as needed in the morning and 5 mg as needed in the evening., Disp: , Rfl:     rosuvastatin (CRESTOR) 40 MG tablet, TAKE 1 TABLET BY MOUTH EVERY DAY, Disp: 90 tablet, Rfl: 1    ARIPiprazole (ABILIFY) 5 mg tablet, Take 5 mg by mouth daily (Patient taking differently: Take 10 mg by mouth daily), Disp: , Rfl:     DULoxetine (CYMBALTA) 30 mg delayed release capsule, Take 90 mg by mouth daily  (Patient not taking: Reported on 3/17/2025), Disp: , Rfl:   Allergies   Allergen Reactions    Pollen Extract Sneezing       Labs:     Chemistry        Component Value Date/Time     09/08/2015 1027    K 4.1 02/10/2025 1648    K 4.6 09/08/2015 1027     02/10/2025 1648     09/08/2015 1027    CO2 25 02/10/2025 1648    CO2 27 09/08/2015 1027    BUN 17 02/10/2025 1648    BUN 15 09/08/2015 1027    CREATININE 0.90 02/10/2025 1648    CREATININE 0.81 09/08/2015 1027        Component Value Date/Time    CALCIUM 9.0 02/10/2025 1648    CALCIUM 9.3 09/08/2015 1027    ALKPHOS 51 10/22/2024 0845    ALKPHOS 121 07/21/2014 0910    AST 22 10/22/2024 0845    AST 18 07/21/2014 0910    ALT 21 10/22/2024 0845    ALT 34 07/21/2014 0910    BILITOT 0.45 07/21/2014 0910            Lab Results   Component Value Date    CHOL 190 07/21/2014     Lab Results   Component Value Date    HDL 46 10/22/2024    HDL 46 04/25/2024    HDL 46  "10/19/2023     Lab Results   Component Value Date    LDLCALC 96 10/22/2024    LDLCALC 74 04/25/2024    LDLCALC 71 10/19/2023     Lab Results   Component Value Date    TRIG 125 10/22/2024    TRIG 107 04/25/2024    TRIG 119 10/19/2023     No results found for: \"CHOLHDL\"    Imaging: No results found.    ECG:    Normal sinus rhythm      Review of Systems   Constitutional: Negative.   HENT: Negative.     Eyes: Negative.    Cardiovascular: Negative.    Respiratory: Negative.     Endocrine: Negative.    Hematologic/Lymphatic: Negative.    Skin: Negative.    Musculoskeletal: Negative.    Gastrointestinal: Negative.    Genitourinary: Negative.    Neurological: Negative.    Psychiatric/Behavioral: Negative.         Vitals:    05/22/25 0907   BP: 114/60   Pulse: 64   SpO2: 94%     Vitals:    05/22/25 0907   Weight: 105 kg (231 lb)     Height: 5' 8\" (172.7 cm)   Body mass index is 35.12 kg/m².    Physical Exam:  Vital signs reviewed  General:  Alert and cooperative, appears stated age, no acute distress  HEENT:  PERRLA, EOMI, no scleral icterus, no conjunctival pallor  Neck:  No lymphadenopathy, no thyromegaly, no carotid bruits, no elevated JVP  Heart:  Regular rate and rhythm, normal S1/S2, no S3/S4, no murmur, rubs or gallops.  PMI nondisplaced  Lungs:  Clear to auscultation bilaterally, no wheezes rales or rhonchi  Abdomen:  Soft, non-tender, positive bowel sounds, no rebound or guarding,   no organomegaly   Extremities:  Normal range of motion.  No clubbing, cyanosis or edema   Vascular:  2+ pedal pulses  Skin:  No rashes or lesions on exposed skin  Neurologic:  Cranial nerves II-XII grossly intact without focal deficits  Psych:  Normal mood and affect            "

## 2025-05-23 ENCOUNTER — APPOINTMENT (OUTPATIENT)
Dept: PHYSICAL THERAPY | Facility: REHABILITATION | Age: 75
End: 2025-05-23
Attending: FAMILY MEDICINE
Payer: COMMERCIAL

## 2025-05-23 LAB
ATRIAL RATE: 64 BPM
P AXIS: 24 DEGREES
PR INTERVAL: 234 MS
QRS AXIS: 71 DEGREES
QRSD INTERVAL: 88 MS
QT INTERVAL: 396 MS
QTC INTERVAL: 408 MS
T WAVE AXIS: 46 DEGREES
VENTRICULAR RATE: 64 BPM

## 2025-05-28 DIAGNOSIS — M47.816 LUMBAR SPONDYLOSIS: ICD-10-CM

## 2025-05-29 ENCOUNTER — APPOINTMENT (OUTPATIENT)
Dept: LAB | Facility: CLINIC | Age: 75
End: 2025-05-29
Payer: COMMERCIAL

## 2025-05-29 ENCOUNTER — APPOINTMENT (OUTPATIENT)
Dept: PHYSICAL THERAPY | Facility: REHABILITATION | Age: 75
End: 2025-05-29
Attending: FAMILY MEDICINE
Payer: COMMERCIAL

## 2025-05-29 DIAGNOSIS — F32.A ANXIETY AND DEPRESSION: ICD-10-CM

## 2025-05-29 DIAGNOSIS — E78.2 MIXED HYPERLIPIDEMIA: ICD-10-CM

## 2025-05-29 DIAGNOSIS — M54.16 LUMBAR RADICULOPATHY: ICD-10-CM

## 2025-05-29 DIAGNOSIS — F41.9 ANXIETY AND DEPRESSION: ICD-10-CM

## 2025-05-29 DIAGNOSIS — J41.0 SIMPLE CHRONIC BRONCHITIS (HCC): ICD-10-CM

## 2025-05-29 DIAGNOSIS — R73.01 IMPAIRED FASTING GLUCOSE: ICD-10-CM

## 2025-05-29 DIAGNOSIS — I10 PRIMARY HYPERTENSION: ICD-10-CM

## 2025-05-29 LAB
ALBUMIN SERPL BCG-MCNC: 3.9 G/DL (ref 3.5–5)
ALP SERPL-CCNC: 55 U/L (ref 34–104)
ALT SERPL W P-5'-P-CCNC: 16 U/L (ref 7–52)
ANION GAP SERPL CALCULATED.3IONS-SCNC: 7 MMOL/L (ref 4–13)
AST SERPL W P-5'-P-CCNC: 17 U/L (ref 13–39)
BILIRUB SERPL-MCNC: 0.51 MG/DL (ref 0.2–1)
BUN SERPL-MCNC: 16 MG/DL (ref 5–25)
CALCIUM SERPL-MCNC: 9.6 MG/DL (ref 8.4–10.2)
CHLORIDE SERPL-SCNC: 102 MMOL/L (ref 96–108)
CHOLEST SERPL-MCNC: 125 MG/DL (ref ?–200)
CO2 SERPL-SCNC: 27 MMOL/L (ref 21–32)
CREAT SERPL-MCNC: 0.82 MG/DL (ref 0.6–1.3)
EST. AVERAGE GLUCOSE BLD GHB EST-MCNC: 128 MG/DL
GFR SERPL CREATININE-BSD FRML MDRD: 87 ML/MIN/1.73SQ M
GLUCOSE P FAST SERPL-MCNC: 112 MG/DL (ref 65–99)
HBA1C MFR BLD: 6.1 %
HDLC SERPL-MCNC: 41 MG/DL
LDLC SERPL CALC-MCNC: 63 MG/DL (ref 0–100)
NONHDLC SERPL-MCNC: 84 MG/DL
POTASSIUM SERPL-SCNC: 4.1 MMOL/L (ref 3.5–5.3)
PROT SERPL-MCNC: 6.8 G/DL (ref 6.4–8.4)
SODIUM SERPL-SCNC: 136 MMOL/L (ref 135–147)
TRIGL SERPL-MCNC: 103 MG/DL (ref ?–150)

## 2025-05-29 PROCEDURE — 80053 COMPREHEN METABOLIC PANEL: CPT

## 2025-05-29 PROCEDURE — 36415 COLL VENOUS BLD VENIPUNCTURE: CPT

## 2025-05-29 PROCEDURE — 83036 HEMOGLOBIN GLYCOSYLATED A1C: CPT

## 2025-05-29 PROCEDURE — 80061 LIPID PANEL: CPT

## 2025-05-29 RX ORDER — DICLOFENAC SODIUM 75 MG/1
75 TABLET, DELAYED RELEASE ORAL 2 TIMES DAILY PRN
Qty: 60 TABLET | Refills: 5 | Status: SHIPPED | OUTPATIENT
Start: 2025-05-29

## 2025-06-02 ENCOUNTER — RA CDI HCC (OUTPATIENT)
Dept: OTHER | Facility: HOSPITAL | Age: 75
End: 2025-06-02

## 2025-06-06 ENCOUNTER — OFFICE VISIT (OUTPATIENT)
Dept: FAMILY MEDICINE CLINIC | Facility: CLINIC | Age: 75
End: 2025-06-06
Payer: COMMERCIAL

## 2025-06-06 VITALS
BODY MASS INDEX: 35.83 KG/M2 | OXYGEN SATURATION: 95 % | RESPIRATION RATE: 16 BRPM | DIASTOLIC BLOOD PRESSURE: 76 MMHG | TEMPERATURE: 97.6 F | HEART RATE: 104 BPM | WEIGHT: 236.4 LBS | HEIGHT: 68 IN | SYSTOLIC BLOOD PRESSURE: 142 MMHG

## 2025-06-06 DIAGNOSIS — R73.01 IMPAIRED FASTING GLUCOSE: ICD-10-CM

## 2025-06-06 DIAGNOSIS — I10 PRIMARY HYPERTENSION: ICD-10-CM

## 2025-06-06 DIAGNOSIS — F32.A ANXIETY AND DEPRESSION: ICD-10-CM

## 2025-06-06 DIAGNOSIS — E78.2 MIXED HYPERLIPIDEMIA: ICD-10-CM

## 2025-06-06 DIAGNOSIS — J41.8 MIXED SIMPLE AND MUCOPURULENT CHRONIC BRONCHITIS (HCC): ICD-10-CM

## 2025-06-06 DIAGNOSIS — F41.9 ANXIETY AND DEPRESSION: ICD-10-CM

## 2025-06-06 DIAGNOSIS — G89.4 CHRONIC PAIN SYNDROME: Primary | ICD-10-CM

## 2025-06-06 DIAGNOSIS — R26.81 UNSTEADY GAIT: ICD-10-CM

## 2025-06-06 DIAGNOSIS — Z23 ENCOUNTER FOR IMMUNIZATION: ICD-10-CM

## 2025-06-06 PROCEDURE — G0009 ADMIN PNEUMOCOCCAL VACCINE: HCPCS

## 2025-06-06 PROCEDURE — G2211 COMPLEX E/M VISIT ADD ON: HCPCS | Performed by: FAMILY MEDICINE

## 2025-06-06 PROCEDURE — 99214 OFFICE O/P EST MOD 30 MIN: CPT | Performed by: FAMILY MEDICINE

## 2025-06-06 PROCEDURE — 90677 PCV20 VACCINE IM: CPT

## 2025-06-06 NOTE — PROGRESS NOTES
Name: Morgan Rodriguez      : 1950      MRN: [id number]  Encounter Provider: Antonio Haider MD  Encounter Date: 2025   Encounter department: [facility]  :  Assessment & Plan  Chronic pain syndrome  [facility] pain specialist.        Unsteady gait  Use walker. Fall precautions. FU PT.        Anxiety and depression  [facility] psychiatry.     Orders:    CBC; Future    Comprehensive metabolic panel; Future    Hemoglobin A1C; Future    Lipid panel; Future    TSH, 3rd generation with Free T4 reflex; Future    Mixed simple and mucopurulent chronic bronchitis (HCC)  Stable. Continue advair 250-50 bid.   Orders:    CBC; Future    Comprehensive metabolic panel; Future    Hemoglobin A1C; Future    Lipid panel; Future    TSH, 3rd generation with Free T4 reflex; Future    Primary hypertension  Controlled. DASH diet. Continue metoprolol and nifedipine per cardiology.     Orders:    CBC; Future    Comprehensive metabolic panel; Future    Hemoglobin A1C; Future    Lipid panel; Future    TSH, 3rd generation with Free T4 reflex; Future     Mixed hyperlipidemia  Low fat diet. Continue crestor 40mg qhs per cardiology.     Orders:    CBC; Future    Comprehensive metabolic panel; Future    Hemoglobin A1C; Future    Lipid panel; Future    TSH, 3rd generation with Free T4 reflex; Future     Impaired fasting glucose  Low carb diet. Continue metformin 500mg QD.     Orders:    CBC; Future    Comprehensive metabolic panel; Future    Hemoglobin A1C; Future    Lipid panel; Future    TSH, 3rd generation with Free T4 reflex; Future     Encounter for immunization    Orders:    Pneumococcal Conjugate Vaccine 20-valent (Pcv20)      Reviewed lab in 2025  CMP ok  hgA1C 6.1  Lipid 125/103/41/63 ok    Flu shot yearly.   Got covid19 vaccine and boosters.   Give PCV20 today.   Got shingrix in pharmacy.   PSA yearly per urology. Pros and cons educated pt.   Colonoscopy 3/2017. Repeat in 10 years.   RTO in 6 months.         Falls Plan of  Care: balance, strength, and gait training instructions were provided and referral to physical therapy.       History of Present Illness   HPI    Pt is here by himself.     Chronic lower back pain--for years. Hx of back surgery in 2012 at [facility]. FU pain specialist, got injection but no help.  He is on diclofenac 75mg bid and norco bid per pain specialist.   Felt unsteady. Use walker now. Afraid of falling.     Anxiety/depression/Insomnia----[facility] psychiatry Dr. Dennis Q 3 month.   Anxiety worse because afraid of falling.   He is on cymbalta 120mg QD and gabapentin 300mg am, 300mg noon and 900mg pm per psychiatrist.    He is on abilify 15mg QD now.   Use clonazepam 0.5mg qhs for insomnia.     COPD/chronic bronchitis---Controlled. He is on singulair 10mg QD.   He uses wixela in winter just in case.   Quit smoking in 2000.      IFG---He is on metformin 500mg QD per endocrinology. Denies SE. Tried to eat healthy.      MAT/HTN----[facility] cardiology for MAT, HTN. He is on metoprolol 75mg bid and nifedipine 90mg Qd.  Denies headache, vision change, dizzy, SOB, palpitation or chest pain.     Hyperlipidemia---He is on crestor 40mg qhs per cardiology. Denies side effects.         BPH---[facility] urology Q 6 months for BPH s/p TURP in 2013. Had cystoscopy and dilation in 9/2014. He does not need to self-cath now.   He is on oxybutynin and has dry mouth. He can tolerate.      Quit smoking since 20 years ago.   No alcohol.      Lives with wife. Does all ADL's.   Fell last month. He started to use walker now but he still afraid of falling.     Review of Systems   Constitutional:  Negative for appetite change, chills and fever.   HENT:  Negative for congestion, ear pain, sinus pain and sore throat.    Eyes:  Negative for discharge and itching.   Respiratory:  Negative for apnea, cough, chest tightness, shortness of breath and wheezing.    Cardiovascular:  Negative for chest pain, palpitations and leg swelling.   Gastrointestinal:  Negative for  "abdominal pain, anal bleeding, constipation, diarrhea, nausea and vomiting.   Endocrine: Negative for cold intolerance, heat intolerance and polyuria.   Genitourinary:  Negative for difficulty urinating and dysuria.   Musculoskeletal:  Positive for arthralgias, back pain and gait problem. Negative for myalgias.   Skin:  Negative for rash.   Neurological:  Negative for dizziness and headaches.   Psychiatric/Behavioral:  Negative for agitation.        Objective   /76   Pulse 104   Temp 97.6 °F (36.4 °C) (Tympanic)   Resp 16   Ht 5' 8\" (1.727 m)   Wt 107 kg (236 lb 6.4 oz)   SpO2 95%   BMI 35.94 kg/m²      Physical Exam  Constitutional:       Appearance: He is well-developed.   HENT:      Head: Normocephalic and atraumatic.     Eyes:      General:         Right eye: No discharge.         Left eye: No discharge.      Conjunctiva/sclera: Conjunctivae normal.       Cardiovascular:      Rate and Rhythm: Normal rate and regular rhythm.      Heart sounds: Normal heart sounds. No murmur heard.     No friction rub. No gallop.   Pulmonary:      Effort: Pulmonary effort is normal. No respiratory distress.      Breath sounds: Normal breath sounds. No wheezing or rales.   Abdominal:      General: Bowel sounds are normal. There is no distension.      Palpations: Abdomen is soft.      Tenderness: There is no abdominal tenderness. There is no guarding.     Musculoskeletal:      Cervical back: Normal range of motion and neck supple.      Right lower leg: No edema.      Left lower leg: No edema.      Comments: Use walker     Neurological:      Mental Status: He is alert.         "

## 2025-06-06 NOTE — ASSESSMENT & PLAN NOTE
Low carb diet. Continue metformin 500mg QD.     Orders:    CBC; Future    Comprehensive metabolic panel; Future    Hemoglobin A1C; Future    Lipid panel; Future    TSH, 3rd generation with Free T4 reflex; Future

## 2025-06-06 NOTE — ASSESSMENT & PLAN NOTE
[facility].     Orders:    CBC; Future    Comprehensive metabolic panel; Future    Hemoglobin A1C; Future    Lipid panel; Future    TSH, 3rd generation with Free T4 reflex; Future

## 2025-06-06 NOTE — ASSESSMENT & PLAN NOTE
Stable. Continue advair 250-50 bid.   Orders:    CBC; Future    Comprehensive metabolic panel; Future    Hemoglobin A1C; Future    Lipid panel; Future    TSH, 3rd generation with Free T4 reflex; Future

## 2025-06-06 NOTE — ASSESSMENT & PLAN NOTE
Low fat diet. Continue crestor 40mg qhs per cardiology.     Orders:    CBC; Future    Comprehensive metabolic panel; Future    Hemoglobin A1C; Future    Lipid panel; Future    TSH, 3rd generation with Free T4 reflex; Future

## 2025-06-06 NOTE — ASSESSMENT & PLAN NOTE
Controlled. DASH diet. Continue metoprolol and nifedipine per cardiology.     Orders:    CBC; Future    Comprehensive metabolic panel; Future    Hemoglobin A1C; Future    Lipid panel; Future    TSH, 3rd generation with Free T4 reflex; Future

## 2025-06-07 DIAGNOSIS — E78.2 MIXED HYPERLIPIDEMIA: ICD-10-CM

## 2025-06-07 DIAGNOSIS — J41.1 MUCOPURULENT CHRONIC BRONCHITIS (HCC): ICD-10-CM

## 2025-06-08 RX ORDER — MONTELUKAST SODIUM 10 MG/1
10 TABLET ORAL
Qty: 90 TABLET | Refills: 1 | Status: SHIPPED | OUTPATIENT
Start: 2025-06-08

## 2025-06-08 RX ORDER — ROSUVASTATIN CALCIUM 40 MG/1
40 TABLET, COATED ORAL DAILY
Qty: 90 TABLET | Refills: 1 | Status: SHIPPED | OUTPATIENT
Start: 2025-06-08

## 2025-06-09 ENCOUNTER — TELEPHONE (OUTPATIENT)
Dept: ENDOCRINOLOGY | Facility: CLINIC | Age: 75
End: 2025-06-09

## 2025-06-09 DIAGNOSIS — R73.03 PREDIABETES: ICD-10-CM

## 2025-06-09 RX ORDER — METFORMIN HYDROCHLORIDE 500 MG/1
500 TABLET, EXTENDED RELEASE ORAL
Qty: 90 TABLET | Refills: 3 | OUTPATIENT
Start: 2025-06-09

## 2025-06-10 ENCOUNTER — APPOINTMENT (OUTPATIENT)
Dept: PHYSICAL THERAPY | Facility: REHABILITATION | Age: 75
End: 2025-06-10
Attending: FAMILY MEDICINE
Payer: COMMERCIAL

## 2025-06-10 DIAGNOSIS — F32.A ANXIETY AND DEPRESSION: ICD-10-CM

## 2025-06-10 DIAGNOSIS — F41.9 ANXIETY AND DEPRESSION: ICD-10-CM

## 2025-06-10 RX ORDER — METFORMIN HYDROCHLORIDE 500 MG/1
500 TABLET, EXTENDED RELEASE ORAL
Qty: 90 TABLET | Refills: 0 | OUTPATIENT
Start: 2025-06-10

## 2025-06-10 RX ORDER — CLONAZEPAM 0.5 MG/1
0.5 TABLET ORAL
Qty: 30 TABLET | Refills: 0 | Status: SHIPPED | OUTPATIENT
Start: 2025-06-10

## 2025-06-11 DIAGNOSIS — R73.03 PREDIABETES: ICD-10-CM

## 2025-06-11 RX ORDER — METFORMIN HYDROCHLORIDE 500 MG/1
500 TABLET, EXTENDED RELEASE ORAL
Qty: 90 TABLET | Refills: 1 | Status: SHIPPED | OUTPATIENT
Start: 2025-06-11

## 2025-06-11 RX ORDER — METFORMIN HYDROCHLORIDE 500 MG/1
500 TABLET, EXTENDED RELEASE ORAL
Qty: 90 TABLET | Refills: 1 | Status: SHIPPED | OUTPATIENT
Start: 2025-06-11 | End: 2025-06-11 | Stop reason: SDUPTHER

## 2025-06-13 ENCOUNTER — HOSPITAL ENCOUNTER (OUTPATIENT)
Dept: NON INVASIVE DIAGNOSTICS | Facility: CLINIC | Age: 75
Discharge: HOME | End: 2025-06-13
Attending: INTERNAL MEDICINE
Payer: COMMERCIAL

## 2025-06-13 DIAGNOSIS — I65.23 CAROTID STENOSIS, ASYMPTOMATIC, BILATERAL: ICD-10-CM

## 2025-06-13 PROCEDURE — 93880 EXTRACRANIAL BILAT STUDY: CPT | Performed by: SURGERY

## 2025-06-13 PROCEDURE — 93880 EXTRACRANIAL BILAT STUDY: CPT

## 2025-06-20 ENCOUNTER — APPOINTMENT (OUTPATIENT)
Dept: PHYSICAL THERAPY | Facility: REHABILITATION | Age: 75
End: 2025-06-20
Attending: FAMILY MEDICINE
Payer: COMMERCIAL

## 2025-06-20 NOTE — PROGRESS NOTES
Daily Note     Today's date: 2025  Patient name: Morgan Rodriguez  : 1950  MRN: [id number]  Referring provider: Antonio Haider MD  Dx:   No diagnosis found.                 Subjective: Pt mentioned that his back and LE symptoms have been getting slowly better, he continues to have problems with balance and walking endurance. He mentioned that he began walking with a rollator instead of RW and this has been going well.       Objective: See treatment diary below      Assessment: Tolerated treatment well. Patient would benefit from continued PT. No new exercises were added as pt continues to be appropriately challenged by exercise plan. He was progressed with increased resistance with leg ext as BLE strength is improving. He showed improving foot clearance with marching and step up onto airex this visit. He continues to respond well to Vcs for glute activation with SLS on L side. Continue to progress as tolerated, 1:1 with Theron Claire DPT entirety of tx.      Plan: Continue per plan of care.      Precautions: PMH includes anxiety, arthritis, asthma, a-fib, chronic pain disorder, depression, DM, LBP, HTN, OA, decompression of spinal stenosis of lower thoracic, L hip JORDON, BL TKA, Spinal fusion, R RTC repair      POC expires Unit limit Auth Expiration date PT/OT + Visit Limit?   25 BOMN 25 BOMN         Visit/Unit Tracking  AUTH Status:  Date 4/1 4/7 4/9 4/15 4/22 4/29 5/8 5/15 5/20 6/20   Approved Used 1 2 3 4 5 6 7 8 1 2    Remaining  7 6 5 4 3 2 1 0 7 6      Pertinent Findings:      POC End Date: 25                                                                                          Test / Measure  2025   FOTO (Predicted 42) 30   Lumbar ROM Decreased   BLE Strength  Decreased especially BL ankle      Access Code: [email or web address]  URL: [email or web address]  Date: 2025  Prepared by: Theron Claire    Exercises  - Long Sitting Calf Stretch with Strap  - 1 x daily - 7 x  weekly - 1 sets - 5 reps - 15 seconds hold  - Long Sitting Ankle Plantar Flexion with Resistance  - 1 x daily - 7 x weekly - 3 sets - 10 reps  - Long Sitting Ankle Inversion with Resistance  - 1 x daily - 7 x weekly - 3 sets - 10 reps  - Long Sitting Ankle Eversion with Resistance  - 1 x daily - 7 x weekly - 3 sets - 10 reps  - Seated Toe Raise  - 1 x daily - 7 x weekly - 3 sets - 10 reps  - Standing Heel Raise with Support  - 1 x daily - 7 x weekly - 3 sets - 10 reps    Visit Number: 1 2 3 4 5 6 7 8 - RE 9 10   Manuals 4/1 4/7 4/9 4/15 4/22 4/29 5/8 5/15 5/20 6/20   BL Hip PROM  MC SW                                                 Neuro Re-Ed             Ankle 4-Way 10x ea            TA Recruitment + Bridge    3x5 3x5 3x5 3x5 3x5, 5# 3x6, 5# 8   Supine Tball Press  10x, 5s hold 10x, 5s hold 10x, 5s hold Held        Supine Knee to chest Core       2x6 ea 2x6 ea 2x6 ea 7   Tball Press + Hip flex/abd             Feet Together EO/EC    3x15s hold Held 3x15s hold, weight shift 4 directions 3x15s hold, weight shift 4 directions 3x15s hold, weight shift 4 directions 3x15s hold, weight shift 4 directions    Tandem Balance    2x15s hold ea  2x15s hold ea  1 Lap windows supervision 1 Lap windows supervision 6   E-Stim Ankle DF/EV   3x20 ankle DF, L anterior tib  3x20 ankle DF, L anterior tib 3x20 ankle DF, L anterior tib                     Ther Ex             HEP Review + Pt Edu 10 min       RE     NuStep  7', lvl 5 6' lvl 5  7' lvl 5  7' lvl 5  7' lvl 5  7' lvl 5  7' lvl 5  7' lvl 5  1   Heel/Toe Raises 10x ea  2x10 3x10 3x10 3x10 3x10 3x10 3x10 2   Calf Stretch 5x            LTRs             Eccentric STS  NMES, 3x7 HHA 2x10 HHA, foam  3x8, HHA, foam 3x8, HHA, foam 3x8, HHA, foam 3x8, HHA, foam 3x8, HHA, foam 3x8, HHA, foam 3   Standing March   10x ea, HHA 10x ea, HHA 2x10 ea, HHA  On foam, 10x ea On foam, 10x ea On foam, 10x ea On foam, 10x ea 4   Palloff Press ABCs  10x ea, 7# 5# x10  Hold         Standing Geovanna/Barbara  "Rotation             Side Steps w/ TB   4 laps HHA 4 laps HHA         Fwd Step Ups   6\" [id number]  4\" step, 2x5 ea  4\" step, 2x5 ea  4\" step, 2x5 ea  NV      Barbara Fwd/Bwd Ambulation             Ambulating at Bar       2 laps windows 2 laps windows 2 laps windows 5   SLR   2# 2X10ea  2#, 2x10 2#, 2x10 2#, 2x10 2#, 2x10 2#, 2x10 9   LAQ   4# x10 each   7.5#, 2x15 7.5#, 2x15 8#, 2x15 10#, 2x15 12#, 2x15 10   Ther Activity                                       Gait Training                                       Modalities                                                                  "

## 2025-06-24 ENCOUNTER — OFFICE VISIT (OUTPATIENT)
Dept: PHYSICAL THERAPY | Facility: REHABILITATION | Age: 75
End: 2025-06-24
Attending: FAMILY MEDICINE
Payer: COMMERCIAL

## 2025-06-24 DIAGNOSIS — R26.9 GAIT ABNORMALITY: ICD-10-CM

## 2025-06-24 DIAGNOSIS — M54.16 LUMBAR RADICULOPATHY: Primary | ICD-10-CM

## 2025-06-24 PROCEDURE — 97110 THERAPEUTIC EXERCISES: CPT

## 2025-06-24 PROCEDURE — 97164 PT RE-EVAL EST PLAN CARE: CPT

## 2025-06-24 PROCEDURE — 97112 NEUROMUSCULAR REEDUCATION: CPT

## 2025-06-24 NOTE — PROGRESS NOTES
PT Re-Evaluation     Today's date: 2025  Patient name: Morgan Rodriguez  : 1950  MRN: [id number]  Referring provider: Antonio Haider MD  Dx:   Encounter Diagnosis     ICD-10-CM    1. Lumbar radiculopathy  M54.16       2. Gait abnormality  R26.9                    Assessment  Impairments: abnormal gait, abnormal muscle firing, abnormal or restricted ROM, abnormal movement, activity intolerance, impaired physical strength, lacks appropriate home exercise program, pain with function, safety issue, poor posture , poor body mechanics, unable to perform ADL, participation limitations, activity limitations and endurance  Symptom irritability: moderate    Assessment details: Morgan Rodriguez is a 74 y.o. male attending physical therapy for lumbar radiculopathy. Pt was last treated at the clinic on  but had a lapse in care due to lack of transportation. He continues to show most limitations with walking endurance and static/dynamic balance as displayed by difficulty with standing exercises and limitations with fatigue with walking. Primary impairments continue to include increased pain with functional activities, decreased core/paraspinal and BLE strength, lumbar AROM dysfunction, lumbopelvic motor control dysfunction, decreased endurance, and decreased gait quality which affect his ability to perform ADLs and recreational activities.   Pt would benefit from continued skilled PT interventions to increase functional lower extremity strength, increase pain free ROM, improved endurance, and facilitate return to recreational activities and ADL management/independence with less limitations and pain. 1:1 with Theron Claire DPT entirety of tx.  Barriers to intervention: medical complexity  Understanding of Dx/Px/POC: excellent     Prognosis: fair    Goals  Short term goals:   STGs to be met in 3-4 weeks:  1.  Increase BLE and core/paraspinal strength by half grade or more to increase functional strength.  (Progressing)  2.  Decrease subjective report of pain by 25% or more to improve QoL. (Progressing)  3.  Sit/Stand for recreational activities, or ADLs for >/=30 minutes with minimal to no pain. (Progressing)  4.  Independent with basic HEP. (Met)    Long term goals:  LTG's to be met by DC:  1.  Ambulate community distances with little to no pain or difficulty. (Not met)  2.  Perform all transfers without pain and difficulty. (Not met)  3.  Perform recreational activities / ADLs with little pain or difficulty. (Not met)  4.  Increase strength to 3+/5 or better in BLEs and core/paraspinal musculature. (Progressing)  5.  Independent with advanced HEP. (Not met)  6.  Increase FOTO to predicted value by DC. (Not met)    Plan  Patient would benefit from: skilled physical therapy and PT eval  Referral necessary: No    Planned therapy interventions: abdominal trunk stabilization, balance/weight bearing training, body mechanics training, functional ROM exercises, home exercise program, gait training, joint mobilization, manual therapy, massage, neuromuscular re-education, patient education, postural training, strengthening, therapeutic activities, therapeutic exercise, self care, graded motor, graded exercise, graded activity and nerve gliding    Frequency: 1-2x week  Duration in weeks: 8  Plan of Care expiration date: 8/19/25  Treatment plan discussed with: patient        Subjective  6/24/2025 = PT Re-Evaluation: Pt had a lapse in care due to lack of transportation. He mentioned that he has been practicing his exercises at home and this has been going well. He feels he has regressed in terms of function as he has not been moving as much at home. In terms of improvement, he feels he is 15% improved. He mentioned that his pain medications have been helping as his current pain is a 0/10 at his back. He feels his pain at worst in the lower back was a 7/10. He would like to re-start physical therapy.     5/15/2025 = PT  "Re-Evaluation: Pt believes physical therapy has been helping with his pain and functional limitations as he believes he is 20% improved. Pt mentioned that he feels he has improving control of his ankle as well as gait quality which has been helping with his functional mobility and performance with ADLs. He still feels he  could work on his balance and ambulation without his RW. In terms of pain, his current pain is a 1/10 and the worst it has been in the last week was a 7/10.       HPI: Pt referred to physical therapy for lumbar radiculopathy. Pt mentioned that his back pain began about 14 years ago and he has been dealing with this on and off. He mentioned that he has a history of a decompression surgery that was successful. He reports that his most recent back pain has been bothering him for about 2 months as he fell coming out a restaurant. He mentioned that he has trouble walking at times due to his ankles inverting, this is the reason why he is walking with a RW instead of a cane. He describes a \"burning electric weakness\" from his lower back down both of his legs to his knees.   Pain Location: Lower back  Pain Intensity: Current: 0/10, Worst: 9/10, Best: 0/10  DONAVON: Insidious  DOI: Chronic  Aggravating Factors: Standing, walking  Alleviating Factors: Sitting and laying down  Living Situation: 3SH, lives on 2 stories, Railing assist with steps  Dominant Side: R side  Goals: To walk with less difficulty and limitations with balance  PLOF: Retired, was active in martial arts, has elliptical 10 minutes 4x/week, doing upper body exercises     Objective      Strength and ROM evaluated B from a regional biomechanical perspective and values relevant to this episode recorded in tables below.     ROM:   Joint / Motion  Right: 4/1/2025  Left: 4/1/2025  Right: 6/24/2025 Left: 6/24/2025   Lumbar Flexion  25% limited   25% limited    Lumbar Extension  50% limited   25% limited    Lumbar Sidebending  50% limited 50% limited " 25% limited 25% limited   Hip ER  45 45 45 45   Hip IR  15 15 15 15         Strength: MMT revealed the following findings.  Joint Motion Right: 5/15/2025 Left: 5/15/2025 Right: 6/24/2025 Left: 6/24/2025   Hip Flexion 4+/5 4+/5 4+/5 4+/5   Hip Abduction 4/5 4/5 4/5 4/5   Hip Adduction 4/5 4/5 4/5 4/5   Hip Extension 4/5 4/5 4-/5 4-/5   Knee Extension 4+/5 4+/5 4+/5 4+/5   Knee Flexion 4/5 4/5 4/5 4/5   Ankle Plantarflexion 2+/5 2+/5 2+/5 2+/5   Ankle Dorsiflexion 2+/5 2+/5 2+/5 2+/5        Additional Assessments:  Pain with palpation noted: Along paraspinals of lumbar spine  Gait Assessment: Ambulating with RW, hunched posture, improving foot clearance with swing phase, continues to be challenged when fatigued.     5xSTS = 5/15/2025: no foam, HHA on RW 12.1s  6/24/2025 = no foam, HHA on RW = 12.6s / no hands on RW = 14.7s    TUG = 5/15/2025: 23.41s  6/24/2025 = 29.5s       Precautions: PMH includes anxiety, arthritis, asthma, a-fib, chronic pain disorder, depression, DM, LBP, HTN, OA, decompression of spinal stenosis of lower thoracic, L hip JORDON, BL TKA, Spinal fusion, R RTC repair      POC expires Unit limit Auth Expiration date PT/OT + Visit Limit?   5/27/25 BOMN 9/27/25 BOMN         Visit/Unit Tracking  AUTH Status:  Date 5/8 5/15 5/20 6/24     Approved Used 7 8 1 2      Remaining  1 0 7 6        Pertinent Findings:      POC End Date: 5/27/25                                                                                          Test / Measure  4/1/2025   FOTO (Predicted 42) 30   Lumbar ROM Decreased   BLE Strength  Decreased especially BL ankle      Access Code: [email or web address]  URL: [email or web address]  Date: 04/01/2025  Prepared by: Theron Claire    Exercises  - Long Sitting Calf Stretch with Strap  - 1 x daily - 7 x weekly - 1 sets - 5 reps - 15 seconds hold  - Long Sitting Ankle Plantar Flexion with Resistance  - 1 x daily - 7 x weekly - 3 sets - 10 reps  - Long Sitting Ankle Inversion with  Resistance  - 1 x daily - 7 x weekly - 3 sets - 10 reps  - Long Sitting Ankle Eversion with Resistance  - 1 x daily - 7 x weekly - 3 sets - 10 reps  - Seated Toe Raise  - 1 x daily - 7 x weekly - 3 sets - 10 reps  - Standing Heel Raise with Support  - 1 x daily - 7 x weekly - 3 sets - 10 reps    Visit Number: 7 8 - RE 9 10      Manuals 5/8 5/15 5/20 6/24      BL Hip PROM                                        Neuro Re-Ed          Ankle 4-Way          TA Recruitment + Bridge 3x5 3x5, 5# 3x6, 5# 3x6, 5#      Supine Tball Press          Supine Knee to chest Core 2x6 ea 2x6 ea 2x6 ea 2x6 ea      Tball Press + Hip flex/abd          Feet Together EO/EC 3x15s hold, weight shift 4 directions 3x15s hold, weight shift 4 directions 3x15s hold, weight shift 4 directions 3x15s hold, weight shift 4 directions      Tandem Balance  1 Lap windows supervision 1 Lap windows supervision 1 Lap windows supervision      E-Stim Ankle DF/EV                     Ther Ex          HEP Review + Pt Edu  RE        NuStep 7' lvl 5  7' lvl 5  7' lvl 5  7' lvl 5       Heel/Toe Raises 3x10 3x10 3x10 NV      Calf Stretch          LTRs          Eccentric STS 3x8, HHA, foam 3x8, HHA, foam 3x8, HHA, foam 3x5 no airex      Standing March  On foam, 10x ea On foam, 10x ea On foam, 10x ea No foam, 2x8 ea      Palloff Press ABCs          Standing Tband/Thebes Rotation          Side Steps w/ TB          Fwd Step Ups NV         Barbara Fwd/Bwd Ambulation          Ambulating at Bar 2 laps windows 2 laps windows 2 laps windows       SLR 2#, 2x10 2#, 2x10 2#, 2x10 2#, 2x10      LAQ 8#, 2x15 10#, 2x15 12#, 2x15 12#, 2x15      Ther Activity                              Gait Training                              Modalities

## 2025-07-03 ENCOUNTER — OFFICE VISIT (OUTPATIENT)
Dept: PHYSICAL THERAPY | Facility: REHABILITATION | Age: 75
End: 2025-07-03
Attending: FAMILY MEDICINE
Payer: COMMERCIAL

## 2025-07-03 DIAGNOSIS — M54.16 LUMBAR RADICULOPATHY: Primary | ICD-10-CM

## 2025-07-03 DIAGNOSIS — R26.9 GAIT ABNORMALITY: ICD-10-CM

## 2025-07-03 PROCEDURE — 97112 NEUROMUSCULAR REEDUCATION: CPT

## 2025-07-03 PROCEDURE — 97110 THERAPEUTIC EXERCISES: CPT

## 2025-07-03 NOTE — PROGRESS NOTES
Daily Note     Today's date: 7/3/2025  Patient name: Morgan Rodriguez  : 1950  MRN: [id number]  Referring provider: Antonio Haider MD  Dx:   Encounter Diagnosis     ICD-10-CM    1. Lumbar radiculopathy  M54.16       2. Gait abnormality  R26.9                      Subjective: Pt notes that he fell over the weekend as he was walking with his rollator in his basement and ran over a drain cover and fell.       Objective: See treatment diary below      Assessment: Tolerated treatment well. Patient demonstrated fatigue post treatment and would benefit from continued PT. No new exercises were added as pt continues to be appropriately challenged by current exercise plan. He was progressed with increased resistance with supine bridge and LAQ and responded well without excessive increase in pain or soreness. Pt continues to be a fall risk with balance exercises as displayed by difficulty with walking along bar as well as marching on airex. Continue to progress as tolerated, 1:1 with Theron Claire DPT entirety of tx.      Plan: Continue per plan of care.      Precautions: PMH includes anxiety, arthritis, asthma, a-fib, chronic pain disorder, depression, DM, LBP, HTN, OA, decompression of spinal stenosis of lower thoracic, L hip JORDON, BL TKA, Spinal fusion, R RTC repair      POC expires Unit limit Auth Expiration date PT/OT + Visit Limit?   25 BOMN 25 BOMN         Visit/Unit Tracking  AUTH Status:  Date 5/8 5/15 5/20 6/24 7/3    Approved Used 7 8 1 2 3     Remaining  1 0 7 6 5       Pertinent Findings:      POC End Date: 25                                                                                          Test / Measure  2025   FOTO (Predicted 42) 30   Lumbar ROM Decreased   BLE Strength  Decreased especially BL ankle      Access Code: [email or web address]  URL: [email or web address]  Date: 2025  Prepared by: Theron Claire    Exercises  - Long Sitting Calf Stretch with Strap  - 1 x  daily - 7 x weekly - 1 sets - 5 reps - 15 seconds hold  - Long Sitting Ankle Plantar Flexion with Resistance  - 1 x daily - 7 x weekly - 3 sets - 10 reps  - Long Sitting Ankle Inversion with Resistance  - 1 x daily - 7 x weekly - 3 sets - 10 reps  - Long Sitting Ankle Eversion with Resistance  - 1 x daily - 7 x weekly - 3 sets - 10 reps  - Seated Toe Raise  - 1 x daily - 7 x weekly - 3 sets - 10 reps  - Standing Heel Raise with Support  - 1 x daily - 7 x weekly - 3 sets - 10 reps    *Use Gait belt with dynamic balance exercises   Visit Number: 7 8 - RE 9 10 11     Manuals 5/8 5/15 5/20 6/24 7/3     BL Hip PROM                                        Neuro Re-Ed          Ankle 4-Way          TA Recruitment + Bridge 3x5 3x5, 5# 3x6, 5# 3x6, 5# 3x6, 7.5#     Supine Tball Press          Supine Knee to chest Core 2x6 ea 2x6 ea 2x6 ea 2x6 ea 2x6 ea     Tball Press + Hip flex/abd          Feet Together EO/EC 3x15s hold, weight shift 4 directions 3x15s hold, weight shift 4 directions 3x15s hold, weight shift 4 directions 3x15s hold, weight shift 4 directions 3x15s hold, weight shift 4 directions     Tandem Balance  1 Lap windows supervision 1 Lap windows supervision 1 Lap windows supervision Held     E-Stim Ankle DF/EV                     Ther Ex          HEP Review + Pt Edu  RE        NuStep 7' lvl 5  7' lvl 5  7' lvl 5  7' lvl 5  7' lvl 5      Heel/Toe Raises 3x10 3x10 3x10       Calf Stretch          LTRs          Eccentric STS 3x8, HHA, foam 3x8, HHA, foam 3x8, HHA, foam 3x5 no airex 3x6 no airex      Standing March  On foam, 10x ea On foam, 10x ea On foam, 10x ea No foam, 2x8 ea On foam, HHA 2x10     Palloff Press ABCs          Standing Tband/Belen Rotation          Side Steps w/ TB          Fwd Step Ups NV         Ambulating at Bar 2 laps windows 2 laps windows 2 laps windows  2 laps windows, w/ spc     SLR 2#, 2x10 2#, 2x10 2#, 2x10 2#, 2x10 2#, 2x10     LAQ 8#, 2x15 10#, 2x15 12#, 2x15 12#, 2x15 12.5#, 2x15     Ther  Activity                              Gait Training                              Modalities

## 2025-07-07 ENCOUNTER — APPOINTMENT (OUTPATIENT)
Dept: PHYSICAL THERAPY | Facility: REHABILITATION | Age: 75
End: 2025-07-07
Attending: FAMILY MEDICINE
Payer: COMMERCIAL

## 2025-07-08 DIAGNOSIS — F32.A ANXIETY AND DEPRESSION: ICD-10-CM

## 2025-07-08 DIAGNOSIS — F41.9 ANXIETY AND DEPRESSION: ICD-10-CM

## 2025-07-09 NOTE — PROGRESS NOTES
Name: Morgan Rodriguez      : 1950      MRN: [id number]  Encounter Provider: SHAHANA Nobles  Encounter Date: 7/10/2025   Encounter department: [facility]  :  Assessment & Plan  Lumbar radiculopathy    Orders:    HYDROcodone-acetaminophen (NORCO) 5-325 mg per tablet; Take 1 tablet by mouth 3 (three) times a day as needed for pain Max Daily Amount: 3 tablets    HYDROcodone-acetaminophen (NORCO) 5-325 mg per tablet; Take 1 tablet by mouth 3 (three) times a day as needed for pain Max Daily Amount: 3 tablets Do not start before 2025.    Fusion of lumbosacral spine    Orders:    HYDROcodone-acetaminophen (NORCO) 5-325 mg per tablet; Take 1 tablet by mouth 3 (three) times a day as needed for pain Max Daily Amount: 3 tablets    HYDROcodone-acetaminophen (NORCO) 5-325 mg per tablet; Take 1 tablet by mouth 3 (three) times a day as needed for pain Max Daily Amount: 3 tablets Do not start before 2025.    Spondylolisthesis, acquired    Orders:    HYDROcodone-acetaminophen (NORCO) 5-325 mg per tablet; Take 1 tablet by mouth 3 (three) times a day as needed for pain Max Daily Amount: 3 tablets    HYDROcodone-acetaminophen (NORCO) 5-325 mg per tablet; Take 1 tablet by mouth 3 (three) times a day as needed for pain Max Daily Amount: 3 tablets Do not start before 2025.    Lumbar spondylosis    Orders:    HYDROcodone-acetaminophen (NORCO) 5-325 mg per tablet; Take 1 tablet by mouth 3 (three) times a day as needed for pain Max Daily Amount: 3 tablets    FL spine and pain procedure; Future    HYDROcodone-acetaminophen (NORCO) 5-325 mg per tablet; Take 1 tablet by mouth 3 (three) times a day as needed for pain Max Daily Amount: 3 tablets Do not start before 2025.    Spinal stenosis of lumbar region with neurogenic claudication    Orders:    HYDROcodone-acetaminophen (NORCO) 5-325 mg per tablet; Take 1 tablet by mouth 3 (three) times a day as needed for  pain Max Daily Amount: 3 tablets    HYDROcodone-acetaminophen (NORCO) 5-325 mg per tablet; Take 1 tablet by mouth 3 (three) times a day as needed for pain Max Daily Amount: 3 tablets Do not start before August 8, 2025.    Chronic pain syndrome         Status post lumbar and lumbosacral fusion by anterior technique    Orders:    HYDROcodone-acetaminophen (NORCO) 5-325 mg per tablet; Take 1 tablet by mouth 3 (three) times a day as needed for pain Max Daily Amount: 3 tablets    HYDROcodone-acetaminophen (NORCO) 5-325 mg per tablet; Take 1 tablet by mouth 3 (three) times a day as needed for pain Max Daily Amount: 3 tablets Do not start before August 8, 2025.      The patient has been experiencing moderate to severe axial spine pain that is causing functional deficit.  The pain has been present for at least 3 months and is not improving with conservative care.  Currently the patient is not experiencing any radicular features nor neurogenic claudication.  Non-facet pathology has been ruled out on clinical evaluation. We will schedule the patient for bilateral L3-5 medial branch blocks with intention of moving forward towards radiofrequency ablation if there is an appropriate diagnostic response. The initial blocks will be performed with 2% lidocaine and if an appropriate response is obtained upon review of the patient's pain diary, a confirmatory block will be scheduled.  Complete risks and benefits including bleeding, infection, tissue reaction, nerve injury and allergic reaction were discussed. The patient was agreeable and verbalized an understanding  We can repeat bilateral L3 TFESI as needed.  I discussed with the patient that since there has been moderate to significant improvement in the pain symptoms, we will hold off on any repeat injections at this point in time. However, I reviewed with the patient that if their symptoms should return or worsen,  they should call our office to schedule to discuss repeating the  injection.  I will gently increase Norco 5/325 mg to 1 tablet 3 times daily as needed pain.  The patient was given a 2 month supply of prescriptions with a Do Not Fill date(s) of today and August 8, 2025.    [place] Prescription Drug Monitoring Program report was reviewed and was appropriate    Narcan RX up to date. He is aware of the risk of taking benzodiazepine and opioid medication concurrently.    There are risks associated with opioid medications, including dependence, addiction and tolerance. The patient understands and agrees to use these medications only as prescribed. Potential side effects of the medications include, but are not limited to, constipation, drowsiness, addiction, impaired judgment and risk of fatal overdose if not taken as prescribed. The patient was warned against driving while taking sedation medications.  Sharing medications is a felony. At this point in time, the patient is showing no signs of addiction, abuse, diversion or suicidal ideation.    4.  Patient may continue duloxetine as prescribed  5.  Continue with physical therapy and home exercise program  6.  Continue to follow with orthopedics regarding shoulder complaints as scheduled  7.  Follow-up in 8 weeks or sooner if needed    My impressions and treatment recommendations were discussed in detail with the patient who verbalized understanding and had no further questions.  Discharge instructions were provided. I personally saw and examined the patient and I agree with the above discussed plan of care.    History of Present Illness     Morgan Rodriguez is a 75 y.o. male with a history of ALIF at L5-S1 and [employer] spinal cord stimulator explant last seen in the office on 05/06/2025  who presents to [facility] for a follow up office visit in regards to Low back pain that radiates to the buttocks and bilateral lower extremities.  He denies bowel or bladder incontinence or saddle anesthesia.  The  "patient rates their pain a 4-5 out of 10 on the numeric pain rating scale. Pain is described as Constant, Burning, Sharp, and Shooting. Since last office visit, the patientis status post bilateral L3 TFESI May 7, 2025 with 75% improvement of his pain with an ongoing 50% improvement of his pain at this time.  He states that this injection primarily improved his lower extremity symptoms however he does still continue with axial low back pain.  He is requesting to repeat lumbar medial branch blocks.    The patient continues on Norco 5/325 mg twice daily as needed pain.  He does feel that the medication provides at least 50% relief however unfortunately does not last until his next dose. He also has significant OA in bilateral shoulders and feels the medication also provides relief to this pain. In addition, he manages with diclofenac 75 mg twice daily as needed, and duloxetine 120 mg daily.    Pain Contract Signed: 5/6/2025  Last Urine Drug Screen: 5/6/2025  Kip/J LUIS 5/6/2025    Review of Systems   Respiratory:  Negative for shortness of breath.    Cardiovascular:  Negative for chest pain.   Gastrointestinal:  Negative for constipation, diarrhea, nausea and vomiting.   Musculoskeletal:  Positive for back pain and gait problem. Negative for arthralgias, joint swelling and myalgias.   Skin:  Negative for rash.   Neurological:  Negative for dizziness, seizures and weakness.   All other systems reviewed and are negative.      Medical History Reviewed by provider this encounter:     .       Objective   Ht 5' 8\" (1.727 m)   Wt 107 kg (236 lb)   BMI 35.88 kg/m²      Pain Score:   4  Physical Exam  Constitutional: normal, well developed, well nourished, alert, in no distress and non-toxic and no overt pain behavior.  Eyes: anicteric  HEENT: grossly intact  Neck: supple, symmetric, trachea midline and no masses   Pulmonary: even and unlabored  Cardiovascular: No edema or pitting edema present  Skin: Normal without rashes " or lesions and well hydrated  Psychiatric: Mood and affect appropriate  Neurologic: Cranial Nerves II-XII grossly intact  Musculoskeletal: antalgic and ambulates with a walker.  Positive lumbar facet loading maneuvers

## 2025-07-10 ENCOUNTER — OFFICE VISIT (OUTPATIENT)
Dept: PAIN MEDICINE | Facility: CLINIC | Age: 75
End: 2025-07-10

## 2025-07-10 VITALS — WEIGHT: 236 LBS | BODY MASS INDEX: 35.77 KG/M2 | HEIGHT: 68 IN

## 2025-07-10 DIAGNOSIS — M43.27 FUSION OF LUMBOSACRAL SPINE: ICD-10-CM

## 2025-07-10 DIAGNOSIS — M47.816 LUMBAR SPONDYLOSIS: ICD-10-CM

## 2025-07-10 DIAGNOSIS — M54.16 LUMBAR RADICULOPATHY: Primary | ICD-10-CM

## 2025-07-10 DIAGNOSIS — M43.10 SPONDYLOLISTHESIS, ACQUIRED: ICD-10-CM

## 2025-07-10 DIAGNOSIS — G89.4 CHRONIC PAIN SYNDROME: ICD-10-CM

## 2025-07-10 DIAGNOSIS — Z98.1 STATUS POST LUMBAR AND LUMBOSACRAL FUSION BY ANTERIOR TECHNIQUE: ICD-10-CM

## 2025-07-10 DIAGNOSIS — M48.062 SPINAL STENOSIS OF LUMBAR REGION WITH NEUROGENIC CLAUDICATION: ICD-10-CM

## 2025-07-10 RX ORDER — HYDROCODONE BITARTRATE AND ACETAMINOPHEN 5; 325 MG/1; MG/1
2 TABLET ORAL DAILY
COMMUNITY
End: 2025-07-10 | Stop reason: SDUPTHER

## 2025-07-10 RX ORDER — HYDROCODONE BITARTRATE AND ACETAMINOPHEN 5; 325 MG/1; MG/1
1 TABLET ORAL 3 TIMES DAILY PRN
Qty: 90 TABLET | Refills: 0 | Status: SHIPPED | OUTPATIENT
Start: 2025-07-10

## 2025-07-10 RX ORDER — CLONAZEPAM 0.5 MG/1
0.5 TABLET ORAL
Qty: 30 TABLET | Refills: 0 | Status: SHIPPED | OUTPATIENT
Start: 2025-07-10

## 2025-07-10 RX ORDER — HYDROCODONE BITARTRATE AND ACETAMINOPHEN 5; 325 MG/1; MG/1
1 TABLET ORAL 3 TIMES DAILY PRN
Qty: 90 TABLET | Refills: 0 | Status: SHIPPED | OUTPATIENT
Start: 2025-08-08

## 2025-07-10 NOTE — ASSESSMENT & PLAN NOTE
Orders:    HYDROcodone-acetaminophen (NORCO) 5-325 mg per tablet; Take 1 tablet by mouth 3 (three) times a day as needed for pain Max Daily Amount: 3 tablets    FL spine and pain procedure; Future    HYDROcodone-acetaminophen (NORCO) 5-325 mg per tablet; Take 1 tablet by mouth 3 (three) times a day as needed for pain Max Daily Amount: 3 tablets Do not start before August 8, 2025.

## 2025-07-10 NOTE — TELEPHONE ENCOUNTER
Patient called to request a refill for their clonazepam advised a refill was requested on 07/08/25 and is pending approval. Patient verbalized understanding and is in agreement.     Patient is concerned because he is out of the medication    Does the patient have enough for 3 days?   [] Yes   [x] No - Send as HP to POD

## 2025-07-10 NOTE — PATIENT INSTRUCTIONS
"Patient Education     Taking opioids safely   The Basics   Written by the doctors and editors at [facility]   What are opioids? -- Opioids are a group of prescription medicines that relieve pain. They work by attaching to \"opioid receptors\" in the body and blocking pain signals.  Opioids are sometimes used when other types of pain medicine do not help enough. Opioids can be helpful for treating short-term, or \"acute,\" pain, like after surgery or an injury. They are also sometimes used to treat long-term, or \"chronic,\" pain, like for people with cancer. But they come with risks.  If your doctor prescribes an opioid medicine, it's important to understand the risks and know how to stay safe.  What are the risks of taking opioids? -- You should know that:   Opioids have side effects. Some are just bothersome, and some can be dangerous. For example, taking too much of an opioid is called an \"overdose.\" An overdose can cause serious problems and even death.   In some cases, taking opioids can lead to misuse. For example, people might take the medicine when they don't need it for pain. Or they might take more than they are supposed to. Sharing or selling opioids are other examples of misuse.   There is a risk of addiction. This is also called \"opioid use disorder.\"  If you take too much, or take opioids with alcohol or certain other drugs, it can cause serious harm. It can even cause death from overdose.  How do I stay safe? -- There are things you can do to stay safe if you need to take an opioid medicine (figure 1). These things help protect yourself and others.  Know your medicines:    Opioids come in different forms. \"Immediate-release\" medicines work quickly and last for a short time. \"Extended-release\" medicines work more slowly and last longer. Make sure that you know what type of opioid you have. Read the label and the information that comes with your prescription.   Follow your treatment plan carefully. Take only " "the dose your doctor prescribes, and only as often as they tell you to.   Never take opioids that were not prescribed to you.   Some opioids come combined with other medicines like acetaminophen or an \"NSAID\" (like ibuprofen). Do not take any extra NSAIDs or acetaminophen without talking to your doctor first.   Make sure that all of your doctors know every medicine you take, even those that are non-prescription. Some medicines can affect the way opioids work. Bring a complete list of all of your pain medicines and other medicines with you whenever you go to a doctor, nurse, dentist, or pharmacist.   Ask your doctor or pharmacist if it is safe to take your other medicines with your opioid medicine.  Use and store your medicine safely:    Do not drink alcohol while you are taking opioids.   Do not take opioids with medicines that make you sleepy, unless your doctor tells you to. Examples include:   \"Benzodiazepines\" like diazepam (sample brand name: Valium) or alprazolam (sample brand name: Xanax)   Gabapentin (sample brand name: Neurontin) or pregabalin (brand name: Lyrica)   Muscle relaxants like baclofen or cyclobenzaprine   Sleeping pills like zolpidem (sample brand name: Ambien)   Talk to your doctor about whether it is safe to drive. Opioids can make you feel tired or have trouble thinking clearly. If you are starting a new prescription or taking a higher dose, you might need to avoid things like driving, using dangerous machinery, or other activities that could be risky.   Store your opioids in a safe place, such as a locked cabinet. This prevents children, teens, or anyone else from getting to them.   Never share your opioids with other people.  Be aware of side effects:    Opioid medicines can cause side effects. There are often ways to prevent or treat these.   Call your doctor or nurse if you have side effects that bother you, such as:   Constipation - Your doctor or nurse might suggest that you take a " "laxative to prevent or treat constipation. If your bowel movements are hard and dry, a stool softener might help. Drink plenty of water, and try to get regular physical activity.   Mild nausea or stomach discomfort - Taking the medicine with or after food can help with this. Nausea usually gets better with time.   Severe nausea, vomiting, or itchiness - If you have any of these problems, your doctor might be able to switch you to a different medicine.   Dry mouth   Feeling dizzy or sleepy, or having trouble thinking clearly   Vision problems   Being clumsy or falling down   Know the signs of an opioid overdose. Get help right away if you think that you or someone else took too much of an opioid medicine. Signs of an overdose are listed below.  Stay safe when stopping your opioid medicine:    When opioids are needed to treat acute pain, doctors usually try to prescribe them for only a short time. This usually means a few days or a week. They also prescribe the lowest dose possible to relieve pain.   Follow your doctor's instructions about how to stop taking your opioid once your pain has improved. This usually involves \"tapering,\" or reducing the dose gradually. If you stop an opioid suddenly, this can cause unpleasant symptoms like stomach ache, diarrhea, or shakes. This is called \"withdrawal.\" Tapering the dose can help prevent withdrawal.   When your pain gets better, get rid of any leftover medicines. Your doctor, nurse, or pharmacist can suggest ways to get rid of them. This might involve flushing them down the toilet or mixing them with something like dirt or cat litter, then putting the mixture in a sealed container in the trash. Some police stations and pharmacies also take leftover medicines.  What is naloxone? -- Naloxone is a medicine that reverses the effects of opioids. It can prevent death from an opioid overdose. Naloxone comes as an injection (shot), or as a spray that goes in the nose. Naloxone nasal " spray (brand name: Narcan) is available without a prescription.  If you or someone you know uses opioids, it's a good idea to keep naloxone with you. Make sure that you and your family and friends know how and when to use it.  When should I call for help? -- If you are taking an opioid, it's important to know when to get help. Signs of an opioid overdose include:   Extreme sleepiness   Slow breathing, or no breathing at all   Very small pupils (the black circles in the center of the eyes)   Very slow heartbeat  If you took too much of your opioid medicine or think that someone is having an opioid overdose:   If you have naloxone, give it immediately. Naloxone can save a person's life. But it needs to be given as soon as possible.   Call for an ambulance right away (in the [place] and [place], call 9-1-1).  Call your doctor or nurse if:   You are having side effects that bother you.   You have questions about how to take your medicine.   You are having trouble managing your pain.  All topics are updated as new evidence becomes available and our peer review process is complete.  This topic retrieved from [employer] on: May 15, 2024.  Topic 647825 Version 1.0  Release: 32.4.3 - C32.134  © 2024 UpToDate, Inc. and/or its affiliates. All rights reserved.  figure 1: Tips for medication safety     Tips to use your medicine safely include:  (A) Read labels so you know how to take your medicines.  (B) Have a routine for taking your medicines.  (C) Make sure you know what foods, drinks, and other medicines are safe for you.  (D) Store medicines in a safe place.  (E) Work with your health care team and ask questions if you have them.  Graphic 393923 Version 2.0  Consumer Information Use and Disclaimer   Disclaimer: This generalized information is a limited summary of diagnosis, treatment, and/or medication information. It is not meant to be comprehensive and should be used as a tool to help the user understand and/or assess potential  diagnostic and treatment options. It does NOT include all information about conditions, treatments, medications, side effects, or risks that may apply to a specific patient. It is not intended to be medical advice or a substitute for the medical advice, diagnosis, or treatment of a health care provider based on the health care provider's examination and assessment of a patient's specific and unique circumstances. Patients must speak with a health care provider for complete information about their health, medical questions, and treatment options, including any risks or benefits regarding use of medications. This information does not endorse any treatments or medications as safe, effective, or approved for treating a specific patient. UpToDate, Inc. and its affiliates disclaim any warranty or liability relating to this information or the use thereof.The use of this information is governed by the Terms of Use, available at https://www.woltersI AM ATuwer.com/en/know/clinical-effectiveness-terms. 2024© UpToDate, Inc. and its affiliates and/or licensors. All rights reserved.  Copyright   © 2024 UpToDate, Inc. and/or its affiliates. All rights reserved.

## 2025-07-10 NOTE — ASSESSMENT & PLAN NOTE
Orders:    HYDROcodone-acetaminophen (NORCO) 5-325 mg per tablet; Take 1 tablet by mouth 3 (three) times a day as needed for pain Max Daily Amount: 3 tablets    HYDROcodone-acetaminophen (NORCO) 5-325 mg per tablet; Take 1 tablet by mouth 3 (three) times a day as needed for pain Max Daily Amount: 3 tablets Do not start before August 8, 2025.

## 2025-07-15 ENCOUNTER — OFFICE VISIT (OUTPATIENT)
Dept: PHYSICAL THERAPY | Facility: REHABILITATION | Age: 75
End: 2025-07-15
Attending: FAMILY MEDICINE
Payer: COMMERCIAL

## 2025-07-15 DIAGNOSIS — M54.16 LUMBAR RADICULOPATHY: Primary | ICD-10-CM

## 2025-07-15 DIAGNOSIS — R26.9 GAIT ABNORMALITY: ICD-10-CM

## 2025-07-15 PROCEDURE — 97110 THERAPEUTIC EXERCISES: CPT

## 2025-07-15 PROCEDURE — 97112 NEUROMUSCULAR REEDUCATION: CPT

## 2025-07-17 ENCOUNTER — OFFICE VISIT (OUTPATIENT)
Dept: PHYSICAL THERAPY | Facility: REHABILITATION | Age: 75
End: 2025-07-17
Attending: FAMILY MEDICINE
Payer: COMMERCIAL

## 2025-07-17 DIAGNOSIS — R26.9 GAIT ABNORMALITY: ICD-10-CM

## 2025-07-17 DIAGNOSIS — M54.16 LUMBAR RADICULOPATHY: Primary | ICD-10-CM

## 2025-07-17 PROCEDURE — 97110 THERAPEUTIC EXERCISES: CPT

## 2025-07-17 PROCEDURE — 97112 NEUROMUSCULAR REEDUCATION: CPT

## 2025-07-17 NOTE — PROGRESS NOTES
Daily Note     Today's date: 2025  Patient name: Morgan Rodriguez  : 1950  MRN: [id number]  Referring provider: Antonio Haider MD  Dx:   Encounter Diagnosis     ICD-10-CM    1. Lumbar radiculopathy  M54.16       2. Gait abnormality  R26.9           Start Time: 1400  Stop Time: 1445  Total time in clinic (min): 45 minutes    Subjective: Pt mentioned that he continues to have some problems with balance especially with longer distances. He mentioned that he continues to have some groin pain in the R side.       Objective: See treatment diary below      Assessment: Tolerated treatment well. Patient would benefit from continued PT. No new exercises were added as pt continues to be appropriately challenged by current exercise plan. Pt continues to be most challenged by standing balance and walking exercises due to strength and endurance limitations. He continues to respond well to close supervision with balance exercises due to increased fall risk. 1:1 with Theron Claire DPT entirety of tx.      Plan: Continue per plan of care.      Precautions: PMH includes anxiety, arthritis, asthma, a-fib, chronic pain disorder, depression, DM, LBP, HTN, OA, decompression of spinal stenosis of lower thoracic, L hip JORDON, BL TKA, Spinal fusion, R RTC repair      POC expires Unit limit Auth Expiration date PT/OT + Visit Limit?   25 BOMN 25 BOMN         Visit/Unit Tracking  AUTH Status:  Date 5/8 5/15 5/20 6/24 7/3 7/15 7/17   Approved Used 7 8 1 2 3 4 5    Remaining  1 0 7 6 5 6 5      Pertinent Findings:      POC End Date: 25                                                                                          Test / Measure  2025   FOTO (Predicted 42) 30   Lumbar ROM Decreased   BLE Strength  Decreased especially BL ankle      Access Code: [email or web address]  URL: [email or web address]  Date: 2025  Prepared by: Theron Claire    Exercises  - Long Sitting Calf Stretch with Strap  - 1 x daily  - 7 x weekly - 1 sets - 5 reps - 15 seconds hold  - Long Sitting Ankle Plantar Flexion with Resistance  - 1 x daily - 7 x weekly - 3 sets - 10 reps  - Long Sitting Ankle Inversion with Resistance  - 1 x daily - 7 x weekly - 3 sets - 10 reps  - Long Sitting Ankle Eversion with Resistance  - 1 x daily - 7 x weekly - 3 sets - 10 reps  - Seated Toe Raise  - 1 x daily - 7 x weekly - 3 sets - 10 reps  - Standing Heel Raise with Support  - 1 x daily - 7 x weekly - 3 sets - 10 reps    *Use Gait belt with dynamic balance exercises   Visit Number: 7 8 - RE 9 10 11 12 13   Manuals 5/8 5/15 5/20 6/24 7/3 7/15 7/17   BL Hip PROM                                        Neuro Re-Ed          Ankle 4-Way          TA Recruitment + Bridge 3x5 3x5, 5# 3x6, 5# 3x6, 5# 3x6, 7.5#     Supine Tball Press          Supine Knee to chest Core 2x6 ea 2x6 ea 2x6 ea 2x6 ea 2x6 ea     Tball Press + Hip flex/abd          Rockerboard taps      A/p, m/l  10x ea A/p, m/l  10x ea   hurdles      Fwd w/ SPC-1x  Lat-1x Lat 2x5 ea   Feet Together EO/EC 3x15s hold, weight shift 4 directions 3x15s hold, weight shift 4 directions 3x15s hold, weight shift 4 directions 3x15s hold, weight shift 4 directions 3x15s hold, weight shift 4 directions 3x15s hold, weight shift 4 directions 3x15s hold, weight shift 4 directions   Tandem Balance  1 Lap windows supervision 1 Lap windows supervision 1 Lap windows supervision Held Along mirror w/ SPC  1 lap Held   E-Stim Ankle DF/EV                     Ther Ex          HEP Review + Pt Edu  RE        NuStep 7' lvl 5  7' lvl 5  7' lvl 5  7' lvl 5  7' lvl 5  8' lvl 5 8' lvl 5   Leg press      40#  2x10 Held   Heel/Toe Raises 3x10 3x10 3x10       Calf Stretch          LTRs          Eccentric STS 3x8, HHA, foam 3x8, HHA, foam 3x8, HHA, foam 3x5 no airex 3x6 no airex  3x6 no airex  3x6 no airex    Standing March  On foam, 10x ea On foam, 10x ea On foam, 10x ea No foam, 2x8 ea On foam, HHA 2x10 On foam, HHA 2x10 On foam, HHA 2x10    Palloff Press ABCs          Standing Tband/Trona Rotation          Side Steps w/ TB          Fwd Step Ups NV         Ambulating at Bar 2 laps windows 2 laps windows 2 laps windows  2 laps windows, w/ spc  2 laps windows, w/ spc   SLR 2#, 2x10 2#, 2x10 2#, 2x10 2#, 2x10 2#, 2x10  3#, 2x8   LAQ 8#, 2x15 10#, 2x15 12#, 2x15 12#, 2x15 12.5#, 2x15  12.5#, 2x15   Ther Activity                              Gait Training                              Modalities

## 2025-07-22 ENCOUNTER — APPOINTMENT (OUTPATIENT)
Dept: PHYSICAL THERAPY | Facility: REHABILITATION | Age: 75
End: 2025-07-22
Attending: FAMILY MEDICINE
Payer: COMMERCIAL

## 2025-07-25 ENCOUNTER — OFFICE VISIT (OUTPATIENT)
Dept: PHYSICAL THERAPY | Facility: REHABILITATION | Age: 75
End: 2025-07-25
Attending: FAMILY MEDICINE
Payer: COMMERCIAL

## 2025-07-25 DIAGNOSIS — M54.16 LUMBAR RADICULOPATHY: Primary | ICD-10-CM

## 2025-07-25 DIAGNOSIS — R26.9 GAIT ABNORMALITY: ICD-10-CM

## 2025-07-25 PROCEDURE — 97110 THERAPEUTIC EXERCISES: CPT

## 2025-07-25 PROCEDURE — 97112 NEUROMUSCULAR REEDUCATION: CPT

## 2025-07-25 NOTE — PROGRESS NOTES
Daily Note     Today's date: 2025  Patient name: Morgan Rodriguez  : 1950  MRN: [id number]  Referring provider: Antonio Haider MD  Dx:   Encounter Diagnosis     ICD-10-CM    1. Lumbar radiculopathy  M54.16       2. Gait abnormality  R26.9                      Subjective: Pt reports no significant changes since last visit, has been doing his exercises at home.       Objective: See treatment diary below      Assessment: Tolerated treatment well. Patient would benefit from continued PT. Pt continues to be most challenged by standing dynamic balance exercises especially with standing march on airex as well as side steps over lacho due to ROM and strength/endurance limitations. He continues to respond well to supervision w/ gait belt during standing exercises due to increased fall risk. Continue to progress with leg press slowly NV for functional LE strength. 1:1 with hTeron Claire DPT entirety of tx.      Plan: Continue per plan of care.      Precautions: PMH includes anxiety, arthritis, asthma, a-fib, chronic pain disorder, depression, DM, LBP, HTN, OA, decompression of spinal stenosis of lower thoracic, L hip JORDON, BL TKA, Spinal fusion, R RTC repair      POC expires Unit limit Auth Expiration date PT/OT + Visit Limit?   25 BOMN 25 BOMN         Visit/Unit Tracking  AUTH Status:  Date 5/8 5/15 5/20 6/24 7/3 7/15 7/17 7/25   Approved Used 7 8 1 2 3 4 5 6    Remaining  1 0 7 6 5 6 5 4      Pertinent Findings:      POC End Date: 25                                                                                          Test / Measure  2025   FOTO (Predicted 42) 30   Lumbar ROM Decreased   BLE Strength  Decreased especially BL ankle      Access Code: [email or web address]  URL: [email or web address]  Date: 2025  Prepared by: Theron Claire    Exercises  - Long Sitting Calf Stretch with Strap  - 1 x daily - 7 x weekly - 1 sets - 5 reps - 15 seconds hold  - Long Sitting Ankle Plantar  Flexion with Resistance  - 1 x daily - 7 x weekly - 3 sets - 10 reps  - Long Sitting Ankle Inversion with Resistance  - 1 x daily - 7 x weekly - 3 sets - 10 reps  - Long Sitting Ankle Eversion with Resistance  - 1 x daily - 7 x weekly - 3 sets - 10 reps  - Seated Toe Raise  - 1 x daily - 7 x weekly - 3 sets - 10 reps  - Standing Heel Raise with Support  - 1 x daily - 7 x weekly - 3 sets - 10 reps    *Use Gait belt with dynamic balance exercises   Visit Number: 7 8 - RE 9 10 11 12 13 14   Manuals 5/8 5/15 5/20 6/24 7/3 7/15 7/17 7/25   BL Hip PROM                                            Neuro Re-Ed           Ankle 4-Way           TA Recruitment + Bridge 3x5 3x5, 5# 3x6, 5# 3x6, 5# 3x6, 7.5#      Supine Tball Press           Supine Knee to chest Core 2x6 ea 2x6 ea 2x6 ea 2x6 ea 2x6 ea      Tball Press + Hip flex/abd           Rockerboard taps      A/p, m/l  10x ea A/p, m/l  10x ea A/p, m/l  10x ea   hurdles      Fwd w/ SPC-1x  Lat-1x Lat 2x5 ea Lat 2x5 ea   Feet Together EO/EC 3x15s hold, weight shift 4 directions 3x15s hold, weight shift 4 directions 3x15s hold, weight shift 4 directions 3x15s hold, weight shift 4 directions 3x15s hold, weight shift 4 directions 3x15s hold, weight shift 4 directions 3x15s hold, weight shift 4 directions 3x15s hold, weight shift 4 directions   Tandem Balance  1 Lap windows supervision 1 Lap windows supervision 1 Lap windows supervision Held Along mirror w/ SPC  1 lap Held Along mirror w/ SPC  1 lap   E-Stim Ankle DF/EV                       Ther Ex           HEP Review + Pt Edu  RE         NuStep 7' lvl 5  7' lvl 5  7' lvl 5  7' lvl 5  7' lvl 5  8' lvl 5 8' lvl 5 8' lvl 5   Leg press      40#  2x10 Held NV   Heel/Toe Raises 3x10 3x10 3x10        Calf Stretch           LTRs           Eccentric STS 3x8, HHA, foam 3x8, HHA, foam 3x8, HHA, foam 3x5 no airex 3x6 no airex  3x6 no airex  3x6 no airex  3x6 no airex    Standing March  On foam, 10x ea On foam, 10x ea On foam, 10x ea No foam,  2x8 ea On foam, HHA 2x10 On foam, HHA 2x10 On foam, HHA 2x10 On foam, HHA 2x10   Palloff Press ABCs           Standing Tband/Barbara Rotation           Side Steps w/ TB           Fwd Step Ups NV          Ambulating at Bar 2 laps windows 2 laps windows 2 laps windows  2 laps windows, w/ spc  2 laps windows, w/ spc 2 laps windows, w/ spc   SLR 2#, 2x10 2#, 2x10 2#, 2x10 2#, 2x10 2#, 2x10  3#, 2x8 3#, 2x8   LAQ 8#, 2x15 10#, 2x15 12#, 2x15 12#, 2x15 12.5#, 2x15  12.5#, 2x15 12.5#, 2x15   Ther Activity                                 Gait Training                                 Modalities

## 2025-07-30 ENCOUNTER — OFFICE VISIT (OUTPATIENT)
Dept: PHYSICAL THERAPY | Facility: REHABILITATION | Age: 75
End: 2025-07-30
Attending: FAMILY MEDICINE
Payer: COMMERCIAL

## 2025-07-30 DIAGNOSIS — R26.9 GAIT ABNORMALITY: ICD-10-CM

## 2025-07-30 DIAGNOSIS — M54.16 LUMBAR RADICULOPATHY: Primary | ICD-10-CM

## 2025-07-30 PROCEDURE — 97112 NEUROMUSCULAR REEDUCATION: CPT

## 2025-07-30 PROCEDURE — 97110 THERAPEUTIC EXERCISES: CPT

## 2025-08-01 ENCOUNTER — OFFICE VISIT (OUTPATIENT)
Dept: PHYSICAL THERAPY | Facility: REHABILITATION | Age: 75
End: 2025-08-01
Attending: FAMILY MEDICINE
Payer: COMMERCIAL

## 2025-08-01 DIAGNOSIS — M54.16 LUMBAR RADICULOPATHY: Primary | ICD-10-CM

## 2025-08-01 DIAGNOSIS — R26.9 GAIT ABNORMALITY: ICD-10-CM

## 2025-08-01 PROCEDURE — 97164 PT RE-EVAL EST PLAN CARE: CPT

## 2025-08-01 PROCEDURE — 97112 NEUROMUSCULAR REEDUCATION: CPT

## 2025-08-01 PROCEDURE — 97110 THERAPEUTIC EXERCISES: CPT

## 2025-08-05 ENCOUNTER — HOSPITAL ENCOUNTER (OUTPATIENT)
Dept: RADIOLOGY | Facility: CLINIC | Age: 75
Discharge: HOME | End: 2025-08-05
Attending: NURSE PRACTITIONER
Payer: COMMERCIAL

## 2025-08-05 VITALS
TEMPERATURE: 97.7 F | RESPIRATION RATE: 16 BRPM | SYSTOLIC BLOOD PRESSURE: 145 MMHG | OXYGEN SATURATION: 94 % | HEART RATE: 69 BPM | DIASTOLIC BLOOD PRESSURE: 74 MMHG

## 2025-08-05 DIAGNOSIS — M47.816 LUMBAR SPONDYLOSIS: ICD-10-CM

## 2025-08-05 PROCEDURE — 64494 INJ PARAVERT F JNT L/S 2 LEV: CPT | Performed by: ANESTHESIOLOGY

## 2025-08-05 PROCEDURE — 64493 INJ PARAVERT F JNT L/S 1 LEV: CPT | Performed by: ANESTHESIOLOGY

## 2025-08-05 RX ADMIN — LIDOCAINE HYDROCHLORIDE 3 ML: 20 INJECTION, SOLUTION EPIDURAL; INFILTRATION; INTRACAUDAL at 11:19

## 2025-08-06 ENCOUNTER — PATIENT MESSAGE (OUTPATIENT)
Dept: PAIN MEDICINE | Facility: CLINIC | Age: 75
End: 2025-08-06

## 2025-08-06 DIAGNOSIS — F41.9 ANXIETY AND DEPRESSION: ICD-10-CM

## 2025-08-06 DIAGNOSIS — F32.A ANXIETY AND DEPRESSION: ICD-10-CM

## 2025-08-06 RX ORDER — CLONAZEPAM 0.5 MG/1
0.5 TABLET ORAL
Qty: 30 TABLET | Refills: 0 | Status: SHIPPED | OUTPATIENT
Start: 2025-08-06

## 2025-08-07 ENCOUNTER — TELEPHONE (OUTPATIENT)
Dept: RADIOLOGY | Facility: CLINIC | Age: 75
End: 2025-08-07

## 2025-08-07 DIAGNOSIS — M47.816 LUMBAR SPONDYLOSIS: Primary | ICD-10-CM

## 2025-08-08 ENCOUNTER — OFFICE VISIT (OUTPATIENT)
Dept: PHYSICAL THERAPY | Facility: REHABILITATION | Age: 75
End: 2025-08-08
Attending: FAMILY MEDICINE
Payer: COMMERCIAL

## 2025-08-08 DIAGNOSIS — M54.16 LUMBAR RADICULOPATHY: Primary | ICD-10-CM

## 2025-08-08 DIAGNOSIS — R26.9 GAIT ABNORMALITY: ICD-10-CM

## 2025-08-08 PROCEDURE — 97110 THERAPEUTIC EXERCISES: CPT

## 2025-08-08 PROCEDURE — 97112 NEUROMUSCULAR REEDUCATION: CPT

## 2025-08-13 ENCOUNTER — OFFICE VISIT (OUTPATIENT)
Dept: PHYSICAL THERAPY | Facility: REHABILITATION | Age: 75
End: 2025-08-13
Attending: FAMILY MEDICINE
Payer: COMMERCIAL

## 2025-08-21 ENCOUNTER — OFFICE VISIT (OUTPATIENT)
Dept: PHYSICAL THERAPY | Facility: REHABILITATION | Age: 75
End: 2025-08-21
Attending: FAMILY MEDICINE
Payer: COMMERCIAL

## 2025-08-21 DIAGNOSIS — M54.16 LUMBAR RADICULOPATHY: Primary | ICD-10-CM

## 2025-08-21 DIAGNOSIS — R26.9 GAIT ABNORMALITY: ICD-10-CM

## 2025-08-21 PROCEDURE — 97110 THERAPEUTIC EXERCISES: CPT

## 2025-08-21 PROCEDURE — 97112 NEUROMUSCULAR REEDUCATION: CPT

## (undated) DEVICE — ANTIBACTERIAL VIOLET BRAIDED (POLYGLACTIN 910), SYNTHETIC ABSORBABLE SUTURE: Brand: COATED VICRYL

## (undated) DEVICE — PENCIL ELECTROSURG E-Z CLEAN -0035H

## (undated) DEVICE — ACE WRAP 3 IN VELCRO LATEX FREE

## (undated) DEVICE — SUT MONOCRYL 4-0 PS-2 27 IN Y426H

## (undated) DEVICE — 3M™ IOBAN™ 2 ANTIMICROBIAL INCISE DRAPE 6650EZ: Brand: IOBAN™ 2

## (undated) DEVICE — DRESSING MEPILEX AG BORDER 4 X 4 IN

## (undated) DEVICE — NEEDLE 25G X 1 1/2

## (undated) DEVICE — SKIN MARKER DUAL TIP WITH RULER CAP, FLEXIBLE RULER AND LABELS: Brand: DEVON

## (undated) DEVICE — GLOVE INDICATOR PI UNDERGLOVE SZ 8 BLUE

## (undated) DEVICE — ADHESIVE SKIN HIGH VISCOSITY EXOFIN 1ML

## (undated) DEVICE — INTENDED FOR TISSUE SEPARATION, AND OTHER PROCEDURES THAT REQUIRE A SHARP SURGICAL BLADE TO PUNCTURE OR CUT.: Brand: BARD-PARKER SAFETY BLADES SIZE 10, STERILE

## (undated) DEVICE — BINDER ABDOMINAL 30-45 IN

## (undated) DEVICE — SPONGE PVP SCRUB WING STERILE

## (undated) DEVICE — PROGRAMMER PATIENT THERAPY MANAGER RS2

## (undated) DEVICE — PAD GROUNDING ADULT

## (undated) DEVICE — TUBING SUCTION 5MM X 12 FT

## (undated) DEVICE — TIBURON SPLIT SHEET: Brand: CONVERTORS

## (undated) DEVICE — RECHARGER PRGRMR THERAPY MANAGER RS2

## (undated) DEVICE — DISPOSABLE EQUIPMENT COVER: Brand: SMALL TOWEL DRAPE

## (undated) DEVICE — CHLORAPREP HI-LITE 26ML ORANGE

## (undated) DEVICE — GLOVE SRG BIOGEL 6.5

## (undated) DEVICE — SNAP KOVER: Brand: UNBRANDED

## (undated) DEVICE — GLOVE SRG BIOGEL ECLIPSE 7

## (undated) DEVICE — Device

## (undated) DEVICE — STRL COTTON TIP APPLCTR 6IN PK: Brand: CARDINAL HEALTH

## (undated) DEVICE — SYRINGE EPI 8ML LUER SLIP LOSS OF RESISTANCE PLASTIC PERFIX

## (undated) DEVICE — SUT MONOCRYL PLUS 4-0 PS-2 18 IN MCP496G

## (undated) DEVICE — PROGRAMMER EXTRNL WIRELESS NEURO STIM RS2

## (undated) DEVICE — SPONGE SCRUB 4 PCT CHLORHEXIDINE

## (undated) DEVICE — ELECTRODE BLADE MOD E-Z CLEAN 2.5IN 6.4CM -0012M

## (undated) DEVICE — 3M™ IOBAN™ 2 ANTIMICROBIAL INCISE DRAPE 6640EZ: Brand: IOBAN™ 2

## (undated) DEVICE — SUT PROLENE 4-0 PS-2 18 IN 8682G

## (undated) DEVICE — CERVICAL COLLAR SOFT MED

## (undated) DEVICE — BETHLEHEM UNIVERSAL MINOR GEN: Brand: CARDINAL HEALTH

## (undated) DEVICE — 10FR FRAZIER SUCTION HANDLE: Brand: CARDINAL HEALTH

## (undated) DEVICE — GLOVE SRG BIOGEL 7.5

## (undated) DEVICE — STERILE BETHLEHEM PLASTIC HAND: Brand: CARDINAL HEALTH

## (undated) DEVICE — RETROGRADE KNIFE BOX OF 6: Brand: ECTRA

## (undated) DEVICE — GLOVE INDICATOR PI UNDERGLOVE SZ 7.5 BLUE

## (undated) DEVICE — GLOVE INDICATOR PI UNDERGLOVE SZ 6.5 BLUE

## (undated) DEVICE — SUT SILK 2-0 SH 30 IN K833H

## (undated) DEVICE — TOOL 14MH30 LEGEND 14CM 3MM: Brand: MIDAS REX ™

## (undated) DEVICE — DRAPE C-ARMOUR

## (undated) DEVICE — VIAL DECANTER

## (undated) DEVICE — NEEDLE HYPO 22G X 1-1/2 IN

## (undated) DEVICE — PREP SURGICAL PURPREP 26ML

## (undated) DEVICE — DRAPE C-ARM X-RAY

## (undated) DEVICE — GAUZE SPONGES,16 PLY: Brand: CURITY

## (undated) DEVICE — OCCLUSIVE GAUZE STRIP,3% BISMUTH TRIBROMOPHENATE IN PETROLATUM BLEND: Brand: XEROFORM

## (undated) DEVICE — PAD CAST 4 IN COTTON NON STERILE

## (undated) DEVICE — INTENDED FOR TISSUE SEPARATION, AND OTHER PROCEDURES THAT REQUIRE A SHARP SURGICAL BLADE TO PUNCTURE OR CUT.: Brand: BARD-PARKER SAFETY BLADES SIZE 15, STERILE

## (undated) DEVICE — UTILITY MARKER,BLACK WITH LABELS: Brand: DEVON